# Patient Record
Sex: FEMALE | Race: WHITE | NOT HISPANIC OR LATINO | Employment: OTHER | URBAN - METROPOLITAN AREA
[De-identification: names, ages, dates, MRNs, and addresses within clinical notes are randomized per-mention and may not be internally consistent; named-entity substitution may affect disease eponyms.]

---

## 2018-01-31 ENCOUNTER — TRANSCRIBE ORDERS (OUTPATIENT)
Dept: ADMINISTRATIVE | Facility: HOSPITAL | Age: 58
End: 2018-01-31

## 2018-01-31 DIAGNOSIS — Z12.39 SCREENING BREAST EXAMINATION: Primary | ICD-10-CM

## 2018-02-22 ENCOUNTER — HOSPITAL ENCOUNTER (OUTPATIENT)
Dept: RADIOLOGY | Facility: MEDICAL CENTER | Age: 58
Discharge: HOME | End: 2018-02-22
Payer: COMMERCIAL

## 2018-02-22 DIAGNOSIS — Z12.39 SCREENING BREAST EXAMINATION: ICD-10-CM

## 2018-02-22 PROCEDURE — 77067 SCR MAMMO BI INCL CAD: CPT

## 2018-03-13 NOTE — PROGRESS NOTES
called and spoke with nurse Cirilo Huizar at [facility], [address] [place] [address] office on 3/13/18 concerning  unsuccessful attempts (on 2/23/18 and 3/02/18) at reaching Ms Hensley for diagnostic call back breast imaging  Also a Patient result letter was sent on 2/26/18  Per nurse Cirilo Huizar the office is currently working on getting a breast MRI approved through patients insurance

## 2018-03-26 ENCOUNTER — HOSPITAL ENCOUNTER (OUTPATIENT)
Dept: MAMMOGRAPHY | Facility: CLINIC | Age: 58
Discharge: HOME | End: 2018-03-26
Payer: COMMERCIAL

## 2018-03-26 ENCOUNTER — HOSPITAL ENCOUNTER (OUTPATIENT)
Dept: ULTRASOUND IMAGING | Facility: CLINIC | Age: 58
Discharge: HOME | End: 2018-03-26
Payer: COMMERCIAL

## 2018-03-26 ENCOUNTER — HOSPITAL ENCOUNTER (OUTPATIENT)
Dept: ULTRASOUND IMAGING | Facility: CLINIC | Age: 58
Discharge: HOME | End: 2018-03-26

## 2018-03-26 DIAGNOSIS — R92.8 ABNORMAL SCREENING MAMMOGRAM: ICD-10-CM

## 2018-03-26 PROCEDURE — G0279 TOMOSYNTHESIS, MAMMO: HCPCS

## 2018-03-26 PROCEDURE — 77065 DX MAMMO INCL CAD UNI: CPT

## 2018-03-26 PROCEDURE — 76642 ULTRASOUND BREAST LIMITED: CPT

## 2018-03-26 RX ORDER — LIDOCAINE HYDROCHLORIDE 10 MG/ML
5 INJECTION, SOLUTION INFILTRATION; PERINEURAL ONCE
Status: DISCONTINUED | OUTPATIENT
Start: 2018-03-26 | End: 2018-03-26

## 2018-03-26 NOTE — PROGRESS NOTES
Patient was going to have biopsy today however on pre-procedure VS screen pt BP noted to be 191/118, P 103, 0/10 pain, Dr Remi Glover adv of pt blood pressure, adv against performing procedure with pt blood pressure, adv to contact pt PCP, Dr Mio Pastrana office contacted, adv will see pt right away, pt and pt  adv of plan, given information and adv to contact tomorrow to schedule procedure after PCP appt

## 2018-03-26 NOTE — PROGRESS NOTES
Met with patient and Dr Nadir Rose regarding recommendation for;      _____ RIGHT ___X___LEFT      __X___Ultrasound guided  ______Stereotactic  Breast biopsy  __X___Verbalized understanding        Blood thinners:  _____yes ___X__no    Date stopped: _____N/A______    Biopsy teaching sheet given:  ___X___yes ______no

## 2018-04-03 ENCOUNTER — HOSPITAL ENCOUNTER (OUTPATIENT)
Dept: MAMMOGRAPHY | Facility: CLINIC | Age: 58
Discharge: HOME | End: 2018-04-03

## 2018-04-03 ENCOUNTER — HOSPITAL ENCOUNTER (OUTPATIENT)
Dept: ULTRASOUND IMAGING | Facility: CLINIC | Age: 58
Discharge: HOME | End: 2018-04-03
Admitting: RADIOLOGY
Payer: COMMERCIAL

## 2018-04-03 ENCOUNTER — HOSPITAL ENCOUNTER (OUTPATIENT)
Dept: ULTRASOUND IMAGING | Facility: CLINIC | Age: 58
Discharge: HOME | End: 2018-04-03

## 2018-04-03 VITALS — HEART RATE: 57 BPM | DIASTOLIC BLOOD PRESSURE: 54 MMHG | SYSTOLIC BLOOD PRESSURE: 82 MMHG

## 2018-04-03 DIAGNOSIS — R92.8 ABNORMAL MAMMOGRAM: ICD-10-CM

## 2018-04-03 DIAGNOSIS — R93.89 ABNORMAL ULTRASOUND: ICD-10-CM

## 2018-04-03 PROCEDURE — 76942 ECHO GUIDE FOR BIOPSY: CPT

## 2018-04-03 PROCEDURE — 88341 IMHCHEM/IMCYTCHM EA ADD ANTB: CPT | Performed by: PATHOLOGY

## 2018-04-03 PROCEDURE — 88305 TISSUE EXAM BY PATHOLOGIST: CPT | Performed by: PATHOLOGY

## 2018-04-03 PROCEDURE — 88342 IMHCHEM/IMCYTCHM 1ST ANTB: CPT | Performed by: PATHOLOGY

## 2018-04-03 PROCEDURE — 38505 NEEDLE BIOPSY LYMPH NODES: CPT

## 2018-04-03 PROCEDURE — 19084 BX BREAST ADD LESION US IMAG: CPT

## 2018-04-03 PROCEDURE — 88361 TUMOR IMMUNOHISTOCHEM/COMPUT: CPT | Performed by: PATHOLOGY

## 2018-04-03 PROCEDURE — 19083 BX BREAST 1ST LESION US IMAG: CPT

## 2018-04-03 RX ORDER — VERAPAMIL HYDROCHLORIDE 240 MG/1
240 TABLET, FILM COATED, EXTENDED RELEASE ORAL 2 TIMES DAILY
COMMUNITY
End: 2018-12-21 | Stop reason: HOSPADM

## 2018-04-03 RX ORDER — LIDOCAINE HYDROCHLORIDE 10 MG/ML
5 INJECTION, SOLUTION INFILTRATION; PERINEURAL ONCE
Status: COMPLETED | OUTPATIENT
Start: 2018-04-03 | End: 2018-04-03

## 2018-04-03 RX ORDER — SUMATRIPTAN 100 MG/1
100 TABLET, FILM COATED ORAL ONCE AS NEEDED
COMMUNITY

## 2018-04-03 RX ORDER — VENLAFAXINE 75 MG/1
75 TABLET ORAL
COMMUNITY
End: 2018-05-30 | Stop reason: HOSPADM

## 2018-04-03 RX ORDER — ALPRAZOLAM 0.25 MG/1
0.25 TABLET ORAL 2 TIMES DAILY PRN
COMMUNITY
End: 2018-06-04 | Stop reason: SDDI

## 2018-04-03 RX ADMIN — LIDOCAINE HYDROCHLORIDE 5 ML: 10 INJECTION, SOLUTION INFILTRATION; PERINEURAL at 15:21

## 2018-04-03 RX ADMIN — LIDOCAINE HYDROCHLORIDE 5 ML: 10 INJECTION, SOLUTION INFILTRATION; PERINEURAL at 14:53

## 2018-04-03 RX ADMIN — LIDOCAINE HYDROCHLORIDE 5 ML: 10 INJECTION, SOLUTION INFILTRATION; PERINEURAL at 15:07

## 2018-04-03 NOTE — PROGRESS NOTES
Met with patient and Dr Mauricio Benson regarding recommendation for;      _____ RIGHT __X____LEFT      __X___Ultrasound guided  ______Stereotactic  Breast biopsy  __X___Verbalized understanding        Blood thinners:  _____yes __X___no    Date stopped: ____N/A_______    Biopsy teaching sheet given:  ___X____yes ______no

## 2018-04-03 NOTE — DISCHARGE INSTR - OTHER ORDERS
POST LARGE CORE BREAST BIOPSY PATIENT INFORMATION      1  Place an ice pack inside your bra over the top of the dressing every hour for 20 minutes (20 minutes on, 60 minutes off) Do this until bedtime  2  Do not shower or bathe until the following morning       3  You may bathe your breast carefully with the steri-strips in place  Be careful    Not to loosen them  The steri-strips will fall off in 3-5 days  4  You may have mild discomfort, and you may have some bruising where the   Needle entered the skin  This should clear within 5-7 days  5  If you need medicine for discomfort, take acetaminophen products such as   Tylenol  You may also take Advil or Motrin products  6  Do not participate in strenuous activities such as-tennis, aerobics, skiing,  Weight lifting, etc  for 24 hours  Refrain from swimming for 72 hours  7  Wearing a bra for sleeping may be more comfortable for the first 24-48 hours  8  Watch for continued bleeding, pain or fever over 101     For procedures done at the [address] [place] [address] call:  Zena Mahan RN at [phone], Baldo Franklin RN at [phone] or Laurie Zhou RN at [phone]  After 4 PM call the Interventional Radiology Department at [phone] and ask to speak with the nurse on call  For procedures performed at [address] [place] [address] call: The Radiology Nurse at [phone]    After 4 PM call your physician, or go to the Emergency Department  9          The final results of your biopsy are usually available within one week

## 2018-04-03 NOTE — PROGRESS NOTES
Ice pack given:    ___X__yes _____no    Discharge instructions signed by patient:    ___X__yes _____no    Discharge instructions given to patient:    __X___yes _____no    Discharged via:    _____amulatory    _____wheelchair    _____stretcher    Stable on discharge:    ___X__yes ____no

## 2018-04-03 NOTE — PROGRESS NOTES
Patient arrived via:    __X___ambulatory    _____wheelchair    _____stretcher      Domestic violence screen    ___X___negative______positive    Breast Implants:    _______yes ____X____no

## 2018-04-04 NOTE — PROGRESS NOTES
Post procedure call completed    Bleeding: _____yes __X___no    Pain: _____yes ___X___no    Redness/Swelling: ______yes __X____no    Band aid removed: _____yes ___X__no    Steri-Strips intact: ___X___yes _____no

## 2018-04-04 NOTE — PROGRESS NOTES
Procedure type: (Site A)    __X___ultrasound guided _____stereotactic    Breast:    __X___Left _____Right    Location: 6:00 5cm FN    Needle: 12g Marquee    # of passes: 4    Clip: Wing    Performed by: Oval Freud    Pressure held for 5 minutes by: Clois Rich    Steri Strips:    __X___yes _____no    Band aid:    __X___yes_____no    Tape and guaze:    _____yes __X___no    Tolerated procedure:    __X___yes _____no      Procedure type: (Site B)    __X___ultrasound guided _____stereotactic    Breast:    __X___Left _____Right    Location: 11:00 4cm FN    Needle: 12g Marquee    # of passes: 4    Clip: Cylinder    Performed by: Oval Freud    Pressure held for 5 minutes by: Clois Rich    Steri Strips:    ___X__yes _____no    Alex Apley aid:    __X___yes_____no    Tape and guaze:    _____yes __X___no    Tolerated procedure:    __X___yes _____no      Procedure type: (Site C)    __X___ultrasound guided _____stereotactic    Breast:    ___X__Left _____Right    Location: L Axilla    Needle: Bard 14g    # of passes: 2    Clip: Top Hat    Performed by: Oval Freud    Pressure held for 5 minutes by: Clois Rich    Steri Strips:    ___X__yes _____no    Band aid:    __X___yes_____no    Tape and guaze:    _____yes __X___no    Tolerated procedure:    __X___yes _____no

## 2018-04-06 NOTE — PROGRESS NOTES
Patient Past Medical History: Anxiety, HTN, Migraines    Allergies: NKDA    Past Breast History:     Previous Biopsy:   Yes______ No____X____      Breast: Right____ Left______       Malignant______ Benign_______      Treatments if applicable        Present Breast History: Invasive lobular carcinoma with L axillary metastatic carcinoma     Right__________    Left____X________     Density_________    Calcifications____________   Palpable______________      Patient notified of results on: 4/6/18    Date of Appointment with Surgeon: Dr Macie Chery 4/10/18 at

## 2018-04-10 ENCOUNTER — APPOINTMENT (OUTPATIENT)
Dept: LAB | Facility: CLINIC | Age: 58
End: 2018-04-10
Payer: COMMERCIAL

## 2018-04-10 ENCOUNTER — OFFICE VISIT (OUTPATIENT)
Dept: SURGICAL ONCOLOGY | Facility: CLINIC | Age: 58
End: 2018-04-10
Payer: COMMERCIAL

## 2018-04-10 VITALS
DIASTOLIC BLOOD PRESSURE: 98 MMHG | BODY MASS INDEX: 26.91 KG/M2 | TEMPERATURE: 97.5 F | RESPIRATION RATE: 16 BRPM | SYSTOLIC BLOOD PRESSURE: 160 MMHG | HEIGHT: 65 IN | WEIGHT: 161.5 LBS | HEART RATE: 84 BPM

## 2018-04-10 DIAGNOSIS — C50.812 MALIGNANT NEOPLASM OF OVERLAPPING SITES OF LEFT BREAST IN FEMALE, ESTROGEN RECEPTOR POSITIVE (HCC): Primary | ICD-10-CM

## 2018-04-10 DIAGNOSIS — C50.812 MALIGNANT NEOPLASM OF OVERLAPPING SITES OF LEFT BREAST IN FEMALE, ESTROGEN RECEPTOR POSITIVE (HCC): ICD-10-CM

## 2018-04-10 DIAGNOSIS — Z17.0 MALIGNANT NEOPLASM OF OVERLAPPING SITES OF LEFT BREAST IN FEMALE, ESTROGEN RECEPTOR POSITIVE (HCC): Primary | ICD-10-CM

## 2018-04-10 DIAGNOSIS — Z17.0 MALIGNANT NEOPLASM OF OVERLAPPING SITES OF LEFT BREAST IN FEMALE, ESTROGEN RECEPTOR POSITIVE (HCC): ICD-10-CM

## 2018-04-10 LAB
BUN SERPL-MCNC: 22 MG/DL (ref 5–25)
CREAT SERPL-MCNC: 1.38 MG/DL (ref 0.6–1.3)
GFR SERPL CREATININE-BSD FRML MDRD: 43 ML/MIN/1.73SQ M

## 2018-04-10 PROCEDURE — 84520 ASSAY OF UREA NITROGEN: CPT

## 2018-04-10 PROCEDURE — 82565 ASSAY OF CREATININE: CPT

## 2018-04-10 PROCEDURE — 99245 OFF/OP CONSLTJ NEW/EST HI 55: CPT | Performed by: SURGERY

## 2018-04-10 PROCEDURE — 36415 COLL VENOUS BLD VENIPUNCTURE: CPT

## 2018-04-10 NOTE — LETTER
April 10, 2018     Consuelo [facility], [address]  [facility]    Patient: Isa Iglesias   YOB: 1960   Date of Visit: 4/10/2018       Dear Dr José Ramirez: Thank you for referring Olayinka Eddy to me for evaluation  Below are my notes for this consultation  If you have questions, please do not hesitate to call me  I look forward to following your patient along with you  Sincerely,        Melanie Hill MD        CC: No Recipients  Melanie Hill MD  4/10/2018 11:31 AM  Sign at close encounter               Surgical Oncology Follow Up       [address] [place] [id number]    Isa Iglesias  1960  [id number]  [address] [place] [address],[address]  [facility] [address] [address]      Chief Complaint:     Chief Complaint   Patient presents with    Breast Cancer       Assessment and Plan:   Assessment/Plan   Left invasive breast cancer, stage II (T2, N1) multifocal  Metastatic workup, if negative left modified radical mastectomy followed by chemotherapy and radiation therapy    Oncology History:        Malignant neoplasm of overlapping sites of left breast in female, estrogen receptor positive ([facility] [address] )    4/3/2018 Initial Diagnosis     Left breast biopsy  4 6 cm in size on ultrasound largest tumor   Site A:  6:00 o clock 5 CMFN-Invasive Lobular grade 2  Site B:  11:00 o'clock 4 CMFN Invasive Lobular grade 2  Lymph node:  Positive for metastatic cancer  ER 70  OK 15  Her 2 1+   Performed on site A specimen  Stage IIA            History of Present Illness: This is a 45-year-old woman who had a mammogram in the distant past which was negative  She recently appreciated a palpable abnormality in her left breast and sought medical attention    She had a diagnostic mammogram which showed no abnormalities on the right side however on the left side there were 2 masses both measuring over 4 cm, she also had suspicious adenopathy with lymph nodes measuring approximately 1 2 cm in size 1 lymph node in 2 of the masses were biopsied  They all came back with breast cancer  The breast cancer is invasive lobular, ER 70% NV 15%, grade 2, HER2 1+/negative  Patient presents now for an opinion regarding further management  She has no family history of breast cancer  Review of Systems:   Review of Systems   Constitutional: Negative for activity change, appetite change, fatigue and unexpected weight change  HENT: Negative for ear pain, tinnitus, trouble swallowing and voice change  Eyes: Negative for pain and visual disturbance  Respiratory: Negative for cough, shortness of breath, wheezing and stridor  Cardiovascular: Negative for chest pain, palpitations and leg swelling  Gastrointestinal: Negative for abdominal distention, abdominal pain and blood in stool  Endocrine: Negative for cold intolerance and heat intolerance  Genitourinary: Negative for difficulty urinating, dysuria, flank pain and hematuria  Musculoskeletal: Negative for arthralgias, back pain, gait problem and joint swelling  Skin: Negative for color change, rash and wound  Allergic/Immunologic: Negative for immunocompromised state  Neurological: Negative for dizziness, seizures, speech difficulty, weakness and headaches  Hematological: Negative for adenopathy  Psychiatric/Behavioral: Negative for confusion         Past Medical History:      Patient Active Problem List   Diagnosis    Malignant neoplasm of overlapping sites of left breast in female, estrogen receptor positive ([facility] [place] [address] )        Past Medical History:   Diagnosis Date    Hypertension         Past Surgical History:   Procedure Laterality Date    COLON SURGERY          Family History   Problem Relation Age of Onset    No Known Problems Mother     No Known Problems Father         Social History     Social History    Marital status: /Civil Union     Spouse name: N/A    Number of children: N/A    Years of education: N/A     Occupational History    Not on file  Social History Main Topics    Smoking status: Never Smoker    Smokeless tobacco: Never Used    Alcohol use No    Drug use: No    Sexual activity: Not on file     Other Topics Concern    Not on file     Social History Narrative    No narrative on file        Current Outpatient Prescriptions:     ALPRAZolam (XANAX) 0 25 mg tablet, Take 0 25 mg by mouth 2 (two) times a day as needed for anxiety, Disp: , Rfl:     SUMAtriptan (IMITREX) 100 mg tablet, Take 100 mg by mouth once as needed for migraine, Disp: , Rfl:     venlafaxine (EFFEXOR) 75 mg tablet, Take 75 mg by mouth daily, Disp: , Rfl:     verapamil (CALAN-SR) 240 mg CR tablet, Take 240 mg by mouth 2 (two) times a day, Disp: , Rfl:    No Known Allergies    Physical Exam:     Vitals:    04/10/18 1048   BP: 160/98   Pulse: 84   Resp: 16   Temp: 97 5 °F (36 4 °C)     Physical Exam   Constitutional: She is oriented to person, place, and time  She appears well-developed and well-nourished  HENT:   Head: Normocephalic and atraumatic  Mouth/Throat: Oropharynx is clear and moist    Eyes: EOM are normal  Pupils are equal, round, and reactive to light  Neck: Normal range of motion  Neck supple  No JVD present  No tracheal deviation present  No thyromegaly present  Cardiovascular: Normal rate, regular rhythm, normal heart sounds and intact distal pulses  Exam reveals no gallop and no friction rub  No murmur heard  Pulmonary/Chest: Effort normal and breath sounds normal  No respiratory distress  She has no wheezes  She has no rales  Examination the right breast was entirely normal, specifically no dominant masses skin changes or axillary adenopathy  Examination of the left breast demonstrates considerable ecchymosis    There is a large dominant mass in the mid to lower inner quadrant with additional mass above and lateral to this  There is subtle adenopathy on clinical exam which is consistent with her biopsy  Abdominal: Soft  She exhibits no distension and no mass  There is no hepatomegaly  There is no tenderness  There is no rebound and no guarding  Musculoskeletal: Normal range of motion  She exhibits no edema or tenderness  Lymphadenopathy:     She has no cervical adenopathy  Neurological: She is alert and oriented to person, place, and time  No cranial nerve deficit  Skin: Skin is warm and dry  No rash noted  No erythema  Psychiatric: She has a normal mood and affect  Her behavior is normal    Vitals reviewed  Results:   Pathology:  Juancarlos stage IIA left invasive lobular carcinoma, grade 2  Imaging  I reviewed her mammogram ultrasound and post biopsy films  Discussion/Summary:   Clinically this is a stage II however on clinical exam she has relatively extensive disease consequently I have recommended a metastatic workup  One lymph node was biopsied though at least 3 were suspicious on ultrasound exam   Presuming she has no metastatic disease and I have recommended a modified radical mastectomy followed by chemotherapy and radiation therapy  We talked about possible reconstruction though I would recommend this initially  I explained the patient could be reconstructed later after everything was over though she is not interested did this  We will see her back following her metastatic workup for definitive surgical planning  All questions were answered the patient's satisfaction  Advance Care Planning/Advance Directives:  I discussed the disease status, treatment plans and follow-up with the patient

## 2018-04-10 NOTE — PROGRESS NOTES
Surgical Oncology Follow Up       [facility] [address] [address]    Betsey Reynoso  1960  [id number]  [address] [address] [address]      Chief Complaint:     Chief Complaint   Patient presents with    Breast Cancer       Assessment and Plan:   Assessment/Plan   Left invasive breast cancer, stage II (T2, N1) multifocal  Metastatic workup, if negative left modified radical mastectomy followed by chemotherapy and radiation therapy    Oncology History:        Malignant neoplasm of overlapping sites of left breast in female, estrogen receptor positive ([facility] [address] )    4/3/2018 Initial Diagnosis     Left breast biopsy  4 6 cm in size on ultrasound largest tumor   Site A:  6:00 o clock 5 CMFN-Invasive Lobular grade 2  Site B:  11:00 o'clock 4 CMFN Invasive Lobular grade 2  Lymph node:  Positive for metastatic cancer  ER 70  NV 15  Her 2 1+   Performed on site A specimen  Stage IIA            History of Present Illness: This is a 52-year-old woman who had a mammogram in the distant past which was negative  She recently appreciated a palpable abnormality in her left breast and sought medical attention  She had a diagnostic mammogram which showed no abnormalities on the right side however on the left side there were 2 masses both measuring over 4 cm, she also had suspicious adenopathy with lymph nodes measuring approximately 1 2 cm in size 1 lymph node in 2 of the masses were biopsied  They all came back with breast cancer  The breast cancer is invasive lobular, ER 70% NV 15%, grade 2, HER2 1+/negative  Patient presents now for an opinion regarding further management  She has no family history of breast cancer      Review of Systems:   Review of Systems   Constitutional: Negative for activity change, appetite change, fatigue and unexpected weight change  HENT: Negative for ear pain, tinnitus, trouble swallowing and voice change  Eyes: Negative for pain and visual disturbance  Respiratory: Negative for cough, shortness of breath, wheezing and stridor  Cardiovascular: Negative for chest pain, palpitations and leg swelling  Gastrointestinal: Negative for abdominal distention, abdominal pain and blood in stool  Endocrine: Negative for cold intolerance and heat intolerance  Genitourinary: Negative for difficulty urinating, dysuria, flank pain and hematuria  Musculoskeletal: Negative for arthralgias, back pain, gait problem and joint swelling  Skin: Negative for color change, rash and wound  Allergic/Immunologic: Negative for immunocompromised state  Neurological: Negative for dizziness, seizures, speech difficulty, weakness and headaches  Hematological: Negative for adenopathy  Psychiatric/Behavioral: Negative for confusion  Past Medical History:      Patient Active Problem List   Diagnosis    Malignant neoplasm of overlapping sites of left breast in female, estrogen receptor positive ([facility] [address] )        Past Medical History:   Diagnosis Date    Hypertension         Past Surgical History:   Procedure Laterality Date    COLON SURGERY          Family History   Problem Relation Age of Onset    No Known Problems Mother     No Known Problems Father         Social History     Social History    Marital status: /Civil Union     Spouse name: N/A    Number of children: N/A    Years of education: N/A     Occupational History    Not on file       Social History Main Topics    Smoking status: Never Smoker    Smokeless tobacco: Never Used    Alcohol use No    Drug use: No    Sexual activity: Not on file     Other Topics Concern    Not on file     Social History Narrative    No narrative on file        Current Outpatient Prescriptions:     ALPRAZolam (XANAX) 0 25 mg tablet, Take 0 25 mg by mouth 2 (two) times a day as needed for anxiety, Disp: , Rfl:     SUMAtriptan (IMITREX) 100 mg tablet, Take 100 mg by mouth once as needed for migraine, Disp: , Rfl:     venlafaxine (EFFEXOR) 75 mg tablet, Take 75 mg by mouth daily, Disp: , Rfl:     verapamil (CALAN-SR) 240 mg CR tablet, Take 240 mg by mouth 2 (two) times a day, Disp: , Rfl:    No Known Allergies    Physical Exam:     Vitals:    04/10/18 1048   BP: 160/98   Pulse: 84   Resp: 16   Temp: 97 5 °F (36 4 °C)     Physical Exam   Constitutional: She is oriented to person, place, and time  She appears well-developed and well-nourished  HENT:   Head: Normocephalic and atraumatic  Mouth/Throat: Oropharynx is clear and moist    Eyes: EOM are normal  Pupils are equal, round, and reactive to light  Neck: Normal range of motion  Neck supple  No JVD present  No tracheal deviation present  No thyromegaly present  Cardiovascular: Normal rate, regular rhythm, normal heart sounds and intact distal pulses  Exam reveals no gallop and no friction rub  No murmur heard  Pulmonary/Chest: Effort normal and breath sounds normal  No respiratory distress  She has no wheezes  She has no rales  Examination the right breast was entirely normal, specifically no dominant masses skin changes or axillary adenopathy  Examination of the left breast demonstrates considerable ecchymosis  There is a large dominant mass in the mid to lower inner quadrant with additional mass above and lateral to this  There is subtle adenopathy on clinical exam which is consistent with her biopsy  Abdominal: Soft  She exhibits no distension and no mass  There is no hepatomegaly  There is no tenderness  There is no rebound and no guarding  Musculoskeletal: Normal range of motion  She exhibits no edema or tenderness  Lymphadenopathy:     She has no cervical adenopathy  Neurological: She is alert and oriented to person, place, and time  No cranial nerve deficit  Skin: Skin is warm and dry  No rash noted   No erythema  Psychiatric: She has a normal mood and affect  Her behavior is normal    Vitals reviewed  Results:   Pathology:  Juancarlos stage IIA left invasive lobular carcinoma, grade 2  Imaging  I reviewed her mammogram ultrasound and post biopsy films  Discussion/Summary:   Clinically this is a stage II however on clinical exam she has relatively extensive disease consequently I have recommended a metastatic workup  One lymph node was biopsied though at least 3 were suspicious on ultrasound exam   Presuming she has no metastatic disease and I have recommended a modified radical mastectomy followed by chemotherapy and radiation therapy  We talked about possible reconstruction though I would recommend this initially  I explained the patient could be reconstructed later after everything was over though she is not interested did this  We will see her back following her metastatic workup for definitive surgical planning  All questions were answered the patient's satisfaction  Advance Care Planning/Advance Directives:  I discussed the disease status, treatment plans and follow-up with the patient

## 2018-04-11 ENCOUNTER — HOSPITAL ENCOUNTER (OUTPATIENT)
Dept: NUCLEAR MEDICINE | Facility: HOSPITAL | Age: 58
Discharge: HOME | End: 2018-04-11
Attending: SURGERY
Payer: COMMERCIAL

## 2018-04-11 ENCOUNTER — TELEPHONE (OUTPATIENT)
Dept: SURGICAL ONCOLOGY | Facility: CLINIC | Age: 58
End: 2018-04-11

## 2018-04-11 ENCOUNTER — APPOINTMENT (OUTPATIENT)
Dept: NUCLEAR MEDICINE | Facility: HOSPITAL | Age: 58
End: 2018-04-11
Attending: SURGERY
Payer: COMMERCIAL

## 2018-04-11 ENCOUNTER — HOSPITAL ENCOUNTER (OUTPATIENT)
Dept: CT IMAGING | Facility: HOSPITAL | Age: 58
Discharge: HOME | End: 2018-04-11
Attending: SURGERY
Payer: COMMERCIAL

## 2018-04-11 DIAGNOSIS — C50.812 MALIGNANT NEOPLASM OF OVERLAPPING SITES OF LEFT BREAST IN FEMALE, ESTROGEN RECEPTOR POSITIVE (HCC): ICD-10-CM

## 2018-04-11 DIAGNOSIS — Z17.0 MALIGNANT NEOPLASM OF OVERLAPPING SITES OF LEFT BREAST IN FEMALE, ESTROGEN RECEPTOR POSITIVE (HCC): ICD-10-CM

## 2018-04-11 PROCEDURE — A9503 TC99M MEDRONATE: HCPCS

## 2018-04-11 PROCEDURE — 74177 CT ABD & PELVIS W/CONTRAST: CPT

## 2018-04-11 PROCEDURE — 78306 BONE IMAGING WHOLE BODY: CPT

## 2018-04-11 PROCEDURE — 71260 CT THORAX DX C+: CPT

## 2018-04-11 RX ADMIN — IODIXANOL 80 ML: 320 INJECTION, SOLUTION INTRAVASCULAR at 07:27

## 2018-04-12 ENCOUNTER — TELEPHONE (OUTPATIENT)
Dept: UROLOGY | Facility: AMBULATORY SURGERY CENTER | Age: 58
End: 2018-04-12

## 2018-04-12 ENCOUNTER — TELEPHONE (OUTPATIENT)
Dept: SURGICAL ONCOLOGY | Facility: CLINIC | Age: 58
End: 2018-04-12

## 2018-04-12 DIAGNOSIS — N13.5 URETERAL OBSTRUCTION, RIGHT: ICD-10-CM

## 2018-04-12 DIAGNOSIS — Z17.0 MALIGNANT NEOPLASM OF OVERLAPPING SITES OF LEFT BREAST IN FEMALE, ESTROGEN RECEPTOR POSITIVE (HCC): Primary | ICD-10-CM

## 2018-04-12 DIAGNOSIS — N13.1 HYDRONEPHROSIS DUE TO OBSTRUCTION OF URETER: Primary | ICD-10-CM

## 2018-04-12 DIAGNOSIS — C50.812 MALIGNANT NEOPLASM OF OVERLAPPING SITES OF LEFT BREAST IN FEMALE, ESTROGEN RECEPTOR POSITIVE (HCC): Primary | ICD-10-CM

## 2018-04-12 NOTE — TELEPHONE ENCOUNTER
I spoke with her  area regarding her findings of abnormal kidney function as well as the need for an additional test which would be a PET scan  He will try to have her call me later today on my cell phone  In the meantime we will place order for PET scan

## 2018-04-12 NOTE — TELEPHONE ENCOUNTER
Spoke with Dr Paty Juarez office, they would like to set up a new patient appointment for patient  She had cat scan done  Cat scan in Epic- please review and advise  Patient would like [facility]

## 2018-04-12 NOTE — TELEPHONE ENCOUNTER
Pt called and scheduled for appt with Dr Danielle Treviño at [facility] in approximately 2 weeks per Jorge Butler

## 2018-04-16 ENCOUNTER — HOSPITAL ENCOUNTER (OUTPATIENT)
Dept: RADIOLOGY | Age: 58
Discharge: HOME | End: 2018-04-16
Payer: COMMERCIAL

## 2018-04-16 DIAGNOSIS — C50.812 MALIGNANT NEOPLASM OF OVERLAPPING SITES OF LEFT BREAST IN FEMALE, ESTROGEN RECEPTOR POSITIVE (HCC): ICD-10-CM

## 2018-04-16 DIAGNOSIS — Z17.0 MALIGNANT NEOPLASM OF OVERLAPPING SITES OF LEFT BREAST IN FEMALE, ESTROGEN RECEPTOR POSITIVE (HCC): ICD-10-CM

## 2018-04-16 LAB — GLUCOSE SERPL-MCNC: 106 MG/DL (ref 65–140)

## 2018-04-16 PROCEDURE — 78815 PET IMAGE W/CT SKULL-THIGH: CPT

## 2018-04-16 PROCEDURE — A9552 F18 FDG: HCPCS

## 2018-04-16 PROCEDURE — 82948 REAGENT STRIP/BLOOD GLUCOSE: CPT

## 2018-04-16 RX ADMIN — IOHEXOL 5 ML: 240 INJECTION, SOLUTION INTRATHECAL; INTRAVASCULAR; INTRAVENOUS; ORAL at 13:30

## 2018-04-17 ENCOUNTER — TELEPHONE (OUTPATIENT)
Dept: SURGICAL ONCOLOGY | Facility: CLINIC | Age: 58
End: 2018-04-17

## 2018-04-17 NOTE — TELEPHONE ENCOUNTER
I contacted the urologist that Pete is going to see on 4/30 and described the CT findings of the kidney, I explained that she is asymptomatic  There is nothing we need to do prior to surgery, but possibly if she undergoes chemotherapy

## 2018-04-20 ENCOUNTER — TRANSCRIBE ORDERS (OUTPATIENT)
Dept: LAB | Facility: CLINIC | Age: 58
End: 2018-04-20

## 2018-04-20 ENCOUNTER — APPOINTMENT (OUTPATIENT)
Dept: LAB | Facility: CLINIC | Age: 58
End: 2018-04-20
Payer: COMMERCIAL

## 2018-04-20 ENCOUNTER — OFFICE VISIT (OUTPATIENT)
Dept: SURGICAL ONCOLOGY | Facility: CLINIC | Age: 58
End: 2018-04-20
Payer: COMMERCIAL

## 2018-04-20 ENCOUNTER — OFFICE VISIT (OUTPATIENT)
Dept: LAB | Facility: CLINIC | Age: 58
End: 2018-04-20
Payer: COMMERCIAL

## 2018-04-20 VITALS
DIASTOLIC BLOOD PRESSURE: 110 MMHG | HEIGHT: 65 IN | WEIGHT: 158 LBS | SYSTOLIC BLOOD PRESSURE: 180 MMHG | HEART RATE: 94 BPM | TEMPERATURE: 98.1 F | RESPIRATION RATE: 16 BRPM | BODY MASS INDEX: 26.33 KG/M2

## 2018-04-20 DIAGNOSIS — C50.812 MALIGNANT NEOPLASM OF OVERLAPPING SITES OF LEFT BREAST IN FEMALE, ESTROGEN RECEPTOR POSITIVE (HCC): Primary | ICD-10-CM

## 2018-04-20 DIAGNOSIS — C50.812 MALIGNANT NEOPLASM OF OVERLAPPING SITES OF LEFT BREAST IN FEMALE, ESTROGEN RECEPTOR POSITIVE (HCC): ICD-10-CM

## 2018-04-20 DIAGNOSIS — Z17.0 MALIGNANT NEOPLASM OF OVERLAPPING SITES OF LEFT BREAST IN FEMALE, ESTROGEN RECEPTOR POSITIVE (HCC): ICD-10-CM

## 2018-04-20 DIAGNOSIS — Z17.0 MALIGNANT NEOPLASM OF OVERLAPPING SITES OF LEFT BREAST IN FEMALE, ESTROGEN RECEPTOR POSITIVE (HCC): Primary | ICD-10-CM

## 2018-04-20 LAB
ALBUMIN SERPL BCP-MCNC: 4.5 G/DL (ref 3.5–5)
ALP SERPL-CCNC: 91 U/L (ref 46–116)
ALT SERPL W P-5'-P-CCNC: 44 U/L (ref 12–78)
ANION GAP SERPL CALCULATED.3IONS-SCNC: 12 MMOL/L (ref 4–13)
AST SERPL W P-5'-P-CCNC: 42 U/L (ref 5–45)
BASOPHILS # BLD AUTO: 0.04 THOUSANDS/ΜL (ref 0–0.1)
BASOPHILS NFR BLD AUTO: 1 % (ref 0–1)
BILIRUB SERPL-MCNC: 0.5 MG/DL (ref 0.2–1)
BUN SERPL-MCNC: 23 MG/DL (ref 5–25)
CALCIUM SERPL-MCNC: 9.2 MG/DL (ref 8.3–10.1)
CHLORIDE SERPL-SCNC: 99 MMOL/L (ref 100–108)
CO2 SERPL-SCNC: 28 MMOL/L (ref 21–32)
CREAT SERPL-MCNC: 1.42 MG/DL (ref 0.6–1.3)
EOSINOPHIL # BLD AUTO: 0.26 THOUSAND/ΜL (ref 0–0.61)
EOSINOPHIL NFR BLD AUTO: 5 % (ref 0–6)
ERYTHROCYTE [DISTWIDTH] IN BLOOD BY AUTOMATED COUNT: 12.9 % (ref 11.6–15.1)
GFR SERPL CREATININE-BSD FRML MDRD: 41 ML/MIN/1.73SQ M
GLUCOSE SERPL-MCNC: 102 MG/DL (ref 65–140)
HCT VFR BLD AUTO: 42.7 % (ref 34.8–46.1)
HGB BLD-MCNC: 14.6 G/DL (ref 11.5–15.4)
LYMPHOCYTES # BLD AUTO: 0.92 THOUSANDS/ΜL (ref 0.6–4.47)
LYMPHOCYTES NFR BLD AUTO: 17 % (ref 14–44)
MCH RBC QN AUTO: 33.7 PG (ref 26.8–34.3)
MCHC RBC AUTO-ENTMCNC: 34.2 G/DL (ref 31.4–37.4)
MCV RBC AUTO: 99 FL (ref 82–98)
MONOCYTES # BLD AUTO: 0.39 THOUSAND/ΜL (ref 0.17–1.22)
MONOCYTES NFR BLD AUTO: 7 % (ref 4–12)
NEUTROPHILS # BLD AUTO: 3.8 THOUSANDS/ΜL (ref 1.85–7.62)
NEUTS SEG NFR BLD AUTO: 70 % (ref 43–75)
PLATELET # BLD AUTO: 270 THOUSANDS/UL (ref 149–390)
PMV BLD AUTO: 10.7 FL (ref 8.9–12.7)
POTASSIUM SERPL-SCNC: 4.5 MMOL/L (ref 3.5–5.3)
PROT SERPL-MCNC: 8.7 G/DL (ref 6.4–8.2)
RBC # BLD AUTO: 4.33 MILLION/UL (ref 3.81–5.12)
SODIUM SERPL-SCNC: 139 MMOL/L (ref 136–145)
WBC # BLD AUTO: 5.41 THOUSAND/UL (ref 4.31–10.16)

## 2018-04-20 PROCEDURE — 36415 COLL VENOUS BLD VENIPUNCTURE: CPT

## 2018-04-20 PROCEDURE — 85025 COMPLETE CBC W/AUTO DIFF WBC: CPT

## 2018-04-20 PROCEDURE — 99214 OFFICE O/P EST MOD 30 MIN: CPT | Performed by: SURGERY

## 2018-04-20 PROCEDURE — 93005 ELECTROCARDIOGRAM TRACING: CPT

## 2018-04-20 PROCEDURE — 80053 COMPREHEN METABOLIC PANEL: CPT

## 2018-04-20 RX ORDER — OXYCODONE HYDROCHLORIDE AND ACETAMINOPHEN 5; 325 MG/1; MG/1
1 TABLET ORAL EVERY 6 HOURS PRN
Qty: 28 TABLET | Refills: 0 | Status: SHIPPED | OUTPATIENT
Start: 2018-04-20 | End: 2018-06-04 | Stop reason: ALTCHOICE

## 2018-04-20 NOTE — PROGRESS NOTES
Surgical Oncology Follow Up       [address]  [facility] [address] [address]    Fam Guest  1960  [id number]  [address] [facility]  [facility] [place] [id number]    Chief Complaint   Patient presents with    Breast Cancer     Pt is here for a two week follow up          Assessment & Plan:   Assessment/Plan   Left breast invasive lobular carcinoma, node positive  Plan left modified radical mastectomy  Cancer History:        Malignant neoplasm of overlapping sites of left breast in female, estrogen receptor positive ([facility] [address] )    4/3/2018 Initial Diagnosis     Left breast biopsy  4 6 cm in size on ultrasound largest tumor   Site A:  6:00 o clock 5 CMFN-Invasive Lobular grade 2  Site B:  11:00 o'clock 4 CMFN Invasive Lobular grade 2  Lymph node:  Positive for metastatic cancer  ER 70  NV 15  Her 2 1+   Performed on site A specimen  Stage IIA            History of Present Illness:   See above    Interval History:   The patient has had a CT scan which demonstrated possible activity which is been cleared on a PET scan  She also had obstructed ureter which seems to be chronic  She has a follow-up appointment with a urologist I have reviewed this with him and he recommended proceeding with surgery currently  Review of Systems:   Review of Systems   Constitutional: Negative for activity change, appetite change, fatigue and unexpected weight change  HENT: Negative for ear pain, tinnitus, trouble swallowing and voice change  Eyes: Negative for pain and visual disturbance  Respiratory: Negative for cough, shortness of breath, wheezing and stridor  Cardiovascular: Negative for chest pain, palpitations and leg swelling  Gastrointestinal: Negative for abdominal distention, abdominal pain and blood in stool  Endocrine: Negative for cold intolerance and heat intolerance  Genitourinary: Negative for difficulty urinating, dysuria, flank pain and hematuria  Musculoskeletal: Negative for arthralgias, back pain, gait problem and joint swelling  Skin: Negative for color change, rash and wound  Allergic/Immunologic: Negative for immunocompromised state  Neurological: Negative for dizziness, seizures, speech difficulty, weakness and headaches  Hematological: Negative for adenopathy  Psychiatric/Behavioral: Negative for confusion  Past Medical History     Patient Active Problem List   Diagnosis    Malignant neoplasm of overlapping sites of left breast in female, estrogen receptor positive ([facility] [address] )     Past Medical History:   Diagnosis Date    Hypertension     Malignant neoplasm of overlapping sites of left female breast [facility])      Past Surgical History:   Procedure Laterality Date    BREAST BIOPSY      COLON SURGERY       Family History   Problem Relation Age of Onset    No Known Problems Mother     No Known Problems Father      Social History     Social History    Marital status: /Civil Union     Spouse name: N/A    Number of children: N/A    Years of education: N/A     Occupational History    Not on file       Social History Main Topics    Smoking status: Never Smoker    Smokeless tobacco: Never Used    Alcohol use No    Drug use: No    Sexual activity: Not on file     Other Topics Concern    Not on file     Social History Narrative    No narrative on file       Current Outpatient Prescriptions:     ALPRAZolam (XANAX) 0 25 mg tablet, Take 0 25 mg by mouth 2 (two) times a day as needed for anxiety, Disp: , Rfl:     SUMAtriptan (IMITREX) 100 mg tablet, Take 100 mg by mouth once as needed for migraine, Disp: , Rfl:     venlafaxine (EFFEXOR) 75 mg tablet, Take 75 mg by mouth daily, Disp: , Rfl:     verapamil (CALAN-SR) 240 mg CR tablet, Take 240 mg by mouth 2 (two) times a day, Disp: , Rfl:   No Known Allergies    Physical Exam:     Vitals: 04/20/18 1015   BP: (!) 180/110   Pulse: 94   Resp: 16   Temp: 98 1 °F (36 7 °C)     Physical Exam   Constitutional: She is oriented to person, place, and time  She appears well-developed and well-nourished  HENT:   Head: Normocephalic and atraumatic  Mouth/Throat: Oropharynx is clear and moist    Eyes: EOM are normal  Pupils are equal, round, and reactive to light  Neck: Normal range of motion  Neck supple  No JVD present  No tracheal deviation present  No thyromegaly present  Cardiovascular: Normal rate, regular rhythm, normal heart sounds and intact distal pulses  Exam reveals no gallop and no friction rub  No murmur heard  Pulmonary/Chest: Effort normal and breath sounds normal  No respiratory distress  She has no wheezes  She has no rales  The patient has no ecchymosis in the left breast   Her dominant masses remain present she still has subtle clinical adenopathy  Abdominal: Soft  She exhibits no distension and no mass  There is no hepatomegaly  There is no tenderness  There is no rebound and no guarding  Musculoskeletal: Normal range of motion  She exhibits no edema or tenderness  Lymphadenopathy:     She has no cervical adenopathy  Neurological: She is alert and oriented to person, place, and time  No cranial nerve deficit  Skin: Skin is warm and dry  No rash noted  No erythema  Psychiatric: She has a normal mood and affect  Her behavior is normal    Vitals reviewed  Results:   I reviewed the results with the patient for the PET scan as well as the CT scan  Discussion/Summary:   The patient has no evidence of metastatic disease  She will follow up with Urology for her ureter kidney issue  Given the magnitude of the disease in her breast as well as her axilla I have recommended a modified radical mastectomy    I suspect she will need postoperative radiation therapy and possibly chemotherapy consequently I have recommended we delay reconstruction until she completes all of her treatments  Patient is agreeable to this  We subsequently underwent the process of informed consent for a left breast modified radical mastectomy  Advance Care Planning/Advance Directives:  I discussed the disease status, treatment plans and follow-up with the patient

## 2018-04-20 NOTE — LETTER
April 20, 2018     Sorin Majestic, [address]    Patient: Karthik Cottrell   YOB: 1960   Date of Visit: 4/20/2018       Dear Dr Marilin Oliver: Thank you for referring Jaziel Ling to me for evaluation  Below are my notes for this consultation  If you have questions, please do not hesitate to call me  I look forward to following your patient along with you  Sincerely,        Deya Fine MD        CC: No Recipients  Deya Fine MD  4/20/2018 11:07 AM  Sign at close encounter     Surgical Oncology Follow Up       [address] [place] [id number]    Karthik Cottrell  1960  [id number]  [address] [facility],[address]  [facility] [address] [address]    Chief Complaint   Patient presents with    Breast Cancer     Pt is here for a two week follow up          Assessment & Plan:   Assessment/Plan   Left breast invasive lobular carcinoma, node positive  Plan left modified radical mastectomy  Cancer History:        Malignant neoplasm of overlapping sites of left breast in female, estrogen receptor positive ([facility] [address] )    4/3/2018 Initial Diagnosis     Left breast biopsy  4 6 cm in size on ultrasound largest tumor   Site A:  6:00 o clock 5 CMFN-Invasive Lobular grade 2  Site B:  11:00 o'clock 4 CMFN Invasive Lobular grade 2  Lymph node:  Positive for metastatic cancer  ER 70  KS 15  Her 2 1+   Performed on site A specimen  Stage IIA            History of Present Illness:   See above    Interval History:   The patient has had a CT scan which demonstrated possible activity which is been cleared on a PET scan  She also had obstructed ureter which seems to be chronic  She has a follow-up appointment with a urologist I have reviewed this with him and he recommended proceeding with surgery currently      Review of Systems:   Review of Systems   Constitutional: Negative for activity change, appetite change, fatigue and unexpected weight change  HENT: Negative for ear pain, tinnitus, trouble swallowing and voice change  Eyes: Negative for pain and visual disturbance  Respiratory: Negative for cough, shortness of breath, wheezing and stridor  Cardiovascular: Negative for chest pain, palpitations and leg swelling  Gastrointestinal: Negative for abdominal distention, abdominal pain and blood in stool  Endocrine: Negative for cold intolerance and heat intolerance  Genitourinary: Negative for difficulty urinating, dysuria, flank pain and hematuria  Musculoskeletal: Negative for arthralgias, back pain, gait problem and joint swelling  Skin: Negative for color change, rash and wound  Allergic/Immunologic: Negative for immunocompromised state  Neurological: Negative for dizziness, seizures, speech difficulty, weakness and headaches  Hematological: Negative for adenopathy  Psychiatric/Behavioral: Negative for confusion  Past Medical History     Patient Active Problem List   Diagnosis    Malignant neoplasm of overlapping sites of left breast in female, estrogen receptor positive ([facility] [address] )     Past Medical History:   Diagnosis Date    Hypertension     Malignant neoplasm of overlapping sites of left female breast [facility])      Past Surgical History:   Procedure Laterality Date    BREAST BIOPSY      COLON SURGERY       Family History   Problem Relation Age of Onset    No Known Problems Mother     No Known Problems Father      Social History     Social History    Marital status: /Civil Union     Spouse name: N/A    Number of children: N/A    Years of education: N/A     Occupational History    Not on file       Social History Main Topics    Smoking status: Never Smoker    Smokeless tobacco: Never Used    Alcohol use No    Drug use: No    Sexual activity: Not on file     Other Topics Concern    Not on file     Social History Narrative    No narrative on file       Current Outpatient Prescriptions:     ALPRAZolam (XANAX) 0 25 mg tablet, Take 0 25 mg by mouth 2 (two) times a day as needed for anxiety, Disp: , Rfl:     SUMAtriptan (IMITREX) 100 mg tablet, Take 100 mg by mouth once as needed for migraine, Disp: , Rfl:     venlafaxine (EFFEXOR) 75 mg tablet, Take 75 mg by mouth daily, Disp: , Rfl:     verapamil (CALAN-SR) 240 mg CR tablet, Take 240 mg by mouth 2 (two) times a day, Disp: , Rfl:   No Known Allergies    Physical Exam:     Vitals:    04/20/18 1015   BP: (!) 180/110   Pulse: 94   Resp: 16   Temp: 98 1 °F (36 7 °C)     Physical Exam   Constitutional: She is oriented to person, place, and time  She appears well-developed and well-nourished  HENT:   Head: Normocephalic and atraumatic  Mouth/Throat: Oropharynx is clear and moist    Eyes: EOM are normal  Pupils are equal, round, and reactive to light  Neck: Normal range of motion  Neck supple  No JVD present  No tracheal deviation present  No thyromegaly present  Cardiovascular: Normal rate, regular rhythm, normal heart sounds and intact distal pulses  Exam reveals no gallop and no friction rub  No murmur heard  Pulmonary/Chest: Effort normal and breath sounds normal  No respiratory distress  She has no wheezes  She has no rales  The patient has no ecchymosis in the left breast   Her dominant masses remain present she still has subtle clinical adenopathy  Abdominal: Soft  She exhibits no distension and no mass  There is no hepatomegaly  There is no tenderness  There is no rebound and no guarding  Musculoskeletal: Normal range of motion  She exhibits no edema or tenderness  Lymphadenopathy:     She has no cervical adenopathy  Neurological: She is alert and oriented to person, place, and time  No cranial nerve deficit  Skin: Skin is warm and dry  No rash noted  No erythema  Psychiatric: She has a normal mood and affect   Her behavior is normal    Vitals reviewed  Results:   I reviewed the results with the patient for the PET scan as well as the CT scan  Discussion/Summary:   The patient has no evidence of metastatic disease  She will follow up with Urology for her ureter kidney issue  Given the magnitude of the disease in her breast as well as her axilla I have recommended a modified radical mastectomy  I suspect she will need postoperative radiation therapy and possibly chemotherapy consequently I have recommended we delay reconstruction until she completes all of her treatments  Patient is agreeable to this  We subsequently underwent the process of informed consent for a left breast modified radical mastectomy  Advance Care Planning/Advance Directives:  I discussed the disease status, treatment plans and follow-up with the patient

## 2018-04-20 NOTE — PATIENT INSTRUCTIONS
Stefan-Davis Drain Care   AMBULATORY CARE:   A Stefan-Davis (MIMI) drain  is used to remove fluids that build up in an area of your body after surgery  The MIMI drain is a bulb shaped device connected to a tube  One end of the tube is placed inside you during surgery  The other end comes out through a small cut in your skin  The bulb is connected to this end  You may have a stitch to hold the tube in place  Seek care immediately if:   · Your MIMI drain breaks or comes out  · You have cloudy yellow or brown drainage from your MIMI drain site, or the drainage smells bad  Contact your healthcare provider if:   · You drain less than 30 milliliters (2 tablespoons) in 24 hours  This may mean your drain can be removed  · You suddenly stop draining fluid or think your MIMI drain is blocked  · You have a fever higher than 101 5°F (38 6°C)  · You have increased pain, redness, or swelling around the drain site  · You have questions about your MIMI drain care  How a Stefan-Davis drain works: The MIMI drain removes fluids by creating suction in the tube  The bulb is squeezed flat and connected to the tube that sticks out of your body  The bulb expands as it fills with fluid  How to change the bandage around your Stefan-Davis drain:  If you have a bandage, change it once a day  You may need to change your bandage more than once a day if it gets completely wet  · Wash your hands with soap and water  · Loosen the tape and gently remove the old bandage  Throw the old bandage into a plastic trash bag  · Use soap and water or saline (salt water) solution to clean your MIMI drain site  Dip a cotton swab or gauze pad in the solution and gently clean your skin  · Pat the area dry  · Place a new bandage on your MIMI drain site and secure it to your skin with medical tape  · Wash your hands  How to empty the Stefan-Davis drain:  Empty the bulb when it is half full or every 8 to 12 hours    · Wash your hands with soap and water  · Remove the plug from the bulb  · Pour the fluid into a measuring cup  · Clean the plug with an alcohol swab or a cotton ball dipped in rubbing alcohol  · Squeeze the bulb flat and put the plug back in  The bulb should stay flat until it starts to fill with fluid again  · Measure the amount of fluid you pour out  Write down how much fluid you empty from the MIMI drain and the date and time you collected it  · Flush the fluid down the toilet  Wash your hands  Clear clogged tubing: Use the following steps to clear your Stefan-Davis tubing:  · Hold the tubing between your thumb and first finger at the place closest to your skin  This hand will prevent the tube from being pulled out of your skin  · Use your other thumb and first finger to slide the clog down the tubing toward the bulb  You may have to repeat the sliding until the tubing is unclogged  Stefan-Davis drain removal:  The amount of fluid that you drain will decrease as your wound heals  The MIMI drain usually is removed when less than 30 milliliters (2 tablespoons) is collected in 24 hours  Ask your healthcare provider when and how your MIMI drain will be removed  Follow up with your healthcare provider as directed:  Write down your questions so you remember to ask them during your visits  © 2017 [employer] [employer] Information is for End User's use only and may not be sold, redistributed or otherwise used for commercial purposes  All illustrations and images included in CareNotes® are the copyrighted property of [employer]  or [employer] Butterfield  The above information is an  only  It is not intended as medical advice for individual conditions or treatments  Talk to your doctor, nurse or pharmacist before following any medical regimen to see if it is safe and effective for you    Mastectomy   AMBULATORY CARE:   What you need to know about a mastectomy:  A mastectomy is surgery to remove all or part of your breast  Tissue, lymph nodes, or muscle near the breast may also be removed  A mastectomy is done to treat breast cancer and prevent cancer from spreading  A mastectomy can also be done to prevent breast cancer  This may be a choice if you are at high risk for breast cancer  The type of mastectomy you need may depend on the size of the tumor  It may also depend on if the cancer has spread  How to prepare for a mastectomy:  Your healthcare provider will talk to you about how to prepare for surgery  He may tell you not to eat or drink anything after midnight on the day of your surgery  He will tell you what medicines to take or not take on the day of your surgery  You may need to stop taking aspirin or blood thinners several days before surgery  Arrange for someone to drive you home and stay with you after surgery  This person can help care for you and watch for complications from surgery  What will happen during a mastectomy:   · You will be given general anesthesia to keep you asleep and free from pain during surgery  You may be given an antibiotic through your IV to help prevent a bacterial infection  Your healthcare provider will make an incision over your breast  He will remove the tumor and breast tissue  He may also remove muscle from behind your breast  If lymph nodes will be taken, a small incision will be made in your armpit  The lymph nodes will be removed and tested for cancer  · One or more drains may be inserted near your incision  A drain removes extra fluid and helps your incision heal  Your healthcare provider will close your incision with stitches and cover it with a bandage  He may also wrap a tight-fitting bandage around both of your breasts  This may decrease swelling, bleeding, and pain  What will happen after a mastectomy:  Healthcare providers will monitor you until you are awake   You may need to spend 1 to 2 nights in the hospital  You may have difficulty moving your arm closest to your mastectomy  This should get better in a few days  Get out of bed and walk when your healthcare provider says it is safe  This will help prevent blood clots  Risks of a mastectomy:  You may bleed more than expected or get an infection  Nerves, blood vessels, and muscles may be damaged during your surgery  Blood or fluid may collect under your skin  You may need other procedures to remove the fluid or blood  You may have swelling in your arm closest to the mastectomy or where lymph nodes were removed  This swelling is called lymphedema  Lymphedema may cause tingling, numbness, stiffness, and weakness in your arm  This may be permanent  You may get a blood clot in your arm or leg  The blood clot may travel to your heart, lungs, or brain  This may become life-threatening  Call 911 for any of the following:   · You feel lightheaded, short of breath, and have chest pain  · You cough up blood  · You have trouble breathing  Seek care immediately if:   · Blood soaks through your bandage  · Your stitches come apart  · Your bruise suddenly gets bigger  · Your leg or arm is larger than normal and painful  Contact your healthcare provider if:   · You have a fever or chills  · Your wound is red, swollen, or draining pus  · You have nausea or are vomiting  · Your skin is itchy, swollen, or you have a rash  · Your pain does not get better after you take pain medicine  · Your drain falls out or stops draining fluid  · Your drain has pus or foul-smelling fluid coming out of it  · You have numbness, tingling, or swelling in your arm or hand  · You feel very sad or anxious  · You have trouble coping with your condition  · You have questions or concerns about your condition or care  Medicines: You may need any of the following:  · Antibiotics  help prevent a bacterial infection  · Prescription pain medicine  may be given   Ask your healthcare provider how to take this medicine safely  Some prescription pain medicines contain acetaminophen  Do not take other medicines that contain acetaminophen without talking to your healthcare provider  Too much acetaminophen may cause liver damage  Prescription pain medicine may cause constipation  Ask your healthcare provider how to prevent or treat constipation  · NSAIDs , such as ibuprofen, help decrease swelling, pain, and fever  NSAIDs can cause stomach bleeding or kidney problems in certain people  If you take blood thinner medicine, always ask your healthcare provider if NSAIDs are safe for you  Always read the medicine label and follow directions  · Take your medicine as directed  Contact your healthcare provider if you think your medicine is not helping or if you have side effects  Tell him or her if you are allergic to any medicine  Keep a list of the medicines, vitamins, and herbs you take  Include the amounts, and when and why you take them  Bring the list or the pill bottles to follow-up visits  Carry your medicine list with you in case of an emergency  Care for your wound as directed: If you have a tight-fitting bandage, you can remove it in 24 to 48 hours, or as directed  Ask your healthcare provider when your incision can get wet  You may need to take a sponge bath until your drain is removed  Carefully wash around the incision with soap and water  It is okay to allow the soap and water to gently run over your incision  Gently pat dry the area and put on new, clean bandages as directed  Change your bandages when they get wet or dirty  If lymph nodes were removed from your armpit, ask your healthcare provider when you can wear deodorant  Check your incision every day for redness, pus, or swelling  Self-care:   · Apply ice  on your incision for 15 to 20 minutes every hour or as directed  Use an ice pack, or put crushed ice in a plastic bag  Cover it with a towel   Ice helps prevent tissue damage and decreases swelling and pain  · Elevate  your arm nearest to your incision above the level of your heart  Do this as often as you can  This will help decrease swelling and pain  Prop your arm on pillows or blankets to keep it elevated comfortably  · Rest  as directed  Do not lift anything heavier than 5 pounds  Do not push or pull with your arms  You can use your arms to groom, eat, and bathe  Take short walks around the house  Gradually walk further as you feel better  Ask your healthcare provider when you can return to your normal activities  · Do not sleep on your stomach  This will put too much pressure on your incision  Sleep on your back or on the side opposite to your incision  · Empty your drain  as directed  You may need to write down how much fluid you empty from your drain each day  Ask your healthcare provider for more information about how to empty your drain  · Wear a supportive bra  as directed  Wait until you remove the tight-fitting bandage to wear a bra  You may be given a surgical bra or told to wear a sports bra  A supportive bra may help hold your bandages in place  It may also help with swelling and pain  Do not  wear bras with lace or underwire  They may rub against your incision and cause discomfort  Arm stretches: Your healthcare provider may show you how to do arm stretches  Arm stretches may prevent stiff arms or shoulders  You may need to wait until after your drains are removed to begin stretching  Do not do arm stretches until your healthcare provider says it is okay  Ask your healthcare provider for more information about arm stretches  More information and support: You may have difficulty coping with the changes to your body  Talk to your family or friends about how you are feeling  Ask your healthcare provider about support groups  It may be helpful to talk with other women who have had a mastectomy     · [employer]  [address] [place] [address] Cheri York  Phone: [phone]  Web Address: http://[employer]/  org  · [address] [place] [address], [address] [place] [address]  Phone: [phone] - [phone]  Web Address: http://[employer]/  gov  Follow up with your healthcare provider as directed:  Write down your questions so you remember to ask them during your visits  © 2017 [address] [place] [address] Information is for End User's use only and may not be sold, redistributed or otherwise used for commercial purposes  All illustrations and images included in CareNotes® are the copyrighted property of [employer]  or Roberto Butterfield  The above information is an  only  It is not intended as medical advice for individual conditions or treatments  Talk to your doctor, nurse or pharmacist before following any medical regimen to see if it is safe and effective for you

## 2018-04-21 LAB
ATRIAL RATE: 79 BPM
P AXIS: 36 DEGREES
PR INTERVAL: 178 MS
QRS AXIS: 1 DEGREES
QRSD INTERVAL: 78 MS
QT INTERVAL: 400 MS
QTC INTERVAL: 458 MS
T WAVE AXIS: 39 DEGREES
VENTRICULAR RATE: 79 BPM

## 2018-04-21 PROCEDURE — 93010 ELECTROCARDIOGRAM REPORT: CPT | Performed by: INTERNAL MEDICINE

## 2018-04-25 NOTE — PRE-PROCEDURE INSTRUCTIONS
Pre-Surgery Instructions:   Medication Instructions    ALPRAZolam (XANAX) 0 25 mg tablet Instructed patient per Anesthesia Guidelines   SUMAtriptan (IMITREX) 100 mg tablet Instructed patient per Anesthesia Guidelines   venlafaxine (EFFEXOR) 75 mg tablet Instructed patient per Anesthesia Guidelines   verapamil (CALAN-SR) 240 mg CR tablet Instructed patient per Anesthesia Guidelines      Pre op instructions reviewed-showering instructions reviewed- patient has hibiclens

## 2018-04-27 DIAGNOSIS — Z17.0 MALIGNANT NEOPLASM OF OVERLAPPING SITES OF LEFT BREAST IN FEMALE, ESTROGEN RECEPTOR POSITIVE (HCC): Primary | ICD-10-CM

## 2018-04-27 DIAGNOSIS — C50.812 MALIGNANT NEOPLASM OF OVERLAPPING SITES OF LEFT BREAST IN FEMALE, ESTROGEN RECEPTOR POSITIVE (HCC): Primary | ICD-10-CM

## 2018-04-29 PROBLEM — N13.30 HYDRONEPHROSIS: Status: ACTIVE | Noted: 2018-04-29

## 2018-04-29 NOTE — ASSESSMENT & PLAN NOTE
I have reviewed Amelia's imaging and her pathology with her in realtime using our PACS imaging system  There appears to be no functional renal parenchyma remaining in the right kidney  There may be an impacted stone in the right distal ureter accounting for this pathology  I would not be able to place a stent in her ureter given extreme tortuosity proximal to her distal right ureteral calcification and should she ever develop complicated urinary tract infection or right flank pain she will require a nephrostomy tube followed by eventual ureteroscopy to assess for destruction of the right ureteral stone vs incision of stricture to effect drainage of infected fluid  As she does not have the above presentation at this time her imaging findings require no acute urologic or interventional radiology intervention as we stand to gain negligible renal function from any surgical intervention  Plan:  She will continue to follow with Dr Sony Geiger, I will be happy to see her again in the future should any symptoms developed  She mentions concerns about financial toxicity of treatments and as such I do not believe that her following with me on a regular basis is absolutely necessary at this time

## 2018-04-29 NOTE — PATIENT INSTRUCTIONS
Hydronephrosis   WHAT YOU NEED TO KNOW:   What is hydronephrosis? Hydronephrosis is swelling in one or both kidneys caused by urine buildup  Urine normally flows from the kidneys to the bladder through tubes called ureters  A blockage in the ureters can prevent urine from flowing properly  Urine flow may also be prevented or slowed if your kidneys do not work correctly  Urine flows back into your urinary tract  Pressure builds up in the kidney and causes swelling  What increases my risk for hydronephrosis? · Nerve damage or narrowed blood vessels    · Kidney stones, blood clots, or tumors that cause a blockage    · Urinary tract infections    · Body changes during pregnancy    · Enlarged prostate  What are the signs and symptoms of hydronephrosis? You may have no signs or symptoms, or you may have any of the following:  · Frequent urinary tract infections    · Mild or severe lower back pain that may spread to the groin    · Urinating little or not at all, even with an urge to urinate    · Dribbling urine, or loss of urine control    · Blood or pus in your urine    · Nausea, vomiting, fever, or chills    · Abdominal fullness or swelling    · Weight gain that you cannot explain  How is hydronephrosis diagnosed? Your healthcare provider will examine you and ask you about your signs and symptoms  He may also feel your abdomen or pelvis for any pain or swelling  You may also need any of the following:  · Blood tests  show if your kidneys are working properly or have a blockage  · Kidney function tests  show how well your kidneys are working  · X-rays  may be taken of your kidneys, bladder, and ureters  You may need to have dye injected into your kidneys before the x-rays to help healthcare providers find the blockage  Tell the healthcare provider if you have ever had an allergic reaction to contrast dye  · Urine tests  show how much urine your body is removing   They may also show if you have infection, blood, or protein in your urine  This may mean your kidneys are not working as they should  · A CT scan  (CAT scan) uses an x-ray and a computer to take pictures of your kidneys, bladder, and ureters  The pictures may show a kidney stone or other obstruction  · An ultrasound  may be used to show your kidney or bladder size, and if either is swollen  Ultrasound can also be used to find kidney stones  You may need to have a CT and an ultrasound together to find a blockage  How is hydronephrosis treated? Treatment may help keep your kidneys healthy, and prevent infection  You may need the following:  · A renal diet  is a meal plan that includes foods that are low in sodium (salt), potassium, and protein  Your healthcare provider may also tell you to eat and drink more vegetables and juices  · Stone removal  may be used to remove the kidney stones that are slowing or blocking your urine flow  Your healthcare provider may use strong sound waves called shock wave therapy to break up large kidney stones  This will help make them small enough for you to pass them when you urinate  · Catheter or stent placement  may be needed to help increase your urine flow  You may need a catheter (flexible tube) placed directly into your bladder to drain urine  Your healthcare provider may place a hard plastic tube called a stent inside your urinary tract to help urine pass from your kidney to your bladder  · Surgery  may be needed to remove part or all of your kidney if it is not working properly  Your prostate may need to be removed if it is so large that it is blocking urine flow  What are the risks of hydronephrosis? Swelling in one or both kidneys from too much urine buildup may lead to long-term kidney damage  Partial blockages may cause loss of urine control  Severe hydronephrosis may cause a blood infection called sepsis  Sepsis is toxin (poison) buildup in your blood   It happens when your kidneys cannot flush toxins out of your body  It could also paralyze your intestines  Your kidneys could fail without treatment  These conditions may be life-threatening  When should I contact my healthcare provider? · Your abdomen feels full  · You have a change in how much or how often you urinate  · You urinate more times at night and in larger amounts than during the day  · You have mild lower back pain or pain on one side when you urinate  When should I seek immediate care? · You have severe, stabbing back pain  · You have blood in your urine  · You cannot urinate, or you urinate very little  CARE AGREEMENT:   You have the right to help plan your care  Learn about your health condition and how it may be treated  Discuss treatment options with your caregivers to decide what care you want to receive  You always have the right to refuse treatment  The above information is an  only  It is not intended as medical advice for individual conditions or treatments  Talk to your doctor, nurse or pharmacist before following any medical regimen to see if it is safe and effective for you  © 2017 [address] Information is for End User's use only and may not be sold, redistributed or otherwise used for commercial purposes  All illustrations and images included in CareNotes® are the copyrighted property of [employer]  or [employer] Butterfield

## 2018-04-30 ENCOUNTER — OFFICE VISIT (OUTPATIENT)
Dept: UROLOGY | Facility: CLINIC | Age: 58
End: 2018-04-30
Payer: COMMERCIAL

## 2018-04-30 VITALS
BODY MASS INDEX: 26.12 KG/M2 | HEIGHT: 65 IN | WEIGHT: 156.8 LBS | SYSTOLIC BLOOD PRESSURE: 106 MMHG | DIASTOLIC BLOOD PRESSURE: 74 MMHG | HEART RATE: 74 BPM

## 2018-04-30 DIAGNOSIS — N13.1 HYDRONEPHROSIS DUE TO OBSTRUCTION OF URETER: ICD-10-CM

## 2018-04-30 DIAGNOSIS — N13.5 URETERAL OBSTRUCTION, RIGHT: ICD-10-CM

## 2018-04-30 DIAGNOSIS — N13.30 HYDRONEPHROSIS, UNSPECIFIED HYDRONEPHROSIS TYPE: Primary | ICD-10-CM

## 2018-04-30 PROCEDURE — 99244 OFF/OP CNSLTJ NEW/EST MOD 40: CPT | Performed by: UROLOGY

## 2018-04-30 NOTE — LETTER
April 30, 2018     Eva Hagan MD  [facility] [id number]    Patient: Betsey Reynoso   YOB: 1960   Date of Visit: 4/30/2018       Dear Dr Sonia Parker:    Thank you for referring Sophy Miller to me for evaluation  Below are my notes for this consultation  If you have questions, please do not hesitate to call me  I look forward to following your patient along with you  Sincerely,        Jess Owens MD        CC: Vishnu Ashley MD  4/30/2018 11:45 AM  Sign at close encounter       Problem List Items Addressed This Visit     Hydronephrosis - Primary     I have reviewed Amelia's imaging and her pathology with her in realtime using our PACS imaging system  There appears to be no functional renal parenchyma remaining in the right kidney  There may be an impacted stone in the right distal ureter accounting for this pathology  I would not be able to place a stent in her ureter given extreme tortuosity proximal to her distal right ureteral calcification and should she ever develop complicated urinary tract infection or right flank pain she will require a nephrostomy tube followed by eventual ureteroscopy to assess for destruction of the right ureteral stone vs incision of stricture to effect drainage of infected fluid  As she does not have the above presentation at this time her imaging findings require no acute urologic or interventional radiology intervention as we stand to gain negligible renal function from any surgical intervention  Plan:  She will continue to follow with Dr Sonia Parker, I will be happy to see her again in the future should any symptoms developed  She mentions concerns about financial toxicity of treatments and as such I do not believe that her following with me on a regular basis is absolutely necessary at this time             Other Visit Diagnoses     Ureteral obstruction, right        Hydronephrosis due to obstruction of ureter Discussion:  William Mensah and her partner and I had a productive consultation today  We reviewed her imaging in realtime using our PACS imaging system and we reviewed the fact that she has no symptoms at this time  She does have concerns about financial impact of treatments and she is having no symptoms is not particularly interested in having any treatments that are not absolutely necessary  I did  her on the need to care after her functionally solitary left kidney and in the event that she has any left flank pain that she contact her healthcare providers immediately  I suspect that this will cause her no troubles during her oncological treatment for breast cancer  I will be happy to see her again in the future should she develop other urologic complaints or concerns  Assessment and plan:       Please see problem oriented charting for the assessment plan of today's urological complaints      Daly Lynne MD      Chief Complaint     Right hydronephrosis  Functionally solitary left kidney  Kidney disease      History of Present Illness     Kike Jay is a 65 y o  woman with a past medical history as listed below which includes breast cancer with a plan for mastectomy followed by radiation under the guidance of Dr Jimmy Fernandez  Upon her staging workup she was found to have a massively hydronephrotic right kidney with no residual parenchyma and a torturous right ureter  Today she denies dysuria, incontinence, hesitancy urination, gross hematuria, urgency of urination, she has nocturia 2 times per night, she feels empty after voiding in her stream quality is strong  She does not have a known history of kidney stones although there is a calcification within the distal right ureter proximal to which is severe pelvic caliectasis and ureterectasis    Again, there appears to be no residual functional renal parenchyma on her right kidney as evidence by multiplanar cross-sectional imaging  Her review of systems from a urinary standpoint is otherwise negative today and she feels well overall  Her baseline creatinine is 1 42  She has a planned undergo mastectomy with radiation therapy and subsequent breast reconstruction per her report today  Detailed Urologic History     - please refer to HPI    Review of Systems     Review of Systems   Constitutional: Negative  HENT: Negative  Eyes: Negative  Respiratory: Negative  Cardiovascular: Negative  Gastrointestinal: Negative  Endocrine: Negative  Genitourinary:        As per HPI   Musculoskeletal: Negative  Skin: Negative  Allergic/Immunologic: Negative  Neurological: Negative  Hematological: Negative  Psychiatric/Behavioral: Negative  Allergies     No Known Allergies    Physical Exam     Physical Exam   Constitutional: She is oriented to person, place, and time  She appears well-developed and well-nourished  No distress  HENT:   Head: Normocephalic and atraumatic  Right Ear: External ear normal    Left Ear: External ear normal    Nose: Nose normal    Mouth/Throat: Oropharynx is clear and moist  No oropharyngeal exudate  Eyes: Conjunctivae and EOM are normal  Pupils are equal, round, and reactive to light  Right eye exhibits no discharge  Left eye exhibits no discharge  No scleral icterus  Neck: Normal range of motion  Neck supple  No tracheal deviation present  No thyromegaly present  Cardiovascular: Normal rate, regular rhythm and intact distal pulses  Pulmonary/Chest: Effort normal  No stridor  No respiratory distress  She has no wheezes  She exhibits no tenderness  Abdominal: Soft  She exhibits no distension and no mass  There is no tenderness  There is no rebound and no guarding  No hernia     There is no CVA tenderness, the right kidney is not palpable on abdominal examination   Genitourinary:   Genitourinary Comments: A genitourinary examination is not indicated based on today's chief complaint   Musculoskeletal: Normal range of motion  She exhibits no edema, tenderness or deformity  Lymphadenopathy:     She has no cervical adenopathy  Neurological: She is alert and oriented to person, place, and time  No cranial nerve deficit or sensory deficit  She exhibits normal muscle tone  Coordination normal    Skin: Skin is warm and dry  No rash noted  She is not diaphoretic  No erythema  No pallor  Psychiatric: She has a normal mood and affect   Her behavior is normal  Judgment and thought content normal            Vital Signs  Vitals:    04/30/18 1112   BP: 106/74   BP Location: Left arm   Patient Position: Sitting   Cuff Size: Standard   Pulse: 74   Weight: 71 1 kg (156 lb 12 8 oz)   Height: 5' 5" (1 651 m)         Current Medications       Current Outpatient Prescriptions:     ALPRAZolam (XANAX) 0 25 mg tablet, Take 0 25 mg by mouth 2 (two) times a day as needed for anxiety, Disp: , Rfl:     SUMAtriptan (IMITREX) 100 mg tablet, Take 100 mg by mouth once as needed for migraine, Disp: , Rfl:     venlafaxine (EFFEXOR) 75 mg tablet, Take 75 mg by mouth daily in the early morning  , Disp: , Rfl:     verapamil (CALAN-SR) 240 mg CR tablet, Take 240 mg by mouth 2 (two) times a day, Disp: , Rfl:     oxyCODONE-acetaminophen (PERCOCET) 5-325 mg per tablet, Take 1 tablet by mouth every 6 (six) hours as needed for moderate pain Max Daily Amount: 4 tablets, Disp: 28 tablet, Rfl: 0      Active Problems     Patient Active Problem List   Diagnosis    Malignant neoplasm of overlapping sites of left breast in female, estrogen receptor positive ([facility] [address] )    Hydronephrosis         Past Medical History     Past Medical History:   Diagnosis Date    Hypertension     Malignant neoplasm of overlapping sites of left female breast ([facility] [address] )     Migraine          Surgical History     Past Surgical History:   Procedure Laterality Date    BREAST BIOPSY      COLON SURGERY      colon resection         Family History     Family History   Problem Relation Age of Onset    No Known Problems Mother     No Known Problems Father          Social History     Social History     History   Smoking Status    Never Smoker   Smokeless Tobacco    Never Used         Pertinent Lab Values     Lab Results   Component Value Date    CREATININE 1 42 (H) 04/20/2018                 Pertinent Imaging       The patient's images were reviewed by me personally and also in real time with them in the examination room using our PACS imaging system  The imaging findings are significant for a severely obstructed right kidney with ureterectasis and pelviectasis to the level of a right distal ureteral calcification (stricture vs impacted stone)

## 2018-05-02 ENCOUNTER — PATIENT MESSAGE (OUTPATIENT)
Dept: UROLOGY | Facility: AMBULATORY SURGERY CENTER | Age: 58
End: 2018-05-02

## 2018-05-15 ENCOUNTER — CONVERSION ENCOUNTER (OUTPATIENT)
Dept: MAMMOGRAPHY | Facility: HOSPITAL | Age: 58
End: 2018-05-15

## 2018-05-15 ENCOUNTER — ANESTHESIA (OUTPATIENT)
Dept: PERIOP | Facility: HOSPITAL | Age: 58
End: 2018-05-15
Payer: COMMERCIAL

## 2018-05-15 ENCOUNTER — HOSPITAL ENCOUNTER (OUTPATIENT)
Facility: HOSPITAL | Age: 58
Setting detail: OBSERVATION
Discharge: HOME | End: 2018-05-16
Attending: SURGERY | Admitting: SURGERY
Payer: COMMERCIAL

## 2018-05-15 ENCOUNTER — ANESTHESIA EVENT (OUTPATIENT)
Dept: PERIOP | Facility: HOSPITAL | Age: 58
End: 2018-05-15
Payer: COMMERCIAL

## 2018-05-15 DIAGNOSIS — C50.812 MALIGNANT NEOPLASM OF OVERLAPPING SITES OF LEFT BREAST IN FEMALE, ESTROGEN RECEPTOR POSITIVE (HCC): ICD-10-CM

## 2018-05-15 DIAGNOSIS — Z17.0 MALIGNANT NEOPLASM OF OVERLAPPING SITES OF LEFT BREAST IN FEMALE, ESTROGEN RECEPTOR POSITIVE (HCC): ICD-10-CM

## 2018-05-15 PROCEDURE — 88342 IMHCHEM/IMCYTCHM 1ST ANTB: CPT | Performed by: PATHOLOGY

## 2018-05-15 PROCEDURE — 19307 MAST MOD RAD: CPT | Performed by: PHYSICIAN ASSISTANT

## 2018-05-15 PROCEDURE — 19307 MAST MOD RAD: CPT | Performed by: SURGERY

## 2018-05-15 PROCEDURE — 88307 TISSUE EXAM BY PATHOLOGIST: CPT | Performed by: PATHOLOGY

## 2018-05-15 RX ORDER — BUPIVACAINE HYDROCHLORIDE AND EPINEPHRINE 2.5; 5 MG/ML; UG/ML
INJECTION, SOLUTION EPIDURAL; INFILTRATION; INTRACAUDAL; PERINEURAL AS NEEDED
Status: DISCONTINUED | OUTPATIENT
Start: 2018-05-15 | End: 2018-05-15 | Stop reason: HOSPADM

## 2018-05-15 RX ORDER — ONDANSETRON 2 MG/ML
INJECTION INTRAMUSCULAR; INTRAVENOUS AS NEEDED
Status: DISCONTINUED | OUTPATIENT
Start: 2018-05-15 | End: 2018-05-15 | Stop reason: SURG

## 2018-05-15 RX ORDER — SODIUM CHLORIDE 9 MG/ML
75 INJECTION, SOLUTION INTRAVENOUS CONTINUOUS
Status: DISCONTINUED | OUTPATIENT
Start: 2018-05-15 | End: 2018-05-16

## 2018-05-15 RX ORDER — PROPOFOL 10 MG/ML
INJECTION, EMULSION INTRAVENOUS AS NEEDED
Status: DISCONTINUED | OUTPATIENT
Start: 2018-05-15 | End: 2018-05-15 | Stop reason: SURG

## 2018-05-15 RX ORDER — ONDANSETRON 2 MG/ML
4 INJECTION INTRAMUSCULAR; INTRAVENOUS EVERY 4 HOURS PRN
Status: DISCONTINUED | OUTPATIENT
Start: 2018-05-15 | End: 2018-05-15 | Stop reason: HOSPADM

## 2018-05-15 RX ORDER — FENTANYL CITRATE 50 UG/ML
INJECTION, SOLUTION INTRAMUSCULAR; INTRAVENOUS AS NEEDED
Status: DISCONTINUED | OUTPATIENT
Start: 2018-05-15 | End: 2018-05-15 | Stop reason: SURG

## 2018-05-15 RX ORDER — ACETAMINOPHEN 325 MG/1
650 TABLET ORAL EVERY 4 HOURS PRN
Status: DISCONTINUED | OUTPATIENT
Start: 2018-05-15 | End: 2018-05-16 | Stop reason: HOSPADM

## 2018-05-15 RX ORDER — VENLAFAXINE 37.5 MG/1
75 TABLET ORAL
Status: DISCONTINUED | OUTPATIENT
Start: 2018-05-16 | End: 2018-05-16 | Stop reason: HOSPADM

## 2018-05-15 RX ORDER — EPHEDRINE SULFATE 50 MG/ML
INJECTION, SOLUTION INTRAVENOUS AS NEEDED
Status: DISCONTINUED | OUTPATIENT
Start: 2018-05-15 | End: 2018-05-15 | Stop reason: SURG

## 2018-05-15 RX ORDER — MORPHINE SULFATE 2 MG/ML
2 INJECTION, SOLUTION INTRAMUSCULAR; INTRAVENOUS EVERY 4 HOURS PRN
Status: DISCONTINUED | OUTPATIENT
Start: 2018-05-15 | End: 2018-05-16 | Stop reason: HOSPADM

## 2018-05-15 RX ORDER — ONDANSETRON 2 MG/ML
4 INJECTION INTRAMUSCULAR; INTRAVENOUS EVERY 4 HOURS PRN
Status: DISCONTINUED | OUTPATIENT
Start: 2018-05-15 | End: 2018-05-16 | Stop reason: HOSPADM

## 2018-05-15 RX ORDER — ALPRAZOLAM 0.25 MG/1
0.25 TABLET ORAL 2 TIMES DAILY PRN
Status: DISCONTINUED | OUTPATIENT
Start: 2018-05-15 | End: 2018-05-16 | Stop reason: HOSPADM

## 2018-05-15 RX ORDER — OXYCODONE HYDROCHLORIDE AND ACETAMINOPHEN 5; 325 MG/1; MG/1
1 TABLET ORAL EVERY 4 HOURS PRN
Status: DISCONTINUED | OUTPATIENT
Start: 2018-05-15 | End: 2018-05-15

## 2018-05-15 RX ORDER — FENTANYL CITRATE/PF 50 MCG/ML
50 SYRINGE (ML) INJECTION
Status: DISCONTINUED | OUTPATIENT
Start: 2018-05-15 | End: 2018-05-15 | Stop reason: HOSPADM

## 2018-05-15 RX ORDER — MORPHINE SULFATE 10 MG/ML
2 INJECTION, SOLUTION INTRAMUSCULAR; INTRAVENOUS
Status: DISCONTINUED | OUTPATIENT
Start: 2018-05-15 | End: 2018-05-16

## 2018-05-15 RX ORDER — SODIUM CHLORIDE 9 MG/ML
INJECTION, SOLUTION INTRAVENOUS CONTINUOUS PRN
Status: DISCONTINUED | OUTPATIENT
Start: 2018-05-15 | End: 2018-05-15 | Stop reason: SURG

## 2018-05-15 RX ORDER — MIDAZOLAM HYDROCHLORIDE 1 MG/ML
INJECTION INTRAMUSCULAR; INTRAVENOUS AS NEEDED
Status: DISCONTINUED | OUTPATIENT
Start: 2018-05-15 | End: 2018-05-15 | Stop reason: SURG

## 2018-05-15 RX ORDER — SUMATRIPTAN 50 MG/1
100 TABLET, FILM COATED ORAL ONCE AS NEEDED
Status: DISCONTINUED | OUTPATIENT
Start: 2018-05-15 | End: 2018-05-16 | Stop reason: HOSPADM

## 2018-05-15 RX ORDER — MORPHINE SULFATE 4 MG/ML
4 INJECTION, SOLUTION INTRAMUSCULAR; INTRAVENOUS EVERY 4 HOURS PRN
Status: DISCONTINUED | OUTPATIENT
Start: 2018-05-15 | End: 2018-05-16

## 2018-05-15 RX ORDER — DIPHENHYDRAMINE HYDROCHLORIDE 50 MG/ML
12.5 INJECTION INTRAMUSCULAR; INTRAVENOUS ONCE AS NEEDED
Status: DISCONTINUED | OUTPATIENT
Start: 2018-05-15 | End: 2018-05-15 | Stop reason: HOSPADM

## 2018-05-15 RX ORDER — OXYCODONE HYDROCHLORIDE 5 MG/1
10 TABLET ORAL EVERY 4 HOURS PRN
Status: DISCONTINUED | OUTPATIENT
Start: 2018-05-15 | End: 2018-05-15

## 2018-05-15 RX ADMIN — PROPOFOL 20 MG: 10 INJECTION, EMULSION INTRAVENOUS at 14:38

## 2018-05-15 RX ADMIN — ONDANSETRON 4 MG: 2 INJECTION INTRAMUSCULAR; INTRAVENOUS at 16:12

## 2018-05-15 RX ADMIN — PROPOFOL 180 MG: 10 INJECTION, EMULSION INTRAVENOUS at 14:37

## 2018-05-15 RX ADMIN — VERAPAMIL HYDROCHLORIDE 240 MG: 120 TABLET, FILM COATED, EXTENDED RELEASE ORAL at 18:42

## 2018-05-15 RX ADMIN — FENTANYL CITRATE 50 MCG: 50 INJECTION INTRAMUSCULAR; INTRAVENOUS at 17:05

## 2018-05-15 RX ADMIN — ALPRAZOLAM 0.25 MG: 0.25 TABLET ORAL at 22:37

## 2018-05-15 RX ADMIN — MIDAZOLAM HYDROCHLORIDE 2 MG: 1 INJECTION, SOLUTION INTRAMUSCULAR; INTRAVENOUS at 14:31

## 2018-05-15 RX ADMIN — OXYCODONE AND ACETAMINOPHEN 1 TABLET: 5; 325 TABLET ORAL at 18:25

## 2018-05-15 RX ADMIN — MORPHINE SULFATE 2 MG: 2 INJECTION, SOLUTION INTRAMUSCULAR; INTRAVENOUS at 19:47

## 2018-05-15 RX ADMIN — EPHEDRINE SULFATE 10 MG: 50 INJECTION, SOLUTION INTRAMUSCULAR; INTRAVENOUS; SUBCUTANEOUS at 15:41

## 2018-05-15 RX ADMIN — EPHEDRINE SULFATE 10 MG: 50 INJECTION, SOLUTION INTRAMUSCULAR; INTRAVENOUS; SUBCUTANEOUS at 15:47

## 2018-05-15 RX ADMIN — FENTANYL CITRATE 25 MCG: 50 INJECTION INTRAMUSCULAR; INTRAVENOUS at 15:26

## 2018-05-15 RX ADMIN — ACETAMINOPHEN 650 MG: 325 TABLET ORAL at 22:37

## 2018-05-15 RX ADMIN — DEXAMETHASONE SODIUM PHOSPHATE 10 MG: 10 INJECTION INTRAMUSCULAR; INTRAVENOUS at 14:37

## 2018-05-15 RX ADMIN — SODIUM CHLORIDE: 0.9 INJECTION, SOLUTION INTRAVENOUS at 12:40

## 2018-05-15 RX ADMIN — EPHEDRINE SULFATE 10 MG: 50 INJECTION, SOLUTION INTRAMUSCULAR; INTRAVENOUS; SUBCUTANEOUS at 15:23

## 2018-05-15 RX ADMIN — FENTANYL CITRATE 50 MCG: 50 INJECTION INTRAMUSCULAR; INTRAVENOUS at 14:51

## 2018-05-15 RX ADMIN — FENTANYL CITRATE 25 MCG: 50 INJECTION INTRAMUSCULAR; INTRAVENOUS at 14:39

## 2018-05-15 RX ADMIN — SODIUM CHLORIDE: 0.9 INJECTION, SOLUTION INTRAVENOUS at 15:13

## 2018-05-15 RX ADMIN — CEFAZOLIN SODIUM 1000 MG: 1 SOLUTION INTRAVENOUS at 14:41

## 2018-05-15 RX ADMIN — HYDROMORPHONE HYDROCHLORIDE 0.5 MG: 1 INJECTION, SOLUTION INTRAMUSCULAR; INTRAVENOUS; SUBCUTANEOUS at 16:11

## 2018-05-15 NOTE — OP NOTE
OPERATIVE REPORT  PATIENT NAME: Karthik Cottrell    :  1960  MRN: [id number]  Pt Location: AN OR ROOM 02    SURGERY DATE: 5/15/2018    Surgeon(s) and Role:     * Deya Fine MD - Primary     * Hilda Adamson - Assisting     * Isadora Griggs MD - Assisting    Preop Diagnosis:  Malignant neoplasm of overlapping sites of left breast in female, estrogen receptor positive ([facility] [address] ) [C50 812, Z17 0]    Post-Op Diagnosis Codes:     * Malignant neoplasm of overlapping sites of left breast in female, estrogen receptor positive ([facility] [address] ) [C50 812, Z17 0]    Procedure(s) (LRB):  BREAST MODIFIED RADICAL MASTECTOMY (Left)    Specimen(s):  ID Type Source Tests Collected by Time Destination   1 : Left breast mastectomy and axillary contents  Tissue Breast, Left TISSUE EXAM Deya Fine MD 5/15/2018 1502        Estimated Blood Loss:   50 mL    Drains:  Closed/Suction Drain Left;Superior Breast Bulb 19 Fr  (Active)   Number of days: 0       Closed/Suction Drain Left; Inferior Breast Bulb 19 Fr   (Active)   Number of days: 0       Anesthesia Type:   General    Operative Indications:  Malignant neoplasm of overlapping sites of left breast in female, estrogen receptor positive ([facility] [address] ) [C50 812, Z17 0]  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    Operative Findings:  Gross adenopathy in axilla, level I and II, large masses in breast requiring large extensive skin resection, wound closed primarily, but tight    Complications:   None    Procedure and Technique:  The patient was brought to the operation room and placed under general anesthesia   Attention was paid to ensure appropriate padding and correct positioning   The left breast was prepped and draped in a sterile fashion   I initiated a time-out, identifying the patient, the correct side and the above procedure   All parties agreed with the time out      The planned incision which encompassed both masses was then injected with 0 25% Marcaine and epinephrine for local anesthesia   The incision was sharply incised   Thin flaps were then elevated using electrocautery starting superiorly and then continuing medially, inferiorly and finally laterally   The breast was then removed in a subfascial manner from the underlying muscles and left tethered to the axillary contents  There was gross disease in the level I and II nodes  The deltopectoral fascia was indentified and divided just inferior the axillary vein  Dissection through the superificial adipose tissue was performed with electrocautery and the superficial vein entering the axilla was ligated and divided  Further dissection at this level identified both the thoracodorsal neurovascular bundle and the long thoracic nerve  These structures were carefully preserved  The axillary contents were withdrawn from level II and dissection continued along the medial axillary wall  The axillary contents (I and II) were retracted inferiorly and  from their lateral attachments  Small vessels and lymphatics were controlled with clips, ligated or if small, electrocautery  The axillary contents and breast in continuity were sent for permanent analysis  The breast was oriented with sutures (short=superior; medium=medial; long=lateral)  Meticulous hemostasis was maintained with electrocautery   The wound was extensively irrigated and two 19mm, slotted, round MIMI drains were placed and brought out through separate incisions and secured with nylon sutures   The wound was then closed in two layers  an inner layer of 2-0 vicryl and a subcuticular closure with 4-0 Monocryl  Steri-strips were applied  The counts were correct x2  I was present for the entire procedure, the resident had to leave midway through the case and a PA facilitated the remainder of the axillary dissection and closure with me      Patient Disposition:  PACU     SIGNATURE: Klever Quintanilla MD  DATE: May 15, 2018  TIME: 4:26 PM

## 2018-05-15 NOTE — POST OP PROGRESS NOTES
Post Op Check Note -Surgery TIFFANIE Storey 65 y o  female MRN: [id number]  Unit/Bed#: -01 Encounter: [id number]    ASSESSMENT/PLAN:    81-year-old female postop day 1 status post left breast mastectomy    -continue drain care   -continue to monitor skin for changes  -patient may have IV pain medication this evening  Plan to transition to p o  pain medication tomorrow  -please do not give Lovenox evening of 5/15  May give Lovenox morning of 5/16  -plan for discharge 5/16    Problem List     * (Principal)Malignant neoplasm of overlapping sites of left breast in female, estrogen receptor positive ([facility] [address] )    Hydronephrosis          Subjective/Objective     Subjective: Carl Storey is a 65 y o  female who is status post left breast mastectomy  Patient tolerated dinner  Patient is stating she is having pain to the left breast   MIMI drain x2 with serosanguineous output  No clots present  Objective/Physical Exam:   Blood pressure 129/58, pulse 77, temperature 97 6 °F (36 4 °C), temperature source Oral, resp  rate 18, height 5' 5" (1 651 m), weight 72 1 kg (158 lb 15 2 oz), SpO2 93 %  ,Body mass index is 26 45 kg/m²      General appearance: alert and oriented, in no acute distress  Skin: Left breast: No hematoma present, MIMI drain x2 serosanguinous output, no erythema      Current Facility-Administered Medications:     acetaminophen (TYLENOL) tablet 650 mg, 650 mg, Oral, Q4H PRN, Lakesha Alanis PA-C    ALPRAZolam Brunetta Dome) tablet 0 25 mg, 0 25 mg, Oral, BID PRN, Lakesha Alanis PA-C    [START ON 5/16/2018] enoxaparin (LOVENOX) subcutaneous injection 40 mg, 40 mg, Subcutaneous, Daily, Lakesha Alanis PA-C    morphine (PF) 10 mg/mL injection 2 mg, 2 mg, Intravenous, Q1H PRN, Lakesha Alanis PA-C    morphine (PF) 4 mg/mL injection 4 mg, 4 mg, Intravenous, Q4H PRN, Lakesha Alanis PA-C    morphine injection 2 mg, 2 mg, Intravenous, Q4H PRN, Lakesha Alanis PA-C    ondansetron (ZOFRAN) injection 4 mg, 4 mg, Intravenous, Q4H PRN, Lakesha Alanis PA-C    sodium chloride 0 9 % infusion, 75 mL/hr, Intravenous, Continuous, Lakesha Alanis PA-C    SUMAtriptan (IMITREX) tablet 100 mg, 100 mg, Oral, Once PRN, Blaine Nieves PA-C    [START ON 5/16/2018] venlafaxine [facility]) tablet 75 mg, 75 mg, Oral, Early Morning, Lakesha Alanis PA-C    verapamil (CALAN-SR) CR tablet 240 mg, 240 mg, Oral, BID, Lakesha Alanis PA-C, 240 mg at 05/15/18 1842      Intake/Output Summary (Last 24 hours) at 05/15/18 1935  Last data filed at 05/15/18 1914   Gross per 24 hour   Intake             1800 ml   Output               82 ml   Net             1718 ml       Invasive Devices     Peripheral Intravenous Line            Peripheral IV 05/15/18 Right Hand less than 1 day          Drain            Closed/Suction Drain Left; Inferior Breast Bulb 19 Fr  less than 1 day    Closed/Suction Drain Left;Superior Breast Bulb 19 Fr  less than 1 day                          VTE Pharmacologic Prophylaxis: Lovenox may be given 5/16    Blaine Nieves PA-C

## 2018-05-15 NOTE — ANESTHESIA POSTPROCEDURE EVALUATION
Post-Op Assessment Note      CV Status:  Stable    Mental Status:  Alert and awake    Hydration Status:  Euvolemic    PONV Controlled:  Controlled    Airway Patency:  Patent    Post Op Vitals Reviewed: Yes          Staff: CRNA       Comments: vss report rn          /88 (05/15/18 1645)    Temp 97 8 °F (36 6 °C) (05/15/18 1645)    Pulse 86 (05/15/18 1645)   Resp 16 (05/15/18 1645)    SpO2 90 % (05/15/18 1645)

## 2018-05-15 NOTE — PLAN OF CARE
DISCHARGE PLANNING     Discharge to home or other facility with appropriate resources Progressing        PAIN - ADULT     Verbalizes/displays adequate comfort level or baseline comfort level Progressing        Potential for Falls     Patient will remain free of falls Progressing        SKIN/TISSUE INTEGRITY - ADULT     Incision(s), wounds(s) or drain site(s) healing without S/S of infection Progressing

## 2018-05-15 NOTE — H&P (VIEW-ONLY)
Surgical Oncology Follow Up       [address]  [facility] [address] [address]    Candy Berger  1960  [id number]  [address] [facility]  [facility] [place]  [address] [address]    Chief Complaint   Patient presents with    Breast Cancer     Pt is here for a two week follow up          Assessment & Plan:   Assessment/Plan   Left breast invasive lobular carcinoma, node positive  Plan left modified radical mastectomy  Cancer History:        Malignant neoplasm of overlapping sites of left breast in female, estrogen receptor positive ([facility] [address] )    4/3/2018 Initial Diagnosis     Left breast biopsy  4 6 cm in size on ultrasound largest tumor   Site A:  6:00 o clock 5 CMFN-Invasive Lobular grade 2  Site B:  11:00 o'clock 4 CMFN Invasive Lobular grade 2  Lymph node:  Positive for metastatic cancer  ER 70  NC 15  Her 2 1+   Performed on site A specimen  Stage IIA            History of Present Illness:   See above    Interval History:   The patient has had a CT scan which demonstrated possible activity which is been cleared on a PET scan  She also had obstructed ureter which seems to be chronic  She has a follow-up appointment with a urologist I have reviewed this with him and he recommended proceeding with surgery currently  Review of Systems:   Review of Systems   Constitutional: Negative for activity change, appetite change, fatigue and unexpected weight change  HENT: Negative for ear pain, tinnitus, trouble swallowing and voice change  Eyes: Negative for pain and visual disturbance  Respiratory: Negative for cough, shortness of breath, wheezing and stridor  Cardiovascular: Negative for chest pain, palpitations and leg swelling  Gastrointestinal: Negative for abdominal distention, abdominal pain and blood in stool  Endocrine: Negative for cold intolerance and heat intolerance  Genitourinary: Negative for difficulty urinating, dysuria, flank pain and hematuria  Musculoskeletal: Negative for arthralgias, back pain, gait problem and joint swelling  Skin: Negative for color change, rash and wound  Allergic/Immunologic: Negative for immunocompromised state  Neurological: Negative for dizziness, seizures, speech difficulty, weakness and headaches  Hematological: Negative for adenopathy  Psychiatric/Behavioral: Negative for confusion  Past Medical History     Patient Active Problem List   Diagnosis    Malignant neoplasm of overlapping sites of left breast in female, estrogen receptor positive ([facility] [address] )     Past Medical History:   Diagnosis Date    Hypertension     Malignant neoplasm of overlapping sites of left female breast [facility])      Past Surgical History:   Procedure Laterality Date    BREAST BIOPSY      COLON SURGERY       Family History   Problem Relation Age of Onset    No Known Problems Mother     No Known Problems Father      Social History     Social History    Marital status: /Civil Union     Spouse name: N/A    Number of children: N/A    Years of education: N/A     Occupational History    Not on file       Social History Main Topics    Smoking status: Never Smoker    Smokeless tobacco: Never Used    Alcohol use No    Drug use: No    Sexual activity: Not on file     Other Topics Concern    Not on file     Social History Narrative    No narrative on file       Current Outpatient Prescriptions:     ALPRAZolam (XANAX) 0 25 mg tablet, Take 0 25 mg by mouth 2 (two) times a day as needed for anxiety, Disp: , Rfl:     SUMAtriptan (IMITREX) 100 mg tablet, Take 100 mg by mouth once as needed for migraine, Disp: , Rfl:     venlafaxine (EFFEXOR) 75 mg tablet, Take 75 mg by mouth daily, Disp: , Rfl:     verapamil (CALAN-SR) 240 mg CR tablet, Take 240 mg by mouth 2 (two) times a day, Disp: , Rfl:   No Known Allergies    Physical Exam:     Vitals: 04/20/18 1015   BP: (!) 180/110   Pulse: 94   Resp: 16   Temp: 98 1 °F (36 7 °C)     Physical Exam   Constitutional: She is oriented to person, place, and time  She appears well-developed and well-nourished  HENT:   Head: Normocephalic and atraumatic  Mouth/Throat: Oropharynx is clear and moist    Eyes: EOM are normal  Pupils are equal, round, and reactive to light  Neck: Normal range of motion  Neck supple  No JVD present  No tracheal deviation present  No thyromegaly present  Cardiovascular: Normal rate, regular rhythm, normal heart sounds and intact distal pulses  Exam reveals no gallop and no friction rub  No murmur heard  Pulmonary/Chest: Effort normal and breath sounds normal  No respiratory distress  She has no wheezes  She has no rales  The patient has no ecchymosis in the left breast   Her dominant masses remain present she still has subtle clinical adenopathy  Abdominal: Soft  She exhibits no distension and no mass  There is no hepatomegaly  There is no tenderness  There is no rebound and no guarding  Musculoskeletal: Normal range of motion  She exhibits no edema or tenderness  Lymphadenopathy:     She has no cervical adenopathy  Neurological: She is alert and oriented to person, place, and time  No cranial nerve deficit  Skin: Skin is warm and dry  No rash noted  No erythema  Psychiatric: She has a normal mood and affect  Her behavior is normal    Vitals reviewed  Results:   I reviewed the results with the patient for the PET scan as well as the CT scan  Discussion/Summary:   The patient has no evidence of metastatic disease  She will follow up with Urology for her ureter kidney issue  Given the magnitude of the disease in her breast as well as her axilla I have recommended a modified radical mastectomy    I suspect she will need postoperative radiation therapy and possibly chemotherapy consequently I have recommended we delay reconstruction until she completes all of her treatments  Patient is agreeable to this  We subsequently underwent the process of informed consent for a left breast modified radical mastectomy  Advance Care Planning/Advance Directives:  I discussed the disease status, treatment plans and follow-up with the patient

## 2018-05-16 VITALS
HEIGHT: 65 IN | DIASTOLIC BLOOD PRESSURE: 67 MMHG | HEART RATE: 66 BPM | SYSTOLIC BLOOD PRESSURE: 122 MMHG | TEMPERATURE: 97.5 F | OXYGEN SATURATION: 95 % | RESPIRATION RATE: 14 BRPM | WEIGHT: 158.95 LBS | BODY MASS INDEX: 26.48 KG/M2

## 2018-05-16 LAB
ANION GAP SERPL CALCULATED.3IONS-SCNC: 10 MMOL/L (ref 4–13)
BUN SERPL-MCNC: 20 MG/DL (ref 5–25)
CALCIUM SERPL-MCNC: 8.2 MG/DL (ref 8.3–10.1)
CHLORIDE SERPL-SCNC: 106 MMOL/L (ref 100–108)
CO2 SERPL-SCNC: 24 MMOL/L (ref 21–32)
CREAT SERPL-MCNC: 1.29 MG/DL (ref 0.6–1.3)
ERYTHROCYTE [DISTWIDTH] IN BLOOD BY AUTOMATED COUNT: 13.6 % (ref 11.6–15.1)
GFR SERPL CREATININE-BSD FRML MDRD: 46 ML/MIN/1.73SQ M
GLUCOSE P FAST SERPL-MCNC: 159 MG/DL (ref 65–99)
GLUCOSE SERPL-MCNC: 159 MG/DL (ref 65–140)
HCT VFR BLD AUTO: 38.2 % (ref 34.8–46.1)
HGB BLD-MCNC: 12.5 G/DL (ref 11.5–15.4)
MCH RBC QN AUTO: 33.1 PG (ref 26.8–34.3)
MCHC RBC AUTO-ENTMCNC: 32.7 G/DL (ref 31.4–37.4)
MCV RBC AUTO: 101 FL (ref 82–98)
PLATELET # BLD AUTO: 197 THOUSANDS/UL (ref 149–390)
PMV BLD AUTO: 10.3 FL (ref 8.9–12.7)
POTASSIUM SERPL-SCNC: 4.3 MMOL/L (ref 3.5–5.3)
RBC # BLD AUTO: 3.78 MILLION/UL (ref 3.81–5.12)
SODIUM SERPL-SCNC: 140 MMOL/L (ref 136–145)
WBC # BLD AUTO: 5.86 THOUSAND/UL (ref 4.31–10.16)

## 2018-05-16 PROCEDURE — 85027 COMPLETE CBC AUTOMATED: CPT | Performed by: PHYSICIAN ASSISTANT

## 2018-05-16 PROCEDURE — 80048 BASIC METABOLIC PNL TOTAL CA: CPT | Performed by: PHYSICIAN ASSISTANT

## 2018-05-16 RX ORDER — OXYCODONE HYDROCHLORIDE AND ACETAMINOPHEN 5; 325 MG/1; MG/1
1 TABLET ORAL EVERY 4 HOURS PRN
Status: DISCONTINUED | OUTPATIENT
Start: 2018-05-16 | End: 2018-05-16 | Stop reason: HOSPADM

## 2018-05-16 RX ADMIN — MORPHINE SULFATE 2 MG: 2 INJECTION, SOLUTION INTRAMUSCULAR; INTRAVENOUS at 03:26

## 2018-05-16 RX ADMIN — VERAPAMIL HYDROCHLORIDE 240 MG: 120 TABLET, FILM COATED, EXTENDED RELEASE ORAL at 08:31

## 2018-05-16 RX ADMIN — VENLAFAXINE 75 MG: 37.5 TABLET ORAL at 05:23

## 2018-05-16 NOTE — PROGRESS NOTES
Progress Note -Surgery PA  Aaron Mendoza 65 y o  female MRN: [id number]  Unit/Bed#: -01 Encounter: [id number]      Assessment:  65year old female s/p left mastectomy  Plan:  -dc today   -patient states she can do her own drain care  -has RX for percocet at home  -follow up with Dr Rosey Simmons as scheduled          Subjective/Objective     Subjective: Feels well  Pain is minimal  Wants to go home  Drain serosanguinous  Objective:     /66 (BP Location: Right arm)   Pulse 61   Temp 97 6 °F (36 4 °C) (Oral)   Resp 14   Ht 5' 5" (1 651 m)   Wt 72 1 kg (158 lb 15 2 oz)   SpO2 92%   BMI 26 45 kg/m²   I/O last 24 hours: In: 2280 [P O :480; I V :1800]  Out: 632 [Urine:400; Drains:182; Blood:50]        Intake/Output Summary (Last 24 hours) at 05/16/18 0724  Last data filed at 05/16/18 4514   Gross per 24 hour   Intake             2280 ml   Output              632 ml   Net             1648 ml       Invasive Devices     Peripheral Intravenous Line            Peripheral IV 05/15/18 Right Hand less than 1 day          Drain            Closed/Suction Drain Left; Inferior Breast Bulb 19 Fr  less than 1 day    Closed/Suction Drain Left;Superior Breast Bulb 19 Fr  less than 1 day                Physical Exam:  /66 (BP Location: Right arm)   Pulse 61   Temp 97 6 °F (36 4 °C) (Oral)   Resp 14   Ht 5' 5" (1 651 m)   Wt 72 1 kg (158 lb 15 2 oz)   SpO2 92%   BMI 26 45 kg/m²   General appearance: alert and oriented, in no acute distress and alert  Breasts: left breast incision healing well   JPs functioning     Current Facility-Administered Medications:     acetaminophen (TYLENOL) tablet 650 mg, 650 mg, Oral, Q4H PRN, Reagan Brian, PA-C, 650 mg at 05/15/18 2237    ALPRAZolam (XANAX) tablet 0 25 mg, 0 25 mg, Oral, BID PRN, Hendricks Brian, PA-C, 0 25 mg at 05/15/18 2237    enoxaparin (LOVENOX) subcutaneous injection 40 mg, 40 mg, Subcutaneous, Daily, Reagan Brian, PA-C    morphine (PF) 10 mg/mL injection 2 mg, 2 mg, Intravenous, Q1H PRN, Blaine Nieves PA-C    morphine (PF) 4 mg/mL injection 4 mg, 4 mg, Intravenous, Q4H PRN, Blaine Nieves PA-C    morphine injection 2 mg, 2 mg, Intravenous, Q4H PRN, Lakesha Alanis PA-C, 2 mg at 05/16/18 0326    ondansetron (ZOFRAN) injection 4 mg, 4 mg, Intravenous, Q4H PRN, Lakesha Alanis PA-C    sodium chloride 0 9 % infusion, 75 mL/hr, Intravenous, Continuous, Lakesha Alanis PA-C    SUMAtriptan (IMITREX) tablet 100 mg, 100 mg, Oral, Once PRN, Lakesha Alanis PA-C    venlafaxine [facility]) tablet 75 mg, 75 mg, Oral, Early Morning, Lakesha Alanis PA-C, 75 mg at 05/16/18 0523    verapamil (CALAN-SR) CR tablet 240 mg, 240 mg, Oral, BID, Blaine Nieves PA-C, 240 mg at 05/15/18 1842           Lab, Imaging and other studies:  CBC:   Lab Results   Component Value Date    WBC 5 86 05/16/2018    HGB 12 5 05/16/2018    HCT 38 2 05/16/2018     (H) 05/16/2018     05/16/2018    MCH 33 1 05/16/2018    MCHC 32 7 05/16/2018    RDW 13 6 05/16/2018    MPV 10 3 05/16/2018   , CMP:   Lab Results   Component Value Date     05/16/2018    K 4 3 05/16/2018     05/16/2018    CO2 24 05/16/2018    ANIONGAP 10 05/16/2018    BUN 20 05/16/2018    CREATININE 1 29 05/16/2018    GLUCOSE 159 (H) 05/16/2018    CALCIUM 8 2 (L) 05/16/2018    EGFR 46 05/16/2018         VTE Pharmacologic Prophylaxis: Sequential compression device (Venodyne)  and Enoxaparin (Lovenox)  VTE Mechanical Prophylaxis: sequential compression device    Rounds performed with nursing

## 2018-05-16 NOTE — CASE MANAGEMENT
Initial Clinical Review    Age/Sex: 65 y o  female    Surgery Date:  5/16/2018     Procedure: BREAST MODIFIED RADICAL MASTECTOMY (Left)    Anesthesia: general     Admission Orders: Date/Time/Statement:   5/16/2018  0737 Outpatient no charge bed and CHANGED TO  5/16/2018  0743 OBSERVATION  Re:  Used post op iv analgesia  Place in Observation (Order [id number])   Admission   Date: 5/16/2018 Department: [place] [address] 4th Floor Med Surg Unit Released By/Authorizing: Darrell Lai PA-C (auto-released)   Order Information     Order Name   [address] [[id number]]     The link below is only for use if the above order is AMB REFERRAL TO HOME HEALTH   Face to Face Certification Statement (for AMB REFERRAL TO [place] [address] Only)   Order History   Inpatient   Date/Time Action Taken User Additional Information   05/16/18 0742 Release Darrell Lai PA-C (auto-released) From Order: [id number]   05/16/18 8480 Complete Darrell Lai PA-C          Vital Signs: /66 (BP Location: Right arm)   Pulse 61   Temp 97 6 °F (36 4 °C) (Oral)   Resp 14   Ht 5' 5" (1 651 m)   Wt 72 1 kg (158 lb 15 2 oz)   SpO2 92%   BMI 26 45 kg/m²     Diet:        Diet Orders            Start     Ordered    05/15/18 1918  Room Service  Once     Question:  Type of Service  Answer:  Room Service - Appropriate with Assistance    05/15/18 1917    05/15/18 1752  Diet Regular; Regular House; Gluten Free  Diet effective now     Question Answer Comment   Diet Type Regular    Regular Regular House    Other Restriction(s): Gluten Free    RD to adjust diet per protocol? Yes        05/15/18 1752          Mobility: up as tolerated  DVT Prophylaxis: lovenox  40 sq daily  scds  Pain Control: tylenol - used x 1  Morphine 2 mg iv - used x 2     Pain Medications             oxyCODONE-acetaminophen (PERCOCET) 5-325 mg per tablet Take 1 tablet by mouth every 6 (six) hours as needed for moderate pain Max Daily Amount: 4 tablets venlafaxine (EFFEXOR) 75 mg tablet Take 75 mg by mouth daily in the early morning            OTHER ORDERS:  Po medication:  Venlafaxine  Verapamil  Xanax prn - used x 1  Thank you,  [facility] in the [facility] by Roberto Butterfield for 2017  Network Utilization Review Department  Phone: [phone]; Fax [phone]  ATTENTION: The Network Utilization Review Department is now centralized for our 7 Facilities  Make a note that we have a new phone and fax numbers for our Department  Please call with any questions or concerns to [phone] and carefully follow the prompts so that you are directed to the right person  All voicemails are confidential  Fax any determinations, approvals, denials, and requests for initial or continue stay review clinical to [phone]  Due to HIGH CALL volume, it would be easier if you could please send faxed requests to expedite your requests and in part, help us provide discharge notifications faster

## 2018-05-17 ENCOUNTER — TELEPHONE (OUTPATIENT)
Dept: SURGICAL ONCOLOGY | Facility: CLINIC | Age: 58
End: 2018-05-17

## 2018-05-17 NOTE — TELEPHONE ENCOUNTER
Patient returned call  No need for additional Percocet  No problems post op  No fevers, drains functioning  Patient instructed to contact the office if she has any issues prior to her scheduled post op appointment

## 2018-05-30 ENCOUNTER — OFFICE VISIT (OUTPATIENT)
Dept: SURGICAL ONCOLOGY | Facility: CLINIC | Age: 58
End: 2018-05-30

## 2018-05-30 VITALS
SYSTOLIC BLOOD PRESSURE: 144 MMHG | WEIGHT: 152 LBS | TEMPERATURE: 98.6 F | RESPIRATION RATE: 14 BRPM | DIASTOLIC BLOOD PRESSURE: 90 MMHG | HEIGHT: 65 IN | BODY MASS INDEX: 25.33 KG/M2 | HEART RATE: 88 BPM

## 2018-05-30 DIAGNOSIS — Z17.0 MALIGNANT NEOPLASM OF OVERLAPPING SITES OF LEFT BREAST IN FEMALE, ESTROGEN RECEPTOR POSITIVE (HCC): Primary | ICD-10-CM

## 2018-05-30 DIAGNOSIS — C50.812 MALIGNANT NEOPLASM OF OVERLAPPING SITES OF LEFT BREAST IN FEMALE, ESTROGEN RECEPTOR POSITIVE (HCC): Primary | ICD-10-CM

## 2018-05-30 PROCEDURE — 99024 POSTOP FOLLOW-UP VISIT: CPT | Performed by: SURGERY

## 2018-05-30 NOTE — PROGRESS NOTES
Surgical Oncology Breast Post-Op       [address] [facility],[address] [place] [address]  [facility]ER [facility] SURGICAL ONCOLOGY [address] [address]    Adam Francis  1960  [id number]  [address],[address] [place] [address] SURGICAL ONCOLOGY [facility] [id number]    Chief Complaint:   Gracia Pitts is seen for a post-operative visit of her recent left breast surgery  Oncology History:        Malignant neoplasm of overlapping sites of left breast in female, estrogen receptor positive ([facility] [address] )    4/3/2018 Initial Diagnosis     Left breast biopsy  4 6 cm in size on ultrasound largest tumor   Site A:  6:00 o clock 5 CMFN-Invasive Lobular grade 2  Site B:  11:00 o'clock 4 CMFN Invasive Lobular grade 2  Lymph node:  Positive for metastatic cancer  ER 70  [place] 15  Her 2 1+   Performed on site A specimen  Stage IIA         5/15/2018 Surgery     Left breast modified radical mastectomy  Invasive lobular carcinoma  Multi focal  Tumor seen in deep dermis  Grade 2  8 1 cm  6/24 lymph nodes  ER 70  WV 15  Her 2 negative  Stage IIIA               Assessment & Plan:   Assessment/Plan    The patient presents for a postoperative visit  Her pathology demonstrated 6/24 lymph nodes had metastatic disease  Her tumor was multifocal with the largest piece being approximately 8 cm  I recommended Radiation Oncology in addition to Medical Oncology for consideration regarding adjuvant therapies  Her margins were widely negative  The patient's drains are still putting out significant amount of fluid  She has no arm swelling or lymphedema  Her exit sites are somewhat excoriated  The patient has not showered since her surgery and we have encouraged her to do so and use antibiotic ointment  Patient has requested a refill on her Percocet though this far out I explained the prefer her to be on Aleve and ice packs    History of Present Illness:   See above    Interval History:   See above    Review of Systems: Review of Systems    Past Medical History:     Patient Active Problem List   Diagnosis    Malignant neoplasm of overlapping sites of left breast in female, estrogen receptor positive ([facility] [address] )    Hydronephrosis     Past Medical History:   Diagnosis Date    Hypertension     Malignant neoplasm of overlapping sites of left female breast ([facility] [address] )     Migraine      Past Surgical History:   Procedure Laterality Date    BREAST BIOPSY      COLON SURGERY      colon resection    [place] MASTECTOMY, MODIFIED RADICAL Left 5/15/2018    Procedure: BREAST MODIFIED RADICAL MASTECTOMY;  Surgeon: Eva Hagan MD;  [facility];  Service: Surgical Oncology     Family History   Problem Relation Age of Onset    No Known Problems Mother     No Known Problems Father      Social History     Social History    Marital status: /Civil Union     Spouse name: N/A    Number of children: N/A    Years of education: N/A     Occupational History    Not on file       Social History Main Topics    Smoking status: Never Smoker    Smokeless tobacco: Never Used    Alcohol use No    Drug use: No    Sexual activity: Yes     Partners: Female     Birth control/ protection: Female Sterilization     Other Topics Concern    Not on file     Social History Narrative    No narrative on file       Current Outpatient Prescriptions:     ALPRAZolam (XANAX) 0 25 mg tablet, Take 0 25 mg by mouth 2 (two) times a day as needed for anxiety, Disp: , Rfl:     oxyCODONE-acetaminophen (PERCOCET) 5-325 mg per tablet, Take 1 tablet by mouth every 6 (six) hours as needed for moderate pain Max Daily Amount: 4 tablets, Disp: 28 tablet, Rfl: 0    SUMAtriptan (IMITREX) 100 mg tablet, Take 100 mg by mouth once as needed for migraine, Disp: , Rfl:     verapamil (CALAN-SR) 240 mg CR tablet, Take 240 mg by mouth 2 (two) times a day, Disp: , Rfl:   No Known Allergies    Physical Exams:     Vitals:    05/30/18 1450   BP: 144/90   Pulse: 88   Resp: 14   Temp: 98 6 °F (37 °C)     Physical Exam   Pulmonary/Chest:   Examination the mastectomy site shows no evidence of infection or seroma  However at the drain exit sites the area is excoriated  Results:       Labs:       Discussion/Summary:   Will see the patient back in 10 days  I have recommended that she contact us should her drains put out less than a total of 30 cc each per day and we can remove the drains earlier if they meet this criteria

## 2018-05-30 NOTE — LETTER
May 30, 2018     Pham Jerez, [address] [facility] [address] Charli Russell    Patient: Katalina Gardner   YOB: 1960   Date of Visit: 5/30/2018       Dear Dr Davin Anne: Thank you for referring Shaji Oconnell to me for evaluation  Below are my notes for this consultation  If you have questions, please do not hesitate to call me  I look forward to following your patient along with you  Sincerely,        Melanie Johnson MD        CC: No Recipients  Melanie Johnson MD  5/30/2018  3:12 PM  Sign at close encounter     Surgical Oncology Breast Post-Op       [address]  1960  [id number]  [address] [facility],[address]  [facility] SURGICAL ONCOLOGY [place] [id number]    Chief Complaint:   Nikita Snow is seen for a post-operative visit of her recent left breast surgery  Oncology History:        Malignant neoplasm of overlapping sites of left breast in female, estrogen receptor positive ([facility] [address] )    4/3/2018 Initial Diagnosis     Left breast biopsy  4 6 cm in size on ultrasound largest tumor   Site A:  6:00 o clock 5 CMFN-Invasive Lobular grade 2  Site B:  11:00 o'clock 4 CMFN Invasive Lobular grade 2  Lymph node:  Positive for metastatic cancer  ER 70  AK 15  Her 2 1+   Performed on site A specimen  Stage IIA         5/15/2018 Surgery     Left breast modified radical mastectomy  Invasive lobular carcinoma  Multi focal  Tumor seen in deep dermis  Grade 2  8 1 cm  6/24 lymph nodes  ER 70  AK 15  Her 2 negative  Stage IIIA               Assessment & Plan:   Assessment/Plan    The patient presents for a postoperative visit  Her pathology demonstrated 6/24 lymph nodes had metastatic disease  Her tumor was multifocal with the largest piece being approximately 8 cm  I recommended Radiation Oncology in addition to Medical Oncology for consideration regarding adjuvant therapies  Her margins were widely negative  The patient's drains are still putting out significant amount of fluid  She has no arm swelling or lymphedema  Her exit sites are somewhat excoriated  The patient has not showered since her surgery and we have encouraged her to do so and use antibiotic ointment  Patient has requested a refill on her Percocet though this far out I explained the prefer her to be on Aleve and ice packs  History of Present Illness:   See above    Interval History:   See above    Review of Systems:   Review of Systems    Past Medical History:     Patient Active Problem List   Diagnosis    Malignant neoplasm of overlapping sites of left breast in female, estrogen receptor positive ([address] )    Hydronephrosis     Past Medical History:   Diagnosis Date    Hypertension     Malignant neoplasm of overlapping sites of left female breast ([facility] [address] )     Migraine      Past Surgical History:   Procedure Laterality Date    BREAST BIOPSY      COLON SURGERY      colon resection    [place] MASTECTOMY, MODIFIED RADICAL Left 5/15/2018    Procedure: BREAST MODIFIED RADICAL MASTECTOMY;  Surgeon: Philly Mcmillan MD;  [facility];  Service: Surgical Oncology     Family History   Problem Relation Age of Onset    No Known Problems Mother     No Known Problems Father      Social History     Social History    Marital status: /Civil Union     Spouse name: N/A    Number of children: N/A    Years of education: N/A     Occupational History    Not on file       Social History Main Topics    Smoking status: Never Smoker    Smokeless tobacco: Never Used    Alcohol use No    Drug use: No    Sexual activity: Yes     Partners: Female     Birth control/ protection: Female Sterilization     Other Topics Concern    Not on file     Social History Narrative    No narrative on file       Current Outpatient Prescriptions:     ALPRAZolam (XANAX) 0 25 mg tablet, Take 0 25 mg by mouth 2 (two) times a day as needed for anxiety, Disp: , Rfl:     oxyCODONE-acetaminophen (PERCOCET) 5-325 mg per tablet, Take 1 tablet by mouth every 6 (six) hours as needed for moderate pain Max Daily Amount: 4 tablets, Disp: 28 tablet, Rfl: 0    SUMAtriptan (IMITREX) 100 mg tablet, Take 100 mg by mouth once as needed for migraine, Disp: , Rfl:     verapamil (CALAN-SR) 240 mg CR tablet, Take 240 mg by mouth 2 (two) times a day, Disp: , Rfl:   No Known Allergies    Physical Exams:     Vitals:    05/30/18 1450   BP: 144/90   Pulse: 88   Resp: 14   Temp: 98 6 °F (37 °C)     Physical Exam   Pulmonary/Chest:   Examination the mastectomy site shows no evidence of infection or seroma  However at the drain exit sites the area is excoriated  Results:       Labs:       Discussion/Summary:   Will see the patient back in 10 days  I have recommended that she contact us should her drains put out less than a total of 30 cc each per day and we can remove the drains earlier if they meet this criteria

## 2018-05-31 ENCOUNTER — TELEPHONE (OUTPATIENT)
Dept: SURGICAL ONCOLOGY | Facility: CLINIC | Age: 58
End: 2018-05-31

## 2018-06-01 ENCOUNTER — TELEPHONE (OUTPATIENT)
Dept: SURGICAL ONCOLOGY | Facility: CLINIC | Age: 58
End: 2018-06-01

## 2018-06-01 ENCOUNTER — TRANSITIONAL CARE MANAGEMENT (OUTPATIENT)
Dept: SURGICAL ONCOLOGY | Facility: CLINIC | Age: 58
End: 2018-06-01

## 2018-06-01 ENCOUNTER — OFFICE VISIT (OUTPATIENT)
Dept: SURGICAL ONCOLOGY | Facility: CLINIC | Age: 58
End: 2018-06-01

## 2018-06-01 VITALS
WEIGHT: 151.5 LBS | BODY MASS INDEX: 25.24 KG/M2 | SYSTOLIC BLOOD PRESSURE: 140 MMHG | HEART RATE: 97 BPM | HEIGHT: 65 IN | TEMPERATURE: 98 F | DIASTOLIC BLOOD PRESSURE: 80 MMHG

## 2018-06-01 DIAGNOSIS — C50.812 MALIGNANT NEOPLASM OF OVERLAPPING SITES OF LEFT BREAST IN FEMALE, ESTROGEN RECEPTOR POSITIVE (HCC): Primary | ICD-10-CM

## 2018-06-01 DIAGNOSIS — Z17.0 MALIGNANT NEOPLASM OF OVERLAPPING SITES OF LEFT BREAST IN FEMALE, ESTROGEN RECEPTOR POSITIVE (HCC): Primary | ICD-10-CM

## 2018-06-01 PROCEDURE — 99024 POSTOP FOLLOW-UP VISIT: CPT | Performed by: SURGERY

## 2018-06-01 NOTE — PROGRESS NOTES
Surgical Oncology Breast Post-Op       [address] [facility],[address] [place] [address]  [facility] SURGICAL ONCOLOGY [address] [address]    Benjamin Beebe  1960  [id number]  [address] [facility],[address] [place] [address] SURGICAL ONCOLOGY [place] [id number]    Chief Complaint:   Erica Connell is seen for a post-operative visit of her recent left breast surgery  Oncology History:     Oncology History    2/22/18 Bilateral screening mammogram showed large   suspicious masses  identified at the 7 o'clock position of the left breast posteriorly and in the 11-1 o'clock position of the   left breast, middle depth  Dense lymph nodes are also identified in the left axilla  3/26/18 Left diagnostic mammogram/US revealed at 6:00 5 cm from the nipple, a hypoechoic mass with a hyperechoic rim measuring 3 7 x 2 4 x 3 6 cm  corresponds to the mammographic   lower inner quadrant mass  At 11:00 4 cm from the nipple a hypoechoic mass with hyperechoic rim measuring 4 2 x 2 5 by at least 4 6 cm corresponds to the upper inner quadrant mass on mammogram   At 12:00 7 cm from the nipple, a possible correlate for the 3rd mammographic mass is identified as a hyperechoic mass measuring 1 1 x 0 9 cm     There are multiple abnormal left axillary lymph nodes with the largest measuring 1 2 x 0 9 x 1 2 cm, without an identifiable fatty hilum  Cine images show at least 3 additional abnormal lymph nodes some of which appear to contain calcifications  4/3/18 left breast biopsy with lymph node biopsy  4/10/18 Dr Roxanne Tyson:  Richard Gorman she has no metastatic disease, recommended a modified radical mastectomy followed by chemotherapy and radiation therapy       5/15/18 BREAST MODIFIED RADICAL MASTECTOMY (Left)              Malignant neoplasm of overlapping sites of left breast in female, estrogen receptor positive ([facility] [address] )    4/3/2018 Initial Diagnosis     Left breast biopsy  4 6 cm in size on ultrasound largest tumor   Site A:  6:00 o clock 5 CMFN-Invasive Lobular grade 2  Site B:  11:00 o'clock 4 CMFN Invasive Lobular grade 2  Lymph node:  Positive for metastatic cancer  ER 70  OK 15  Her 2 1+   Performed on site A specimen  Stage IIA         5/15/2018 Surgery     Left breast modified radical mastectomy  Invasive lobular carcinoma  Multi focal  Tumor seen in deep dermis  Grade 2  8 1 cm  6/24 lymph nodes  ER 70  OK 15  Her 2 negative  Stage IIIA               Assessment & Plan:   Assessment/Plan      History of Present Illness:   See above    Interval History:   The patient called me last night and explained that she noticed that her drains had fallen out along with her sutures  Review of Systems:   Review of Systems    Past Medical History:     Patient Active Problem List   Diagnosis    Malignant neoplasm of overlapping sites of left breast in female, estrogen receptor positive ([address] )    Hydronephrosis     Past Medical History:   Diagnosis Date    Hypertension     Malignant neoplasm of overlapping sites of left female breast ([address] )     Migraine      Past Surgical History:   Procedure Laterality Date    BREAST BIOPSY      COLON SURGERY      colon resection    [place] MASTECTOMY, MODIFIED RADICAL Left 5/15/2018    Procedure: BREAST MODIFIED RADICAL MASTECTOMY;  Surgeon: Terri Herzog MD;  [facility];  Service: Surgical Oncology     Family History   Problem Relation Age of Onset    No Known Problems Mother     No Known Problems Father      Social History     Social History    Marital status: /Civil Union     Spouse name: N/A    Number of children: N/A    Years of education: N/A     Occupational History    Not on file       Social History Main Topics    Smoking status: Never Smoker    Smokeless tobacco: Never Used    Alcohol use No    Drug use: No    Sexual activity: Yes     Partners: Female     Birth control/ protection: Female Sterilization     Other Topics Concern    Not on file Social History Narrative    No narrative on file       Current Outpatient Prescriptions:     ALPRAZolam (XANAX) 0 25 mg tablet, Take 0 25 mg by mouth 2 (two) times a day as needed for anxiety, Disp: , Rfl:     oxyCODONE-acetaminophen (PERCOCET) 5-325 mg per tablet, Take 1 tablet by mouth every 6 (six) hours as needed for moderate pain Max Daily Amount: 4 tablets, Disp: 28 tablet, Rfl: 0    SUMAtriptan (IMITREX) 100 mg tablet, Take 100 mg by mouth once as needed for migraine, Disp: , Rfl:     verapamil (CALAN-SR) 240 mg CR tablet, Take 240 mg by mouth 2 (two) times a day, Disp: , Rfl:   No Known Allergies    Physical Exams:     Vitals:    06/01/18 0929   BP: 140/80   Pulse: 97   Temp: 98 °F (36 7 °C)     Physical Exam   Pulmonary/Chest:   Examination left mastectomy site shows no evidence of a seroma  The patient's drain site was covered with seroma which we left in place  The patient assured us her sutures were no longer in place  Results:       Labs:       Discussion/Summary:   The patient may develop a seroma she had been draining approximately 50 cc per day per drain  She was instructed to wash the area twice a day keep it covered  She is currently using Optifoam which is working well for her  We will cancel her scheduled appointment for 10 days and move that out to 3 months  She will contact us should she developed a seroma

## 2018-06-04 ENCOUNTER — DOCUMENTATION (OUTPATIENT)
Dept: RADIATION ONCOLOGY | Facility: HOSPITAL | Age: 58
End: 2018-06-04

## 2018-06-04 ENCOUNTER — RADIATION ONCOLOGY CONSULT (OUTPATIENT)
Dept: RADIATION ONCOLOGY | Facility: HOSPITAL | Age: 58
End: 2018-06-04
Attending: RADIOLOGY
Payer: COMMERCIAL

## 2018-06-04 VITALS
TEMPERATURE: 98.7 F | OXYGEN SATURATION: 98 % | HEIGHT: 65 IN | HEART RATE: 93 BPM | WEIGHT: 152.2 LBS | SYSTOLIC BLOOD PRESSURE: 118 MMHG | BODY MASS INDEX: 25.36 KG/M2 | DIASTOLIC BLOOD PRESSURE: 70 MMHG | RESPIRATION RATE: 16 BRPM

## 2018-06-04 DIAGNOSIS — Z17.0 MALIGNANT NEOPLASM OF OVERLAPPING SITES OF LEFT BREAST IN FEMALE, ESTROGEN RECEPTOR POSITIVE (HCC): ICD-10-CM

## 2018-06-04 DIAGNOSIS — C50.812 MALIGNANT NEOPLASM OF OVERLAPPING SITES OF LEFT BREAST IN FEMALE, ESTROGEN RECEPTOR POSITIVE (HCC): ICD-10-CM

## 2018-06-04 PROCEDURE — 99215 OFFICE O/P EST HI 40 MIN: CPT | Performed by: RADIOLOGY

## 2018-06-04 RX ORDER — NAPROXEN SODIUM 220 MG
660 TABLET ORAL DAILY PRN
COMMUNITY
End: 2018-12-21 | Stop reason: HOSPADM

## 2018-06-04 NOTE — PROGRESS NOTES
Consultation - Radiation Oncology     LQI:[id number] : 1960  Encounter: [id number]  Patient Information: [address]  Chief Complaint   Patient presents with    Breast Cancer     Consult     Cancer Staging  Malignant neoplasm of overlapping sites of left breast in female, estrogen receptor positive ([address] )  Staging form: Breast, AJCC 8th Edition  - Clinical stage from 4/10/2018: Stage IIA (cT2, cN1(f), cM0, G2, ER: Positive, ND: Positive, HER2: Negative) - Signed by Gabriela Saucedo MD on 4/10/2018           History of Present Illness   Vilma Abdullahi is a 65y o  year old female who presents with recently diagnosed invasive mammary carcinoma of the left breast as described below  She presents today accompanied by her  to discuss postmastectomy radiation therapy  She has done well since her surgery and is essentially without complaints  Historical Information   Oncology History      Summer 2017, felt 2 masses in the left breast  Felt pressure, at times, when she leaned forward  She did not seek medical attention until 2018    Post menopause around age 49  NO HRT  BCP appr 5 yrs  No recent GYN f/u  18 Bilateral screening mammogram showed large   suspicious masses  identified at the 7 o'clock position of the left breast posteriorly and in the 11-1 o'clock position of the   left breast, middle depth  Dense lymph nodes are also identified in the left axilla  3/26/18 Left diagnostic mammogram/US revealed at 6:00 5 cm from the nipple, a hypoechoic mass with a hyperechoic rim measuring 3 7 x 2 4 x 3 6 cm  corresponds to the mammographic   lower inner quadrant mass    At 11:00 4 cm from the nipple a hypoechoic mass with hyperechoic rim measuring 4 2 x 2 5 by at least 4 6 cm corresponds to the upper inner quadrant mass on mammogram   At 12:00 7 cm from the nipple, a possible correlate for the 3rd mammographic mass is identified as a hyperechoic mass measuring 1 1 x 0 9 cm     There are multiple abnormal left axillary lymph nodes with the largest measuring 1 2 x 0 9 x 1 2 cm, without an identifiable fatty hilum  Cine images show at least 3 additional abnormal lymph nodes some of which appear to contain calcifications  4/3/18 left breast biopsy with lymph node biopsy  4/10/18 Dr Nicola Nunez:  Tyra Leon she has no metastatic disease, recommended a modified radical mastectomy followed by chemotherapy and radiation therapy  4/11/18 Bone scan/CT chest, abd , and pelvis    4/16/18 PET/CT    5/15/18 BREAST MODIFIED RADICAL MASTECTOMY (Left)    5/30/18 F/U with Dr Nicola Nunez:  pathology demonstrated 6/24 lymph nodes had metastatic disease  Her tumor was multifocal with the largest piece being approximately 8 cm  Recommended Radiation Oncology in addition to Medical Oncology for consideration regarding adjuvant therapies  6/11/18 Consult with Dr Linda Bell  Today has minimal soreness in the left chest, post op  No healing issues           Malignant neoplasm of overlapping sites of left breast in female, estrogen receptor positive ([address] )    4/3/2018 Initial Diagnosis     Left breast biopsy  4 6 cm in size on ultrasound largest tumor   Site A:  6:00 o clock 5 CMFN-Invasive Lobular grade 2  Site B:  11:00 o'clock 4 CMFN Invasive Lobular grade 2  Lymph node:  Positive for metastatic cancer  ER 70  AR 15  Her 2 1+   Performed on site A specimen  Stage IIA         5/15/2018 Surgery     Left breast modified radical mastectomy  Invasive lobular carcinoma  Multi focal  Tumor seen in deep dermis  Grade 2  8 1 cm  6/24 lymph nodes  ER 70  AR 15  Her 2 negative  Stage IIIA                 Past Medical History:   Diagnosis Date    Congenital prolapsed rectum     Hypertension     Malignant neoplasm of overlapping sites of left female breast ([address] )     Migraine      Past Surgical History:   Procedure Laterality Date    BREAST BIOPSY      COLON SURGERY      colon resection    AR MASTECTOMY, MODIFIED RADICAL Left 5/15/2018    Procedure: BREAST MODIFIED RADICAL MASTECTOMY;  Surgeon: Dima Smith MD;  [facility];  Service: Surgical Oncology       Family History   Problem Relation Age of Onset    No Known Problems Mother     No Known Problems Father        Social History   History   Alcohol Use No     History   Drug Use No     History   Smoking Status    Never Smoker   Smokeless Tobacco    Never Used         Meds/Allergies     Current Outpatient Prescriptions:     naproxen sodium (ALEVE) 220 MG tablet, Take 660 mg by mouth daily as needed for mild pain, Disp: , Rfl:     SUMAtriptan (IMITREX) 100 mg tablet, Take 100 mg by mouth once as needed for migraine, Disp: , Rfl:     verapamil (CALAN-SR) 240 mg CR tablet, Take 240 mg by mouth 2 (two) times a day, Disp: , Rfl:   No Known Allergies      Review of Systems   Review of Systems   Constitutional: Positive for activity change (less than pre op  ), appetite change (decreased), fatigue (6/10) and unexpected weight change (wt loss of 10 lbs since diagnosis, stable presently)  HENT: Negative  Eyes: Negative  Respiratory: Negative  Cardiovascular: Negative  Gastrointestinal: Negative  Endocrine: Negative  Genitourinary: Negative  Musculoskeletal: Negative  Skin: Negative  Reports good healing in the left chest   Allergic/Immunologic: Negative  Neurological: Negative  Hematological: Negative  Psychiatric/Behavioral: Negative  Screening  Tobacco  Current tobacco user: no  If yes, brief counseling provided: NA    Hypertension  Hypertension screening performed: yes  Normotensive:  yes  If no, referred to PCP: n/a    Depression Screening  Screened for depression using PHQ-2: yes    Screened for depression using PHQ-9:  yes  Screening positive or negative:  positive  If score >4, was any of the following actions taken? Yes    Additional evaluation for depression, suicide risk assesment, referral to PCP or psychiatry, medication started:   Referred to counselor, I spoke with Dimitry Simpson, gave her his card  Asked him to call her  Advanced Care Planning for Patients >65 years  Advanced Care Planning Discussed:  n/a  Patient named surrogate decision maker or care plan in chart: n/a        OBJECTIVE:   /70 (BP Location: Right arm)   Pulse 93   Temp 98 7 °F (37 1 °C)   Resp 16   Ht 5' 5" (1 651 m)   Wt 69 kg (152 lb 3 2 oz)   SpO2 98%   BMI 25 33 kg/m²   Pain Assessment:  0  Performance Status: Karnofsky: 90 - Able to carry on normal activity; minor signs or symptoms of disease     Physical Exam   The patient presents today no apparent distress  Sclera anicteric  No cervical supraclavicular lymphadenopathy  Normal S1-S2 regular rate and rhythm  Lungs clear to auscultation bilaterally  The right breast is within normal limits  The patient is status post left mastectomy  The left mastectomy scar is healing well  There is a small seroma along the far lateral inferior aspect of the scar where her drain was previously located  No signs of infection  No axillary lymphadenopathy  No lymphedema  Slight limitation in range of motion of the left shoulder  Normal speech  Normal affect  RESULTS  Lab Results    Chemistry        Component Value Date/Time     05/16/2018 0606    K 4 3 05/16/2018 0606     05/16/2018 0606    CO2 24 05/16/2018 0606    BUN 20 05/16/2018 0606    CREATININE 1 29 05/16/2018 0606        Component Value Date/Time    CALCIUM 8 2 (L) 05/16/2018 0606    ALKPHOS 91 04/20/2018 1129    AST 42 04/20/2018 1129    ALT 44 04/20/2018 1129    BILITOT 0 50 04/20/2018 1129            Lab Results   Component Value Date    WBC 5 86 05/16/2018    HGB 12 5 05/16/2018    HCT 38 2 05/16/2018     (H) 05/16/2018     05/16/2018           ASSESSMENT  1   Malignant neoplasm of overlapping sites of left breast in female, estrogen receptor positive ([facility] [address] )  Ambulatory referral to Radiation Oncology     Cancer Staging  Malignant neoplasm of overlapping sites of left breast in female, estrogen receptor positive ([facility] [address] )  Staging form: Breast, AJCC 8th Edition  - Clinical stage from 4/10/2018: Stage IIA (cT2, cN1(f), cM0, G2, ER: Positive, ND: Positive, HER2: Negative) - Signed by Nona Juarze MD on 4/10/2018        PLAN/DISCUSSION  No orders of the defined types were placed in this encounter  Garnette Bloch is a 65y o  year old female with recently diagnosed pT3 N2a M0 grade 2 ER/ND positive her 2 - Stage IIIA invasive mammary carcinoma of the left breast status post modified radical mastectomy with 6 of 24 lymph nodes positive  She did not undergo immediate reconstruction but is planning on meeting with a plastic surgeon and will be considering reconstruction in the future  She will be meeting with Medical Oncology next week for consultation  We are recommending a course of postmastectomy radiation therapy  We anticipate delivering a dose of 5000 cGy to the chest wall and regional lymphatics  Given the size of the primary lesion and dermal invasion, we would additionally boost the mastectomy scar to a dose of 6000 cGy  The associated risks and toxicities of postmastectomy radiation were discussed patient in detail, including, but not limited to, fatigue, erythema, hyperpigmentation, desquamation, subcutaneous fibrosis, rib fracture, lymphedema, pneumonitis, cardiac toxicity, secondary malignancy, and increased difficulty with and complications related to reconstruction  She will return to our department following completion of systemic therapy for CT planning            Lake Valera MD  6/0/5355,78:00 AM      Portions of the record may have been created with voice recognition software   Occasional wrong word or "sound a like" substitutions may have occurred due to the inherent limitations of voice recognition software   Read the chart carefully and recognize, using context, where substitutions have occurred

## 2018-06-04 NOTE — PROGRESS NOTES
Sola Drake    1  Malignant neoplasm of overlapping sites of left breast in female, estrogen receptor positive [facility])  Ambulatory referral to Radiation Oncology     Cancer Staging  Malignant neoplasm of overlapping sites of left breast in female, estrogen receptor positive ([facility] [address] )  Staging form: Breast, AJCC 8th Edition  - Clinical stage from 4/10/2018: Stage IIA (cT2, cN1(f), cM0, G2, ER: Positive, NC: Positive, HER2: Negative) - Signed by Yumiko Canas MD on 4/10/2018    Oncology History      Summer 2017, felt 2 masses in the left breast  Felt pressure, at times, when she leaned forward  She did not seek medical attention until Feb 2018 2/22/18 Bilateral screening mammogram showed large   suspicious masses  identified at the 7 o'clock position of the left breast posteriorly and in the 11-1 o'clock position of the   left breast, middle depth  Dense lymph nodes are also identified in the left axilla  3/26/18 Left diagnostic mammogram/US revealed at 6:00 5 cm from the nipple, a hypoechoic mass with a hyperechoic rim measuring 3 7 x 2 4 x 3 6 cm  corresponds to the mammographic   lower inner quadrant mass  At 11:00 4 cm from the nipple a hypoechoic mass with hyperechoic rim measuring 4 2 x 2 5 by at least 4 6 cm corresponds to the upper inner quadrant mass on mammogram   At 12:00 7 cm from the nipple, a possible correlate for the 3rd mammographic mass is identified as a hyperechoic mass measuring 1 1 x 0 9 cm     There are multiple abnormal left axillary lymph nodes with the largest measuring 1 2 x 0 9 x 1 2 cm, without an identifiable fatty hilum  Cine images show at least 3 additional abnormal lymph nodes some of which appear to contain calcifications  4/3/18 left breast biopsy with lymph node biopsy  4/10/18 [facility] Scot Call[facility] has no [facility] disease, recommended a modified radical mastectomy followed by chemotherapy and radiation therapy       4/11/18 Bone scan/CT chest, abd , and pelvis    4/16/18 PET/CT    5/15/18 BREAST MODIFIED RADICAL MASTECTOMY (Left)    5/30/18 F/U with Dr Kalin Davey:  pathology demonstrated 6/24 lymph nodes had metastatic disease  Her tumor was multifocal with the largest piece being approximately 8 cm  Recommended Radiation Oncology in addition to Medical Oncology for consideration regarding adjuvant therapies  6/11/18 Consult with Dr Jacqueline Cortez           Malignant neoplasm of overlapping sites of left breast in female, estrogen receptor positive ([facility] [address] )    4/3/2018 Initial Diagnosis     Left breast biopsy  4 6 cm in size on ultrasound largest tumor   Site A:  6:00 o clock 5 CMFN-Invasive Lobular grade 2  Site B:  11:00 o'clock 4 CMFN Invasive Lobular grade 2  Lymph node:  Positive for metastatic cancer  ER 70  MS 15  Her 2 1+   Performed on site A specimen  Stage IIA         5/15/2018 Surgery     Left breast modified radical mastectomy  Invasive lobular carcinoma  Multi focal  Tumor seen in deep dermis  Grade 2  8 1 cm  6/24 lymph nodes  ER 70  MS 15  Her 2 negative  Stage IIIA               Interval History:***    GYN History:***    Patient Active Problem List   Diagnosis    Malignant neoplasm of overlapping sites of left breast in female, estrogen receptor positive ([address] )    Hydronephrosis     Past Medical History:   Diagnosis Date    Congenital prolapsed rectum     Hypertension     Malignant neoplasm of overlapping sites of left female breast ([address] )     Migraine      Past Surgical History:   Procedure Laterality Date    BREAST BIOPSY      COLON SURGERY      colon resection    MS MASTECTOMY, MODIFIED RADICAL Left 5/15/2018    Procedure: BREAST MODIFIED RADICAL MASTECTOMY;  Surgeon: Mally Arceo MD;  [facility];  Service: Surgical Oncology     Family History   Problem Relation Age of Onset    No Known Problems Mother     No Known Problems Father      Social History     Social History    Marital status: /Civil Union     Spouse name: N/A   [facility] Number of children: N/A    Years of education: N/A     Occupational History    Not on file  Social History Main Topics    Smoking status: Never Smoker    Smokeless tobacco: Never Used    Alcohol use No    Drug use: No    Sexual activity: Yes     Partners: Female     Birth control/ protection: Female Sterilization     Other Topics Concern    Not on file     Social History Narrative    No narrative on file       Current Outpatient Prescriptions:     naproxen sodium (ALEVE) 220 MG tablet, Take 660 mg by mouth daily as needed for mild pain, Disp: , Rfl:     SUMAtriptan (IMITREX) 100 mg tablet, Take 100 mg by mouth once as needed for migraine, Disp: , Rfl:     verapamil (CALAN-SR) 240 mg CR tablet, Take 240 mg by mouth 2 (two) times a day, Disp: , Rfl:   No Known Allergies    Review of Systems:  Review of Systems    Vitals:    06/04/18 0950   BP: 118/70   BP Location: Right arm   Pulse: 93   Resp: 16   Temp: 98 7 °F (37 1 °C)   SpO2: 98%   Weight: 69 kg (152 lb 3 2 oz)   Height: 5' 5" (1 651 m)       Imaging:No results found      Teaching:***

## 2018-06-04 NOTE — PROGRESS NOTES
As per Rad Onc nursing, I was asked to meet with the pt upon her initial consult in that department  She apparently scored high on the depression scale  Met with the pt and her  Kenneth Bhatia admitted to some depression and anxiety which she stated started upon diagnosis and resulted in major changes to her lifestyle eg her work and family life re caring for her grandchildren  It is her hope to retire later this year and move with her  to [place]  Her family and other support network appear intact and strong  She has taken Xanax as prescribed by her PCP around the time of diagnosis  Chery Bhatia admitted to being anxious today  Provided reassurance and supportive counseling to her at this visit and encouraged her to call to set up an appt with me whenever she feels the need to speak with someone  As she is is currently out of her Xanax, I encouraged her to call her PCP to keep her physician apprised as to her condition  Provided pt and spouse with my contact information

## 2018-06-04 NOTE — PROGRESS NOTES
Adam Francis  1960  Ms Berenda Gitelman is a 65 y o  female    Chief Complaint   Patient presents with   [address]       Cancer Staging  Malignant neoplasm of overlapping sites of left breast in female, estrogen receptor positive ([facility] [address] )  Staging form: Breast, AJCC 8th Edition  - Clinical stage from 4/10/2018: Stage IIA (cT2, cN1(f), cM0, G2, ER: Positive, W[place]: Positive, HER2: Negative) - Signed by Fletcher He MD on 4/10/2018      Oncology History      Summer 2017, felt 2 masses in the left breast  Felt pressure, at times, when she leaned forward  She did not seek medical attention until Feb 2018 2/22/18 Bilateral screening mammogram showed large   suspicious masses  identified at the 7 o'clock position of the left breast posteriorly and in the 11-1 o'clock position of the   left breast, middle depth  Dense lymph nodes are also identified in the left axilla  3/26/18 Left diagnostic mammogram/US revealed at 6:00 5 cm from the nipple, a hypoechoic mass with a hyperechoic rim measuring 3 7 x 2 4 x 3 6 cm  corresponds to the mammographic   lower inner quadrant mass  At 11:00 4 cm from the nipple a hypoechoic mass with hyperechoic rim measuring 4 2 x 2 5 by at least 4 6 cm corresponds to the upper inner quadrant mass on mammogram   At 12:00 7 cm from the nipple, a possible correlate for the 3rd mammographic mass is identified as a hyperechoic mass measuring 1 1 x 0 9 cm     There are multiple abnormal left axillary lymph nodes with the largest measuring 1 2 x 0 9 x 1 2 cm, without an identifiable fatty hilum  Cine images show at least 3 additional abnormal lymph nodes some of which appear to contain calcifications  4/3/18 left breast biopsy with lymph node biopsy  4/10/18 Dr Rangel Roque:  Otilio Vincent she has no metastatic disease, recommended a modified radical mastectomy followed by chemotherapy and radiation therapy       4/11/18 Bone scan/CT chest, abd , and pelvis    4/16/18 PET/CT    5/15/18 BREAST MODIFIED RADICAL MASTECTOMY (Left)    18 F/U with Dr Priti Rome:  pathology demonstrated  lymph nodes had metastatic disease  Her tumor was multifocal with the largest piece being approximately 8 cm  Recommended Radiation Oncology in addition to Medical Oncology for consideration regarding adjuvant therapies  18 Consult with Dr Veronica Flannery  Malignant neoplasm of overlapping sites of left breast in female, estrogen receptor positive ([facility] [address] )    4/3/2018 Initial Diagnosis     Left breast biopsy  4 6 cm in size on ultrasound largest tumor   Site A:  6:00 o clock 5 CMFN-Invasive Lobular grade 2  Site B:  11:00 o'clock 4 CMFN Invasive Lobular grade 2  Lymph node:  Positive for metastatic cancer  ER 70  SD 15  Her 2 1+   Performed on site A specimen  Stage IIA         5/15/2018 Surgery     Left breast modified radical mastectomy  Invasive lobular carcinoma  Multi focal  Tumor seen in deep dermis  Grade 2  8 1 cm   lymph nodes  ER 70  SD 15  Her 2 negative  Stage IIIA               Clinical Trial: No  Screening  Tobacco  Current tobacco user: no  If yes, brief counseling provided: NA    Hypertension  Hypertension screening performed: yes  Normotensive:  yes  If no, referred to PCP: n/a    Depression Screening  Screened for depression using PHQ-2: yes    Screened for depression using PHQ-9:  yes  Screening positive or negative:  positive  If score >4, was any of the following actions taken? Yes  Additional evaluation for depression, suicide risk assesment, referral to PCP or psychiatry, medication started:   Referred to counselor, I spoke with Flash Piper, gave her his card  Asked him to call her  Advanced Care Planning for Patients >65 years  Advanced Care Planning Discussed:  n/a  Patient named surrogate decision maker or care plan in chart: n/a    OB/GYN History:  Post menopause around age 49  NO HRT  BCP appr 5 yrs          Health Maintenance   Topic Date Due    HIV SCREENING  1960  Hepatitis C Screening  1960    Depression Screening PHQ-9  1960    PAP SMEAR  1960    CRC Screening: Colonoscopy  1960    PNEUMOCOCCAL POLYSACCHARIDE VACCINE X 2 AGE 11-64 YEARS IMMUNOCOMPROMISED (1) 07/30/1972    DTaP,Tdap,and Td Vaccines (1 - Tdap) 07/30/1982    INFLUENZA VACCINE  09/01/2018    MAMMOGRAM  03/26/2019       Patient Active Problem List   Diagnosis    Malignant neoplasm of overlapping sites of left breast in female, estrogen receptor positive ([facility] [address] )    Hydronephrosis     Past Medical History:   Diagnosis Date    Congenital prolapsed rectum     Hypertension     Malignant neoplasm of overlapping sites of left female breast ([address] )     Migraine      Past Surgical History:   Procedure Laterality Date    BREAST BIOPSY      COLON SURGERY      colon resection    TN MASTECTOMY, MODIFIED RADICAL Left 5/15/2018    Procedure: BREAST MODIFIED RADICAL MASTECTOMY;  Surgeon: Gabriela Saucedo MD;  [facility];  Service: Surgical Oncology     Family History   Problem Relation Age of Onset    No Known Problems Mother     No Known Problems Father      Social History     Social History    Marital status: /Civil Union     Spouse name: N/A    Number of children: N/A    Years of education: N/A     Occupational History    Not on file       Social History Main Topics    Smoking status: Never Smoker    Smokeless tobacco: Never Used    Alcohol use No    Drug use: No    Sexual activity: Yes     Partners: Female     Birth control/ protection: Female Sterilization     Other Topics Concern    Not on file     Social History Narrative    No narrative on file       Current Outpatient Prescriptions:     naproxen sodium (ALEVE) 220 MG tablet, Take 660 mg by mouth daily as needed for mild pain, Disp: , Rfl:     SUMAtriptan (IMITREX) 100 mg tablet, Take 100 mg by mouth once as needed for migraine, Disp: , Rfl:     verapamil (CALAN-SR) 240 mg CR tablet, Take 240 mg by mouth 2 (two) times a day, Disp: , Rfl:     No Known Allergies    Review of Systems:  Review of Systems   Constitutional: Positive for activity change (less than pre op  ), appetite change (decreased), fatigue (6/10) and unexpected weight change (wt loss of 10 lbs since diagnosis, stable presently)  HENT: Negative  Eyes: Negative  Respiratory: Negative  Cardiovascular: Negative  Gastrointestinal: Negative  Endocrine: Negative  Genitourinary: Negative  Musculoskeletal: Negative  Skin: Negative  Reports good healing in the left chest   Allergic/Immunologic: Negative  Neurological: Negative  Hematological: Negative  Psychiatric/Behavioral: Negative  Vitals:    06/04/18 0950   BP: 118/70   BP Location: Right arm   Pulse: 93   Resp: 16   Temp: 98 7 °F (37 1 °C)   SpO2: 98%   Weight: 69 kg (152 lb 3 2 oz)   Height: 5' 5" (1 651 m)            Imaging:No results found      Teaching Left chest wall information/radiation packet

## 2018-06-11 ENCOUNTER — OFFICE VISIT (OUTPATIENT)
Dept: HEMATOLOGY ONCOLOGY | Facility: CLINIC | Age: 58
End: 2018-06-11
Payer: COMMERCIAL

## 2018-06-11 VITALS
SYSTOLIC BLOOD PRESSURE: 140 MMHG | DIASTOLIC BLOOD PRESSURE: 100 MMHG | TEMPERATURE: 98.3 F | HEART RATE: 107 BPM | WEIGHT: 153 LBS | HEIGHT: 65 IN | OXYGEN SATURATION: 99 % | BODY MASS INDEX: 25.49 KG/M2 | RESPIRATION RATE: 18 BRPM

## 2018-06-11 DIAGNOSIS — C50.812 MALIGNANT NEOPLASM OF OVERLAPPING SITES OF LEFT BREAST IN FEMALE, ESTROGEN RECEPTOR POSITIVE (HCC): ICD-10-CM

## 2018-06-11 DIAGNOSIS — L65.9 ALOPECIA: Primary | ICD-10-CM

## 2018-06-11 DIAGNOSIS — L03.90 CELLULITIS, UNSPECIFIED CELLULITIS SITE: Primary | ICD-10-CM

## 2018-06-11 DIAGNOSIS — C50.912 MALIGNANT NEOPLASM OF LEFT BREAST IN FEMALE, ESTROGEN RECEPTOR POSITIVE, UNSPECIFIED SITE OF BREAST (HCC): Primary | ICD-10-CM

## 2018-06-11 DIAGNOSIS — Z17.0 MALIGNANT NEOPLASM OF OVERLAPPING SITES OF LEFT BREAST IN FEMALE, ESTROGEN RECEPTOR POSITIVE (HCC): ICD-10-CM

## 2018-06-11 DIAGNOSIS — Z17.0 MALIGNANT NEOPLASM OF LEFT BREAST IN FEMALE, ESTROGEN RECEPTOR POSITIVE, UNSPECIFIED SITE OF BREAST (HCC): Primary | ICD-10-CM

## 2018-06-11 PROCEDURE — 99245 OFF/OP CONSLTJ NEW/EST HI 55: CPT | Performed by: INTERNAL MEDICINE

## 2018-06-11 RX ORDER — CEPHALEXIN 500 MG/1
500 CAPSULE ORAL EVERY 8 HOURS SCHEDULED
Qty: 21 CAPSULE | Refills: 0 | Status: SHIPPED | OUTPATIENT
Start: 2018-06-11 | End: 2018-06-18

## 2018-06-11 NOTE — LETTER
June 11, 2018     [facility] MD Sergio  [facility] [address]    Patient: Isa Iglesias   YOB: 1960   Date of Visit: 6/11/2018       Dear Dr rPiti Rome:    Thank you for referring Olayinka Eddy to me for evaluation  Below are my notes for this consultation  If you have questions, please do not hesitate to call me  I look forward to following your patient along with you  Sincerely,        Shirlene Addison MD        CC: MD Shirlene Zapata MD  6/11/2018 12:09 PM  Sign at close encounter  Hematology / Oncology Outpatient Consult Note    Isa Iglesias 65 y o  female [id number]         Date:  6/11/2018    Assessment / Plan:  A 15-year-old postmenopausal woman with newly diagnosed stage IIIA left breast cancer, grade 2, invasive lobular histology, ER 75% positive, WI 15% positive, HER2 negative disease  She underwent mastectomy and axillary lymph node dissection, resulting in IBAN  She had 6 positive lymph nodes as well as 8 1 cm of primary tumor size  She presents today with her  to discuss adjuvant treatment options  We had extensive discussion regarding the diagnosis, staging information, tumor phenotype, relatively high risk disease, prognosis and treatment options  I recommended her to have adjuvant chemotherapy with dose dense AC x4 followed by weekly paclitaxel x12  Side effects of this regimen was thoroughly discussed, including but not limited to alopecia, nausea, vomiting, neutropenia, risk of infection, cardiotoxicity, neuropathy, allergic reaction as well as small chance of secondary leukemia  She understood and wished to proceed  She appeared to have cellulitis at mastectomy site  I prescribed Keflex for 7 days  I am going to ask interventional radiologist to place a Port-A-Cath  I am going to obtain MUGA scan for cardiac clearance    She is going to have 1st cycle treatment in June 27, 2018 at [facility]  She is in agreement with my recommendations  Subjective:     HPI:  A 71-year-old postmenopausal woman who palpated lump in the left breast which she brought to medical attention  She was found to have radiographic abnormality for which she underwent left breast biopsy in April 3, 2018  Biopsy showed invasive lobular carcinoma, grade 2  This was ER % positive, IN 15% positive, HER2 negative disease  She had led to be large breast tumor as well as clinically positive lymph nodes  Therefore, she underwent mastectomy and axillary lymph node dissection by Dr Mina Paul in May 15, 2018  She had 8 1 cm of invasive ductal carcinoma, grade 2  Lymphovascular invasion was present  6/24 axillary lymph nodes were positive for metastatic disease with largest tumor measuring 1 9 cm with extranodal extension  She did not have reconstruction  Prior to the surgery, PET-CT scan showed no evidence of distant metastasis  She presents today to discuss adjuvant treatment options  She has no breast related symptomatology  Her weight has been stable  She has no complaint of bone pain  She denied any respiratory symptoms  Her performance status is normal  She is a lifetime never smoker  Interval History:          Objective:     Primary Diagnosis:    Left breast cancer, stage IIIA (pT3, pN2, M0) grade 2, invasive lobular histology, ER 75% positive, IN 15% positive, HER2 negative disease  6 positive lymph nodes  Diagnosed in May 2018      Cancer Staging:  Cancer Staging  Malignant neoplasm of overlapping sites of left breast in female, estrogen receptor positive ([facility] [address] )  Staging form: Breast, AJCC 8th Edition  - Clinical stage from 4/10/2018: Stage IIA (cT2, cN1(f), cM0, G2, ER: Positive, IN: Positive, HER2: Negative) - Signed by Zak Quinteros MD on 4/10/2018        Previous Hematologic/ Oncologic Treatment:         Current Hematologic/ Oncologic Treatment:      Adjuvant chemotherapy with dose dense AC x4 followed by weekly paclitaxel x12  Disease Status:     IBAN status post mastectomy and axillary lymph node dissection  Test Results:    Pathology:    8 1 cm of invasive lobular carcinoma, grade 2  Lymphovascular invasion was present  6/24 axillary lymph nodes were positive for metastatic disease with largest tumor 1 9 cm  Extranodal extension was positive  ER 75 to 100% positive, MD 15 present positive, HER2 negative disease  Stage IIIA (pT3, pN2, M0)    Radiology:    PET-CT scan showed hypermetabolic left breast mass with no evidence of distant metastasis  Laboratory:    See below  Physical Exam:      General Appearance:    Alert, oriented        Eyes:    PERRL   Ears:    Normal external ear canals, both ears   Nose:   Nares normal, septum midline   Throat:   Mucosa moist  Pharynx without injection  Neck:   Supple       Lungs:     Clear to auscultation bilaterally   Chest Wall:    No tenderness or deformity    Heart:    Regular rate and rhythm       Abdomen:     Soft, non-tender, bowel sounds +, no organomegaly           Extremities:   Extremities no cyanosis or edema       Skin:   no rash or icterus  Lymph nodes:   Cervical, supraclavicular, and axillary nodes normal   Neurologic:   CNII-XII intact, normal strength, sensation and reflexes     Throughout          Breast exam: Status post left mastectomy without reconstruction  No palpable abnormality in her left chest wall  Minor redness around the mastectomy scar  Right breast exam is negative  ROS: Review of Systems        Imaging: No results found        Labs:   Lab Results   Component Value Date    WBC 5 86 05/16/2018    HGB 12 5 05/16/2018    HCT 38 2 05/16/2018     (H) 05/16/2018     05/16/2018     Lab Results   Component Value Date     05/16/2018    K 4 3 05/16/2018     05/16/2018    CO2 24 05/16/2018    ANIONGAP 10 05/16/2018    BUN 20 05/16/2018    CREATININE 1 29 05/16/2018    GLUCOSE 159 (H) 05/16/2018    GLUF 159 (H) 05/16/2018    CALCIUM 8 2 (L) 05/16/2018    AST 42 04/20/2018    ALT 44 04/20/2018    ALKPHOS 91 04/20/2018    PROT 8 7 (H) 04/20/2018    BILITOT 0 50 04/20/2018    EGFR 46 05/16/2018           Vital Sign:    Body surface area is 1 76 meters squared  Wt Readings from Last 3 Encounters:   06/11/18 69 4 kg (153 lb)   06/04/18 69 kg (152 lb 3 2 oz)   06/01/18 68 7 kg (151 lb 8 oz)        Temp Readings from Last 3 Encounters:   06/11/18 98 3 °F (36 8 °C) (Tympanic)   06/04/18 98 7 °F (37 1 °C)   06/01/18 98 °F (36 7 °C)        BP Readings from Last 3 Encounters:   06/11/18 140/100   06/04/18 118/70   06/01/18 140/80         Pulse Readings from Last 3 Encounters:   06/11/18 (!) 107   06/04/18 93   06/01/18 97     @LASTSAO2(3)@    Active Problems:   Patient Active Problem List   Diagnosis    Malignant neoplasm of overlapping sites of left breast in female, estrogen receptor positive ([facility] [address] )    Hydronephrosis       Past Medical History:   Past Medical History:   Diagnosis Date    Congenital prolapsed rectum     Hypertension     Malignant neoplasm of overlapping sites of left female breast ([facility] [address] )     Migraine        Surgical History:   Past Surgical History:   Procedure Laterality Date    BREAST BIOPSY      COLON SURGERY      colon resection    HI MASTECTOMY, MODIFIED RADICAL Left 5/15/2018    Procedure: BREAST MODIFIED RADICAL MASTECTOMY;  Surgeon: Milagros Contreras MD;  Location: [facility];  Service: Surgical Oncology       Family History:    Family History   Problem Relation Age of Onset    No Known Problems Mother     No Known Problems Father        Cancer-related family history is not on file  Social History:   Social History     Social History    Marital status: /Civil Union     Spouse name: N/A    Number of children: N/A    Years of education: N/A     Occupational History    Not on file       Social History Main Topics    Smoking status: Never Smoker    Smokeless tobacco: Never Used    Alcohol use No    Drug use: No    Sexual activity: Yes     Partners: Female     Birth control/ protection: Female Sterilization     Other Topics Concern    Not on file     Social History Narrative    No narrative on file       Current Medications:   Current Outpatient Prescriptions   Medication Sig Dispense Refill    naproxen sodium (ALEVE) 220 MG tablet Take 660 mg by mouth daily as needed for mild pain      SUMAtriptan (IMITREX) 100 mg tablet Take 100 mg by mouth once as needed for migraine      verapamil (CALAN-SR) 240 mg CR tablet Take 240 mg by mouth 2 (two) times a day       No current facility-administered medications for this visit          Allergies: No Known Allergies

## 2018-06-11 NOTE — PROGRESS NOTES
Hematology / Oncology Outpatient Consult Note    Garnette Bloch 65 y o  female [id number]         Date:  6/11/2018    Assessment / Plan:  A 61-year-old postmenopausal woman with newly diagnosed stage IIIA left breast cancer, grade 2, invasive lobular histology, ER 75% positive, IN 15% positive, HER2 negative disease  She underwent mastectomy and axillary lymph node dissection, resulting in IBAN  She had 6 positive lymph nodes as well as 8 1 cm of primary tumor size  She presents today with her  to discuss adjuvant treatment options  We had extensive discussion regarding the diagnosis, staging information, tumor phenotype, relatively high risk disease, prognosis and treatment options  I recommended her to have adjuvant chemotherapy with dose dense AC x4 followed by weekly paclitaxel x12  Side effects of this regimen was thoroughly discussed, including but not limited to alopecia, nausea, vomiting, neutropenia, risk of infection, cardiotoxicity, neuropathy, allergic reaction as well as small chance of secondary leukemia  She understood and wished to proceed  She appeared to have cellulitis at mastectomy site  I prescribed Keflex for 7 days  I am going to ask interventional radiologist to place a Port-A-Cath  I am going to obtain MUGA scan for cardiac clearance  She is going to have 1st cycle treatment in June 27, 2018 at [facility]  She is in agreement with my recommendations  Subjective:     HPI:  A 61-year-old postmenopausal woman who palpated lump in the left breast which she brought to medical attention  She was found to have radiographic abnormality for which she underwent left breast biopsy in April 3, 2018  Biopsy showed invasive lobular carcinoma, grade 2  This was ER % positive, IN 15% positive, HER2 negative disease  She had led to be large breast tumor as well as clinically positive lymph nodes   Therefore, she underwent mastectomy and axillary lymph node dissection by Dr Misael Silverman in May 15, 2018  She had 8 1 cm of invasive ductal carcinoma, grade 2  Lymphovascular invasion was present  6/24 axillary lymph nodes were positive for metastatic disease with largest tumor measuring 1 9 cm with extranodal extension  She did not have reconstruction  Prior to the surgery, PET-CT scan showed no evidence of distant metastasis  She presents today to discuss adjuvant treatment options  She has no breast related symptomatology  Her weight has been stable  She has no complaint of bone pain  She denied any respiratory symptoms  Her performance status is normal  She is a lifetime never smoker  Interval History:          Objective:     Primary Diagnosis:    Left breast cancer, stage IIIA (pT3, pN2, M0) grade 2, invasive lobular histology, ER 75% positive, UT 15% positive, HER2 negative disease  6 positive lymph nodes  Diagnosed in May 2018  Cancer Staging:  Cancer Staging  Malignant neoplasm of overlapping sites of left breast in female, estrogen receptor positive ([facility] [address] )  Staging form: Breast, AJCC 8th Edition  - Clinical stage from 4/10/2018: Stage IIA (cT2, cN1(f), cM0, G2, ER: Positive, UT: Positive, HER2: Negative) - Signed by Avis Romo MD on 4/10/2018        Previous Hematologic/ Oncologic Treatment:         Current Hematologic/ Oncologic Treatment:      Adjuvant chemotherapy with dose dense AC x4 followed by weekly paclitaxel x12  Disease Status:     IBAN status post mastectomy and axillary lymph node dissection  Test Results:    Pathology:    8 1 cm of invasive lobular carcinoma, grade 2  Lymphovascular invasion was present  6/24 axillary lymph nodes were positive for metastatic disease with largest tumor 1 9 cm  Extranodal extension was positive  ER 75 to 100% positive, UT 15 present positive, HER2 negative disease    Stage IIIA (pT3, pN2, M0)    Radiology:    PET-CT scan showed hypermetabolic left breast mass with no evidence of distant metastasis  Laboratory:    See below  Physical Exam:      General Appearance:    Alert, oriented        Eyes:    PERRL   Ears:    Normal external ear canals, both ears   Nose:   Nares normal, septum midline   Throat:   Mucosa moist  Pharynx without injection  Neck:   Supple       Lungs:     Clear to auscultation bilaterally   Chest Wall:    No tenderness or deformity    Heart:    Regular rate and rhythm       Abdomen:     Soft, non-tender, bowel sounds +, no organomegaly           Extremities:   Extremities no cyanosis or edema       Skin:   no rash or icterus  Lymph nodes:   Cervical, supraclavicular, and axillary nodes normal   Neurologic:   CNII-XII intact, normal strength, sensation and reflexes     Throughout          Breast exam: Status post left mastectomy without reconstruction  No palpable abnormality in her left chest wall  Minor redness around the mastectomy scar  Right breast exam is negative  ROS: Review of Systems        Imaging: No results found  Labs:   Lab Results   Component Value Date    WBC 5 86 05/16/2018    HGB 12 5 05/16/2018    HCT 38 2 05/16/2018     (H) 05/16/2018     05/16/2018     Lab Results   Component Value Date     05/16/2018    K 4 3 05/16/2018     05/16/2018    CO2 24 05/16/2018    ANIONGAP 10 05/16/2018    BUN 20 05/16/2018    CREATININE 1 29 05/16/2018    GLUCOSE 159 (H) 05/16/2018    GLUF 159 (H) 05/16/2018    CALCIUM 8 2 (L) 05/16/2018    AST 42 04/20/2018    ALT 44 04/20/2018    ALKPHOS 91 04/20/2018    PROT 8 7 (H) 04/20/2018    BILITOT 0 50 04/20/2018    EGFR 46 05/16/2018           Vital Sign:    Body surface area is 1 76 meters squared      Wt Readings from Last 3 Encounters:   06/11/18 69 4 kg (153 lb)   06/04/18 69 kg (152 lb 3 2 oz)   06/01/18 68 7 kg (151 lb 8 oz)        Temp Readings from Last 3 Encounters:   06/11/18 98 3 °F (36 8 °C) (Tympanic)   06/04/18 98 7 °F (37 1 °C)   06/01/18 98 °F (36 7 °C) BP Readings from Last 3 Encounters:   06/11/18 140/100   06/04/18 118/70   06/01/18 140/80         Pulse Readings from Last 3 Encounters:   06/11/18 (!) 107   06/04/18 93   06/01/18 97     @LASTSAO2(3)@    Active Problems:   Patient Active Problem List   Diagnosis    Malignant neoplasm of overlapping sites of left breast in female, estrogen receptor positive ([facility] [address] )    Hydronephrosis       Past Medical History:   Past Medical History:   Diagnosis Date    Congenital prolapsed rectum     Hypertension     [facility]     Migraine        Surgical History:   Past Surgical History:   Procedure Laterality Date    BREAST BIOPSY      COLON SURGERY      colon resection    [place] MASTECTOMY, MODIFIED RADICAL Left 5/15/2018    Procedure: BREAST MODIFIED RADICAL MASTECTOMY;  Surgeon: Juliane Albarran MD;  Location: [facility];  Service: Surgical Oncology       Family History:    Family History   Problem Relation Age of Onset    No Known Problems Mother     No Known Problems Father        Cancer-related family history is not on file  Social History:   Social History     Social History    Marital status: /Civil Union     Spouse name: N/A    Number of children: N/A    Years of education: N/A     Occupational History    Not on file       Social History Main Topics    Smoking status: Never Smoker    Smokeless tobacco: Never Used    Alcohol use No    Drug use: No    Sexual activity: Yes     Partners: Female     Birth control/ protection: Female Sterilization     Other Topics Concern    Not on file     Social History Narrative    No narrative on file       Current Medications:   Current Outpatient Prescriptions   Medication Sig Dispense Refill    naproxen sodium (ALEVE) 220 MG tablet Take 660 mg by mouth daily as needed for mild pain      SUMAtriptan (IMITREX) 100 mg tablet Take 100 mg by mouth once as needed for migraine      verapamil (CALAN-SR) 240 mg CR tablet Take 240 mg by mouth 2 (two) times a day       No current facility-administered medications for this visit          Allergies: No Known Allergies

## 2018-06-13 ENCOUNTER — HOSPITAL ENCOUNTER (OUTPATIENT)
Dept: NUCLEAR MEDICINE | Facility: HOSPITAL | Age: 58
Discharge: HOME | End: 2018-06-13
Attending: INTERNAL MEDICINE
Payer: COMMERCIAL

## 2018-06-13 DIAGNOSIS — C50.812 MALIGNANT NEOPLASM OF OVERLAPPING SITES OF LEFT BREAST IN FEMALE, ESTROGEN RECEPTOR POSITIVE (HCC): ICD-10-CM

## 2018-06-13 DIAGNOSIS — Z17.0 MALIGNANT NEOPLASM OF OVERLAPPING SITES OF LEFT BREAST IN FEMALE, ESTROGEN RECEPTOR POSITIVE (HCC): ICD-10-CM

## 2018-06-13 PROCEDURE — 78472 GATED HEART PLANAR SINGLE: CPT

## 2018-06-13 PROCEDURE — A9560 TC99M LABELED RBC: HCPCS

## 2018-06-18 ENCOUNTER — TELEPHONE (OUTPATIENT)
Dept: UROLOGY | Facility: CLINIC | Age: 58
End: 2018-06-18

## 2018-06-18 ENCOUNTER — ANESTHESIA EVENT (OUTPATIENT)
Dept: PERIOP | Facility: AMBULARY SURGERY CENTER | Age: 58
End: 2018-06-18
Payer: COMMERCIAL

## 2018-06-18 ENCOUNTER — TELEPHONE (OUTPATIENT)
Dept: SURGICAL ONCOLOGY | Facility: CLINIC | Age: 58
End: 2018-06-18

## 2018-06-18 NOTE — TELEPHONE ENCOUNTER
Patient called with concern of new onset of left chest wall swelling  Patient had a mastectomy performed on 5/15/2018 with subsequent MIMI drain placement  The drains have been out for 2 weeks  No pain, no redness, no warmth to area, no fevers  Patient instructed that she can apply warm compresses to that area or take OTC pain medications if needed  Reviewed signs of infection with patient and instructed her to call if swelling increases or new symptoms start

## 2018-06-18 NOTE — TELEPHONE ENCOUNTER
Left additional message for patient to call office, advised her question should be directed to her primary treating team   Request she call back to confirm

## 2018-06-18 NOTE — TELEPHONE ENCOUNTER
----- Message from Nereida Hensley sent at 6/16/2018  3:30 PM EDT -----  Regarding: Non-Urgent Medical Question  Contact: [phone]  Am I a candidate for an I O R T ?

## 2018-06-18 NOTE — TELEPHONE ENCOUNTER
This is not a question for the urology team   This should be addressed by her primary treating team

## 2018-06-19 NOTE — TELEPHONE ENCOUNTER
Patient called back and confirmed she received message and states she sent email to the wrong provider

## 2018-06-26 ENCOUNTER — HOSPITAL ENCOUNTER (OUTPATIENT)
Facility: AMBULARY SURGERY CENTER | Age: 58
Setting detail: OUTPATIENT SURGERY
Discharge: HOME | End: 2018-06-26
Attending: RADIOLOGY | Admitting: RADIOLOGY
Payer: COMMERCIAL

## 2018-06-26 ENCOUNTER — APPOINTMENT (OUTPATIENT)
Dept: RADIOLOGY | Facility: AMBULARY SURGERY CENTER | Age: 58
End: 2018-06-26
Payer: COMMERCIAL

## 2018-06-26 ENCOUNTER — APPOINTMENT (OUTPATIENT)
Dept: LAB | Facility: CLINIC | Age: 58
End: 2018-06-26
Payer: COMMERCIAL

## 2018-06-26 ENCOUNTER — ANESTHESIA (OUTPATIENT)
Dept: PERIOP | Facility: AMBULARY SURGERY CENTER | Age: 58
End: 2018-06-26
Payer: COMMERCIAL

## 2018-06-26 ENCOUNTER — OFFICE VISIT (OUTPATIENT)
Dept: HEMATOLOGY ONCOLOGY | Facility: CLINIC | Age: 58
End: 2018-06-26
Payer: COMMERCIAL

## 2018-06-26 VITALS
WEIGHT: 152 LBS | BODY MASS INDEX: 25.33 KG/M2 | HEART RATE: 64 BPM | DIASTOLIC BLOOD PRESSURE: 58 MMHG | OXYGEN SATURATION: 95 % | SYSTOLIC BLOOD PRESSURE: 99 MMHG | HEIGHT: 65 IN | RESPIRATION RATE: 18 BRPM | TEMPERATURE: 98 F

## 2018-06-26 VITALS
HEIGHT: 65 IN | DIASTOLIC BLOOD PRESSURE: 68 MMHG | RESPIRATION RATE: 18 BRPM | TEMPERATURE: 98.1 F | SYSTOLIC BLOOD PRESSURE: 100 MMHG | WEIGHT: 151 LBS | BODY MASS INDEX: 25.16 KG/M2

## 2018-06-26 DIAGNOSIS — C50.812 MALIGNANT NEOPLASM OF OVERLAPPING SITES OF LEFT BREAST IN FEMALE, ESTROGEN RECEPTOR POSITIVE (HCC): Primary | ICD-10-CM

## 2018-06-26 DIAGNOSIS — Z17.0 MALIGNANT NEOPLASM OF OVERLAPPING SITES OF LEFT BREAST IN FEMALE, ESTROGEN RECEPTOR POSITIVE (HCC): Primary | ICD-10-CM

## 2018-06-26 LAB
BASOPHILS # BLD AUTO: 0.05 THOUSANDS/ΜL (ref 0–0.1)
BASOPHILS NFR BLD AUTO: 1 % (ref 0–1)
EOSINOPHIL # BLD AUTO: 0.1 THOUSAND/ΜL (ref 0–0.61)
EOSINOPHIL NFR BLD AUTO: 2 % (ref 0–6)
ERYTHROCYTE [DISTWIDTH] IN BLOOD BY AUTOMATED COUNT: 13.9 % (ref 11.6–15.1)
HCT VFR BLD AUTO: 37.4 % (ref 34.8–46.1)
HGB BLD-MCNC: 12.5 G/DL (ref 11.5–15.4)
LYMPHOCYTES # BLD AUTO: 1.57 THOUSANDS/ΜL (ref 0.6–4.47)
LYMPHOCYTES NFR BLD AUTO: 30 % (ref 14–44)
MCH RBC QN AUTO: 33.8 PG (ref 26.8–34.3)
MCHC RBC AUTO-ENTMCNC: 33.4 G/DL (ref 31.4–37.4)
MCV RBC AUTO: 101 FL (ref 82–98)
MONOCYTES # BLD AUTO: 0.39 THOUSAND/ΜL (ref 0.17–1.22)
MONOCYTES NFR BLD AUTO: 7 % (ref 4–12)
NEUTROPHILS # BLD AUTO: 3.2 THOUSANDS/ΜL (ref 1.85–7.62)
NEUTS SEG NFR BLD AUTO: 60 % (ref 43–75)
PLATELET # BLD AUTO: 284 THOUSANDS/UL (ref 149–390)
PMV BLD AUTO: 9.6 FL (ref 8.9–12.7)
RBC # BLD AUTO: 3.7 MILLION/UL (ref 3.81–5.12)
WBC # BLD AUTO: 5.31 THOUSAND/UL (ref 4.31–10.16)

## 2018-06-26 PROCEDURE — 77001 FLUOROGUIDE FOR VEIN DEVICE: CPT | Performed by: RADIOLOGY

## 2018-06-26 PROCEDURE — 77001 FLUOROGUIDE FOR VEIN DEVICE: CPT

## 2018-06-26 PROCEDURE — C1788 PORT, INDWELLING, IMP: HCPCS | Performed by: RADIOLOGY

## 2018-06-26 PROCEDURE — 36415 COLL VENOUS BLD VENIPUNCTURE: CPT | Performed by: INTERNAL MEDICINE

## 2018-06-26 PROCEDURE — 76937 US GUIDE VASCULAR ACCESS: CPT | Performed by: RADIOLOGY

## 2018-06-26 PROCEDURE — 36561 INSERT TUNNELED CV CATH: CPT | Performed by: RADIOLOGY

## 2018-06-26 PROCEDURE — 85025 COMPLETE CBC W/AUTO DIFF WBC: CPT | Performed by: INTERNAL MEDICINE

## 2018-06-26 PROCEDURE — 99214 OFFICE O/P EST MOD 30 MIN: CPT | Performed by: INTERNAL MEDICINE

## 2018-06-26 DEVICE — PORT DIGINTY 8FR MICRO-STICK KIT HEMODIAL MID SIZE
Type: IMPLANTABLE DEVICE | Site: CHEST | Status: NON-FUNCTIONAL
Removed: 2019-06-04

## 2018-06-26 RX ORDER — DOXORUBICIN HYDROCHLORIDE 2 MG/ML
106 INJECTION, SOLUTION INTRAVENOUS ONCE
Status: COMPLETED | OUTPATIENT
Start: 2018-06-27 | End: 2018-06-27

## 2018-06-26 RX ORDER — METOCLOPRAMIDE 10 MG/1
10 TABLET ORAL 4 TIMES DAILY PRN
Qty: 30 TABLET | Refills: 1 | Status: SHIPPED | OUTPATIENT
Start: 2018-06-26 | End: 2019-01-22 | Stop reason: ALTCHOICE

## 2018-06-26 RX ORDER — FENTANYL CITRATE 50 UG/ML
INJECTION, SOLUTION INTRAMUSCULAR; INTRAVENOUS AS NEEDED
Status: DISCONTINUED | OUTPATIENT
Start: 2018-06-26 | End: 2018-06-26 | Stop reason: SURG

## 2018-06-26 RX ORDER — SODIUM CHLORIDE 9 MG/ML
20 INJECTION, SOLUTION INTRAVENOUS CONTINUOUS
Status: DISCONTINUED | OUTPATIENT
Start: 2018-06-27 | End: 2018-06-30 | Stop reason: HOSPADM

## 2018-06-26 RX ORDER — SODIUM CHLORIDE 9 MG/ML
125 INJECTION, SOLUTION INTRAVENOUS CONTINUOUS
Status: DISCONTINUED | OUTPATIENT
Start: 2018-06-26 | End: 2018-06-26 | Stop reason: HOSPADM

## 2018-06-26 RX ORDER — CEFAZOLIN SODIUM 1 G/3ML
INJECTION, POWDER, FOR SOLUTION INTRAMUSCULAR; INTRAVENOUS AS NEEDED
Status: DISCONTINUED | OUTPATIENT
Start: 2018-06-26 | End: 2018-06-26 | Stop reason: SURG

## 2018-06-26 RX ORDER — LIDOCAINE HYDROCHLORIDE AND EPINEPHRINE 10; 10 MG/ML; UG/ML
INJECTION, SOLUTION INFILTRATION; PERINEURAL AS NEEDED
Status: DISCONTINUED | OUTPATIENT
Start: 2018-06-26 | End: 2018-06-26 | Stop reason: HOSPADM

## 2018-06-26 RX ORDER — MIDAZOLAM HYDROCHLORIDE 1 MG/ML
INJECTION INTRAMUSCULAR; INTRAVENOUS AS NEEDED
Status: DISCONTINUED | OUTPATIENT
Start: 2018-06-26 | End: 2018-06-26 | Stop reason: SURG

## 2018-06-26 RX ADMIN — MIDAZOLAM HYDROCHLORIDE 1 MG: 1 INJECTION, SOLUTION INTRAMUSCULAR; INTRAVENOUS at 09:42

## 2018-06-26 RX ADMIN — MIDAZOLAM HYDROCHLORIDE 2 MG: 1 INJECTION, SOLUTION INTRAMUSCULAR; INTRAVENOUS at 09:15

## 2018-06-26 RX ADMIN — MIDAZOLAM HYDROCHLORIDE 1 MG: 1 INJECTION, SOLUTION INTRAMUSCULAR; INTRAVENOUS at 09:26

## 2018-06-26 RX ADMIN — FENTANYL CITRATE 50 MCG: 50 INJECTION, SOLUTION INTRAMUSCULAR; INTRAVENOUS at 09:50

## 2018-06-26 RX ADMIN — FENTANYL CITRATE 50 MCG: 50 INJECTION, SOLUTION INTRAMUSCULAR; INTRAVENOUS at 09:22

## 2018-06-26 RX ADMIN — CEFAZOLIN SODIUM 1000 MG: 1 INJECTION, POWDER, FOR SOLUTION INTRAMUSCULAR; INTRAVENOUS at 09:20

## 2018-06-26 RX ADMIN — SODIUM CHLORIDE: 0.9 INJECTION, SOLUTION INTRAVENOUS at 09:00

## 2018-06-26 NOTE — H&P (VIEW-ONLY)
Hematology / Oncology Outpatient Consult Note    Jerry Samaniego 65 y o  female [id number]         Date:  6/11/2018    Assessment / Plan:  A 15-year-old postmenopausal woman with newly diagnosed stage IIIA left breast cancer, grade 2, invasive lobular histology, ER 75% positive, GA 15% positive, HER2 negative disease  She underwent mastectomy and axillary lymph node dissection, resulting in IBAN  She had 6 positive lymph nodes as well as 8 1 cm of primary tumor size  She presents today with her  to discuss adjuvant treatment options  We had extensive discussion regarding the diagnosis, staging information, tumor phenotype, relatively high risk disease, prognosis and treatment options  I recommended her to have adjuvant chemotherapy with dose dense AC x4 followed by weekly paclitaxel x12  Side effects of this regimen was thoroughly discussed, including but not limited to alopecia, nausea, vomiting, neutropenia, risk of infection, cardiotoxicity, neuropathy, allergic reaction as well as small chance of secondary leukemia  She understood and wished to proceed  She appeared to have cellulitis at mastectomy site  I prescribed Keflex for 7 days  I am going to ask interventional radiologist to place a Port-A-Cath  I am going to obtain MUGA scan for cardiac clearance  She is going to have 1st cycle treatment in June 27, 2018 at [facility]  She is in agreement with my recommendations  Subjective:     HPI:  A 15-year-old postmenopausal woman who palpated lump in the left breast which she brought to medical attention  She was found to have radiographic abnormality for which she underwent left breast biopsy in April 3, 2018  Biopsy showed invasive lobular carcinoma, grade 2  This was ER % positive, GA 15% positive, HER2 negative disease  She had led to be large breast tumor as well as clinically positive lymph nodes   Therefore, she underwent mastectomy and axillary lymph node dissection by Dr Stefania Clark in May 15, 2018  She had 8 1 cm of invasive ductal carcinoma, grade 2  Lymphovascular invasion was present  6/24 axillary lymph nodes were positive for metastatic disease with largest tumor measuring 1 9 cm with extranodal extension  She did not have reconstruction  Prior to the surgery, PET-CT scan showed no evidence of distant metastasis  She presents today to discuss adjuvant treatment options  She has no breast related symptomatology  Her weight has been stable  She has no complaint of bone pain  She denied any respiratory symptoms  Her performance status is normal  She is a lifetime never smoker  Interval History:          Objective:     Primary Diagnosis:    Left breast cancer, stage IIIA (pT3, pN2, M0) grade 2, invasive lobular histology, ER 75% positive, AK 15% positive, HER2 negative disease  6 positive lymph nodes  Diagnosed in May 2018  Cancer Staging:  Cancer Staging  Malignant neoplasm of overlapping sites of left breast in female, estrogen receptor positive ([facility] [address] )  Staging form: Breast, AJCC 8th Edition  - Clinical stage from 4/10/2018: Stage IIA (cT2, cN1(f), cM0, G2, ER: Positive, AK: Positive, HER2: Negative) - Signed by Sahil Adams MD on 4/10/2018        Previous Hematologic/ Oncologic Treatment:         Current Hematologic/ Oncologic Treatment:      Adjuvant chemotherapy with dose dense AC x4 followed by weekly paclitaxel x12  Disease Status:     IBAN status post mastectomy and axillary lymph node dissection  Test Results:    Pathology:    8 1 cm of invasive lobular carcinoma, grade 2  Lymphovascular invasion was present  6/24 axillary lymph nodes were positive for metastatic disease with largest tumor 1 9 cm  Extranodal extension was positive  ER 75 to 100% positive, AK 15 present positive, HER2 negative disease    Stage IIIA (pT3, pN2, M0)    Radiology:    PET-CT scan showed hypermetabolic left breast mass with no evidence of distant metastasis  Laboratory:    See below  Physical Exam:      General Appearance:    Alert, oriented        Eyes:    PERRL   Ears:    Normal external ear canals, both ears   Nose:   Nares normal, septum midline   Throat:   Mucosa moist  Pharynx without injection  Neck:   Supple       Lungs:     Clear to auscultation bilaterally   Chest Wall:    No tenderness or deformity    Heart:    Regular rate and rhythm       Abdomen:     Soft, non-tender, bowel sounds +, no organomegaly           Extremities:   Extremities no cyanosis or edema       Skin:   no rash or icterus  Lymph nodes:   Cervical, supraclavicular, and axillary nodes normal   Neurologic:   CNII-XII intact, normal strength, sensation and reflexes     Throughout          Breast exam: Status post left mastectomy without reconstruction  No palpable abnormality in her left chest wall  Minor redness around the mastectomy scar  Right breast exam is negative  ROS: Review of Systems        Imaging: No results found  Labs:   Lab Results   Component Value Date    WBC 5 86 05/16/2018    HGB 12 5 05/16/2018    HCT 38 2 05/16/2018     (H) 05/16/2018     05/16/2018     Lab Results   Component Value Date     05/16/2018    K 4 3 05/16/2018     05/16/2018    CO2 24 05/16/2018    ANIONGAP 10 05/16/2018    BUN 20 05/16/2018    CREATININE 1 29 05/16/2018    GLUCOSE 159 (H) 05/16/2018    GLUF 159 (H) 05/16/2018    CALCIUM 8 2 (L) 05/16/2018    AST 42 04/20/2018    ALT 44 04/20/2018    ALKPHOS 91 04/20/2018    PROT 8 7 (H) 04/20/2018    BILITOT 0 50 04/20/2018    EGFR 46 05/16/2018           Vital Sign:    Body surface area is 1 76 meters squared      Wt Readings from Last 3 Encounters:   06/11/18 69 4 kg (153 lb)   06/04/18 69 kg (152 lb 3 2 oz)   06/01/18 68 7 kg (151 lb 8 oz)        Temp Readings from Last 3 Encounters:   06/11/18 98 3 °F (36 8 °C) (Tympanic)   06/04/18 98 7 °F (37 1 °C)   06/01/18 98 °F (36 7 °C) BP Readings from Last 3 Encounters:   06/11/18 140/100   06/04/18 118/70   06/01/18 140/80         Pulse Readings from Last 3 Encounters:   06/11/18 (!) 107   06/04/18 93   06/01/18 97     @LASTSAO2(3)@    Active Problems:   Patient Active Problem List   Diagnosis    Malignant neoplasm of overlapping sites of left breast in female, estrogen receptor positive ([facility] [address] )    Hydronephrosis       Past Medical History:   Past Medical History:   Diagnosis Date    Congenital prolapsed rectum     Hypertension     Malignant neoplasm of overlapping sites of left female breast ([facility] [address] )     Migraine        Surgical History:   Past Surgical History:   Procedure Laterality Date    BREAST BIOPSY      COLON SURGERY      colon resection    AK MASTECTOMY, MODIFIED RADICAL Left 5/15/2018    Procedure: BREAST MODIFIED RADICAL MASTECTOMY;  Surgeon: Gabriela Saucedo MD;  Location: [facility];  Service: Surgical Oncology       Family History:    Family History   Problem Relation Age of Onset    No Known Problems Mother     No Known Problems Father        Cancer-related family history is not on file  Social History:   Social History     Social History    Marital status: /Civil Union     Spouse name: N/A    Number of children: N/A    Years of education: N/A     Occupational History    Not on file       Social History Main Topics    Smoking status: Never Smoker    Smokeless tobacco: Never Used    Alcohol use No    Drug use: No    Sexual activity: Yes     Partners: Female     Birth control/ protection: Female Sterilization     Other Topics Concern    Not on file     Social History Narrative    No narrative on file       Current Medications:   Current Outpatient Prescriptions   Medication Sig Dispense Refill    naproxen sodium (ALEVE) 220 MG tablet Take 660 mg by mouth daily as needed for mild pain      SUMAtriptan (IMITREX) 100 mg tablet Take 100 mg by mouth once as needed for migraine      verapamil (CALAN-SR) 240 mg CR tablet Take 240 mg by mouth 2 (two) times a day       No current facility-administered medications for this visit          Allergies: No Known Allergies

## 2018-06-26 NOTE — INTERVAL H&P NOTE
H & P reviewed after examining the patient and I find no changes in the patient condition since the H & P has been written  Patient re-evaluated   Accept as history and physical     Douglas Hoffman, DO/June 26, 2018/7:57 AM

## 2018-06-26 NOTE — OP NOTE
Chest port placement 6/26/2018     History:      Breast carcinoma     Procedure:     The patient was identified verbally and by wristband  Timeout was performed  Informed consent was obtained       All elements of maximal sterile barrier technique were followed (cap, mask, sterile gown, sterile gloves, large sterile sheet, hand hygiene and 2% chlorhexidine for cutaneous antisepsis)      Following obtaining informed consent, the patient was prepped and draped in the usual sterile fashion  Using ultrasound guidance, access was gained to the patient's right  internal jugular vein using a micropuncture system  The micropuncture wire was removed and a  035 wire was advanced to the level of the inferior vena cava using fluoroscopic guidance      Using 1% lidocaine, the region of the right anterior chest was was anesthetized  A small incision was made using a 15 blade scalpel  The port pocket was created using blunt dissection      The catheter tubing was tunneled from the incision to the venotomy  The micropuncture dilator was exchanged for a peel-away sheath, using fluoroscopic guidance  The catheter was place through the peel-away sheath  The catheter was measured and cut to size  The catheter was attached to the port  The port was flushed with saline to ensure patency without evidence of leakage  The port was placed in the port pocket  The port was sutured in the pocket using 3-0 Vicryl      The incisions were approximated with 3-0 Vicryl and Histoacryl  The patient tolerated the procedure well without apparent immediate complications  The patient left the IR department in unchanged condition      Dr Jory Martin performed and directly supervised the entire procedure      Findings:      Using ultrasound guidance, the right internal jugular vein was cannulated using Seldinger technique  The right internal jugular vein was evaluated as a potential access site  The right internal jugular vein was patent, and free of thrombus  Static images of the vessel was obtained  Visualization of real time needle entry into the vessel was obtained      Fluoroscopic spot image demonstrates a newly placed single lumen chest port via the right internal jugular vein with the most central aspect at the SVC/RA junction   The catheter tubing is smooth in contour         IMPRESSION:     Successful placement of a single lumen chest port via the right internal jugular vein

## 2018-06-26 NOTE — PROGRESS NOTES
Patient arrived to [facility] for port placement    The procedure and risks were discussed with the patient  All questions were answered  Informed consent was obtained

## 2018-06-26 NOTE — DISCHARGE INSTRUCTIONS
Do not lay flat for 6 hours  No lifting more than 5 pounds for 1 week  No showers for 24 hours  Take off dressing 6/27/18  Let skin glue wear off on its own, do not pick at it  If pain not relieved by over the counter pain relievers, call MD   Report any signs of infection (reddness at incision sites, bleeding, foul smelling discharge, or fever over 101) to MD   No creams or ointments to be applied to port site

## 2018-06-26 NOTE — ANESTHESIA PREPROCEDURE EVALUATION
Review of Systems/Medical History  Patient summary reviewed  Chart reviewed      Cardiovascular  Exercise tolerance (METS): >4,  Hypertension controlled,    Pulmonary  Negative pulmonary ROS        GI/Hepatic    No GERD ,        Negative  ROS        Endo/Other  Negative endo/other ROS      GYN    Breast cancer left mastectomy       Hematology  Negative hematology ROS      Musculoskeletal  Negative musculoskeletal ROS        Neurology    Headaches,    Psychology   Negative psychology ROS              Physical Exam    Airway    Mallampati score: II  TM Distance: >3 FB  Neck ROM: full     Dental   No notable dental hx     Cardiovascular  Cardiovascular exam normal    Pulmonary  Pulmonary exam normal     Other Findings        Anesthesia Plan  ASA Score- 2     Anesthesia Type- IV sedation with anesthesia with ASA Monitors  Additional Monitors:   Airway Plan:         Plan Factors-  Patient did not smoke on day of surgery  Induction- intravenous  Postoperative Plan-     Informed Consent- Anesthetic plan and risks discussed with patient  I personally reviewed this patient with the CRNA  Discussed and agreed on the Anesthesia Plan with the CRNA  Iain Willett

## 2018-06-26 NOTE — ANESTHESIA POSTPROCEDURE EVALUATION
Post-Op Assessment Note      CV Status:  Stable    Mental Status:  Alert    Hydration Status:  Euvolemic    PONV Controlled:  None    Airway Patency:  Patent    Post Op Vitals Reviewed: Yes          Staff: CRNA       Comments: vss          BP   100/60   Temp 98 4   Pulse 60   Resp 16   SpO2 94

## 2018-06-26 NOTE — PROGRESS NOTES
Hematology / Oncology Outpatient Follow Up Note    Tamar Parada 65 y o  female :1960 [id number]         Date:  2018    Assessment / Plan:  A 60-year-old postmenopausal woman with stage IIIA left breast cancer, grade 2, invasive lobular histology, ER 75% positive, KY 15% positive, HER2 negative disease  She underwent mastectomy and axillary lymph node dissection, resulting in IBAN  She had 6 positive lymph nodes as well as 8 1 cm of primary tumor size  she has adequate cardiac function to be able to tolerate doxorubicin  She is going to start [facility], tomorrow followed by Neulasta support  Clinically, she has no evidence recurrent disease  She was instructed to give us a call immediately if she has fever  I prescribed metoclopramide for nausea control  I will see her again in 2 weeks at which time she is due for 2nd cycle treatment  She is in agreement with my recommendation               Subjective:      HPI:  A 60-year-old postmenopausal woman who palpated lump in the left breast which she brought to medical attention  She was found to have radiographic abnormality for which she underwent left breast biopsy in April 3, 2018  Biopsy showed invasive lobular carcinoma, grade 2  This was ER % positive, KY 15% positive, HER2 negative disease  She had led to be large breast tumor as well as clinically positive lymph nodes  Therefore, she underwent mastectomy and axillary lymph node dissection by Dr Joe Montes in May 15, 2018  She had 8 1 cm of invasive ductal carcinoma, grade 2  Lymphovascular invasion was present  /24 axillary lymph nodes were positive for metastatic disease with largest tumor measuring 1 9 cm with extranodal extension  She did not have reconstruction  Prior to the surgery, PET-CT scan showed no evidence of distant metastasis  She presents today to discuss adjuvant treatment options  She has no breast related symptomatology  Her weight has been stable    She has no complaint of bone pain  She denied any respiratory symptoms  Her performance status is normal  She is a lifetime never smoker         Interval History:   A 60-year-old postmenopausal woman with stage IIIA left breast cancer, grade 2, invasive lobular histology, ER 75% positive, TN 15% positive, HER2 negative disease  She underwent mastectomy and axillary lymph node dissection, resulting in IBAN  She had 6 positive lymph nodes as well as 8 1 cm of primary tumor size  She was agreeable to have adjuvant chemotherapy with [facility] followed by paclitaxel  She had port placement, earlier today  Her recent MUGA scan showed the normal ejection fraction  She presents today for follow-up  She is scheduled to have 1st cycle chemotherapy tomorrow  She feels well  She has no complaint of pain  She has no respiratory symptoms  She is maintaining her weight  Her performance status is normal            Objective:      Primary Diagnosis:     Left breast cancer, stage IIIA (pT3, pN2, M0) grade 2, invasive lobular histology, ER 75% positive, TN 15% positive, HER2 negative disease  6 positive lymph nodes  Diagnosed in May 2018      Cancer Staging:  Cancer Staging  Malignant neoplasm of overlapping sites of left breast in female, estrogen receptor positive ([facility] [address] )  Staging form: Breast, AJCC 8th Edition  - Clinical stage from 4/10/2018: Stage IIA (cT2, cN1(f), cM0, G2, ER: Positive, TN: Positive, HER2: Negative) - Signed by Mayra Horta MD on 4/10/2018           Previous Hematologic/ Oncologic Treatment:            Current Hematologic/ Oncologic Treatment:       Adjuvant chemotherapy with dose dense AC x4 followed by weekly paclitaxel x12  First cycle of AC to be given in June 27, 2018      Disease Status:      IBAN status post mastectomy and axillary lymph node dissection      Test Results:     Pathology:     8 1 cm of invasive lobular carcinoma, grade 2  Lymphovascular invasion was present   6/24 axillary lymph nodes were positive for metastatic disease with largest tumor 1 9 cm  Extranodal extension was positive  ER 75 to 100% positive, MD 15 present positive, HER2 negative disease  Stage IIIA (pT3, pN2, M0)     Radiology:     PET-CT scan showed hypermetabolic left breast mass with no evidence of distant metastasis  MUGA scan in June 2018 showed ejection fraction 62%      Laboratory:     See below      Physical Exam:        General Appearance:    Alert, oriented          Eyes:    PERRL   Ears:    Normal external ear canals, both ears   Nose:   Nares normal, septum midline   Throat:   Mucosa moist  Pharynx without injection  Neck:   Supple         Lungs:     Clear to auscultation bilaterally   Chest Wall:    No tenderness or deformity    Heart:    Regular rate and rhythm         Abdomen:     Soft, non-tender, bowel sounds +, no organomegaly               Extremities:   Extremities no cyanosis or edema         Skin:   no rash or icterus  Lymph nodes:   Cervical, supraclavicular, and axillary nodes normal   Neurologic:   CNII-XII intact, normal strength, sensation and reflexes     Throughout             Breast exam: Status post left mastectomy without reconstruction  No palpable abnormality in her left chest wall  Minor redness around the mastectomy scar  Right breast exam is negative  ROS: Review of Systems   All other systems reviewed and are negative  Imaging: Nm Cardiac Blood Pool [place] (at Rest)    Result Date: 6/13/2018  Narrative: MUGA SCAN INDICATION: Prechemotherapy treatment  D56 235: Malignant neoplasm of overlapping sites of left female breast Z17 0: Estrogen receptor positive status (ER+) COMPARISON: None available TECHNIQUE:   The study was performed following in vivo labeling utilizing 26 9  mCi Tc-99m pertechnetate IV  Gated images of the heart were acquired in the anterior, [place] and steep [place] projections  FINDINGS: Right ventricular motion is unremarkable   There is normal symmetrical contractility of the left ventricle  The overall resting left ventricular ejection fraction is 62 %, based on automatic calculation  Impression: The resting left ventricular ejection fraction is 62%  Workstation performed: [id number]         Labs:   Lab Results   Component Value Date    WBC 5 86 05/16/2018    HGB 12 5 05/16/2018    HCT 38 2 05/16/2018     (H) 05/16/2018     05/16/2018     Lab Results   Component Value Date     05/16/2018    K 4 3 05/16/2018     05/16/2018    CO2 24 05/16/2018    ANIONGAP 10 05/16/2018    BUN 20 05/16/2018    CREATININE 1 29 05/16/2018    GLUCOSE 159 (H) 05/16/2018    GLUF 159 (H) 05/16/2018    CALCIUM 8 2 (L) 05/16/2018    AST 42 04/20/2018    ALT 44 04/20/2018    ALKPHOS 91 04/20/2018    PROT 8 7 (H) 04/20/2018    BILITOT 0 50 04/20/2018    EGFR 46 05/16/2018         Current Medications: Reviewed  Allergies: Reviewed  PMH/FH/SH:  Reviewed      Vital Sign:    Body surface area is 1 76 meters squared      Wt Readings from Last 3 Encounters:   06/26/18 68 5 kg (151 lb)   06/26/18 68 9 kg (152 lb)   06/11/18 69 4 kg (153 lb)        Temp Readings from Last 3 Encounters:   06/26/18 98 1 °F (36 7 °C) (Oral)   06/26/18 98 °F (36 7 °C) (Temporal)   06/11/18 98 3 °F (36 8 °C) (Tympanic)        BP Readings from Last 3 Encounters:   06/26/18 100/68   06/26/18 99/58   06/11/18 140/100         Pulse Readings from Last 3 Encounters:   06/26/18 64   06/11/18 (!) 107   06/04/18 93     @LASTSAO2(3)@

## 2018-06-27 ENCOUNTER — HOSPITAL ENCOUNTER (OUTPATIENT)
Dept: INFUSION CENTER | Facility: CLINIC | Age: 58
Discharge: HOME | End: 2018-06-27
Payer: COMMERCIAL

## 2018-06-27 VITALS
HEIGHT: 66 IN | SYSTOLIC BLOOD PRESSURE: 128 MMHG | RESPIRATION RATE: 18 BRPM | HEART RATE: 87 BPM | BODY MASS INDEX: 24.75 KG/M2 | DIASTOLIC BLOOD PRESSURE: 82 MMHG | OXYGEN SATURATION: 97 % | WEIGHT: 154 LBS | TEMPERATURE: 98.5 F

## 2018-06-27 LAB
ALBUMIN SERPL BCP-MCNC: 4.1 G/DL (ref 3.5–5)
ALP SERPL-CCNC: 101 U/L (ref 46–116)
ALT SERPL W P-5'-P-CCNC: 48 U/L (ref 12–78)
ANION GAP SERPL CALCULATED.3IONS-SCNC: 14 MMOL/L (ref 4–13)
AST SERPL W P-5'-P-CCNC: 51 U/L (ref 5–45)
BILIRUB SERPL-MCNC: 0.4 MG/DL (ref 0.2–1)
BUN SERPL-MCNC: 17 MG/DL (ref 5–25)
CALCIUM SERPL-MCNC: 9 MG/DL (ref 8.3–10.1)
CHLORIDE SERPL-SCNC: 96 MMOL/L (ref 100–108)
CO2 SERPL-SCNC: 24 MMOL/L (ref 21–32)
CREAT SERPL-MCNC: 1.25 MG/DL (ref 0.6–1.3)
GFR SERPL CREATININE-BSD FRML MDRD: 48 ML/MIN/1.73SQ M
GLUCOSE P FAST SERPL-MCNC: 73 MG/DL (ref 65–99)
GLUCOSE SERPL-MCNC: 73 MG/DL (ref 65–140)
POTASSIUM SERPL-SCNC: 3.5 MMOL/L (ref 3.5–5.3)
PROT SERPL-MCNC: 7.8 G/DL (ref 6.4–8.2)
SODIUM SERPL-SCNC: 134 MMOL/L (ref 136–145)

## 2018-06-27 PROCEDURE — 96367 TX/PROPH/DG ADDL SEQ IV INF: CPT

## 2018-06-27 PROCEDURE — 96377 APPLICATON ON-BODY INJECTOR: CPT

## 2018-06-27 PROCEDURE — 96413 CHEMO IV INFUSION 1 HR: CPT

## 2018-06-27 PROCEDURE — 80053 COMPREHEN METABOLIC PANEL: CPT | Performed by: INTERNAL MEDICINE

## 2018-06-27 PROCEDURE — 96411 CHEMO IV PUSH ADDL DRUG: CPT

## 2018-06-27 RX ADMIN — DEXAMETHASONE SODIUM PHOSPHATE: 10 INJECTION, SOLUTION INTRAMUSCULAR; INTRAVENOUS at 11:27

## 2018-06-27 RX ADMIN — Medication 300 UNITS: at 13:35

## 2018-06-27 RX ADMIN — PEGFILGRASTIM 6 MG: KIT SUBCUTANEOUS at 13:40

## 2018-06-27 RX ADMIN — SODIUM CHLORIDE 150 MG: 0.9 INJECTION, SOLUTION INTRAVENOUS at 11:57

## 2018-06-27 RX ADMIN — SODIUM CHLORIDE 20 ML/HR: 9 INJECTION, SOLUTION INTRAVENOUS at 10:45

## 2018-06-27 RX ADMIN — CYCLOPHOSPHAMIDE 1056 MG: 1 INJECTION, POWDER, FOR SOLUTION INTRAVENOUS; ORAL at 12:59

## 2018-06-27 RX ADMIN — DOXORUBICIN HYDROCHLORIDE 106 MG: 2 INJECTION, SOLUTION INTRAVENOUS at 12:37

## 2018-06-27 NOTE — PROGRESS NOTES
PATIENT HERE FOR FIRST CHEMO  HER PORT WAS PUT IN YESTERDAY  CMP DRAWN  PER HEIDI ASTUDILLO TO TREAT WITHOUT WAITING FOR LAB RESULTS  MUGA SCAN - JUNE 2018 - 62% PER NOTE  INTAKE ASSESSMENT PERFORMED  PATIENT TOLERATED ALL INFUSIONS WELL WITH NO ISSUES  PATIENT AND  WATCHED NEULASTA VIDEO  ALS PROVIDED

## 2018-06-27 NOTE — PLAN OF CARE
Knowledge Deficit     Patient/family/caregiver demonstrates understanding of disease process, treatment plan, medications, and discharge instructions Progressing        PAIN - ADULT     Verbalizes/displays adequate comfort level or baseline comfort level Progressing

## 2018-07-10 ENCOUNTER — HOSPITAL ENCOUNTER (OUTPATIENT)
Dept: INFUSION CENTER | Facility: CLINIC | Age: 58
Discharge: HOME | End: 2018-07-10
Payer: COMMERCIAL

## 2018-07-10 ENCOUNTER — OFFICE VISIT (OUTPATIENT)
Dept: HEMATOLOGY ONCOLOGY | Facility: CLINIC | Age: 58
End: 2018-07-10
Payer: COMMERCIAL

## 2018-07-10 VITALS
DIASTOLIC BLOOD PRESSURE: 84 MMHG | BODY MASS INDEX: 23.95 KG/M2 | HEART RATE: 109 BPM | WEIGHT: 149 LBS | SYSTOLIC BLOOD PRESSURE: 120 MMHG | HEIGHT: 66 IN | TEMPERATURE: 100 F | OXYGEN SATURATION: 99 % | RESPIRATION RATE: 18 BRPM

## 2018-07-10 DIAGNOSIS — C50.812 MALIGNANT NEOPLASM OF OVERLAPPING SITES OF LEFT BREAST IN FEMALE, ESTROGEN RECEPTOR POSITIVE (HCC): Primary | ICD-10-CM

## 2018-07-10 DIAGNOSIS — Z17.0 MALIGNANT NEOPLASM OF OVERLAPPING SITES OF LEFT BREAST IN FEMALE, ESTROGEN RECEPTOR POSITIVE (HCC): Primary | ICD-10-CM

## 2018-07-10 LAB
ALBUMIN SERPL BCP-MCNC: 3.5 G/DL (ref 3.5–5)
ALP SERPL-CCNC: 81 U/L (ref 46–116)
ALT SERPL W P-5'-P-CCNC: 28 U/L (ref 12–78)
ANION GAP SERPL CALCULATED.3IONS-SCNC: 10 MMOL/L (ref 4–13)
ANISOCYTOSIS BLD QL SMEAR: PRESENT
AST SERPL W P-5'-P-CCNC: 26 U/L (ref 5–45)
BASOPHILS # BLD MANUAL: 0 THOUSAND/UL (ref 0–0.1)
BASOPHILS NFR MAR MANUAL: 0 % (ref 0–1)
BILIRUB SERPL-MCNC: 0.1 MG/DL (ref 0.2–1)
BUN SERPL-MCNC: 13 MG/DL (ref 5–25)
CALCIUM SERPL-MCNC: 9.2 MG/DL (ref 8.3–10.1)
CHLORIDE SERPL-SCNC: 102 MMOL/L (ref 100–108)
CO2 SERPL-SCNC: 28 MMOL/L (ref 21–32)
CREAT SERPL-MCNC: 1.45 MG/DL (ref 0.6–1.3)
EOSINOPHIL # BLD MANUAL: 0 THOUSAND/UL (ref 0–0.4)
EOSINOPHIL NFR BLD MANUAL: 0 % (ref 0–6)
ERYTHROCYTE [DISTWIDTH] IN BLOOD BY AUTOMATED COUNT: 13.1 % (ref 11.6–15.1)
GFR SERPL CREATININE-BSD FRML MDRD: 40 ML/MIN/1.73SQ M
GLUCOSE SERPL-MCNC: 86 MG/DL (ref 65–140)
HCT VFR BLD AUTO: 31.6 % (ref 34.8–46.1)
HGB BLD-MCNC: 11.1 G/DL (ref 11.5–15.4)
HYPERCHROMIA BLD QL SMEAR: PRESENT
LG PLATELETS BLD QL SMEAR: PRESENT
LYMPHOCYTES # BLD AUTO: 1.21 THOUSAND/UL (ref 0.6–4.47)
LYMPHOCYTES # BLD AUTO: 14 % (ref 14–44)
MCH RBC QN AUTO: 33.6 PG (ref 26.8–34.3)
MCHC RBC AUTO-ENTMCNC: 35.1 G/DL (ref 31.4–37.4)
MCV RBC AUTO: 96 FL (ref 82–98)
METAMYELOCYTES NFR BLD MANUAL: 4 % (ref 0–1)
MONOCYTES # BLD AUTO: 0.6 THOUSAND/UL (ref 0–1.22)
MONOCYTES NFR BLD: 7 % (ref 4–12)
MYELOCYTES NFR BLD MANUAL: 3 % (ref 0–1)
NEUTROPHILS # BLD MANUAL: 6.2 THOUSAND/UL (ref 1.85–7.62)
NEUTS BAND NFR BLD MANUAL: 5 % (ref 0–8)
NEUTS SEG NFR BLD AUTO: 67 % (ref 43–75)
PLATELET # BLD AUTO: 350 THOUSANDS/UL (ref 149–390)
PLATELET BLD QL SMEAR: ADEQUATE
PMV BLD AUTO: 10.2 FL (ref 8.9–12.7)
POLYCHROMASIA BLD QL SMEAR: PRESENT
POTASSIUM SERPL-SCNC: 3.9 MMOL/L (ref 3.5–5.3)
PROT SERPL-MCNC: 7.4 G/DL (ref 6.4–8.2)
RBC # BLD AUTO: 3.3 MILLION/UL (ref 3.81–5.12)
SODIUM SERPL-SCNC: 140 MMOL/L (ref 136–145)
TOTAL CELLS COUNTED SPEC: 100
TOXIC GRANULES BLD QL SMEAR: PRESENT
WBC # BLD AUTO: 8.61 THOUSAND/UL (ref 4.31–10.16)

## 2018-07-10 PROCEDURE — 99214 OFFICE O/P EST MOD 30 MIN: CPT | Performed by: INTERNAL MEDICINE

## 2018-07-10 PROCEDURE — 85027 COMPLETE CBC AUTOMATED: CPT | Performed by: INTERNAL MEDICINE

## 2018-07-10 PROCEDURE — 85007 BL SMEAR W/DIFF WBC COUNT: CPT | Performed by: INTERNAL MEDICINE

## 2018-07-10 PROCEDURE — 80053 COMPREHEN METABOLIC PANEL: CPT | Performed by: INTERNAL MEDICINE

## 2018-07-10 RX ORDER — SODIUM CHLORIDE 9 MG/ML
20 INJECTION, SOLUTION INTRAVENOUS CONTINUOUS
Status: DISCONTINUED | OUTPATIENT
Start: 2018-07-11 | End: 2018-07-14 | Stop reason: HOSPADM

## 2018-07-10 RX ORDER — DOXORUBICIN HYDROCHLORIDE 2 MG/ML
106 INJECTION, SOLUTION INTRAVENOUS ONCE
Status: COMPLETED | OUTPATIENT
Start: 2018-07-11 | End: 2018-07-11

## 2018-07-10 RX ORDER — ONDANSETRON 4 MG/1
4 TABLET, FILM COATED ORAL EVERY 6 HOURS PRN
Qty: 30 TABLET | Refills: 1 | Status: SHIPPED | OUTPATIENT
Start: 2018-07-10 | End: 2019-02-26

## 2018-07-10 RX ADMIN — Medication 300 UNITS: at 15:10

## 2018-07-10 NOTE — PROGRESS NOTES
Pt resting with no complaints  Port accessed, labs drawn, port hep-locked and deaccessed without issue  Declined AVS  Pt aware of next appt

## 2018-07-10 NOTE — PROGRESS NOTES
Hematology / Oncology Outpatient Follow Up Note    Umair Black 65 y o  female :1960 [facility]:[id number]         Date:  7/10/2018    Assessment / Plan:  A 60-year-old postmenopausal woman with stage IIIA left breast cancer, grade 2, invasive lobular histology, ER 75% positive, MO 15% positive, HER2 negative disease   She underwent mastectomy and axillary lymph node dissection, resulting in IBAN   She had 6 positive lymph nodes as well as 8 1 cm of primary tumor size   She is currently on adjuvant chemotherapy with dose dense AC  She had expected toxicity for nausea as well as diarrhea  I recommended her to take Imodium if she has diarrhea after the 2nd cycle  I prescribed ondansetron for nausea control  She has adequate ANC to have 2nd cycle of treatment, tomorrow followed by Neulasta support  I will see her again in 2 weeks, when she is due for 3rd cycle treatment  She is in agreement with my recommendations         Subjective:      HPI:  A 60-year-old postmenopausal woman who palpated lump in the left breast which she brought to medical attention  Toby Morgan was found to have radiographic abnormality for which she underwent left breast biopsy in April 3, 2018   Biopsy showed invasive lobular carcinoma, grade 2   This was ER % positive, MO 15% positive, HER2 negative disease   She had led to be large breast tumor as well as clinically positive lymph nodes  Therefore, she underwent mastectomy and axillary lymph node dissection by Dr Stefania Clark in May 15, 2018   She had 8 1 cm of invasive ductal carcinoma, grade 2   Lymphovascular invasion was present  6/24 axillary lymph nodes were positive for metastatic disease with largest tumor measuring 1 9 cm with extranodal extension   She did not have reconstruction  Ingris Salgado to the surgery, PET-CT scan showed no evidence of distant metastasis   She presents today to discuss adjuvant treatment options   She has no breast related symptomatology   Her weight has been stable   She has no complaint of bone pain   She denied any respiratory symptoms   Her performance status is normal  She is a lifetime never smoker         Interval History:   A 50-year-old postmenopausal woman with stage IIIA left breast cancer, grade 2, invasive lobular histology, ER 75% positive, WA 15% positive, HER2 negative disease   She underwent mastectomy and axillary lymph node dissection, resulting in IBAN   She had 6 positive lymph nodes as well as 8 1 cm of primary tumor size  she started adjuvant chemotherapy with dose dense AC, 2 weeks ago with Neulasta support  She has severe nausea for 3 days as well as diarrhea  However, she did not have bone pain, after the Neulasta support  She had 2 hr of fever on day 7 for which she did not call us  In the last 7 days, she feels well  She has mild fatigue  She has a few lb of interval weight loss  She denied any bone pain or respiratory symptoms  She is going to have 2nd cycle of treatment tomorrow  Her performance status is normal        Objective:      Primary Diagnosis:     Left breast cancer, stage IIIA (pT3, pN2, M0) grade 2, invasive lobular histology, ER 75% positive, WA 15% positive, HER2 negative disease   6 positive lymph nodes   Diagnosed in May 2018      Cancer Staging:  Cancer Staging  Malignant neoplasm of overlapping sites of left breast in female, estrogen receptor positive ([facility] [address] )  Staging form: Breast, AJCC 8th Edition  - Clinical stage from 4/10/2018: Stage IIA (cT2, cN1(f), cM0, G2, ER: Positive, WA: Positive, HER2: Negative) - Signed by Everardo Suarez MD on 4/10/2018           Previous Hematologic/ Oncologic Treatment:            Current Hematologic/ Oncologic Treatment:       Adjuvant chemotherapy with dose dense AC x4 followed by weekly paclitaxel x12    second cycle of AC to be given in July 11, 2018        Disease Status:      IBAN status post mastectomy and axillary lymph node dissection      Test Results:     Pathology:     8 1 cm of invasive lobular carcinoma, grade 2   Lymphovascular invasion was present  6/24 axillary lymph nodes were positive for metastatic disease with largest tumor 1 9 cm   Extranodal extension was positive   ER 75 to 100% positive, AK 15 present positive, HER2 negative disease   Stage IIIA (pT3, pN2, M0)     Radiology:     PET-CT scan showed hypermetabolic left breast mass with no evidence of distant metastasis      MUGA scan in June 2018 showed ejection fraction 62%      Laboratory:     See below      Physical Exam:        General Appearance:    Alert, oriented          Eyes:    PERRL   Ears:    Normal external ear canals, both ears   Nose:   Nares normal, septum midline   Throat:   Mucosa moist  Pharynx without injection  Neck:   Supple         Lungs:     Clear to auscultation bilaterally   Chest Wall:    No tenderness or deformity    Heart:    Regular rate and rhythm         Abdomen:     Soft, non-tender, bowel sounds +, no organomegaly               Extremities:   Extremities no cyanosis or edema         Skin:   no rash or icterus  Lymph nodes:   Cervical, supraclavicular, and axillary nodes normal   Neurologic:   CNII-XII intact, normal strength, sensation and reflexes     Throughout             Breast exam: Status post left mastectomy without reconstruction   No palpable abnormality in her left chest wall   Minor redness around the mastectomy scar   Right breast exam is negative             ROS: Review of Systems   All other systems reviewed and are negative  Imaging: Nm Cardiac Blood Pool [place] (at Rest)    Result Date: 6/13/2018  Narrative: MUGA SCAN INDICATION: Prechemotherapy treatment  C56 944: Malignant neoplasm of overlapping sites of left female breast Z17 0: Estrogen receptor positive status (ER+) COMPARISON: None available TECHNIQUE:   The study was performed following in vivo labeling utilizing 26 9  mCi Tc-99m pertechnetate IV   Gated images of the heart were acquired in the anterior, [place] of [place] and steep JENSEN projections  FINDINGS: Right ventricular motion is unremarkable  There is normal symmetrical contractility of the left ventricle  The overall resting left ventricular ejection fraction is 62 %, based on automatic calculation  Impression: The resting left ventricular ejection fraction is 62%  Workstation performed: [id number]     Fl Guided Central Venous Access Device Insertion    Result Date: 6/27/2018  Narrative: Chest port placement 6/26/2018 History: Breast carcinoma Contrast: none Fluoroscopy time: 17 3 seconds Medications: The patient received conscious sedation  The patient's heart rate, oxygen saturation and blood pressure were monitored continuously throughout the procedure  Procedure: The patient was identified verbally and by wristband  Timeout was performed  Informed consent was obtained  All elements of maximal sterile barrier technique were followed (cap, mask, sterile gown, sterile gloves, large sterile sheet, hand hygiene and 2% chlorhexidine for cutaneous antisepsis)  Following obtaining informed consent, the patient was prepped and draped in the usual sterile fashion  Using ultrasound guidance, access was gained to the patient's right internal jugular vein using a micropuncture system  The micropuncture wire was removed and a  035 wire was advanced to the level of the inferior vena cava using fluoroscopic guidance  Using 1% lidocaine, the region of the right anterior chest was was anesthetized  A small incision was made using a 15 blade scalpel  The port pocket was created using blunt dissection  The catheter tubing was tunneled from the incision to the venotomy  The micropuncture dilator was exchanged for a peel-away sheath, using fluoroscopic guidance  The catheter was place through the peel-away sheath  The catheter was measured and cut to size  The catheter was attached to the port  The port was flushed with saline to ensure patency without evidence of leakage    The port was placed in the port pocket  The port was sutured in the pocket using 3-0 Vicryl  The incisions were approximated with 3-0 Vicryl and Histoacryl  The patient tolerated the procedure well without apparent immediate complications  The patient left the IR department in unchanged condition  Dr Aretha Krabbe performed and directly supervised the entire procedure  Findings: Using ultrasound guidance, the right internal jugular vein was cannulated using Seldinger technique  The right internal jugular vein was evaluated as a potential access site  The right internal jugular vein was patent, and free of thrombus  Static images of the vessel was obtained  Visualization of real time needle entry into the vessel was obtained  Fluoroscopic spot image demonstrates a newly placed single lumen chest port via the right internal jugular vein with the most central aspect at the SVC/RA junction  The catheter tubing is smooth in contour  Impression: Impression:  Successful placement of a single lumen chest port via the right internal jugular vein  Workstation performed: [id number]         Labs:   Lab Results   Component Value Date    WBC 8 61 07/10/2018    HGB 11 1 (L) 07/10/2018    HCT 31 6 (L) 07/10/2018    MCV 96 07/10/2018     07/10/2018     Lab Results   Component Value Date     07/10/2018    K 3 9 07/10/2018     07/10/2018    CO2 28 07/10/2018    ANIONGAP 10 07/10/2018    BUN 13 07/10/2018    CREATININE 1 45 (H) 07/10/2018    GLUCOSE 86 07/10/2018    GLUF 73 06/27/2018    CALCIUM 9 2 07/10/2018    AST 26 07/10/2018    ALT 28 07/10/2018    ALKPHOS 81 07/10/2018    PROT 7 4 07/10/2018    BILITOT 0 10 (L) 07/10/2018    EGFR 40 07/10/2018         Current Medications: Reviewed  Allergies: Reviewed  PMH/FH/SH:  Reviewed      Vital Sign:    Body surface area is 1 76 meters squared      Wt Readings from Last 3 Encounters:   07/10/18 67 6 kg (149 lb)   06/27/18 69 9 kg (154 lb)   06/26/18 68 5 kg (151 lb) Temp Readings from Last 3 Encounters:   07/10/18 100 °F (37 8 °C) (Tympanic)   06/27/18 98 5 °F (36 9 °C) (Oral)   06/26/18 98 1 °F (36 7 °C) (Oral)        BP Readings from Last 3 Encounters:   07/10/18 120/84   06/27/18 128/82   06/26/18 100/68         Pulse Readings from Last 3 Encounters:   07/10/18 (!) 109   06/27/18 87   06/26/18 64     @LASTSAO2(3)@

## 2018-07-11 ENCOUNTER — HOSPITAL ENCOUNTER (OUTPATIENT)
Dept: INFUSION CENTER | Facility: CLINIC | Age: 58
Discharge: HOME | End: 2018-07-11
Payer: COMMERCIAL

## 2018-07-11 VITALS
HEIGHT: 65 IN | SYSTOLIC BLOOD PRESSURE: 106 MMHG | DIASTOLIC BLOOD PRESSURE: 68 MMHG | TEMPERATURE: 97.6 F | HEART RATE: 64 BPM | RESPIRATION RATE: 18 BRPM | BODY MASS INDEX: 24.83 KG/M2 | OXYGEN SATURATION: 96 % | WEIGHT: 149 LBS

## 2018-07-11 PROCEDURE — 96367 TX/PROPH/DG ADDL SEQ IV INF: CPT

## 2018-07-11 PROCEDURE — 96411 CHEMO IV PUSH ADDL DRUG: CPT

## 2018-07-11 PROCEDURE — 96377 APPLICATON ON-BODY INJECTOR: CPT

## 2018-07-11 PROCEDURE — 96413 CHEMO IV INFUSION 1 HR: CPT

## 2018-07-11 RX ADMIN — CYCLOPHOSPHAMIDE 1056 MG: 1 INJECTION, POWDER, FOR SOLUTION INTRAVENOUS; ORAL at 12:15

## 2018-07-11 RX ADMIN — DOXORUBICIN HYDROCHLORIDE 106 MG: 2 INJECTION, SOLUTION INTRAVENOUS at 11:58

## 2018-07-11 RX ADMIN — SODIUM CHLORIDE 150 MG: 0.9 INJECTION, SOLUTION INTRAVENOUS at 11:10

## 2018-07-11 RX ADMIN — DEXAMETHASONE SODIUM PHOSPHATE: 10 INJECTION, SOLUTION INTRAMUSCULAR; INTRAVENOUS at 10:48

## 2018-07-11 RX ADMIN — PEGFILGRASTIM 6 MG: KIT SUBCUTANEOUS at 12:58

## 2018-07-11 RX ADMIN — SODIUM CHLORIDE 20 ML/HR: 900 INJECTION, SOLUTION INTRAVENOUS at 10:28

## 2018-07-11 RX ADMIN — HEPARIN 300 UNITS: 100 SYRINGE at 12:54

## 2018-07-11 NOTE — PROGRESS NOTES
Pt tolerated chemotherapy infusion well without any adverse reactions  neulasta injection applied to right arm and patent, patient aware of removal time   Pt declined avs

## 2018-07-24 ENCOUNTER — HOSPITAL ENCOUNTER (OUTPATIENT)
Dept: INFUSION CENTER | Facility: CLINIC | Age: 58
Discharge: HOME | End: 2018-07-24
Payer: COMMERCIAL

## 2018-07-24 DIAGNOSIS — Z17.0 MALIGNANT NEOPLASM OF OVERLAPPING SITES OF LEFT BREAST IN FEMALE, ESTROGEN RECEPTOR POSITIVE (HCC): ICD-10-CM

## 2018-07-24 DIAGNOSIS — C50.812 MALIGNANT NEOPLASM OF OVERLAPPING SITES OF LEFT BREAST IN FEMALE, ESTROGEN RECEPTOR POSITIVE (HCC): ICD-10-CM

## 2018-07-24 LAB
ALBUMIN SERPL BCP-MCNC: 3.5 G/DL (ref 3.5–5)
ALP SERPL-CCNC: 80 U/L (ref 46–116)
ALT SERPL W P-5'-P-CCNC: 20 U/L (ref 12–78)
ANION GAP SERPL CALCULATED.3IONS-SCNC: 8 MMOL/L (ref 4–13)
ANISOCYTOSIS BLD QL SMEAR: PRESENT
AST SERPL W P-5'-P-CCNC: 18 U/L (ref 5–45)
BASOPHILS # BLD MANUAL: 0 THOUSAND/UL (ref 0–0.1)
BASOPHILS NFR MAR MANUAL: 0 % (ref 0–1)
BILIRUB SERPL-MCNC: 0.2 MG/DL (ref 0.2–1)
BLASTS NFR BLD MANUAL: 1 %
BUN SERPL-MCNC: 12 MG/DL (ref 5–25)
CALCIUM SERPL-MCNC: 9.1 MG/DL (ref 8.3–10.1)
CHLORIDE SERPL-SCNC: 102 MMOL/L (ref 100–108)
CO2 SERPL-SCNC: 26 MMOL/L (ref 21–32)
CREAT SERPL-MCNC: 1.53 MG/DL (ref 0.6–1.3)
EOSINOPHIL # BLD MANUAL: 0.11 THOUSAND/UL (ref 0–0.4)
EOSINOPHIL NFR BLD MANUAL: 1 % (ref 0–6)
ERYTHROCYTE [DISTWIDTH] IN BLOOD BY AUTOMATED COUNT: 13.1 % (ref 11.6–15.1)
GFR SERPL CREATININE-BSD FRML MDRD: 38 ML/MIN/1.73SQ M
GLUCOSE SERPL-MCNC: 102 MG/DL (ref 65–140)
HCT VFR BLD AUTO: 27.5 % (ref 34.8–46.1)
HGB BLD-MCNC: 9.5 G/DL (ref 11.5–15.4)
LYMPHOCYTES # BLD AUTO: 0.54 THOUSAND/UL (ref 0.6–4.47)
LYMPHOCYTES # BLD AUTO: 5 % (ref 14–44)
MCH RBC QN AUTO: 34.4 PG (ref 26.8–34.3)
MCHC RBC AUTO-ENTMCNC: 34.5 G/DL (ref 31.4–37.4)
MCV RBC AUTO: 100 FL (ref 82–98)
MONOCYTES # BLD AUTO: 0.86 THOUSAND/UL (ref 0–1.22)
MONOCYTES NFR BLD: 8 % (ref 4–12)
MYELOCYTES NFR BLD MANUAL: 2 % (ref 0–1)
NEUTROPHILS # BLD MANUAL: 8.74 THOUSAND/UL (ref 1.85–7.62)
NEUTS BAND NFR BLD MANUAL: 4 % (ref 0–8)
NEUTS SEG NFR BLD AUTO: 77 % (ref 43–75)
PLATELET # BLD AUTO: 182 THOUSANDS/UL (ref 149–390)
PLATELET BLD QL SMEAR: ADEQUATE
PMV BLD AUTO: 9.5 FL (ref 8.9–12.7)
POTASSIUM SERPL-SCNC: 3.6 MMOL/L (ref 3.5–5.3)
PROT SERPL-MCNC: 6.9 G/DL (ref 6.4–8.2)
RBC # BLD AUTO: 2.76 MILLION/UL (ref 3.81–5.12)
SODIUM SERPL-SCNC: 136 MMOL/L (ref 136–145)
TOTAL CELLS COUNTED SPEC: 100
TOXIC GRANULES BLD QL SMEAR: PRESENT
VARIANT LYMPHS # BLD AUTO: 2 %
WBC # BLD AUTO: 10.79 THOUSAND/UL (ref 4.31–10.16)

## 2018-07-24 PROCEDURE — 80053 COMPREHEN METABOLIC PANEL: CPT

## 2018-07-24 PROCEDURE — 85027 COMPLETE CBC AUTOMATED: CPT

## 2018-07-24 PROCEDURE — 85007 BL SMEAR W/DIFF WBC COUNT: CPT

## 2018-07-24 RX ORDER — DOXORUBICIN HYDROCHLORIDE 2 MG/ML
106 INJECTION, SOLUTION INTRAVENOUS ONCE
Status: COMPLETED | OUTPATIENT
Start: 2018-07-25 | End: 2018-07-25

## 2018-07-24 RX ORDER — SODIUM CHLORIDE 9 MG/ML
20 INJECTION, SOLUTION INTRAVENOUS CONTINUOUS
Status: DISCONTINUED | OUTPATIENT
Start: 2018-07-25 | End: 2018-07-28 | Stop reason: HOSPADM

## 2018-07-24 RX ADMIN — Medication 300 UNITS: at 15:25

## 2018-07-24 NOTE — PROGRESS NOTES
Pt resting with no complaints  Area superior to pt's port appears reddened, like newly healed skin  Pt reports having gotten suburned  Area not swollen or sore  Very small area at the corner of the incision site is scabbed over but no active exudate noted  Pt told to keep close eye on the port and report any swelling, soreness or pus to MD Ozzie Stanley accessed, labs drawn, port hep-locked and deaccessed without issue  Declined AVS  Pt aware of next appt

## 2018-07-25 ENCOUNTER — HOSPITAL ENCOUNTER (OUTPATIENT)
Dept: INFUSION CENTER | Facility: CLINIC | Age: 58
Discharge: HOME | End: 2018-07-25
Payer: COMMERCIAL

## 2018-07-25 VITALS
HEIGHT: 65 IN | WEIGHT: 149.03 LBS | HEART RATE: 91 BPM | BODY MASS INDEX: 24.83 KG/M2 | DIASTOLIC BLOOD PRESSURE: 86 MMHG | SYSTOLIC BLOOD PRESSURE: 128 MMHG | RESPIRATION RATE: 18 BRPM | TEMPERATURE: 98.6 F

## 2018-07-25 PROCEDURE — 96411 CHEMO IV PUSH ADDL DRUG: CPT

## 2018-07-25 PROCEDURE — 96377 APPLICATON ON-BODY INJECTOR: CPT

## 2018-07-25 PROCEDURE — 96413 CHEMO IV INFUSION 1 HR: CPT

## 2018-07-25 PROCEDURE — 96367 TX/PROPH/DG ADDL SEQ IV INF: CPT

## 2018-07-25 RX ADMIN — HEPARIN 300 UNITS: 100 SYRINGE at 12:32

## 2018-07-25 RX ADMIN — DEXAMETHASONE SODIUM PHOSPHATE: 10 INJECTION, SOLUTION INTRAMUSCULAR; INTRAVENOUS at 10:32

## 2018-07-25 RX ADMIN — PEGFILGRASTIM 6 MG: KIT SUBCUTANEOUS at 12:33

## 2018-07-25 RX ADMIN — SODIUM CHLORIDE 150 MG: 0.9 INJECTION, SOLUTION INTRAVENOUS at 11:05

## 2018-07-25 RX ADMIN — CYCLOPHOSPHAMIDE 1056 MG: 2 INJECTION, POWDER, FOR SOLUTION INTRAVENOUS; ORAL at 11:55

## 2018-07-25 RX ADMIN — DOXORUBICIN HYDROCHLORIDE 106 MG: 2 INJECTION, SOLUTION INTRAVENOUS at 11:42

## 2018-07-25 RX ADMIN — SODIUM CHLORIDE 20 ML/HR: 0.9 INJECTION, SOLUTION INTRAVENOUS at 10:31

## 2018-07-25 NOTE — PROGRESS NOTES
Chemo infused without incident, pt tolerated well and offered no complaints  OnPro applied to pt's left arm, confirmed flashing green light, pt aware of time to d/c and discard   Aware of next appt, declined avs

## 2018-07-25 NOTE — PLAN OF CARE
Problem: Potential for Falls  Goal: Patient will remain free of falls  INTERVENTIONS:  - Assess patient frequently for physical needs  -  Identify cognitive and physical deficits and behaviors that affect risk of falls    -  [place] fall precautions as indicated by assessment   - Educate patient/family on patient safety including physical limitations  - Instruct patient to call for assistance with activity based on assessment  - Modify environment to reduce risk of injury  - Consider OT/PT consult to assist with strengthening/mobility   Outcome: Progressing

## 2018-07-25 NOTE — PROGRESS NOTES
Called and spoke w Shelbie Chaidez to make her aware that's pt's creat has been increasing  Per Danae President they are aware and stated it was 'OK for now'

## 2018-08-06 RX ORDER — SODIUM CHLORIDE 9 MG/ML
20 INJECTION, SOLUTION INTRAVENOUS CONTINUOUS
Status: DISCONTINUED | OUTPATIENT
Start: 2018-08-07 | End: 2018-08-10 | Stop reason: HOSPADM

## 2018-08-07 ENCOUNTER — HOSPITAL ENCOUNTER (OUTPATIENT)
Dept: INFUSION CENTER | Facility: CLINIC | Age: 58
Discharge: HOME | End: 2018-08-07
Payer: COMMERCIAL

## 2018-08-07 DIAGNOSIS — Z17.0 MALIGNANT NEOPLASM OF BREAST IN FEMALE, ESTROGEN RECEPTOR POSITIVE, UNSPECIFIED LATERALITY, UNSPECIFIED SITE OF BREAST (HCC): Primary | ICD-10-CM

## 2018-08-07 DIAGNOSIS — C50.919 MALIGNANT NEOPLASM OF BREAST IN FEMALE, ESTROGEN RECEPTOR POSITIVE, UNSPECIFIED LATERALITY, UNSPECIFIED SITE OF BREAST (HCC): Primary | ICD-10-CM

## 2018-08-07 DIAGNOSIS — C50.919 MALIGNANT NEOPLASM OF BREAST IN FEMALE, ESTROGEN RECEPTOR POSITIVE, UNSPECIFIED LATERALITY, UNSPECIFIED SITE OF BREAST (HCC): ICD-10-CM

## 2018-08-07 DIAGNOSIS — Z17.0 MALIGNANT NEOPLASM OF BREAST IN FEMALE, ESTROGEN RECEPTOR POSITIVE, UNSPECIFIED LATERALITY, UNSPECIFIED SITE OF BREAST (HCC): ICD-10-CM

## 2018-08-07 LAB
ALBUMIN SERPL BCP-MCNC: 3.4 G/DL (ref 3.5–5)
ALP SERPL-CCNC: 79 U/L (ref 46–116)
ALT SERPL W P-5'-P-CCNC: 18 U/L (ref 12–78)
ANION GAP SERPL CALCULATED.3IONS-SCNC: 12 MMOL/L (ref 4–13)
ANISOCYTOSIS BLD QL SMEAR: PRESENT
AST SERPL W P-5'-P-CCNC: 17 U/L (ref 5–45)
BASOPHILS # BLD MANUAL: 0.11 THOUSAND/UL (ref 0–0.1)
BASOPHILS NFR MAR MANUAL: 1 % (ref 0–1)
BILIRUB SERPL-MCNC: 0.2 MG/DL (ref 0.2–1)
BUN SERPL-MCNC: 7 MG/DL (ref 5–25)
CALCIUM SERPL-MCNC: 8.8 MG/DL (ref 8.3–10.1)
CHLORIDE SERPL-SCNC: 100 MMOL/L (ref 100–108)
CO2 SERPL-SCNC: 23 MMOL/L (ref 21–32)
CREAT SERPL-MCNC: 1.32 MG/DL (ref 0.6–1.3)
EOSINOPHIL # BLD MANUAL: 0 THOUSAND/UL (ref 0–0.4)
EOSINOPHIL NFR BLD MANUAL: 0 % (ref 0–6)
ERYTHROCYTE [DISTWIDTH] IN BLOOD BY AUTOMATED COUNT: 13.9 % (ref 11.6–15.1)
GFR SERPL CREATININE-BSD FRML MDRD: 45 ML/MIN/1.73SQ M
GLUCOSE SERPL-MCNC: 120 MG/DL (ref 65–140)
HCT VFR BLD AUTO: 22 % (ref 34.8–46.1)
HGB BLD-MCNC: 7.5 G/DL (ref 11.5–15.4)
LYMPHOCYTES # BLD AUTO: 0.44 THOUSAND/UL (ref 0.6–4.47)
LYMPHOCYTES # BLD AUTO: 4 % (ref 14–44)
MCH RBC QN AUTO: 33.9 PG (ref 26.8–34.3)
MCHC RBC AUTO-ENTMCNC: 34.1 G/DL (ref 31.4–37.4)
MCV RBC AUTO: 100 FL (ref 82–98)
METAMYELOCYTES NFR BLD MANUAL: 1 % (ref 0–1)
MONOCYTES # BLD AUTO: 1.1 THOUSAND/UL (ref 0–1.22)
MONOCYTES NFR BLD: 10 % (ref 4–12)
MYELOCYTES NFR BLD MANUAL: 4 % (ref 0–1)
NEUTROPHILS # BLD MANUAL: 8.8 THOUSAND/UL (ref 1.85–7.62)
NEUTS BAND NFR BLD MANUAL: 6 % (ref 0–8)
NEUTS SEG NFR BLD AUTO: 74 % (ref 43–75)
NRBC BLD AUTO-RTO: 1 /100 WBC (ref 0–2)
NRBC BLD AUTO-RTO: 1 /100 WBCS
PLATELET # BLD AUTO: 241 THOUSANDS/UL (ref 149–390)
PLATELET BLD QL SMEAR: ADEQUATE
PMV BLD AUTO: 10.7 FL (ref 8.9–12.7)
POLYCHROMASIA BLD QL SMEAR: PRESENT
POTASSIUM SERPL-SCNC: 3.1 MMOL/L (ref 3.5–5.3)
PROT SERPL-MCNC: 6.8 G/DL (ref 6.4–8.2)
RBC # BLD AUTO: 2.21 MILLION/UL (ref 3.81–5.12)
SODIUM SERPL-SCNC: 135 MMOL/L (ref 136–145)
TOTAL CELLS COUNTED SPEC: 100
TOXIC GRANULES BLD QL SMEAR: PRESENT
WBC # BLD AUTO: 11 THOUSAND/UL (ref 4.31–10.16)

## 2018-08-07 PROCEDURE — 85007 BL SMEAR W/DIFF WBC COUNT: CPT

## 2018-08-07 PROCEDURE — 80053 COMPREHEN METABOLIC PANEL: CPT

## 2018-08-07 PROCEDURE — 85027 COMPLETE CBC AUTOMATED: CPT

## 2018-08-07 RX ORDER — SODIUM CHLORIDE 9 MG/ML
20 INJECTION, SOLUTION INTRAVENOUS CONTINUOUS
Status: DISCONTINUED | OUTPATIENT
Start: 2018-08-08 | End: 2018-08-11 | Stop reason: HOSPADM

## 2018-08-07 RX ORDER — DOXORUBICIN HYDROCHLORIDE 2 MG/ML
104 INJECTION, SOLUTION INTRAVENOUS ONCE
Status: COMPLETED | OUTPATIENT
Start: 2018-08-08 | End: 2018-08-08

## 2018-08-07 RX ADMIN — HEPARIN 300 UNITS: 100 SYRINGE at 15:25

## 2018-08-07 NOTE — PROGRESS NOTES
Patient here for labs in prep for final cycle [facility] tomorrow  Labs drawn as ordered and patient tolerated procedure well  Noted her port appeared reddened  Patient denies pain/soreness to site, she thinks it got sunburned  When needle pulled from site noted a small amount of serous drainage from puncture site  Dry dressing applied  I did also have Santiago Prescott, RN assess  No s/s infection observed but patient aware to monitor to increased drainage, if site should get sore, or if she should develop red streaking along catheter  She did verbalize understanding and will RTO tomorrow for treatment

## 2018-08-07 NOTE — PLAN OF CARE
Problem: PAIN - ADULT  Goal: Verbalizes/displays adequate comfort level or baseline comfort level  Interventions:  - Encourage patient to monitor pain and request assistance  - Assess pain using appropriate pain scale  - Administer analgesics based on type and severity of pain and evaluate response  - Implement non-pharmacological measures as appropriate and evaluate response  - Consider cultural and social influences on pain and pain management  - Notify physician/advanced practitioner if interventions unsuccessful or patient reports new pain  Outcome: Progressing      Problem: INFECTION - ADULT  Goal: Absence or prevention of progression during hospitalization  INTERVENTIONS:  - Assess and monitor for signs and symptoms of infection  - Monitor lab/diagnostic results  - Monitor all insertion sites, i e  indwelling lines, tubes, and drains  - Monitor endotracheal (as able) and nasal secretions for changes in amount and color  - [place] appropriate cooling/warming therapies per order  - Administer medications as ordered  - Instruct and encourage patient and family to use good hand hygiene technique  - Identify and instruct in appropriate isolation precautions for identified infection/condition  Outcome: Progressing      Problem: Knowledge Deficit  Goal: Patient/family/caregiver demonstrates understanding of disease process, treatment plan, medications, and discharge instructions  Complete learning assessment and assess knowledge base    Interventions:  - Provide teaching at level of understanding  - Provide teaching via preferred learning methods  Outcome: Progressing

## 2018-08-08 ENCOUNTER — HOSPITAL ENCOUNTER (OUTPATIENT)
Dept: INFUSION CENTER | Facility: CLINIC | Age: 58
Discharge: HOME | End: 2018-08-08
Payer: COMMERCIAL

## 2018-08-08 VITALS
TEMPERATURE: 98.6 F | WEIGHT: 143 LBS | RESPIRATION RATE: 16 BRPM | HEART RATE: 92 BPM | BODY MASS INDEX: 23.82 KG/M2 | DIASTOLIC BLOOD PRESSURE: 70 MMHG | OXYGEN SATURATION: 98 % | SYSTOLIC BLOOD PRESSURE: 106 MMHG | HEIGHT: 65 IN

## 2018-08-08 DIAGNOSIS — E87.6 HYPOKALEMIA: Primary | ICD-10-CM

## 2018-08-08 PROCEDURE — 96377 APPLICATON ON-BODY INJECTOR: CPT

## 2018-08-08 PROCEDURE — 96413 CHEMO IV INFUSION 1 HR: CPT

## 2018-08-08 PROCEDURE — 96411 CHEMO IV PUSH ADDL DRUG: CPT

## 2018-08-08 PROCEDURE — 96375 TX/PRO/DX INJ NEW DRUG ADDON: CPT

## 2018-08-08 PROCEDURE — 96367 TX/PROPH/DG ADDL SEQ IV INF: CPT

## 2018-08-08 RX ORDER — POTASSIUM CHLORIDE 20 MEQ/1
20 TABLET, EXTENDED RELEASE ORAL DAILY
Qty: 90 TABLET | Refills: 2 | Status: SHIPPED | OUTPATIENT
Start: 2018-08-08 | End: 2018-09-11

## 2018-08-08 RX ADMIN — DEXAMETHASONE SODIUM PHOSPHATE: 10 INJECTION, SOLUTION INTRAMUSCULAR; INTRAVENOUS at 10:38

## 2018-08-08 RX ADMIN — SODIUM CHLORIDE 20 ML/HR: 0.9 INJECTION, SOLUTION INTRAVENOUS at 10:30

## 2018-08-08 RX ADMIN — Medication 300 UNITS: at 12:20

## 2018-08-08 RX ADMIN — CYCLOPHOSPHAMIDE 1000 MG: 1 INJECTION, POWDER, FOR SOLUTION INTRAVENOUS; ORAL at 11:44

## 2018-08-08 RX ADMIN — PEGFILGRASTIM 6 MG: KIT SUBCUTANEOUS at 12:20

## 2018-08-08 RX ADMIN — SODIUM CHLORIDE 150 MG: 0.9 INJECTION, SOLUTION INTRAVENOUS at 10:59

## 2018-08-08 RX ADMIN — DOXORUBICIN HYDROCHLORIDE 104 MG: 2 INJECTION, SOLUTION INTRAVENOUS at 11:33

## 2018-08-08 NOTE — PROGRESS NOTES
Pt resting with no complaints  K 3 1; pt denies any diarrhea or vomiting  Pt's hgb is below parameters at7 5; okay to treat obtained by [facility] also aware of K; no new orders at this time  Pt provided information on hypokalemia and the importance of high potassium foods  Vitals stable; labs within parameters for treatment  [place] within reach; will continue to monitor

## 2018-08-14 ENCOUNTER — OFFICE VISIT (OUTPATIENT)
Dept: HEMATOLOGY ONCOLOGY | Facility: CLINIC | Age: 58
End: 2018-08-14
Payer: COMMERCIAL

## 2018-08-14 VITALS
TEMPERATURE: 100.4 F | RESPIRATION RATE: 18 BRPM | HEIGHT: 65 IN | HEART RATE: 138 BPM | WEIGHT: 143 LBS | SYSTOLIC BLOOD PRESSURE: 148 MMHG | DIASTOLIC BLOOD PRESSURE: 94 MMHG | OXYGEN SATURATION: 98 % | BODY MASS INDEX: 23.82 KG/M2

## 2018-08-14 DIAGNOSIS — Z17.0 MALIGNANT NEOPLASM OF OVERLAPPING SITES OF LEFT BREAST IN FEMALE, ESTROGEN RECEPTOR POSITIVE (HCC): Primary | ICD-10-CM

## 2018-08-14 DIAGNOSIS — C50.812 MALIGNANT NEOPLASM OF OVERLAPPING SITES OF LEFT BREAST IN FEMALE, ESTROGEN RECEPTOR POSITIVE (HCC): Primary | ICD-10-CM

## 2018-08-14 PROCEDURE — 99214 OFFICE O/P EST MOD 30 MIN: CPT | Performed by: INTERNAL MEDICINE

## 2018-08-14 RX ORDER — LEVOFLOXACIN 500 MG/1
500 TABLET, FILM COATED ORAL EVERY 24 HOURS
Qty: 7 TABLET | Refills: 0 | Status: SHIPPED | OUTPATIENT
Start: 2018-08-14 | End: 2018-08-22

## 2018-08-14 NOTE — PROGRESS NOTES
Hematology / Oncology Outpatient Follow Up Note    Jerry Samaniego 63 y o  female :1960 AQA:[id number]         Date:  2018    Assessment / Plan:  A 50-year-old postmenopausal woman with stage IIIA left breast cancer, grade 2, invasive lobular histology, ER 75% positive, ME 15% positive, HER2 negative disease   She underwent mastectomy and axillary lymph node dissection, resulting in IBAN   She had 6 positive lymph nodes as well as 8 1 cm of primary tumor size   She is currently on adjuvant chemotherapy with dose dense AC    she finished 4 cycle of AC  She has progressive fatigue  However, she has no nausea, vomiting or diarrhea  She was found to have low-grade temperature, today  This is day 7 of 4th cycle of AC  She has no focal symptom of suggesting infection  I recommended her to take empiric antibiotics with levofloxacin for 7 days, starting today  I instructed her to give us a call if she continued to have fever or if she feels ill  She is going to start weekly paclitaxel in 2018  She has more than expected chemotherapy-induced anemia  Since I expect her hemoglobin started to go up with weekly paclitaxel, I would like to hold transfusion which she is agreeable  Based on physical examination, she has no evidence recurrent disease  I will see her again in 4 weeks for routine follow-up  She is in agreement  Subjective:      HPI:  A 50-year-old postmenopausal woman who palpated lump in the left breast which she brought to medical attention  Mekasilvestre Armijo was found to have radiographic abnormality for which she underwent left breast biopsy in April 3, 2018   Biopsy showed invasive lobular carcinoma, grade 2   This was ER % positive, ME 15% positive, HER2 negative disease   She had led to be large breast tumor as well as clinically positive lymph nodes   Therefore, she underwent mastectomy and axillary lymph node dissection by Dr Jagdish Shah in May 15, 2018   She had 8 1 cm of invasive ductal carcinoma, grade 2   Lymphovascular invasion was present  6/24 axillary lymph nodes were positive for metastatic disease with largest tumor measuring 1 9 cm with extranodal extension   She did not have reconstruction  Meena Lynn to the surgery, PET-CT scan showed no evidence of distant metastasis   She presents today to discuss adjuvant treatment options   She has no breast related symptomatology  Ladean Deck weight has been stable   She has no complaint of bone pain   She denied any respiratory symptoms   Her performance status is normal  She is a lifetime never smoker         Interval History:   A 50-year-old postmenopausal woman with stage IIIA left breast cancer, grade 2, invasive lobular histology, ER 75% positive, CO 15% positive, HER2 negative disease   She underwent mastectomy and axillary lymph node dissection, resulting in IBAN   She had 6 positive lymph nodes as well as 8 1 cm of primary tumor size    she started adjuvant chemotherapy with dose dense AC  She already had 4 cycle of dose dense AC with no nausea vomiting  However, she has progressive fatigue  She has no complaint of pain  She has clear runny nose but no shortness of breath at rest   She has no cough, sputum production  She has no mouth sore or diarrhea  She was found to have low-grade temperature with 100 4, today which she was not aware  Her performance status is normal       Objective:      Primary Diagnosis:     Left breast cancer, stage IIIA (pT3, pN2, M0) grade 2, invasive lobular histology, ER 75% positive, CO 15% positive, HER2 negative disease   6 positive lymph nodes   Diagnosed in May 2018      Cancer Staging:  Cancer Staging  Malignant neoplasm of overlapping sites of left breast in female, estrogen receptor positive ([facility] [address] )  Staging form: Breast, AJCC 8th Edition  - Clinical stage from 4/10/2018: Stage IIA (cT2, cN1(f), cM0, G2, ER: Positive, CO: Positive, HER2: Negative) - Signed by Israel Alvarez MD on 4/10/2018           Previous Hematologic/ Oncologic Treatment:            Current Hematologic/ Oncologic Treatment:       Adjuvant chemotherapy with dose dense AC x4 followed by weekly paclitaxel x12   First cycle of weekly paclitaxel to be given in August 22, 2018        Disease Status:      IBAN status post mastectomy and axillary lymph node dissection      Test Results:     Pathology:     8 1 cm of invasive lobular carcinoma, grade 2   Lymphovascular invasion was present  6/24 axillary lymph nodes were positive for metastatic disease with largest tumor 1 9 cm   Extranodal extension was positive   ER 75 to 100% positive, AR 15 present positive, HER2 negative disease   Stage IIIA (pT3, pN2, M0)     Radiology:     PET-CT scan showed hypermetabolic left breast mass with no evidence of distant metastasis      MUGA scan in June 2018 showed ejection fraction 62%      Laboratory:     See below      Physical Exam:        General Appearance:    Alert, oriented          Eyes:    PERRL   Ears:    Normal external ear canals, both ears   Nose:   Nares normal, septum midline   Throat:   Mucosa moist  Pharynx without injection  Neck:   Supple         Lungs:     Clear to auscultation bilaterally   Chest Wall:    No tenderness or deformity    Heart:    Regular rate and rhythm         Abdomen:     Soft, non-tender, bowel sounds +, no organomegaly               Extremities:   Extremities no cyanosis or edema         Skin:   no rash or icterus  Lymph nodes:   Cervical, supraclavicular, and axillary nodes normal   Neurologic:   CNII-XII intact, normal strength, sensation and reflexes     Throughout             Breast exam: Status post left mastectomy without reconstruction   No palpable abnormality in her left chest wall   Minor redness around the mastectomy scar   Right breast exam is negative  ROS: Review of Systems   Constitutional:        Low-grade temperature    Significant fatigue     All other systems reviewed and are negative  Imaging: No results found  Labs:   Lab Results   Component Value Date    WBC 11 00 (H) 08/07/2018    HGB 7 5 (L) 08/07/2018    HCT 22 0 (L) 08/07/2018     (H) 08/07/2018     08/07/2018     Lab Results   Component Value Date     (L) 08/07/2018    K 3 1 (L) 08/07/2018     08/07/2018    CO2 23 08/07/2018    ANIONGAP 12 08/07/2018    BUN 7 08/07/2018    CREATININE 1 32 (H) 08/07/2018    GLUCOSE 120 08/07/2018    GLUF 73 06/27/2018    CALCIUM 8 8 08/07/2018    AST 17 08/07/2018    ALT 18 08/07/2018    ALKPHOS 79 08/07/2018    PROT 6 8 08/07/2018    BILITOT 0 20 08/07/2018    EGFR 45 08/07/2018         Current Medications: Reviewed  Allergies: Reviewed  PMH/FH/SH:  Reviewed      Vital Sign:    Body surface area is 1 72 meters squared      Wt Readings from Last 3 Encounters:   08/14/18 64 9 kg (143 lb)   08/08/18 64 9 kg (143 lb)   07/25/18 67 6 kg (149 lb 0 5 oz)        Temp Readings from Last 3 Encounters:   08/14/18 100 4 °F (38 °C) (Tympanic)   08/08/18 98 6 °F (37 °C) (Oral)   07/25/18 98 6 °F (37 °C) (Oral)        BP Readings from Last 3 Encounters:   08/14/18 148/94   08/08/18 106/70   07/25/18 128/86         Pulse Readings from Last 3 Encounters:   08/14/18 (!) 138   08/08/18 92   07/25/18 91     @LASTSAO2(3)@

## 2018-08-21 ENCOUNTER — HOSPITAL ENCOUNTER (OUTPATIENT)
Dept: INFUSION CENTER | Facility: CLINIC | Age: 58
Discharge: HOME | End: 2018-08-21
Payer: COMMERCIAL

## 2018-08-21 ENCOUNTER — TELEPHONE (OUTPATIENT)
Dept: HEMATOLOGY ONCOLOGY | Facility: CLINIC | Age: 58
End: 2018-08-21

## 2018-08-21 DIAGNOSIS — C50.812 MALIGNANT NEOPLASM OF OVERLAPPING SITES OF LEFT BREAST IN FEMALE, ESTROGEN RECEPTOR POSITIVE (HCC): ICD-10-CM

## 2018-08-21 DIAGNOSIS — Z17.0 MALIGNANT NEOPLASM OF OVERLAPPING SITES OF LEFT BREAST IN FEMALE, ESTROGEN RECEPTOR POSITIVE (HCC): ICD-10-CM

## 2018-08-21 LAB
ALBUMIN SERPL BCP-MCNC: 3.4 G/DL (ref 3.5–5)
ALP SERPL-CCNC: 68 U/L (ref 46–116)
ALT SERPL W P-5'-P-CCNC: 15 U/L (ref 12–78)
ANION GAP SERPL CALCULATED.3IONS-SCNC: 7 MMOL/L (ref 4–13)
ANISOCYTOSIS BLD QL SMEAR: PRESENT
AST SERPL W P-5'-P-CCNC: 12 U/L (ref 5–45)
BASOPHILS # BLD MANUAL: 0.03 THOUSAND/UL (ref 0–0.1)
BASOPHILS NFR MAR MANUAL: 1 % (ref 0–1)
BILIRUB SERPL-MCNC: 0.3 MG/DL (ref 0.2–1)
BUN SERPL-MCNC: 9 MG/DL (ref 5–25)
CALCIUM SERPL-MCNC: 8.4 MG/DL (ref 8.3–10.1)
CHLORIDE SERPL-SCNC: 105 MMOL/L (ref 100–108)
CO2 SERPL-SCNC: 26 MMOL/L (ref 21–32)
CREAT SERPL-MCNC: 1.08 MG/DL (ref 0.6–1.3)
EOSINOPHIL # BLD MANUAL: 0 THOUSAND/UL (ref 0–0.4)
EOSINOPHIL NFR BLD MANUAL: 0 % (ref 0–6)
ERYTHROCYTE [DISTWIDTH] IN BLOOD BY AUTOMATED COUNT: 14.5 % (ref 11.6–15.1)
GFR SERPL CREATININE-BSD FRML MDRD: 57 ML/MIN/1.73SQ M
GLUCOSE SERPL-MCNC: 101 MG/DL (ref 65–140)
HCT VFR BLD AUTO: 16.2 % (ref 34.8–46.1)
HGB BLD-MCNC: 5.5 G/DL (ref 11.5–15.4)
HYPERCHROMIA BLD QL SMEAR: PRESENT
LG PLATELETS BLD QL SMEAR: PRESENT
LYMPHOCYTES # BLD AUTO: 0.41 THOUSAND/UL (ref 0.6–4.47)
LYMPHOCYTES # BLD AUTO: 14 % (ref 14–44)
MCH RBC QN AUTO: 34 PG (ref 26.8–34.3)
MCHC RBC AUTO-ENTMCNC: 34 G/DL (ref 31.4–37.4)
MCV RBC AUTO: 100 FL (ref 82–98)
MONOCYTES # BLD AUTO: 0.15 THOUSAND/UL (ref 0–1.22)
MONOCYTES NFR BLD: 5 % (ref 4–12)
NEUTROPHILS # BLD MANUAL: 2.36 THOUSAND/UL (ref 1.85–7.62)
NEUTS BAND NFR BLD MANUAL: 13 % (ref 0–8)
NEUTS SEG NFR BLD AUTO: 67 % (ref 43–75)
NRBC BLD AUTO-RTO: 0 /100 WBCS
NRBC BLD AUTO-RTO: 2 /100 WBC (ref 0–2)
PLATELET # BLD AUTO: 48 THOUSANDS/UL (ref 149–390)
PLATELET BLD QL SMEAR: ABNORMAL
PMV BLD AUTO: 12 FL (ref 8.9–12.7)
POLYCHROMASIA BLD QL SMEAR: PRESENT
POTASSIUM SERPL-SCNC: 3 MMOL/L (ref 3.5–5.3)
PROT SERPL-MCNC: 6.5 G/DL (ref 6.4–8.2)
RBC # BLD AUTO: 1.62 MILLION/UL (ref 3.81–5.12)
SODIUM SERPL-SCNC: 138 MMOL/L (ref 136–145)
TOTAL CELLS COUNTED SPEC: 100
WBC # BLD AUTO: 2.95 THOUSAND/UL (ref 4.31–10.16)

## 2018-08-21 PROCEDURE — 85007 BL SMEAR W/DIFF WBC COUNT: CPT

## 2018-08-21 PROCEDURE — 80053 COMPREHEN METABOLIC PANEL: CPT | Performed by: INTERNAL MEDICINE

## 2018-08-21 PROCEDURE — 85027 COMPLETE CBC AUTOMATED: CPT

## 2018-08-21 RX ADMIN — Medication 300 UNITS: at 14:50

## 2018-08-22 ENCOUNTER — HOSPITAL ENCOUNTER (OUTPATIENT)
Dept: INFUSION CENTER | Facility: HOSPITAL | Age: 58
Discharge: HOME | End: 2018-08-22
Payer: COMMERCIAL

## 2018-08-22 VITALS
DIASTOLIC BLOOD PRESSURE: 70 MMHG | SYSTOLIC BLOOD PRESSURE: 130 MMHG | RESPIRATION RATE: 18 BRPM | HEART RATE: 80 BPM | TEMPERATURE: 98 F

## 2018-08-22 LAB
ABO GROUP BLD: NORMAL
BLD GP AB SCN SERPL QL: NEGATIVE
RH BLD: NEGATIVE
SPECIMEN EXPIRATION DATE: NORMAL

## 2018-08-22 PROCEDURE — 86900 BLOOD TYPING SEROLOGIC ABO: CPT | Performed by: INTERNAL MEDICINE

## 2018-08-22 PROCEDURE — 86901 BLOOD TYPING SEROLOGIC RH(D): CPT | Performed by: INTERNAL MEDICINE

## 2018-08-22 PROCEDURE — 86923 COMPATIBILITY TEST ELECTRIC: CPT

## 2018-08-22 PROCEDURE — P9040 RBC LEUKOREDUCED IRRADIATED: HCPCS

## 2018-08-22 PROCEDURE — 36430 TRANSFUSION BLD/BLD COMPNT: CPT

## 2018-08-22 PROCEDURE — 86850 RBC ANTIBODY SCREEN: CPT | Performed by: INTERNAL MEDICINE

## 2018-08-22 NOTE — PLAN OF CARE
Problem: Potential for Falls  Goal: Patient will remain free of falls  INTERVENTIONS:  - Assess patient frequently for physical needs  -  Identify cognitive and physical deficits and behaviors that affect risk of falls    -  [place] fall precautions as indicated by assessment   - Educate patient/family on patient safety including physical limitations  - Instruct patient to call for assistance with activity based on assessment  - Modify environment to reduce risk of injury  - Consider OT/PT consult to assist with strengthening/mobility   Outcome: Progressing

## 2018-08-23 LAB
ABO GROUP BLD BPU: NORMAL
ABO GROUP BLD BPU: NORMAL
BPU ID: NORMAL
BPU ID: NORMAL
UNIT DISPENSE STATUS: NORMAL
UNIT DISPENSE STATUS: NORMAL
UNIT PRODUCT CODE: NORMAL
UNIT PRODUCT CODE: NORMAL
UNIT RH: NORMAL
UNIT RH: NORMAL

## 2018-08-29 ENCOUNTER — HOSPITAL ENCOUNTER (OUTPATIENT)
Dept: INFUSION CENTER | Facility: CLINIC | Age: 58
Discharge: HOME | End: 2018-08-29
Payer: COMMERCIAL

## 2018-08-29 DIAGNOSIS — C50.812 MALIGNANT NEOPLASM OF OVERLAPPING SITES OF LEFT BREAST IN FEMALE, ESTROGEN RECEPTOR POSITIVE (HCC): ICD-10-CM

## 2018-08-29 DIAGNOSIS — Z17.0 MALIGNANT NEOPLASM OF OVERLAPPING SITES OF LEFT BREAST IN FEMALE, ESTROGEN RECEPTOR POSITIVE (HCC): ICD-10-CM

## 2018-08-29 LAB
ALBUMIN SERPL BCP-MCNC: 3.3 G/DL (ref 3.5–5)
ALP SERPL-CCNC: 66 U/L (ref 46–116)
ALT SERPL W P-5'-P-CCNC: 39 U/L (ref 12–78)
ANION GAP SERPL CALCULATED.3IONS-SCNC: 12 MMOL/L (ref 4–13)
AST SERPL W P-5'-P-CCNC: 66 U/L (ref 5–45)
BASOPHILS # BLD AUTO: 0.09 THOUSANDS/ΜL (ref 0–0.1)
BASOPHILS NFR BLD AUTO: 2 % (ref 0–1)
BILIRUB SERPL-MCNC: 0.3 MG/DL (ref 0.2–1)
BUN SERPL-MCNC: 11 MG/DL (ref 5–25)
CALCIUM SERPL-MCNC: 8.9 MG/DL (ref 8.3–10.1)
CHLORIDE SERPL-SCNC: 107 MMOL/L (ref 100–108)
CO2 SERPL-SCNC: 24 MMOL/L (ref 21–32)
CREAT SERPL-MCNC: 1.01 MG/DL (ref 0.6–1.3)
EOSINOPHIL # BLD AUTO: 0.01 THOUSAND/ΜL (ref 0–0.61)
EOSINOPHIL NFR BLD AUTO: 0 % (ref 0–6)
ERYTHROCYTE [DISTWIDTH] IN BLOOD BY AUTOMATED COUNT: 17.4 % (ref 11.6–15.1)
GFR SERPL CREATININE-BSD FRML MDRD: 62 ML/MIN/1.73SQ M
GLUCOSE SERPL-MCNC: 97 MG/DL (ref 65–140)
HCT VFR BLD AUTO: 28.3 % (ref 34.8–46.1)
HGB BLD-MCNC: 9.6 G/DL (ref 11.5–15.4)
IMM GRANULOCYTES # BLD AUTO: 0.03 THOUSAND/UL (ref 0–0.2)
IMM GRANULOCYTES NFR BLD AUTO: 1 % (ref 0–2)
LYMPHOCYTES # BLD AUTO: 0.46 THOUSANDS/ΜL (ref 0.6–4.47)
LYMPHOCYTES NFR BLD AUTO: 8 % (ref 14–44)
MCH RBC QN AUTO: 33.1 PG (ref 26.8–34.3)
MCHC RBC AUTO-ENTMCNC: 33.9 G/DL (ref 31.4–37.4)
MCV RBC AUTO: 98 FL (ref 82–98)
MONOCYTES # BLD AUTO: 0.61 THOUSAND/ΜL (ref 0.17–1.22)
MONOCYTES NFR BLD AUTO: 11 % (ref 4–12)
NEUTROPHILS # BLD AUTO: 4.56 THOUSANDS/ΜL (ref 1.85–7.62)
NEUTS SEG NFR BLD AUTO: 78 % (ref 43–75)
NRBC BLD AUTO-RTO: 0 /100 WBCS
PLATELET # BLD AUTO: 269 THOUSANDS/UL (ref 149–390)
PMV BLD AUTO: 10.3 FL (ref 8.9–12.7)
POTASSIUM SERPL-SCNC: 4.1 MMOL/L (ref 3.5–5.3)
PROT SERPL-MCNC: 7 G/DL (ref 6.4–8.2)
RBC # BLD AUTO: 2.9 MILLION/UL (ref 3.81–5.12)
SODIUM SERPL-SCNC: 143 MMOL/L (ref 136–145)
WBC # BLD AUTO: 5.76 THOUSAND/UL (ref 4.31–10.16)

## 2018-08-29 PROCEDURE — 80053 COMPREHEN METABOLIC PANEL: CPT

## 2018-08-29 PROCEDURE — 85025 COMPLETE CBC W/AUTO DIFF WBC: CPT

## 2018-08-29 RX ORDER — SODIUM CHLORIDE 9 MG/ML
20 INJECTION, SOLUTION INTRAVENOUS CONTINUOUS
Status: DISCONTINUED | OUTPATIENT
Start: 2018-08-30 | End: 2018-09-02 | Stop reason: HOSPADM

## 2018-08-29 RX ADMIN — HEPARIN 300 UNITS: 100 SYRINGE at 14:47

## 2018-08-29 NOTE — PLAN OF CARE

## 2018-08-29 NOTE — PROGRESS NOTES
Patient here for labs in prep for chemo tomorrow  She tolerated procedure via port well and offers no c/o  Patient discharged and will RTO tomorrow for first dose Taxol

## 2018-08-30 ENCOUNTER — HOSPITAL ENCOUNTER (OUTPATIENT)
Dept: INFUSION CENTER | Facility: CLINIC | Age: 58
Discharge: HOME | End: 2018-08-30
Payer: COMMERCIAL

## 2018-08-30 VITALS
SYSTOLIC BLOOD PRESSURE: 118 MMHG | OXYGEN SATURATION: 98 % | HEART RATE: 88 BPM | TEMPERATURE: 98.3 F | DIASTOLIC BLOOD PRESSURE: 84 MMHG | WEIGHT: 142 LBS | RESPIRATION RATE: 18 BRPM | HEIGHT: 65 IN | BODY MASS INDEX: 23.66 KG/M2

## 2018-08-30 PROCEDURE — 96413 CHEMO IV INFUSION 1 HR: CPT

## 2018-08-30 PROCEDURE — 96367 TX/PROPH/DG ADDL SEQ IV INF: CPT

## 2018-08-30 PROCEDURE — 96375 TX/PRO/DX INJ NEW DRUG ADDON: CPT

## 2018-08-30 RX ADMIN — SODIUM CHLORIDE 20 ML/HR: 0.9 INJECTION, SOLUTION INTRAVENOUS at 08:10

## 2018-08-30 RX ADMIN — Medication 300 UNITS: at 10:15

## 2018-08-30 RX ADMIN — DIPHENHYDRAMINE HYDROCHLORIDE 25 MG: 50 INJECTION, SOLUTION INTRAMUSCULAR; INTRAVENOUS at 08:38

## 2018-08-30 RX ADMIN — DEXAMETHASONE SODIUM PHOSPHATE 10 MG: 10 INJECTION, SOLUTION INTRAMUSCULAR; INTRAVENOUS at 08:20

## 2018-08-30 RX ADMIN — FAMOTIDINE 20 MG: 10 INJECTION, SOLUTION INTRAVENOUS at 08:54

## 2018-08-30 RX ADMIN — PACLITAXEL 139 MG: 6 INJECTION, SOLUTION INTRAVENOUS at 09:14

## 2018-09-04 ENCOUNTER — HOSPITAL ENCOUNTER (OUTPATIENT)
Dept: INFUSION CENTER | Facility: CLINIC | Age: 58
Discharge: HOME | End: 2018-09-04
Payer: COMMERCIAL

## 2018-09-04 LAB
ALBUMIN SERPL BCP-MCNC: 3.6 G/DL (ref 3.5–5)
ALP SERPL-CCNC: 54 U/L (ref 46–116)
ALT SERPL W P-5'-P-CCNC: 31 U/L (ref 12–78)
ANION GAP SERPL CALCULATED.3IONS-SCNC: 10 MMOL/L (ref 4–13)
AST SERPL W P-5'-P-CCNC: 29 U/L (ref 5–45)
BASOPHILS # BLD AUTO: 0.06 THOUSANDS/ΜL (ref 0–0.1)
BASOPHILS NFR BLD AUTO: 2 % (ref 0–1)
BILIRUB SERPL-MCNC: 0.6 MG/DL (ref 0.2–1)
BUN SERPL-MCNC: 16 MG/DL (ref 5–25)
CALCIUM SERPL-MCNC: 9.3 MG/DL (ref 8.3–10.1)
CHLORIDE SERPL-SCNC: 101 MMOL/L (ref 100–108)
CO2 SERPL-SCNC: 25 MMOL/L (ref 21–32)
CREAT SERPL-MCNC: 1.23 MG/DL (ref 0.6–1.3)
EOSINOPHIL # BLD AUTO: 0.11 THOUSAND/ΜL (ref 0–0.61)
EOSINOPHIL NFR BLD AUTO: 4 % (ref 0–6)
ERYTHROCYTE [DISTWIDTH] IN BLOOD BY AUTOMATED COUNT: 17.2 % (ref 11.6–15.1)
GFR SERPL CREATININE-BSD FRML MDRD: 49 ML/MIN/1.73SQ M
GLUCOSE SERPL-MCNC: 118 MG/DL (ref 65–140)
HCT VFR BLD AUTO: 27.7 % (ref 34.8–46.1)
HGB BLD-MCNC: 9.1 G/DL (ref 11.5–15.4)
IMM GRANULOCYTES # BLD AUTO: 0.03 THOUSAND/UL (ref 0–0.2)
IMM GRANULOCYTES NFR BLD AUTO: 1 % (ref 0–2)
LYMPHOCYTES # BLD AUTO: 0.24 THOUSANDS/ΜL (ref 0.6–4.47)
LYMPHOCYTES NFR BLD AUTO: 8 % (ref 14–44)
MCH RBC QN AUTO: 32.6 PG (ref 26.8–34.3)
MCHC RBC AUTO-ENTMCNC: 32.9 G/DL (ref 31.4–37.4)
MCV RBC AUTO: 99 FL (ref 82–98)
MONOCYTES # BLD AUTO: 0.17 THOUSAND/ΜL (ref 0.17–1.22)
MONOCYTES NFR BLD AUTO: 6 % (ref 4–12)
NEUTROPHILS # BLD AUTO: 2.37 THOUSANDS/ΜL (ref 1.85–7.62)
NEUTS SEG NFR BLD AUTO: 79 % (ref 43–75)
NRBC BLD AUTO-RTO: 0 /100 WBCS
PLATELET # BLD AUTO: 219 THOUSANDS/UL (ref 149–390)
PMV BLD AUTO: 10.6 FL (ref 8.9–12.7)
POTASSIUM SERPL-SCNC: 3.9 MMOL/L (ref 3.5–5.3)
PROT SERPL-MCNC: 7.3 G/DL (ref 6.4–8.2)
RBC # BLD AUTO: 2.79 MILLION/UL (ref 3.81–5.12)
SODIUM SERPL-SCNC: 136 MMOL/L (ref 136–145)
WBC # BLD AUTO: 2.98 THOUSAND/UL (ref 4.31–10.16)

## 2018-09-04 PROCEDURE — 96523 IRRIG DRUG DELIVERY DEVICE: CPT

## 2018-09-04 PROCEDURE — 85025 COMPLETE CBC W/AUTO DIFF WBC: CPT | Performed by: INTERNAL MEDICINE

## 2018-09-04 PROCEDURE — 80053 COMPREHEN METABOLIC PANEL: CPT | Performed by: INTERNAL MEDICINE

## 2018-09-04 RX ADMIN — Medication 300 UNITS: at 14:40

## 2018-09-04 NOTE — PLAN OF CARE

## 2018-09-04 NOTE — PLAN OF CARE
Problem: SAFETY ADULT  Goal: Patient will remain free of falls  INTERVENTIONS:  - Assess patient frequently for physical needs  -  Identify cognitive and physical deficits and behaviors that affect risk of falls    -  [place] fall precautions as indicated by assessment   - Educate patient/family on patient safety including physical limitations  - Instruct patient to call for assistance with activity based on assessment  - Modify environment to reduce risk of injury  - Consider OT/PT consult to assist with strengthening/mobility  Outcome: Progressing

## 2018-09-04 NOTE — PROGRESS NOTES
Patient here for labs in prep for treatment on Thursday 9/6/18, she is doing well and tolerated procedure without incident, offers no c/o and will RTO as scheduled

## 2018-09-05 ENCOUNTER — OFFICE VISIT (OUTPATIENT)
Dept: SURGICAL ONCOLOGY | Facility: CLINIC | Age: 58
End: 2018-09-05
Payer: COMMERCIAL

## 2018-09-05 VITALS
HEART RATE: 110 BPM | BODY MASS INDEX: 23.82 KG/M2 | HEIGHT: 65 IN | SYSTOLIC BLOOD PRESSURE: 108 MMHG | DIASTOLIC BLOOD PRESSURE: 78 MMHG | WEIGHT: 143 LBS | TEMPERATURE: 99 F | RESPIRATION RATE: 18 BRPM

## 2018-09-05 DIAGNOSIS — Z17.0 MALIGNANT NEOPLASM OF OVERLAPPING SITES OF LEFT BREAST IN FEMALE, ESTROGEN RECEPTOR POSITIVE (HCC): Primary | ICD-10-CM

## 2018-09-05 DIAGNOSIS — C50.812 MALIGNANT NEOPLASM OF OVERLAPPING SITES OF LEFT BREAST IN FEMALE, ESTROGEN RECEPTOR POSITIVE (HCC): Primary | ICD-10-CM

## 2018-09-05 PROCEDURE — 99214 OFFICE O/P EST MOD 30 MIN: CPT | Performed by: SURGERY

## 2018-09-05 RX ORDER — SODIUM CHLORIDE 9 MG/ML
20 INJECTION, SOLUTION INTRAVENOUS CONTINUOUS
Status: DISCONTINUED | OUTPATIENT
Start: 2018-09-06 | End: 2018-09-09 | Stop reason: HOSPADM

## 2018-09-05 NOTE — LETTER
September 5, 2018     Virgilio Gutierrez, [address]  [address] [facility]    Patient: Aaron Mendoza   YOB: 1960   Date of Visit: 9/5/2018       Dear Dr Glenna Arnold: Thank you for referring Rachel Jesus to me for evaluation  Below are my notes for this consultation  If you have questions, please do not hesitate to call me  I look forward to following your patient along with you           Sincerely,        Philly Mcmillan MD        CC: No Recipients

## 2018-09-05 NOTE — PROGRESS NOTES
Surgical Oncology Follow Up       [address]  CANCER CARE [facility] SURGICAL ONCOLOGY [place] [address] [address]  1960  [id number]  [address]  CANCER [facility] SURGICAL ONCOLOGY [place] [id number]    Chief Complaint   Patient presents with    Follow-up     3 month follow up    Breast Cancer          Assessment & Plan:   Patient presents for a follow-up visit  She is alf through her chemotherapy and has radiation therapy thereafter  He clinical examination shows no evidence of local regional distant recurrence disease  She will be due for a right mammogram in approximately 6 months will coordinate that for her  We will see her back in 6 months  She is agreeable to seeing Armando Yolanda at her next follow-up  Cancer History:        Malignant neoplasm of overlapping sites of left breast in female, estrogen receptor positive ([facility] [address] )    4/3/2018 Initial Diagnosis     Left breast biopsy  4 6 cm in size on ultrasound largest tumor   Site A:  6:00 o clock 5 CMFN-Invasive Lobular grade 2  Site B:  11:00 o'clock 4 CMFN Invasive Lobular grade 2  Lymph node:  Positive for metastatic cancer  ER 70  IN 15  Her 2 1+   Performed on site A specimen  Stage IIA         5/15/2018 Surgery     Left breast modified radical mastectomy  Invasive lobular carcinoma  Multi focal  Tumor seen in deep dermis  Grade 2  8 1 cm   lymph nodes  ER 70  IN 15  Her 2 negative  Stage IIIA            2018 -  Chemotherapy        Adjuvant chemotherapy with dose dense AC x4 followed by weekly paclitaxel x12  Interval History:   See above    Review of Systems:   Review of Systems   Constitutional: Positive for activity change and fatigue  Negative for appetite change  HENT: Negative  Eyes: Negative  Respiratory: Negative for cough, shortness of breath and wheezing      Cardiovascular: Negative for chest pain and leg swelling  Gastrointestinal: Negative  Endocrine: Negative  Genitourinary: Negative  Musculoskeletal:        No new changes or complaints of bone pain   Skin: Negative  Allergic/Immunologic: Negative  Neurological: Negative  Hematological: Negative  Psychiatric/Behavioral: Negative  Past Medical History     Patient Active Problem List   Diagnosis    Malignant neoplasm of overlapping sites of left breast in female, estrogen receptor positive ([facility] [address] )    Hydronephrosis    Cellulitis     Past Medical History:   Diagnosis Date    Congenital prolapsed rectum     Hypertension     Malignant neoplasm of overlapping sites of left female breast ([facility] [address] )     Migraine      Past Surgical History:   Procedure Laterality Date    BREAST BIOPSY      COLON SURGERY      colon resection    WA INSJ TUNNELED CTR VAD W/SUBQ PORT AGE 5 YR/> N/A 6/26/2018    Procedure: INSERTION VENOUS PORT ( PORT-A-CATH) IR;  Surgeon: Milan Chung DO;  [facility];  Service: Interventional Radiology    WA MASTECTOMY, MODIFIED RADICAL Left 5/15/2018    Procedure: BREAST MODIFIED RADICAL MASTECTOMY;  Surgeon: Zak Quinteros MD;  Location: [facility];  Service: Surgical Oncology     Family History   Problem Relation Age of Onset    No Known Problems Mother     No Known Problems Father      Social History     Social History    Marital status: /Civil Union     Spouse name: N/A    Number of children: N/A    Years of education: N/A     Occupational History    Not on file       Social History Main Topics    Smoking status: Never Smoker    Smokeless tobacco: Never Used    Alcohol use No    Drug use: No    Sexual activity: Yes     Partners: Female     Birth control/ protection: Female Sterilization     Other Topics Concern    Not on file     Social History Narrative    No narrative on file       Current Outpatient Prescriptions:     metoclopramide (REGLAN) 10 mg tablet, Take 1 tablet (10 mg total) by mouth 4 (four) times a day as needed (nausea), Disp: 30 tablet, Rfl: 1    naproxen sodium (ALEVE) 220 MG tablet, Take 660 mg by mouth daily as needed for mild pain, Disp: , Rfl:     ondansetron (ZOFRAN) 4 mg tablet, Take 1 tablet (4 mg total) by mouth every 6 (six) hours as needed for nausea or vomiting, Disp: 30 tablet, Rfl: 1    potassium chloride (K-DUR,KLOR-CON) 20 mEq tablet, Take 1 tablet (20 mEq total) by mouth daily, Disp: 90 tablet, Rfl: 2    SUMAtriptan (IMITREX) 100 mg tablet, Take 100 mg by mouth once as needed for migraine, Disp: , Rfl:     verapamil (CALAN-SR) 240 mg CR tablet, Take 240 mg by mouth 2 (two) times a day, Disp: , Rfl:   No current facility-administered medications for this visit       Facility-Administered Medications Ordered in Other Visits:     alteplase (CATHFLO) injection 2 mg, 2 mg, Intracatheter, PRN, Heather Park MD    [START ON 9/6/2018] alteplase (CATHFLO) injection 2 mg, 2 mg, Intracatheter, PRN, Heather Park MD  [facility]  [START ON 9/6/2018] dexamethasone (DECADRON) 10 mg in sodium chloride 0 9 % 50 mL IVPB, 10 mg, Intravenous, Once, Heather Park MD  [facility]  [START ON 9/6/2018] diphenhydrAMINE (BENADRYL) 25 mg in sodium chloride 0 9 % 50 mL IVPB, 25 mg, Intravenous, Once, Heather Park MD  [facility]  [START ON 9/6/2018] famotidine (PEPCID) 20 mg in sodium chloride 0 9 % 52 mL IVPB, 20 mg, Intravenous, Once, Heather Park MD  [facility]  [START ON 9/6/2018] heparin lock flush 100 units/mL injection 300 Units, 300 Units, Intracatheter, PRN, Heather Park MD  [facility]  [START ON 9/6/2018] PACLitaxel (TAXOL) 139 mg in sodium chloride 0 9 % 250 mL chemo IVPB, 139 mg, Intravenous, Once, Heather Park MD  [facility]  [START ON 9/6/2018] sodium chloride 0 9 % infusion, 20 mL/hr, Intravenous, Continuous, Heather Park MD  No Known Allergies    Physical Exam:     Vitals:    09/05/18 1434   BP: 108/78   Pulse: (!) 110   Resp: 18   Temp: 99 °F (37 2 °C)     Physical Exam   Constitutional: She is oriented to person, place, and time  She appears well-developed  HENT:   Head: Normocephalic and atraumatic  Mouth/Throat: Oropharynx is clear and moist    Eyes: EOM are normal  Pupils are equal, round, and reactive to light  Neck: Normal range of motion  Neck supple  No JVD present  No tracheal deviation present  No thyromegaly present  Cardiovascular: Normal rate, regular rhythm, normal heart sounds and intact distal pulses  Exam reveals no gallop and no friction rub  No murmur heard  Pulmonary/Chest: Effort normal and breath sounds normal  No respiratory distress  She has no wheezes  She has no rales  Examination of the left mastectomy site shows no worrisome skin findings dominant masses or axillary adenopathy  Examination of the right breast in the sitting and supine position demonstrate no skin changes nipple discharge or dominant masses  Abdominal: Soft  She exhibits no distension and no mass  There is no hepatomegaly  There is no tenderness  There is no rebound and no guarding  Musculoskeletal: Normal range of motion  She exhibits no edema or tenderness  Lymphadenopathy:     She has no cervical adenopathy  Neurological: She is alert and oriented to person, place, and time  No cranial nerve deficit  Skin: Skin is warm and dry  No rash noted  No erythema  Psychiatric: She has a normal mood and affect  Her behavior is normal    Vitals reviewed  Results:             Advance Care Planning/Advance Directives:  I discussed the disease status, treatment plans and follow-up with the patient

## 2018-09-06 ENCOUNTER — HOSPITAL ENCOUNTER (OUTPATIENT)
Dept: INFUSION CENTER | Facility: CLINIC | Age: 58
Discharge: HOME | End: 2018-09-06
Payer: COMMERCIAL

## 2018-09-06 VITALS
DIASTOLIC BLOOD PRESSURE: 70 MMHG | HEIGHT: 65 IN | TEMPERATURE: 97.8 F | HEART RATE: 90 BPM | BODY MASS INDEX: 23.16 KG/M2 | WEIGHT: 139 LBS | OXYGEN SATURATION: 98 % | RESPIRATION RATE: 16 BRPM | SYSTOLIC BLOOD PRESSURE: 122 MMHG

## 2018-09-06 PROCEDURE — 96413 CHEMO IV INFUSION 1 HR: CPT

## 2018-09-06 PROCEDURE — 96367 TX/PROPH/DG ADDL SEQ IV INF: CPT

## 2018-09-06 RX ADMIN — DIPHENHYDRAMINE HYDROCHLORIDE 25 MG: 50 INJECTION, SOLUTION INTRAMUSCULAR; INTRAVENOUS at 13:09

## 2018-09-06 RX ADMIN — DEXAMETHASONE SODIUM PHOSPHATE 10 MG: 10 INJECTION, SOLUTION INTRAMUSCULAR; INTRAVENOUS at 12:51

## 2018-09-06 RX ADMIN — SODIUM CHLORIDE 20 ML/HR: 0.9 INJECTION, SOLUTION INTRAVENOUS at 12:34

## 2018-09-06 RX ADMIN — FAMOTIDINE 20 MG: 10 INJECTION, SOLUTION INTRAVENOUS at 13:25

## 2018-09-06 RX ADMIN — Medication 300 UNITS: at 14:50

## 2018-09-06 RX ADMIN — PACLITAXEL 139 MG: 6 INJECTION, SOLUTION INTRAVENOUS at 13:42

## 2018-09-06 NOTE — PROGRESS NOTES
Pt to clinic for taxol infusion, pt offers no complaints at this time, aware of next appointment, declines avs

## 2018-09-11 ENCOUNTER — OFFICE VISIT (OUTPATIENT)
Dept: HEMATOLOGY ONCOLOGY | Facility: CLINIC | Age: 58
End: 2018-09-11
Payer: COMMERCIAL

## 2018-09-11 VITALS
HEIGHT: 65 IN | DIASTOLIC BLOOD PRESSURE: 92 MMHG | RESPIRATION RATE: 18 BRPM | OXYGEN SATURATION: 98 % | HEART RATE: 145 BPM | SYSTOLIC BLOOD PRESSURE: 138 MMHG | TEMPERATURE: 99.8 F | BODY MASS INDEX: 22.82 KG/M2 | WEIGHT: 137 LBS

## 2018-09-11 DIAGNOSIS — Z17.0 MALIGNANT NEOPLASM OF OVERLAPPING SITES OF LEFT BREAST IN FEMALE, ESTROGEN RECEPTOR POSITIVE (HCC): Primary | ICD-10-CM

## 2018-09-11 DIAGNOSIS — C50.812 MALIGNANT NEOPLASM OF OVERLAPPING SITES OF LEFT BREAST IN FEMALE, ESTROGEN RECEPTOR POSITIVE (HCC): Primary | ICD-10-CM

## 2018-09-11 PROCEDURE — 99214 OFFICE O/P EST MOD 30 MIN: CPT | Performed by: INTERNAL MEDICINE

## 2018-09-11 NOTE — PROGRESS NOTES
Hematology / Oncology Outpatient Follow Up Note    Jerry Samaniego 63 y o  female :1960 MELY:[id number]         Date:  2018    Assessment / Plan:  A 50-year-old postmenopausal woman with stage IIIA left breast cancer, grade 2, invasive lobular histology, ER 75% positive, ND 15% positive, HER2 negative disease   She underwent mastectomy and axillary lymph node dissection, resulting in IBAN   She had 6 positive lymph nodes as well as 8 1 cm of primary tumor size   She had more than expected chemotherapy-induced anemia, when she was on [facility] for which she required to have 2 unit of transfusion  Currently, she is on weekly paclitaxel with excellent tolerance  She is going to have 3rd cycle of weekly paclitaxel, tomorrow  I recommended her to continue with weekly treatment until November 15 2018  I will see her again in a month for routine follow-up  She is in agreement with my recommendations  Subjective:      HPI:  A 50-year-old postmenopausal woman who palpated lump in the left breast which she brought to medical attention  Meka Armijo was found to have radiographic abnormality for which she underwent left breast biopsy in April 3, 2018   Biopsy showed invasive lobular carcinoma, grade 2   This was ER % positive, ND 15% positive, HER2 negative disease   She had led to be large breast tumor as well as clinically positive lymph nodes  Therefore, she underwent mastectomy and axillary lymph node dissection by Dr Jagdish Shah in May 15, 2018   She had 8 1 cm of invasive ductal carcinoma, grade 2   Lymphovascular invasion was present   6/24 axillary lymph nodes were positive for metastatic disease with largest tumor measuring 1 9 cm with extranodal extension   She did not have reconstruction  [place] F[place] to the surgery, PET-CT scan showed no evidence of distant metastasis   She presents today to discuss adjuvant treatment options   She has no breast related symptomatology   Her weight has been stable   She has no complaint of bone pain   She denied any respiratory symptoms   Her performance status is normal  She is a lifetime never smoker         Interval History:   A 61-year-old postmenopausal woman with stage IIIA left breast cancer, grade 2, invasive lobular histology, ER 75% positive, HI 15% positive, HER2 negative disease   She underwent mastectomy and axillary lymph node dissection, resulting in IBAN   She had 6 positive lymph nodes as well as 8 1 cm of primary tumor size   She is currently on adjuvant chemotherapy  When she was on dose dense AC, she had more than expected chemotherapy-induced anemia  She her lowest hemoglobin was 5 5  Therefore, she was given 2 units of transfusion, after the 4th cycle of AC  First cycle of weekly paclitaxel was delayed by 1 week  She is going to have 3rd cycle of weekly paclitaxel, tomorrow  She is tolerating paclitaxel very well with no nausea vomiting  However, she has significant fatigue  She has no neuropathy  She denied any skin rash  She has no respiratory symptoms  Her performance status is 0 to 1/4 on the ECOG scale                                                                Objective:      Primary Diagnosis:     Left breast cancer, stage IIIA (pT3, pN2, M0) grade 2, invasive lobular histology, ER 75% positive, HI 15% positive, HER2 negative disease   6 positive lymph nodes   Diagnosed in May 2018      Cancer Staging:  Cancer Staging  Malignant neoplasm of overlapping sites of left breast in female, estrogen receptor positive ([facility] [address] )  Staging form: Breast, AJCC 8th Edition  - Clinical stage from 4/10/2018: Stage IIA (cT2, cN1(f), cM0, G2, ER: Positive, HI: Positive, HER2: Negative) - Signed by Phoenix Burrell MD on 4/10/2018           Previous Hematologic/ Oncologic Treatment:            Current Hematologic/ Oncologic Treatment:       Adjuvant chemotherapy with dose dense AC x4 followed by weekly paclitaxel x12 3rd cycle of [facility] pac[facility]  Disease Status:      IBAN status post mastectomy and axillary lymph node dissection      Test Results:     Pathology:     8 1 cm of invasive lobular carcinoma, grade 2   Lymphovascular invasion was present  6/24 axillary lymph nodes were positive for metastatic disease with largest tumor 1 9 cm   Extranodal extension was positive   ER 75 to 100% positive, ND 15 % positive, HER2 negative disease   Stage IIIA (pT3, pN2, M0)     Radiology:     PET-CT scan showed hypermetabolic left breast mass with no evidence of distant metastasis      MUGA scan in June 2018 showed ejection fraction 62%      Laboratory:     See below      Physical Exam:        General Appearance:    Alert, oriented          Eyes:    PERRL   Ears:    Normal external ear canals, both ears   Nose:   Nares normal, septum midline   Throat:   Mucosa moist  Pharynx without injection  Neck:   Supple         Lungs:     Clear to auscultation bilaterally   Chest Wall:    No tenderness or deformity    Heart:    Regular rate and rhythm         Abdomen:     Soft, non-tender, bowel sounds +, no organomegaly               Extremities:   Extremities no cyanosis or edema         Skin:   no rash or icterus  Lymph nodes:   Cervical, supraclavicular, and axillary nodes normal   Neurologic:   CNII-XII intact, normal strength, sensation and reflexes     Throughout             Breast exam: Status post left mastectomy without reconstruction   No palpable abnormality in her left chest wall   Minor redness around the mastectomy scar   Right breast exam is negative  ROS: Review of Systems   Constitutional:        Fatigue   All other systems reviewed and are negative  Imaging: No results found        Labs:   Lab Results   Component Value Date    WBC 2 98 (L) 09/04/2018    HGB 9 1 (L) 09/04/2018    HCT 27 7 (L) 09/04/2018    MCV 99 (H) 09/04/2018     09/04/2018     Lab Results   Component Value Date     09/04/2018    K 3 9 09/04/2018  09/04/2018    CO2 25 09/04/2018    BUN 16 09/04/2018    CREATININE 1 23 09/04/2018    GLUF 73 06/27/2018    CALCIUM 9 3 09/04/2018    AST 29 09/04/2018    ALT 31 09/04/2018    ALKPHOS 54 09/04/2018    EGFR 49 09/04/2018         Current Medications: Reviewed  Allergies: Reviewed  PMH/FH/SH:  Reviewed      Vital Sign:    Body surface area is 1 68 meters squared      Wt Readings from Last 3 Encounters:   09/11/18 62 1 kg (137 lb)   09/06/18 63 kg (139 lb)   09/05/18 64 9 kg (143 lb)        Temp Readings from Last 3 Encounters:   09/11/18 99 8 °F (37 7 °C) (Tympanic)   09/06/18 97 8 °F (36 6 °C) (Oral)   09/05/18 99 °F (37 2 °C)        BP Readings from Last 3 Encounters:   09/11/18 138/92   09/06/18 122/70   09/05/18 108/78         Pulse Readings from Last 3 Encounters:   09/11/18 (!) 145   09/06/18 90   09/05/18 (!) 110     @LASTSAO2(3)@

## 2018-09-12 ENCOUNTER — HOSPITAL ENCOUNTER (OUTPATIENT)
Dept: INFUSION CENTER | Facility: CLINIC | Age: 58
Discharge: HOME | End: 2018-09-12
Payer: COMMERCIAL

## 2018-09-12 DIAGNOSIS — Z17.0 MALIGNANT NEOPLASM OF OVERLAPPING SITES OF LEFT BREAST IN FEMALE, ESTROGEN RECEPTOR POSITIVE (HCC): ICD-10-CM

## 2018-09-12 DIAGNOSIS — C50.812 MALIGNANT NEOPLASM OF OVERLAPPING SITES OF LEFT BREAST IN FEMALE, ESTROGEN RECEPTOR POSITIVE (HCC): ICD-10-CM

## 2018-09-12 LAB
ALBUMIN SERPL BCP-MCNC: 3.4 G/DL (ref 3.5–5)
ALP SERPL-CCNC: 58 U/L (ref 46–116)
ALT SERPL W P-5'-P-CCNC: 23 U/L (ref 12–78)
ANION GAP SERPL CALCULATED.3IONS-SCNC: 9 MMOL/L (ref 4–13)
AST SERPL W P-5'-P-CCNC: 22 U/L (ref 5–45)
BASOPHILS # BLD AUTO: 0.03 THOUSANDS/ΜL (ref 0–0.1)
BASOPHILS NFR BLD AUTO: 2 % (ref 0–1)
BILIRUB SERPL-MCNC: 0.5 MG/DL (ref 0.2–1)
BUN SERPL-MCNC: 11 MG/DL (ref 5–25)
CALCIUM SERPL-MCNC: 9.3 MG/DL (ref 8.3–10.1)
CHLORIDE SERPL-SCNC: 102 MMOL/L (ref 100–108)
CO2 SERPL-SCNC: 25 MMOL/L (ref 21–32)
CREAT SERPL-MCNC: 1.18 MG/DL (ref 0.6–1.3)
EOSINOPHIL # BLD AUTO: 0.18 THOUSAND/ΜL (ref 0–0.61)
EOSINOPHIL NFR BLD AUTO: 11 % (ref 0–6)
ERYTHROCYTE [DISTWIDTH] IN BLOOD BY AUTOMATED COUNT: 18.2 % (ref 11.6–15.1)
GFR SERPL CREATININE-BSD FRML MDRD: 51 ML/MIN/1.73SQ M
GLUCOSE SERPL-MCNC: 98 MG/DL (ref 65–140)
HCT VFR BLD AUTO: 23.8 % (ref 34.8–46.1)
HGB BLD-MCNC: 7.9 G/DL (ref 11.5–15.4)
IMM GRANULOCYTES # BLD AUTO: 0.01 THOUSAND/UL (ref 0–0.2)
IMM GRANULOCYTES NFR BLD AUTO: 1 % (ref 0–2)
LYMPHOCYTES # BLD AUTO: 0.22 THOUSANDS/ΜL (ref 0.6–4.47)
LYMPHOCYTES NFR BLD AUTO: 14 % (ref 14–44)
MCH RBC QN AUTO: 33.6 PG (ref 26.8–34.3)
MCHC RBC AUTO-ENTMCNC: 33.2 G/DL (ref 31.4–37.4)
MCV RBC AUTO: 101 FL (ref 82–98)
MONOCYTES # BLD AUTO: 0.13 THOUSAND/ΜL (ref 0.17–1.22)
MONOCYTES NFR BLD AUTO: 8 % (ref 4–12)
NEUTROPHILS # BLD AUTO: 1.03 THOUSANDS/ΜL (ref 1.85–7.62)
NEUTS SEG NFR BLD AUTO: 64 % (ref 43–75)
NRBC BLD AUTO-RTO: 0 /100 WBCS
PLATELET # BLD AUTO: 185 THOUSANDS/UL (ref 149–390)
PMV BLD AUTO: 10.6 FL (ref 8.9–12.7)
POTASSIUM SERPL-SCNC: 3.8 MMOL/L (ref 3.5–5.3)
PROT SERPL-MCNC: 7 G/DL (ref 6.4–8.2)
RBC # BLD AUTO: 2.35 MILLION/UL (ref 3.81–5.12)
SODIUM SERPL-SCNC: 136 MMOL/L (ref 136–145)
WBC # BLD AUTO: 1.6 THOUSAND/UL (ref 4.31–10.16)

## 2018-09-12 PROCEDURE — 80053 COMPREHEN METABOLIC PANEL: CPT

## 2018-09-12 PROCEDURE — 85025 COMPLETE CBC W/AUTO DIFF WBC: CPT

## 2018-09-12 RX ORDER — SODIUM CHLORIDE 9 MG/ML
20 INJECTION, SOLUTION INTRAVENOUS CONTINUOUS
Status: DISCONTINUED | OUTPATIENT
Start: 2018-09-13 | End: 2018-09-16 | Stop reason: HOSPADM

## 2018-09-12 RX ADMIN — HEPARIN 300 UNITS: 100 SYRINGE at 14:57

## 2018-09-12 NOTE — PROGRESS NOTES
Patient here for labs in prep for treatment tomorrow  She is doing well, offers no c/o and tolerated procedure without incident  She was discharged post and will RTO tomorrow 1200 for Taxol as previously scheduled

## 2018-09-12 NOTE — PLAN OF CARE

## 2018-09-13 ENCOUNTER — HOSPITAL ENCOUNTER (OUTPATIENT)
Dept: INFUSION CENTER | Facility: CLINIC | Age: 58
Discharge: HOME | End: 2018-09-13
Payer: COMMERCIAL

## 2018-09-13 VITALS
BODY MASS INDEX: 22.91 KG/M2 | DIASTOLIC BLOOD PRESSURE: 78 MMHG | WEIGHT: 137.5 LBS | SYSTOLIC BLOOD PRESSURE: 114 MMHG | HEART RATE: 100 BPM | TEMPERATURE: 98.1 F | OXYGEN SATURATION: 97 % | HEIGHT: 65 IN | RESPIRATION RATE: 18 BRPM

## 2018-09-13 PROCEDURE — 96413 CHEMO IV INFUSION 1 HR: CPT

## 2018-09-13 PROCEDURE — 96375 TX/PRO/DX INJ NEW DRUG ADDON: CPT

## 2018-09-13 PROCEDURE — 96367 TX/PROPH/DG ADDL SEQ IV INF: CPT

## 2018-09-13 RX ADMIN — FAMOTIDINE 20 MG: 10 INJECTION, SOLUTION INTRAVENOUS at 13:00

## 2018-09-13 RX ADMIN — SODIUM CHLORIDE 20 ML/HR: 0.9 INJECTION, SOLUTION INTRAVENOUS at 12:02

## 2018-09-13 RX ADMIN — PACLITAXEL 136 MG: 6 INJECTION, SOLUTION INTRAVENOUS at 13:23

## 2018-09-13 RX ADMIN — DEXAMETHASONE SODIUM PHOSPHATE 10 MG: 10 INJECTION, SOLUTION INTRAMUSCULAR; INTRAVENOUS at 12:43

## 2018-09-13 RX ADMIN — Medication 300 UNITS: at 14:29

## 2018-09-13 RX ADMIN — DIPHENHYDRAMINE HYDROCHLORIDE 25 MG: 50 INJECTION, SOLUTION INTRAMUSCULAR; INTRAVENOUS at 12:20

## 2018-09-13 NOTE — PROGRESS NOTES
LATE NOTE:  Pt to clinic for weekly taxol for breast cancer  Orders present on chart ok to tx with HGB 7 9  Also of note is a low ANC that is above parameters to treat  Discussed pancytopenic precautions with patient and her   Also encouraged her to call with any fevers/shaking/chill/signs or symptoms of infection  Treatment completed w/o incident  Pt confirms next appointment

## 2018-09-19 ENCOUNTER — HOSPITAL ENCOUNTER (OUTPATIENT)
Dept: INFUSION CENTER | Facility: CLINIC | Age: 58
Discharge: HOME | End: 2018-09-19
Payer: COMMERCIAL

## 2018-09-19 ENCOUNTER — TELEPHONE (OUTPATIENT)
Dept: HEMATOLOGY ONCOLOGY | Facility: CLINIC | Age: 58
End: 2018-09-19

## 2018-09-19 DIAGNOSIS — Z17.0 MALIGNANT NEOPLASM OF OVERLAPPING SITES OF LEFT BREAST IN FEMALE, ESTROGEN RECEPTOR POSITIVE (HCC): ICD-10-CM

## 2018-09-19 DIAGNOSIS — C50.919 MALIGNANT NEOPLASM OF FEMALE BREAST, UNSPECIFIED ESTROGEN RECEPTOR STATUS, UNSPECIFIED LATERALITY, UNSPECIFIED SITE OF BREAST (HCC): Primary | ICD-10-CM

## 2018-09-19 DIAGNOSIS — C50.812 MALIGNANT NEOPLASM OF OVERLAPPING SITES OF LEFT BREAST IN FEMALE, ESTROGEN RECEPTOR POSITIVE (HCC): ICD-10-CM

## 2018-09-19 LAB
ALBUMIN SERPL BCP-MCNC: 3.3 G/DL (ref 3.5–5)
ALP SERPL-CCNC: 58 U/L (ref 46–116)
ALT SERPL W P-5'-P-CCNC: 22 U/L (ref 12–78)
ANION GAP SERPL CALCULATED.3IONS-SCNC: 10 MMOL/L (ref 4–13)
AST SERPL W P-5'-P-CCNC: 22 U/L (ref 5–45)
BASOPHILS # BLD AUTO: 0.01 THOUSANDS/ΜL (ref 0–0.1)
BASOPHILS NFR BLD AUTO: 1 % (ref 0–1)
BILIRUB SERPL-MCNC: 0.4 MG/DL (ref 0.2–1)
BUN SERPL-MCNC: 7 MG/DL (ref 5–25)
CALCIUM SERPL-MCNC: 8.8 MG/DL (ref 8.3–10.1)
CHLORIDE SERPL-SCNC: 100 MMOL/L (ref 100–108)
CO2 SERPL-SCNC: 23 MMOL/L (ref 21–32)
CREAT SERPL-MCNC: 1.24 MG/DL (ref 0.6–1.3)
EOSINOPHIL # BLD AUTO: 0.07 THOUSAND/ΜL (ref 0–0.61)
EOSINOPHIL NFR BLD AUTO: 5 % (ref 0–6)
ERYTHROCYTE [DISTWIDTH] IN BLOOD BY AUTOMATED COUNT: 18.8 % (ref 11.6–15.1)
GFR SERPL CREATININE-BSD FRML MDRD: 48 ML/MIN/1.73SQ M
GLUCOSE SERPL-MCNC: 100 MG/DL (ref 65–140)
HCT VFR BLD AUTO: 21.7 % (ref 34.8–46.1)
HGB BLD-MCNC: 7.2 G/DL (ref 11.5–15.4)
IMM GRANULOCYTES # BLD AUTO: 0.01 THOUSAND/UL (ref 0–0.2)
IMM GRANULOCYTES NFR BLD AUTO: 1 % (ref 0–2)
LYMPHOCYTES # BLD AUTO: 0.19 THOUSANDS/ΜL (ref 0.6–4.47)
LYMPHOCYTES NFR BLD AUTO: 13 % (ref 14–44)
MCH RBC QN AUTO: 34 PG (ref 26.8–34.3)
MCHC RBC AUTO-ENTMCNC: 33.2 G/DL (ref 31.4–37.4)
MCV RBC AUTO: 102 FL (ref 82–98)
MONOCYTES # BLD AUTO: 0.15 THOUSAND/ΜL (ref 0.17–1.22)
MONOCYTES NFR BLD AUTO: 10 % (ref 4–12)
NEUTROPHILS # BLD AUTO: 1.01 THOUSANDS/ΜL (ref 1.85–7.62)
NEUTS SEG NFR BLD AUTO: 70 % (ref 43–75)
NRBC BLD AUTO-RTO: 0 /100 WBCS
PLATELET # BLD AUTO: 185 THOUSANDS/UL (ref 149–390)
PMV BLD AUTO: 10.9 FL (ref 8.9–12.7)
POTASSIUM SERPL-SCNC: 3.1 MMOL/L (ref 3.5–5.3)
PROT SERPL-MCNC: 6.7 G/DL (ref 6.4–8.2)
RBC # BLD AUTO: 2.12 MILLION/UL (ref 3.81–5.12)
SODIUM SERPL-SCNC: 133 MMOL/L (ref 136–145)
WBC # BLD AUTO: 1.44 THOUSAND/UL (ref 4.31–10.16)

## 2018-09-19 PROCEDURE — 85025 COMPLETE CBC W/AUTO DIFF WBC: CPT

## 2018-09-19 PROCEDURE — 80053 COMPREHEN METABOLIC PANEL: CPT | Performed by: INTERNAL MEDICINE

## 2018-09-19 RX ADMIN — HEPARIN 300 UNITS: 100 SYRINGE at 14:00

## 2018-09-19 NOTE — TELEPHONE ENCOUNTER
[facility] from the lab in [place] called for critical lab result   Otelia Riding WBC was 1 44

## 2018-09-19 NOTE — PROGRESS NOTES
Patient arrived for central line blood work  Offers no complaints  Blood work drawn with port access  Port flushed per protocol and d/c'd  Verified patients appointment time tomorrow   Declined AVS

## 2018-09-20 ENCOUNTER — HOSPITAL ENCOUNTER (OUTPATIENT)
Dept: INFUSION CENTER | Facility: CLINIC | Age: 58
Discharge: HOME | End: 2018-09-20
Payer: COMMERCIAL

## 2018-09-20 VITALS
RESPIRATION RATE: 18 BRPM | HEIGHT: 66 IN | HEART RATE: 96 BPM | SYSTOLIC BLOOD PRESSURE: 104 MMHG | TEMPERATURE: 98.1 F | OXYGEN SATURATION: 99 % | DIASTOLIC BLOOD PRESSURE: 70 MMHG | BODY MASS INDEX: 21.21 KG/M2 | WEIGHT: 132 LBS

## 2018-09-20 PROCEDURE — 96367 TX/PROPH/DG ADDL SEQ IV INF: CPT

## 2018-09-20 PROCEDURE — 96413 CHEMO IV INFUSION 1 HR: CPT

## 2018-09-20 PROCEDURE — 96375 TX/PRO/DX INJ NEW DRUG ADDON: CPT

## 2018-09-20 RX ORDER — SODIUM CHLORIDE 9 MG/ML
20 INJECTION, SOLUTION INTRAVENOUS CONTINUOUS
Status: DISCONTINUED | OUTPATIENT
Start: 2018-09-20 | End: 2018-09-23 | Stop reason: HOSPADM

## 2018-09-20 RX ADMIN — PACLITAXEL 136 MG: 6 INJECTION, SOLUTION INTRAVENOUS at 15:05

## 2018-09-20 RX ADMIN — DEXAMETHASONE SODIUM PHOSPHATE 10 MG: 10 INJECTION, SOLUTION INTRAMUSCULAR; INTRAVENOUS at 14:09

## 2018-09-20 RX ADMIN — Medication 300 UNITS: at 16:15

## 2018-09-20 RX ADMIN — SODIUM CHLORIDE 20 ML/HR: 0.9 INJECTION, SOLUTION INTRAVENOUS at 13:50

## 2018-09-20 RX ADMIN — DIPHENHYDRAMINE HYDROCHLORIDE 25 MG: 50 INJECTION, SOLUTION INTRAMUSCULAR; INTRAVENOUS at 14:24

## 2018-09-20 RX ADMIN — FAMOTIDINE 20 MG: 10 INJECTION, SOLUTION INTRAVENOUS at 14:44

## 2018-09-20 NOTE — PLAN OF CARE
Problem: Potential for Falls  Goal: Patient will remain free of falls  INTERVENTIONS:  - Assess patient frequently for physical needs  -  Identify cognitive and physical deficits and behaviors that affect risk of falls    -  [place] fall precautions as indicated by assessment   - Educate patient/family on patient safety including physical limitations  - Instruct patient to call for assistance with activity based on assessment  - Modify environment to reduce risk of injury  - Consider OT/PT consult to assist with strengthening/mobility   Outcome: Progressing

## 2018-09-20 NOTE — PROGRESS NOTES
Pt offers no c/o --- she feels tired and adds " this is no different than usual" we discussed her low Hgb and low WBC with ANC noted at 1 01   pt verb understanding of my precautions and neutropenic state that warrants preventative measures  Her noted Hgb at 7 2, pt denies SOB, and  no chest pain   I did call and talk with Florence Dudley RN who is covering with DR Eamon Kemp in [address] [place] [address] Dr    Labs reviewed and we will wait for orders to proceed or HOLD todays treatment

## 2018-09-26 ENCOUNTER — TELEPHONE (OUTPATIENT)
Dept: HEMATOLOGY ONCOLOGY | Facility: CLINIC | Age: 58
End: 2018-09-26

## 2018-09-26 ENCOUNTER — HOSPITAL ENCOUNTER (OUTPATIENT)
Dept: INFUSION CENTER | Facility: CLINIC | Age: 58
Discharge: HOME | End: 2018-09-26
Payer: COMMERCIAL

## 2018-09-26 DIAGNOSIS — C50.812 MALIGNANT NEOPLASM OF OVERLAPPING SITES OF LEFT BREAST IN FEMALE, ESTROGEN RECEPTOR POSITIVE (HCC): ICD-10-CM

## 2018-09-26 DIAGNOSIS — C50.919 MALIGNANT NEOPLASM OF FEMALE BREAST, UNSPECIFIED ESTROGEN RECEPTOR STATUS, UNSPECIFIED LATERALITY, UNSPECIFIED SITE OF BREAST (HCC): Primary | ICD-10-CM

## 2018-09-26 DIAGNOSIS — Z17.0 MALIGNANT NEOPLASM OF OVERLAPPING SITES OF LEFT BREAST IN FEMALE, ESTROGEN RECEPTOR POSITIVE (HCC): ICD-10-CM

## 2018-09-26 DIAGNOSIS — C50.919 MALIGNANT NEOPLASM OF FEMALE BREAST, UNSPECIFIED ESTROGEN RECEPTOR STATUS, UNSPECIFIED LATERALITY, UNSPECIFIED SITE OF BREAST (HCC): ICD-10-CM

## 2018-09-26 LAB
ALBUMIN SERPL BCP-MCNC: 3.1 G/DL (ref 3.5–5)
ALP SERPL-CCNC: 57 U/L (ref 46–116)
ALT SERPL W P-5'-P-CCNC: 20 U/L (ref 12–78)
ANION GAP SERPL CALCULATED.3IONS-SCNC: 11 MMOL/L (ref 4–13)
AST SERPL W P-5'-P-CCNC: 21 U/L (ref 5–45)
BASOPHILS # BLD AUTO: 0.02 THOUSANDS/ΜL (ref 0–0.1)
BASOPHILS NFR BLD AUTO: 2 % (ref 0–1)
BILIRUB SERPL-MCNC: 0.4 MG/DL (ref 0.2–1)
BUN SERPL-MCNC: 11 MG/DL (ref 5–25)
CALCIUM SERPL-MCNC: 8.6 MG/DL (ref 8.3–10.1)
CHLORIDE SERPL-SCNC: 104 MMOL/L (ref 100–108)
CO2 SERPL-SCNC: 23 MMOL/L (ref 21–32)
CREAT SERPL-MCNC: 1.05 MG/DL (ref 0.6–1.3)
EOSINOPHIL # BLD AUTO: 0.04 THOUSAND/ΜL (ref 0–0.61)
EOSINOPHIL NFR BLD AUTO: 3 % (ref 0–6)
ERYTHROCYTE [DISTWIDTH] IN BLOOD BY AUTOMATED COUNT: 19.6 % (ref 11.6–15.1)
GFR SERPL CREATININE-BSD FRML MDRD: 59 ML/MIN/1.73SQ M
GLUCOSE SERPL-MCNC: 103 MG/DL (ref 65–140)
HCT VFR BLD AUTO: 21.3 % (ref 34.8–46.1)
HGB BLD-MCNC: 7 G/DL (ref 11.5–15.4)
IMM GRANULOCYTES # BLD AUTO: 0.02 THOUSAND/UL (ref 0–0.2)
IMM GRANULOCYTES NFR BLD AUTO: 2 % (ref 0–2)
LYMPHOCYTES # BLD AUTO: 0.17 THOUSANDS/ΜL (ref 0.6–4.47)
LYMPHOCYTES NFR BLD AUTO: 13 % (ref 14–44)
MCH RBC QN AUTO: 34.3 PG (ref 26.8–34.3)
MCHC RBC AUTO-ENTMCNC: 32.9 G/DL (ref 31.4–37.4)
MCV RBC AUTO: 104 FL (ref 82–98)
MONOCYTES # BLD AUTO: 0.09 THOUSAND/ΜL (ref 0.17–1.22)
MONOCYTES NFR BLD AUTO: 7 % (ref 4–12)
NEUTROPHILS # BLD AUTO: 0.98 THOUSANDS/ΜL (ref 1.85–7.62)
NEUTS SEG NFR BLD AUTO: 73 % (ref 43–75)
NRBC BLD AUTO-RTO: 0 /100 WBCS
PLATELET # BLD AUTO: 140 THOUSANDS/UL (ref 149–390)
PMV BLD AUTO: 11.5 FL (ref 8.9–12.7)
POTASSIUM SERPL-SCNC: 3.7 MMOL/L (ref 3.5–5.3)
PROT SERPL-MCNC: 6.4 G/DL (ref 6.4–8.2)
RBC # BLD AUTO: 2.04 MILLION/UL (ref 3.81–5.12)
SODIUM SERPL-SCNC: 138 MMOL/L (ref 136–145)
WBC # BLD AUTO: 1.32 THOUSAND/UL (ref 4.31–10.16)

## 2018-09-26 PROCEDURE — 85025 COMPLETE CBC W/AUTO DIFF WBC: CPT

## 2018-09-26 PROCEDURE — 80053 COMPREHEN METABOLIC PANEL: CPT

## 2018-09-26 RX ORDER — SODIUM CHLORIDE 9 MG/ML
20 INJECTION, SOLUTION INTRAVENOUS CONTINUOUS
Status: DISCONTINUED | OUTPATIENT
Start: 2018-09-27 | End: 2018-09-30 | Stop reason: HOSPADM

## 2018-09-26 RX ADMIN — HEPARIN 300 UNITS: 100 SYRINGE at 14:47

## 2018-09-26 NOTE — PROGRESS NOTES
Pt to clinic for lab draw from port a cath, pt offers no complaints at this time, aware of next appointment, declines avs

## 2018-09-26 NOTE — TELEPHONE ENCOUNTER
Spoke to patient, advised that her counts are too low for treatment this week  She will check a CBC next week and we will reevaluate  Pt verbalized understanding  Order for tomorrow timed out with Nick Vazquez RN in [facility] infusion

## 2018-09-27 ENCOUNTER — HOSPITAL ENCOUNTER (OUTPATIENT)
Dept: INFUSION CENTER | Facility: CLINIC | Age: 58
Discharge: HOME | End: 2018-09-27

## 2018-10-03 ENCOUNTER — HOSPITAL ENCOUNTER (OUTPATIENT)
Dept: INFUSION CENTER | Facility: CLINIC | Age: 58
Discharge: HOME | End: 2018-10-03
Payer: COMMERCIAL

## 2018-10-03 ENCOUNTER — TELEPHONE (OUTPATIENT)
Dept: HEMATOLOGY ONCOLOGY | Facility: CLINIC | Age: 58
End: 2018-10-03

## 2018-10-03 DIAGNOSIS — C50.919 MALIGNANT NEOPLASM OF FEMALE BREAST, UNSPECIFIED ESTROGEN RECEPTOR STATUS, UNSPECIFIED LATERALITY, UNSPECIFIED SITE OF BREAST (HCC): ICD-10-CM

## 2018-10-03 LAB
ALBUMIN SERPL BCP-MCNC: 3.1 G/DL (ref 3.5–5)
ALP SERPL-CCNC: 61 U/L (ref 46–116)
ALT SERPL W P-5'-P-CCNC: 25 U/L (ref 12–78)
ANION GAP SERPL CALCULATED.3IONS-SCNC: 8 MMOL/L (ref 4–13)
AST SERPL W P-5'-P-CCNC: 27 U/L (ref 5–45)
BASOPHILS # BLD AUTO: 0.01 THOUSANDS/ΜL (ref 0–0.1)
BASOPHILS NFR BLD AUTO: 1 % (ref 0–1)
BILIRUB SERPL-MCNC: 0.3 MG/DL (ref 0.2–1)
BUN SERPL-MCNC: 9 MG/DL (ref 5–25)
CALCIUM SERPL-MCNC: 8.4 MG/DL (ref 8.3–10.1)
CHLORIDE SERPL-SCNC: 105 MMOL/L (ref 100–108)
CO2 SERPL-SCNC: 24 MMOL/L (ref 21–32)
CREAT SERPL-MCNC: 0.98 MG/DL (ref 0.6–1.3)
EOSINOPHIL # BLD AUTO: 0.05 THOUSAND/ΜL (ref 0–0.61)
EOSINOPHIL NFR BLD AUTO: 3 % (ref 0–6)
ERYTHROCYTE [DISTWIDTH] IN BLOOD BY AUTOMATED COUNT: 20.3 % (ref 11.6–15.1)
GFR SERPL CREATININE-BSD FRML MDRD: 64 ML/MIN/1.73SQ M
GLUCOSE SERPL-MCNC: 106 MG/DL (ref 65–140)
HCT VFR BLD AUTO: 24.1 % (ref 34.8–46.1)
HGB BLD-MCNC: 7.7 G/DL (ref 11.5–15.4)
IMM GRANULOCYTES # BLD AUTO: 0.01 THOUSAND/UL (ref 0–0.2)
IMM GRANULOCYTES NFR BLD AUTO: 1 % (ref 0–2)
LYMPHOCYTES # BLD AUTO: 0.21 THOUSANDS/ΜL (ref 0.6–4.47)
LYMPHOCYTES NFR BLD AUTO: 12 % (ref 14–44)
MCH RBC QN AUTO: 34.4 PG (ref 26.8–34.3)
MCHC RBC AUTO-ENTMCNC: 32 G/DL (ref 31.4–37.4)
MCV RBC AUTO: 108 FL (ref 82–98)
MONOCYTES # BLD AUTO: 0.3 THOUSAND/ΜL (ref 0.17–1.22)
MONOCYTES NFR BLD AUTO: 18 % (ref 4–12)
NEUTROPHILS # BLD AUTO: 1.12 THOUSANDS/ΜL (ref 1.85–7.62)
NEUTS SEG NFR BLD AUTO: 65 % (ref 43–75)
NRBC BLD AUTO-RTO: 0 /100 WBCS
PLATELET # BLD AUTO: 198 THOUSANDS/UL (ref 149–390)
PMV BLD AUTO: 10.4 FL (ref 8.9–12.7)
POTASSIUM SERPL-SCNC: 3.7 MMOL/L (ref 3.5–5.3)
PROT SERPL-MCNC: 6.4 G/DL (ref 6.4–8.2)
RBC # BLD AUTO: 2.24 MILLION/UL (ref 3.81–5.12)
SODIUM SERPL-SCNC: 137 MMOL/L (ref 136–145)
WBC # BLD AUTO: 1.7 THOUSAND/UL (ref 4.31–10.16)

## 2018-10-03 PROCEDURE — 85025 COMPLETE CBC W/AUTO DIFF WBC: CPT

## 2018-10-03 PROCEDURE — 80053 COMPREHEN METABOLIC PANEL: CPT | Performed by: INTERNAL MEDICINE

## 2018-10-03 RX ORDER — SODIUM CHLORIDE 9 MG/ML
20 INJECTION, SOLUTION INTRAVENOUS CONTINUOUS
Status: DISCONTINUED | OUTPATIENT
Start: 2018-10-04 | End: 2018-10-07 | Stop reason: HOSPADM

## 2018-10-03 RX ADMIN — HEPARIN 300 UNITS: 100 SYRINGE at 15:01

## 2018-10-03 NOTE — PROGRESS NOTES
Patient is here for labs from her port  She offers no complaints at this time  Labs drawn per Dr's orders   Next appointment confirmed and she declined avs

## 2018-10-04 ENCOUNTER — HOSPITAL ENCOUNTER (OUTPATIENT)
Dept: INFUSION CENTER | Facility: CLINIC | Age: 58
Discharge: HOME | End: 2018-10-04
Payer: COMMERCIAL

## 2018-10-04 ENCOUNTER — TELEPHONE (OUTPATIENT)
Dept: HEMATOLOGY ONCOLOGY | Facility: CLINIC | Age: 58
End: 2018-10-04

## 2018-10-04 VITALS
DIASTOLIC BLOOD PRESSURE: 80 MMHG | OXYGEN SATURATION: 96 % | RESPIRATION RATE: 18 BRPM | HEART RATE: 95 BPM | WEIGHT: 138 LBS | BODY MASS INDEX: 23.56 KG/M2 | SYSTOLIC BLOOD PRESSURE: 112 MMHG | HEIGHT: 64 IN | TEMPERATURE: 97.8 F

## 2018-10-04 PROCEDURE — 96413 CHEMO IV INFUSION 1 HR: CPT

## 2018-10-04 PROCEDURE — 96367 TX/PROPH/DG ADDL SEQ IV INF: CPT

## 2018-10-04 RX ADMIN — DEXAMETHASONE SODIUM PHOSPHATE 10 MG: 10 INJECTION, SOLUTION INTRAMUSCULAR; INTRAVENOUS at 13:20

## 2018-10-04 RX ADMIN — DIPHENHYDRAMINE HYDROCHLORIDE 25 MG: 50 INJECTION, SOLUTION INTRAMUSCULAR; INTRAVENOUS at 13:45

## 2018-10-04 RX ADMIN — Medication 300 UNITS: at 15:40

## 2018-10-04 RX ADMIN — PACLITAXEL 136 MG: 6 INJECTION, SOLUTION INTRAVENOUS at 14:30

## 2018-10-04 RX ADMIN — SODIUM CHLORIDE 20 ML/HR: 0.9 INJECTION, SOLUTION INTRAVENOUS at 12:35

## 2018-10-04 RX ADMIN — FAMOTIDINE 20 MG: 10 INJECTION, SOLUTION INTRAVENOUS at 14:05

## 2018-10-04 NOTE — TELEPHONE ENCOUNTER
Wants to cancel her appt for 10  9 18 for 10 16 18    I want to make sure that it is ok since the pt is on treatment

## 2018-10-04 NOTE — PROGRESS NOTES
Patient to [facility] for Taxol: Offers no complaints at present time: Lab work ( 10/03/18 ) reviewed: Hgb - 7 7: Dr Sherice Pulido office notified: Message left for Leanna Zaragoza RN: Awaiting return call: Right PAC accessed without difficulty: Good blood return noted

## 2018-10-10 ENCOUNTER — HOSPITAL ENCOUNTER (OUTPATIENT)
Dept: INFUSION CENTER | Facility: CLINIC | Age: 58
Discharge: HOME | End: 2018-10-10
Payer: COMMERCIAL

## 2018-10-10 DIAGNOSIS — C50.919 MALIGNANT NEOPLASM OF FEMALE BREAST, UNSPECIFIED ESTROGEN RECEPTOR STATUS, UNSPECIFIED LATERALITY, UNSPECIFIED SITE OF BREAST (HCC): ICD-10-CM

## 2018-10-10 DIAGNOSIS — C50.919 MALIGNANT NEOPLASM OF FEMALE BREAST, UNSPECIFIED ESTROGEN RECEPTOR STATUS, UNSPECIFIED LATERALITY, UNSPECIFIED SITE OF BREAST (HCC): Primary | ICD-10-CM

## 2018-10-10 LAB
ALBUMIN SERPL BCP-MCNC: 3.4 G/DL (ref 3.5–5)
ALP SERPL-CCNC: 61 U/L (ref 46–116)
ALT SERPL W P-5'-P-CCNC: 40 U/L (ref 12–78)
ANION GAP SERPL CALCULATED.3IONS-SCNC: 10 MMOL/L (ref 4–13)
AST SERPL W P-5'-P-CCNC: 64 U/L (ref 5–45)
BASOPHILS # BLD AUTO: 0.03 THOUSANDS/ΜL (ref 0–0.1)
BASOPHILS NFR BLD AUTO: 1 % (ref 0–1)
BILIRUB SERPL-MCNC: 0.4 MG/DL (ref 0.2–1)
BUN SERPL-MCNC: 16 MG/DL (ref 5–25)
CALCIUM SERPL-MCNC: 8.8 MG/DL (ref 8.3–10.1)
CHLORIDE SERPL-SCNC: 96 MMOL/L (ref 100–108)
CO2 SERPL-SCNC: 26 MMOL/L (ref 21–32)
CREAT SERPL-MCNC: 1.03 MG/DL (ref 0.6–1.3)
EOSINOPHIL # BLD AUTO: 0.06 THOUSAND/ΜL (ref 0–0.61)
EOSINOPHIL NFR BLD AUTO: 1 % (ref 0–6)
ERYTHROCYTE [DISTWIDTH] IN BLOOD BY AUTOMATED COUNT: 18.7 % (ref 11.6–15.1)
GFR SERPL CREATININE-BSD FRML MDRD: 60 ML/MIN/1.73SQ M
GLUCOSE SERPL-MCNC: 101 MG/DL (ref 65–140)
HCT VFR BLD AUTO: 24.9 % (ref 34.8–46.1)
HGB BLD-MCNC: 8.2 G/DL (ref 11.5–15.4)
IMM GRANULOCYTES # BLD AUTO: 0.05 THOUSAND/UL (ref 0–0.2)
IMM GRANULOCYTES NFR BLD AUTO: 1 % (ref 0–2)
LYMPHOCYTES # BLD AUTO: 0.32 THOUSANDS/ΜL (ref 0.6–4.47)
LYMPHOCYTES NFR BLD AUTO: 7 % (ref 14–44)
MCH RBC QN AUTO: 34.7 PG (ref 26.8–34.3)
MCHC RBC AUTO-ENTMCNC: 32.9 G/DL (ref 31.4–37.4)
MCV RBC AUTO: 106 FL (ref 82–98)
MONOCYTES # BLD AUTO: 0.21 THOUSAND/ΜL (ref 0.17–1.22)
MONOCYTES NFR BLD AUTO: 5 % (ref 4–12)
NEUTROPHILS # BLD AUTO: 3.77 THOUSANDS/ΜL (ref 1.85–7.62)
NEUTS SEG NFR BLD AUTO: 85 % (ref 43–75)
NRBC BLD AUTO-RTO: 0 /100 WBCS
PLATELET # BLD AUTO: 218 THOUSANDS/UL (ref 149–390)
PMV BLD AUTO: 10.9 FL (ref 8.9–12.7)
POTASSIUM SERPL-SCNC: 4 MMOL/L (ref 3.5–5.3)
PROT SERPL-MCNC: 6.9 G/DL (ref 6.4–8.2)
RBC # BLD AUTO: 2.36 MILLION/UL (ref 3.81–5.12)
SODIUM SERPL-SCNC: 132 MMOL/L (ref 136–145)
WBC # BLD AUTO: 4.44 THOUSAND/UL (ref 4.31–10.16)

## 2018-10-10 PROCEDURE — 80053 COMPREHEN METABOLIC PANEL: CPT

## 2018-10-10 PROCEDURE — 85025 COMPLETE CBC W/AUTO DIFF WBC: CPT

## 2018-10-10 RX ORDER — SODIUM CHLORIDE 9 MG/ML
20 INJECTION, SOLUTION INTRAVENOUS CONTINUOUS
Status: DISCONTINUED | OUTPATIENT
Start: 2018-10-11 | End: 2018-10-14 | Stop reason: HOSPADM

## 2018-10-10 RX ADMIN — HEPARIN 300 UNITS: 100 SYRINGE at 15:01

## 2018-10-10 NOTE — PROGRESS NOTES
Patient is here for labs from her port  She states she had left lower leg and ankle swelling that started on Saturday September 30, 2018  She states she had a negative homans sign  She stated she had some leftover lasix at home and she took it on October 1,2 and 3  Patient is not on lasix and was instructed to not take any medication without notifying her Dr  She also elevated her legs  She stated this did help  There is no swelling today  Yanira Suarez RN was notified  She stated to inform patient of the signs and symptoms of a blood clot and if she does develop any of them to go to the ER  Patient was instructed and verbalized understanding  She also complains of feeling very tired  Labs drawn per dr's orders   Next appointment confirmed and she declined avs

## 2018-10-11 ENCOUNTER — HOSPITAL ENCOUNTER (OUTPATIENT)
Dept: INFUSION CENTER | Facility: CLINIC | Age: 58
Discharge: HOME | End: 2018-10-11
Payer: COMMERCIAL

## 2018-10-11 VITALS
SYSTOLIC BLOOD PRESSURE: 150 MMHG | OXYGEN SATURATION: 99 % | TEMPERATURE: 97.8 F | WEIGHT: 137.5 LBS | DIASTOLIC BLOOD PRESSURE: 86 MMHG | HEIGHT: 65 IN | BODY MASS INDEX: 22.91 KG/M2 | HEART RATE: 94 BPM | RESPIRATION RATE: 16 BRPM

## 2018-10-11 PROCEDURE — 96367 TX/PROPH/DG ADDL SEQ IV INF: CPT

## 2018-10-11 PROCEDURE — 96413 CHEMO IV INFUSION 1 HR: CPT

## 2018-10-11 RX ADMIN — DIPHENHYDRAMINE HYDROCHLORIDE 25 MG: 50 INJECTION, SOLUTION INTRAMUSCULAR; INTRAVENOUS at 14:29

## 2018-10-11 RX ADMIN — DEXAMETHASONE SODIUM PHOSPHATE 10 MG: 10 INJECTION, SOLUTION INTRAMUSCULAR; INTRAVENOUS at 14:07

## 2018-10-11 RX ADMIN — SODIUM CHLORIDE 20 ML/HR: 0.9 INJECTION, SOLUTION INTRAVENOUS at 14:00

## 2018-10-11 RX ADMIN — FAMOTIDINE 20 MG: 10 INJECTION, SOLUTION INTRAVENOUS at 14:52

## 2018-10-11 RX ADMIN — HEPARIN 300 UNITS: 100 SYRINGE at 16:20

## 2018-10-11 RX ADMIN — PACLITAXEL 136 MG: 6 INJECTION, SOLUTION INTRAVENOUS at 15:17

## 2018-10-11 NOTE — PROGRESS NOTES
Patient tolerated treatment today without complications  Verified patients upcoming appointment   Patient declined AVS

## 2018-10-16 ENCOUNTER — OFFICE VISIT (OUTPATIENT)
Dept: HEMATOLOGY ONCOLOGY | Facility: CLINIC | Age: 58
End: 2018-10-16
Payer: COMMERCIAL

## 2018-10-16 VITALS
RESPIRATION RATE: 16 BRPM | SYSTOLIC BLOOD PRESSURE: 152 MMHG | TEMPERATURE: 98.7 F | BODY MASS INDEX: 23.16 KG/M2 | HEART RATE: 83 BPM | OXYGEN SATURATION: 97 % | DIASTOLIC BLOOD PRESSURE: 100 MMHG | HEIGHT: 65 IN | WEIGHT: 139 LBS

## 2018-10-16 DIAGNOSIS — C50.812 MALIGNANT NEOPLASM OF OVERLAPPING SITES OF LEFT BREAST IN FEMALE, ESTROGEN RECEPTOR POSITIVE (HCC): Primary | ICD-10-CM

## 2018-10-16 DIAGNOSIS — Z17.0 MALIGNANT NEOPLASM OF OVERLAPPING SITES OF LEFT BREAST IN FEMALE, ESTROGEN RECEPTOR POSITIVE (HCC): Primary | ICD-10-CM

## 2018-10-16 PROCEDURE — 99214 OFFICE O/P EST MOD 30 MIN: CPT | Performed by: INTERNAL MEDICINE

## 2018-10-16 NOTE — PROGRESS NOTES
Hematology / Oncology Outpatient Follow Up Note    Efrain Llamas 63 y o  female :1960 OCT:[id number]         Date:  10/16/2018    Assessment / Plan:  A 64-year-old postmenopausal woman with stage IIIA left breast cancer, grade 2, invasive lobular histology, ER 75% positive, NE 15% positive, HER2 negative disease   She underwent mastectomy and axillary lymph node dissection, resulting in IBAN   She had 6 positive lymph nodes as well as 8 1 cm of primary tumor size   She had more than expected chemotherapy-induced anemia, when she was on [facility] for which she required to have 2 unit of transfusion  Currently, she is on weekly paclitaxel with excellent tolerance  Clinically, she has no evidence recurrent disease  I recommended her to continue with weekly paclitaxel until 2018  Since 2 weeks ago, she has left lower extremity swelling  I recommended her to have Doppler ultrasound soon  Once obtain report of Doppler ultrasound, I will contact her  If she has DVT, I would consider apixaban  She is in agreement with my recommendation  I will see her again in a month for routine follow-up                                       Subjective:      HPI:  A 64-year-old postmenopausal woman who palpated lump in the left breast which she brought to medical attention  Marie Mishra was found to have radiographic abnormality for which she underwent left breast biopsy in April 3, 2018   Biopsy showed invasive lobular carcinoma, grade 2   This was ER % positive, NE 15% positive, HER2 negative disease   She had led to be large breast tumor as well as clinically positive lymph nodes  Therefore, she underwent mastectomy and axillary lymph node dissection by Dr Zandra Cooks in May 15, 2018   She had 8 1 cm of invasive ductal carcinoma, grade 2   Lymphovascular invasion was present   6/24 axillary lymph nodes were positive for metastatic disease with largest tumor measuring 1 9 cm with extranodal extension   She did not have shahbaz Christianson to the surgery, PET-CT scan showed no evidence of distant metastasis   She presents today to discuss adjuvant treatment options   She has no breast related symptomatology  Sam Matheuskevin weight has been stable   She has no complaint of bone pain   She denied any respiratory symptoms   Her performance status is normal  She is a lifetime never smoker         Interval History:   A 81-year-old postmenopausal woman with stage IIIA left breast cancer, grade 2, invasive lobular histology, ER 75% positive, VT 15% positive, HER2 negative disease   She underwent mastectomy and axillary lymph node dissection, resulting in IBAN   She had 6 positive lymph nodes as well as 8 1 cm of primary tumor size   She is currently on adjuvant chemotherapy  When she was on dose dense AC, she had more than expected chemotherapy-induced anemia  She her lowest hemoglobin was 5 5  Therefore, she was given 2 units of transfusion, after the 4th cycle of AC    she is currently on weekly paclitaxel  She has no nausea vomiting  However, she continued to have some fatigue  Her previous anemia has been slowly improving  Since 2 weeks ago, she she has noticed some swelling in the left lower extremity with mild pain  She denied shortness of breath  She is maintaining her weight  She has mild neuropathy in the bilateral lower extremity  However, she has no numbness in upper extremity  Her performance status is normal                                              Objective:      Primary Diagnosis:     Left breast cancer, stage IIIA (pT3, pN2, M0) grade 2, invasive lobular histology, ER 75% positive, VT 15% positive, HER2 negative disease   6 positive lymph nodes   Diagnosed in May 2018      Cancer Staging:  Cancer Staging  Malignant neoplasm of overlapping sites of left breast in female, estrogen receptor positive ([facility] [address] )  Staging form: Breast, AJCC 8th Edition  - Clinical stage from 4/10/2018: Stage IIA (cT2, cN1(f), cM0, G2, ER: Positive, CA: Positive, HER2: Negative) - Signed by Martha Nascimento MD on 4/10/2018           Previous Hematologic/ Oncologic Treatment:            Current Hematologic/ Oncologic Treatment:       Adjuvant chemotherapy with dose dense AC x4 followed by weekly paclitaxel x12  7th cycle of weekly paclitaxel to be given in October 18, 2018        Disease Status:      IBAN status post mastectomy and axillary lymph node dissection      Test Results:     Pathology:     8 1 cm of invasive lobular carcinoma, grade 2   Lymphovascular invasion was present  6/24 axillary lymph nodes were positive for metastatic disease with largest tumor 1 9 cm   Extranodal extension was positive   ER 75 to 100% positive, CA 15 % positive, HER2 negative disease   Stage IIIA (pT3, pN2, M0)     Radiology:     PET-CT scan showed hypermetabolic left breast mass with no evidence of distant metastasis      MUGA scan in June 2018 showed ejection fraction 62%      Laboratory:     See below      Physical Exam:        General Appearance:    Alert, oriented          Eyes:    PERRL   Ears:    Normal external ear canals, both ears   Nose:   Nares normal, septum midline   Throat:   Mucosa moist  Pharynx without injection  Neck:   Supple         Lungs:     Clear to auscultation bilaterally   Chest Wall:    No tenderness or deformity    Heart:    Regular rate and rhythm         Abdomen:     Soft, non-tender, bowel sounds +, no organomegaly               Extremities:   Extremities no cyanosis, left lower extremity swelling          Skin:   no rash or icterus  Lymph nodes:   Cervical, supraclavicular, and axillary nodes normal   Neurologic:   CNII-XII intact, normal strength, sensation and reflexes     Throughout             Breast exam: Status post left mastectomy without reconstruction   No palpable abnormality in her left chest wall   Minor redness around the mastectomy scar   Right breast exam is negative             ROS: Review of Systems   Constitutional: Fatigue   Neurological:        Mild neuropathy in lower extremity  All other systems reviewed and are negative  Imaging: No results found  Labs:   Lab Results   Component Value Date    WBC 4 44 10/10/2018    HGB 8 2 (L) 10/10/2018    HCT 24 9 (L) 10/10/2018     (H) 10/10/2018     10/10/2018     Lab Results   Component Value Date     (L) 10/10/2018    K 4 0 10/10/2018    CL 96 (L) 10/10/2018    CO2 26 10/10/2018    BUN 16 10/10/2018    CREATININE 1 03 10/10/2018    GLUF 73 06/27/2018    CALCIUM 8 8 10/10/2018    AST 64 (H) 10/10/2018    ALT 40 10/10/2018    ALKPHOS 61 10/10/2018    EGFR 60 10/10/2018         Current Medications: Reviewed  Allergies: Reviewed  PMH/FH/SH:  Reviewed      Vital Sign:    Body surface area is 1 69 meters squared      Wt Readings from Last 3 Encounters:   10/16/18 63 kg (139 lb)   10/11/18 62 4 kg (137 lb 8 oz)   10/04/18 62 6 kg (138 lb)        Temp Readings from Last 3 Encounters:   10/16/18 98 7 °F (37 1 °C) (Tympanic)   10/11/18 97 8 °F (36 6 °C) (Oral)   10/04/18 97 8 °F (36 6 °C) (Oral)        BP Readings from Last 3 Encounters:   10/16/18 152/100   10/11/18 150/86   10/04/18 112/80         Pulse Readings from Last 3 Encounters:   10/16/18 83   10/11/18 94   10/04/18 95     @LASTSAO2(3)@

## 2018-10-17 ENCOUNTER — HOSPITAL ENCOUNTER (OUTPATIENT)
Dept: INFUSION CENTER | Facility: CLINIC | Age: 58
Discharge: HOME | End: 2018-10-17
Payer: COMMERCIAL

## 2018-10-17 DIAGNOSIS — C50.919 MALIGNANT NEOPLASM OF FEMALE BREAST, UNSPECIFIED ESTROGEN RECEPTOR STATUS, UNSPECIFIED LATERALITY, UNSPECIFIED SITE OF BREAST (HCC): ICD-10-CM

## 2018-10-17 LAB
BASOPHILS # BLD AUTO: 0.02 THOUSANDS/ΜL (ref 0–0.1)
BASOPHILS NFR BLD AUTO: 1 % (ref 0–1)
EOSINOPHIL # BLD AUTO: 0.05 THOUSAND/ΜL (ref 0–0.61)
EOSINOPHIL NFR BLD AUTO: 3 % (ref 0–6)
ERYTHROCYTE [DISTWIDTH] IN BLOOD BY AUTOMATED COUNT: 19.2 % (ref 11.6–15.1)
HCT VFR BLD AUTO: 24.5 % (ref 34.8–46.1)
HGB BLD-MCNC: 8 G/DL (ref 11.5–15.4)
IMM GRANULOCYTES # BLD AUTO: 0.03 THOUSAND/UL (ref 0–0.2)
IMM GRANULOCYTES NFR BLD AUTO: 2 % (ref 0–2)
LYMPHOCYTES # BLD AUTO: 0.26 THOUSANDS/ΜL (ref 0.6–4.47)
LYMPHOCYTES NFR BLD AUTO: 14 % (ref 14–44)
MCH RBC QN AUTO: 35.2 PG (ref 26.8–34.3)
MCHC RBC AUTO-ENTMCNC: 32.7 G/DL (ref 31.4–37.4)
MCV RBC AUTO: 108 FL (ref 82–98)
MONOCYTES # BLD AUTO: 0.11 THOUSAND/ΜL (ref 0.17–1.22)
MONOCYTES NFR BLD AUTO: 6 % (ref 4–12)
NEUTROPHILS # BLD AUTO: 1.39 THOUSANDS/ΜL (ref 1.85–7.62)
NEUTS SEG NFR BLD AUTO: 74 % (ref 43–75)
NRBC BLD AUTO-RTO: 0 /100 WBCS
PLATELET # BLD AUTO: 206 THOUSANDS/UL (ref 149–390)
PMV BLD AUTO: 11.3 FL (ref 8.9–12.7)
RBC # BLD AUTO: 2.27 MILLION/UL (ref 3.81–5.12)
WBC # BLD AUTO: 1.86 THOUSAND/UL (ref 4.31–10.16)

## 2018-10-17 PROCEDURE — 85025 COMPLETE CBC W/AUTO DIFF WBC: CPT

## 2018-10-17 RX ORDER — SODIUM CHLORIDE 9 MG/ML
20 INJECTION, SOLUTION INTRAVENOUS CONTINUOUS
Status: DISCONTINUED | OUTPATIENT
Start: 2018-10-18 | End: 2018-10-21 | Stop reason: HOSPADM

## 2018-10-17 RX ADMIN — HEPARIN 300 UNITS: 100 SYRINGE at 15:06

## 2018-10-17 NOTE — PROGRESS NOTES
Pt tolerated lab draw well without any issues  Pt declined avs and is aware of follow up appointments

## 2018-10-18 ENCOUNTER — HOSPITAL ENCOUNTER (OUTPATIENT)
Dept: INFUSION CENTER | Facility: CLINIC | Age: 58
Discharge: HOME | End: 2018-10-18
Payer: COMMERCIAL

## 2018-10-18 VITALS
TEMPERATURE: 97.6 F | BODY MASS INDEX: 23.41 KG/M2 | HEART RATE: 82 BPM | RESPIRATION RATE: 16 BRPM | DIASTOLIC BLOOD PRESSURE: 77 MMHG | HEIGHT: 65 IN | WEIGHT: 140.5 LBS | SYSTOLIC BLOOD PRESSURE: 132 MMHG

## 2018-10-18 PROCEDURE — 96375 TX/PRO/DX INJ NEW DRUG ADDON: CPT

## 2018-10-18 PROCEDURE — 96413 CHEMO IV INFUSION 1 HR: CPT

## 2018-10-18 PROCEDURE — 96367 TX/PROPH/DG ADDL SEQ IV INF: CPT

## 2018-10-18 RX ADMIN — FAMOTIDINE 20 MG: 10 INJECTION, SOLUTION INTRAVENOUS at 14:34

## 2018-10-18 RX ADMIN — PACLITAXEL 134 MG: 6 INJECTION, SOLUTION INTRAVENOUS at 14:54

## 2018-10-18 RX ADMIN — SODIUM CHLORIDE 20 ML/HR: 0.9 INJECTION, SOLUTION INTRAVENOUS at 13:50

## 2018-10-18 RX ADMIN — DIPHENHYDRAMINE HYDROCHLORIDE 25 MG: 50 INJECTION, SOLUTION INTRAMUSCULAR; INTRAVENOUS at 14:16

## 2018-10-18 RX ADMIN — Medication 300 UNITS: at 15:55

## 2018-10-18 RX ADMIN — DEXAMETHASONE SODIUM PHOSPHATE 10 MG: 10 INJECTION, SOLUTION INTRAMUSCULAR; INTRAVENOUS at 14:01

## 2018-10-18 NOTE — PROGRESS NOTES
Pt here for Taxol for breast ca tx  Vitals stable; labs within parameters for treatment  Callbell within reach; will continue to monitor

## 2018-10-19 ENCOUNTER — HOSPITAL ENCOUNTER (OUTPATIENT)
Dept: NON INVASIVE DIAGNOSTICS | Facility: CLINIC | Age: 58
Discharge: HOME | End: 2018-10-19
Payer: COMMERCIAL

## 2018-10-19 ENCOUNTER — TRANSCRIBE ORDERS (OUTPATIENT)
Dept: LAB | Facility: CLINIC | Age: 58
End: 2018-10-19

## 2018-10-19 DIAGNOSIS — C50.812 MALIGNANT NEOPLASM OF OVERLAPPING SITES OF LEFT BREAST IN FEMALE, ESTROGEN RECEPTOR POSITIVE (HCC): ICD-10-CM

## 2018-10-19 DIAGNOSIS — Z17.0 MALIGNANT NEOPLASM OF OVERLAPPING SITES OF LEFT BREAST IN FEMALE, ESTROGEN RECEPTOR POSITIVE (HCC): ICD-10-CM

## 2018-10-19 DIAGNOSIS — I82.432 ACUTE DEEP VEIN THROMBOSIS (DVT) OF POPLITEAL VEIN OF LEFT LOWER EXTREMITY (HCC): Primary | ICD-10-CM

## 2018-10-19 DIAGNOSIS — M79.89 LEFT LEG SWELLING: ICD-10-CM

## 2018-10-19 PROBLEM — I82.402 ACUTE DEEP VEIN THROMBOSIS (DVT) OF LEFT LOWER EXTREMITY (HCC): Status: ACTIVE | Noted: 2018-10-19

## 2018-10-19 PROCEDURE — 93970 EXTREMITY STUDY: CPT

## 2018-10-19 NOTE — Clinical Note
Please call patient on Monday re:  DVT  Make sure she is taking eliquis as rx, any issues or concerns

## 2018-10-19 NOTE — PROGRESS NOTES
Received call from [address] at vascular lab at [facility] that this patient of Dr Janette Morales has acute dvt left popliteal vein  Chart reviewed  Patient to start Eliquis 5mg po bid  Sent to her [facility] pharmacy on record  Call if any questions or concerns

## 2018-10-21 PROCEDURE — 93970 EXTREMITY STUDY: CPT | Performed by: SURGERY

## 2018-10-22 ENCOUNTER — TELEPHONE (OUTPATIENT)
Dept: HEMATOLOGY ONCOLOGY | Facility: CLINIC | Age: 58
End: 2018-10-22

## 2018-10-22 NOTE — TELEPHONE ENCOUNTER
Left message for patient  Message sent by Nafisa Johnston PA-C to verify that patient started taking Eloquis 5mg BID for clot  I left message for patient asking whether she picked up the medication, and if she is taking it  Asked for a call back when she got the message  Phone number provided

## 2018-10-24 ENCOUNTER — HOSPITAL ENCOUNTER (OUTPATIENT)
Dept: INFUSION CENTER | Facility: CLINIC | Age: 58
Discharge: HOME | End: 2018-10-24
Payer: COMMERCIAL

## 2018-10-24 DIAGNOSIS — Z17.0 MALIGNANT NEOPLASM OF OVERLAPPING SITES OF LEFT BREAST IN FEMALE, ESTROGEN RECEPTOR POSITIVE (HCC): ICD-10-CM

## 2018-10-24 DIAGNOSIS — C50.812 MALIGNANT NEOPLASM OF OVERLAPPING SITES OF LEFT BREAST IN FEMALE, ESTROGEN RECEPTOR POSITIVE (HCC): ICD-10-CM

## 2018-10-24 LAB
ALBUMIN SERPL BCP-MCNC: 3.6 G/DL (ref 3.5–5)
ALP SERPL-CCNC: 66 U/L (ref 46–116)
ALT SERPL W P-5'-P-CCNC: 27 U/L (ref 12–78)
ANION GAP SERPL CALCULATED.3IONS-SCNC: 10 MMOL/L (ref 4–13)
AST SERPL W P-5'-P-CCNC: 20 U/L (ref 5–45)
BASOPHILS # BLD AUTO: 0.02 THOUSANDS/ΜL (ref 0–0.1)
BASOPHILS NFR BLD AUTO: 1 % (ref 0–1)
BILIRUB SERPL-MCNC: 0.4 MG/DL (ref 0.2–1)
BUN SERPL-MCNC: 15 MG/DL (ref 5–25)
CALCIUM SERPL-MCNC: 9.3 MG/DL (ref 8.3–10.1)
CHLORIDE SERPL-SCNC: 102 MMOL/L (ref 100–108)
CO2 SERPL-SCNC: 25 MMOL/L (ref 21–32)
CREAT SERPL-MCNC: 1.25 MG/DL (ref 0.6–1.3)
EOSINOPHIL # BLD AUTO: 0.03 THOUSAND/ΜL (ref 0–0.61)
EOSINOPHIL NFR BLD AUTO: 1 % (ref 0–6)
ERYTHROCYTE [DISTWIDTH] IN BLOOD BY AUTOMATED COUNT: 18.4 % (ref 11.6–15.1)
GFR SERPL CREATININE-BSD FRML MDRD: 48 ML/MIN/1.73SQ M
GLUCOSE SERPL-MCNC: 100 MG/DL (ref 65–140)
HCT VFR BLD AUTO: 26.5 % (ref 34.8–46.1)
HGB BLD-MCNC: 8.5 G/DL (ref 11.5–15.4)
IMM GRANULOCYTES # BLD AUTO: 0.01 THOUSAND/UL (ref 0–0.2)
IMM GRANULOCYTES NFR BLD AUTO: 0 % (ref 0–2)
LYMPHOCYTES # BLD AUTO: 0.24 THOUSANDS/ΜL (ref 0.6–4.47)
LYMPHOCYTES NFR BLD AUTO: 10 % (ref 14–44)
MCH RBC QN AUTO: 35.4 PG (ref 26.8–34.3)
MCHC RBC AUTO-ENTMCNC: 32.1 G/DL (ref 31.4–37.4)
MCV RBC AUTO: 110 FL (ref 82–98)
MONOCYTES # BLD AUTO: 0.17 THOUSAND/ΜL (ref 0.17–1.22)
MONOCYTES NFR BLD AUTO: 7 % (ref 4–12)
NEUTROPHILS # BLD AUTO: 2 THOUSANDS/ΜL (ref 1.85–7.62)
NEUTS SEG NFR BLD AUTO: 81 % (ref 43–75)
NRBC BLD AUTO-RTO: 0 /100 WBCS
PLATELET # BLD AUTO: 257 THOUSANDS/UL (ref 149–390)
PMV BLD AUTO: 10.5 FL (ref 8.9–12.7)
POTASSIUM SERPL-SCNC: 3.8 MMOL/L (ref 3.5–5.3)
PROT SERPL-MCNC: 6.9 G/DL (ref 6.4–8.2)
RBC # BLD AUTO: 2.4 MILLION/UL (ref 3.81–5.12)
SODIUM SERPL-SCNC: 137 MMOL/L (ref 136–145)
WBC # BLD AUTO: 2.47 THOUSAND/UL (ref 4.31–10.16)

## 2018-10-24 PROCEDURE — 80053 COMPREHEN METABOLIC PANEL: CPT

## 2018-10-24 PROCEDURE — 85025 COMPLETE CBC W/AUTO DIFF WBC: CPT

## 2018-10-24 RX ORDER — SODIUM CHLORIDE 9 MG/ML
20 INJECTION, SOLUTION INTRAVENOUS CONTINUOUS
Status: DISCONTINUED | OUTPATIENT
Start: 2018-10-25 | End: 2018-10-28 | Stop reason: HOSPADM

## 2018-10-24 RX ORDER — ESCITALOPRAM OXALATE 10 MG/1
10 TABLET ORAL DAILY
Refills: 0 | Status: ON HOLD | COMMUNITY
Start: 2018-10-16 | End: 2019-03-04 | Stop reason: ALTCHOICE

## 2018-10-24 RX ORDER — VERAPAMIL HYDROCHLORIDE 240 MG/1
240 CAPSULE, EXTENDED RELEASE ORAL DAILY
Refills: 0 | COMMUNITY
Start: 2018-10-16 | End: 2022-03-02 | Stop reason: HOSPADM

## 2018-10-24 RX ORDER — LORAZEPAM 1 MG/1
1 TABLET ORAL 2 TIMES DAILY PRN
Refills: 0 | COMMUNITY
Start: 2018-10-16 | End: 2019-12-03 | Stop reason: ALTCHOICE

## 2018-10-24 RX ADMIN — Medication 300 UNITS: at 14:50

## 2018-10-24 NOTE — PROGRESS NOTES
Patient to [facility] for Lab testing / Catheter maintenance: Offers no complaints at present time: Right PAC accessed without difficulty: Good blood return noted: Labs drawn per MD order

## 2018-10-25 ENCOUNTER — HOSPITAL ENCOUNTER (OUTPATIENT)
Dept: INFUSION CENTER | Facility: CLINIC | Age: 58
Discharge: HOME | End: 2018-10-25
Payer: COMMERCIAL

## 2018-10-25 VITALS
RESPIRATION RATE: 18 BRPM | DIASTOLIC BLOOD PRESSURE: 82 MMHG | WEIGHT: 138.01 LBS | TEMPERATURE: 97.1 F | BODY MASS INDEX: 22.18 KG/M2 | HEART RATE: 113 BPM | SYSTOLIC BLOOD PRESSURE: 140 MMHG | HEIGHT: 66 IN

## 2018-10-25 PROCEDURE — 96367 TX/PROPH/DG ADDL SEQ IV INF: CPT

## 2018-10-25 PROCEDURE — 96413 CHEMO IV INFUSION 1 HR: CPT

## 2018-10-25 RX ADMIN — HEPARIN 300 UNITS: 100 SYRINGE at 15:50

## 2018-10-25 RX ADMIN — DIPHENHYDRAMINE HYDROCHLORIDE 25 MG: 50 INJECTION, SOLUTION INTRAMUSCULAR; INTRAVENOUS at 14:12

## 2018-10-25 RX ADMIN — DEXAMETHASONE SODIUM PHOSPHATE 10 MG: 10 INJECTION, SOLUTION INTRAMUSCULAR; INTRAVENOUS at 13:53

## 2018-10-25 RX ADMIN — PACLITAXEL 134 MG: 6 INJECTION, SOLUTION INTRAVENOUS at 14:51

## 2018-10-25 RX ADMIN — FAMOTIDINE 20 MG: 10 INJECTION, SOLUTION INTRAVENOUS at 14:30

## 2018-10-25 RX ADMIN — SODIUM CHLORIDE 20 ML/HR: 0.9 INJECTION, SOLUTION INTRAVENOUS at 13:36

## 2018-10-25 NOTE — PROGRESS NOTES
LATE NOTE: Pt to clinic for weekly taxol for breast cancer  She is feeling well and reports fatigue as her only complaint  Therapy given as per orders without incident  Pt confirms next appointment

## 2018-10-31 ENCOUNTER — TELEPHONE (OUTPATIENT)
Dept: HEMATOLOGY ONCOLOGY | Facility: CLINIC | Age: 58
End: 2018-10-31

## 2018-10-31 ENCOUNTER — HOSPITAL ENCOUNTER (OUTPATIENT)
Dept: INFUSION CENTER | Facility: CLINIC | Age: 58
Discharge: HOME | End: 2018-10-31
Payer: COMMERCIAL

## 2018-10-31 DIAGNOSIS — C50.919 MALIGNANT NEOPLASM OF FEMALE BREAST, UNSPECIFIED ESTROGEN RECEPTOR STATUS, UNSPECIFIED LATERALITY, UNSPECIFIED SITE OF BREAST (HCC): ICD-10-CM

## 2018-10-31 LAB
BASOPHILS # BLD AUTO: 0.02 THOUSANDS/ΜL (ref 0–0.1)
BASOPHILS NFR BLD AUTO: 1 % (ref 0–1)
EOSINOPHIL # BLD AUTO: 0.05 THOUSAND/ΜL (ref 0–0.61)
EOSINOPHIL NFR BLD AUTO: 3 % (ref 0–6)
ERYTHROCYTE [DISTWIDTH] IN BLOOD BY AUTOMATED COUNT: 17.8 % (ref 11.6–15.1)
HCT VFR BLD AUTO: 26.1 % (ref 34.8–46.1)
HGB BLD-MCNC: 8.5 G/DL (ref 11.5–15.4)
IMM GRANULOCYTES # BLD AUTO: 0.01 THOUSAND/UL (ref 0–0.2)
IMM GRANULOCYTES NFR BLD AUTO: 1 % (ref 0–2)
LYMPHOCYTES # BLD AUTO: 0.22 THOUSANDS/ΜL (ref 0.6–4.47)
LYMPHOCYTES NFR BLD AUTO: 13 % (ref 14–44)
MCH RBC QN AUTO: 35.9 PG (ref 26.8–34.3)
MCHC RBC AUTO-ENTMCNC: 32.6 G/DL (ref 31.4–37.4)
MCV RBC AUTO: 110 FL (ref 82–98)
MONOCYTES # BLD AUTO: 0.11 THOUSAND/ΜL (ref 0.17–1.22)
MONOCYTES NFR BLD AUTO: 7 % (ref 4–12)
NEUTROPHILS # BLD AUTO: 1.24 THOUSANDS/ΜL (ref 1.85–7.62)
NEUTS SEG NFR BLD AUTO: 75 % (ref 43–75)
NRBC BLD AUTO-RTO: 0 /100 WBCS
PLATELET # BLD AUTO: 225 THOUSANDS/UL (ref 149–390)
PMV BLD AUTO: 10.6 FL (ref 8.9–12.7)
RBC # BLD AUTO: 2.37 MILLION/UL (ref 3.81–5.12)
WBC # BLD AUTO: 1.65 THOUSAND/UL (ref 4.31–10.16)

## 2018-10-31 PROCEDURE — 85025 COMPLETE CBC W/AUTO DIFF WBC: CPT

## 2018-10-31 RX ORDER — SODIUM CHLORIDE 9 MG/ML
20 INJECTION, SOLUTION INTRAVENOUS CONTINUOUS
Status: DISCONTINUED | OUTPATIENT
Start: 2018-11-01 | End: 2018-11-04 | Stop reason: HOSPADM

## 2018-10-31 RX ADMIN — HEPARIN 300 UNITS: 100 SYRINGE at 15:02

## 2018-10-31 NOTE — PROGRESS NOTES
Patient arrived for central line blood work  Offers no complaints  Blood work drawn with port access  Port flushed per protocol and d/c'd  Verified patient upcoming appointment   Patient declined AVS

## 2018-11-01 ENCOUNTER — HOSPITAL ENCOUNTER (OUTPATIENT)
Dept: INFUSION CENTER | Facility: CLINIC | Age: 58
Discharge: HOME | End: 2018-11-01
Payer: COMMERCIAL

## 2018-11-01 VITALS
WEIGHT: 137 LBS | HEART RATE: 107 BPM | OXYGEN SATURATION: 98 % | HEIGHT: 64 IN | TEMPERATURE: 98.1 F | SYSTOLIC BLOOD PRESSURE: 131 MMHG | BODY MASS INDEX: 23.39 KG/M2 | DIASTOLIC BLOOD PRESSURE: 71 MMHG | RESPIRATION RATE: 16 BRPM

## 2018-11-01 PROCEDURE — 96413 CHEMO IV INFUSION 1 HR: CPT

## 2018-11-01 PROCEDURE — 96367 TX/PROPH/DG ADDL SEQ IV INF: CPT

## 2018-11-01 RX ADMIN — FAMOTIDINE 20 MG: 10 INJECTION, SOLUTION INTRAVENOUS at 14:25

## 2018-11-01 RX ADMIN — PACLITAXEL 134 MG: 6 INJECTION, SOLUTION INTRAVENOUS at 14:45

## 2018-11-01 RX ADMIN — DEXAMETHASONE SODIUM PHOSPHATE 10 MG: 10 INJECTION, SOLUTION INTRAMUSCULAR; INTRAVENOUS at 13:35

## 2018-11-01 RX ADMIN — SODIUM CHLORIDE 20 ML/HR: 0.9 INJECTION, SOLUTION INTRAVENOUS at 13:10

## 2018-11-01 RX ADMIN — DIPHENHYDRAMINE HYDROCHLORIDE 25 MG: 50 INJECTION, SOLUTION INTRAMUSCULAR; INTRAVENOUS at 14:00

## 2018-11-01 RX ADMIN — HEPARIN 300 UNITS: 100 SYRINGE at 15:55

## 2018-11-01 NOTE — PROGRESS NOTES
Patient to [facility] for Paclitaxel: Offers no complaints at present time: Lab work ( 10/31/18 ) reviewed:  Within parameters to treat: Right PAC accessed without difficulty: Good blood return noted

## 2018-11-07 ENCOUNTER — TELEPHONE (OUTPATIENT)
Dept: HEMATOLOGY ONCOLOGY | Facility: CLINIC | Age: 58
End: 2018-11-07

## 2018-11-07 ENCOUNTER — DOCUMENTATION (OUTPATIENT)
Dept: HEMATOLOGY ONCOLOGY | Facility: CLINIC | Age: 58
End: 2018-11-07

## 2018-11-07 ENCOUNTER — HOSPITAL ENCOUNTER (OUTPATIENT)
Dept: INFUSION CENTER | Facility: CLINIC | Age: 58
Discharge: HOME | End: 2018-11-07
Payer: COMMERCIAL

## 2018-11-07 DIAGNOSIS — C50.919 MALIGNANT NEOPLASM OF FEMALE BREAST, UNSPECIFIED ESTROGEN RECEPTOR STATUS, UNSPECIFIED LATERALITY, UNSPECIFIED SITE OF BREAST (HCC): ICD-10-CM

## 2018-11-07 LAB
ANISOCYTOSIS BLD QL SMEAR: PRESENT
BASOPHILS # BLD MANUAL: 0 THOUSAND/UL (ref 0–0.1)
BASOPHILS NFR MAR MANUAL: 0 % (ref 0–1)
EOSINOPHIL # BLD MANUAL: 0.02 THOUSAND/UL (ref 0–0.4)
EOSINOPHIL NFR BLD MANUAL: 4 % (ref 0–6)
ERYTHROCYTE [DISTWIDTH] IN BLOOD BY AUTOMATED COUNT: 17.4 % (ref 11.6–15.1)
HCT VFR BLD AUTO: 25.6 % (ref 34.8–46.1)
HGB BLD-MCNC: 8.4 G/DL (ref 11.5–15.4)
LYMPHOCYTES # BLD AUTO: 0.2 THOUSAND/UL (ref 0.6–4.47)
LYMPHOCYTES # BLD AUTO: 34 % (ref 14–44)
MCH RBC QN AUTO: 35.9 PG (ref 26.8–34.3)
MCHC RBC AUTO-ENTMCNC: 32.8 G/DL (ref 31.4–37.4)
MCV RBC AUTO: 109 FL (ref 82–98)
MONOCYTES # BLD AUTO: 0.02 THOUSAND/UL (ref 0–1.22)
MONOCYTES NFR BLD: 4 % (ref 4–12)
MYELOCYTES NFR BLD MANUAL: 2 % (ref 0–1)
NEUTROPHILS # BLD MANUAL: 0.32 THOUSAND/UL (ref 1.85–7.62)
NEUTS BAND NFR BLD MANUAL: 4 % (ref 0–8)
NEUTS SEG NFR BLD AUTO: 52 % (ref 43–75)
NRBC BLD AUTO-RTO: 0 /100 WBCS
PLATELET # BLD AUTO: 186 THOUSANDS/UL (ref 149–390)
PLATELET BLD QL SMEAR: ADEQUATE
PMV BLD AUTO: 10.9 FL (ref 8.9–12.7)
RBC # BLD AUTO: 2.34 MILLION/UL (ref 3.81–5.12)
TOTAL CELLS COUNTED SPEC: 50
WBC # BLD AUTO: 0.58 THOUSAND/UL (ref 4.31–10.16)

## 2018-11-07 PROCEDURE — 85027 COMPLETE CBC AUTOMATED: CPT

## 2018-11-07 PROCEDURE — 85007 BL SMEAR W/DIFF WBC COUNT: CPT

## 2018-11-07 RX ADMIN — HEPARIN 300 UNITS: 100 SYRINGE at 14:58

## 2018-11-07 NOTE — PLAN OF CARE
Problem: PAIN - ADULT  Goal: Verbalizes/displays adequate comfort level or baseline comfort level  Interventions:  - Encourage patient to monitor pain and request assistance  - Assess pain using appropriate pain scale  - Administer analgesics based on type and severity of pain and evaluate response  - Implement non-pharmacological measures as appropriate and evaluate response  - Consider cultural and social influences on pain and pain management  - Notify physician/advanced practitioner if interventions unsuccessful or patient reports new pain  Outcome: Progressing      Problem: INFECTION - ADULT  Goal: Absence or prevention of progression during hospitalization  INTERVENTIONS:  - Assess and monitor for signs and symptoms of infection  - Monitor lab/diagnostic results  - Monitor all insertion sites, i e  indwelling lines, tubes, and drains  - Monitor endotracheal (as able) and nasal secretions for changes in amount and color  - [place] appropriate cooling/warming therapies per order  - Administer medications as ordered  - Instruct and encourage patient and family to use good hand hygiene technique  - Identify and instruct in appropriate isolation precautions for identified infection/condition  Outcome: Progressing      Problem: SAFETY ADULT  Goal: Patient will remain free of falls  INTERVENTIONS:  - Assess patient frequently for physical needs  -  Identify cognitive and physical deficits and behaviors that affect risk of falls    -  [place] fall precautions as indicated by assessment   - Educate patient/family on patient safety including physical limitations  - Instruct patient to call for assistance with activity based on assessment  - Modify environment to reduce risk of injury  - Consider OT/PT consult to assist with strengthening/mobility  Outcome: Progressing

## 2018-11-07 NOTE — TELEPHONE ENCOUNTER
Informed patient that chemo will be held tomorrow (11/8) due to WBC of 0 58    Pt informed to get CBC next week    Pt verbalized understanding

## 2018-11-07 NOTE — PROGRESS NOTES
Timeout done on 11/7/2018 at 1605  Treatment to be held tomorrow 11/8/2018 due to WBC of 0 58  Order timed out  Patient notified

## 2018-11-07 NOTE — PROGRESS NOTES
Patient here for weekly CBC and tolerated lab draw via port without incident, patient discharged post and will RTO tomorrow for next cycle chemotherapy  She offers no c/o

## 2018-11-14 ENCOUNTER — TELEPHONE (OUTPATIENT)
Dept: HEMATOLOGY ONCOLOGY | Facility: CLINIC | Age: 58
End: 2018-11-14

## 2018-11-14 ENCOUNTER — HOSPITAL ENCOUNTER (OUTPATIENT)
Dept: INFUSION CENTER | Facility: CLINIC | Age: 58
Discharge: HOME | End: 2018-11-14
Payer: COMMERCIAL

## 2018-11-14 DIAGNOSIS — C50.919 MALIGNANT NEOPLASM OF FEMALE BREAST, UNSPECIFIED ESTROGEN RECEPTOR STATUS, UNSPECIFIED LATERALITY, UNSPECIFIED SITE OF BREAST (HCC): ICD-10-CM

## 2018-11-14 DIAGNOSIS — C50.919 MALIGNANT NEOPLASM OF FEMALE BREAST, UNSPECIFIED ESTROGEN RECEPTOR STATUS, UNSPECIFIED LATERALITY, UNSPECIFIED SITE OF BREAST (HCC): Primary | ICD-10-CM

## 2018-11-14 LAB
ANISOCYTOSIS BLD QL SMEAR: PRESENT
BASO STIPL BLD QL SMEAR: PRESENT
BASOPHILS # BLD MANUAL: 0.01 THOUSAND/UL (ref 0–0.1)
BASOPHILS NFR MAR MANUAL: 1 % (ref 0–1)
EOSINOPHIL # BLD MANUAL: 0.03 THOUSAND/UL (ref 0–0.4)
EOSINOPHIL NFR BLD MANUAL: 2 % (ref 0–6)
ERYTHROCYTE [DISTWIDTH] IN BLOOD BY AUTOMATED COUNT: 18.6 % (ref 11.6–15.1)
HCT VFR BLD AUTO: 28.1 % (ref 34.8–46.1)
HGB BLD-MCNC: 9.6 G/DL (ref 11.5–15.4)
HYPERCHROMIA BLD QL SMEAR: PRESENT
LG PLATELETS BLD QL SMEAR: PRESENT
LYMPHOCYTES # BLD AUTO: 0.54 THOUSAND/UL (ref 0.6–4.47)
LYMPHOCYTES # BLD AUTO: 40 % (ref 14–44)
MCH RBC QN AUTO: 37.1 PG (ref 26.8–34.3)
MCHC RBC AUTO-ENTMCNC: 34.2 G/DL (ref 31.4–37.4)
MCV RBC AUTO: 109 FL (ref 82–98)
MONOCYTES # BLD AUTO: 0.07 THOUSAND/UL (ref 0–1.22)
MONOCYTES NFR BLD: 5 % (ref 4–12)
NEUTROPHILS # BLD MANUAL: 0.67 THOUSAND/UL (ref 1.85–7.62)
NEUTS BAND NFR BLD MANUAL: 2 % (ref 0–8)
NEUTS SEG NFR BLD AUTO: 47 % (ref 43–75)
NRBC BLD AUTO-RTO: 0 /100 WBCS
PLATELET # BLD AUTO: 283 THOUSANDS/UL (ref 149–390)
PLATELET BLD QL SMEAR: ADEQUATE
PMV BLD AUTO: 10.6 FL (ref 8.9–12.7)
POIKILOCYTOSIS BLD QL SMEAR: PRESENT
POLYCHROMASIA BLD QL SMEAR: PRESENT
RBC # BLD AUTO: 2.59 MILLION/UL (ref 3.81–5.12)
SCHISTOCYTES BLD QL SMEAR: PRESENT
TOTAL CELLS COUNTED SPEC: 100
VARIANT LYMPHS # BLD AUTO: 3 %
WBC # BLD AUTO: 1.36 THOUSAND/UL (ref 4.31–10.16)

## 2018-11-14 PROCEDURE — 85007 BL SMEAR W/DIFF WBC COUNT: CPT | Performed by: INTERNAL MEDICINE

## 2018-11-14 PROCEDURE — 85027 COMPLETE CBC AUTOMATED: CPT | Performed by: INTERNAL MEDICINE

## 2018-11-14 RX ADMIN — HEPARIN 300 UNITS: 100 SYRINGE at 14:46

## 2018-11-14 NOTE — TELEPHONE ENCOUNTER
Spoke with patient  Advised her WBC and ANC were too low for treatment tomorrow  Advised her to check CBC in 1 week  Pt verbalized understanding

## 2018-11-15 ENCOUNTER — HOSPITAL ENCOUNTER (OUTPATIENT)
Dept: INFUSION CENTER | Facility: CLINIC | Age: 58
Discharge: HOME | End: 2018-11-15

## 2018-11-15 ENCOUNTER — DOCUMENTATION (OUTPATIENT)
Dept: HEMATOLOGY ONCOLOGY | Facility: CLINIC | Age: 58
End: 2018-11-15

## 2018-11-15 ENCOUNTER — TELEPHONE (OUTPATIENT)
Dept: HEMATOLOGY ONCOLOGY | Facility: CLINIC | Age: 58
End: 2018-11-15

## 2018-11-15 NOTE — TELEPHONE ENCOUNTER
Left message for patient that she is scheduled for chemo next Friday (11/23) at 130 at [address]  As discussed yesterday, I reminded patient to have CBC done on Wednesday (11/21)    Asked for a call back with any questions  Phone number provided

## 2018-11-15 NOTE — PROGRESS NOTES
Time Out    Received from Ryne Davis RN regarding patient Lanette Muhammad    Hold Taxol on Thursday ( 11/15/18 ) secondary to low WBC / Platelet Count  Delay Treatment by 1 week  Repeat CBC next Wednesday ( 11/21/18 )   Next Treatment to be scheduled for Friday ( 11/23/18 )   Awaiting new orders    Pharmacy made aware / Time Out Faxed

## 2018-11-15 NOTE — PROGRESS NOTES
Timeout done on 11/15/18 with [facility] DAVID RN    Holding treatment today (11/15) due to decreased WBC and ANC  Pt scheduled for 11/23/18 at 1:30pm     Pt informed of this appointment   She will have bloodwork done Wednesday 11/21/2018

## 2018-11-20 ENCOUNTER — OFFICE VISIT (OUTPATIENT)
Dept: HEMATOLOGY ONCOLOGY | Facility: CLINIC | Age: 58
End: 2018-11-20
Payer: COMMERCIAL

## 2018-11-20 VITALS
HEART RATE: 94 BPM | TEMPERATURE: 98.7 F | BODY MASS INDEX: 23.73 KG/M2 | WEIGHT: 139 LBS | RESPIRATION RATE: 16 BRPM | HEIGHT: 64 IN | OXYGEN SATURATION: 98 % | SYSTOLIC BLOOD PRESSURE: 122 MMHG | DIASTOLIC BLOOD PRESSURE: 80 MMHG

## 2018-11-20 DIAGNOSIS — I82.90 DEEP VEIN THROMBOSIS (DVT) OF NON-EXTREMITY VEIN, UNSPECIFIED CHRONICITY: Primary | ICD-10-CM

## 2018-11-20 DIAGNOSIS — C50.812 MALIGNANT NEOPLASM OF OVERLAPPING SITES OF LEFT BREAST IN FEMALE, ESTROGEN RECEPTOR POSITIVE (HCC): Primary | ICD-10-CM

## 2018-11-20 DIAGNOSIS — Z17.0 MALIGNANT NEOPLASM OF OVERLAPPING SITES OF LEFT BREAST IN FEMALE, ESTROGEN RECEPTOR POSITIVE (HCC): Primary | ICD-10-CM

## 2018-11-20 PROCEDURE — 99215 OFFICE O/P EST HI 40 MIN: CPT | Performed by: INTERNAL MEDICINE

## 2018-11-20 NOTE — PROGRESS NOTES
Hematology / Oncology Outpatient Follow Up Note    Eddie Doshi 63 y o  female :1960 [facility]:[id number]         Date:  2018    Assessment / Plan:  A 81-year-old postmenopausal woman with stage IIIA left breast cancer, grade 2, invasive lobular histology, ER 75% positive, ND 15% positive, HER2 negative disease   She underwent mastectomy and axillary lymph node dissection, resulting in IBAN   She had 6 positive lymph nodes as well as 8 1 cm of primary tumor size   She had more than expected chemotherapy-induced anemia, when she was on [facility] for which she required to have 2 unit of transfusion   Currently, she is on weekly paclitaxel  She developed acute DVT in left lower extremity for which she is on apixaban  She is to have significant swelling  I recommended her to wear Reji stocking  She has prolonged course of weekly paclitaxel due to the chemotherapy hold with neutropenia  She she is going to have 10 cycle of weekly paclitaxel with reduced dose to 60 mg/m2  She is expected to complete adjuvant chemotherapy in 2018  I will refer her to Dr Nafisa Juarez for postmastectomy radiation therapy  I recommended her to continue with apixaban 5 mg b i d  Humaira Cox I will see her again in end of 2019 to discuss hormonal therapy  She is a candidate for either abemaciclib or everolimus clinical trial   She is in agreement with my recommendations                                                                            Subjective:      HPI:  A 81-year-old postmenopausal woman who palpated lump in the left breast which she brought to medical attention  Rahel Griffith was found to have radiographic abnormality for which she underwent left breast biopsy in April 3, 2018   Biopsy showed invasive lobular carcinoma, grade 2   This was ER % positive, ND 15% positive, HER2 negative disease   She had led to be large breast tumor as well as clinically positive lymph nodes   Therefore, she underwent mastectomy and axillary lymph node dissection by Dr Conchita Chase in May 15, 2018   She had 8 1 cm of invasive ductal carcinoma, grade 2   Lymphovascular invasion was present  6/24 axillary lymph nodes were positive for metastatic disease with largest tumor measuring 1 9 cm with extranodal extension   She did not have reconstruction  Diania Quarry to the surgery, PET-CT scan showed no evidence of distant metastasis   She presents today to discuss adjuvant treatment options   She has no breast related symptomatology  Kerri Edge weight has been stable   She has no complaint of bone pain   She denied any respiratory symptoms   Her performance status is normal  She is a lifetime never smoker         Interval History:   A 55-year-old postmenopausal woman with stage IIIA left breast cancer, grade 2, invasive lobular histology, ER 75% positive, NE 15% positive, HER2 negative disease   She underwent mastectomy and axillary lymph node dissection, resulting in IBAN   She had 6 positive lymph nodes as well as 8 1 cm of primary tumor size   She is currently on adjuvant chemotherapy   When she was on dose dense AC, she had more than expected chemotherapy-induced anemia   She her lowest hemoglobin was 5 5   Therefore, she was given 2 units of transfusion, after the 4th cycle of AC    she is currently on weekly paclitaxel  She has no complaint of nausea vomiting  However, she has significant fatigue  She also has mild to moderate neuropathy  In mid October 2018, she was found to have swelling in the left lower extremity  Doppler study showed occlusive DVT in the left distal lower extremity  Since then, she is on apixaban  She has no bleeding symptoms  She continued to have mild-to-moderate swelling in the left lower extremity  She has no respiratory symptoms  She is maintaining her weight  We held weekly paclitaxel 2 weeks and low because of the neutropenia without any fever  Therefore, her entire treatment has been quite prolonged    Her performance status is 1/4 on the ECOG scale                                                                                             Objective:      Primary Diagnosis:     1  Left breast cancer, stage IIIA (pT3, pN2, M0) grade 2, invasive lobular histology, ER 75% positive, MA 15% positive, HER2 negative disease   6 positive lymph nodes  Diagnosed in May 2018  2    Left lower extremity DVT diagnosed in October 2018      Cancer Staging:  Cancer Staging  Malignant neoplasm of overlapping sites of left breast in female, estrogen receptor positive ([address] )  Staging form: Breast, AJCC 8th Edition  - Clinical stage from 4/10/2018: Stage IIA (cT2, cN1(f), cM0, G2, ER: Positive, MA: Positive, HER2: Negative) - Signed by Leonel Iraheta MD on 4/10/2018           Previous Hematologic/ Oncologic Treatment:            Current Hematologic/ Oncologic Treatment:       Adjuvant chemotherapy with dose dense AC x4 followed by weekly paclitaxel x12  10th cycle of weekly paclitaxel to be given in November 23, 2018        Disease Status:      IBAN status post mastectomy and axillary lymph node dissection      Test Results:     Pathology:     8 1 cm of invasive lobular carcinoma, grade 2   Lymphovascular invasion was present  6/24 axillary lymph nodes were positive for metastatic disease with largest tumor 1 9 cm   Extranodal extension was positive   ER 75 to 100% positive, MA 15 % positive, HER2 negative disease   Stage IIIA (pT3, pN2, M0)     Radiology:     PET-CT scan showed hypermetabolic left breast mass with no evidence of distant metastasis      MUGA scan in June 2018 showed ejection fraction 62%  Doppler study showed acute occlusive left distal lower extremity DVT      Laboratory:     See below      Physical Exam:        General Appearance:    Alert, oriented          Eyes:    PERRL   Ears:    Normal external ear canals, both ears   Nose:   Nares normal, septum midline   Throat:   Mucosa moist  Pharynx without injection      Neck:   Supple         Lungs:     Clear to auscultation bilaterally   Chest Wall:    No tenderness or deformity    Heart:    Regular rate and rhythm         Abdomen:     Soft, non-tender, bowel sounds +, no organomegaly               Extremities:   Extremities no cyanosis, left lower extremity swelling          Skin:   no rash or icterus  Lymph nodes:   Cervical, supraclavicular, and axillary nodes normal   Neurologic:   CNII-XII intact, normal strength, sensation and reflexes     Throughout             Breast exam: Status post left mastectomy without reconstruction   No palpable abnormality in her left chest wall   Minor redness around the mastectomy scar   Right breast exam is negative  ROS: Review of Systems   Constitutional:        Fatigue   Neurological:        Neuropathy  All other systems reviewed and are negative  Imaging: No results found  Labs:   Lab Results   Component Value Date    WBC 1 36 (LL) 11/14/2018    HGB 9 6 (L) 11/14/2018    HCT 28 1 (L) 11/14/2018     (H) 11/14/2018     11/14/2018     Lab Results   Component Value Date    K 3 8 10/24/2018     10/24/2018    CO2 25 10/24/2018    BUN 15 10/24/2018    CREATININE 1 25 10/24/2018    GLUF 73 06/27/2018    CALCIUM 9 3 10/24/2018    AST 20 10/24/2018    ALT 27 10/24/2018    ALKPHOS 66 10/24/2018    EGFR 48 10/24/2018         Current Medications: Reviewed  Allergies: Reviewed  PMH/FH/SH:  Reviewed      Vital Sign:    Body surface area is 1 68 meters squared      Wt Readings from Last 3 Encounters:   11/20/18 63 kg (139 lb)   11/01/18 62 1 kg (137 lb)   10/25/18 62 6 kg (138 lb 0 1 oz)        Temp Readings from Last 3 Encounters:   11/20/18 98 7 °F (37 1 °C) (Tympanic)   11/01/18 98 1 °F (36 7 °C) (Oral)   10/25/18 (!) 97 1 °F (36 2 °C) (Temporal)        BP Readings from Last 3 Encounters:   11/20/18 122/80   11/01/18 131/71   10/25/18 140/82         Pulse Readings from Last 3 Encounters:   11/20/18 94   11/01/18 (!) 107 10/25/18 (!) 113     @LPCCVGC6(5)@

## 2018-11-21 ENCOUNTER — HOSPITAL ENCOUNTER (OUTPATIENT)
Dept: INFUSION CENTER | Facility: CLINIC | Age: 58
Discharge: HOME | End: 2018-11-21
Payer: COMMERCIAL

## 2018-11-21 DIAGNOSIS — Z17.0 MALIGNANT NEOPLASM OF OVERLAPPING SITES OF LEFT BREAST IN FEMALE, ESTROGEN RECEPTOR POSITIVE (HCC): ICD-10-CM

## 2018-11-21 DIAGNOSIS — C50.919 MALIGNANT NEOPLASM OF FEMALE BREAST, UNSPECIFIED ESTROGEN RECEPTOR STATUS, UNSPECIFIED LATERALITY, UNSPECIFIED SITE OF BREAST (HCC): ICD-10-CM

## 2018-11-21 DIAGNOSIS — C50.812 MALIGNANT NEOPLASM OF OVERLAPPING SITES OF LEFT BREAST IN FEMALE, ESTROGEN RECEPTOR POSITIVE (HCC): ICD-10-CM

## 2018-11-21 LAB
ALBUMIN SERPL BCP-MCNC: 3.5 G/DL (ref 3.5–5)
ALP SERPL-CCNC: 81 U/L (ref 46–116)
ALT SERPL W P-5'-P-CCNC: 42 U/L (ref 12–78)
ANION GAP SERPL CALCULATED.3IONS-SCNC: 12 MMOL/L (ref 4–13)
AST SERPL W P-5'-P-CCNC: 60 U/L (ref 5–45)
BASOPHILS # BLD AUTO: 0.05 THOUSANDS/ΜL (ref 0–0.1)
BASOPHILS NFR BLD AUTO: 1 % (ref 0–1)
BILIRUB SERPL-MCNC: 0.6 MG/DL (ref 0.2–1)
BUN SERPL-MCNC: 12 MG/DL (ref 5–25)
CALCIUM SERPL-MCNC: 9 MG/DL (ref 8.3–10.1)
CHLORIDE SERPL-SCNC: 103 MMOL/L (ref 100–108)
CO2 SERPL-SCNC: 23 MMOL/L (ref 21–32)
CREAT SERPL-MCNC: 1.34 MG/DL (ref 0.6–1.3)
EOSINOPHIL # BLD AUTO: 0.1 THOUSAND/ΜL (ref 0–0.61)
EOSINOPHIL NFR BLD AUTO: 2 % (ref 0–6)
ERYTHROCYTE [DISTWIDTH] IN BLOOD BY AUTOMATED COUNT: 20.4 % (ref 11.6–15.1)
GFR SERPL CREATININE-BSD FRML MDRD: 44 ML/MIN/1.73SQ M
GLUCOSE SERPL-MCNC: 89 MG/DL (ref 65–140)
HCT VFR BLD AUTO: 29 % (ref 34.8–46.1)
HGB BLD-MCNC: 9.9 G/DL (ref 11.5–15.4)
IMM GRANULOCYTES # BLD AUTO: 0.02 THOUSAND/UL (ref 0–0.2)
IMM GRANULOCYTES NFR BLD AUTO: 0 % (ref 0–2)
LYMPHOCYTES # BLD AUTO: 0.47 THOUSANDS/ΜL (ref 0.6–4.47)
LYMPHOCYTES NFR BLD AUTO: 10 % (ref 14–44)
MCH RBC QN AUTO: 38.5 PG (ref 26.8–34.3)
MCHC RBC AUTO-ENTMCNC: 34.1 G/DL (ref 31.4–37.4)
MCV RBC AUTO: 113 FL (ref 82–98)
MONOCYTES # BLD AUTO: 0.41 THOUSAND/ΜL (ref 0.17–1.22)
MONOCYTES NFR BLD AUTO: 9 % (ref 4–12)
NEUTROPHILS # BLD AUTO: 3.7 THOUSANDS/ΜL (ref 1.85–7.62)
NEUTS SEG NFR BLD AUTO: 78 % (ref 43–75)
NRBC BLD AUTO-RTO: 0 /100 WBCS
PLATELET # BLD AUTO: 243 THOUSANDS/UL (ref 149–390)
PMV BLD AUTO: 9.7 FL (ref 8.9–12.7)
POTASSIUM SERPL-SCNC: 4.3 MMOL/L (ref 3.5–5.3)
PROT SERPL-MCNC: 6.6 G/DL (ref 6.4–8.2)
RBC # BLD AUTO: 2.57 MILLION/UL (ref 3.81–5.12)
SODIUM SERPL-SCNC: 138 MMOL/L (ref 136–145)
WBC # BLD AUTO: 4.75 THOUSAND/UL (ref 4.31–10.16)

## 2018-11-21 PROCEDURE — 85025 COMPLETE CBC W/AUTO DIFF WBC: CPT

## 2018-11-21 PROCEDURE — 80053 COMPREHEN METABOLIC PANEL: CPT

## 2018-11-21 RX ORDER — SODIUM CHLORIDE 9 MG/ML
20 INJECTION, SOLUTION INTRAVENOUS CONTINUOUS
Status: DISCONTINUED | OUTPATIENT
Start: 2018-11-23 | End: 2018-11-26 | Stop reason: HOSPADM

## 2018-11-21 RX ADMIN — HEPARIN 300 UNITS: 100 SYRINGE at 15:05

## 2018-11-23 ENCOUNTER — HOSPITAL ENCOUNTER (OUTPATIENT)
Dept: INFUSION CENTER | Facility: CLINIC | Age: 58
Discharge: HOME | End: 2018-11-23
Payer: COMMERCIAL

## 2018-11-23 ENCOUNTER — TELEPHONE (OUTPATIENT)
Dept: HEMATOLOGY ONCOLOGY | Facility: CLINIC | Age: 58
End: 2018-11-23

## 2018-11-23 VITALS
BODY MASS INDEX: 23.47 KG/M2 | HEART RATE: 100 BPM | WEIGHT: 137.5 LBS | OXYGEN SATURATION: 98 % | SYSTOLIC BLOOD PRESSURE: 118 MMHG | HEIGHT: 64 IN | DIASTOLIC BLOOD PRESSURE: 72 MMHG | TEMPERATURE: 97.5 F | RESPIRATION RATE: 20 BRPM

## 2018-11-23 DIAGNOSIS — I82.402 ACUTE DEEP VEIN THROMBOSIS (DVT) OF LEFT LOWER EXTREMITY, UNSPECIFIED VEIN (HCC): Primary | ICD-10-CM

## 2018-11-23 PROCEDURE — 96367 TX/PROPH/DG ADDL SEQ IV INF: CPT

## 2018-11-23 PROCEDURE — 96413 CHEMO IV INFUSION 1 HR: CPT

## 2018-11-23 RX ADMIN — DEXAMETHASONE SODIUM PHOSPHATE 10 MG: 10 INJECTION, SOLUTION INTRAMUSCULAR; INTRAVENOUS at 12:45

## 2018-11-23 RX ADMIN — HEPARIN 300 UNITS: 100 SYRINGE at 15:00

## 2018-11-23 RX ADMIN — DIPHENHYDRAMINE HYDROCHLORIDE 25 MG: 50 INJECTION, SOLUTION INTRAMUSCULAR; INTRAVENOUS at 13:10

## 2018-11-23 RX ADMIN — FAMOTIDINE 20 MG: 10 INJECTION, SOLUTION INTRAVENOUS at 13:30

## 2018-11-23 RX ADMIN — SODIUM CHLORIDE 20 ML/HR: 0.9 INJECTION, SOLUTION INTRAVENOUS at 12:35

## 2018-11-23 RX ADMIN — PACLITAXEL 101 MG: 6 INJECTION, SOLUTION INTRAVENOUS at 13:50

## 2018-11-23 NOTE — TELEPHONE ENCOUNTER
Marisabel from pharmacy  called she needs us to send them a script for TEDS Stocking compression 20-30

## 2018-11-26 ENCOUNTER — APPOINTMENT (OUTPATIENT)
Dept: LAB | Facility: CLINIC | Age: 58
End: 2018-11-26
Payer: COMMERCIAL

## 2018-11-26 ENCOUNTER — TELEPHONE (OUTPATIENT)
Dept: HEMATOLOGY ONCOLOGY | Facility: CLINIC | Age: 58
End: 2018-11-26

## 2018-11-26 ENCOUNTER — TRANSCRIBE ORDERS (OUTPATIENT)
Dept: LAB | Facility: CLINIC | Age: 58
End: 2018-11-26

## 2018-11-26 DIAGNOSIS — C50.919 MALIGNANT NEOPLASM OF FEMALE BREAST, UNSPECIFIED ESTROGEN RECEPTOR STATUS, UNSPECIFIED LATERALITY, UNSPECIFIED SITE OF BREAST (HCC): Primary | ICD-10-CM

## 2018-11-26 DIAGNOSIS — C50.919 MALIGNANT NEOPLASM OF FEMALE BREAST, UNSPECIFIED ESTROGEN RECEPTOR STATUS, UNSPECIFIED LATERALITY, UNSPECIFIED SITE OF BREAST (HCC): ICD-10-CM

## 2018-11-26 LAB
ALBUMIN SERPL BCP-MCNC: 4.2 G/DL (ref 3.5–5)
ALP SERPL-CCNC: 83 U/L (ref 46–116)
ALT SERPL W P-5'-P-CCNC: 119 U/L (ref 12–78)
ANION GAP SERPL CALCULATED.3IONS-SCNC: 12 MMOL/L (ref 4–13)
AST SERPL W P-5'-P-CCNC: 168 U/L (ref 5–45)
BACTERIA UR QL AUTO: ABNORMAL /HPF
BASOPHILS # BLD AUTO: 0.03 THOUSANDS/ΜL (ref 0–0.1)
BASOPHILS NFR BLD AUTO: 1 % (ref 0–1)
BILIRUB SERPL-MCNC: 0.8 MG/DL (ref 0.2–1)
BILIRUB UR QL STRIP: NEGATIVE
BUN SERPL-MCNC: 27 MG/DL (ref 5–25)
CALCIUM SERPL-MCNC: 9.8 MG/DL (ref 8.3–10.1)
CHLORIDE SERPL-SCNC: 100 MMOL/L (ref 100–108)
CLARITY UR: ABNORMAL
CO2 SERPL-SCNC: 26 MMOL/L (ref 21–32)
COLOR UR: ABNORMAL
CREAT SERPL-MCNC: 1.18 MG/DL (ref 0.6–1.3)
EOSINOPHIL # BLD AUTO: 0.08 THOUSAND/ΜL (ref 0–0.61)
EOSINOPHIL NFR BLD AUTO: 2 % (ref 0–6)
ERYTHROCYTE [DISTWIDTH] IN BLOOD BY AUTOMATED COUNT: 19.6 % (ref 11.6–15.1)
GFR SERPL CREATININE-BSD FRML MDRD: 51 ML/MIN/1.73SQ M
GLUCOSE P FAST SERPL-MCNC: 114 MG/DL (ref 65–99)
GLUCOSE UR STRIP-MCNC: NEGATIVE MG/DL
HCT VFR BLD AUTO: 32.7 % (ref 34.8–46.1)
HGB BLD-MCNC: 11.1 G/DL (ref 11.5–15.4)
HGB UR QL STRIP.AUTO: ABNORMAL
IMM GRANULOCYTES # BLD AUTO: 0.02 THOUSAND/UL (ref 0–0.2)
IMM GRANULOCYTES NFR BLD AUTO: 0 % (ref 0–2)
KETONES UR STRIP-MCNC: NEGATIVE MG/DL
LEUKOCYTE ESTERASE UR QL STRIP: NEGATIVE
LYMPHOCYTES # BLD AUTO: 0.44 THOUSANDS/ΜL (ref 0.6–4.47)
LYMPHOCYTES NFR BLD AUTO: 9 % (ref 14–44)
MCH RBC QN AUTO: 38.9 PG (ref 26.8–34.3)
MCHC RBC AUTO-ENTMCNC: 33.9 G/DL (ref 31.4–37.4)
MCV RBC AUTO: 115 FL (ref 82–98)
MONOCYTES # BLD AUTO: 0.14 THOUSAND/ΜL (ref 0.17–1.22)
MONOCYTES NFR BLD AUTO: 3 % (ref 4–12)
NEUTROPHILS # BLD AUTO: 4.22 THOUSANDS/ΜL (ref 1.85–7.62)
NEUTS SEG NFR BLD AUTO: 85 % (ref 43–75)
NITRITE UR QL STRIP: NEGATIVE
NON-SQ EPI CELLS URNS QL MICRO: ABNORMAL /HPF
NRBC BLD AUTO-RTO: 0 /100 WBCS
PH UR STRIP.AUTO: 6.5 [PH] (ref 4.5–8)
PLATELET # BLD AUTO: 190 THOUSANDS/UL (ref 149–390)
PMV BLD AUTO: 10.2 FL (ref 8.9–12.7)
POTASSIUM SERPL-SCNC: 4.9 MMOL/L (ref 3.5–5.3)
PROT SERPL-MCNC: 7.8 G/DL (ref 6.4–8.2)
PROT UR STRIP-MCNC: ABNORMAL MG/DL
RBC # BLD AUTO: 2.85 MILLION/UL (ref 3.81–5.12)
RBC #/AREA URNS AUTO: ABNORMAL /HPF
SODIUM SERPL-SCNC: 138 MMOL/L (ref 136–145)
SP GR UR STRIP.AUTO: 1.02 (ref 1–1.03)
UROBILINOGEN UR QL STRIP.AUTO: 0.2 E.U./DL
WBC # BLD AUTO: 4.93 THOUSAND/UL (ref 4.31–10.16)
WBC #/AREA URNS AUTO: ABNORMAL /HPF

## 2018-11-26 PROCEDURE — 85025 COMPLETE CBC W/AUTO DIFF WBC: CPT

## 2018-11-26 PROCEDURE — 80053 COMPREHEN METABOLIC PANEL: CPT

## 2018-11-26 PROCEDURE — 81001 URINALYSIS AUTO W/SCOPE: CPT

## 2018-11-26 PROCEDURE — 36415 COLL VENOUS BLD VENIPUNCTURE: CPT

## 2018-11-26 NOTE — TELEPHONE ENCOUNTER
Spoke with patient that states she has had some small amounts of blood in her urine since last Wednesday, however since her chemotherapy on Friday she states "it looks like she has her period when she pees"  Denies any urinary pain/burning, hesitancy, increased output, but that urine is almost completely red  Encouraged patient to go to ER for evaluation regarding potential concerns of infection versus thrombocytopenia, etc  Patient reviewed she does not want to go to ER  I reviewed my medical advice was to get evaluated at ER since this has been going on since last week, but could place STAT labs/UA for now if she wanted to go and we could review  Patient again stated she does not want to go to ER, but would go now to [facility] for STAT labs  Patient will call me back after having labs drawn to review results/next steps

## 2018-11-26 NOTE — TELEPHONE ENCOUNTER
Patient has had blood in urine since last Wednesday---had chemo on Friday      Please call to discuss  [phone]

## 2018-11-27 ENCOUNTER — TELEPHONE (OUTPATIENT)
Dept: HEMATOLOGY ONCOLOGY | Facility: CLINIC | Age: 58
End: 2018-11-27

## 2018-11-27 DIAGNOSIS — I82.402 ACUTE DEEP VEIN THROMBOSIS (DVT) OF LEFT LOWER EXTREMITY, UNSPECIFIED VEIN (HCC): Primary | ICD-10-CM

## 2018-11-27 NOTE — TELEPHONE ENCOUNTER
Per Dr Claude Junes, patient has Hematuria  Wanted patient to see PCP as this was not related to her breast cancer  I left message to see if she was able to see her PCP yet  Asked for a call back  Phone number provided

## 2018-11-27 NOTE — TELEPHONE ENCOUNTER
Patient seeing her pcp tomorrow  She also wanted to know if she needs to have labs tomorrow  She had them yesterday    Please contact at [phone]

## 2018-11-28 ENCOUNTER — HOSPITAL ENCOUNTER (OUTPATIENT)
Dept: INFUSION CENTER | Facility: CLINIC | Age: 58
Discharge: HOME | End: 2018-11-28

## 2018-11-28 RX ORDER — SODIUM CHLORIDE 9 MG/ML
20 INJECTION, SOLUTION INTRAVENOUS CONTINUOUS
Status: DISCONTINUED | OUTPATIENT
Start: 2018-11-29 | End: 2018-12-02 | Stop reason: HOSPADM

## 2018-11-29 ENCOUNTER — TELEPHONE (OUTPATIENT)
Dept: HEMATOLOGY ONCOLOGY | Facility: CLINIC | Age: 58
End: 2018-11-29

## 2018-11-29 ENCOUNTER — HOSPITAL ENCOUNTER (OUTPATIENT)
Dept: INFUSION CENTER | Facility: CLINIC | Age: 58
Discharge: HOME | End: 2018-11-29
Payer: COMMERCIAL

## 2018-11-29 VITALS
HEIGHT: 65 IN | DIASTOLIC BLOOD PRESSURE: 78 MMHG | BODY MASS INDEX: 22.24 KG/M2 | HEART RATE: 88 BPM | WEIGHT: 133.5 LBS | TEMPERATURE: 98.8 F | SYSTOLIC BLOOD PRESSURE: 147 MMHG | RESPIRATION RATE: 18 BRPM

## 2018-11-29 PROCEDURE — 96413 CHEMO IV INFUSION 1 HR: CPT

## 2018-11-29 PROCEDURE — 96367 TX/PROPH/DG ADDL SEQ IV INF: CPT

## 2018-11-29 RX ADMIN — FAMOTIDINE 20 MG: 10 INJECTION, SOLUTION INTRAVENOUS at 14:18

## 2018-11-29 RX ADMIN — HEPARIN 300 UNITS: 100 SYRINGE at 15:50

## 2018-11-29 RX ADMIN — DEXAMETHASONE SODIUM PHOSPHATE 10 MG: 10 INJECTION, SOLUTION INTRAMUSCULAR; INTRAVENOUS at 13:36

## 2018-11-29 RX ADMIN — DIPHENHYDRAMINE HYDROCHLORIDE 25 MG: 50 INJECTION, SOLUTION INTRAMUSCULAR; INTRAVENOUS at 13:56

## 2018-11-29 RX ADMIN — SODIUM CHLORIDE 20 ML/HR: 0.9 INJECTION, SOLUTION INTRAVENOUS at 13:20

## 2018-11-29 RX ADMIN — PACLITAXEL 101 MG: 6 INJECTION, SOLUTION INTRAVENOUS at 14:42

## 2018-11-29 NOTE — TELEPHONE ENCOUNTER
Emiliano QUIROGA from [facility] called regarding elevated AST and ALT  Also reports PCP thinks pt may have kidney stones and that's what could be causing hematuria  Per Dr Leah Aguila OK to treat with recorded lab results  Asked about hematuria and remains unchanged  Pt for CT tomorrow  Please have pt call office Monday with update on hematuria and hold Eliquis until then

## 2018-11-29 NOTE — PROGRESS NOTES
Notified Stephanie Flores RN in Dr Willingham Ask office about elevated LFT's from blood work drawn 11/26  OK to treat patient today  Dr Vero Lewis requests patient to call office on Monday with update on hematuria  Until then, Eliquis to remain on hold  All reported to patient who verbalized understanding

## 2018-12-02 ENCOUNTER — HOSPITAL ENCOUNTER (EMERGENCY)
Facility: HOSPITAL | Age: 58
Discharge: HOME | End: 2018-12-02
Attending: EMERGENCY MEDICINE | Admitting: EMERGENCY MEDICINE
Payer: COMMERCIAL

## 2018-12-02 VITALS
DIASTOLIC BLOOD PRESSURE: 88 MMHG | HEART RATE: 87 BPM | RESPIRATION RATE: 20 BRPM | SYSTOLIC BLOOD PRESSURE: 153 MMHG | WEIGHT: 136.47 LBS | OXYGEN SATURATION: 98 % | BODY MASS INDEX: 23.01 KG/M2

## 2018-12-02 DIAGNOSIS — I82.432 ACUTE DEEP VEIN THROMBOSIS (DVT) OF POPLITEAL VEIN OF LEFT LOWER EXTREMITY (HCC): ICD-10-CM

## 2018-12-02 DIAGNOSIS — M79.605 ACUTE LEG PAIN, LEFT: Primary | ICD-10-CM

## 2018-12-02 PROCEDURE — 99283 EMERGENCY DEPT VISIT LOW MDM: CPT

## 2018-12-02 RX ORDER — MORPHINE SULFATE 15 MG/1
15 TABLET ORAL EVERY 4 HOURS PRN
Status: DISCONTINUED | OUTPATIENT
Start: 2018-12-02 | End: 2018-12-02 | Stop reason: HOSPADM

## 2018-12-02 RX ORDER — MORPHINE SULFATE 15 MG/1
15 TABLET ORAL EVERY 4 HOURS PRN
Qty: 20 TABLET | Refills: 0 | Status: SHIPPED | OUTPATIENT
Start: 2018-12-02 | End: 2018-12-12

## 2018-12-02 RX ADMIN — MORPHINE SULFATE 15 MG: 15 TABLET ORAL at 15:43

## 2018-12-02 NOTE — DISCHARGE INSTRUCTIONS
Leg Pain   WHAT YOU NEED TO KNOW:   Leg pain may be caused by a variety of health conditions  Your tests did not show any broken bones or blood clots  DISCHARGE INSTRUCTIONS:   Return to the emergency department if:   · You have a fever  · Your leg starts to swell  · Your leg pain gets worse  · You have numbness or tingling in your leg or toes  · You cannot put any weight on or move your leg  Contact your healthcare provider if:   · Your pain does not decrease, even after treatment  · You have questions or concerns about your condition or care  Medicines:   · NSAIDs , such as ibuprofen, help decrease swelling, pain, and fever  This medicine is available with or without a doctor's order  NSAIDs can cause stomach bleeding or kidney problems in certain people  If you take blood thinner medicine, always ask your healthcare provider if NSAIDs are safe for you  Always read the medicine label and follow directions  · Take your medicine as directed  Contact your healthcare provider if you think your medicine is not helping or if you have side effects  Tell him of her if you are allergic to any medicine  Keep a list of the medicines, vitamins, and herbs you take  Include the amounts, and when and why you take them  Bring the list or the pill bottles to follow-up visits  Carry your medicine list with you in case of an emergency  Follow up with your healthcare provider as directed: You may need more tests to find the cause of your leg pain  You may need to see an orthopedic specialist or a physical therapist  Write down your questions so you remember to ask them during your visits  Manage your leg pain:   · Rest  your injured leg so that it can heal  You may need an immobilizer, brace, or splint to limit the movement of your leg  You may need to avoid putting any weight on your leg for at least 48 hours  Return to normal activities as directed      · Ice  the injury for 20 minutes every 4 hours for up to 24 hours, or as directed  Use an ice pack, or put crushed ice in a plastic bag  Cover it with a towel to protect your skin  Ice helps prevent tissue damage and decreases swelling and pain  · Elevate  your injured leg above the level of your heart as often as you can  This will help decrease swelling and pain  If possible, prop your leg on pillows or blankets to keep the area elevated comfortably  · Use assistive devices as directed  You may need to use a cane or crutches  Assistive devices help decrease pain and pressure on your leg when you walk  Ask your healthcare provider for more information about assistive devices and how to use them correctly  · Maintain a healthy weight  Extra body weight can cause pressure and pain in your hip, knee, and ankle joints  Ask your healthcare provider how much you should weigh  Ask him to help you create a weight loss plan if you are overweight  © 2017 [employer] Information is for End User's use only and may not be sold, redistributed or otherwise used for commercial purposes  All illustrations and images included in CareNotes® are the copyrighted property of [employer] , [employer]  or [employer]  The above information is an  only  It is not intended as medical advice for individual conditions or treatments  Talk to your doctor, nurse or pharmacist before following any medical regimen to see if it is safe and effective for you

## 2018-12-02 NOTE — ED PROVIDER NOTES
History  Chief Complaint   Patient presents with    Leg Pain     Pt  presents to the ED with complaints of left leg pain due to a diagnosed DVT  Pt  was diagnosed prior at this facility and started on Eliquis but has just recently stopped taking due to excessive bleeding  Pt  arrives for pain until she can resume treatment  History provided by:  Patient, spouse and medical records  Leg Pain   Location:  Leg  Time since incident:  3 days  Injury: no    Leg location:  L lower leg  Pain details:     Quality:  Aching    Radiates to:  Does not radiate    Severity:  Severe    Onset quality:  Gradual    Duration:  1 week    Timing:  Constant    Progression:  Worsening  Chronicity:  New  Relieved by:  None tried  Worsened by:  Nothing  Ineffective treatments:  None tried  Associated symptoms: no fever    Associated symptoms comment:  No DVT in the lower extremity  , stop taking Eliquis due to hematuria from a bleeding kidney stone  Has worsening pain left lower leg, here for pain controlling medication  Prior to Admission Medications   Prescriptions Last Dose Informant Patient Reported? Taking?    LORazepam (ATIVAN) 1 mg tablet   Yes No   Sig: Take 1 mg by mouth 2 (two) times a day as needed   SUMAtriptan (IMITREX) 100 mg tablet  Self Yes No   Sig: Take 100 mg by mouth once as needed for migraine   apixaban (ELIQUIS) 5 mg   No No   Sig: Take 1 tablet (5 mg total) by mouth 2 (two) times a day   Patient not taking: Reported on 11/29/2018    escitalopram (LEXAPRO) 10 mg tablet   Yes No   Sig: Take 10 mg by mouth daily   metoclopramide (REGLAN) 10 mg tablet   No No   Sig: Take 1 tablet (10 mg total) by mouth 4 (four) times a day as needed (nausea)   naproxen sodium (ALEVE) 220 MG tablet   Yes No   Sig: Take 660 mg by mouth daily as needed for mild pain   ondansetron (ZOFRAN) 4 mg tablet   No No   Sig: Take 1 tablet (4 mg total) by mouth every 6 (six) hours as needed for nausea or vomiting   sertraline (ZOLOFT) 50 mg tablet   Yes No   Sig: Take 50 mg by mouth daily at bedtime   verapamil (CALAN-SR) 240 mg CR tablet  Self Yes No   Sig: Take 240 mg by mouth 2 (two) times a day   verapamil (VERELAN) 240 MG 24 hr capsule   Yes No   Sig: Take 240 mg by mouth daily      Facility-Administered Medications: None       Past Medical History:   Diagnosis Date    Congenital prolapsed rectum     Hypertension     Malignant neoplasm of overlapping sites of left female breast ([address] )     Migraine        Past Surgical History:   Procedure Laterality Date    BREAST BIOPSY      COLON SURGERY      colon resection    LA INSJ TUNNELED CTR VAD W/SUBQ PORT AGE 5 YR/> N/A 6/26/2018    Procedure: INSERTION VENOUS PORT ( PORT-A-CATH) IR;  Surgeon: Lexy Greco DO;  [facility];  Service: Interventional Radiology    LA MASTECTOMY, MODIFIED RADICAL Left 5/15/2018    Procedure: BREAST MODIFIED RADICAL MASTECTOMY;  Surgeon: Alden Allen MD;  [facility];  Service: Surgical Oncology    US GUIDANCE BREAST BIOPSY LEFT EACH ADDITIONAL Left 4/3/2018    US GUIDED BREAST BIOPSY LEFT COMPLETE Left 4/3/2018    US GUIDED BREAST LYMPH NODE BIOPSY LEFT Left 4/3/2018       Family History   Problem Relation Age of Onset    No Known Problems Mother     No Known Problems Father      I have reviewed and agree with the history as documented  Social History   Substance Use Topics    Smoking status: Never Smoker    Smokeless tobacco: Never Used    Alcohol use No        Review of Systems   Constitutional: Negative for fever  Respiratory: Negative for chest tightness and shortness of breath  Cardiovascular: Negative for chest pain  Skin: Negative for rash  Neurological: Negative for dizziness, light-headedness and headaches  Physical Exam  Physical Exam   Constitutional: She appears well-developed  HENT:   Head: Atraumatic  Eyes: Conjunctivae are normal  Right eye exhibits no discharge  Left eye exhibits no discharge   No scleral icterus  Neck: Neck supple  No tracheal deviation present  Pulmonary/Chest: Effort normal  No stridor  No respiratory distress  Musculoskeletal: She exhibits no deformity  Left leg: Moderate swelling compared to right side  , no bony tenderness to tib-fib, ankle bones or foot  No overlying skin changes  ,   Neurological: She is alert  She exhibits normal muscle tone  Coordination normal    Skin: Skin is warm and dry  No rash noted  No erythema  No pallor  Psychiatric: She has a normal mood and affect  Vitals reviewed  Vital Signs  ED Triage Vitals [12/02/18 1459]   Temp Pulse Respirations Blood Pressure SpO2   -- 87 20 153/88 98 %      Temp src Heart Rate Source Patient Position - Orthostatic VS BP Location FiO2 (%)   -- Monitor Lying Right arm --      Pain Score       Worst Possible Pain           Vitals:    12/02/18 1459   BP: 153/88   Pulse: 87   Patient Position - Orthostatic VS: Lying       Visual Acuity      ED Medications  Medications   morphine (MSIR) IR tablet 15 mg (not administered)       Diagnostic Studies  Results Reviewed     None                 No orders to display              Procedures  Procedures       Phone Contacts  ED Phone Contact    ED Course                               MDM  Number of Diagnoses or Management Options  Acute deep vein thrombosis (DVT) of popliteal vein of left lower extremity ([address] ): new and requires workup  Acute leg pain, left: new and requires workup  Diagnosis management comments:  Known breast cancer, has unfortunate complication of DVT and left lower extremity, has worsening pain in the leg here for pain controlling medications  , will prescribe morphine         Amount and/or Complexity of Data Reviewed  Decide to obtain previous medical records or to obtain history from someone other than the patient: yes  Obtain history from someone other than the patient: yes  Review and summarize past medical records: yes      Oj Time    Disposition  Final diagnoses:   Acute leg pain, left   Acute deep vein thrombosis (DVT) of popliteal vein of left lower extremity ([address] )     Time reflects when diagnosis was documented in both MDM as applicable and the Disposition within this note     Time User Action Codes Description Comment    12/2/2018  3:04 PM Marissa, [address] [facility] Acute leg pain, left     12/2/2018  3:05 PM Nery Hays Add [I82 [id number]] Acute deep vein thrombosis (DVT) of popliteal vein of left lower extremity [facility])       ED Disposition     ED Disposition Condition Comment    Discharge  Darrick Rushing discharge to home/self care  Condition at discharge: Good        Follow-up Information    None         Patient's Medications   Discharge Prescriptions    MORPHINE (MSIR) 15 MG TABLET    Take 1 tablet (15 mg total) by mouth every 4 (four) hours as needed for severe pain for up to 10 days Max Daily Amount: 90 mg       Start Date: 12/2/2018 End Date: 12/12/2018       Order Dose: 15 mg       Quantity: 20 tablet    Refills: 0     No discharge procedures on file      ED Provider  Electronically Signed by           Thu Davis DO  12/02/18 [address] [place] [address]   12/02/18 [id number]

## 2018-12-03 ENCOUNTER — TRANSCRIBE ORDERS (OUTPATIENT)
Dept: ADMINISTRATIVE | Facility: HOSPITAL | Age: 58
End: 2018-12-03

## 2018-12-03 DIAGNOSIS — R31.0 GROSS HEMATURIA: Primary | ICD-10-CM

## 2018-12-04 ENCOUNTER — TELEPHONE (OUTPATIENT)
Dept: HEMATOLOGY ONCOLOGY | Facility: CLINIC | Age: 58
End: 2018-12-04

## 2018-12-04 ENCOUNTER — DOCUMENTATION (OUTPATIENT)
Dept: HEMATOLOGY ONCOLOGY | Facility: CLINIC | Age: 58
End: 2018-12-04

## 2018-12-04 RX ORDER — SODIUM CHLORIDE 9 MG/ML
20 INJECTION, SOLUTION INTRAVENOUS CONTINUOUS
Status: DISCONTINUED | OUTPATIENT
Start: 2018-12-06 | End: 2018-12-09 | Stop reason: HOSPADM

## 2018-12-04 NOTE — TELEPHONE ENCOUNTER
Pt indicated that originally [facility] paperwork said she was cleared to go back to work on 11/30  She is just starting to walk and has some other issues  Needs a letter indicating that her leave has been extended through 12/28  Letter is for [employer] number is [phone]  If you can please call patient to let her know when letter has been faxed

## 2018-12-05 ENCOUNTER — HOSPITAL ENCOUNTER (OUTPATIENT)
Dept: INFUSION CENTER | Facility: CLINIC | Age: 58
Discharge: HOME | End: 2018-12-05
Payer: COMMERCIAL

## 2018-12-05 DIAGNOSIS — C50.812 MALIGNANT NEOPLASM OF OVERLAPPING SITES OF LEFT BREAST IN FEMALE, ESTROGEN RECEPTOR POSITIVE (HCC): ICD-10-CM

## 2018-12-05 DIAGNOSIS — Z17.0 MALIGNANT NEOPLASM OF OVERLAPPING SITES OF LEFT BREAST IN FEMALE, ESTROGEN RECEPTOR POSITIVE (HCC): ICD-10-CM

## 2018-12-05 DIAGNOSIS — C50.919 MALIGNANT NEOPLASM OF FEMALE BREAST, UNSPECIFIED ESTROGEN RECEPTOR STATUS, UNSPECIFIED LATERALITY, UNSPECIFIED SITE OF BREAST (HCC): ICD-10-CM

## 2018-12-05 LAB
ALBUMIN SERPL BCP-MCNC: 3.7 G/DL (ref 3.5–5)
ALP SERPL-CCNC: 67 U/L (ref 46–116)
ALT SERPL W P-5'-P-CCNC: 81 U/L (ref 12–78)
ANION GAP SERPL CALCULATED.3IONS-SCNC: 15 MMOL/L (ref 4–13)
AST SERPL W P-5'-P-CCNC: 54 U/L (ref 5–45)
BASOPHILS # BLD AUTO: 0.02 THOUSANDS/ΜL (ref 0–0.1)
BASOPHILS NFR BLD AUTO: 1 % (ref 0–1)
BILIRUB SERPL-MCNC: 0.6 MG/DL (ref 0.2–1)
BUN SERPL-MCNC: 16 MG/DL (ref 5–25)
CALCIUM SERPL-MCNC: 9 MG/DL (ref 8.3–10.1)
CHLORIDE SERPL-SCNC: 99 MMOL/L (ref 100–108)
CO2 SERPL-SCNC: 21 MMOL/L (ref 21–32)
CREAT SERPL-MCNC: 1.06 MG/DL (ref 0.6–1.3)
EOSINOPHIL # BLD AUTO: 0.05 THOUSAND/ΜL (ref 0–0.61)
EOSINOPHIL NFR BLD AUTO: 2 % (ref 0–6)
ERYTHROCYTE [DISTWIDTH] IN BLOOD BY AUTOMATED COUNT: 18.9 % (ref 11.6–15.1)
GFR SERPL CREATININE-BSD FRML MDRD: 58 ML/MIN/1.73SQ M
GLUCOSE SERPL-MCNC: 67 MG/DL (ref 65–140)
HCT VFR BLD AUTO: 26.9 % (ref 34.8–46.1)
HGB BLD-MCNC: 9.2 G/DL (ref 11.5–15.4)
IMM GRANULOCYTES # BLD AUTO: 0.01 THOUSAND/UL (ref 0–0.2)
IMM GRANULOCYTES NFR BLD AUTO: 0 % (ref 0–2)
LYMPHOCYTES # BLD AUTO: 0.21 THOUSANDS/ΜL (ref 0.6–4.47)
LYMPHOCYTES NFR BLD AUTO: 7 % (ref 14–44)
MCH RBC QN AUTO: 39.7 PG (ref 26.8–34.3)
MCHC RBC AUTO-ENTMCNC: 34.2 G/DL (ref 31.4–37.4)
MCV RBC AUTO: 116 FL (ref 82–98)
MONOCYTES # BLD AUTO: 0.19 THOUSAND/ΜL (ref 0.17–1.22)
MONOCYTES NFR BLD AUTO: 6 % (ref 4–12)
NEUTROPHILS # BLD AUTO: 2.63 THOUSANDS/ΜL (ref 1.85–7.62)
NEUTS SEG NFR BLD AUTO: 84 % (ref 43–75)
NRBC BLD AUTO-RTO: 0 /100 WBCS
PLATELET # BLD AUTO: 193 THOUSANDS/UL (ref 149–390)
PMV BLD AUTO: 10.5 FL (ref 8.9–12.7)
POTASSIUM SERPL-SCNC: 4.3 MMOL/L (ref 3.5–5.3)
PROT SERPL-MCNC: 6.8 G/DL (ref 6.4–8.2)
RBC # BLD AUTO: 2.32 MILLION/UL (ref 3.81–5.12)
SODIUM SERPL-SCNC: 135 MMOL/L (ref 136–145)
WBC # BLD AUTO: 3.11 THOUSAND/UL (ref 4.31–10.16)

## 2018-12-05 PROCEDURE — 85025 COMPLETE CBC W/AUTO DIFF WBC: CPT

## 2018-12-05 PROCEDURE — 80053 COMPREHEN METABOLIC PANEL: CPT

## 2018-12-05 RX ADMIN — HEPARIN 300 UNITS: 100 SYRINGE at 14:45

## 2018-12-05 NOTE — PROGRESS NOTES
Pt resting with no complaints  Pt reported having a fall the other day due to leg pain but reports feeling better now  Lacerations noted on pt's lips and face but pt reports they're healing well  Port accessed, labs drawn, port hep-locked and deaccessed without issue  Declined AVS  Pt aware of next appt

## 2018-12-06 ENCOUNTER — HOSPITAL ENCOUNTER (OUTPATIENT)
Dept: INFUSION CENTER | Facility: CLINIC | Age: 58
Discharge: HOME | End: 2018-12-06
Payer: COMMERCIAL

## 2018-12-06 VITALS
RESPIRATION RATE: 18 BRPM | HEART RATE: 84 BPM | BODY MASS INDEX: 22.51 KG/M2 | WEIGHT: 133.5 LBS | SYSTOLIC BLOOD PRESSURE: 115 MMHG | TEMPERATURE: 98.8 F | DIASTOLIC BLOOD PRESSURE: 60 MMHG

## 2018-12-06 PROCEDURE — 96413 CHEMO IV INFUSION 1 HR: CPT

## 2018-12-06 PROCEDURE — 96367 TX/PROPH/DG ADDL SEQ IV INF: CPT

## 2018-12-06 RX ADMIN — DIPHENHYDRAMINE HYDROCHLORIDE 25 MG: 50 INJECTION, SOLUTION INTRAMUSCULAR; INTRAVENOUS at 14:19

## 2018-12-06 RX ADMIN — FAMOTIDINE 20 MG: 10 INJECTION INTRAVENOUS at 14:40

## 2018-12-06 RX ADMIN — PACLITAXEL 101 MG: 6 INJECTION, SOLUTION INTRAVENOUS at 15:02

## 2018-12-06 RX ADMIN — DEXAMETHASONE SODIUM PHOSPHATE 10 MG: 10 INJECTION, SOLUTION INTRAMUSCULAR; INTRAVENOUS at 13:58

## 2018-12-06 RX ADMIN — HEPARIN 300 UNITS: 100 SYRINGE at 16:05

## 2018-12-06 RX ADMIN — SODIUM CHLORIDE 20 ML/HR: 0.9 INJECTION, SOLUTION INTRAVENOUS at 13:55

## 2018-12-06 NOTE — PLAN OF CARE
Problem: Potential for Falls  Goal: Patient will remain free of falls  INTERVENTIONS:  - Assess patient frequently for physical needs  -  Identify cognitive and physical deficits and behaviors that affect risk of falls  -  [place] fall precautions as indicated by assessment   - Educate patient/family on patient safety including physical limitations  - Instruct patient to call for assistance with activity based on assessment  - Modify environment to reduce risk of injury  - Consider OT/PT consult to assist with strengthening/mobility   Outcome: Progressing      Problem: SAFETY ADULT  Goal: Patient will remain free of falls  INTERVENTIONS:  - Assess patient frequently for physical needs  -  Identify cognitive and physical deficits and behaviors that affect risk of falls  -  [place] fall precautions as indicated by assessment   - Educate patient/family on patient safety including physical limitations  - Instruct patient to call for assistance with activity based on assessment  - Modify environment to reduce risk of injury  - Consider OT/PT consult to assist with strengthening/mobility   Outcome: Progressing      Problem: Knowledge Deficit  Goal: Patient/family/caregiver demonstrates understanding of disease process, treatment plan, medications, and discharge instructions  Complete learning assessment and assess knowledge base    Interventions:  - Provide teaching at level of understanding  - Provide teaching via preferred learning methods  Outcome: Progressing

## 2018-12-06 NOTE — PROGRESS NOTES
Patient tolerated treatment today without issue  Patient scheduled port flush appointments before d/c   Patient declined AVS

## 2018-12-10 DIAGNOSIS — I82.412 ACUTE DEEP VEIN THROMBOSIS (DVT) OF FEMORAL VEIN OF LEFT LOWER EXTREMITY (HCC): Primary | ICD-10-CM

## 2018-12-17 ENCOUNTER — HOSPITAL ENCOUNTER (OUTPATIENT)
Dept: CT IMAGING | Facility: HOSPITAL | Age: 58
Discharge: HOME | End: 2018-12-17
Payer: COMMERCIAL

## 2018-12-17 DIAGNOSIS — R31.0 GROSS HEMATURIA: ICD-10-CM

## 2018-12-17 PROCEDURE — 74177 CT ABD & PELVIS W/CONTRAST: CPT

## 2018-12-17 RX ADMIN — IOHEXOL 100 ML: 350 INJECTION, SOLUTION INTRAVENOUS at 18:14

## 2018-12-19 ENCOUNTER — HOSPITAL ENCOUNTER (INPATIENT)
Facility: HOSPITAL | Age: 58
LOS: 2 days | Discharge: HOME | DRG: 598 | End: 2018-12-21
Attending: EMERGENCY MEDICINE | Admitting: INTERNAL MEDICINE
Payer: COMMERCIAL

## 2018-12-19 DIAGNOSIS — N13.30 HYDRONEPHROSIS, UNSPECIFIED HYDRONEPHROSIS TYPE: ICD-10-CM

## 2018-12-19 DIAGNOSIS — R31.9 HEMATURIA: ICD-10-CM

## 2018-12-19 DIAGNOSIS — R53.1 GENERALIZED WEAKNESS: ICD-10-CM

## 2018-12-19 DIAGNOSIS — D64.81 ANEMIA FOLLOWING USE OF CHEMOTHERAPEUTIC DRUG: Primary | ICD-10-CM

## 2018-12-19 PROBLEM — Z86.718 HISTORY OF DVT (DEEP VEIN THROMBOSIS): Status: ACTIVE | Noted: 2018-10-19

## 2018-12-19 LAB
ABO GROUP BLD: NORMAL
ALBUMIN SERPL BCP-MCNC: 2.1 G/DL (ref 3.5–5)
ALP SERPL-CCNC: 149 U/L (ref 46–116)
ALT SERPL W P-5'-P-CCNC: 53 U/L (ref 12–78)
ANION GAP SERPL CALCULATED.3IONS-SCNC: 18 MMOL/L (ref 4–13)
APTT PPP: 38 SECONDS (ref 26–38)
AST SERPL W P-5'-P-CCNC: 123 U/L (ref 5–45)
BASOPHILS # BLD MANUAL: 0.1 THOUSAND/UL (ref 0–0.1)
BASOPHILS NFR MAR MANUAL: 1 % (ref 0–1)
BILIRUB SERPL-MCNC: 1 MG/DL (ref 0.2–1)
BLD GP AB SCN SERPL QL: NEGATIVE
BUN SERPL-MCNC: 23 MG/DL (ref 5–25)
CALCIUM SERPL-MCNC: 9.1 MG/DL (ref 8.3–10.1)
CHLORIDE SERPL-SCNC: 93 MMOL/L (ref 100–108)
CO2 SERPL-SCNC: 20 MMOL/L (ref 21–32)
CREAT SERPL-MCNC: 0.98 MG/DL (ref 0.6–1.3)
DOHLE BOD BLD QL SMEAR: PRESENT
EOSINOPHIL # BLD MANUAL: 0 THOUSAND/UL (ref 0–0.4)
EOSINOPHIL NFR BLD MANUAL: 0 % (ref 0–6)
ERYTHROCYTE [DISTWIDTH] IN BLOOD BY AUTOMATED COUNT: 18.4 % (ref 11.6–15.1)
GFR SERPL CREATININE-BSD FRML MDRD: 64 ML/MIN/1.73SQ M
GLUCOSE SERPL-MCNC: 89 MG/DL (ref 65–140)
HCT VFR BLD AUTO: 12 % (ref 34.8–46.1)
HGB BLD-MCNC: 4 G/DL (ref 11.5–15.4)
HYPERCHROMIA BLD QL SMEAR: PRESENT
INR PPP: 1.32 (ref 0.86–1.17)
LG PLATELETS BLD QL SMEAR: PRESENT
LYMPHOCYTES # BLD AUTO: 0.3 THOUSAND/UL (ref 0.6–4.47)
LYMPHOCYTES # BLD AUTO: 3 % (ref 14–44)
MACROCYTES BLD QL AUTO: PRESENT
MAGNESIUM SERPL-MCNC: 1.6 MG/DL (ref 1.6–2.6)
MCH RBC QN AUTO: 39.2 PG (ref 26.8–34.3)
MCHC RBC AUTO-ENTMCNC: 33.3 G/DL (ref 31.4–37.4)
MCV RBC AUTO: 118 FL (ref 82–98)
MONOCYTES # BLD AUTO: 1.3 THOUSAND/UL (ref 0–1.22)
MONOCYTES NFR BLD: 13 % (ref 4–12)
NEUTROPHILS # BLD MANUAL: 7.52 THOUSAND/UL (ref 1.85–7.62)
NEUTS BAND NFR BLD MANUAL: 12 % (ref 0–8)
NEUTS SEG NFR BLD AUTO: 63 % (ref 43–75)
NRBC BLD AUTO-RTO: 0 /100 WBCS
PLATELET # BLD AUTO: 372 THOUSANDS/UL (ref 149–390)
PLATELET BLD QL SMEAR: ADEQUATE
PMV BLD AUTO: 10.4 FL (ref 8.9–12.7)
POIKILOCYTOSIS BLD QL SMEAR: PRESENT
POTASSIUM SERPL-SCNC: 3.4 MMOL/L (ref 3.5–5.3)
PROT SERPL-MCNC: 6.3 G/DL (ref 6.4–8.2)
PROTHROMBIN TIME: 16 SECONDS (ref 11.8–14.2)
RBC # BLD AUTO: 1.02 MILLION/UL (ref 3.81–5.12)
RH BLD: NEGATIVE
SODIUM SERPL-SCNC: 131 MMOL/L (ref 136–145)
SPECIMEN EXPIRATION DATE: NORMAL
TOTAL CELLS COUNTED SPEC: 100
TOXIC GRANULES BLD QL SMEAR: PRESENT
VARIANT LYMPHS # BLD AUTO: 8 %
WBC # BLD AUTO: 10.03 THOUSAND/UL (ref 4.31–10.16)

## 2018-12-19 PROCEDURE — 82607 VITAMIN B-12: CPT | Performed by: PHYSICIAN ASSISTANT

## 2018-12-19 PROCEDURE — 85610 PROTHROMBIN TIME: CPT | Performed by: EMERGENCY MEDICINE

## 2018-12-19 PROCEDURE — 96360 HYDRATION IV INFUSION INIT: CPT

## 2018-12-19 PROCEDURE — 30233N1 TRANSFUSION OF NONAUTOLOGOUS RED BLOOD CELLS INTO PERIPHERAL VEIN, PERCUTANEOUS APPROACH: ICD-10-PCS | Performed by: EMERGENCY MEDICINE

## 2018-12-19 PROCEDURE — 83540 ASSAY OF IRON: CPT | Performed by: PHYSICIAN ASSISTANT

## 2018-12-19 PROCEDURE — 86901 BLOOD TYPING SEROLOGIC RH(D): CPT | Performed by: EMERGENCY MEDICINE

## 2018-12-19 PROCEDURE — 99223 1ST HOSP IP/OBS HIGH 75: CPT | Performed by: PHYSICIAN ASSISTANT

## 2018-12-19 PROCEDURE — 86920 COMPATIBILITY TEST SPIN: CPT

## 2018-12-19 PROCEDURE — P9040 RBC LEUKOREDUCED IRRADIATED: HCPCS

## 2018-12-19 PROCEDURE — 86850 RBC ANTIBODY SCREEN: CPT | Performed by: EMERGENCY MEDICINE

## 2018-12-19 PROCEDURE — 85007 BL SMEAR W/DIFF WBC COUNT: CPT | Performed by: EMERGENCY MEDICINE

## 2018-12-19 PROCEDURE — 36430 TRANSFUSION BLD/BLD COMPNT: CPT

## 2018-12-19 PROCEDURE — 82728 ASSAY OF FERRITIN: CPT | Performed by: PHYSICIAN ASSISTANT

## 2018-12-19 PROCEDURE — 82746 ASSAY OF FOLIC ACID SERUM: CPT | Performed by: PHYSICIAN ASSISTANT

## 2018-12-19 PROCEDURE — 83735 ASSAY OF MAGNESIUM: CPT | Performed by: EMERGENCY MEDICINE

## 2018-12-19 PROCEDURE — 86900 BLOOD TYPING SEROLOGIC ABO: CPT | Performed by: EMERGENCY MEDICINE

## 2018-12-19 PROCEDURE — 85027 COMPLETE CBC AUTOMATED: CPT | Performed by: EMERGENCY MEDICINE

## 2018-12-19 PROCEDURE — 36415 COLL VENOUS BLD VENIPUNCTURE: CPT | Performed by: EMERGENCY MEDICINE

## 2018-12-19 PROCEDURE — 85730 THROMBOPLASTIN TIME PARTIAL: CPT | Performed by: EMERGENCY MEDICINE

## 2018-12-19 PROCEDURE — 99285 EMERGENCY DEPT VISIT HI MDM: CPT

## 2018-12-19 PROCEDURE — 83550 IRON BINDING TEST: CPT | Performed by: PHYSICIAN ASSISTANT

## 2018-12-19 PROCEDURE — 80053 COMPREHEN METABOLIC PANEL: CPT | Performed by: EMERGENCY MEDICINE

## 2018-12-19 RX ORDER — SUMATRIPTAN 25 MG/1
100 TABLET, FILM COATED ORAL ONCE AS NEEDED
Status: DISCONTINUED | OUTPATIENT
Start: 2018-12-19 | End: 2018-12-21 | Stop reason: HOSPADM

## 2018-12-19 RX ORDER — ONDANSETRON 2 MG/ML
4 INJECTION INTRAMUSCULAR; INTRAVENOUS EVERY 6 HOURS PRN
Status: DISCONTINUED | OUTPATIENT
Start: 2018-12-19 | End: 2018-12-21 | Stop reason: HOSPADM

## 2018-12-19 RX ORDER — METOCLOPRAMIDE 10 MG/1
10 TABLET ORAL 4 TIMES DAILY PRN
Status: DISCONTINUED | OUTPATIENT
Start: 2018-12-19 | End: 2018-12-21 | Stop reason: HOSPADM

## 2018-12-19 RX ORDER — ESCITALOPRAM OXALATE 10 MG/1
10 TABLET ORAL DAILY
Status: DISCONTINUED | OUTPATIENT
Start: 2018-12-20 | End: 2018-12-21 | Stop reason: HOSPADM

## 2018-12-19 RX ORDER — LORAZEPAM 1 MG/1
1 TABLET ORAL 2 TIMES DAILY PRN
Status: DISCONTINUED | OUTPATIENT
Start: 2018-12-19 | End: 2018-12-21 | Stop reason: HOSPADM

## 2018-12-19 RX ORDER — DICYCLOMINE HCL 20 MG
20 TABLET ORAL ONCE
Status: COMPLETED | OUTPATIENT
Start: 2018-12-19 | End: 2018-12-19

## 2018-12-19 RX ORDER — ACETAMINOPHEN 325 MG/1
650 TABLET ORAL EVERY 6 HOURS PRN
Status: DISCONTINUED | OUTPATIENT
Start: 2018-12-19 | End: 2018-12-21 | Stop reason: HOSPADM

## 2018-12-19 RX ADMIN — DICYCLOMINE HYDROCHLORIDE 20 MG: 20 TABLET ORAL at 19:37

## 2018-12-19 RX ADMIN — SODIUM CHLORIDE 1000 ML: 0.9 INJECTION, SOLUTION INTRAVENOUS at 19:43

## 2018-12-20 PROBLEM — R31.9 HEMATURIA: Status: ACTIVE | Noted: 2018-12-20

## 2018-12-20 LAB
ABO GROUP BLD BPU: NORMAL
ANION GAP SERPL CALCULATED.3IONS-SCNC: 12 MMOL/L (ref 4–13)
BPU ID: NORMAL
BUN SERPL-MCNC: 22 MG/DL (ref 5–25)
CALCIUM SERPL-MCNC: 8.9 MG/DL (ref 8.3–10.1)
CHLORIDE SERPL-SCNC: 100 MMOL/L (ref 100–108)
CO2 SERPL-SCNC: 21 MMOL/L (ref 21–32)
CREAT SERPL-MCNC: 0.9 MG/DL (ref 0.6–1.3)
CROSSMATCH: NORMAL
ERYTHROCYTE [DISTWIDTH] IN BLOOD BY AUTOMATED COUNT: 23.2 % (ref 11.6–15.1)
FERRITIN SERPL-MCNC: 1033 NG/ML (ref 8–388)
FOLATE SERPL-MCNC: 6.7 NG/ML (ref 3.1–17.5)
GFR SERPL CREATININE-BSD FRML MDRD: 71 ML/MIN/1.73SQ M
GLUCOSE SERPL-MCNC: 101 MG/DL (ref 65–140)
HCT VFR BLD AUTO: 35.4 % (ref 34.8–46.1)
HGB BLD-MCNC: 12.4 G/DL (ref 11.5–15.4)
IRON SATN MFR SERPL: 8 %
IRON SERPL-MCNC: 12 UG/DL (ref 50–170)
MCH RBC QN AUTO: 34.8 PG (ref 26.8–34.3)
MCHC RBC AUTO-ENTMCNC: 35 G/DL (ref 31.4–37.4)
MCV RBC AUTO: 99 FL (ref 82–98)
PLATELET # BLD AUTO: 287 THOUSANDS/UL (ref 149–390)
PMV BLD AUTO: 10 FL (ref 8.9–12.7)
POTASSIUM SERPL-SCNC: 3.9 MMOL/L (ref 3.5–5.3)
RBC # BLD AUTO: 3.56 MILLION/UL (ref 3.81–5.12)
SODIUM SERPL-SCNC: 133 MMOL/L (ref 136–145)
TIBC SERPL-MCNC: 145 UG/DL (ref 250–450)
UNIT DISPENSE STATUS: NORMAL
UNIT PRODUCT CODE: NORMAL
UNIT RH: NORMAL
VIT B12 SERPL-MCNC: 1811 PG/ML (ref 100–900)
WBC # BLD AUTO: 10.13 THOUSAND/UL (ref 4.31–10.16)

## 2018-12-20 PROCEDURE — 99232 SBSQ HOSP IP/OBS MODERATE 35: CPT | Performed by: INTERNAL MEDICINE

## 2018-12-20 PROCEDURE — 80048 BASIC METABOLIC PNL TOTAL CA: CPT | Performed by: PHYSICIAN ASSISTANT

## 2018-12-20 PROCEDURE — P9040 RBC LEUKOREDUCED IRRADIATED: HCPCS

## 2018-12-20 PROCEDURE — 99254 IP/OBS CNSLTJ NEW/EST MOD 60: CPT | Performed by: NURSE PRACTITIONER

## 2018-12-20 PROCEDURE — 85027 COMPLETE CBC AUTOMATED: CPT | Performed by: PHYSICIAN ASSISTANT

## 2018-12-20 RX ORDER — FERROUS SULFATE 325(65) MG
325 TABLET ORAL 2 TIMES DAILY WITH MEALS
Status: DISCONTINUED | OUTPATIENT
Start: 2018-12-20 | End: 2018-12-21 | Stop reason: HOSPADM

## 2018-12-20 RX ORDER — POTASSIUM CHLORIDE 20 MEQ/1
40 TABLET, EXTENDED RELEASE ORAL ONCE
Status: COMPLETED | OUTPATIENT
Start: 2018-12-20 | End: 2018-12-20

## 2018-12-20 RX ORDER — SODIUM CHLORIDE 9 MG/ML
50 INJECTION, SOLUTION INTRAVENOUS CONTINUOUS
Status: DISCONTINUED | OUTPATIENT
Start: 2018-12-20 | End: 2018-12-21 | Stop reason: HOSPADM

## 2018-12-20 RX ADMIN — FERROUS SULFATE TAB 325 MG (65 MG ELEMENTAL FE) 325 MG: 325 (65 FE) TAB at 09:00

## 2018-12-20 RX ADMIN — SERTRALINE HYDROCHLORIDE 50 MG: 50 TABLET ORAL at 00:42

## 2018-12-20 RX ADMIN — FERROUS SULFATE TAB 325 MG (65 MG ELEMENTAL FE) 325 MG: 325 (65 FE) TAB at 17:12

## 2018-12-20 RX ADMIN — POTASSIUM CHLORIDE 40 MEQ: 1500 TABLET, EXTENDED RELEASE ORAL at 00:42

## 2018-12-20 RX ADMIN — APIXABAN 5 MG: 5 TABLET, FILM COATED ORAL at 17:12

## 2018-12-20 RX ADMIN — SERTRALINE HYDROCHLORIDE 50 MG: 50 TABLET ORAL at 22:50

## 2018-12-20 RX ADMIN — APIXABAN 5 MG: 5 TABLET, FILM COATED ORAL at 09:00

## 2018-12-20 RX ADMIN — VERAPAMIL HYDROCHLORIDE 240 MG: 120 TABLET, FILM COATED, EXTENDED RELEASE ORAL at 09:01

## 2018-12-20 RX ADMIN — ESCITALOPRAM OXALATE 10 MG: 10 TABLET ORAL at 09:00

## 2018-12-20 NOTE — ED PROVIDER NOTES
History  Chief Complaint   Patient presents with    Weakness - Generalized     S/P Mastectomy in May, Finished Chemo on the 7th  Seen last week for pain her feet  Last couple of days since finishing Chemo has not eaten feeling weak, denies N/V does have "alot of diarhea"        History provided by:  Patient and spouse   used: No     81-year-old female undergoing treatment for breast cancer just finished chemotherapy almost 2 weeks ago and is about to start radiation therapy presented for evaluation of generalized weakness  She has been having some diarrhea  States that she has not been able to walk secondary to weakness  She states she gets up and then falls right down  She appears very pale on exam   Her vitals are normal   Heart lung sounds normal   Normal pulses  Oropharynx dry  Prior to Admission Medications   Prescriptions Last Dose Informant Patient Reported? Taking?    Elastic Bandages & Supports MISC  Self No No   Sig: by Does not apply route daily   LORazepam (ATIVAN) 1 mg tablet  Self Yes No   Sig: Take 1 mg by mouth 2 (two) times a day as needed   SUMAtriptan (IMITREX) 100 mg tablet  Self Yes No   Sig: Take 100 mg by mouth once as needed for migraine   apixaban (ELIQUIS) 5 mg  Self No No   Sig: Take 1 tablet (5 mg total) by mouth 2 (two) times a day   Patient not taking: Reported on 11/29/2018    escitalopram (LEXAPRO) 10 mg tablet  Self Yes No   Sig: Take 10 mg by mouth daily   metoclopramide (REGLAN) 10 mg tablet  Self No No   Sig: Take 1 tablet (10 mg total) by mouth 4 (four) times a day as needed (nausea)   naproxen sodium (ALEVE) 220 MG tablet  Self Yes No   Sig: Take 660 mg by mouth daily as needed for mild pain   ondansetron (ZOFRAN) 4 mg tablet  Self No No   Sig: Take 1 tablet (4 mg total) by mouth every 6 (six) hours as needed for nausea or vomiting   sertraline (ZOLOFT) 50 mg tablet  Self Yes No   Sig: Take 50 mg by mouth daily at bedtime   verapamil (CALAN-SR) 240 mg CR tablet  Self Yes No   Sig: Take 240 mg by mouth 2 (two) times a day   verapamil (VERELAN) 240 MG 24 hr capsule  Self Yes No   Sig: Take 240 mg by mouth daily      Facility-Administered Medications: None       Past Medical History:   Diagnosis Date    Acute DVT (deep venous thrombosis) (HCC)     of LLE>     Congenital prolapsed rectum     Hydronephrosis     right kidney    Hypertension     Malignant neoplasm of overlapping sites of left female breast ([address] )     Migraine        Past Surgical History:   Procedure Laterality Date    BREAST BIOPSY      COLON SURGERY      colon resection    NM INSJ TUNNELED CTR VAD W/SUBQ PORT AGE 5 YR/> N/A 6/26/2018    Procedure: INSERTION VENOUS PORT ( PORT-A-CATH) IR;  Surgeon: Hilario Lomeli DO;  [facility];  Service: Interventional Radiology    NM MASTECTOMY, MODIFIED RADICAL Left 5/15/2018    Procedure: BREAST MODIFIED RADICAL MASTECTOMY;  Surgeon: Jack Dominguez MD;  [facility];  Service: Surgical Oncology    US GUIDANCE BREAST BIOPSY LEFT EACH ADDITIONAL Left 4/3/2018    US GUIDED BREAST BIOPSY LEFT COMPLETE Left 4/3/2018    US GUIDED BREAST LYMPH NODE BIOPSY LEFT Left 4/3/2018       Family History   Problem Relation Age of Onset    No Known Problems Mother     No Known Problems Father      I have reviewed and agree with the history as documented  Social History   Substance Use Topics    Smoking status: Never Smoker    Smokeless tobacco: Never Used    Alcohol use No        Review of Systems   Constitutional: Positive for activity change, appetite change and fatigue  Negative for fever  Respiratory: Negative for chest tightness and shortness of breath  Cardiovascular: Negative for chest pain and leg swelling  Gastrointestinal: Negative for abdominal pain, nausea and vomiting  Musculoskeletal: Positive for gait problem  Skin: Positive for pallor  Negative for color change  Neurological: Negative for dizziness and weakness     All other systems reviewed and are negative  Physical Exam  Physical Exam   Constitutional: She is oriented to person, place, and time  She appears well-developed and well-nourished  No distress  HENT:   Head: Normocephalic and atraumatic  Dry lips and somewhat dry mouth  Eyes: Pupils are equal, round, and reactive to light  Conjunctivae are normal    Cardiovascular: Normal rate, regular rhythm and intact distal pulses  Pulmonary/Chest: Effort normal and breath sounds normal    Abdominal: She exhibits no distension  Musculoskeletal: Normal range of motion  She exhibits no edema  Neurological: She is alert and oriented to person, place, and time  Skin: Skin is warm and dry  There is pallor  Psychiatric: She has a normal mood and affect  Her behavior is normal    Nursing note and vitals reviewed        Vital Signs  ED Triage Vitals [12/19/18 1738]   Temperature Pulse Respirations Blood Pressure SpO2   98 3 °F (36 8 °C) 104 20 139/83 95 %      Temp Source Heart Rate Source Patient Position - Orthostatic VS BP Location FiO2 (%)   Oral Monitor Sitting Right arm --      Pain Score       No Pain           Vitals:    12/19/18 2146 12/19/18 2313 12/19/18 2348 12/20/18 0000   BP: 124/69 116/69 124/66 126/70   Pulse: 95 102 98 96   Patient Position - Orthostatic VS: Lying Lying  Lying       Visual Acuity      ED Medications  Medications   apixaban (ELIQUIS) tablet 5 mg (not administered)   escitalopram (LEXAPRO) tablet 10 mg (not administered)   LORazepam (ATIVAN) tablet 1 mg (not administered)   metoclopramide (REGLAN) tablet 10 mg (not administered)   sertraline (ZOLOFT) tablet 50 mg (50 mg Oral Given 12/20/18 0042)   SUMAtriptan (IMITREX) tablet 100 mg (not administered)   verapamil (CALAN-SR) CR tablet 240 mg (not administered)   ondansetron (ZOFRAN) injection 4 mg (not administered)   acetaminophen (TYLENOL) tablet 650 mg (not administered)   sodium chloride 0 9 % infusion (not administered)   sodium chloride 0 9 % bolus 1,000 mL (0 mL Intravenous Stopped 12/19/18 2043)   dicyclomine (BENTYL) tablet 20 mg (20 mg Oral Given 12/19/18 1937)   potassium chloride (K-DUR,KLOR-CON) CR tablet 40 mEq (40 mEq Oral Given 12/20/18 0042)       Diagnostic Studies  Results Reviewed     Procedure Component Value Units Date/Time    Vitamin B12 [[id number]] Collected:  12/19/18 1933    Lab Status: In process Specimen:  Blood Updated:  12/19/18 2353    Folate [[id number]] Collected:  12/19/18 1933    Lab Status: In process Specimen:  Blood Updated:  12/19/18 2353    Iron Saturation % [[id number]] Collected:  12/19/18 1933    Lab Status: In process Specimen:  Blood Updated:  12/19/18 2353    Ferritin [[id number]] Collected:  12/19/18 1933    Lab Status:   In process Specimen:  Blood Updated:  12/19/18 2353    CBC and differential [[id number]]  (Abnormal) Collected:  12/19/18 1933    Lab Status:  Final result Specimen:  Blood from Central Venous Line Updated:  12/19/18 2028     WBC 10 03 Thousand/uL      RBC 1 02 (L) Million/uL      Hemoglobin 4 0 (LL) g/dL      Hematocrit 12 0 (L) %       (H) fL      MCH 39 2 (H) pg      MCHC 33 3 g/dL      RDW 18 4 (H) %      MPV 10 4 fL      Platelets 831 Thousands/uL      nRBC 0 /100 WBCs     Comprehensive metabolic panel [[id number]]  (Abnormal) Collected:  12/19/18 1933    Lab Status:  Final result Specimen:  Blood from Central Venous Line Updated:  12/19/18 2010     Sodium 131 (L) mmol/L      Potassium 3 4 (L) mmol/L      Chloride 93 (L) mmol/L      CO2 20 (L) mmol/L      ANION GAP 18 (H) mmol/L      BUN 23 mg/dL      Creatinine 0 98 mg/dL      Glucose 89 mg/dL      Calcium 9 1 mg/dL       (H) U/L      ALT 53 U/L      Alkaline Phosphatase 149 (H) U/L      Total Protein 6 3 (L) g/dL      Albumin 2 1 (L) g/dL      Total Bilirubin 1 00 mg/dL      eGFR 64 ml/min/1 73sq m     Narrative:         National Kidney Disease Education Program recommendations are as follows:  GFR calculation is accurate only with a steady state creatinine  Chronic Kidney disease less than 60 ml/min/1 73 sq  meters  Kidney failure less than 15 ml/min/1 73 sq  meters  Magnesium [[id number]]  (Normal) Collected:  12/19/18 1933    Lab Status:  Final result Specimen:  Blood from Central Venous Line Updated:  12/19/18 2010     Magnesium 1 6 mg/dL     Protime-INR [[id number]]  (Abnormal) Collected:  12/19/18 1933    Lab Status:  Final result Specimen:  Blood from Arm, Right Updated:  12/19/18 2006     Protime 16 0 (H) seconds      INR 1 32 (H)    APTT [[id number]]  (Normal) Collected:  12/19/18 1933    Lab Status:  Final result Specimen:  Blood from Arm, Right Updated:  12/19/18 2006     PTT 38 seconds                  No orders to display              Procedures  Procedures       Phone Contacts  ED Phone Contact    ED Course                               MDM  Number of Diagnoses or Management Options  Anemia following use of chemotherapeutic drug: new and requires workup  Generalized weakness: new and requires workup  Diagnosis management comments: 72-year-old female who recently finished chemotherapy for breast cancer presented with generalized weakness, pallor  Found have a hemoglobin of 4 0 likely related to anti neoplastic agent  No signs of active bleeding  Packed red cell transfusion began in emergency department  Patient admitted for continued care         Amount and/or Complexity of Data Reviewed  Clinical lab tests: ordered and reviewed  Discuss the patient with other providers: yes    Patient Progress  Patient progress: stable    CritCare Time    Disposition  Final diagnoses:   Anemia following use of chemotherapeutic drug   Generalized weakness     Time reflects when diagnosis was documented in both MDM as applicable and the Disposition within this note     Time User Action Codes Description Comment    12/19/2018  8:52 PM Chirag Miller [D64 81] Anemia following use of chemotherapeutic drug     12/19/2018 8:52 PM Bailey Plata Add [R53 1] Generalized weakness       ED Disposition     ED Disposition Condition Comment    Admit  Case was discussed with Karen Brown and the patient's admission status was agreed to be Admission Status: inpatient status to the service of Dr SANDOVAL           Follow-up Information    None         Current Discharge Medication List      CONTINUE these medications which have NOT CHANGED    Details   apixaban (ELIQUIS) 5 mg Take 1 tablet (5 mg total) by mouth 2 (two) times a day  Qty: 60 tablet, Refills: 2    Associated Diagnoses: Acute deep vein thrombosis (DVT) of popliteal vein of left lower extremity (HCC)      Elastic Bandages & Supports MISC by Does not apply route daily  Qty: 6 each, Refills: 0    Comments: Compression stockings 20-30  Associated Diagnoses: Acute deep vein thrombosis (DVT) of left lower extremity, unspecified vein (HCC)      escitalopram (LEXAPRO) 10 mg tablet Take 10 mg by mouth daily  Refills: 0      LORazepam (ATIVAN) 1 mg tablet Take 1 mg by mouth 2 (two) times a day as needed  Refills: 0      metoclopramide (REGLAN) 10 mg tablet Take 1 tablet (10 mg total) by mouth 4 (four) times a day as needed (nausea)  Qty: 30 tablet, Refills: 1    Associated Diagnoses: Malignant neoplasm of overlapping sites of left breast in female, estrogen receptor positive (HCC)      naproxen sodium (ALEVE) 220 MG tablet Take 660 mg by mouth daily as needed for mild pain      ondansetron (ZOFRAN) 4 mg tablet Take 1 tablet (4 mg total) by mouth every 6 (six) hours as needed for nausea or vomiting  Qty: 30 tablet, Refills: 1    Associated Diagnoses: Malignant neoplasm of overlapping sites of left breast in female, estrogen receptor positive (HCC)      sertraline (ZOLOFT) 50 mg tablet Take 50 mg by mouth daily at bedtime  Refills: 0      SUMAtriptan (IMITREX) 100 mg tablet Take 100 mg by mouth once as needed for migraine      verapamil (CALAN-SR) 240 mg CR tablet Take 240 mg by mouth 2 (two) times a day      verapamil (VERELAN) 240 MG 24 hr capsule Take 240 mg by mouth daily  Refills: 0           No discharge procedures on file      ED Provider  Electronically Signed by           Yosef Yusuf MD  12/20/18 0569

## 2018-12-20 NOTE — H&P
H&P- Melissa Gudino 1960, 63 y o  female MRN: [id number]    Unit/Bed#: -01 Encounter: [id number]    Primary Care Provider: Carlos Alberto Rodriguez DC   Date and time admitted to hospital: 12/19/2018  7:06 PM    * Anemia following use of chemotherapeutic drug   Assessment & Plan    · Presents with generalized weakness  · History of chemotherapy induced anemia requiring 2 units PRBCs  · Hgb noted to be 4 0 on admission  · 3 units PRBCs ordered in ED  · Post-transfusion CBC  · Monitor Hgb and transfuse as necessary  · Check occult stools, iron panel, B12, Folate  History of DVT (deep vein thrombosis)   Assessment & Plan    · Anticoagulated on Eliquis- Continue  · SCD's  Malignant neoplasm of overlapping sites of left breast in female, estrogen receptor positive ([facility] [address] )   Assessment & Plan    · Stage IIIA left breast cancer, grade II, invasive lobular, ER 75% positive, GA 15% positive, HER2 negative  · S/p mastectomy and axillary LN dissection resulting in IBAN  · Finished final chemotherapy on 12/13/18  · Referred to Dr Iris Aviles for post-mastectomy radiation  · Following with Dr Belinda Luna as outpatient  VTE Prophylaxis: Apixaban (Eliquis)  / sequential compression device   Code Status: Full Code  POLST: POLST form is not discussed and not completed at this time  Discussion with family: Discussed with patient at bedside  Anticipated Length of Stay:  Patient will be admitted on an Inpatient basis with an anticipated length of stay of  Greater than 2 midnights  Justification for Hospital Stay: Chemotherapy-induced anemia requiring blood transfusion  Total Time for Visit, including Counseling / Coordination of Care: 45 minutes  Greater than 50% of this total time spent on direct patient counseling and coordination of care  Chief Complaint:   Weakness      History of Present Illness:    Melissa Gudino is a 63 y o  female, PMHx of breast cancer s/p mastectomy, chemotherapy, prior DVT, who presents with generalized weakness x 1 week  Patient states that she received her final chemotherapy treatment last Thursday  Since then she has been feeling very weak  She states that when she tries to stand up her legs just give out on her  She additionally has a tingling sensation over both of her lower legs  She states that she was told this is from the chemotherapy  She denies any hematemesis, hematochezia/melena  She had some hematuria from kidney stones and was due to have a cystoscopy yesterday, but it was cancelled due to her blood counts being low  She additionally states that she has no appetite and has not been eating well  She denies abdominal pain, N/V, change in BM, dysuria/frequency/urgency  She states that she needed 2 units of blood before from chemotherapy, but she does not remember when this occurred  Review of Systems:    Review of Systems   Constitutional: Positive for fatigue  Negative for chills, diaphoresis and fever  Respiratory: Negative for cough and shortness of breath  Cardiovascular: Negative for chest pain and palpitations  Gastrointestinal: Negative for abdominal pain, blood in stool, constipation, diarrhea, nausea and vomiting  Genitourinary: Positive for hematuria  Negative for dysuria, frequency and urgency  Neurological: Negative for dizziness, light-headedness and headaches  All other systems reviewed and are negative        Past Medical and Surgical History:     Past Medical History:   Diagnosis Date    Acute DVT (deep venous thrombosis) (HCC)     of LLE>     Congenital prolapsed rectum     Hydronephrosis     right kidney    Hypertension     Malignant neoplasm of overlapping sites of left female breast ([facility] [address] )     Migraine        Past Surgical History:   Procedure Laterality Date    BREAST BIOPSY      COLON SURGERY      colon resection    [place] INSJ TUNNELED CTR VAD W/SUBQ PORT AGE 5 YR/> N/A 6/26/2018    Procedure: INSERTION VENOUS PORT ( PORT-A-CATH) IR;  Surgeon: Bulah Kanner, DO;  [facility];  Service: Interventional Radiology    GA MASTECTOMY, MODIFIED RADICAL Left 5/15/2018    Procedure: BREAST MODIFIED RADICAL MASTECTOMY;  Surgeon: Anand Elizabeth MD;  [facility];  Service: Surgical Oncology    US GUIDANCE BREAST BIOPSY LEFT EACH ADDITIONAL Left 4/3/2018    US GUIDED BREAST BIOPSY LEFT COMPLETE Left 4/3/2018    US GUIDED BREAST LYMPH NODE BIOPSY LEFT Left 4/3/2018       Meds/Allergies:    Prior to Admission medications    Medication Sig Start Date End Date Taking?  Authorizing Provider   apixaban (ELIQUIS) 5 mg Take 1 tablet (5 mg total) by mouth 2 (two) times a day  Patient not taking: Reported on 11/29/2018  10/19/18   Jared Hernandes PA-C   Elastic Bandages & Supports MISC by Does not apply route daily 12/12/18   Miguel Ángel Gutierrez MD   escitalopram (LEXAPRO) 10 mg tablet Take 10 mg by mouth daily 10/16/18   Historical Provider, MD   LORazepam (ATIVAN) 1 mg tablet Take 1 mg by mouth 2 (two) times a day as needed 10/16/18   Historical Provider, MD   metoclopramide (REGLAN) 10 mg tablet Take 1 tablet (10 mg total) by mouth 4 (four) times a day as needed (nausea) 6/26/18   Miguel Ángel Gutierrez MD   naproxen sodium (ALEVE) 220 MG tablet Take 660 mg by mouth daily as needed for mild pain    Historical Provider, MD   ondansetron (ZOFRAN) 4 mg tablet Take 1 tablet (4 mg total) by mouth every 6 (six) hours as needed for nausea or vomiting 7/10/18   Miguel Ángel Gutierrez MD   sertraline (ZOLOFT) 50 mg tablet Take 50 mg by mouth daily at bedtime 10/11/18   Historical Provider, MD   SUMAtriptan (IMITREX) 100 mg tablet Take 100 mg by mouth once as needed for migraine    Historical Provider, MD   verapamil (CALAN-SR) 240 mg CR tablet Take 240 mg by mouth 2 (two) times a day    Historical Provider, MD   verapamil (VERELAN) 240 MG 24 hr capsule Take 240 mg by mouth daily 10/16/18   Historical Provider, MD     I have reviewed home medications with patient personally  Allergies: No Known Allergies    Social History:     Marital Status: /Civil Union   Occupation: Unknown  Patient Pre-hospital Living Situation: Home  Patient Pre-hospital Level of Mobility: Independent  Patient Pre-hospital Diet Restrictions: None  Substance Use History:   History   Alcohol Use No     History   Smoking Status    Never Smoker   Smokeless Tobacco    Never Used     History   Drug Use No       Family History:    Family History   Problem Relation Age of Onset    No Known Problems Mother     No Known Problems Father        Physical Exam:     Vitals:   Blood Pressure: 116/69 (12/19/18 2313)  Pulse: 102 (12/19/18 2313)  Temperature: 98 3 °F (36 8 °C) (12/19/18 2313)  Temp Source: Oral (12/19/18 2313)  Respirations: 16 (12/19/18 2313)  Height: 5' 4" (162 6 cm) (12/19/18 1738)  Weight - Scale: 60 6 kg (133 lb 9 6 oz) (12/19/18 1738)  SpO2: 95 % (12/19/18 2313)    Physical Exam   Constitutional: She is oriented to person, place, and time  She appears well-developed and well-nourished  She is cooperative  She does not appear ill  No distress  Pale appearing  HENT:   Head: Normocephalic and atraumatic  Eyes: Pupils are equal, round, and reactive to light  Conjunctivae, EOM and lids are normal    Cardiovascular: Normal rate, regular rhythm, normal heart sounds and normal pulses  No murmur heard  Pulmonary/Chest: Effort normal and breath sounds normal  She has no wheezes  She has no rhonchi  She has no rales  Right chest wall port  Abdominal: Soft  Normal appearance and bowel sounds are normal  There is no tenderness  Musculoskeletal: Normal range of motion  Neurological: She is alert and oriented to person, place, and time  She has normal strength  She is not disoriented  No sensory deficit  Skin: Skin is warm, dry and intact  She is not diaphoretic  Psychiatric: She has a normal mood and affect   Her speech is normal and behavior is normal  Cognition and memory are normal    Vitals reviewed  Additional Data:     Lab Results: I have personally reviewed pertinent reports  Results from last 7 days  Lab Units 12/19/18  1933   WBC Thousand/uL 10 03   HEMOGLOBIN g/dL 4 0*   HEMATOCRIT % 12 0*   PLATELETS Thousands/uL 372   BANDS PCT % 12*   LYMPHO PCT % 3*   MONO PCT % 13*   EOS PCT % 0       Results from last 7 days  Lab Units 12/19/18 1933   SODIUM mmol/L 131*   POTASSIUM mmol/L 3 4*   CHLORIDE mmol/L 93*   CO2 mmol/L 20*   BUN mg/dL 23   CREATININE mg/dL 0 98   ANION GAP mmol/L 18*   CALCIUM mg/dL 9 1   ALBUMIN g/dL 2 1*   TOTAL BILIRUBIN mg/dL 1 00   ALK PHOS U/L 149*   ALT U/L 53   AST U/L 123*   GLUCOSE RANDOM mg/dL 89       Results from last 7 days  Lab Units 12/19/18 1933   INR  1 32*                   Imaging: I have personally reviewed pertinent reports  No orders to display       EKG, Pathology, and Other Studies Reviewed on Admission:   · EKG: Unavailable  [facility] Records Reviewed: Yes     ** Please Note: This note has been constructed using a voice recognition system   **

## 2018-12-20 NOTE — UTILIZATION REVIEW
Initial Clinical Review    Admission: Date/Time/Statement: 12/19/18 @ 2110     Orders Placed This Encounter   Procedures    Inpatient Admission (expected length of stay for this patient is greater than two midnights)     Standing Status:   Standing     Number of Occurrences:   1     Order Specific Question:   Admitting Physician     Answer:   Wood Arias     Order Specific Question:   Level of Care     Answer:   Med Surg [16]     Order Specific Question:   Estimated length of stay     Answer:   More than 2 Midnights     Order Specific Question:   Certification     Answer:   I certify that inpatient services are medically necessary for this patient for a duration of greater than two midnights  See H&P and MD Progress Notes for additional information about the patient's course of treatment  ED: Date/Time/Mode of Arrival:   ED Arrival Information     Expected Arrival Acuity Means of Arrival Escorted By Service Admission Type    - 12/19/2018 17:33 Urgent Wheelchair Family Member Hospitalist Urgent    Arrival Complaint    difficulty walking,           Chief Complaint:   Chief Complaint   Patient presents with    Weakness - Generalized     S/P Mastectomy in May, Finished Chemo on the 7th  Seen last week for pain her feet  Last couple of days since finishing Chemo has not eaten feeling weak, denies N/V does have "alot of diarhea"        History of Illness: Marcie Naqvi is a 63 y o  female, PMHx of breast cancer s/p mastectomy, chemotherapy, prior DVT, who presents with generalized weakness x 1 week  Patient states that she received her final chemotherapy treatment last Thursday  Since then she has been feeling very weak  She states that when she tries to stand up her legs just give out on her  She additionally has a tingling sensation over both of her lower legs  She states that she was told this is from the chemotherapy    She had some hematuria from kidney stones and was due to have a cystoscopy yesterday, but it was cancelled due to her blood counts being low  She additionally states that she has no appetite and has not been eating well  She states that she needed 2 units of blood before from chemotherapy, but she does not remember when this occurred  ED Vital Signs:   ED Triage Vitals [12/19/18 1738]   Temperature Pulse Respirations Blood Pressure SpO2   98 3 °F (36 8 °C) 104 20 139/83 95 %      Temp Source Heart Rate Source Patient Position - Orthostatic VS BP Location FiO2 (%)   Oral Monitor Sitting Right arm --      Pain Score       No Pain        Wt Readings from Last 1 Encounters:   12/19/18 60 6 kg (133 lb 9 6 oz)       Vital Signs (abnormal): intermittent tachycardia 104 - 115    12/20/18 0705  98 4 °F (36 9 °C)   115  20  130/81  --  95 %  None (Room air)        Abnormal Labs/Diagnostic Test Results:   Wbc 10 03, hgb 4, hct 12  Bands 12%  Na 131, K 3 4  Cl 93  CO2-20  Anion gap 18  ast 123  Alkaline phosphatase 149  Total protein 6 3  Albumin 2 1  INR 1 32  Vitamin B-12 1811  TIBC 145  Iron 12  Ferritin 1033    12/20/2018-  Na 133  Wbc 10 13, hgb 12 4, hct 35 4       ED Treatment:   Medication Administration from 12/19/2018 1733 to 12/19/2018 2140       Date/Time Order Dose Route Action Comments     12/19/2018 2043 sodium chloride 0 9 % bolus 1,000 mL 0 mL Intravenous Stopped      12/19/2018 1943 sodium chloride 0 9 % bolus 1,000 mL 1,000 mL Intravenous New Bag      12/19/2018 1937 dicyclomine (BENTYL) tablet 20 mg 20 mg Oral Given           Past Medical/Surgical History:   Past Medical History:   Diagnosis Date    Acute DVT (deep venous thrombosis) (HCC)     Congenital prolapsed rectum     Hydronephrosis     Hypertension     Malignant neoplasm of overlapping sites of left female breast ([facility] [address] )     Migraine        Admitting Diagnosis: Weakness [R53 1]  Difficulty walking [R26 2]  Generalized weakness [R53 1]  Anemia following use of chemotherapeutic drug [D64 81]    Age/Sex: 63 y o  female    Assessment/Plan: This is a 63year old female from home with past medical history of breast cancer, mastectomy, DVT who presents to ED with weakness  Patient completed chemotherapy  Last Thursday and weakness has increased  She additionaly has no appetite  She admits to home hematuria  In ED Hgb 4, hct 12  Patient is admitted inpatient with anemia following use of chemotherapeutic drug, plan includes, transfuse with PRBC, monitor CBC, check stools occult blood    Admission Orders: 12/19/2018  2111 INPATIENT   Scheduled Meds:   Current Facility-Administered Medications:  acetaminophen 650 mg Oral Q6H PRN    apixaban 5 mg Oral BID    escitalopram 10 mg Oral Daily    ferrous sulfate 325 mg Oral BID With Meals    LORazepam 1 mg Oral BID PRN    metoclopramide 10 mg Oral 4x Daily PRN    ondansetron 4 mg Intravenous Q6H PRN    sertraline 50 mg Oral HS    sodium chloride 50 mL/hr Intravenous Continuous Last Rate: 50 mL/hr (12/20/18 0347)   SUMAtriptan 100 mg Oral Once PRN    verapamil 240 mg Oral Daily      Continuous Infusions:   sodium chloride 50 mL/hr Last Rate: 50 mL/hr (12/20/18 0347)     PRN Meds: not used     scds  Stool occult blood  Transfuse PRBC - 3 units  [address]  Utilization Review Department  Phone: [phone]; Fax [phone]  [email or web address]  ATTENTION: Please call with any questions or concerns to [phone]  and carefully listen to the prompts so that you are directed to the right person  Send all requests for admission clinical reviews, approved or denied determinations and any other requests to fax [phone]   All voicemails are confidential

## 2018-12-20 NOTE — MALNUTRITION/BMI
This medical record reflects one or more clinical indicators suggestive of malnutrition and/or morbid obesity  Malnutrition Findings:   Malnutrition type: Chronic illness (catabolic illness)  Degree of Malnutrition: Malnutrition of moderate degree  Malnutrition Characteristics: Fat loss, Muscle loss, Inadequate energy, Weight loss (13 1% weight decrease x 6 months, temporal indentation)Currently being treated with oral supplementation  BMI Findings: Body mass index is 22 93 kg/m²  See Nutrition note dated 12/20/2018 for additional details  Completed nutrition assessment is viewable in the nutrition documentation

## 2018-12-20 NOTE — ASSESSMENT & PLAN NOTE
· Stage IIIA left breast cancer, grade II, invasive lobular, ER 75% positive, GA 15% positive, HER2 negative  · S/p mastectomy and axillary LN dissection resulting in IBAN  · Finished final chemotherapy on 12/13/18  · Referred to Dr Mariano Ramon for post-mastectomy radiation  · Following with Dr Gladys Jaramillo as outpatient

## 2018-12-20 NOTE — ASSESSMENT & PLAN NOTE
· Stage IIIA left breast cancer, grade II, invasive lobular, ER 75% positive, [place] 15% positive, HER2 negative  · S/p mastectomy and axillary LN dissection resulting in IBAN  · Finished final chemotherapy on 12/13/18  · Referred to Dr Candelaria Sanders for post-mastectomy radiation  · Following with Dr Dami Handley as outpatient

## 2018-12-20 NOTE — PLAN OF CARE
HEMATOLOGIC - ADULT     Maintains hematologic stability Not Progressing        Potential for Falls     Patient will remain free of falls Not Progressing        SAFETY ADULT     Maintain or return to baseline ADL function Not Progressing     Maintain or return mobility status to optimal level Not Progressing

## 2018-12-20 NOTE — CONSULTS
UROLOGY CONSULTATION NOTE     Patient Identifiers: Keith Hernandez (MRN [id number])  Service Requesting Consultation: DR Chon Song  Service Providing Consultation:  Urology, Ashleigh Delgadillo, [facility]    Date of Service: 12/20/2018  Inpatient consult to Urology  Consult performed by: Sylwia Stokes ordered by: Kg Yadav          Reason for Consultation:  Hematuria    ASSESSMENT:     63 y o  old female with  stage III breast cancer, systemic chemotherapy, profound anemia, hematuria and chronic severe right hydronephrosis  PLAN:   Monitor voiding  Check PVRs  Collect serial urine  Monitor hemoglobin  Appreciate medical optimization to include transfusion  Patient's presenting hemoglobin of 4 is inconsistent with degree of hematuria  In patients with ABLA secondary to  tract blood loss, severe symptomatology is anticipated to include urinary clot retention and bladder spasms  Patient would require urinary catheter for bladder access  Fortunately for Ms Marty Ivey this is not the case  Patient is on chemotherapy, compounded by anticoagulation  Would continue workup for different source/etiology  Right hydronephrosis is essentially unchanged  Of note, retrograde ureteral stent placement is not feasible  Therefore, if patient should require emergent decompression for symptoms related to upper tract obstruction, percutaneous nephrostomy would be necessary  Considering intact renal function and absence of renal colic, nephrostomy insertion is currently not indicated  Timing for cystoscopy to be determined  Currently no requirement for emergent cystoscopy or clot evacuation      History of Present Illness:     Keith Hernandez is a 63 y o  old with stage III breast cancer status post mastectomy, radiation and recent completion of cytoreductive chemotherapy known to Dr Lucia Cruz for chronic right hydronephrosis secondary to torturous right ureter, admitted for profound anemia    Of note, patient was scheduled for cystoscopic examination to investigate recent mild hematuria, but procedure was canceled secondary to abnormal lab work  Patient denies fever, chills but acknowledges weakness and anorexia  Urologically, patient denies any irritative or obstructive urinary symptoms besides painless hematuria to include urinary urgency, frequency, incomplete bladder emptying, suprapubic pain, flank or groin pain or dysuria  On presentation hemoglobin was 4  Patient on Eliquis chronically  Patient was in admitted to Internal Medicine service and immediately transfused with 3 units of packed cells  Hemoglobin is now exceeding 12  CT scan was positive for chronic in stable severe right hydronephrosis without change from prior imaging  Urologic consultation is requested against patient's significant  background      Past Medical, Past Surgical History:     Past Medical History:   Diagnosis Date    Acute DVT (deep venous thrombosis) (HCC)     of LLE>     Congenital prolapsed rectum     Hydronephrosis     right kidney    Hypertension     Malignant neoplasm of overlapping sites of left female breast ([facility] [place] [address] )     Migraine    :    Past Surgical History:   Procedure Laterality Date    BREAST BIOPSY      COLON SURGERY      colon resection    WY INSJ TUNNELED CTR VAD W/SUBQ PORT AGE 5 YR/> N/A 6/26/2018    Procedure: INSERTION VENOUS PORT ( PORT-A-CATH) IR;  Surgeon: Madelyn Case DO;  [facility];  Service: Interventional Radiology    WY MASTECTOMY, MODIFIED RADICAL Left 5/15/2018    Procedure: BREAST MODIFIED RADICAL MASTECTOMY;  Surgeon: Jasper Wood MD;  [facility];  Service: Surgical Oncology    US GUIDANCE BREAST BIOPSY LEFT EACH ADDITIONAL Left 4/3/2018    US GUIDED BREAST BIOPSY LEFT COMPLETE Left 4/3/2018    US GUIDED BREAST LYMPH NODE BIOPSY LEFT Left 4/3/2018   :    Medications, Allergies:     Current Facility-Administered Medications   Medication Dose Route Frequency    acetaminophen (TYLENOL) tablet 650 mg  650 mg Oral Q6H PRN    apixaban (ELIQUIS) tablet 5 mg  5 mg Oral BID    escitalopram (LEXAPRO) tablet 10 mg  10 mg Oral Daily    ferrous sulfate tablet 325 mg  325 mg Oral BID With Meals    LORazepam (ATIVAN) tablet 1 mg  1 mg Oral BID PRN    metoclopramide (REGLAN) tablet 10 mg  10 mg Oral 4x Daily PRN    ondansetron (ZOFRAN) injection 4 mg  4 mg Intravenous Q6H PRN    sertraline (ZOLOFT) tablet 50 mg  50 mg Oral HS    sodium chloride 0 9 % infusion  50 mL/hr Intravenous Continuous    SUMAtriptan (IMITREX) tablet 100 mg  100 mg Oral Once PRN    verapamil (CALAN-SR) CR tablet 240 mg  240 mg Oral Daily       Allergies:  No Known Allergies:    Social and Family History:   Social History:   Social History   Substance Use Topics    Smoking status: Never Smoker    Smokeless tobacco: Never Used    Alcohol use No        History   Smoking Status    Never Smoker   Smokeless Tobacco    Never Used       Family History:  Family History   Problem Relation Age of Onset    No Known Problems Mother     No Known Problems Father    :     Review of Systems:     Review of Systems - History obtained from chart review and the patient  General ROS: positive for  - malaise  Respiratory ROS: no cough, shortness of breath, or wheezing  Cardiovascular ROS: no chest pain or dyspnea on exertion  Gastrointestinal ROS: no abdominal pain, change in bowel habits, or black or bloody stools  Genito-Urinary ROS: positive for - hematuria  Neurological ROS: no TIA or stroke symptoms    All other systems queried were negative  Physical Exam:   General: Patient is pleasant and in NAD  Awake and alert  /73 (BP Location: Right arm)   Pulse 93   Temp 98 °F (36 7 °C) (Oral)   Resp 18   Ht 5' 4" (1 626 m)   Wt 60 6 kg (133 lb 9 6 oz)   LMP  (LMP Unknown)   SpO2 94%   BMI 22 93 kg/m² Temp (24hrs), Av 4 °F (36 9 °C), Min:97 6 °F (36 4 °C), Max:99 5 °F (37 5 °C)  current;  Temperature: 98 °F (36 7 °C)  I/O last 24 hours: In: 2050 [Blood:1050; IV Piggyback:1000]  Out: 200 [Urine:200]  General appearance: alert and oriented, in no acute distress, appears stated age, cooperative and no distress  Head: Normocephalic, without obvious abnormality, atraumatic, Alopecia  Neck: no adenopathy, no carotid bruit, no JVD, supple, symmetrical, trachea midline and thyroid not enlarged, symmetric, no tenderness/mass/nodules  Lungs: diminished breath sounds  Heart: regular rate and rhythm, S1, S2 normal, no murmur, click, rub or gallop  Abdomen: soft, non-tender; bowel sounds normal; no masses,  no organomegaly  Extremities: extremities normal, warm and well-perfused; no cyanosis, clubbing, or edema  Pulses: 2+ and symmetric  Neurologic: Grossly normal  No urinary drains      Labs:     Lab Results   Component Value Date    HGB 12 4 12/20/2018    HCT 35 4 12/20/2018    WBC 10 13 12/20/2018     12/20/2018   ]    Lab Results   Component Value Date    K 3 9 12/20/2018     12/20/2018    CO2 21 12/20/2018    BUN 22 12/20/2018    CREATININE 0 90 12/20/2018    CALCIUM 8 9 12/20/2018   ]    Imaging:   I personally reviewed the images and report of the following studies, and reviewed them with the patient:    CT Abdomen: See below  CT abdomen pelvis w contrast [[id number]] Collected: 12/17/18 1828   Order Status: Completed Updated: 12/17/18 1837   Narrative:     CT ABDOMEN AND PELVIS WITH IV CONTRAST    INDICATION:   R31 0: Gross hematuria  COMPARISON:  CT chest abdomen pelvis 4/11/2018  TECHNIQUE:  CT examination of the abdomen and pelvis was performed  Axial, sagittal, and coronal 2D reformatted images were created from the source data and submitted for interpretation      Radiation dose length product (DLP) for this visit:  901 mGy-cm    This examination, like all CT scans performed [facility], was performed utilizing techniques to minimize radiation dose exposure, including the use of iterative   reconstruction and automated exposure control  IV Contrast:  100 mL of iohexol (OMNIPAQUE)  Enteric Contrast:  Enteric contrast was not administered  FINDINGS:    ABDOMEN    LOWER CHEST:  Partially imaged large hiatal hernia  LIVER/BILIARY TREE:  Fatty infiltration    GALLBLADDER:  No calcified gallstones  No pericholecystic inflammatory change  SPLEEN:  Unremarkable  PANCREAS:  Unremarkable  ADRENAL GLANDS:  15 mm left adrenal nodule is likely an adenoma  KIDNEYS/URETERS:  Severe chronic right hydronephrosis and hydroureter with an abrupt transition point of the distal right ureter unchanged from the prior study  No new left renal findings  STOMACH AND BOWEL:  Mild low-density thickening of the distal sigmoid colon and rectum    APPENDIX:  No findings to suggest appendicitis  ABDOMINOPELVIC CAVITY:  No ascites or free intraperitoneal air  No lymphadenopathy  VESSELS:  Unremarkable for patient's age  PELVIS    REPRODUCTIVE ORGANS:  Unremarkable for patient's age  URINARY BLADDER:  Circumferential bladder wall thickening and surrounding fat stranding in keeping with a cystitis  ABDOMINAL WALL/INGUINAL REGIONS:  Unremarkable  OSSEOUS STRUCTURES:  No acute fracture or destructive osseous lesion  Impression:       Circumferential bladder wall thickening in keeping with cystitis  Chronic right hydroureter nephrosis and hydroureter unchanged from the prior study  Partially imaged large hiatal hernia  The study was marked in [facility] for immediate notification  Thank you for allowing me to participate in this patients care  Please do not hesitate to call with any additional questions    JANN Melgar

## 2018-12-20 NOTE — PROGRESS NOTES
Progress Note - Cesar Ruiz 1960, 63 y o  female MRN: [id number]    Unit/Bed#: -01 Encounter: [id number]    Primary Care Provider: Conni Hodgkin, DC   Date and time admitted to hospital: 12/19/2018  7:06 PM    Hematuria   Assessment & Plan    Was scheduled for cystoscopy yesterday  Given Hx hematuria and anemia  Urology consult     History of DVT (deep vein thrombosis)   Assessment & Plan    · Diagnosed 2 weeks ago per pt  · Anticoagulated on Eliquis- Continue  · SCD's  Malignant neoplasm of overlapping sites of left breast in female, estrogen receptor positive ([facility] [address] )   Assessment & Plan    · Stage IIIA left breast cancer, grade II, invasive lobular, ER 75% positive, [place] 15% positive, HER2 negative  · S/p mastectomy and axillary LN dissection resulting in IBAN  · Finished final chemotherapy on 12/13/18  · Referred to Dr Fatuma Oneil for post-mastectomy radiation  · Following with Dr Abram Chu as outpatient  * Anemia following use of chemotherapeutic drug   Assessment & Plan    · Presents with generalized weakness  · History of chemotherapy induced anemia requiring 2 units PRBCs  · Hgb noted to be 4 0 on admission  · 3 units PRBCs ordered in ED  · Post-transfusion CBC 12  · If hemoccult stool negative, no further GI w/u needed  · Note Hematuria       VTE Pharmacologic Prophylaxis: Pharmacologic VTE Prophylaxis contraindicated due to eliquis  Mechanical: Mechanical VTE prophylaxis in place  Patient Centered Rounds: I have performed bedside rounds with nursing staff today  Discussions with Specialists or Other Care Team Provider:     Education and Discussions with Family / Patient:     Time Spent for Care: More than 50% of total time spent on counseling and coordination of care as described above      Current Length of Stay: 1 day(s)    Current Patient Status: Inpatient   Certification Statement: The patient will continue to require additional inpatient hospital stay due to as above    Discharge Plan:    Code Status: Level 1 - Full Code      Subjective: better since transfusion; chart review shows similar problem in past     Objective:     Vitals:   Temp (24hrs), Av 4 °F (36 9 °C), Min:97 6 °F (36 4 °C), Max:99 5 °F (37 5 °C)    Temp:  [97 6 °F (36 4 °C)-99 5 °F (37 5 °C)] 98 6 °F (37 °C)  HR:  [] 94  Resp:  [16-20] 18  BP: (108-153)/(66-83) 124/80  SpO2:  [93 %-97 %] 96 %  Body mass index is 22 93 kg/m²  Input and Output Summary (last 24 hours): Intake/Output Summary (Last 24 hours) at 18 1644  Last data filed at 18 1318   Gross per 24 hour   Intake             2050 ml   Output              200 ml   Net             1850 ml       Physical Exam   Eyes: Pupils are equal, round, and reactive to light  Cardiovascular: Normal heart sounds  Pulmonary/Chest: Effort normal    Abdominal: Soft  Neurological: She is alert  Skin: There is pallor  Additional Data:     Labs:      Results from last 7 days  Lab Units 18  0508 18  1933   WBC Thousand/uL 10 13 10 03   HEMOGLOBIN g/dL 12 4 4 0*   HEMATOCRIT % 35 4 12 0*   PLATELETS Thousands/uL 287 372   LYMPHO PCT %  --  3*   MONO PCT %  --  13*   EOS PCT %  --  0       Results from last 7 days  Lab Units 18  0508 18  1933   POTASSIUM mmol/L 3 9 3 4*   CHLORIDE mmol/L 100 93*   CO2 mmol/L 21 20*   BUN mg/dL 22 23   CREATININE mg/dL 0 90 0 98   CALCIUM mg/dL 8 9 9 1   ALK PHOS U/L  --  149*   ALT U/L  --  53   AST U/L  --  123*       Results from last 7 days  Lab Units 18  1933   INR  1 32*       * I Have Reviewed All Lab Data Listed Above      Imaging:  Imaging Personally Reviewed by Myself Includes:     Cultures:   Blood Culture: No results found for: BLOODCX  Urine Culture: No results found for: URINECX  Sputum Culture: No components found for: SPUTUMCX  Wound Culture: No results found for: WOUNDCULT    Last 24 Hours Medication List:     Current Facility-Administered Medications:  acetaminophen 650 mg Oral Q6H PRN Tatyana Chimes, PA-C    apixaban 5 mg Oral BID Tatyana Chimes, PA-C    escitalopram 10 mg Oral Daily Tatyana Chimes, PA-C    ferrous sulfate 325 mg Oral BID With Meals Tatyana Chimes, PA-C    LORazepam 1 mg Oral BID PRN Tatyana Chimes, PA-C    metoclopramide 10 mg Oral 4x Daily PRN Tatyana Chimes, PA-C    ondansetron 4 mg Intravenous Q6H PRN Tatyana Chimes, PA-C    sertraline 50 mg Oral HS Tatyana Chimes, PA-C    sodium chloride 50 mL/hr Intravenous Continuous Tatyana Chimes, PA-C Last Rate: 50 mL/hr (12/20/18 0347)   SUMAtriptan 100 mg Oral Once PRN Tatyana Chimes, PA-C    verapamil 240 mg Oral Daily Tatyana Chimes, PA-C         Today, Patient Was Seen By: Fredi Dominguez MD    ** Please Note: Dragon 360 Dictation voice to text software may have been used in the creation of this document   **

## 2018-12-20 NOTE — ASSESSMENT & PLAN NOTE
· Presents with generalized weakness  · History of chemotherapy induced anemia requiring 2 units PRBCs  · Hgb noted to be 4 0 on admission  · 3 units PRBCs ordered in ED    · Post-transfusion CBC 12  · If hemoccult stool negative, no further GI w/u needed  · Note Hematuria

## 2018-12-20 NOTE — ASSESSMENT & PLAN NOTE
· Presents with generalized weakness  · History of chemotherapy induced anemia requiring 2 units PRBCs  · Hgb noted to be 4 0 on admission  · 3 units PRBCs ordered in ED  · Post-transfusion CBC  · Monitor Hgb and transfuse as necessary  · Check occult stools, iron panel, B12, Folate

## 2018-12-21 ENCOUNTER — TELEPHONE (OUTPATIENT)
Dept: OTHER | Facility: HOSPITAL | Age: 58
End: 2018-12-21

## 2018-12-21 VITALS
DIASTOLIC BLOOD PRESSURE: 70 MMHG | BODY MASS INDEX: 22.81 KG/M2 | RESPIRATION RATE: 18 BRPM | OXYGEN SATURATION: 93 % | HEART RATE: 96 BPM | WEIGHT: 133.6 LBS | TEMPERATURE: 98.5 F | HEIGHT: 64 IN | SYSTOLIC BLOOD PRESSURE: 101 MMHG

## 2018-12-21 LAB
ANISOCYTOSIS BLD QL SMEAR: PRESENT
BASOPHILS # BLD MANUAL: 0 THOUSAND/UL (ref 0–0.1)
BASOPHILS NFR MAR MANUAL: 0 % (ref 0–1)
EOSINOPHIL # BLD MANUAL: 0 THOUSAND/UL (ref 0–0.4)
EOSINOPHIL NFR BLD MANUAL: 0 % (ref 0–6)
ERYTHROCYTE [DISTWIDTH] IN BLOOD BY AUTOMATED COUNT: 23.3 % (ref 11.6–15.1)
HCT VFR BLD AUTO: 34.3 % (ref 34.8–46.1)
HEMOCCULT STL QL: NEGATIVE
HGB BLD-MCNC: 12.2 G/DL (ref 11.5–15.4)
LYMPHOCYTES # BLD AUTO: 0.12 THOUSAND/UL (ref 0.6–4.47)
LYMPHOCYTES # BLD AUTO: 1 % (ref 14–44)
MCH RBC QN AUTO: 35 PG (ref 26.8–34.3)
MCHC RBC AUTO-ENTMCNC: 35.6 G/DL (ref 31.4–37.4)
MCV RBC AUTO: 98 FL (ref 82–98)
MONOCYTES # BLD AUTO: 1.16 THOUSAND/UL (ref 0–1.22)
MONOCYTES NFR BLD: 10 % (ref 4–12)
NEUTROPHILS # BLD MANUAL: 10.31 THOUSAND/UL (ref 1.85–7.62)
NEUTS BAND NFR BLD MANUAL: 6 % (ref 0–8)
NEUTS SEG NFR BLD AUTO: 83 % (ref 43–75)
NRBC BLD AUTO-RTO: 0 /100 WBCS
PLATELET # BLD AUTO: 318 THOUSANDS/UL (ref 149–390)
PLATELET BLD QL SMEAR: ADEQUATE
PMV BLD AUTO: 9.9 FL (ref 8.9–12.7)
RBC # BLD AUTO: 3.49 MILLION/UL (ref 3.81–5.12)
TOTAL CELLS COUNTED SPEC: 100
WBC # BLD AUTO: 11.58 THOUSAND/UL (ref 4.31–10.16)

## 2018-12-21 PROCEDURE — 85027 COMPLETE CBC AUTOMATED: CPT | Performed by: PHYSICIAN ASSISTANT

## 2018-12-21 PROCEDURE — 99239 HOSP IP/OBS DSCHRG MGMT >30: CPT | Performed by: INTERNAL MEDICINE

## 2018-12-21 PROCEDURE — 99232 SBSQ HOSP IP/OBS MODERATE 35: CPT | Performed by: NURSE PRACTITIONER

## 2018-12-21 PROCEDURE — 82272 OCCULT BLD FECES 1-3 TESTS: CPT | Performed by: PHYSICIAN ASSISTANT

## 2018-12-21 PROCEDURE — 85007 BL SMEAR W/DIFF WBC COUNT: CPT | Performed by: PHYSICIAN ASSISTANT

## 2018-12-21 RX ORDER — FERROUS SULFATE 325(65) MG
325 TABLET ORAL 2 TIMES DAILY WITH MEALS
Qty: 60 TABLET | Refills: 0 | Status: SHIPPED | OUTPATIENT
Start: 2018-12-22 | End: 2019-01-22 | Stop reason: ALTCHOICE

## 2018-12-21 RX ADMIN — VERAPAMIL HYDROCHLORIDE 240 MG: 120 TABLET, FILM COATED, EXTENDED RELEASE ORAL at 08:02

## 2018-12-21 RX ADMIN — APIXABAN 5 MG: 5 TABLET, FILM COATED ORAL at 17:06

## 2018-12-21 RX ADMIN — FERROUS SULFATE TAB 325 MG (65 MG ELEMENTAL FE) 325 MG: 325 (65 FE) TAB at 17:06

## 2018-12-21 RX ADMIN — FERROUS SULFATE TAB 325 MG (65 MG ELEMENTAL FE) 325 MG: 325 (65 FE) TAB at 08:02

## 2018-12-21 RX ADMIN — ESCITALOPRAM OXALATE 10 MG: 10 TABLET ORAL at 08:02

## 2018-12-21 RX ADMIN — HEPARIN 300 UNITS: 100 SYRINGE at 18:28

## 2018-12-21 RX ADMIN — APIXABAN 5 MG: 5 TABLET, FILM COATED ORAL at 08:01

## 2018-12-21 NOTE — TELEPHONE ENCOUNTER
Ms Les Locke is a 52-year-old female known to Dr Ford Macias awaiting cystoscopy and admitted for profound anemia and seen in consultation  Please contact patient had to reschedule office follow-up in 3 weeks time  Thank you

## 2018-12-21 NOTE — PLAN OF CARE
HEMATOLOGIC - ADULT     Maintains hematologic stability Adequate for Discharge        Nutrition/Hydration-ADULT     Nutrient/Hydration intake appropriate for improving, restoring or maintaining nutritional needs Adequate for Discharge        Potential for Falls     Patient will remain free of falls Adequate for Discharge        Prexisting or High Potential for Compromised Skin Integrity     Skin integrity is maintained or improved Adequate for Discharge        SAFETY ADULT     Maintain or return to baseline ADL function Adequate for Discharge     Maintain or return mobility status to optimal level Adequate for Discharge

## 2018-12-21 NOTE — PLAN OF CARE
HEMATOLOGIC - ADULT     Maintains hematologic stability Progressing        Nutrition/Hydration-ADULT     Nutrient/Hydration intake appropriate for improving, restoring or maintaining nutritional needs Progressing        Potential for Falls     Patient will remain free of falls Progressing        SAFETY ADULT     Maintain or return to baseline ADL function Progressing     Maintain or return mobility status to optimal level Progressing

## 2018-12-21 NOTE — PROGRESS NOTES
Progress Note - Calin Nicolas 63 y o  female MRN: [id number]    Unit/Bed#: -01 Encounter: [id number]      Assessment:  Ms Eve García is a 15-year-old female with stage III breast cancer status post recent completion chemotherapy, admitted for profound anemia with presenting hemoglobin of 4; known to our group for recent complaints of hematuria, pending outpatient cystoscopy, canceled secondary to low blood count  Additional  history includes chronic right hydronephrosis secondary to torturous right ureter  Hemoglobin has remained relatively stable post transfusion of 3 units packed RBCs at 12  Of note, patient is chronically anticoagulated for history of DVT; on Eliquis  No doses held  Nursing staff have collected serial urine with inspection of joselyn to light blush urine without clots  Plan:  May discontinue serial urine collection  Hemoglobin stable  Patient is asymptomatic  Not inclined to believe level of anemia on presentation was associated with degree of hematuria  Continue medical optimization  Our office will reach out to patient to reschedule cystoscopy with Dr Douglas Eldridge once fully recovered  No further  intervention indicated during this hospital stay  Will sign off  Please do not hesitate to contact our office with any questions or new urologic concerns  Subjective:  Denies fever, chills, flank, abdominal, suprapubic or groin pain,or dysuria  Objective:     Vitals: Blood pressure 136/74, pulse (!) 121, temperature 97 5 °F (36 4 °C), temperature source Oral, resp  rate 18, height 5' 4" (1 626 m), weight 60 6 kg (133 lb 9 6 oz), SpO2 97 %, not currently breastfeeding  ,Body mass index is 22 93 kg/m²        Intake/Output Summary (Last 24 hours) at 12/21/18 0936  Last data filed at 12/21/18 0804   Gross per 24 hour   Intake                0 ml   Output              525 ml   Net             -525 ml       Physical Exam: General appearance: alert and oriented, in no acute distress, appears stated age, cooperative and no distress  Head: Normocephalic, without obvious abnormality, atraumatic, Alopecia  Lungs: diminished breath sounds  Heart: regular rate and rhythm, S1, S2 normal, no murmur, click, rub or gallop  Abdomen: soft, non-tender; bowel sounds normal; no masses,  no organomegaly  Extremities: extremities normal, warm and well-perfused; no cyanosis, clubbing, or edema  Pulses: 2+ and symmetric  Neurologic: Grossly normal  Urine collected and wrapped in bathroom in small specimen containers--joselyn--to light blush     Invasive Devices     Central Venous Catheter Line            Port A Cath 12/19/18 Right Chest 1 day              Lab Results   Component Value Date    WBC 11 58 (H) 12/21/2018    HGB 12 2 12/21/2018    HCT 34 3 (L) 12/21/2018    MCV 98 12/21/2018     12/21/2018     Lab Results   Component Value Date    CALCIUM 8 9 12/20/2018    K 3 9 12/20/2018    CO2 21 12/20/2018     12/20/2018    BUN 22 12/20/2018    CREATININE 0 90 12/20/2018         Lab, Imaging and other studies: I have personally reviewed pertinent reports

## 2018-12-21 NOTE — DISCHARGE SUMMARY
Discharge Summary - [facility] Internal Medicine    Patient Information: Brian Julien 63 y o  female MRN: [id number]  Unit/Bed#: -01 Encounter: [id number]    Discharging Physician / Practitioner: Gayatri Greene MD  PCP: Sally Tarango DC  Admission Date: 12/19/2018  Discharge Date: 12/21/18    Reason for Admission: anemia    Discharge Diagnoses:     Principal Problem:    Anemia following use of chemotherapeutic drug  Active Problems:    Malignant neoplasm of overlapping sites of left breast in female, estrogen receptor positive ([address] )    History of DVT (deep vein thrombosis)    Hematuria  Resolved Problems:    * No resolved hospital problems  *      Consultations During Hospital Stay:  · Dr Dallin Jorgensen urology    Procedures Performed:     · none      Incidental Findings:   · none    Test Results Pending at Discharge (will require follow up):   · none     Outpatient Tests Requested:  · none    Complications: none    Hospital Course:     Brian Julien is a 63 y o  female patient who originally presented to the hospital on 12/19/2018 due to severe anemia likely chemo related  She responded to 3u PRBC with rpt Hb 12  She was also reported to have hematuria and scheduled for outpt cystoscopy at presentation  Urology saw patient but her hematuria settled and they will follow as outpt  She had recent DVT/PE with known Lt breast cancer currently getting chemotherapy  Stable at discharge  Condition at Discharge: fair     Discharge Day Visit / Exam:     Vitals: Blood Pressure: 101/70 (12/21/18 1613)  Pulse: 96 (12/21/18 1613)  Temperature: 98 5 °F (36 9 °C) (12/21/18 1613)  Temp Source: Oral (12/21/18 1613)  Respirations: 18 (12/21/18 1613)  Height: 5' 4" (162 6 cm) (12/19/18 1738)  Weight - Scale: 60 6 kg (133 lb 9 6 oz) (12/19/18 1738)  SpO2: 93 % (12/21/18 1613)  Exam:   Physical Exam   HENT:   Head: Normocephalic  Eyes: Pupils are equal, round, and reactive to light  Cardiovascular: Normal heart sounds  Pulmonary/Chest: Effort normal    Abdominal: Soft  Neurological: She is alert  Skin: There is pallor  Discharge instructions/Information to patient and family:   See after visit summary for information provided to patient and family  Provisions for Follow-Up Care:  See after visit summary for information related to follow-up care and any pertinent home health orders  Disposition: Home    Discharge Statement:  I spent 35 minutes discharging the patient  This time was spent on the day of discharge  I had direct contact with the patient on the day of discharge  Greater than 50% of the total time was spent examining patient, answering all patient questions, arranging and discussing plan of care with patient as well as directly providing post-discharge instructions  Additional time then spent on discharge activities  Discharge Medications:  See after visit summary for reconciled discharge medications provided to patient and family  ** Please Note: Dragon 360 Dictation voice to text software may have been used in the creation of this document   **

## 2018-12-24 NOTE — TELEPHONE ENCOUNTER
Patient has been discharged  Last time patient was seen was 4/30/2018 and Dr Sally Correa note stated:    Plan:  She will continue to follow with Dr Zandra Cooks, I will be happy to see her again in the future should any symptoms developed  She mentions concerns about financial toxicity of treatments and as such I do not believe that her following with me on a regular basis is absolutely necessary at this time  I do not see that patient was ever scheduled for cysto in our office, and there is no documentation that this was needed  Please advise on follow up  Thank you

## 2018-12-30 ENCOUNTER — HOSPITAL ENCOUNTER (INPATIENT)
Facility: HOSPITAL | Age: 58
LOS: 7 days | Discharge: HOME | DRG: 690 | End: 2019-01-06
Attending: EMERGENCY MEDICINE | Admitting: INTERNAL MEDICINE
Payer: COMMERCIAL

## 2018-12-30 ENCOUNTER — APPOINTMENT (EMERGENCY)
Dept: RADIOLOGY | Facility: HOSPITAL | Age: 58
DRG: 690 | End: 2018-12-30
Payer: COMMERCIAL

## 2018-12-30 ENCOUNTER — APPOINTMENT (EMERGENCY)
Dept: CT IMAGING | Facility: HOSPITAL | Age: 58
DRG: 690 | End: 2018-12-30
Payer: COMMERCIAL

## 2018-12-30 DIAGNOSIS — R19.5 HEME POSITIVE STOOL: ICD-10-CM

## 2018-12-30 DIAGNOSIS — N30.91 HEMORRHAGIC CYSTITIS: ICD-10-CM

## 2018-12-30 DIAGNOSIS — E80.6 HYPERBILIRUBINEMIA: ICD-10-CM

## 2018-12-30 DIAGNOSIS — D62 ACUTE BLOOD LOSS ANEMIA: ICD-10-CM

## 2018-12-30 DIAGNOSIS — D64.81 ANEMIA FOLLOWING USE OF CHEMOTHERAPEUTIC DRUG: ICD-10-CM

## 2018-12-30 DIAGNOSIS — Z86.718 HISTORY OF DVT (DEEP VEIN THROMBOSIS): ICD-10-CM

## 2018-12-30 DIAGNOSIS — R53.1 GENERALIZED WEAKNESS: ICD-10-CM

## 2018-12-30 DIAGNOSIS — R31.9 HEMATURIA: ICD-10-CM

## 2018-12-30 DIAGNOSIS — Z17.0 MALIGNANT NEOPLASM OF OVERLAPPING SITES OF LEFT BREAST IN FEMALE, ESTROGEN RECEPTOR POSITIVE (HCC): ICD-10-CM

## 2018-12-30 DIAGNOSIS — C50.812 MALIGNANT NEOPLASM OF OVERLAPPING SITES OF LEFT BREAST IN FEMALE, ESTROGEN RECEPTOR POSITIVE (HCC): ICD-10-CM

## 2018-12-30 DIAGNOSIS — R50.9 FEVER: Primary | ICD-10-CM

## 2018-12-30 PROBLEM — I10 ESSENTIAL HYPERTENSION: Status: ACTIVE | Noted: 2018-12-30

## 2018-12-30 PROBLEM — D84.9 IMMUNOCOMPROMISED (HCC): Status: ACTIVE | Noted: 2018-12-30

## 2018-12-30 PROBLEM — E44.0 MODERATE PROTEIN-CALORIE MALNUTRITION (HCC): Status: ACTIVE | Noted: 2018-12-30

## 2018-12-30 PROBLEM — I82.402 ACUTE DEEP VEIN THROMBOSIS (DVT) OF LEFT LOWER EXTREMITY (HCC): Status: ACTIVE | Noted: 2018-12-30

## 2018-12-30 LAB
ALBUMIN SERPL BCP-MCNC: 1.8 G/DL (ref 3.5–5)
ALP SERPL-CCNC: 190 U/L (ref 46–116)
ALT SERPL W P-5'-P-CCNC: 30 U/L (ref 12–78)
ANION GAP SERPL CALCULATED.3IONS-SCNC: 10 MMOL/L (ref 4–13)
APTT PPP: 55 SECONDS (ref 26–38)
APTT PPP: 59 SECONDS (ref 26–38)
AST SERPL W P-5'-P-CCNC: 49 U/L (ref 5–45)
ATRIAL RATE: 84 BPM
BACTERIA UR QL AUTO: ABNORMAL /HPF
BASOPHILS # BLD AUTO: 0.07 THOUSANDS/ΜL (ref 0–0.1)
BASOPHILS NFR BLD AUTO: 1 % (ref 0–1)
BILIRUB DIRECT SERPL-MCNC: 0.72 MG/DL (ref 0–0.2)
BILIRUB SERPL-MCNC: 1.3 MG/DL (ref 0.2–1)
BILIRUB UR QL STRIP: ABNORMAL
BUN SERPL-MCNC: 17 MG/DL (ref 5–25)
CALCIUM SERPL-MCNC: 8.8 MG/DL (ref 8.3–10.1)
CHLORIDE SERPL-SCNC: 102 MMOL/L (ref 100–108)
CLARITY UR: ABNORMAL
CO2 SERPL-SCNC: 26 MMOL/L (ref 21–32)
COLOR UR: ABNORMAL
CREAT SERPL-MCNC: 0.98 MG/DL (ref 0.6–1.3)
EOSINOPHIL # BLD AUTO: 0.07 THOUSAND/ΜL (ref 0–0.61)
EOSINOPHIL NFR BLD AUTO: 1 % (ref 0–6)
ERYTHROCYTE [DISTWIDTH] IN BLOOD BY AUTOMATED COUNT: 20.1 % (ref 11.6–15.1)
ERYTHROCYTE [DISTWIDTH] IN BLOOD BY AUTOMATED COUNT: 20.4 % (ref 11.6–15.1)
GFR SERPL CREATININE-BSD FRML MDRD: 64 ML/MIN/1.73SQ M
GLUCOSE SERPL-MCNC: 81 MG/DL (ref 65–140)
GLUCOSE UR STRIP-MCNC: NEGATIVE MG/DL
HCT VFR BLD AUTO: 29.3 % (ref 34.8–46.1)
HCT VFR BLD AUTO: 31.6 % (ref 34.8–46.1)
HGB BLD-MCNC: 10.4 G/DL (ref 11.5–15.4)
HGB BLD-MCNC: 9.4 G/DL (ref 11.5–15.4)
HGB UR QL STRIP.AUTO: ABNORMAL
IMM GRANULOCYTES # BLD AUTO: 0.22 THOUSAND/UL (ref 0–0.2)
IMM GRANULOCYTES NFR BLD AUTO: 2 % (ref 0–2)
INR PPP: 2.29 (ref 0.86–1.17)
INR PPP: 2.32 (ref 0.86–1.17)
KETONES UR STRIP-MCNC: ABNORMAL MG/DL
LACTATE SERPL-SCNC: 1.2 MMOL/L (ref 0.5–2)
LEUKOCYTE ESTERASE UR QL STRIP: ABNORMAL
LYMPHOCYTES # BLD AUTO: 0.48 THOUSANDS/ΜL (ref 0.6–4.47)
LYMPHOCYTES NFR BLD AUTO: 4 % (ref 14–44)
MCH RBC QN AUTO: 34.7 PG (ref 26.8–34.3)
MCH RBC QN AUTO: 35.3 PG (ref 26.8–34.3)
MCHC RBC AUTO-ENTMCNC: 32.1 G/DL (ref 31.4–37.4)
MCHC RBC AUTO-ENTMCNC: 32.9 G/DL (ref 31.4–37.4)
MCV RBC AUTO: 107 FL (ref 82–98)
MCV RBC AUTO: 108 FL (ref 82–98)
MONOCYTES # BLD AUTO: 1.02 THOUSAND/ΜL (ref 0.17–1.22)
MONOCYTES NFR BLD AUTO: 8 % (ref 4–12)
NEUTROPHILS # BLD AUTO: 10.93 THOUSANDS/ΜL (ref 1.85–7.62)
NEUTS SEG NFR BLD AUTO: 84 % (ref 43–75)
NITRITE UR QL STRIP: POSITIVE
NON-SQ EPI CELLS URNS QL MICRO: ABNORMAL /HPF
NRBC BLD AUTO-RTO: 0 /100 WBCS
P AXIS: 44 DEGREES
PH UR STRIP.AUTO: 6 [PH] (ref 4.5–8)
PLATELET # BLD AUTO: 344 THOUSANDS/UL (ref 149–390)
PLATELET # BLD AUTO: 388 THOUSANDS/UL (ref 149–390)
PMV BLD AUTO: 10.3 FL (ref 8.9–12.7)
PMV BLD AUTO: 10.7 FL (ref 8.9–12.7)
POTASSIUM SERPL-SCNC: 3.7 MMOL/L (ref 3.5–5.3)
PR INTERVAL: 184 MS
PROCALCITONIN SERPL-MCNC: 0.52 NG/ML
PROT SERPL-MCNC: 5.9 G/DL (ref 6.4–8.2)
PROT UR STRIP-MCNC: ABNORMAL MG/DL
PROTHROMBIN TIME: 24.5 SECONDS (ref 11.8–14.2)
PROTHROMBIN TIME: 24.8 SECONDS (ref 11.8–14.2)
QRS AXIS: 25 DEGREES
QRSD INTERVAL: 86 MS
QT INTERVAL: 340 MS
QTC INTERVAL: 401 MS
RBC # BLD AUTO: 2.71 MILLION/UL (ref 3.81–5.12)
RBC # BLD AUTO: 2.95 MILLION/UL (ref 3.81–5.12)
RBC #/AREA URNS AUTO: ABNORMAL /HPF
SODIUM SERPL-SCNC: 138 MMOL/L (ref 136–145)
SP GR UR STRIP.AUTO: 1.02 (ref 1–1.03)
T WAVE AXIS: 60 DEGREES
TROPONIN I SERPL-MCNC: <0.02 NG/ML
UROBILINOGEN UR QL STRIP.AUTO: 4 E.U./DL
VENTRICULAR RATE: 84 BPM
WBC # BLD AUTO: 12.79 THOUSAND/UL (ref 4.31–10.16)
WBC # BLD AUTO: 13.13 THOUSAND/UL (ref 4.31–10.16)
WBC #/AREA URNS AUTO: ABNORMAL /HPF

## 2018-12-30 PROCEDURE — 81001 URINALYSIS AUTO W/SCOPE: CPT | Performed by: EMERGENCY MEDICINE

## 2018-12-30 PROCEDURE — 93005 ELECTROCARDIOGRAM TRACING: CPT

## 2018-12-30 PROCEDURE — 36415 COLL VENOUS BLD VENIPUNCTURE: CPT | Performed by: EMERGENCY MEDICINE

## 2018-12-30 PROCEDURE — 87086 URINE CULTURE/COLONY COUNT: CPT | Performed by: EMERGENCY MEDICINE

## 2018-12-30 PROCEDURE — 99223 1ST HOSP IP/OBS HIGH 75: CPT | Performed by: INTERNAL MEDICINE

## 2018-12-30 PROCEDURE — 99285 EMERGENCY DEPT VISIT HI MDM: CPT

## 2018-12-30 PROCEDURE — 83605 ASSAY OF LACTIC ACID: CPT | Performed by: EMERGENCY MEDICINE

## 2018-12-30 PROCEDURE — 87186 SC STD MICRODIL/AGAR DIL: CPT | Performed by: EMERGENCY MEDICINE

## 2018-12-30 PROCEDURE — 87631 RESP VIRUS 3-5 TARGETS: CPT | Performed by: EMERGENCY MEDICINE

## 2018-12-30 PROCEDURE — 87077 CULTURE AEROBIC IDENTIFY: CPT | Performed by: EMERGENCY MEDICINE

## 2018-12-30 PROCEDURE — 85610 PROTHROMBIN TIME: CPT | Performed by: INTERNAL MEDICINE

## 2018-12-30 PROCEDURE — 85027 COMPLETE CBC AUTOMATED: CPT | Performed by: INTERNAL MEDICINE

## 2018-12-30 PROCEDURE — 82272 OCCULT BLD FECES 1-3 TESTS: CPT

## 2018-12-30 PROCEDURE — 93010 ELECTROCARDIOGRAM REPORT: CPT | Performed by: INTERNAL MEDICINE

## 2018-12-30 PROCEDURE — 85610 PROTHROMBIN TIME: CPT | Performed by: EMERGENCY MEDICINE

## 2018-12-30 PROCEDURE — 80053 COMPREHEN METABOLIC PANEL: CPT | Performed by: EMERGENCY MEDICINE

## 2018-12-30 PROCEDURE — 85730 THROMBOPLASTIN TIME PARTIAL: CPT | Performed by: INTERNAL MEDICINE

## 2018-12-30 PROCEDURE — 96365 THER/PROPH/DIAG IV INF INIT: CPT

## 2018-12-30 PROCEDURE — 70450 CT HEAD/BRAIN W/O DYE: CPT

## 2018-12-30 PROCEDURE — 71046 X-RAY EXAM CHEST 2 VIEWS: CPT

## 2018-12-30 PROCEDURE — 82248 BILIRUBIN DIRECT: CPT | Performed by: INTERNAL MEDICINE

## 2018-12-30 PROCEDURE — 85025 COMPLETE CBC W/AUTO DIFF WBC: CPT | Performed by: EMERGENCY MEDICINE

## 2018-12-30 PROCEDURE — 84484 ASSAY OF TROPONIN QUANT: CPT | Performed by: EMERGENCY MEDICINE

## 2018-12-30 PROCEDURE — 84145 PROCALCITONIN (PCT): CPT | Performed by: EMERGENCY MEDICINE

## 2018-12-30 PROCEDURE — 85730 THROMBOPLASTIN TIME PARTIAL: CPT | Performed by: EMERGENCY MEDICINE

## 2018-12-30 PROCEDURE — 96367 TX/PROPH/DG ADDL SEQ IV INF: CPT

## 2018-12-30 PROCEDURE — 87040 BLOOD CULTURE FOR BACTERIA: CPT | Performed by: EMERGENCY MEDICINE

## 2018-12-30 RX ORDER — VANCOMYCIN HYDROCHLORIDE 1 G/200ML
15 INJECTION, SOLUTION INTRAVENOUS ONCE
Status: COMPLETED | OUTPATIENT
Start: 2018-12-30 | End: 2018-12-30

## 2018-12-30 RX ORDER — HEPARIN SODIUM 1000 [USP'U]/ML
4800 INJECTION, SOLUTION INTRAVENOUS; SUBCUTANEOUS AS NEEDED
Status: DISCONTINUED | OUTPATIENT
Start: 2018-12-30 | End: 2019-01-02

## 2018-12-30 RX ORDER — ONDANSETRON 2 MG/ML
4 INJECTION INTRAMUSCULAR; INTRAVENOUS EVERY 6 HOURS PRN
Status: DISCONTINUED | OUTPATIENT
Start: 2018-12-30 | End: 2019-01-06 | Stop reason: HOSPADM

## 2018-12-30 RX ORDER — HEPARIN SODIUM 1000 [USP'U]/ML
4800 INJECTION, SOLUTION INTRAVENOUS; SUBCUTANEOUS ONCE
Status: COMPLETED | OUTPATIENT
Start: 2018-12-30 | End: 2018-12-30

## 2018-12-30 RX ORDER — SUMATRIPTAN 25 MG/1
100 TABLET, FILM COATED ORAL ONCE AS NEEDED
Status: DISCONTINUED | OUTPATIENT
Start: 2018-12-30 | End: 2019-01-06 | Stop reason: HOSPADM

## 2018-12-30 RX ORDER — HEPARIN SODIUM 10000 [USP'U]/100ML
3-30 INJECTION, SOLUTION INTRAVENOUS
Status: DISCONTINUED | OUTPATIENT
Start: 2018-12-30 | End: 2019-01-02

## 2018-12-30 RX ORDER — ACETAMINOPHEN 325 MG/1
650 TABLET ORAL EVERY 6 HOURS PRN
Status: DISCONTINUED | OUTPATIENT
Start: 2018-12-30 | End: 2019-01-06 | Stop reason: HOSPADM

## 2018-12-30 RX ORDER — FERROUS SULFATE 325(65) MG
325 TABLET ORAL 2 TIMES DAILY WITH MEALS
Status: DISCONTINUED | OUTPATIENT
Start: 2018-12-31 | End: 2019-01-06 | Stop reason: HOSPADM

## 2018-12-30 RX ORDER — SODIUM CHLORIDE 9 MG/ML
100 INJECTION, SOLUTION INTRAVENOUS CONTINUOUS
Status: DISCONTINUED | OUTPATIENT
Start: 2018-12-30 | End: 2019-01-06 | Stop reason: HOSPADM

## 2018-12-30 RX ORDER — HEPARIN SODIUM 1000 [USP'U]/ML
2400 INJECTION, SOLUTION INTRAVENOUS; SUBCUTANEOUS AS NEEDED
Status: DISCONTINUED | OUTPATIENT
Start: 2018-12-30 | End: 2019-01-02

## 2018-12-30 RX ORDER — ESCITALOPRAM OXALATE 10 MG/1
10 TABLET ORAL DAILY
Status: DISCONTINUED | OUTPATIENT
Start: 2018-12-31 | End: 2019-01-06 | Stop reason: HOSPADM

## 2018-12-30 RX ORDER — METOCLOPRAMIDE 10 MG/1
10 TABLET ORAL 4 TIMES DAILY PRN
Status: DISCONTINUED | OUTPATIENT
Start: 2018-12-30 | End: 2019-01-06 | Stop reason: HOSPADM

## 2018-12-30 RX ORDER — LORAZEPAM 1 MG/1
1 TABLET ORAL 2 TIMES DAILY PRN
Status: DISCONTINUED | OUTPATIENT
Start: 2018-12-30 | End: 2019-01-06 | Stop reason: HOSPADM

## 2018-12-30 RX ADMIN — HEPARIN SODIUM AND DEXTROSE 18 UNITS/KG/HR: 10000; 5 INJECTION INTRAVENOUS at 21:24

## 2018-12-30 RX ADMIN — HEPARIN SODIUM 4800 UNITS: 1000 INJECTION, SOLUTION INTRAVENOUS; SUBCUTANEOUS at 21:23

## 2018-12-30 RX ADMIN — SERTRALINE HYDROCHLORIDE 50 MG: 50 TABLET ORAL at 21:22

## 2018-12-30 RX ADMIN — SODIUM CHLORIDE 1000 ML: 0.9 INJECTION, SOLUTION INTRAVENOUS at 12:26

## 2018-12-30 RX ADMIN — CEFEPIME HYDROCHLORIDE 2000 MG: 2 INJECTION, POWDER, FOR SOLUTION INTRAVENOUS at 12:30

## 2018-12-30 RX ADMIN — SODIUM CHLORIDE 100 ML/HR: 0.9 INJECTION, SOLUTION INTRAVENOUS at 21:35

## 2018-12-30 RX ADMIN — VANCOMYCIN HYDROCHLORIDE 1000 MG: 1 INJECTION, SOLUTION INTRAVENOUS at 13:11

## 2018-12-30 NOTE — ED PROVIDER NOTES
History  Chief Complaint   Patient presents with    Weakness - Generalized     Pt being treated for breast Ca, last trx 12/7, family reports pt is weak and has not eaten in 3 days r/t no appetite, pt recently admitted with Hgb of 4 and received 4 units of blood  Pt reports going to urology for blood in urine but unable to do testing r/t low Hgb  Pt on Eliquis r/t DVT in left leg     HPI     60-year-old female with history of breast cancer, recently finished chemotherapy on December 7th with upcoming radiation, DVT on Eliquis, who presents for evaluation of generalized weakness  Of note, patient was just discharged from the hospital 9 days ago after admission for anemia with hemoglobin of 4  During that admission patient received 3 units of packed red blood cells  She had hematuria that spontaneously resolved  Her Eliquis was discontinued and then restarted  Patient now reports that she is having blood in her stool  Reports pink blood mixed in with diarrhea multiple times a day  Also endorses continued blood in her urine  States that she has felt very weak and tired since 1 day after discharge  Denies fevers but is febrile to 100 4 here  No body aches  Denies chest pain, cough, or shortness of breath  No abdominal pain, nausea, or vomiting  Endorses multiple episodes of diarrhea daily  Endorses complete lack of appetite and has not ate or drank anything in the last 3 days  She tells me she feels very lightheaded with standing, and passed out in the bathroom 2 days ago, hit her head  Had some bleeding from a scalp black that resolved on its own so did not call EMS  Prior to Admission Medications   Prescriptions Last Dose Informant Patient Reported? Taking?    Elastic Bandages & Supports MISC  Self No Yes   Sig: by Does not apply route daily   LORazepam (ATIVAN) 1 mg tablet 12/30/2018 at Unknown time Self Yes Yes   Sig: Take 1 mg by mouth 2 (two) times a day as needed   SUMAtriptan (IMITREX) 100 mg tablet  Self Yes Yes   Sig: Take 100 mg by mouth once as needed for migraine   apixaban (ELIQUIS) 5 mg 12/30/2018 at Unknown time Self No Yes   Sig: Take 1 tablet (5 mg total) by mouth 2 (two) times a day   escitalopram (LEXAPRO) 10 mg tablet 12/30/2018 at Unknown time Self Yes Yes   Sig: Take 10 mg by mouth daily   ferrous sulfate 325 (65 Fe) mg tablet 12/30/2018 at Unknown time  No Yes   Sig: Take 1 tablet (325 mg total) by mouth 2 (two) times a day with meals   metoclopramide (REGLAN) 10 mg tablet  Self No Yes   Sig: Take 1 tablet (10 mg total) by mouth 4 (four) times a day as needed (nausea)   ondansetron (ZOFRAN) 4 mg tablet  Self No Yes   Sig: Take 1 tablet (4 mg total) by mouth every 6 (six) hours as needed for nausea or vomiting   sertraline (ZOLOFT) 50 mg tablet 12/30/2018 at Unknown time Self Yes Yes   Sig: Take 50 mg by mouth daily at bedtime   verapamil (VERELAN) 240 MG 24 hr capsule 12/30/2018 at Unknown time Self Yes Yes   Sig: Take 240 mg by mouth daily      Facility-Administered Medications: None       Past Medical History:   Diagnosis Date    Acute DVT (deep venous thrombosis) (HCC)     of LLE>     Congenital prolapsed rectum     Hydronephrosis     right kidney    Hypertension     Malignant neoplasm of overlapping sites of left female breast ([facility] [address]     Migraine        Past Surgical History:   Procedure Laterality Date    BREAST BIOPSY      COLON SURGERY      colon resection    TN INSJ TUNNELED CTR VAD W/SUBQ PORT AGE 5 YR/> N/A 6/26/2018    Procedure: INSERTION VENOUS PORT ( PORT-A-CATH) IR;  Surgeon: Mackenzie Carcamo DO;  [facility];  Service: Interventional Radiology    TN MASTECTOMY, MODIFIED RADICAL Left 5/15/2018    Procedure: BREAST MODIFIED RADICAL MASTECTOMY;  Surgeon: Jeff Gunderson MD;  Location[facility];  Service: Surgical Oncology    US GUIDANCE BREAST BIOPSY LEFT EACH ADDITIONAL Left 4/3/2018    US GUIDED BREAST BIOPSY LEFT COMPLETE Left 4/3/2018    US GUIDED BREAST LYMPH NODE BIOPSY LEFT Left 4/3/2018       Family History   Problem Relation Age of Onset    No Known Problems Mother     No Known Problems Father      I have reviewed and agree with the history as documented  Social History   Substance Use Topics    Smoking status: Never Smoker    Smokeless tobacco: Never Used    Alcohol use No        Review of Systems   Constitutional: Positive for appetite change and fatigue  Negative for chills and fever  HENT: Negative for congestion  Eyes: Negative for visual disturbance  Respiratory: Negative for cough and shortness of breath  Cardiovascular: Negative for chest pain and leg swelling  Gastrointestinal: Positive for blood in stool and diarrhea  Negative for abdominal pain, nausea and vomiting  Genitourinary: Positive for hematuria  Negative for dysuria, flank pain and frequency  Musculoskeletal: Negative for arthralgias, back pain, neck pain and neck stiffness  Skin: Negative for rash  Neurological: Positive for weakness (generalized)  Negative for numbness and headaches  Psychiatric/Behavioral: Negative for agitation, behavioral problems and confusion  Physical Exam  Physical Exam   Constitutional: She is oriented to person, place, and time  No distress  Thin, appears ill   HENT:   Head: Normocephalic  Right Ear: External ear normal    Left Ear: External ear normal    Nose: Nose normal    Mouth/Throat: Oropharynx is clear and moist    Healing superficial lac to the R parietal scalp   Eyes: Pupils are equal, round, and reactive to light  Conjunctivae and EOM are normal    Neck: Normal range of motion  Neck supple  Cardiovascular: Normal rate, regular rhythm, normal heart sounds and intact distal pulses  Exam reveals no gallop and no friction rub  No murmur heard  Pulmonary/Chest: Effort normal and breath sounds normal  No respiratory distress  She has no wheezes  She has no rales  Abdominal: Soft   Bowel sounds are normal  She exhibits no distension  There is no tenderness  There is no guarding  Genitourinary:   Genitourinary Comments: Hemoccult positive light brown stool in the rectal vault   Musculoskeletal: Normal range of motion  She exhibits no edema or deformity  Neurological: She is alert and oriented to person, place, and time  She exhibits normal muscle tone  Skin: Skin is warm and dry  She is not diaphoretic  There is pallor     Psychiatric: Thought content normal        Vital Signs  ED Triage Vitals [12/30/18 1107]   Temperature Pulse Respirations Blood Pressure SpO2   99 6 °F (37 6 °C) 93 18 106/68 94 %      Temp Source Heart Rate Source Patient Position - Orthostatic VS BP Location FiO2 (%)   Oral Monitor Lying Right arm --      Pain Score       No Pain           Vitals:    12/30/18 1400 12/30/18 1409 12/30/18 1444 12/30/18 1505   BP: 104/60 104/60 99/65 100/58   Pulse: 76 75 78 79   Patient Position - Orthostatic VS: Lying Lying  Lying       Visual Acuity      ED Medications  Medications   escitalopram (LEXAPRO) tablet 10 mg (not administered)   ferrous sulfate tablet 325 mg (not administered)   LORazepam (ATIVAN) tablet 1 mg (not administered)   metoclopramide (REGLAN) tablet 10 mg (not administered)   sertraline (ZOLOFT) tablet 50 mg (not administered)   SUMAtriptan (IMITREX) tablet 100 mg (not administered)   verapamil (CALAN-SR) CR tablet 240 mg (not administered)   cefepime (MAXIPIME) 2 g/50 mL dextrose IVPB (0 mg Intravenous Stopped 12/30/18 1309)   vancomycin (VANCOCIN) IVPB (premix) 1,000 mg (0 mg/kg × 61 kg Intravenous Stopped 12/30/18 1429)   sodium chloride 0 9 % bolus 1,000 mL (0 mL Intravenous Stopped 12/30/18 1447)       Diagnostic Studies  Results Reviewed     Procedure Component Value Units Date/Time    Procalcitonin [[id number]]  (Abnormal) Collected:  12/30/18 1153    Lab Status:  Final result Specimen:  Blood from Arm, Left Updated:  12/30/18 3368     Procalcitonin 0 52 (H) ng/ml     Urine Microscopic [[id number]]  (Abnormal) Collected:  12/30/18 1219    Lab Status:  Final result Specimen:  Urine from Urine, Clean Catch Updated:  12/30/18 1242     RBC, UA Innumerable (A) /hpf      WBC, UA       Field obscured, unable to enumerate (A)     /hpf     Epithelial Cells       Field obscured, unable to enumerate (A)     /hpf     Bacteria, UA       Field obscured, unable to enumerate (A)     /hpf    Urine culture [[id number]] Collected:  12/30/18 1219    Lab Status: In process Specimen:  Urine from Urine, Clean Catch Updated:  12/30/18 1242    UA w Reflex to Microscopic w Reflex to Culture [[id number]]  (Abnormal) Collected:  12/30/18 1219    Lab Status:  Final result Specimen:  Urine from Urine, Clean Catch Updated:  12/30/18 1238     Color, UA Red     Clarity, UA Cloudy     Specific Gravity, UA 1 025     pH, UA 6 0     Leukocytes, UA Trace (A)     Nitrite, UA Positive (A)     Protein,  (2+) (A) mg/dl      Glucose, UA Negative mg/dl      Ketones, UA Trace (A) mg/dl      Urobilinogen, UA 4 0 (A) E U /dl      Bilirubin, UA Moderate (A)     Blood, UA Large (A)    Influenza A/B and RSV by PCR [[id number]] Collected:  12/30/18 1228    Lab Status: In process Specimen:  Nasopharyngeal from Nasopharyngeal Swab Updated:  12/30/18 1230    Protime-INR [[id number]]  (Abnormal) Collected:  12/30/18 1153    Lab Status:  Final result Specimen:  Blood from Arm, Left Updated:  12/30/18 1227     Protime 24 5 (H) seconds      INR 2 29 (H)    APTT [[id number]]  (Abnormal) Collected:  12/30/18 1153    Lab Status:  Final result Specimen:  Blood from Arm, Left Updated:  12/30/18 1227     PTT 55 (H) seconds     Lactic acid x2 [[id number]]  (Normal) Collected:  12/30/18 1153    Lab Status:  Final result Specimen:  Blood from Arm, Left Updated:  12/30/18 1225     LACTIC ACID 1 2 mmol/L     Narrative:         Result may be elevated if tourniquet was used during collection      Troponin I [[id number]]  (Normal) Collected:  12/30/18 1153    Lab Status:  Final result Specimen:  Blood from Arm, Left Updated:  12/30/18 1225     Troponin I <0 02 ng/mL     Comprehensive metabolic panel [[id number]]  (Abnormal) Collected:  12/30/18 1153    Lab Status:  Final result Specimen:  Blood from Arm, Left Updated:  12/30/18 1223     Sodium 138 mmol/L      Potassium 3 7 mmol/L      Chloride 102 mmol/L      CO2 26 mmol/L      ANION GAP 10 mmol/L      BUN 17 mg/dL      Creatinine 0 98 mg/dL      Glucose 81 mg/dL      Calcium 8 8 mg/dL      AST 49 (H) U/L      ALT 30 U/L      Alkaline Phosphatase 190 (H) U/L      Total Protein 5 9 (L) g/dL      Albumin 1 8 (L) g/dL      Total Bilirubin 1 30 (H) mg/dL      eGFR 64 ml/min/1 73sq m     Narrative:         National Kidney Disease Education Program recommendations are as follows:  GFR calculation is accurate only with a steady state creatinine  Chronic Kidney disease less than 60 ml/min/1 73 sq  meters  Kidney failure less than 15 ml/min/1 73 sq  meters  Blood culture #2 [[id number]] Collected:  12/30/18 1216    Lab Status:   In process Specimen:  Blood from Arm, Right Updated:  12/30/18 1218    CBC and differential [[id number]]  (Abnormal) Collected:  12/30/18 1153    Lab Status:  Final result Specimen:  Blood from Arm, Left Updated:  12/30/18 1205     WBC 12 79 (H) Thousand/uL      RBC 2 95 (L) Million/uL      Hemoglobin 10 4 (L) g/dL      Hematocrit 31 6 (L) %       (H) fL      MCH 35 3 (H) pg      MCHC 32 9 g/dL      RDW 20 4 (H) %      MPV 10 7 fL      Platelets 029 Thousands/uL      nRBC 0 /100 WBCs      Neutrophils Relative 84 (H) %      Immat GRANS % 2 %      Lymphocytes Relative 4 (L) %      Monocytes Relative 8 %      Eosinophils Relative 1 %      Basophils Relative 1 %      Neutrophils Absolute 10 93 (H) Thousands/µL      Immature Grans Absolute 0 22 (H) Thousand/uL      Lymphocytes Absolute 0 48 (L) Thousands/µL      Monocytes Absolute 1 02 Thousand/µL      Eosinophils Absolute 0 07 Thousand/µL      Basophils Absolute 0 07 Thousands/µL     Blood culture #1 [[id number]] Collected:  12/30/18 1153    Lab Status: In process Specimen:  Blood from Arm, Left Updated:  12/30/18 1202                 CT head without contrast   Final Result by Janell Tinoco DO (12/30 1241)      No acute intracranial abnormality  Workstation performed: [id number]         XR chest pa & lateral    (Results Pending)              Procedures  Procedures       Phone Contacts  ED Phone Contact    ED Course                               MDM  Number of Diagnoses or Management Options  Fever: new and requires workup  Generalized weakness: new and requires workup  Hematuria: new and requires workup  Diagnosis management comments: Ill-appearing  Rectal temp to 100 4°  Mildly hyotensive with blood pressure of 101/59  Not tachycardic  Satting well on room air  Normal abdominal exam   Patient does have Hemoccult-positive light brown stool in the rectal vault, her urine has gross hematuria  This is concerning as she is on Eliquis  CBC with normal hemoglobin to 10 4, leukocytosis to 12 79, platelet count 875  CMP remarkable for elevated alk-phos to 190, mildly elevated T bili to 1 3  No lactic acidosis  Troponin undetectable  I personally interpreted the patient's EKG, which shows normal rate, normal sinus rhythm, normal axis, normal intervals, T-wave inversion in leads V1 and V2 unchanged from prior, no new ischemic changes  Doubt underlying cardiac etiology the patient's symptoms  Influenza testing sent  Urinalysis with innumerable red blood cells, positive nitrites, trace leukocytes, unable to assess for bacteria secondary to high volume of blood  Underlying cause of the patient's fever is still unknown, as she recently finished chemotherapy and is immunocompromised, she was covered with vancomycin and Zosyn until cultures result  May be secondary to UTI    Plan to admit for fatigue in the setting of gross hematuria and Hemoccult-positive stool in this patient who is on Eliquis and recently required multiple blood transfusions  She does not have bright red blood per rectum and is hemodynamically stable so does not require emergent colonoscopy or higher level of care  She was admitted in stable condition  Amount and/or Complexity of Data Reviewed  Clinical lab tests: ordered and reviewed  Tests in the radiology section of CPT®: ordered  Review and summarize past medical records: yes  Independent visualization of images, tracings, or specimens: yes    Patient Progress  Patient progress: stable    CritCare Time         Disposition  Final diagnoses:   Fever   Hematuria   Generalized weakness     Time reflects when diagnosis was documented in both MDM as applicable and the Disposition within this note     Time User Action Codes Description Comment    12/30/2018  2:25 PM Reg Shin Add [R50 9] Fever     12/30/2018  2:25 PM Reg Shin Add [R31 9] Hematuria     12/30/2018  2:25 PM Reg Shin Add [R53 1] Generalized weakness       ED Disposition     ED Disposition Condition Comment    Admit  Case was discussed with Arpan and the patient's admission status was agreed to be Admission Status: inpatient status to the service of Dr Lara Licea           Follow-up Information    None         Current Discharge Medication List      CONTINUE these medications which have NOT CHANGED    Details   apixaban (ELIQUIS) 5 mg Take 1 tablet (5 mg total) by mouth 2 (two) times a day  Qty: 60 tablet, Refills: 2    Associated Diagnoses: Acute deep vein thrombosis (DVT) of popliteal vein of left lower extremity (HCC)      Elastic Bandages & Supports MISC by Does not apply route daily  Qty: 6 each, Refills: 0    Comments: Compression stockings 20-30  Associated Diagnoses: Acute deep vein thrombosis (DVT) of left lower extremity, unspecified vein (HCC)      escitalopram (LEXAPRO) 10 mg tablet Take 10 mg by mouth daily  Refills: 0      ferrous sulfate 325 (65 Fe) mg tablet Take 1 tablet (325 mg total) by mouth 2 (two) times a day with meals  Qty: 60 tablet, Refills: 0    Associated Diagnoses: Anemia following use of chemotherapeutic drug      LORazepam (ATIVAN) 1 mg tablet Take 1 mg by mouth 2 (two) times a day as needed  Refills: 0      metoclopramide (REGLAN) 10 mg tablet Take 1 tablet (10 mg total) by mouth 4 (four) times a day as needed (nausea)  Qty: 30 tablet, Refills: 1    Associated Diagnoses: Malignant neoplasm of overlapping sites of left breast in female, estrogen receptor positive (HCC)      ondansetron (ZOFRAN) 4 mg tablet Take 1 tablet (4 mg total) by mouth every 6 (six) hours as needed for nausea or vomiting  Qty: 30 tablet, Refills: 1    Associated Diagnoses: Malignant neoplasm of overlapping sites of left breast in female, estrogen receptor positive (HCC)      sertraline (ZOLOFT) 50 mg tablet Take 50 mg by mouth daily at bedtime  Refills: 0      SUMAtriptan (IMITREX) 100 mg tablet Take 100 mg by mouth once as needed for migraine      verapamil (VERELAN) 240 MG 24 hr capsule Take 240 mg by mouth daily  Refills: 0           No discharge procedures on file      ED Provider  Electronically Signed by           Bernadette Tapia MD  12/30/18 2592

## 2018-12-31 ENCOUNTER — APPOINTMENT (INPATIENT)
Dept: MRI IMAGING | Facility: HOSPITAL | Age: 58
DRG: 690 | End: 2018-12-31
Payer: COMMERCIAL

## 2018-12-31 ENCOUNTER — TELEPHONE (OUTPATIENT)
Dept: UROLOGY | Facility: CLINIC | Age: 58
End: 2018-12-31

## 2018-12-31 ENCOUNTER — APPOINTMENT (INPATIENT)
Dept: ULTRASOUND IMAGING | Facility: HOSPITAL | Age: 58
DRG: 690 | End: 2018-12-31
Payer: COMMERCIAL

## 2018-12-31 LAB
ABO GROUP BLD: NORMAL
ALBUMIN SERPL BCP-MCNC: 1.5 G/DL (ref 3.5–5)
ALP SERPL-CCNC: 152 U/L (ref 46–116)
ALT SERPL W P-5'-P-CCNC: 22 U/L (ref 12–78)
ANION GAP SERPL CALCULATED.3IONS-SCNC: 15 MMOL/L (ref 4–13)
APTT PPP: 142 SECONDS (ref 26–38)
APTT PPP: 65 SECONDS (ref 26–38)
APTT PPP: 94 SECONDS (ref 26–38)
AST SERPL W P-5'-P-CCNC: 46 U/L (ref 5–45)
BASOPHILS # BLD AUTO: 0.08 THOUSANDS/ΜL (ref 0–0.1)
BASOPHILS NFR BLD AUTO: 1 % (ref 0–1)
BILIRUB SERPL-MCNC: 1 MG/DL (ref 0.2–1)
BLD GP AB SCN SERPL QL: NEGATIVE
BLD SMEAR INTERP: NORMAL
BUN SERPL-MCNC: 14 MG/DL (ref 5–25)
CALCIUM SERPL-MCNC: 7.9 MG/DL (ref 8.3–10.1)
CHLORIDE SERPL-SCNC: 104 MMOL/L (ref 100–108)
CO2 SERPL-SCNC: 17 MMOL/L (ref 21–32)
CREAT SERPL-MCNC: 0.79 MG/DL (ref 0.6–1.3)
EOSINOPHIL # BLD AUTO: 0.08 THOUSAND/ΜL (ref 0–0.61)
EOSINOPHIL NFR BLD AUTO: 1 % (ref 0–6)
ERYTHROCYTE [DISTWIDTH] IN BLOOD BY AUTOMATED COUNT: 20.4 % (ref 11.6–15.1)
FLUAV AG SPEC QL: NORMAL
FLUBV AG SPEC QL: NORMAL
GFR SERPL CREATININE-BSD FRML MDRD: 83 ML/MIN/1.73SQ M
GLUCOSE SERPL-MCNC: 73 MG/DL (ref 65–140)
HCT VFR BLD AUTO: 29.5 % (ref 34.8–46.1)
HGB BLD-MCNC: 9.3 G/DL (ref 11.5–15.4)
IMM GRANULOCYTES # BLD AUTO: 0.22 THOUSAND/UL (ref 0–0.2)
IMM GRANULOCYTES NFR BLD AUTO: 2 % (ref 0–2)
LDH SERPL-CCNC: 217 U/L (ref 81–234)
LYMPHOCYTES # BLD AUTO: 0.44 THOUSANDS/ΜL (ref 0.6–4.47)
LYMPHOCYTES NFR BLD AUTO: 4 % (ref 14–44)
MAGNESIUM SERPL-MCNC: 1.7 MG/DL (ref 1.6–2.6)
MCH RBC QN AUTO: 34.6 PG (ref 26.8–34.3)
MCHC RBC AUTO-ENTMCNC: 31.5 G/DL (ref 31.4–37.4)
MCV RBC AUTO: 110 FL (ref 82–98)
MONOCYTES # BLD AUTO: 0.8 THOUSAND/ΜL (ref 0.17–1.22)
MONOCYTES NFR BLD AUTO: 6 % (ref 4–12)
NEUTROPHILS # BLD AUTO: 10.84 THOUSANDS/ΜL (ref 1.85–7.62)
NEUTS SEG NFR BLD AUTO: 86 % (ref 43–75)
NRBC BLD AUTO-RTO: 0 /100 WBCS
PLATELET # BLD AUTO: 360 THOUSANDS/UL (ref 149–390)
PMV BLD AUTO: 10.3 FL (ref 8.9–12.7)
POTASSIUM SERPL-SCNC: 3.6 MMOL/L (ref 3.5–5.3)
PROT SERPL-MCNC: 5.3 G/DL (ref 6.4–8.2)
RBC # BLD AUTO: 2.69 MILLION/UL (ref 3.81–5.12)
RH BLD: NEGATIVE
RSV B RNA SPEC QL NAA+PROBE: NORMAL
SODIUM SERPL-SCNC: 136 MMOL/L (ref 136–145)
SPECIMEN EXPIRATION DATE: NORMAL
WBC # BLD AUTO: 12.46 THOUSAND/UL (ref 4.31–10.16)

## 2018-12-31 PROCEDURE — 76705 ECHO EXAM OF ABDOMEN: CPT

## 2018-12-31 PROCEDURE — 99253 IP/OBS CNSLTJ NEW/EST LOW 45: CPT | Performed by: PHYSICIAN ASSISTANT

## 2018-12-31 PROCEDURE — 86900 BLOOD TYPING SEROLOGIC ABO: CPT | Performed by: INTERNAL MEDICINE

## 2018-12-31 PROCEDURE — 83735 ASSAY OF MAGNESIUM: CPT | Performed by: INTERNAL MEDICINE

## 2018-12-31 PROCEDURE — 80053 COMPREHEN METABOLIC PANEL: CPT | Performed by: INTERNAL MEDICINE

## 2018-12-31 PROCEDURE — 83615 LACTATE (LD) (LDH) ENZYME: CPT | Performed by: INTERNAL MEDICINE

## 2018-12-31 PROCEDURE — 74181 MRI ABDOMEN W/O CONTRAST: CPT

## 2018-12-31 PROCEDURE — 86850 RBC ANTIBODY SCREEN: CPT | Performed by: INTERNAL MEDICINE

## 2018-12-31 PROCEDURE — 99232 SBSQ HOSP IP/OBS MODERATE 35: CPT | Performed by: INTERNAL MEDICINE

## 2018-12-31 PROCEDURE — 86901 BLOOD TYPING SEROLOGIC RH(D): CPT | Performed by: INTERNAL MEDICINE

## 2018-12-31 PROCEDURE — 99254 IP/OBS CNSLTJ NEW/EST MOD 60: CPT | Performed by: INTERNAL MEDICINE

## 2018-12-31 PROCEDURE — 85730 THROMBOPLASTIN TIME PARTIAL: CPT | Performed by: INTERNAL MEDICINE

## 2018-12-31 PROCEDURE — 87493 C DIFF AMPLIFIED PROBE: CPT | Performed by: INTERNAL MEDICINE

## 2018-12-31 PROCEDURE — 83010 ASSAY OF HAPTOGLOBIN QUANT: CPT | Performed by: INTERNAL MEDICINE

## 2018-12-31 PROCEDURE — 85025 COMPLETE CBC W/AUTO DIFF WBC: CPT | Performed by: INTERNAL MEDICINE

## 2018-12-31 RX ADMIN — CEFEPIME HYDROCHLORIDE 2000 MG: 2 INJECTION, POWDER, FOR SOLUTION INTRAVENOUS at 12:38

## 2018-12-31 RX ADMIN — IRON SUCROSE 200 MG: 20 INJECTION, SOLUTION INTRAVENOUS at 15:17

## 2018-12-31 RX ADMIN — ESCITALOPRAM OXALATE 10 MG: 10 TABLET, FILM COATED ORAL at 08:16

## 2018-12-31 RX ADMIN — FERROUS SULFATE TAB 325 MG (65 MG ELEMENTAL FE) 325 MG: 325 (65 FE) TAB at 08:15

## 2018-12-31 RX ADMIN — SERTRALINE HYDROCHLORIDE 50 MG: 50 TABLET ORAL at 21:29

## 2018-12-31 RX ADMIN — SODIUM CHLORIDE 100 ML/HR: 0.9 INJECTION, SOLUTION INTRAVENOUS at 07:07

## 2018-12-31 RX ADMIN — SODIUM CHLORIDE 100 ML/HR: 0.9 INJECTION, SOLUTION INTRAVENOUS at 17:47

## 2018-12-31 RX ADMIN — HEPARIN SODIUM AND DEXTROSE 13 UNITS/KG/HR: 10000; 5 INJECTION INTRAVENOUS at 20:35

## 2018-12-31 RX ADMIN — FERROUS SULFATE TAB 325 MG (65 MG ELEMENTAL FE) 325 MG: 325 (65 FE) TAB at 16:46

## 2018-12-31 NOTE — PROGRESS NOTES
[facility] Progress Note  Patient: Arlen Kayser 63 y o  female   MRN: [id number]  PCP: Camryn Jurado DC  Unit/Bed#: MS 22465 Encounter: [id number]  Date Of Visit: 12/31/18    Assessment:    Principal Problem:    Hemorrhagic cystitis  Active Problems:    Malignant neoplasm of overlapping sites of left breast in female, estrogen receptor positive ([facility] [address] )    Hematuria    Heme positive stool    Essential hypertension    Acute deep vein thrombosis (DVT) of left lower extremity (HCC)    Hyperbilirubinemia    Acute blood loss anemia    Moderate protein-calorie malnutrition ([facility] [address] )    Immunocompromised ([facility] [address] )      Plan:    · Hemorrhagic Cystitis in Immunosuppressed Patient -   · Antibiotic - Cefepime  · Culture - follow up urine and blood cultures  · Serial exams   · Monitor temperatures and WBC counts  Monitor H/H  · Urologist evaluation for hematuria done today and recommend treating for infection initially  However, I would recommend getting cystoscopy done prior to starting back on Eliquis again (ie, this admission)  · If failure to improve, or source is not urinary, consider ID evaluation  · Heme positive stool - GI evaluation today - patient declined offer made for colonoscopy  Monitor for symptoms  Of note, hemoccult in ER was positive yesterday, but stool was negative for blood on 12/21/2018  Monitor H/H   · Diarrhea - likely chemo induced vs  Bleed related  However, will check C-diff given antibiotic use  If C-diff negative, then can use antidiarrheal therapies  · Acute blood loss anemia - Monitor H/H  Try venofer today  Transfuse if needed  Evaluations of the  and GI losses  Has recently required transfusion 12/19/2018  Iron studies, folate and B12 checked last admission  Hemolysis workup is negative  Chemo related anemia as well? · History of DVT - Holding Eliquis   But as the DVT was diagnosed only 1 month ago, we have patient being bridged on a heparin drip currently until we figure out a definitive plan of care for hematuria at least (blood in stool very mild)  Need to determine reversibility of hematuria / gi blood losses  If not clear or if high risk for rebleed, would consider IVC filter  Awaiting hematology evaluation for them to weigh in on decision  · History of Breast Cancer - Awaiting consult by hematology / oncology to evaluate  Has been receiving chemotherapy under the direction of Dr Lexis Newberry  · Hyperbilirubinemia - LFTs have normalized today  GI team is planning on an MRCP to evaluate this further  CMP again in am   · Moderate Protein Calorie Malnutrition - dietician evaluation  Nutritional supplements  · Essential Hypertension - continue home meds  Monitor blood pressures  VTE Pharmacologic Prophylaxis:   Pharmacologic: Heparin Drip  Mechanical VTE Prophylaxis in Place: Yes    Patient Centered Rounds: I have performed bedside rounds with nursing staff today  Discussions with Specialists or Other Care Team Provider: PA with GI team     Education and Discussions with Family / Patient: Updated family at bedside    Time Spent for Care: 30 minutes  More than 50% of total time spent on counseling and coordination of care as described above  Current Length of Stay: 1 day(s)    Current Patient Status: Inpatient   Certification Statement: The patient will continue to require additional inpatient hospital stay due to evaluation and treatment for above medical conditions  Discharge Plan / Estimated Discharge Date: to be determined  Within 72 hours  Code Status: Level 3 - DNAR and DNI      Subjective: The patient states that her hematuria is lightening up a little bit  The patient has no dizziness, dyspnea, chest pain, or other signs of anemia  The patient states that she has been having diarrhea and that her stools are a little bit more melanotic  The diarrhea has been intermittent off and on with the chemotherapy      Objective:     Vitals:   Temp (24hrs), Av 9 °F (37 2 °C), Min:98 4 °F (36 9 °C), Max:99 2 °F (37 3 °C)    Temp:  [98 4 °F (36 9 °C)-99 2 °F (37 3 °C)] 98 4 °F (36 9 °C)  HR:  [68-86] 68  Resp:  [16-18] 18  BP: ()/(58-70) 104/70  SpO2:  [93 %-98 %] 98 %  Body mass index is 23 08 kg/m²  Input and Output Summary (last 24 hours): Intake/Output Summary (Last 24 hours) at 18 1142  Last data filed at 18 0707   Gross per 24 hour   Intake          2153 33 ml   Output                0 ml   Net          2153 33 ml       Physical Exam:     Physical Exam   Constitutional: She is oriented to person, place, and time  HENT:   Mouth/Throat: No oropharyngeal exudate  Slightly dry oral mucosa   Eyes: Pupils are equal, round, and reactive to light  Cardiovascular: Normal rate and regular rhythm  Pulmonary/Chest: Effort normal  She has no wheezes  She has no rales  Decreased breath sounds   Abdominal: Soft  Bowel sounds are normal  She exhibits no distension  There is no tenderness  Musculoskeletal: She exhibits edema (left lower extremity - mild to 1+)  Neurological: She is alert and oriented to person, place, and time  Vitals reviewed  Additional Data:     Labs:      Results from last 7 days  Lab Units 18  0410   WBC Thousand/uL 12 46*   HEMOGLOBIN g/dL 9 3*   HEMATOCRIT % 29 5*   PLATELETS Thousands/uL 360   NEUTROS PCT % 86*   LYMPHS PCT % 4*   MONOS PCT % 6   EOS PCT % 1       Results from last 7 days  Lab Units 18  0410   POTASSIUM mmol/L 3 6   CHLORIDE mmol/L 104   CO2 mmol/L 17*   BUN mg/dL 14   CREATININE mg/dL 0 79   CALCIUM mg/dL 7 9*   ALK PHOS U/L 152*   ALT U/L 22   AST U/L 46*       Results from last 7 days  Lab Units 18  2043   INR  2 32*       * I Have Reviewed All Lab Data Listed Above  * Additional Pertinent Lab Tests Reviewed:  All Labs For Current Hospital Admission Reviewed    Imaging:    Imaging Reports Reviewed Today Include: RUQ ultrasound  Imaging Personally Reviewed by Myself Includes:  None    Recent Cultures (last 7 days):       Results from last 7 days  Lab Units 12/30/18  1228 12/30/18  1219   URINE CULTURE   --  >100,000 cfu/ml Gram Negative Saeid Enteric Like*   INFLUENZA B PCR  None Detected  --    RSV PCR  None Detected  --        Last 24 Hours Medication List:     Current Facility-Administered Medications:  acetaminophen 650 mg Oral Q6H PRN Keanu Spinner, DO    cefepime 2,000 mg Intravenous Q24H Keanu Spinner, DO    escitalopram 10 mg Oral Daily Keanu Spinner, DO    ferrous sulfate 325 mg Oral BID With Meals Keanu Spinner, DO    heparin (porcine) 3-30 Units/kg/hr (Order-Specific) Intravenous Titrated Keanu Spinner, DO Last Rate: 15 Units/kg/hr (12/31/18 3415)   heparin (porcine) 2,400 Units Intravenous PRN Keanu Spinner, DO    heparin (porcine) 4,800 Units Intravenous PRN Keanu Spinner, DO    LORazepam 1 mg Oral BID PRN Keanu Spinner, DO    metoclopramide 10 mg Oral 4x Daily PRN Keanu Spinner, DO    ondansetron 4 mg Intravenous Q6H PRN Keanu Spinner, DO    sertraline 50 mg Oral HS Keanu Spinner, DO    sodium chloride 100 mL/hr Intravenous Continuous Keanu Spinner, DO Last Rate: 100 mL/hr (12/31/18 0707)   SUMAtriptan 100 mg Oral Once PRN Keanu Spinner, DO    verapamil 240 mg Oral Daily Keanu Spinner, DO         Today, Patient Was Seen By: Keanu Guillory DO    ** Please Note: This note has been constructed using a voice recognition system   **

## 2018-12-31 NOTE — CONSULTS
Consultation - [facility]  Stefanylindseycirilo Doshi 63 y o  female MRN: [id number]  Unit/Bed#: -01 Encounter: [id number]        Inpatient consult to gastroenterology  Consult performed by: Agustina Paris ordered by: Cleve Schultz          Reason for Consult / Principal Problem:  Acute blood loss anemia, hyperbilirubinemia, heme-positive stool    HPI: 52-year-old female with history of breast cancer status post mastectomy and chemotherapy, hypertension, hydronephrosis, congenital prolapse rectum status post surgery, and recent DVT on Eliquis admitted with severe fatigue and weakness  She was admitted earlier this month 12/19 with anemia hemoglobin 4 0 and hematuria  She received 3 units PRBCs and hemoglobin responded to 12 4  Urology evaluated the patient and recommended outpatient follow-up  She comes back to the hospital with severe fatigue and weakness  Hemoglobin noted to be 10 4  She continues to have hematuria  She also reports red blood in the stool for the past few weeks since starting Eliquis  The Eliquis was put on hold and she was started on heparin drip  She has chronic nausea, poor appetite, some weight loss and occasional diarrhea since being on chemotherapy  She denies vomiting and abdominal pain  Last colonoscopy was about 7 years ago  She denies family history of colon cancer or colon polyps  REVIEW OF SYSTEMS:    CONSTITUTIONAL: Denies any fever, chills, or rigors  + Poor appetite and   + weight loss  HEENT: No earache or tinnitus  Denies hearing loss or visual disturbances  CARDIOVASCULAR: No chest pain or palpitations  RESPIRATORY: Denies any cough, hemoptysis, shortness of breath or dyspnea on exertion  GASTROINTESTINAL: As noted in the History of Present Illness  GENITOURINARY: No problems with urination  + Hematuria  NEUROLOGIC: No dizziness or vertigo, denies headaches  MUSCULOSKELETAL: Denies any muscle or joint pain     SKIN: Denies skin rashes or itching  ENDOCRINE: Denies excessive thirst  Denies intolerance to heat or cold  PSYCHOSOCIAL: Denies depression or anxiety  Denies any recent memory loss         Historical Information   Past Medical History:   Diagnosis Date    Acute DVT (deep venous thrombosis) (HCC)     of LLE>     Congenital prolapsed rectum     Hydronephrosis     right kidney    Hypertension     Malignant neoplasm of overlapping sites of left female breast ([facility] [address] )     Migraine      Past Surgical History:   Procedure Laterality Date    BREAST BIOPSY      COLON SURGERY      colon resection    AK INSJ TUNNELED CTR VAD W/SUBQ PORT AGE 5 YR/> N/A 6/26/2018    Procedure: INSERTION VENOUS PORT ( PORT-A-CATH) IR;  Surgeon: Ambrosio Lay DO;  [facility];  Service: Interventional Radiology    AK MASTECTOMY, MODIFIED RADICAL Left 5/15/2018    Procedure: BREAST MODIFIED RADICAL MASTECTOMY;  Surgeon: Honey Davila MD;  Location: [facility];  Service: Surgical Oncology    US GUIDANCE BREAST BIOPSY LEFT EACH ADDITIONAL Left 4/3/2018    US GUIDED BREAST BIOPSY LEFT COMPLETE Left 4/3/2018    US GUIDED BREAST LYMPH NODE BIOPSY LEFT Left 4/3/2018     Social History   History   Alcohol Use No     History   Drug Use No     History   Smoking Status    Never Smoker   Smokeless Tobacco    Never Used     Family History   Problem Relation Age of Onset    No Known Problems Mother     No Known Problems Father        Meds/Allergies     Prescriptions Prior to Admission   Medication    apixaban (ELIQUIS) 5 mg    Elastic Bandages & Supports MISC    escitalopram (LEXAPRO) 10 mg tablet    ferrous sulfate 325 (65 Fe) mg tablet    LORazepam (ATIVAN) 1 mg tablet    metoclopramide (REGLAN) 10 mg tablet    ondansetron (ZOFRAN) 4 mg tablet    sertraline (ZOLOFT) 50 mg tablet    SUMAtriptan (IMITREX) 100 mg tablet    verapamil (VERELAN) 240 MG 24 hr capsule     Current Facility-Administered Medications   Medication Dose Route Frequency    acetaminophen (TYLENOL) tablet 650 mg  650 mg Oral Q6H PRN    cefepime (MAXIPIME) 2,000 mg in dextrose 5 % 50 mL IVPB  2,000 mg Intravenous Q24H    escitalopram (LEXAPRO) tablet 10 mg  10 mg Oral Daily    ferrous sulfate tablet 325 mg  325 mg Oral BID With Meals    heparin (porcine) 25,000 units in 250 mL infusion (premix)  3-30 Units/kg/hr (Order-Specific) Intravenous Titrated    heparin (porcine) injection 2,400 Units  2,400 Units Intravenous PRN    heparin (porcine) injection 4,800 Units  4,800 Units Intravenous PRN    LORazepam (ATIVAN) tablet 1 mg  1 mg Oral BID PRN    metoclopramide (REGLAN) tablet 10 mg  10 mg Oral 4x Daily PRN    ondansetron (ZOFRAN) injection 4 mg  4 mg Intravenous Q6H PRN    sertraline (ZOLOFT) tablet 50 mg  50 mg Oral HS    sodium chloride 0 9 % infusion  100 mL/hr Intravenous Continuous    SUMAtriptan (IMITREX) tablet 100 mg  100 mg Oral Once PRN    verapamil (CALAN-SR) CR tablet 240 mg  240 mg Oral Daily       Allergies   Allergen Reactions    Gluten Meal     Lactose            Objective     Blood pressure 104/70, pulse 68, temperature 98 4 °F (36 9 °C), temperature source Oral, resp  rate 18, weight 61 kg (134 lb 7 7 oz), SpO2 98 %, not currently breastfeeding  Intake/Output Summary (Last 24 hours) at 12/31/18 1058  Last data filed at 12/31/18 0707   Gross per 24 hour   Intake          2153 33 ml   Output                0 ml   Net          2153 33 ml         PHYSICAL EXAM:      General Appearance:   Alert, cooperative, no distress, appears stated age    HEENT:   Normocephalic, atraumatic, anicteric      Neck:  Supple, symmetrical, trachea midline, no adenopathy   Lungs:   Clear to auscultation bilaterally; no rales, rhonchi or wheezing; respirations unlabored    Heart[de-identified]   S1 and S2 normal; regular rate and rhythm; no murmur, rub, or gallop     Abdomen:   Soft, non-tender, non-distended; normal bowel sounds; no masses, no organomegaly    Genitalia:   Deferred  Rectal:   Deferred    Extremities:  No cyanosis, clubbing or edema    Pulses:  2+ and symmetric all extremities    Skin:  Skin color, texture, turgor normal, no rashes or lesions    Lymph nodes:  No palpable cervical lymphadenopathy        Lab Results:     Results from last 7 days  Lab Units 12/31/18  0410   WBC Thousand/uL 12 46*   HEMOGLOBIN g/dL 9 3*   HEMATOCRIT % 29 5*   PLATELETS Thousands/uL 360   NEUTROS PCT % 86*   LYMPHS PCT % 4*   MONOS PCT % 6   EOS PCT % 1       Results from last 7 days  Lab Units 12/31/18  0410   POTASSIUM mmol/L 3 6   CHLORIDE mmol/L 104   CO2 mmol/L 17*   BUN mg/dL 14   CREATININE mg/dL 0 79   CALCIUM mg/dL 7 9*   ALK PHOS U/L 152*   ALT U/L 22   AST U/L 46*       Results from last 7 days  Lab Units 12/30/18  2043   INR  2 32*           Imaging Studies: I have personally reviewed pertinent imaging studies  Xr Chest Pa & Lateral    Result Date: 12/31/2018  Impression: Increased retrocardiac opacity could represent a developing infiltrate in the appropriate clinical setting  The study was marked in [facility] for immediate notification  Workstation performed: [id number]     Ct Head Without Contrast    Result Date: 12/30/2018  Impression: No acute intracranial abnormality  Workstation performed: [id number]     Us Liver    Result Date: 12/31/2018  Impression: Dilated common bile duct with suggestion of sludge or choledocholithiasis  Consider ERCP  Chronic right-sided hydronephrosis again identified with advanced cortical thinning  Workstation performed: [id number]       ASSESSMENT and PLAN:      Discussed plan with Dr Jacinta Sheth    60-year-old female with history of breast cancer status post mastectomy and chemotherapy, hypertension, hydronephrosis, congenital prolapse rectum status post surgery, and recent DVT on Eliquis admitted with severe fatigue, weakness, anemia, hematuria, and rectal bleeding      1) Acute blood loss anemia in the setting of hematuria and rectal bleeding: She was recently admitted earlier this month for hematuria and received 3 units PRBCs  She reports ongoing hematuria and rectal bleeding for the past few weeks  Stools did test heme-positive in the emergency room  Hemoglobin 10 4 on admission down from 12 4  Repeat hemoglobin 9 3  Suspect much of her anemia is related to hematuria however she does report rectal bleeding  Differential diagnosis includes hemorrhoids, colitis, rectal ulcer, AVM, colon polyp, malignancy     -We discussed our recommendation for colonoscopy to evaluate for source of bleeding especially since her last colonoscopy was about 7 years ago  -Patient adamantly refuses colonoscopy at this time  -She understands that we may be missing a colon cancer  -Monitor hemoglobin and stool color  -Eliquis currently being held, patient on heparin gtt  -If patient has more overt GI bleeding and continued drop in hemoglobin, may need urgent colonoscopy    2) Elevated liver enzymes with dilated CBD: Liver enzymes noted to be elevated on admission with AST 49, ALT 30, alkaline phosphatase 190, total bilirubin 1 30  Today, liver enzymes trending down AST 46, ALT 22, alkaline phosphatase 152, total bilirubin 1 00  Ultrasound significant for dilated common bile duct with suggestion of sludge or choledocholithiasis  T-max 100 4° yesterday  Patient has been afebrile today however     -Patient has no jaundice or abdominal pain to suggest cholangitis  -We will plan for MRCP to rule out retained sludge or bile duct stone  -If MRCP positive, patient will need ERCP   -Continue monitoring LFTs    Patient was seen and examined by Dr Anna Scales  All garduno medical decisions were made by Dr Anna Scales  Thank you for allowing us to participate in the care of this present patient  We will follow-up with you closely

## 2018-12-31 NOTE — TELEPHONE ENCOUNTER
Rosalva Selby MD   [facility] Garcia 3 minutes ago (2:50 PM)      Patient needs cystoscopy at the [facility] in the next 2-4 weeks, ok to double book (Routing comment)       Rosalva Selby MD 3 minutes ago (2:50 PM)        We will arrange for outpatient cystoscopy to evaluate the bladder

## 2018-12-31 NOTE — CONSULTS
UROLOGY CONSULTATION NOTE     Patient Identifiers: Zachary Zhou (MRN [id number])  Service Requesting Consultation:  Internal Medicine  Service Providing Consultation:  Urology, Tamar Gomez PA-C    Date of Service: 12/31/2018    Reason for Consultation:  Hematuria    History of Present Illness:     Zachary Zhou is a 63 y o  old female previously under the urologic care of Dr Ty Hardy with a history of chronic right hydronephrosis secondary to tortuous right ureter and hematuria  Patient was recently admitted with gross hematuria and anemia with a hemoglobin of 4  Patient had required blood transfusions  Her hematuria had resolved for period of time on report  She re-presented the emergency department yesterday for generalized weakness and reported blood in her stool with continued blood in her urine  Patient's urinalysis in the emergency department was leukocyte and nitrite negative, urine culture pending  Patient was initiated on cefepime in the meantime  Patient has a history of breast cancer and is followed by Oncology  Patient does have a history of DVT and is normally on Eliquis  She was transitioned to heparin drip by Internal Medicine  Patient reports that her urine this morning was yellow  On conversation with RN, there was gross hematuria without clot noted in her morning urine  There is no urine bedside or in her toilet to observe at this time however      Past Medical, Past Surgical History:     Past Medical History:   Diagnosis Date    Acute DVT (deep venous thrombosis) (HCC)     of LLE>     Congenital prolapsed rectum     Hydronephrosis     right kidney    Hypertension     Malignant neoplasm of overlapping sites of left female breast ([facility] [address] )     Migraine    :    Past Surgical History:   Procedure Laterality Date    BREAST BIOPSY      COLON SURGERY      colon resection    HI INSJ TUNNELED CTR VAD W/SUBQ PORT AGE 5 YR/> N/A 6/26/2018    Procedure: INSERTION VENOUS PORT ( PORT-A-CATH) IR;  Surgeon: Mackenzie Carcamo DO;  [facility];  Service: Interventional Radiology    [place] MASTECTOMY, MODIFIED RADICAL Left 5/15/2018    Procedure: BREAST MODIFIED RADICAL MASTECTOMY;  Surgeon: Jeff Gunderson MD;  [facility];  Service: Surgical Oncology    US GUIDANCE BREAST BIOPSY LEFT EACH ADDITIONAL Left 4/3/2018    US GUIDED BREAST BIOPSY LEFT COMPLETE Left 4/3/2018    US GUIDED BREAST LYMPH NODE BIOPSY LEFT Left 4/3/2018   :    Medications, Allergies:     Current Facility-Administered Medications:     acetaminophen (TYLENOL) tablet 650 mg, 650 mg, Oral, Q6H PRN, Fernanda Christianson, DO    cefepime (MAXIPIME) 2,000 mg in dextrose 5 % 50 mL IVPB, 2,000 mg, Intravenous, Q24H, Fernanda Christianson, DO    escitalopram (LEXAPRO) tablet 10 mg, 10 mg, Oral, Daily, Fernanda Crhistianson, DO    ferrous sulfate tablet 325 mg, 325 mg, Oral, BID With Meals, Fernanda Christianson, DO    heparin (porcine) 25,000 units in 250 mL infusion (premix), 3-30 Units/kg/hr (Order-Specific), Intravenous, Titrated, Fernanda Christianson, DO, Last Rate: 9 mL/hr at 12/31/18 0608, 15 Units/kg/hr at 12/31/18 0608    heparin (porcine) injection 2,400 Units, 2,400 Units, Intravenous, PRN, Fernanda Christianson, DO    heparin (porcine) injection 4,800 Units, 4,800 Units, Intravenous, PRN, Fernanda Christianson, DO    LORazepam (ATIVAN) tablet 1 mg, 1 mg, Oral, BID PRN, Fernanda Christianson, DO    metoclopramide (REGLAN) tablet 10 mg, 10 mg, Oral, 4x Daily PRN, Fernanda Christianson, DO    ondansetron [facility]) injection 4 mg, 4 mg, Intravenous, Q6H PRN, Fernanda Christianson, DO    sertraline (ZOLOFT) tablet 50 mg, 50 mg, Oral, HS, Fernanda Christianson, DO, 50 mg at 12/30/18 2122    sodium chloride 0 9 % infusion, 100 mL/hr, Intravenous, Continuous, Fernanda Sale, DO, Last Rate: 100 mL/hr at 12/31/18 0707, 100 mL/hr at 12/31/18 0707    SUMAtriptan (IMITREX) tablet 100 mg, 100 mg, Oral, Once PRN, Fernanda Christianson DO    verapamil (CALAN-SR) CR tablet 240 mg, 240 mg, Oral, Daily, Gilma Mack DO    Allergies: Allergies   Allergen Reactions    Gluten Meal     Lactose    :    Social and Family History:   Social History:   Social History   Substance Use Topics    Smoking status: Never Smoker    Smokeless tobacco: Never Used    Alcohol use No        History   Smoking Status    Never Smoker   Smokeless Tobacco    Never Used       Family History:  Family History   Problem Relation Age of Onset    No Known Problems Mother     No Known Problems Father    :     Review of Systems:     General: Fever, chills, or night sweats: negative  Cardiac: Negative for chest pain  Pulmonary: Negative for shortness of breath  Gastrointestinal: Abdominal pain negative  Positive new nausea in which she reports has been persistent since chemotherapy  Denies any emesis  Genitourinary: See HPI above  Patient does have hematuria  All other systems queried were negative  Physical Exam:   General: Patient is pleasant and in NAD  Awake and alert  /70 (BP Location: Right arm)   Pulse 68   Temp 98 4 °F (36 9 °C) (Oral)   Resp 18   Wt 61 kg (134 lb 7 7 oz)   LMP  (LMP Unknown)   SpO2 98%   BMI 23 08 kg/m²   Cardiac: Peripheral edema: negative  Pulmonary: Non-labored breathing  Abdomen: Soft, non-tender, non-distended  Genitourinary: Negative CVA tenderness, negative suprapubic tenderness  TURCIOS: None    Labs:     Lab Results   Component Value Date    HGB 9 3 (L) 12/31/2018    HCT 29 5 (L) 12/31/2018    WBC 12 46 (H) 12/31/2018     12/31/2018   ]    Lab Results   Component Value Date    K 3 6 12/31/2018     12/31/2018    CO2 17 (L) 12/31/2018    BUN 14 12/31/2018    CREATININE 0 79 12/31/2018    CALCIUM 7 9 (L) 12/31/2018   ]    ASSESSMENT/ PLAN:     1) Hemorrhagic cystitis  - urinalysis leukocyte and nitrite positive, urine culture pending  - continue cefepime in the meantime  - please obtain postvoid residual to ensure adequate emptying    If >250mL, initiate urinary retention protocol  - if patient is passing clots or continued hematuria noted, may need to place Salcedo catheter and perform hand irrigation for clot evacuation   - continue to monitor H/H  - will require in outpatient cystoscopy to complete evaluation of patient's hematuria  - please have patient is urine racked bedside for urologic evaluation    2) Chronic severe right hydronephrosis  - right hydronephrosis was previously unchanged on imaging from 12/17/18     - Creatinine stable at 0 79 in patient remains asymptomatic  No intervention required  Would not be able to drain through retrograde stent given torturous ureter, if required emergent decompression a percutaneous nephrostomy would be necessary  Thank you for allowing me to participate in this patients care  Please do not hesitate to call with any additional questions    Brandi Salas PA-C

## 2018-12-31 NOTE — TELEPHONE ENCOUNTER
Patient was re-admitted into hospital  I will track admission and call again once discharged  Will cancel appointment with Jace Alvarez on 1/15/19 because patient will need cysto after all per Dr Tomer Ortega

## 2018-12-31 NOTE — PLAN OF CARE

## 2018-12-31 NOTE — PLAN OF CARE
INFECTION - ADULT     Absence or prevention of progression during hospitalization Progressing     Absence of fever/infection during neutropenic period Progressing        Nutrition/Hydration-ADULT     Nutrient/Hydration intake appropriate for improving, restoring or maintaining nutritional needs Progressing        PAIN - ADULT     Verbalizes/displays adequate comfort level or baseline comfort level Progressing        Prexisting or High Potential for Compromised Skin Integrity     Skin integrity is maintained or improved Progressing        SAFETY ADULT     Patient will remain free of falls Progressing     Maintain or return to baseline ADL function Progressing     Maintain or return mobility status to optimal level Progressing

## 2018-12-31 NOTE — MALNUTRITION/BMI
This medical record reflects one or more clinical indicators suggestive of malnutrition and/or morbid obesity  Malnutrition Findings:   Malnutrition type: Chronic illness (catabolic illness)  Degree of Malnutrition: Malnutrition of moderate degree  Malnutrition Characteristics: Fat loss, Muscle loss, Weight loss (temporal indentation, 11 3% weight decrease x 6 months)Currently being treated with oral supplementation  BMI Findings: Body mass index is 23 08 kg/m²  See Nutrition note dated 12/31/18 for additional details  Completed nutrition assessment is viewable in the nutrition documentation

## 2018-12-31 NOTE — H&P
History and Physical - [facility] Internal Medicine    Patient Information: Marcie Naqvi 63 y o  female MRN: [id number]  Unit/Bed#: -40 Encounter: [id number]  Admitting Physician: Nickie Olivas DO  PCP: Meri Burkitt, DC  Date of Admission:  12/30/18    Assessment/Plan:    Hospital Problem List:     Principal Problem:    Hemorrhagic cystitis  Active Problems:    Malignant neoplasm of overlapping sites of left breast in female, estrogen receptor positive ([facility] [address] )    Hematuria    Heme positive stool    Essential hypertension    Acute deep vein thrombosis (DVT) of left lower extremity (HCC)    Hyperbilirubinemia    Acute blood loss anemia    Moderate protein-calorie malnutrition ([facility] [address] )    Immunocompromised ([facility] )      Plan for the Primary Problem(s):  · Hemorrhagic Cystitis in Immunosuppressed Patient -   · Antibiotic - Cefepime  · Culture - follow up urine and blood cultures  · Serial exams   · Monitor temperatures and WBC counts  Monitor H/H  · Urologist evaluation for hematuria  · If failure to improve, or source is not urinary, consider ID evaluation  Plan for Additional Problems:   · Heme positive stool - GI evaluation  Was this a false positive? Stool was negative for blood on 12/21/2018  Monitor H/H   · Acute blood loss anemia - Monitor H/H   venofer can be considered  Transfuse if needed  Evaluations of the  and GI losses  Has recently required transfusion 12/19/2018  Will get haptoglobin, LDH, and hemolysis smear given the elevated bilirubin  Chemo related anemia as well? · History of DVT - Hold Eliquis  Will change patient to heparin drip for now until we figure out a definitive plan of care  Need to determine reversibility of hematuria / gi blood losses  If not clear or if high risk for rebleed, would consider IVC filter  Will get hematology evaluation for them to weigh in on decision  · History of Breast Cancer - Will consult hematology / oncology to evaluate    Has been receiving chemotherapy under the direction of Dr Shorty Berrios  · Hyperbilirubinemia - trend LFTs  Get RUQ ultrasound  GI evaluation  · Moderate Protein Calorie Malnutrition - dietician evaluation  Nutritional supplements  · Essential Hypertension - continue home meds  Monitor blood pressures  VTE Prophylaxis: Apixaban (Eliquis)  / sequential compression device   Code Status: Level 3 DNR / DNI  This is a change from prior and confirmed twice with patient  POLST: There is no POLST form on file for this patient (pre-hospital)    Anticipated Length of Stay:  Patient will be admitted on an Inpatient basis with an anticipated length of stay of  > 2 midnights  Justification for Hospital Stay: Evaluation for fever / fatigue in immunosuppressed patient  Total Time for Visit, including Counseling / Coordination of Care: 45 minutes  Greater than 50% of this total time spent on direct patient counseling and coordination of care  Chief Complaint:  Fever, hematuria, fatigue    History of Present Illness:    Milena Hoffman is a 63 y o  female who presents with severe weakness and fatigue that has resulted in her sleeping for large parts of the day  The patient states that as much as she tries to stay awake she will continue leave fall asleep  She feels like she has no energy whatsoever  The patient denies any associated dyspnea, dizziness, chest pain, or palpitations  The patient has been having hematuria for the last 2 weeks  She denies any fevers or chills earlier today but did was noted to have fever this morning when evaluated in the emergency room  The patient has had a decreased appetite and does have chronic nausea  She has had no diarrhea or vomiting  The patient has a history of breast cancer and was diagnosed with a left lower extremity DVT about 1 month ago    The patient states that it took about 2 weeks before she was put on Eliquis but then only a few days after starting Eliquis she had a large amount of hematuria and actually had a hemoglobin down around 4  The patient states that she receive 4 units of blood and was kept off of the Eliquis for about a week  Then she was placed on a lower dose of 5 mg twice daily  The patient states that prior to restarting the Eliquis her hematuria which was dark previously had gone to a lighter red color but had not yet resolved  Previously the patient was supposed to see Urology for cystoscopy but this was canceled when she ended up coming into the hospital for the very low hemoglobin recently  Nonetheless the hematuria has continued on a consistent basis and she has not yet had any urology evaluation for this  The patient has had no fevers or chills along the way but today does have a mild fever  The patient is undergoing chemotherapy for the breast cancer and is immunosuppressed as result  The patient had stools checked during the last admission for blood and these were negative for blood  However, emergency room physician did report that they did a Hemoccult in the emergency room today which was positive  Request made by emergency room for admission into the hospital for concern about the patient's weakness and worsening anemia as her hemoglobin today is 10 4 which is decreased from 12 2 on 12/21/2018  Of note, it was just back on 12/19/2018 that the patient had hemoglobin of 4  Review of Systems:    Review of Systems   Constitutional: Positive for fatigue and fever (today only, mild / low grade)  Negative for activity change, appetite change and chills  HENT: Negative for congestion, rhinorrhea, sinus pain, sinus pressure and sore throat  Eyes: Negative for visual disturbance  Respiratory: Negative for cough, shortness of breath and wheezing  Cardiovascular: Positive for leg swelling (chronic left lower extremity)  Negative for chest pain and palpitations  Gastrointestinal: Positive for nausea (chronic)   Negative for abdominal pain, blood in stool (but heme positive in ER), constipation, diarrhea and vomiting  Endocrine: Negative  Genitourinary: Positive for hematuria  Negative for difficulty urinating, dysuria, flank pain and pelvic pain  Musculoskeletal: Negative for arthralgias, back pain, myalgias and neck pain  Skin: Negative for pallor and rash  Allergic/Immunologic: Negative  Neurological: Positive for weakness  Negative for dizziness, syncope, light-headedness and headaches  Hematological: Negative  Psychiatric/Behavioral: Negative  All other systems reviewed and are negative  Past Medical and Surgical History:     Past Medical History:   Diagnosis Date    Acute DVT (deep venous thrombosis) (HCC)     of LLE>     Congenital prolapsed rectum     Hydronephrosis     right kidney    Hypertension     Malignant neoplasm of overlapping sites of left female breast ([facility] [address] )     Migraine        Past Surgical History:   Procedure Laterality Date    BREAST BIOPSY      COLON SURGERY      colon resection    NM INSJ TUNNELED CTR VAD W/SUBQ PORT AGE 5 YR/> N/A 6/26/2018    Procedure: INSERTION VENOUS PORT ( PORT-A-CATH) IR;  Surgeon: Donna Ford DO;  [facility];  Service: Interventional Radiology    NM MASTECTOMY, MODIFIED RADICAL Left 5/15/2018    Procedure: BREAST MODIFIED RADICAL MASTECTOMY;  Surgeon: Astrid Sandoval MD;  [facility];  Service: Surgical Oncology    US GUIDANCE BREAST BIOPSY LEFT EACH ADDITIONAL Left 4/3/2018    US GUIDED BREAST BIOPSY LEFT COMPLETE Left 4/3/2018    US GUIDED BREAST LYMPH NODE BIOPSY LEFT Left 4/3/2018       Meds/Allergies:    Prior to Admission medications    Medication Sig Start Date End Date Taking?  Authorizing Provider   apixaban (ELIQUIS) 5 mg Take 1 tablet (5 mg total) by mouth 2 (two) times a day 10/19/18  Yes Eri Arceo PA-C   [address] by Does not apply route daily 12/12/18  Yes Moy Stewart MD   escitalopram (LEXAPRO) 10 mg tablet Take 10 mg by mouth daily 10/16/18  Yes Historical Provider, MD   ferrous sulfate 325 (65 Fe) mg tablet Take 1 tablet (325 mg total) by mouth 2 (two) times a day with meals 12/22/18  Yes Terrell Pedraza MD   LORazepam (ATIVAN) 1 mg tablet Take 1 mg by mouth 2 (two) times a day as needed 10/16/18  Yes Historical Provider, MD   metoclopramide (REGLAN) 10 mg tablet Take 1 tablet (10 mg total) by mouth 4 (four) times a day as needed (nausea) 6/26/18  Yes Michelle Sanchez MD   ondansetron (ZOFRAN) 4 mg tablet Take 1 tablet (4 mg total) by mouth every 6 (six) hours as needed for nausea or vomiting 7/10/18  Yes Michelle Sanchez MD   sertraline (ZOLOFT) 50 mg tablet Take 50 mg by mouth daily at bedtime 10/11/18  Yes Historical Provider, MD   SUMAtriptan (IMITREX) 100 mg tablet Take 100 mg by mouth once as needed for migraine   Yes Historical Provider, MD   verapamil (VERELAN) 240 MG 24 hr capsule Take 240 mg by mouth daily 10/16/18  Yes Historical Provider, MD     I have reviewed home medications with patient personally  Allergies: Allergies   Allergen Reactions    Gluten Meal     Lactose        Social History:     Marital Status: /Civil Union     Substance Use History:   History   Alcohol Use No     History   Smoking Status    Never Smoker   Smokeless Tobacco    Never Used     History   Drug Use No       Family History:     non-contributory    Physical Exam:     Vitals:   Blood Pressure: 100/58 (12/30/18 1505)  Pulse: 79 (12/30/18 1505)  Temperature: 99 2 °F (37 3 °C) (12/30/18 1505)  Temp Source: Oral (12/30/18 1505)  Respirations: 16 (12/30/18 1505)  Weight - Scale: 61 kg (134 lb 7 7 oz) (12/30/18 1107)  SpO2: 97 % (12/30/18 1505)    Physical Exam   Constitutional: She is oriented to person, place, and time  No distress  HENT:   Mouth/Throat: No oropharyngeal exudate  Slightly dry oral mucosa   Eyes: Pupils are equal, round, and reactive to light   Conjunctivae are normal    Neck: No JVD present  Cardiovascular: Normal rate and regular rhythm  No murmur heard  Pulmonary/Chest: Effort normal  She has no wheezes  She has no rales  Decreased breath sounds at right base   Abdominal: Soft  Bowel sounds are normal  She exhibits no distension  There is no tenderness  Genitourinary:   Genitourinary Comments: No flank tenderness  Musculoskeletal: She exhibits no edema  Neurological: She is alert and oriented to person, place, and time  Sleeping on arrival but easily awakens to voice  nonfocal neurological exam in terms of motor and sensory exams  Skin: Skin is warm and dry  She is not diaphoretic  No erythema  No bruising  Psychiatric: She has a normal mood and affect  Vitals reviewed  Additional Data:     Lab Results: I have personally reviewed pertinent reports  Results from last 7 days  Lab Units 12/30/18  1153   WBC Thousand/uL 12 79*   HEMOGLOBIN g/dL 10 4*   HEMATOCRIT % 31 6*   PLATELETS Thousands/uL 388   NEUTROS PCT % 84*   LYMPHS PCT % 4*   MONOS PCT % 8   EOS PCT % 1       Results from last 7 days  Lab Units 12/30/18  1153   POTASSIUM mmol/L 3 7   CHLORIDE mmol/L 102   CO2 mmol/L 26   BUN mg/dL 17   CREATININE mg/dL 0 98   CALCIUM mg/dL 8 8   ALK PHOS U/L 190*   ALT U/L 30   AST U/L 49*       Results from last 7 days  Lab Units 12/30/18  1153   INR  2 29*       Imaging: I have personally reviewed pertinent reports  Ct Head Without Contrast    Result Date: 12/30/2018  Narrative: CT BRAIN - WITHOUT CONTRAST INDICATION:   Fall, patient takes eliquis  COMPARISON:  Images from PET/CT 4/16/2018 TECHNIQUE:  CT examination of the brain was performed  In addition to axial images, coronal 2D reformatted images were created and submitted for interpretation  Radiation dose length product (DLP) for this visit:  966 mGy-cm     This examination, like all CT scans performed in the [facility], was performed utilizing techniques to minimize radiation dose exposure, including the use of iterative reconstruction and automated exposure control  IMAGE QUALITY:  Diagnostic  FINDINGS: PARENCHYMA:  No intracranial mass, mass effect or midline shift  No CT signs of acute infarction  No acute parenchymal hemorrhage  Probable bilateral basal ganglia calcifications although not heavily calcified on the left  Age-related cortical atrophy  Periventricular white matter hypodensity which is nonspecific although most compatible with chronic small vessel ischemic disease  VENTRICLES AND EXTRA-AXIAL SPACES:  Normal for the patient's age  VISUALIZED ORBITS AND PARANASAL SINUSES:  Unremarkable  CALVARIUM AND EXTRACRANIAL SOFT TISSUES:  Normal      Impression: No acute intracranial abnormality  Workstation performed: [id number]     Ct Abdomen Pelvis W Contrast    Result Date: 12/17/2018  Narrative: CT ABDOMEN AND PELVIS WITH IV CONTRAST INDICATION:   R31 0: Gross hematuria  COMPARISON:  CT chest abdomen pelvis 4/11/2018  TECHNIQUE:  CT examination of the abdomen and pelvis was performed  Axial, sagittal, and coronal 2D reformatted images were created from the source data and submitted for interpretation  Radiation dose length product (DLP) for this visit:  901 mGy-cm   This examination, like all CT scans performed in the [facility], was performed utilizing techniques to minimize radiation dose exposure, including the use of iterative reconstruction and automated exposure control  IV Contrast:  100 mL of iohexol (OMNIPAQUE) Enteric Contrast:  Enteric contrast was not administered  FINDINGS: ABDOMEN LOWER CHEST:  Partially imaged large hiatal hernia  LIVER/BILIARY TREE:  Fatty infiltration GALLBLADDER:  No calcified gallstones  No pericholecystic inflammatory change  SPLEEN:  Unremarkable  PANCREAS:  Unremarkable  ADRENAL GLANDS:  15 mm left adrenal nodule is likely an adenoma   KIDNEYS/URETERS:  Severe chronic right hydronephrosis and hydroureter with an abrupt transition point of the distal right ureter unchanged from the prior study  No new left renal findings  STOMACH AND BOWEL:  Mild low-density thickening of the distal sigmoid colon and rectum APPENDIX:  No findings to suggest appendicitis  ABDOMINOPELVIC CAVITY:  No ascites or free intraperitoneal air  No lymphadenopathy  VESSELS:  Unremarkable for patient's age  PELVIS REPRODUCTIVE ORGANS:  Unremarkable for patient's age  URINARY BLADDER:  Circumferential bladder wall thickening and surrounding fat stranding in keeping with a cystitis  ABDOMINAL WALL/INGUINAL REGIONS:  Unremarkable  OSSEOUS STRUCTURES:  No acute fracture or destructive osseous lesion  Impression: Circumferential bladder wall thickening in keeping with cystitis  Chronic right hydroureter nephrosis and hydroureter unchanged from the prior study  Partially imaged large hiatal hernia  The study was marked in [facility] for immediate notification  Workstation performed: [id number]     [email or web address] Records Reviewed: Yes     ** Please Note: This note has been constructed using a voice recognition system   **

## 2018-12-31 NOTE — UTILIZATION REVIEW
Initial Clinical Review    Admission: Date/Time/Statement: 12/30/18 @ 1425     Orders Placed This Encounter   Procedures    Inpatient Admission (expected length of stay for this patient is greater than two midnights)     Standing Status:   Standing     Number of Occurrences:   1     Order Specific Question:   Admitting Physician     Answer:   Jessee Solares [[id number]]     Order Specific Question:   Level of Care     Answer:   Med Surg [[id number]]     Order Specific Question:   Estimated length of stay     Answer:   More than 2 Midnights     Order Specific Question:   Certification     Answer:   I certify that inpatient services are medically necessary for this patient for a duration of greater than two midnights  See H&P and MD Progress Notes for additional information about the patient's course of treatment  ED: Date/Time/Mode of Arrival:   ED Arrival Information     Expected Arrival Acuity Means of Arrival Escorted By Service Admission Type    - 12/30/2018 10:38 Emergent Walk-In Self General Medicine Emergency    Arrival Complaint    UNABLE TO EAT OR WALK/ RECTAL BLEED          Chief Complaint:   Chief Complaint   Patient presents with    Weakness - Generalized     Pt being treated for breast Ca, last trx 12/7, family reports pt is weak and has not eaten in 3 days r/t no appetite, pt recently admitted with Hgb of 4 and received 4 units of blood  Pt reports going to urology for blood in urine but unable to do testing r/t low Hgb  Pt on Eliquis r/t DVT in left leg       History of Illness: 63 y o  female who presents with severe weakness and fatigue that has resulted in her sleeping for large parts of the day  The patient states that as much as she tries to stay awake she will continue leave fall asleep  She feels like she has no energy whatsoever  The patient has been having hematuria for the last 2 weeks    She denies any fevers or chills earlier today but did was noted to have fever this morning when evaluated in the emergency room  The patient has had a decreased appetite and does have chronic nausea  The patient has a history of breast cancer and was diagnosed with a left lower extremity DVT about 1 month ago  The patient states that it took about 2 weeks before she was put on Eliquis but then only a few days after starting Eliquis she had a large amount of hematuria and actually had a hemoglobin down around 4  The patient states that she receive 4 units of blood and was kept off of the Eliquis for about a week  Then she was placed on a lower dose of 5 mg twice daily  The patient states that prior to restarting the Eliquis her hematuria which was dark previously had gone to a lighter red color but had not yet resolved  Previously the patient was supposed to see Urology for cystoscopy but this was canceled when she ended up coming into the hospital for the very low hemoglobin recently  Nonetheless the hematuria has continued on a consistent basis and she has not yet had any urology evaluation for this  The patient has had no fevers or chills along the way but today does have a mild fever  The patient is undergoing chemotherapy for the breast cancer and is immunosuppressed as result  The patient had stools checked during the last admission for blood and these were negative for blood  However, emergency room physician did report that they did a Hemoccult in the emergency room today which was positive  Request made by emergency room for admission into the hospital for concern about the patient's weakness and worsening anemia as her hemoglobin today is 10 4 which is decreased from 12 2 on 12/21/2018    Of note, it was just back on 12/19/2018 that the patient had hemoglobin of 4        ED Vital Signs:   ED Triage Vitals [12/30/18 1107]   Temperature Pulse Respirations Blood Pressure SpO2   99 6 °F (37 6 °C) 93 18 106/68 94 %      Temp Source Heart Rate Source Patient Position - Orthostatic VS BP Location FiO2 (%)   Oral Monitor Lying Right arm --      Pain Score       No Pain        Wt Readings from Last 1 Encounters:   12/30/18 61 kg (134 lb 7 7 oz)     Vital Signs (abnormal):   12/30/18 1230  --  82  18  102/60  --  96 %  None (Room air)  Lying   12/30/18 1141  100 4 °F (38 °C)  --  --  --  --  --           Abnormal Labs/Diagnostic Test Results:   procalcitonin 0 52  UA trace leukocytes  + nitrites  2+ protein  Trace ketones  4 0 urobilinogen  Moderate bilirubin  Large blood  INR 2 29  ast 49  Alkaline phosphatase 190  Total protein 5 9  Albumin 1 8  Total bilirubin 1 30  Wbc 12 79   hgb 10 4, hct 31 6  CxR- Increased retrocardiac opacity could represent a developing infiltrate in the appropriate clinical setting    Ct head - no acute intracranial abnormality    2043-  Wbc 13 13, hgb 9 4, hct 29 3    Urine culture [[id number]] (Abnormal) Collected: 12/30/18 1219   Lab Status: Preliminary result Specimen: Urine from Urine, Clean Catch Updated: 12/31/18 1055    Urine Culture >100,000 cfu/ml Gram Negative Saeid Enteric Like (A)         12/31/2018-  Wbc 12 46, hgb 9 3, hct 29 5  CO2-17  Anion gap 15  Calcium 7 9   ast 46  ED Treatment: blood cultures     Medication Administration from 12/30/2018 1038 to 12/30/2018 1453       Date/Time Order Dose Route Action Comments     12/30/2018 1309 cefepime (MAXIPIME) 2 g/50 mL dextrose IVPB 0 mg Intravenous Stopped      12/30/2018 1230 cefepime (MAXIPIME) 2 g/50 mL dextrose IVPB 2,000 mg Intravenous New Bag      12/30/2018 1429 vancomycin (VANCOCIN) IVPB (premix) 1,000 mg 0 mg/kg Intravenous Stopped      12/30/2018 1311 vancomycin (VANCOCIN) IVPB (premix) 1,000 mg 1,000 mg Intravenous New Bag      12/30/2018 1447 sodium chloride 0 9 % bolus 1,000 mL 0 mL Intravenous Stopped      12/30/2018 1226 sodium chloride 0 9 % bolus 1,000 mL 1,000 mL Intravenous New Bag           Past Medical/Surgical History:   12/19/2018 - 12/21/2018- anemia following use of chemotherapeutic drug  Active Ambulatory Problems     Diagnosis Date Noted    Malignant neoplasm of overlapping sites of left breast in female, estrogen receptor positive ([facility] [address] ) 04/10/2018    Hydronephrosis 04/29/2018    Cellulitis 06/11/2018    History of DVT (deep vein thrombosis) 10/19/2018    Anemia following use of chemotherapeutic drug 12/19/2018    Hematuria 12/20/2018     Past Medical History:   Diagnosis Date    Acute DVT (deep venous thrombosis) (HCC)     Congenital prolapsed rectum     Hydronephrosis     Hypertension     [facility]     Migraine        Admitting Diagnosis: Weakness [R53 1]  Hematuria [R31 9]  Fever [R50 9]  Generalized weakness [R53 1]    Age/Sex: 63 y o  female    · Assessment/Plan: Hemorrhagic Cystitis in Immunosuppressed Patient -   ? Antibiotic - Cefepime  ? Culture - follow up urine and blood cultures  ? Serial exams   ? Monitor temperatures and WBC counts  Monitor H/H  ? Urologist evaluation for hematuria  ? If failure to improve, or source is not urinary, consider ID evaluation      Plan for Additional Problems:   · Heme positive stool - GI evaluation  Was this a false positive? Stool was negative for blood on 12/21/2018  Monitor H/H   · Acute blood loss anemia - Monitor H/H   venofer can be considered  Transfuse if needed  Evaluations of the  and GI losses  Has recently required transfusion 12/19/2018  Will get haptoglobin, LDH, and hemolysis smear given the elevated bilirubin  Chemo related anemia as well? · History of DVT - Hold Eliquis  Will change patient to heparin drip for now until we figure out a definitive plan of care  Need to determine reversibility of hematuria / gi blood losses  If not clear or if high risk for rebleed, would consider IVC filter  Will get hematology evaluation for them to weigh in on decision    · History of Breast Cancer - Will consult hematology / oncology to evaluate  Has been receiving chemotherapy under the direction of Dr Sharma  · Hyperbilirubinemia - trend LFTs  Get RUQ ultrasound  GI evaluation  · Moderate Protein Calorie Malnutrition - dietician evaluation  Nutritional supplements  Essential Hypertension - continue home meds  Monitor blood pressures    Admission Orders:  12/20/2018  1426 INPATIENT   Scheduled Meds:   Current Facility-Administered Medications:  acetaminophen 650 mg Oral Q6H PRN    cefepime 2,000 mg Intravenous Q24H    escitalopram 10 mg Oral Daily    ferrous sulfate 325 mg Oral BID With Meals    heparin (porcine) 3-30 Units/kg/hr (Order-Specific) Intravenous Titrated Last Rate: 15 Units/kg/hr (12/31/18 7671)   heparin (porcine) 2,400 Units Intravenous PRN    heparin (porcine) 4,800 Units Intravenous PRN    LORazepam 1 mg Oral BID PRN    metoclopramide 10 mg Oral 4x Daily PRN    ondansetron 4 mg Intravenous Q6H PRN    sertraline 50 mg Oral HS    sodium chloride 100 mL/hr Intravenous Continuous Last Rate: 100 mL/hr (12/31/18 0707)   SUMAtriptan 100 mg Oral Once PRN    verapamil 240 mg Oral Daily      Continuous Infusions:   heparin (porcine) 3-30 Units/kg/hr (Order-Specific) Last Rate: 15 Units/kg/hr (12/31/18 1944)   sodium chloride 100 mL/hr Last Rate: 100 mL/hr (12/31/18 0707)     PRN Meds: not used  OTHER ORDERS: scds  Consult hematology, urology, GI    12/21 per urology - Hemorrhagic cystitis  - urinalysis leukocyte and nitrite positive, urine culture pending  - continue cefepime in the meantime  - please obtain postvoid residual to ensure adequate emptying    If >250mL, initiate urinary retention protocol  - if patient is passing clots or continued hematuria noted, may need to place Salcedo catheter and perform hand irrigation for clot evacuation   - continue to monitor H/H  - will require in outpatient cystoscopy to complete evaluation of patient's hematuria  - please have patient is urine racked bedside for urologic evaluation     2) Chronic severe right hydronephrosis  - right hydronephrosis was previously unchanged on imaging from 12/17/18     - Creatinine stable at 0 79 in patient remains asymptomatic  No intervention required  Would not be able to drain through retrograde stent given torturous ureter, if required emergent decompression a percutaneous nephrostomy would be necessary      [address]  Utilization Review Department  Phone: [phone]; Fax [phone]  [email or web address]  ATTENTION: Please call with any questions or concerns to [phone]  and carefully listen to the prompts so that you are directed to the right person  Send all requests for admission clinical reviews, approved or denied determinations and any other requests to fax [phone]   All voicemails are confidential

## 2019-01-01 LAB
ALBUMIN SERPL BCP-MCNC: 1.3 G/DL (ref 3.5–5)
ALP SERPL-CCNC: 124 U/L (ref 46–116)
ALT SERPL W P-5'-P-CCNC: 17 U/L (ref 12–78)
ANION GAP SERPL CALCULATED.3IONS-SCNC: 11 MMOL/L (ref 4–13)
APTT PPP: 100 SECONDS (ref 26–38)
APTT PPP: 125 SECONDS (ref 26–38)
APTT PPP: 96 SECONDS (ref 26–38)
AST SERPL W P-5'-P-CCNC: 29 U/L (ref 5–45)
BACTERIA UR CULT: ABNORMAL
BASOPHILS # BLD AUTO: 0.05 THOUSANDS/ΜL (ref 0–0.1)
BASOPHILS NFR BLD AUTO: 1 % (ref 0–1)
BILIRUB SERPL-MCNC: 0.8 MG/DL (ref 0.2–1)
BUN SERPL-MCNC: 11 MG/DL (ref 5–25)
C DIFF TOX GENS STL QL NAA+PROBE: NORMAL
CALCIUM SERPL-MCNC: 7.7 MG/DL (ref 8.3–10.1)
CHLORIDE SERPL-SCNC: 107 MMOL/L (ref 100–108)
CO2 SERPL-SCNC: 20 MMOL/L (ref 21–32)
CREAT SERPL-MCNC: 0.77 MG/DL (ref 0.6–1.3)
EOSINOPHIL # BLD AUTO: 0.07 THOUSAND/ΜL (ref 0–0.61)
EOSINOPHIL NFR BLD AUTO: 1 % (ref 0–6)
ERYTHROCYTE [DISTWIDTH] IN BLOOD BY AUTOMATED COUNT: 19.9 % (ref 11.6–15.1)
GFR SERPL CREATININE-BSD FRML MDRD: 86 ML/MIN/1.73SQ M
GLUCOSE SERPL-MCNC: 82 MG/DL (ref 65–140)
HAPTOGLOB SERPL-MCNC: 305 MG/DL (ref 34–200)
HCT VFR BLD AUTO: 26.2 % (ref 34.8–46.1)
HGB BLD-MCNC: 8.4 G/DL (ref 11.5–15.4)
IMM GRANULOCYTES # BLD AUTO: 0.16 THOUSAND/UL (ref 0–0.2)
IMM GRANULOCYTES NFR BLD AUTO: 2 % (ref 0–2)
LYMPHOCYTES # BLD AUTO: 0.33 THOUSANDS/ΜL (ref 0.6–4.47)
LYMPHOCYTES NFR BLD AUTO: 3 % (ref 14–44)
MCH RBC QN AUTO: 34.6 PG (ref 26.8–34.3)
MCHC RBC AUTO-ENTMCNC: 32.1 G/DL (ref 31.4–37.4)
MCV RBC AUTO: 108 FL (ref 82–98)
MONOCYTES # BLD AUTO: 0.8 THOUSAND/ΜL (ref 0.17–1.22)
MONOCYTES NFR BLD AUTO: 7 % (ref 4–12)
NEUTROPHILS # BLD AUTO: 9.56 THOUSANDS/ΜL (ref 1.85–7.62)
NEUTS SEG NFR BLD AUTO: 86 % (ref 43–75)
NRBC BLD AUTO-RTO: 0 /100 WBCS
PLATELET # BLD AUTO: 352 THOUSANDS/UL (ref 149–390)
PMV BLD AUTO: 10.2 FL (ref 8.9–12.7)
POTASSIUM SERPL-SCNC: 3 MMOL/L (ref 3.5–5.3)
PROT SERPL-MCNC: 4.8 G/DL (ref 6.4–8.2)
RBC # BLD AUTO: 2.43 MILLION/UL (ref 3.81–5.12)
SODIUM SERPL-SCNC: 138 MMOL/L (ref 136–145)
WBC # BLD AUTO: 10.97 THOUSAND/UL (ref 4.31–10.16)

## 2019-01-01 PROCEDURE — 99231 SBSQ HOSP IP/OBS SF/LOW 25: CPT | Performed by: UROLOGY

## 2019-01-01 PROCEDURE — 99232 SBSQ HOSP IP/OBS MODERATE 35: CPT | Performed by: INTERNAL MEDICINE

## 2019-01-01 PROCEDURE — 85730 THROMBOPLASTIN TIME PARTIAL: CPT | Performed by: INTERNAL MEDICINE

## 2019-01-01 PROCEDURE — 85025 COMPLETE CBC W/AUTO DIFF WBC: CPT | Performed by: INTERNAL MEDICINE

## 2019-01-01 PROCEDURE — 80053 COMPREHEN METABOLIC PANEL: CPT | Performed by: INTERNAL MEDICINE

## 2019-01-01 RX ORDER — POTASSIUM CHLORIDE 14.9 MG/ML
20 INJECTION INTRAVENOUS ONCE
Status: COMPLETED | OUTPATIENT
Start: 2019-01-01 | End: 2019-01-01

## 2019-01-01 RX ORDER — POTASSIUM CHLORIDE 20 MEQ/1
20 TABLET, EXTENDED RELEASE ORAL DAILY
Status: DISCONTINUED | OUTPATIENT
Start: 2019-01-01 | End: 2019-01-01

## 2019-01-01 RX ORDER — CEPHALEXIN 500 MG/1
500 CAPSULE ORAL EVERY 12 HOURS SCHEDULED
Status: DISCONTINUED | OUTPATIENT
Start: 2019-01-01 | End: 2019-01-06 | Stop reason: HOSPADM

## 2019-01-01 RX ORDER — POTASSIUM CHLORIDE 20 MEQ/1
20 TABLET, EXTENDED RELEASE ORAL ONCE
Status: COMPLETED | OUTPATIENT
Start: 2019-01-01 | End: 2019-01-01

## 2019-01-01 RX ADMIN — ESCITALOPRAM OXALATE 10 MG: 10 TABLET, FILM COATED ORAL at 09:56

## 2019-01-01 RX ADMIN — POTASSIUM CHLORIDE 20 MEQ: 1500 TABLET, EXTENDED RELEASE ORAL at 20:39

## 2019-01-01 RX ADMIN — SODIUM CHLORIDE 100 ML/HR: 0.9 INJECTION, SOLUTION INTRAVENOUS at 19:52

## 2019-01-01 RX ADMIN — FERROUS SULFATE TAB 325 MG (65 MG ELEMENTAL FE) 325 MG: 325 (65 FE) TAB at 17:00

## 2019-01-01 RX ADMIN — POTASSIUM CHLORIDE 20 MEQ: 200 INJECTION, SOLUTION INTRAVENOUS at 10:03

## 2019-01-01 RX ADMIN — CEFEPIME HYDROCHLORIDE 2000 MG: 2 INJECTION, POWDER, FOR SOLUTION INTRAVENOUS at 12:49

## 2019-01-01 RX ADMIN — FERROUS SULFATE TAB 325 MG (65 MG ELEMENTAL FE) 325 MG: 325 (65 FE) TAB at 09:59

## 2019-01-01 RX ADMIN — SODIUM CHLORIDE 100 ML/HR: 0.9 INJECTION, SOLUTION INTRAVENOUS at 03:48

## 2019-01-01 RX ADMIN — CEPHALEXIN 500 MG: 500 CAPSULE ORAL at 20:39

## 2019-01-01 RX ADMIN — POTASSIUM CHLORIDE 20 MEQ: 200 INJECTION, SOLUTION INTRAVENOUS at 21:07

## 2019-01-01 RX ADMIN — SERTRALINE HYDROCHLORIDE 50 MG: 50 TABLET ORAL at 22:44

## 2019-01-01 RX ADMIN — HEPARIN SODIUM AND DEXTROSE 13 UNITS/KG/HR: 10000; 5 INJECTION INTRAVENOUS at 01:04

## 2019-01-01 RX ADMIN — POTASSIUM CHLORIDE 20 MEQ: 1500 TABLET, EXTENDED RELEASE ORAL at 09:56

## 2019-01-01 NOTE — PROGRESS NOTES
Per PCA, patient had an episode of hematuria, however not visualized by RN  Will assess next urine and continue to monitor

## 2019-01-01 NOTE — PROGRESS NOTES
Progress Note - Alex Watkins 1960, 63 y o  female MRN: [id number]    Unit/Bed#: -01 Encounter: [id number]    Primary Care Provider: Mindy Dow DC   Date and time admitted to hospital: 12/30/2018 10:57 AM    Hemorrhagic Cystitis in Immunosuppressed Patient -   C/w abx  Heme positive stool   GI evaluation today - patient declined offer made for colonoscopy  Monitor for symptoms  Of note, hemoccult in ER was positive yesterday, but stool was negative for blood on 12/21/2018  Monitor H/H    Diarrhea -   likely chemo induced vs  Bleed related  However, will check C-diff given antibiotic use  C-diff negative  History of DVT   Holding Eliquis  But as the DVT was diagnosed only 1 month ago, we have patient being bridged on a heparin drip currently until we figure out a definitive plan of care for hematuria at least (blood in stool very mild)  Need to determine reversibility of hematuria / gi blood losses  If not clear or if high risk for rebleed, would consider IVC filter    hematology evaluation for them to weigh in on decision  History of Breast Cancer -   Awaiting consult by hematology / oncology to evaluate  Has been receiving chemotherapy under the direction of Dr Sally Barnes  Hyperbilirubinemia   LFTs have normalized today  GI team is planning on an MRCP to evaluate this further  CMP again in am     Moderate Protein Calorie Malnutrition - dietician evaluation  Nutritional supplements  Essential Hypertension   continue home meds  Monitor blood pressures       VTE Pharmacologic Prophylaxis: heparin gtt  Pharmacologic: Heparin  Mechanical VTE Prophylaxis in Place: Yes    Patient Centered Rounds: I have performed bedside rounds with nursing staff today  Discussions with Specialists or Other Care Team Provider: reviewed Urology note  Education and Discussions with Family / Patient: Discussed with patient  She will update her family       Time Spent for Care: 30 minutes  More than 50% of total time spent on counseling and coordination of care as described above  Current Length of Stay: 2 day(s)    Current Patient Status: Inpatient   Certification Statement: The patient will continue to require additional inpatient hospital stay due to awaiting urine cultures  Discharge Plan: Not medically stable for DC  Code Status: Level 3 - DNAR and DNI      Subjective:   Patient seen and examined      " I feel okay"    Objective:     Vitals:   Temp (24hrs), Av 4 °F (36 9 °C), Min:97 9 °F (36 6 °C), Max:98 6 °F (37 °C)    Temp:  [97 9 °F (36 6 °C)-98 6 °F (37 °C)] 97 9 °F (36 6 °C)  HR:  [82-92] 82  Resp:  [18] 18  BP: (110-120)/(70-74) 110/70  SpO2:  [94 %-97 %] 97 %  Body mass index is 23 08 kg/m²  Input and Output Summary (last 24 hours): Intake/Output Summary (Last 24 hours) at 19 1446  Last data filed at 19 1044   Gross per 24 hour   Intake          2306 67 ml   Output                0 ml   Net          2306 67 ml       Physical Exam:     Physical Exam   Constitutional: She is oriented to person, place, and time  She appears well-developed  No distress  HENT:   Head: Normocephalic  Mouth/Throat: No oropharyngeal exudate  Eyes: Right eye exhibits no discharge  Left eye exhibits no discharge  No scleral icterus  Neck: Normal range of motion  No JVD present  No tracheal deviation present  No thyromegaly present  Cardiovascular: Normal rate  Exam reveals no gallop and no friction rub  No murmur heard  Pulmonary/Chest: Effort normal and breath sounds normal  No respiratory distress  She has no wheezes  She has no rales  She exhibits no tenderness  Abdominal: Soft  Bowel sounds are normal  She exhibits no distension and no mass  There is no tenderness  There is no rebound and no guarding  Musculoskeletal: She exhibits no edema, tenderness or deformity  Lymphadenopathy:     She has no cervical adenopathy     Neurological: She is alert and oriented to person, place, and time  She displays normal reflexes  No cranial nerve deficit  She exhibits normal muscle tone  Coordination normal    Skin: Skin is warm and dry  No rash noted  She is not diaphoretic  No erythema  No pallor  Psychiatric: She has a normal mood and affect  Additional Data:     Labs:      Results from last 7 days  Lab Units 01/01/19  0550   WBC Thousand/uL 10 97*   HEMOGLOBIN g/dL 8 4*   HEMATOCRIT % 26 2*   PLATELETS Thousands/uL 352   NEUTROS PCT % 86*   LYMPHS PCT % 3*   MONOS PCT % 7   EOS PCT % 1       Results from last 7 days  Lab Units 01/01/19  0550   SODIUM mmol/L 138   POTASSIUM mmol/L 3 0*   CHLORIDE mmol/L 107   CO2 mmol/L 20*   BUN mg/dL 11   CREATININE mg/dL 0 77   ANION GAP mmol/L 11   CALCIUM mg/dL 7 7*   ALBUMIN g/dL 1 3*   TOTAL BILIRUBIN mg/dL 0 80   ALK PHOS U/L 124*   ALT U/L 17   AST U/L 29   GLUCOSE RANDOM mg/dL 82       Results from last 7 days  Lab Units 12/30/18  2043   INR  2 32*               Results from last 7 days  Lab Units 12/30/18  1153   LACTIC ACID mmol/L 1 2   PROCALCITONIN ng/ml 0 52*           * I Have Reviewed All Lab Data Listed Above  * Additional Pertinent Lab Tests Reviewed: Roxanne 67 Admission Reviewed    Imaging:    Imaging Reports Reviewed Today Include:   MRI abdomen -   "1   Dilated CBD up to 15 mm with abrupt transitioning distal CBD proximal to the ampulla for which the possibility of stricture cannot be excluded  Correlation with ERCP with brush biopsy is recommended   No discernible choledocholithiasis  2   Evidence of pancreatic divisum   No MRI evidence of pancreatitis  3   Abnormal hepatic signal suggestive of iron overload   Correlate with serologic testing  4   Severe right hydroureteronephrosis again identified   Presence of distal right ureteral stricture cannot be excluded      5  1 cm hypoattenuating focus seen along the superior posterior margin of the pancreatic body on previous CT imaging in retrospect, 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   Follow-up CTA abdomen in 3 months recommended to exclude   less likely possibility of a subtle pancreatic lesion   Limited assessment of the pancreas without contrast     6   Small bilateral pleural effusions with bibasilar compressive atelectasis and large hiatal hernia      I personally discussed this study and recommendations with Dr Lilia Sharma from GI on 1/1/2019 at 6:12 AM "  Imaging Personally Reviewed by Myself Includes:  As above  Recent Cultures (last 7 days):       Results from last 7 days  Lab Units 12/31/18  1857 12/30/18  1228 12/30/18  1219 12/30/18  1216 12/30/18  1153   BLOOD CULTURE   --   --   --  No Growth at 24 hrs  No Growth at 24 hrs     URINE CULTURE   --   --  >100,000 cfu/ml Escherichia coli*  --   --    INFLUENZA B PCR   --  None Detected  --   --   --    RSV PCR   --  None Detected  --   --   --    C DIFF TOXIN B  NEGATIVE for C difficle toxin by PCR    --   --   --   --        Last 24 Hours Medication List:     Current Facility-Administered Medications:  acetaminophen 650 mg Oral Q6H PRN Vincent Parag, DO    cefepime 2,000 mg Intravenous Q24H Vincent Parag, DO Last Rate: 2,000 mg (01/01/19 1249)   escitalopram 10 mg Oral Daily Vincent Parag, DO    ferrous sulfate 325 mg Oral BID With Meals Vincent Parag, DO    heparin (porcine) 3-30 Units/kg/hr (Order-Specific) Intravenous Titrated Vincent Parag, DO Last Rate: 8 Units/kg/hr (01/01/19 1055)   heparin (porcine) 2,400 Units Intravenous PRN Vincent Parag, DO    heparin (porcine) 4,800 Units Intravenous PRN Vincent Parag, DO    LORazepam 1 mg Oral BID PRN Vincent Parag, DO    metoclopramide 10 mg Oral 4x Daily PRN Vincent Parag, DO    ondansetron 4 mg Intravenous Q6H PRN Vincent Parag, DO    potassium chloride 20 mEq Oral Daily Micha Raphael MD    sertraline 50 mg Oral HS Khanh Rust,     sodium chloride 100 mL/hr Intravenous Continuous Piper Murders, DO Last Rate: 100 mL/hr (01/01/19 0348)   SUMAtriptan 100 mg Oral Once PRN Piper Murders, DO    verapamil 240 mg Oral Daily Piper Murders, DO         Today, Patient Was Seen By: Tyree Hollingsworth MD    ** Please Note: Dictation voice to text software may have been used in the creation of this document   **

## 2019-01-01 NOTE — PROGRESS NOTES
Progress Note - Urology Progress  Brian Julien 63 y o  female MRN: [id number]  Unit/Bed#: -01 Encounter: [id number]    Assessment:  Hematuria has cleared  Culture shows E coli, that is possibility related to the hematuria, along with Eliquis  Has chronically obstructed nonfunctional right kidney, but no need to decompress that  Patient will be instructed to call our office, for outpatient cystoscopy, in the office, to check her bladder when she is all recovered from this hospitalization  Plan:  Above      Blood pressure 110/70, pulse 82, temperature 97 9 °F (36 6 °C), temperature source Oral, resp  rate 18, weight 61 kg (134 lb 7 7 oz), SpO2 97 %, not currently breastfeeding  ,Body mass index is 23 08 kg/m²        Intake/Output Summary (Last 24 hours) at 01/01/19 1034  Last data filed at 01/01/19 0348   Gross per 24 hour   Intake          2066 67 ml   Output                0 ml   Net          2066 67 ml       Invasive Devices     Central Venous Catheter Line            Port A Cath 12/30/18 Right Chest 1 day                Physical Exam: General appearance: alert and oriented, in no acute distress  Abdomen: soft, non-tender; bowel sounds normal; no masses,  no organomegaly    Lab, Imaging and other studies:  CBC:   Lab Results   Component Value Date    WBC 10 97 (H) 01/01/2019    HGB 8 4 (L) 01/01/2019    HCT 26 2 (L) 01/01/2019     (H) 01/01/2019     01/01/2019    MCH 34 6 (H) 01/01/2019    MCHC 32 1 01/01/2019    RDW 19 9 (H) 01/01/2019    MPV 10 2 01/01/2019    NRBC 0 01/01/2019   , CMP:   Lab Results   Component Value Date    SODIUM 138 01/01/2019    K 3 0 (L) 01/01/2019     01/01/2019    CO2 20 (L) 01/01/2019    BUN 11 01/01/2019    CREATININE 0 77 01/01/2019    CALCIUM 7 7 (L) 01/01/2019    AST 29 01/01/2019    ALT 17 01/01/2019    ALKPHOS 124 (H) 01/01/2019    EGFR 86 01/01/2019     }

## 2019-01-01 NOTE — PROGRESS NOTES
Progress Note - Alex Watkins 63 y o  female MRN: [id number]    Unit/Bed#: -18 Encounter: [id number]    Assessment and Plan:   Principal Problem:    Hemorrhagic cystitis  Active Problems:    Malignant neoplasm of overlapping sites of left breast in female, estrogen receptor positive ([facility] [address] )    Hematuria    Heme positive stool    Essential hypertension    Acute deep vein thrombosis (DVT) of left lower extremity (HCC)    Hyperbilirubinemia    Acute blood loss anemia    Moderate protein-calorie malnutrition ([facility] [address] )    Immunocompromised ([facility] [address] )    #1  Acute blood loss anemia in the setting of hematuria:  Denying any rectal bleeding at this time  Hemoglobin is stable  -recommend continued treatment of urinary tract infection hemorrhagic cystitis  -patient does not have any further rectal bleeding signs at this time  She has refused colonoscopy  -recommend bowel regimen    #2  Elevated liver enzymes with dilated common bile duct:  LFTs have almost returned to normal   MRCP showing a dilated CBD with a possible lower common bile duct stricture  No choledocholithiasis is present   -discussed with the patient in regards dating ERCP for further evaluation and biopsy at that stricture this will be diet an inpatient versus outpatient depending on patient's clinical course  This is not emergent and LFTs have normalized at this time   -continue to monitor LFTs    #3  Questionable signs of iron overload in the liver on MRI  -Iron level is currently level and ferritin is high  These could both be explained by current infection as ferritin is an acute phase reactant and low iron level due to bleeding  Consider repeating iron panel in the outpatient setting once patient is not actively bleeding and is not having acute infection  #4   Hypoattenuating focus in the pancreatic body:   This was seen on previous CT imaging and favors a partial thrombosis of splenic artery aneurysm   -follow-up CTA in 3 months as an outpatient was recommended to exclude subtle pancreatic lesion          ----------------------------------------------------------------------------------------------------------------    Subjective:     Patient reports that she had a normal bowel movement without any signs of blood  She denies any significant abdominal pain  No nausea or vomiting  Objective:     Vitals: Blood pressure 110/70, pulse 82, temperature 97 9 °F (36 6 °C), temperature source Oral, resp  rate 18, weight 61 kg (134 lb 7 7 oz), SpO2 97 %, not currently breastfeeding  ,Body mass index is 23 08 kg/m²  Intake/Output Summary (Last 24 hours) at 01/01/19 1015  Last data filed at 01/01/19 0348   Gross per 24 hour   Intake          2066 67 ml   Output                0 ml   Net          2066 67 ml       Physical Exam:     General Appearance: Alert, appears stated age and cooperative; chronically ill-appearing  Lungs: Clear to auscultation bilaterally, no rales or rhonchi, no labored breathing/accessory muscle use  Heart: Regular rate and rhythm, S1, S2 normal, no murmur, click, rub or gallop  Abdomen: Soft, non-tender, non-distended; bowel sounds normal; no masses or no organomegaly  Extremities: No cyanosis, clubbing, or edema    Invasive Devices     Central Venous Catheter Line            Port A Cath 12/30/18 Right Chest 1 day                Lab Results:    Results from last 7 days  Lab Units 01/01/19  0550   WBC Thousand/uL 10 97*   HEMOGLOBIN g/dL 8 4*   HEMATOCRIT % 26 2*   PLATELETS Thousands/uL 352   NEUTROS PCT % 86*   LYMPHS PCT % 3*   MONOS PCT % 7   EOS PCT % 1       Results from last 7 days  Lab Units 01/01/19  0550   POTASSIUM mmol/L 3 0*   CHLORIDE mmol/L 107   CO2 mmol/L 20*   BUN mg/dL 11   CREATININE mg/dL 0 77   CALCIUM mg/dL 7 7*   ALK PHOS U/L 124*   ALT U/L 17   AST U/L 29       Results from last 7 days  Lab Units 12/30/18  2043   INR  2 32*           Imaging Studies: I have personally reviewed pertinent imaging studies      [facility] Chest Pa & Lateral    Result Date: 12/31/2018  Impression: Increased retrocardiac opacity could represent a developing infiltrate in the appropriate clinical setting  The study was marked in [facility] for immediate notification  Workstation performed: [id number]     Ct Head Without Contrast    Result Date: 12/30/2018  Impression: No acute intracranial abnormality  Workstation performed: [id number]     Us Liver    Result Date: 12/31/2018  Impression: Dilated common bile duct with suggestion of sludge or choledocholithiasis  Consider ERCP  Chronic right-sided hydronephrosis again identified with advanced cortical thinning  Workstation performed: [id number]     Mri Abdomen Wo Contrast And Mrcp    Result Date: 1/1/2019  Impression: 1  Dilated CBD up to 15 mm with abrupt transitioning distal CBD proximal to the ampulla for which the possibility of stricture cannot be excluded  Correlation with ERCP with brush biopsy is recommended  No discernible choledocholithiasis  2   Evidence of pancreatic divisum  No MRI evidence of pancreatitis  3   Abnormal hepatic signal suggestive of iron overload  Correlate with serologic testing  4   Severe right hydroureteronephrosis again identified  Presence of distal right ureteral stricture cannot be excluded  5  1 cm hypoattenuating focus seen along the superior posterior margin of the pancreatic body on previous CT imaging in retrospect, 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  Follow-up CTA abdomen in 3 months recommended to exclude  less likely possibility of a subtle pancreatic lesion  Limited assessment of the pancreas without contrast  6   Small bilateral pleural effusions with bibasilar compressive atelectasis and large hiatal hernia    I personally discussed this study and recommendations with Dr Lane Drafts from GI on 1/1/2019 at [facility] NO 5 AM  Workstation performed: [id number]

## 2019-01-02 LAB
ALBUMIN SERPL BCP-MCNC: 1.6 G/DL (ref 3.5–5)
ALP SERPL-CCNC: 140 U/L (ref 46–116)
ALT SERPL W P-5'-P-CCNC: 20 U/L (ref 12–78)
ANION GAP SERPL CALCULATED.3IONS-SCNC: 10 MMOL/L (ref 4–13)
APTT PPP: 59 SECONDS (ref 26–38)
APTT PPP: 74 SECONDS (ref 26–38)
APTT PPP: 77 SECONDS (ref 26–38)
AST SERPL W P-5'-P-CCNC: 33 U/L (ref 5–45)
BASOPHILS # BLD AUTO: 0.06 THOUSANDS/ΜL (ref 0–0.1)
BASOPHILS NFR BLD AUTO: 1 % (ref 0–1)
BILIRUB SERPL-MCNC: 0.8 MG/DL (ref 0.2–1)
BUN SERPL-MCNC: 8 MG/DL (ref 5–25)
CALCIUM SERPL-MCNC: 8.1 MG/DL (ref 8.3–10.1)
CHLORIDE SERPL-SCNC: 107 MMOL/L (ref 100–108)
CO2 SERPL-SCNC: 20 MMOL/L (ref 21–32)
CREAT SERPL-MCNC: 0.8 MG/DL (ref 0.6–1.3)
EOSINOPHIL # BLD AUTO: 0.06 THOUSAND/ΜL (ref 0–0.61)
EOSINOPHIL NFR BLD AUTO: 1 % (ref 0–6)
ERYTHROCYTE [DISTWIDTH] IN BLOOD BY AUTOMATED COUNT: 20.1 % (ref 11.6–15.1)
ERYTHROCYTE [DISTWIDTH] IN BLOOD BY AUTOMATED COUNT: 20.3 % (ref 11.6–15.1)
GFR SERPL CREATININE-BSD FRML MDRD: 82 ML/MIN/1.73SQ M
GLUCOSE SERPL-MCNC: 90 MG/DL (ref 65–140)
HCT VFR BLD AUTO: 28.3 % (ref 34.8–46.1)
HCT VFR BLD AUTO: 31.9 % (ref 34.8–46.1)
HGB BLD-MCNC: 10.2 G/DL (ref 11.5–15.4)
HGB BLD-MCNC: 9 G/DL (ref 11.5–15.4)
IMM GRANULOCYTES # BLD AUTO: 0.12 THOUSAND/UL (ref 0–0.2)
IMM GRANULOCYTES NFR BLD AUTO: 1 % (ref 0–2)
INR PPP: 2.06 (ref 0.86–1.17)
LYMPHOCYTES # BLD AUTO: 0.38 THOUSANDS/ΜL (ref 0.6–4.47)
LYMPHOCYTES NFR BLD AUTO: 4 % (ref 14–44)
MCH RBC QN AUTO: 34.4 PG (ref 26.8–34.3)
MCH RBC QN AUTO: 34.5 PG (ref 26.8–34.3)
MCHC RBC AUTO-ENTMCNC: 31.8 G/DL (ref 31.4–37.4)
MCHC RBC AUTO-ENTMCNC: 32 G/DL (ref 31.4–37.4)
MCV RBC AUTO: 108 FL (ref 82–98)
MCV RBC AUTO: 108 FL (ref 82–98)
MONOCYTES # BLD AUTO: 0.51 THOUSAND/ΜL (ref 0.17–1.22)
MONOCYTES NFR BLD AUTO: 5 % (ref 4–12)
NEUTROPHILS # BLD AUTO: 8.84 THOUSANDS/ΜL (ref 1.85–7.62)
NEUTS SEG NFR BLD AUTO: 88 % (ref 43–75)
NRBC BLD AUTO-RTO: 0 /100 WBCS
PLATELET # BLD AUTO: 390 THOUSANDS/UL (ref 149–390)
PLATELET # BLD AUTO: 450 THOUSANDS/UL (ref 149–390)
PMV BLD AUTO: 10.2 FL (ref 8.9–12.7)
PMV BLD AUTO: 10.2 FL (ref 8.9–12.7)
POTASSIUM SERPL-SCNC: 3.7 MMOL/L (ref 3.5–5.3)
PROT SERPL-MCNC: 5.7 G/DL (ref 6.4–8.2)
PROTHROMBIN TIME: 22.6 SECONDS (ref 11.8–14.2)
RBC # BLD AUTO: 2.62 MILLION/UL (ref 3.81–5.12)
RBC # BLD AUTO: 2.96 MILLION/UL (ref 3.81–5.12)
SODIUM SERPL-SCNC: 137 MMOL/L (ref 136–145)
WBC # BLD AUTO: 11.1 THOUSAND/UL (ref 4.31–10.16)
WBC # BLD AUTO: 9.97 THOUSAND/UL (ref 4.31–10.16)

## 2019-01-02 PROCEDURE — 85610 PROTHROMBIN TIME: CPT | Performed by: INTERNAL MEDICINE

## 2019-01-02 PROCEDURE — 85730 THROMBOPLASTIN TIME PARTIAL: CPT | Performed by: INTERNAL MEDICINE

## 2019-01-02 PROCEDURE — 85027 COMPLETE CBC AUTOMATED: CPT | Performed by: INTERNAL MEDICINE

## 2019-01-02 PROCEDURE — 99232 SBSQ HOSP IP/OBS MODERATE 35: CPT | Performed by: INTERNAL MEDICINE

## 2019-01-02 PROCEDURE — 99233 SBSQ HOSP IP/OBS HIGH 50: CPT | Performed by: PHYSICIAN ASSISTANT

## 2019-01-02 PROCEDURE — 85025 COMPLETE CBC W/AUTO DIFF WBC: CPT | Performed by: INTERNAL MEDICINE

## 2019-01-02 PROCEDURE — 99232 SBSQ HOSP IP/OBS MODERATE 35: CPT | Performed by: NURSE PRACTITIONER

## 2019-01-02 PROCEDURE — 80053 COMPREHEN METABOLIC PANEL: CPT | Performed by: INTERNAL MEDICINE

## 2019-01-02 RX ORDER — HEPARIN SODIUM 10000 [USP'U]/100ML
3-30 INJECTION, SOLUTION INTRAVENOUS
Status: DISPENSED | OUTPATIENT
Start: 2019-01-02 | End: 2019-01-04

## 2019-01-02 RX ADMIN — SERTRALINE HYDROCHLORIDE 50 MG: 50 TABLET ORAL at 22:36

## 2019-01-02 RX ADMIN — FERROUS SULFATE TAB 325 MG (65 MG ELEMENTAL FE) 325 MG: 325 (65 FE) TAB at 16:15

## 2019-01-02 RX ADMIN — HEPARIN SODIUM 2400 UNITS: 1000 INJECTION, SOLUTION INTRAVENOUS; SUBCUTANEOUS at 01:43

## 2019-01-02 RX ADMIN — FERROUS SULFATE TAB 325 MG (65 MG ELEMENTAL FE) 325 MG: 325 (65 FE) TAB at 10:29

## 2019-01-02 RX ADMIN — ESCITALOPRAM OXALATE 10 MG: 10 TABLET, FILM COATED ORAL at 10:23

## 2019-01-02 RX ADMIN — APIXABAN 5 MG: 5 TABLET, FILM COATED ORAL at 10:23

## 2019-01-02 RX ADMIN — SODIUM CHLORIDE 100 ML/HR: 0.9 INJECTION, SOLUTION INTRAVENOUS at 05:16

## 2019-01-02 RX ADMIN — CEPHALEXIN 500 MG: 500 CAPSULE ORAL at 22:36

## 2019-01-02 RX ADMIN — VERAPAMIL HYDROCHLORIDE 240 MG: 120 TABLET, FILM COATED, EXTENDED RELEASE ORAL at 10:22

## 2019-01-02 RX ADMIN — CEPHALEXIN 500 MG: 500 CAPSULE ORAL at 10:22

## 2019-01-02 RX ADMIN — SODIUM CHLORIDE 100 ML/HR: 0.9 INJECTION, SOLUTION INTRAVENOUS at 14:39

## 2019-01-02 NOTE — PLAN OF CARE
HEMATOLOGIC - ADULT     Maintains hematologic stability Progressing        INFECTION - ADULT     Absence or prevention of progression during hospitalization Progressing     Absence of fever/infection during neutropenic period Progressing        METABOLIC, FLUID AND ELECTROLYTES - ADULT     Fluid balance maintained Progressing        Nutrition/Hydration-ADULT     Nutrient/Hydration intake appropriate for improving, restoring or maintaining nutritional needs Progressing        PAIN - ADULT     Verbalizes/displays adequate comfort level or baseline comfort level Progressing        Potential for Falls     Patient will remain free of falls Progressing        Prexisting or High Potential for Compromised Skin Integrity     Skin integrity is maintained or improved Progressing        SAFETY ADULT     Patient will remain free of falls Progressing     Maintain or return to baseline ADL function Progressing     Maintain or return mobility status to optimal level Progressing

## 2019-01-02 NOTE — PROGRESS NOTES
Progress Note -  Hematology/Oncology   Thu Bryson 63 y o  female MRN: [id number]  Unit/Bed#: -18 Encounter: [id number]    HPI: Thu Bryson is a 63y o  year old female with a history of Stage IIIA left breast cancer and LE DVT  She recently completed chemotherapy on 12/27 and presented 12/20 for progressive weakness and fatigue with hematuria  Hemoglobin was 10 4, WBC 12 7, platelets 332  Hematuria confirmed with UA  Her home apixaban was held for hematuria  She did not require blood transfusion  Please see Shivani Penn PA-C initial consult note dated 12/30 for complete details    Assessment/Plan:   #1Stage IIIA breast cancer, s/p chemotherapy w/ AC and paclitaxel (last dose 12/27)  -she will continue to follow with Dr Arby Galeaz[facility] for treatment planning and clinical monitoring  #2 Anemia, suspect multifactorial 2/2 cumulative marrow suppression +/- hematuria  -chronic cumulative bone marrow suppression due to systemic cytotoxic chemotherapy expect to improve over next 2-3 weeks  Continued laboratory study monitoring outpatient by primary oncology provider is recommended   -possibly mild hemorrhagic cystitis contributing, urology is planning outpatient evaluation   -negative hemolysis work up incliding tbili, LDH, haptoglobin and hemolysis smear negative    #3 History of DVT, apixaban on hold for hematuria  -new diagnosis 10/2018 will need at least 6 months of chronic anticoagulation  Recommend transition back to oral anticoagulant with resolution of hematuria and stabilization of hemoglobin    Our recommendations were communicated to the primary team ([facility] [place]  Please contact us if you have any questions  Subjective:  She is fatigued today but otherwise no complaints  No SOB or CP  No hematuria today  No pelvic pain or discomfort  Review of Systems   Constitutional: Positive for appetite change and fatigue  Negative for chills and fever     HENT: Negative for mouth sores, nosebleeds and trouble swallowing  Eyes: Negative for icterus  Respiratory: Negative for cough, hemoptysis and shortness of breath  Cardiovascular: Positive for leg swelling  Negative for chest pain and palpitations  Gastrointestinal: Negative for abdominal distention, abdominal pain, constipation, diarrhea, nausea and vomiting  Genitourinary: Positive for hematuria (intermittent this admission w/ dark blood)  Negative for dysuria, pelvic pain and vaginal discharge  Musculoskeletal: Negative for gait problem  Neurological: Negative for gait problem, headaches and speech difficulty  Hematological: Does not bruise/bleed easily  Psychiatric/Behavioral: Negative for confusion  All other systems reviewed and are negative  The remainder of a 12 point review of systems was negative  Objective:  /73   Pulse 80   Temp 98 8 °F (37 1 °C)   Resp 16   Wt 61 kg (134 lb 7 7 oz)   LMP  (LMP Unknown)   SpO2 96%   BMI 23 08 kg/m²     Physical Exam   Constitutional: She is oriented to person, place, and time  No distress  HENT:   Mouth/Throat: Oropharynx is clear and moist  No oropharyngeal exudate  Eyes: Conjunctivae and EOM are normal    Neck: Normal range of motion  Cardiovascular: Normal rate and regular rhythm  Pulmonary/Chest: Effort normal and breath sounds normal  No respiratory distress  Abdominal: Soft  She exhibits no distension  There is no tenderness  Musculoskeletal: She exhibits no edema  Neurological: She is alert and oriented to person, place, and time  Skin: Skin is warm and dry  No rash noted  No erythema         Recent Labs      12/31/18   0410  01/01/19   0550  01/02/19   0813  01/02/19   1201   WBC  12 46*  10 97*  9 97  11 10*   HGB  9 3*  8 4*  10 2*  9 0*   PLT  360  352  450*  390   MCV  110*  108*  108*  108*   RDW  20 4*  19 9*  20 1*  20 3*   CREATININE  0 79  0 77  0 80   --    AST  46*  29  33   --    ALT  22  17  20 --        Laboratory studies were reviewed    Imaging Studies:        Pathology: None    Code Status: Level 3 - DNAR and DNI

## 2019-01-02 NOTE — PROGRESS NOTES
Progress Note - Efrain Llamas 63 y o  female MRN: [id number]    Unit/Bed#: -01 Encounter: [id number]      Assessment:  Ms Izzy Casillas is a 80-year-old female with breast cancer admitted for anemia and seen in consultation for E coli UTI with hematuria  Patient is anticoagulated chronically and is currently on heparin dot bridging  PTT remains on the higher range of therapeutic dot hemoglobin is 10 4 dot patient reports dot hematuria dot however, degree of blossom urinary tract is in consistent with recent drop in hemoglobin  Patient is afebrile and denies any flank, abdominal, groin or suprapubic pain or dysuria  On Keflex per primary team    Plan:  Continue antibiosis and medical optimization  Office has already began process to schedule outpatient cystoscopy with Dr Isa Ruth  No further  intervention indicated during this hospital stay  Will sign off  Do not hesitate to contact our service with any questions or new  concerns  Subjective:   As above--    Objective:     Vitals: Blood pressure 134/73, pulse 80, temperature 98 8 °F (37 1 °C), resp  rate 16, weight 61 kg (134 lb 7 7 oz), SpO2 96 %, not currently breastfeeding  ,Body mass index is 23 08 kg/m²        Intake/Output Summary (Last 24 hours) at 01/02/19 0826  Last data filed at 01/02/19 0759   Gross per 24 hour   Intake          2906 67 ml   Output              750 ml   Net          2156 67 ml       Physical Exam: General appearance: alert and oriented, in no acute distress, appears stated age, cooperative and pale  Head: Normocephalic, without obvious abnormality, atraumatic, Alopecia  Neck: supple, symmetrical, trachea midline  Lungs: clear to auscultation bilaterally  Heart: regular rate and rhythm, S1, S2 normal, no murmur, click, rub or gallop  Abdomen: soft, non-tender; bowel sounds normal; no masses,  no organomegaly  Extremities: extremities normal, warm and well-perfused; no cyanosis, clubbing, or edema  Pulses: 2+ and symmetric  Neurologic: Grossly normal  No urinary drains     Invasive Devices     Central Venous Catheter Line            Port A Cath 12/30/18 Right Chest 2 days              Lab Results   Component Value Date    WBC 9 97 01/02/2019    HGB 10 2 (L) 01/02/2019    HCT 31 9 (L) 01/02/2019     (H) 01/02/2019     (H) 01/02/2019     Lab Results   Component Value Date    CALCIUM 7 7 (L) 01/01/2019    K 3 0 (L) 01/01/2019    CO2 20 (L) 01/01/2019     01/01/2019    BUN 11 01/01/2019    CREATININE 0 77 01/01/2019       Lab, Imaging and other studies: I have personally reviewed pertinent reports

## 2019-01-02 NOTE — PLAN OF CARE
HEMATOLOGIC - ADULT     Maintains hematologic stability Progressing        INFECTION - ADULT     Absence or prevention of progression during hospitalization Progressing     Absence of fever/infection during neutropenic period Progressing        METABOLIC, FLUID AND ELECTROLYTES - ADULT     Fluid balance maintained Progressing        PAIN - ADULT     Verbalizes/displays adequate comfort level or baseline comfort level Progressing        Potential for Falls     Patient will remain free of falls Progressing        Prexisting or High Potential for Compromised Skin Integrity     Skin integrity is maintained or improved Progressing        SAFETY ADULT     Patient will remain free of falls Progressing     Maintain or return to baseline ADL function Progressing     Maintain or return mobility status to optimal level Progressing

## 2019-01-02 NOTE — PROGRESS NOTES
Progress Note - Madison Braun 1960, 63 y o  female MRN: [id number]    Unit/Bed#: -01 Encounter: [id number]    Primary Care Provider: Krystla Fernandez DC   Date and time admitted to hospital: 12/30/2018 10:57 AM      Hemorrhagic Cystitis in Immunosuppressed Patient -   C/w abx  Also reviewed Urology note  Outpatient cystoscopy planned for now  Patient will continue on heparin gtt tomorrow       Heme positive stool   GI - patient not wanting to pursue scope  Will need ERCP inpatient         Diarrhea -   likely chemo induced vs  Bleed related  Resolved  C-diff negative       History of DVT   On gtt  Will transition to eliquis after ERCP       History of Breast Cancer -    Has been receiving chemotherapy under the direction of Dr Justin Admas  Outpatient follow up       Hyperbilirubinemia   LFTs have normalized today  [address] team is planning on an MRCP to evaluate this further   CMP again in am      Moderate Protein Calorie Malnutrition -   dietician evaluation   Nutritional supplements      Essential Hypertension   continue home meds   Monitor blood pressures  VTE Pharmacologic Prophylaxis:   Pharmacologic: Heparin  Mechanical VTE Prophylaxis in Place: Yes    Patient Centered Rounds: I have performed bedside rounds with nursing staff today  Discussions with Specialists or Other Care Team Provider: Discussed with GI today  ERCP ? Friday  Education and Discussions with Family / Patient: Discussed with patient and with family  Time Spent for Care: 30 minutes  More than 50% of total time spent on counseling and coordination of care as described above  Current Length of Stay: 3 day(s)    Current Patient Status: Inpatient   Certification Statement: The patient will continue to require additional inpatient hospital stay due to needs inpatient ERCP  Discharge Plan: Not medically stable for DC  Likely Saturday       Code Status: Level 3 - DNAR and DNI      Subjective:   Patient seen and examined      " I am feeling a little better"    Objective:     Vitals:   Temp (24hrs), Av 5 °F (36 9 °C), Min:98 3 °F (36 8 °C), Max:98 8 °F (37 1 °C)    Temp:  [98 3 °F (36 8 °C)-98 8 °F (37 1 °C)] 98 4 °F (36 9 °C)  HR:  [78-85] 78  Resp:  [16-18] 16  BP: (101-134)/(61-73) 101/61  SpO2:  [94 %-96 %] 94 %  Body mass index is 23 08 kg/m²  Input and Output Summary (last 24 hours): Intake/Output Summary (Last 24 hours) at 19 1640  Last data filed at 19 0759   Gross per 24 hour   Intake          2666 67 ml   Output              750 ml   Net          1916 67 ml       Physical Exam:     Physical Exam   Constitutional: She appears well-developed  No distress  HENT:   Head: Normocephalic  Eyes: Right eye exhibits no discharge  Left eye exhibits no discharge  No scleral icterus  Neck: Normal range of motion  No JVD present  No tracheal deviation present  No thyromegaly present  Cardiovascular: Normal rate  Exam reveals no gallop and no friction rub  No murmur heard  Pulmonary/Chest: No respiratory distress  She has no wheezes  She has no rales  She exhibits no tenderness  Abdominal: She exhibits no distension and no mass  There is no tenderness  There is no rebound and no guarding  Genitourinary: Rectal exam shows guaiac negative stool  No vaginal discharge found  Musculoskeletal: Normal range of motion  She exhibits no edema, tenderness or deformity  Lymphadenopathy:     She has no cervical adenopathy  Neurological: She is alert  No cranial nerve deficit  Coordination normal    Skin: Skin is warm  No rash noted  She is not diaphoretic  No erythema  There is pallor  Psychiatric: She has a normal mood and affect           Additional Data:     Labs:      Results from last 7 days  Lab Units 19  1201 19  0813   WBC Thousand/uL 11 10* 9 97   HEMOGLOBIN g/dL 9 0* 10 2*   HEMATOCRIT % 28 3* 31 9*   PLATELETS Thousands/uL 390 450*   NEUTROS PCT %  --  88*   LYMPHS PCT %  --  4*   MONOS PCT %  --  5   EOS PCT %  --  1       Results from last 7 days  Lab Units 01/02/19  0813   SODIUM mmol/L 137   POTASSIUM mmol/L 3 7   CHLORIDE mmol/L 107   CO2 mmol/L 20*   BUN mg/dL 8   CREATININE mg/dL 0 80   ANION GAP mmol/L 10   CALCIUM mg/dL 8 1*   ALBUMIN g/dL 1 6*   TOTAL BILIRUBIN mg/dL 0 80   ALK PHOS U/L 140*   ALT U/L 20   AST U/L 33   GLUCOSE RANDOM mg/dL 90       Results from last 7 days  Lab Units 01/02/19  1201   INR  2 06*               Results from last 7 days  Lab Units 12/30/18  1153   LACTIC ACID mmol/L 1 2   PROCALCITONIN ng/ml 0 52*           * I Have Reviewed All Lab Data Listed Above  * Additional Pertinent Lab Tests Reviewed: [address] Admission Reviewed    Imaging:    Imaging Reports Reviewed Today Include:   MRI -  "  1   Dilated CBD up to 15 mm with abrupt transitioning distal CBD proximal to the ampulla for which the possibility of stricture cannot be excluded  Correlation with ERCP with brush biopsy is recommended   No discernible choledocholithiasis  2   Evidence of pancreatic divisum   No MRI evidence of pancreatitis  3   Abnormal hepatic signal suggestive of iron overload   Correlate with serologic testing  4   Severe right hydroureteronephrosis again identified   Presence of distal right ureteral stricture cannot be excluded      5  1 cm hypoattenuating focus seen along the superior posterior margin of the pancreatic body on previous CT imaging in retrospect, 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   Follow-up CTA abdomen in 3 months recommended to exclude   less likely possibility of a subtle pancreatic lesion   Limited assessment of the pancreas without contrast     6   Small bilateral pleural effusions with bibasilar compressive atelectasis and large hiatal hernia      I personally discussed this study and recommendations with Dr Lobo Gomes from GI on 1/1/2019 at 6:12 AM "    US liver -   "  Dilated common bile duct with suggestion of sludge or choledocholithiasis   Consider ERCP  Chronic right-sided hydronephrosis again identified with advanced cortical thinning "      Imaging Personally Reviewed by Myself Includes:  As above  Recent Cultures (last 7 days):       Results from last 7 days  Lab Units 12/31/18  1857 12/30/18  1228 12/30/18  1219 12/30/18  1216 12/30/18  1153   BLOOD CULTURE   --   --   --  No Growth at 48 hrs  No Growth at 48 hrs  URINE CULTURE   --   --  >100,000 cfu/ml Escherichia coli*  --   --    INFLUENZA B PCR   --  None Detected  --   --   --    RSV PCR   --  None Detected  --   --   --    C DIFF TOXIN B  NEGATIVE for C difficle toxin by PCR    --   --   --   --        Last 24 Hours Medication List:     Current Facility-Administered Medications:  acetaminophen 650 mg Oral Q6H PRN Gweneth Glaze, DO    cephalexin 500 mg Oral Q12H [facility] Mik Rutherford MD    escitalopram 10 mg Oral Daily Gweneth Glaze, DO    ferrous sulfate 325 mg Oral BID With Meals Gweneth Glaze, DO    heparin (porcine) 3-30 Units/kg/hr (Order-Specific) Intravenous Titrated Mik Rutherford MD Last Rate: 8 Units/kg/hr (01/02/19 1253)   LORazepam 1 mg Oral BID PRN Gweneth Glaze, DO    metoclopramide 10 mg Oral 4x Daily PRN Gweneth Glaze, DO    ondansetron 4 mg Intravenous Q6H PRN Gweneth Glaze, DO    sertraline 50 mg Oral HS Gweneth Glaze, DO    sodium chloride 100 mL/hr Intravenous Continuous Gweneth Glaze, DO Last Rate: 100 mL/hr (01/02/19 1439)   SUMAtriptan 100 mg Oral Once PRN Gweneth Glaze, DO    verapamil 240 mg Oral Daily Gweneth Glaze, DO         Today, Patient Was Seen By: Mik Rutherford MD    ** Please Note: Dictation voice to text software may have been used in the creation of this document   **

## 2019-01-03 ENCOUNTER — ANESTHESIA EVENT (INPATIENT)
Dept: PERIOP | Facility: HOSPITAL | Age: 59
DRG: 690 | End: 2019-01-03
Payer: COMMERCIAL

## 2019-01-03 LAB — APTT PPP: 63 SECONDS (ref 26–38)

## 2019-01-03 PROCEDURE — 99232 SBSQ HOSP IP/OBS MODERATE 35: CPT | Performed by: INTERNAL MEDICINE

## 2019-01-03 PROCEDURE — 85730 THROMBOPLASTIN TIME PARTIAL: CPT | Performed by: INTERNAL MEDICINE

## 2019-01-03 PROCEDURE — 99232 SBSQ HOSP IP/OBS MODERATE 35: CPT | Performed by: PHYSICIAN ASSISTANT

## 2019-01-03 RX ADMIN — SODIUM CHLORIDE 100 ML/HR: 0.9 INJECTION, SOLUTION INTRAVENOUS at 19:50

## 2019-01-03 RX ADMIN — CEPHALEXIN 500 MG: 500 CAPSULE ORAL at 08:21

## 2019-01-03 RX ADMIN — ACETAMINOPHEN 650 MG: 325 TABLET, FILM COATED ORAL at 08:28

## 2019-01-03 RX ADMIN — FERROUS SULFATE TAB 325 MG (65 MG ELEMENTAL FE) 325 MG: 325 (65 FE) TAB at 17:28

## 2019-01-03 RX ADMIN — FERROUS SULFATE TAB 325 MG (65 MG ELEMENTAL FE) 325 MG: 325 (65 FE) TAB at 08:21

## 2019-01-03 RX ADMIN — SODIUM CHLORIDE 100 ML/HR: 0.9 INJECTION, SOLUTION INTRAVENOUS at 00:28

## 2019-01-03 RX ADMIN — CEPHALEXIN 500 MG: 500 CAPSULE ORAL at 21:08

## 2019-01-03 RX ADMIN — VERAPAMIL HYDROCHLORIDE 240 MG: 120 TABLET, FILM COATED, EXTENDED RELEASE ORAL at 08:20

## 2019-01-03 RX ADMIN — ESCITALOPRAM OXALATE 10 MG: 10 TABLET, FILM COATED ORAL at 08:21

## 2019-01-03 RX ADMIN — SERTRALINE HYDROCHLORIDE 50 MG: 50 TABLET ORAL at 21:08

## 2019-01-03 RX ADMIN — SODIUM CHLORIDE 100 ML/HR: 0.9 INJECTION, SOLUTION INTRAVENOUS at 10:08

## 2019-01-03 RX ADMIN — HEPARIN SODIUM AND DEXTROSE 8 UNITS/KG/HR: 10000; 5 INJECTION INTRAVENOUS at 10:06

## 2019-01-03 NOTE — UTILIZATION REVIEW
Continued Stay Review    Date: 1/3/3019    Vital Signs: /72   Pulse 79   Temp 97 9 °F (36 6 °C)   Resp 16   Wt 61 kg (134 lb 7 7 oz)   LMP  (LMP Unknown)   SpO2 96%   BMI 23 08 kg/m²     Medications:   Scheduled Meds:   Current Facility-Administered Medications:  acetaminophen 650 mg Oral Q6H PRN    cephalexin 500 mg Oral Q12H DAVON    escitalopram 10 mg Oral Daily    ferrous sulfate 325 mg Oral BID With Meals    heparin (porcine) 3-30 Units/kg/hr (Order-Specific) Intravenous Titrated Last Rate: 8 Units/kg/hr (01/03/19 1006)   LORazepam 1 mg Oral BID PRN    metoclopramide 10 mg Oral 4x Daily PRN    ondansetron 4 mg Intravenous Q6H PRN    sertraline 50 mg Oral HS    sodium chloride 100 mL/hr Intravenous Continuous Last Rate: 100 mL/hr (01/03/19 1008)   SUMAtriptan 100 mg Oral Once PRN    verapamil 240 mg Oral Daily      Continuous Infusions:   heparin (porcine) 3-30 Units/kg/hr (Order-Specific) Last Rate: 8 Units/kg/hr (01/03/19 1006)   sodium chloride 100 mL/hr Last Rate: 100 mL/hr (01/03/19 1008)     PRN Meds:   Acetaminophen - used x 1  Abnormal Labs/Diagnostic Results:   PTT 63    1/1 MRI abdomen - Dilated CBD up to 15 mm with abrupt transitioning distal CBD proximal to the ampulla for which the possibility of stricture cannot be excluded  Correlation with ERCP with brush biopsy is recommended   No discernible choledocholithiasis  2   Evidence of pancreatic divisum   No MRI evidence of pancreatitis  3   Abnormal hepatic signal suggestive of iron overload   Correlate with serologic testing  4   Severe right hydroureteronephrosis again identified   Presence of distal right ureteral stricture cannot be excluded    5  1 cm hypoattenuating focus seen along the superior posterior margin of the pancreatic body on previous CT imaging in retrospect, 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   Follow-up CTA abdomen in 3 months recommended to exclude   less likely possibility of a subtle pancreatic lesion   Limited assessment of the pancreas without contrast   6   Small bilateral pleural effusions with bibasilar compressive atelectasis and large hiatal hernia        Age/Sex: 63 y o  female     Assessment/Plan: Hemorrhagic Cystitis in Immunosuppressed Patient -   C/w abx  PO abx  Also reviewed Urology note  Outpatient cystoscopy planned for now  Patient will continue on heparin gtt tomorrow       Heme positive stool   GI - patient not wanting to pursue scope  Will need ERCP inpatient          Diarrhea   likely chemo induced vs  Bleed related  Resolved  C-diff negative       History of DVT   On gtt  Will transition to eliquis after ERCP       History of Breast Cancer -    Has been receiving chemotherapy under the direction of Dr Colt Crook  Outpatient follow up       Hyperbilirubinemia   LFTs have normalized today  [address] team is planning on an MRCP to evaluate this further   CMP again in am      Moderate Protein Calorie Malnutrition -   dietician evaluation   Nutritional supplements      Essential Hypertension   continue home meds   Monitor blood pressures  Discharge Plan: To be determined, needs IP ERCP            [address] Utilization Review Department  Phone: [phone]; Fax [phone]  [email or web address]  ATTENTION: Please call with any questions or concerns to [phone]  and carefully listen to the prompts so that you are directed to the right person  Send all requests for admission clinical reviews, approved or denied determinations and any other requests to fax [phone]   All voicemails are confidential

## 2019-01-03 NOTE — PROGRESS NOTES
Progress Note -  Hematology/Oncology   Efrain Llamas 63 y o  female MRN: [id number]  Unit/Bed#: -18 Encounter: [id number]    HPI: Efrain Llamas is a 63y o  year old female with a history of Stage IIIA left breast cancer and LE DVT  She recently completed chemotherapy on 12/27 and presented 12/20 for progressive weakness and fatigue with hematuria  Hemoglobin was 10 4, WBC 12 7, platelets 557  Hematuria confirmed with UA  Her home apixaban was held for hematuria  She did not require blood transfusion      Please see Shivani Penn PA-C initial consult note dated 12/30 for complete details     Assessment/Plan:   #1Stage IIIA breast cancer, s/p chemotherapy w/ AC and paclitaxel (last dose 12/27)  -she will continue to follow with Dr Anjali Delcid [facility] for treatment planning and clinical monitoring  Since her hemoglobin has been stable we will not need a post hospital follow up with Dr Teddy Melton  She will contact his office if she has any issues,otherwise we will keep her post treatment appointment as is for 2/12/2019 at 2:40 PM at the [facility]     #2 Anemia, suspect multifactorial 2/2 cumulative marrow suppression +/- hematuria   -chronic cumulative bone marrow suppression due to systemic cytotoxic chemotherapy expect to improve over next 2-3 weeks  Continued laboratory study monitoring outpatient by primary oncology provider is recommended   -possibly mild hemorrhagic cystitis contributing, urology is planning outpatient evaluation with cystoscopy  -negative hemolysis work up incliding tbili, LDH, haptoglobin and hemolysis smear negative     #3 History of DVT, apixaban on hold for hematuria  -new diagnosis 10/2018 will need at least 6 months of chronic anticoagulation   Recommend transition back to oral anticoagulant with resolution of hematuria and stabilization of hemoglobin    She is OK from an oncologic perspective for discharge when deemed medically stable by the primary team     Please contact us if you have any questions  Discussed with Dr Keron Santos on this date  Subjective:  Denies hematuria today  No pain  Review of Systems   Constitutional: Negative for appetite change, chills, fatigue and fever  HENT:   Negative for mouth sores and trouble swallowing  Eyes: Negative for icterus  Respiratory: Negative for chest tightness, cough, hemoptysis and shortness of breath  Cardiovascular: Negative for chest pain, leg swelling and palpitations  Gastrointestinal: Negative for abdominal distention, abdominal pain, blood in stool, diarrhea, nausea and vomiting  Genitourinary: Negative for hematuria and pelvic pain  Musculoskeletal: Negative for flank pain and gait problem  Skin: Negative for rash  Neurological: Negative for dizziness, gait problem, headaches and speech difficulty  Hematological: Bruises/bleeds easily  Psychiatric/Behavioral: Negative for confusion  All other systems reviewed and are negative  Objective:  /72   Pulse 79   Temp 97 9 °F (36 6 °C)   Resp 16   Wt 61 kg (134 lb 7 7 oz)   LMP  (LMP Unknown)   SpO2 96%   BMI 23 08 kg/m²     Physical Exam   Constitutional: She is oriented to person, place, and time  No distress  HENT:   Head: Atraumatic  Mouth/Throat: No oropharyngeal exudate  Eyes: Conjunctivae and EOM are normal  No scleral icterus  Neck: Normal range of motion  Cardiovascular: Normal rate and regular rhythm  Pulmonary/Chest: Effort normal and breath sounds normal  She has no wheezes  Abdominal: Soft  She exhibits no distension  There is no tenderness  Musculoskeletal: She exhibits no edema or tenderness  Lymphadenopathy:     She has no cervical adenopathy  Neurological: She is alert and oriented to person, place, and time  Skin: Skin is warm and dry  No rash noted  No pallor  Vitals reviewed        Recent Labs      01/01/19   0550  01/02/19   0813  01/02/19   1201   WBC  10 97*  9 97 11 10*   HGB  8 4*  10 2*  9 0*   PLT  352  450*  390   MCV  108*  108*  108*   RDW  19 9*  20 1*  20 3*   CREATININE  0 77  0 80   --    AST  29  33   --    ALT  17  20   --        Laboratory studies were reviewed    Imaging Studies:        Pathology: None    Code Status: Level 3 - DNAR and DNI

## 2019-01-03 NOTE — PROGRESS NOTES
Progress Note - Marielos Mahoney 1960, 63 y o  female MRN: [id number]    Unit/Bed#: -01 Encounter: [id number]    Primary Care Provider: Anika Oleary DC   Date and time admitted to hospital: 12/30/2018 10:57 AM          Hemorrhagic Cystitis in Immunosuppressed Patient -   C/w abx  PO abx  Also reviewed Urology note  Outpatient cystoscopy planned for now  Patient will continue on heparin gtt tomorrow       Heme positive stool   GI - patient not wanting to pursue scope  Will need ERCP inpatient          Diarrhea   likely chemo induced vs  Bleed related  Resolved  C-diff negative       History of DVT   On gtt  Will transition to eliquis after ERCP       History of Breast Cancer -    Has been receiving chemotherapy under the direction of Dr Prince Ray  Outpatient follow up       Hyperbilirubinemia   LFTs have normalized today  [facility] team is planning on an MRCP to evaluate this further   CMP again in am      Moderate Protein Calorie Malnutrition -   dietician evaluation   Nutritional supplements      Essential Hypertension   continue home meds   Monitor blood pressures      VTE Pharmacologic Prophylaxis:   Pharmacologic: Heparin  Mechanical VTE Prophylaxis in Place: Yes    Patient Centered Rounds: I have performed bedside rounds with nursing staff today  Discussions with Specialists or Other Care Team Provider: Discussed with GI - planned by GI on Friday  Education and Discussions with Family / Patient: Discussed with patient and with  Ruslan Needmakayla  Time Spent for Care: 30 minutes  More than 50% of total time spent on counseling and coordination of care as described above  Current Length of Stay: 4 day(s)    Current Patient Status: Inpatient   Certification Statement: The patient will continue to require additional inpatient hospital stay due to ercp inpatient  Discharge Plan: Not medically stable for DC       Code Status: Level 3 - DNAR and DNI      Subjective:   Patient seen and examined  "I feel okay"    Objective:     Vitals:   Temp (24hrs), Av 1 °F (36 7 °C), Min:97 9 °F (36 6 °C), Max:98 4 °F (36 9 °C)    Temp:  [97 9 °F (36 6 °C)-98 4 °F (36 9 °C)] 97 9 °F (36 6 °C)  HR:  [76-79] 79  Resp:  [16] 16  BP: (101-115)/(61-72) 115/72  SpO2:  [94 %-96 %] 96 %  Body mass index is 23 08 kg/m²  Input and Output Summary (last 24 hours): Intake/Output Summary (Last 24 hours) at 19 1223  Last data filed at 19 1005   Gross per 24 hour   Intake          2981 67 ml   Output              550 ml   Net          2431 67 ml       Physical Exam:     Physical Exam   Constitutional: No distress  HENT:   Mouth/Throat: No oropharyngeal exudate  Eyes: Right eye exhibits no discharge  Left eye exhibits no discharge  No scleral icterus  Neck: No JVD present  No tracheal deviation present  No thyromegaly present  Cardiovascular: Normal rate  Exam reveals no gallop and no friction rub  No murmur heard  Pulmonary/Chest: Effort normal  No respiratory distress  She has no wheezes  She exhibits no tenderness  Abdominal: Soft  She exhibits no distension and no mass  There is no tenderness  There is no rebound and no guarding  Musculoskeletal: She exhibits no edema, tenderness or deformity  Lymphadenopathy:     She has no cervical adenopathy  Neurological: She is alert  She displays normal reflexes  No cranial nerve deficit  She exhibits normal muscle tone  Coordination normal    Skin: No rash noted  She is not diaphoretic  No erythema  There is pallor  Psychiatric: She has a normal mood and affect           Additional Data:     Labs:      Results from last 7 days  Lab Units 19  1201 19  0813   WBC Thousand/uL 11 10* 9 97   HEMOGLOBIN g/dL 9 0* 10 2*   HEMATOCRIT % 28 3* 31 9*   PLATELETS Thousands/uL 390 450*   NEUTROS PCT %  --  88*   LYMPHS PCT %  --  4*   MONOS PCT %  --  5   EOS PCT %  --  1       Results from last 7 days  Lab Units 19  0813 SODIUM mmol/L 137   POTASSIUM mmol/L 3 7   CHLORIDE mmol/L 107   CO2 mmol/L 20*   BUN mg/dL 8   CREATININE mg/dL 0 80   ANION GAP mmol/L 10   CALCIUM mg/dL 8 1*   ALBUMIN g/dL 1 6*   TOTAL BILIRUBIN mg/dL 0 80   ALK PHOS U/L 140*   ALT U/L 20   AST U/L 33   GLUCOSE RANDOM mg/dL 90       Results from last 7 days  Lab Units 01/02/19  1201   INR  2 06*               Results from last 7 days  Lab Units 12/30/18  1153   LACTIC ACID mmol/L 1 2   PROCALCITONIN ng/ml 0 52*           * I Have Reviewed All Lab Data Listed Above  * Additional Pertinent Lab Tests Reviewed: [facility] Ad[facility] Reviewed    Imaging:    Imaging Reports Reviewed Today Include:   MRI -   "1   Dilated CBD up to 15 mm with abrupt transitioning distal CBD proximal to the ampulla for which the possibility of stricture cannot be excluded  Correlation with ERCP with brush biopsy is recommended   No discernible choledocholithiasis  2   Evidence of pancreatic divisum   No MRI evidence of pancreatitis  3   Abnormal hepatic signal suggestive of iron overload   Correlate with serologic testing  4   Severe right hydroureteronephrosis again identified   Presence of distal right ureteral stricture cannot be excluded  5  1 cm hypoattenuating focus seen along the superior posterior margin of the pancreatic body on previous CT imaging in retrospect, 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   Follow-up CTA abdomen in 3 months recommended to exclude   less likely possibility of a subtle pancreatic lesion   Limited assessment of the pancreas without contrast     6   Small bilateral pleural effusions with bibasilar compressive atelectasis and large hiatal hernia      I personally discussed this study and recommendations with Dr Freddy Crabtree from GI on 1/1/2019 at 6:12 AM "  Imaging Personally Reviewed by Myself Includes:  As above       Recent Cultures (last 7 days):       Results from last 7 days  Lab Units 12/31/18  1857 12/30/18  1228 12/30/18  1219 12/30/18  1216 12/30/18  1153   BLOOD CULTURE   --   --   --  No Growth at 72 hrs  No Growth at 72 hrs  URINE CULTURE   --   --  >100,000 cfu/ml Escherichia coli*  --   --    INFLUENZA B PCR   --  None Detected  --   --   --    RSV PCR   --  None Detected  --   --   --    C DIFF TOXIN B  NEGATIVE for C difficle toxin by PCR    --   --   --   --        Last 24 Hours Medication List:     Current Facility-Administered Medications:  acetaminophen 650 mg Oral Q6H PRN [place] Citrin, DO    cephalexin 500 mg Oral Q12H [address] Piper Sanchez MD    escitalopram 10 mg Oral Daily [place] Citrin, DO    ferrous sulfate 325 mg Oral BID With Meals [place] Citrin, DO    heparin (porcine) 3-30 Units/kg/hr (Order-Specific) Intravenous Titrated Piper Sanchez MD Last Rate: 8 Units/kg/hr (01/03/19 1006)   LORazepam 1 mg Oral BID PRN [place] Citrin, DO    metoclopramide 10 mg Oral 4x Daily PRN [place] Citrin, DO    ondansetron 4 mg Intravenous Q6H PRN [place] Citrin, DO    sertraline 50 mg Oral HS [place] Citrin, DO    sodium chloride 100 mL/hr Intravenous Continuous [place] Citrin, DO Last Rate: 100 mL/hr (01/03/19 1008)   SUMAtriptan 100 mg Oral Once PRN [place] Citrin, DO    verapamil 240 mg Oral Daily [place] Citrin, DO         Today, Patient Was Seen By: Piper Sanchez MD    ** Please Note: Dictation voice to text software may have been used in the creation of this document   **

## 2019-01-03 NOTE — PROGRESS NOTES
Progress Note- Zuleyka Mccurdy 63 y o  female MRN: [id number]    Unit/Bed#: -18 Encounter: [id number]      Assessment and Plan:    Elevate liver enzymes with dilated CBD  -MRCP shows dilated CBD with possible stricture  -lfts have improved  -planning for ERCP 1/4  -Heparin drip to be held 6 hours prior  -NPO at midnight    Acute blood loss anemia with hematuria  -hemorrhagic cystitis, no overt GI bleeding  -she continues to refuse colonoscopy    ? Pancreatic lesion versus partial thrombosis splenic artery aneurysm  -recommending outpatient EUS  ______________________________________________________________________    Subjective:     Juliana Roberto feels well, she denies complaints  No abdominal pain, nausea, vomiting       Medication Administration - last 24 hours from 01/02/2019 1032 to 01/03/2019 1032       Date/Time Order Dose Route Action Action by     01/03/2019 0821 escitalopram (LEXAPRO) tablet 10 mg 10 mg Oral Given Carin Sykes RN     01/03/2019 8916 ferrous sulfate tablet 325 mg 325 mg Oral Given Carin Sykes RN     01/02/2019 1615 ferrous sulfate tablet 325 mg 325 mg Oral Given Summer Cardona RN     01/02/2019 2236 sertraline (ZOLOFT) tablet 50 mg 50 mg Oral Given Genet Calvert RN     01/03/2019 0820 verapamil (CALAN-SR) CR tablet 240 mg 240 mg Oral Given Carin Sykes RN     01/03/2019 1008 sodium chloride 0 9 % infusion 100 mL/hr Intravenous [place] [address], [facility]     01/03/2019 1005 sodium chloride 0 9 % infusion 0 mL/hr Intravenous Stopped Carin Sykes RN     01/03/2019 0028 sodium chloride 0 9 % infusion 100 mL/hr Intravenous [address] Genet Calvert RN     01/02/2019 1439 sodium chloride 0 9 % infusion 100 mL/hr Intravenous [address], [facility]     01/03/2019 0392 acetaminophen (TYLENOL) tablet 650 mg 650 mg Oral Given Carin Sykes RN     01/03/2019 2328 cephalexin (KEFLEX) capsule 500 mg 500 mg Oral Given Carin Sykes RN     01/02/2019 2236 cephalexin (Merril Suzy) capsule 500 mg 500 mg Oral Given Vidal Hutton RN     01/02/2019 1254 heparin (VTE/PE) high 0  Intravenous Hold Daniela Perez RN     01/03/2019 1006 heparin (porcine) 25,000 units in 250 mL infusion (premix) 8 Units/kg/hr Intravenous [place] [address], [facility]     01/02/2019 1253 heparin (porcine) 25,000 units in 250 mL infusion (premix) 8 Units/kg/hr Intravenous Rate/Dose Change Daniela Perez RN          Objective:     Vitals: Blood pressure 115/72, pulse 79, temperature 97 9 °F (36 6 °C), resp  rate 16, weight 61 kg (134 lb 7 7 oz), SpO2 96 %, not currently breastfeeding  ,Body mass index is 23 08 kg/m²  Intake/Output Summary (Last 24 hours) at 01/03/19 1032  Last data filed at 01/03/19 1005   Gross per 24 hour   Intake          2981 67 ml   Output              550 ml   Net          2431 67 ml       Physical Exam:   General Appearance: Awake and alert, in no acute distress, chronically ill appearing  Abdomen: Soft, non-tender, non-distended; bowel sounds normal  Pulm: ctab, unlabored respirations    Invasive Devices     Central Venous Catheter Line            Port A Cath 12/30/18 Right Chest 3 days                Lab Results:  Admission on 12/30/2018   No results displayed because visit has over 200 results  Imaging Studies: I have personally reviewed pertinent imaging studies

## 2019-01-04 ENCOUNTER — APPOINTMENT (INPATIENT)
Dept: RADIOLOGY | Facility: HOSPITAL | Age: 59
DRG: 690 | End: 2019-01-04
Payer: COMMERCIAL

## 2019-01-04 ENCOUNTER — ANESTHESIA (INPATIENT)
Dept: PERIOP | Facility: HOSPITAL | Age: 59
DRG: 690 | End: 2019-01-04
Payer: COMMERCIAL

## 2019-01-04 LAB
ANION GAP SERPL CALCULATED.3IONS-SCNC: 11 MMOL/L (ref 4–13)
APTT PPP: 88 SECONDS (ref 26–38)
BACTERIA BLD CULT: NORMAL
BACTERIA BLD CULT: NORMAL
BASOPHILS # BLD AUTO: 0.05 THOUSANDS/ΜL (ref 0–0.1)
BASOPHILS NFR BLD AUTO: 1 % (ref 0–1)
BUN SERPL-MCNC: 4 MG/DL (ref 5–25)
CALCIUM SERPL-MCNC: 7.7 MG/DL (ref 8.3–10.1)
CHLORIDE SERPL-SCNC: 110 MMOL/L (ref 100–108)
CO2 SERPL-SCNC: 20 MMOL/L (ref 21–32)
CREAT SERPL-MCNC: 0.65 MG/DL (ref 0.6–1.3)
EOSINOPHIL # BLD AUTO: 0.17 THOUSAND/ΜL (ref 0–0.61)
EOSINOPHIL NFR BLD AUTO: 2 % (ref 0–6)
ERYTHROCYTE [DISTWIDTH] IN BLOOD BY AUTOMATED COUNT: 20.3 % (ref 11.6–15.1)
GFR SERPL CREATININE-BSD FRML MDRD: 99 ML/MIN/1.73SQ M
GLUCOSE SERPL-MCNC: 87 MG/DL (ref 65–140)
HCT VFR BLD AUTO: 24.4 % (ref 34.8–46.1)
HGB BLD-MCNC: 7.7 G/DL (ref 11.5–15.4)
IMM GRANULOCYTES # BLD AUTO: 0.07 THOUSAND/UL (ref 0–0.2)
IMM GRANULOCYTES NFR BLD AUTO: 1 % (ref 0–2)
INR PPP: 1.49 (ref 0.86–1.17)
LYMPHOCYTES # BLD AUTO: 0.52 THOUSANDS/ΜL (ref 0.6–4.47)
LYMPHOCYTES NFR BLD AUTO: 6 % (ref 14–44)
MCH RBC QN AUTO: 33.9 PG (ref 26.8–34.3)
MCHC RBC AUTO-ENTMCNC: 31.6 G/DL (ref 31.4–37.4)
MCV RBC AUTO: 108 FL (ref 82–98)
MONOCYTES # BLD AUTO: 0.62 THOUSAND/ΜL (ref 0.17–1.22)
MONOCYTES NFR BLD AUTO: 8 % (ref 4–12)
NEUTROPHILS # BLD AUTO: 6.72 THOUSANDS/ΜL (ref 1.85–7.62)
NEUTS SEG NFR BLD AUTO: 82 % (ref 43–75)
NRBC BLD AUTO-RTO: 0 /100 WBCS
PLATELET # BLD AUTO: 335 THOUSANDS/UL (ref 149–390)
PMV BLD AUTO: 10.3 FL (ref 8.9–12.7)
POTASSIUM SERPL-SCNC: 3.2 MMOL/L (ref 3.5–5.3)
PROTHROMBIN TIME: 17.6 SECONDS (ref 11.8–14.2)
RBC # BLD AUTO: 2.27 MILLION/UL (ref 3.81–5.12)
SODIUM SERPL-SCNC: 141 MMOL/L (ref 136–145)
WBC # BLD AUTO: 8.15 THOUSAND/UL (ref 4.31–10.16)

## 2019-01-04 PROCEDURE — 74328 X-RAY BILE DUCT ENDOSCOPY: CPT

## 2019-01-04 PROCEDURE — 88112 CYTOPATH CELL ENHANCE TECH: CPT | Performed by: PATHOLOGY

## 2019-01-04 PROCEDURE — 80048 BASIC METABOLIC PNL TOTAL CA: CPT | Performed by: INTERNAL MEDICINE

## 2019-01-04 PROCEDURE — 85025 COMPLETE CBC W/AUTO DIFF WBC: CPT | Performed by: INTERNAL MEDICINE

## 2019-01-04 PROCEDURE — C1769 GUIDE WIRE: HCPCS | Performed by: INTERNAL MEDICINE

## 2019-01-04 PROCEDURE — 0F798DZ DILATION OF COMMON BILE DUCT WITH INTRALUMINAL DEVICE, VIA NATURAL OR ARTIFICIAL OPENING ENDOSCOPIC: ICD-10-PCS | Performed by: INTERNAL MEDICINE

## 2019-01-04 PROCEDURE — 85610 PROTHROMBIN TIME: CPT | Performed by: PHYSICIAN ASSISTANT

## 2019-01-04 PROCEDURE — C2617 STENT, NON-COR, TEM W/O DEL: HCPCS | Performed by: INTERNAL MEDICINE

## 2019-01-04 PROCEDURE — 99232 SBSQ HOSP IP/OBS MODERATE 35: CPT | Performed by: INTERNAL MEDICINE

## 2019-01-04 PROCEDURE — 0FDC8ZX EXTRACTION OF AMPULLA OF VATER, VIA NATURAL OR ARTIFICIAL OPENING ENDOSCOPIC, DIAGNOSTIC: ICD-10-PCS | Performed by: INTERNAL MEDICINE

## 2019-01-04 PROCEDURE — 99232 SBSQ HOSP IP/OBS MODERATE 35: CPT | Performed by: PHYSICIAN ASSISTANT

## 2019-01-04 PROCEDURE — C2625 STENT, NON-COR, TEM W/DEL SY: HCPCS | Performed by: INTERNAL MEDICINE

## 2019-01-04 PROCEDURE — 43262 ENDO CHOLANGIOPANCREATOGRAPH: CPT | Performed by: INTERNAL MEDICINE

## 2019-01-04 PROCEDURE — 85730 THROMBOPLASTIN TIME PARTIAL: CPT | Performed by: INTERNAL MEDICINE

## 2019-01-04 PROCEDURE — 43274 ERCP DUCT STENT PLACEMENT: CPT | Performed by: INTERNAL MEDICINE

## 2019-01-04 PROCEDURE — 43273 ENDOSCOPIC PANCREATOSCOPY: CPT | Performed by: INTERNAL MEDICINE

## 2019-01-04 DEVICE — BILIARY STENT
Type: IMPLANTABLE DEVICE | Site: BILE DUCT | Status: NON-FUNCTIONAL
Brand: ADVANIX™ BILIARY
Removed: 2019-03-07

## 2019-01-04 RX ORDER — METOCLOPRAMIDE HYDROCHLORIDE 5 MG/ML
10 INJECTION INTRAMUSCULAR; INTRAVENOUS ONCE AS NEEDED
Status: DISCONTINUED | OUTPATIENT
Start: 2019-01-04 | End: 2019-01-04 | Stop reason: HOSPADM

## 2019-01-04 RX ORDER — FENTANYL CITRATE/PF 50 MCG/ML
25 SYRINGE (ML) INJECTION
Status: DISCONTINUED | OUTPATIENT
Start: 2019-01-04 | End: 2019-01-04 | Stop reason: HOSPADM

## 2019-01-04 RX ORDER — ONDANSETRON 2 MG/ML
INJECTION INTRAMUSCULAR; INTRAVENOUS AS NEEDED
Status: DISCONTINUED | OUTPATIENT
Start: 2019-01-04 | End: 2019-01-04 | Stop reason: SURG

## 2019-01-04 RX ORDER — MIDAZOLAM HYDROCHLORIDE 1 MG/ML
INJECTION INTRAMUSCULAR; INTRAVENOUS AS NEEDED
Status: DISCONTINUED | OUTPATIENT
Start: 2019-01-04 | End: 2019-01-04 | Stop reason: SURG

## 2019-01-04 RX ORDER — PROPOFOL 10 MG/ML
INJECTION, EMULSION INTRAVENOUS AS NEEDED
Status: DISCONTINUED | OUTPATIENT
Start: 2019-01-04 | End: 2019-01-04 | Stop reason: SURG

## 2019-01-04 RX ORDER — HEPARIN SODIUM 10000 [USP'U]/100ML
3-30 INJECTION, SOLUTION INTRAVENOUS
Status: DISCONTINUED | OUTPATIENT
Start: 2019-01-04 | End: 2019-01-04

## 2019-01-04 RX ORDER — ONDANSETRON 2 MG/ML
4 INJECTION INTRAMUSCULAR; INTRAVENOUS ONCE AS NEEDED
Status: DISCONTINUED | OUTPATIENT
Start: 2019-01-04 | End: 2019-01-04 | Stop reason: HOSPADM

## 2019-01-04 RX ORDER — SUCCINYLCHOLINE CHLORIDE 20 MG/ML
INJECTION INTRAMUSCULAR; INTRAVENOUS AS NEEDED
Status: DISCONTINUED | OUTPATIENT
Start: 2019-01-04 | End: 2019-01-04 | Stop reason: SURG

## 2019-01-04 RX ORDER — LIDOCAINE HYDROCHLORIDE 10 MG/ML
INJECTION, SOLUTION INFILTRATION; PERINEURAL AS NEEDED
Status: DISCONTINUED | OUTPATIENT
Start: 2019-01-04 | End: 2019-01-04 | Stop reason: SURG

## 2019-01-04 RX ORDER — POTASSIUM CHLORIDE 14.9 MG/ML
20 INJECTION INTRAVENOUS ONCE
Status: COMPLETED | OUTPATIENT
Start: 2019-01-04 | End: 2019-01-04

## 2019-01-04 RX ADMIN — ONDANSETRON 4 MG: 2 INJECTION INTRAMUSCULAR; INTRAVENOUS at 16:57

## 2019-01-04 RX ADMIN — SUCCINYLCHOLINE CHLORIDE 100 MG: 20 INJECTION, SOLUTION INTRAMUSCULAR; INTRAVENOUS at 16:49

## 2019-01-04 RX ADMIN — CEPHALEXIN 500 MG: 500 CAPSULE ORAL at 20:06

## 2019-01-04 RX ADMIN — DEXAMETHASONE SODIUM PHOSPHATE 4 MG: 10 INJECTION INTRAMUSCULAR; INTRAVENOUS at 16:57

## 2019-01-04 RX ADMIN — APIXABAN 5 MG: 5 TABLET, FILM COATED ORAL at 20:06

## 2019-01-04 RX ADMIN — IOHEXOL 13 ML: 240 INJECTION, SOLUTION INTRATHECAL; INTRAVASCULAR; INTRAVENOUS; ORAL at 18:40

## 2019-01-04 RX ADMIN — CEPHALEXIN 500 MG: 500 CAPSULE ORAL at 09:49

## 2019-01-04 RX ADMIN — LORAZEPAM 1 MG: 1 TABLET ORAL at 19:33

## 2019-01-04 RX ADMIN — ALTEPLASE 2 MG: 2.2 INJECTION, POWDER, LYOPHILIZED, FOR SOLUTION INTRAVENOUS at 12:52

## 2019-01-04 RX ADMIN — SERTRALINE HYDROCHLORIDE 50 MG: 50 TABLET ORAL at 22:06

## 2019-01-04 RX ADMIN — POTASSIUM CHLORIDE 20 MEQ: 200 INJECTION, SOLUTION INTRAVENOUS at 10:13

## 2019-01-04 RX ADMIN — MIDAZOLAM HYDROCHLORIDE 2 MG: 1 INJECTION, SOLUTION INTRAMUSCULAR; INTRAVENOUS at 16:47

## 2019-01-04 RX ADMIN — PROPOFOL 150 MG: 10 INJECTION, EMULSION INTRAVENOUS at 16:49

## 2019-01-04 RX ADMIN — SODIUM CHLORIDE: 0.9 INJECTION, SOLUTION INTRAVENOUS at 16:24

## 2019-01-04 RX ADMIN — LIDOCAINE HYDROCHLORIDE ANHYDROUS 50 MG: 10 INJECTION, SOLUTION INFILTRATION at 16:49

## 2019-01-04 NOTE — ANESTHESIA PREPROCEDURE EVALUATION
Review of Systems/Medical History  Patient summary reviewed  Chart reviewed      Cardiovascular  Hypertension controlled,    Pulmonary  Negative pulmonary ROS        GI/Hepatic    GERD ,        Negative  ROS        Endo/Other  Negative endo/other ROS      GYN    Breast cancer left mastectomy and axillary node dissection       Hematology  Anemia ,     Musculoskeletal  Negative musculoskeletal ROS        Neurology    Headaches,    Psychology   Negative psychology ROS              Physical Exam    Airway    Mallampati score: II  TM Distance: >3 FB  Neck ROM: full     Dental   No notable dental hx     Cardiovascular  Cardiovascular exam normal    Pulmonary  Pulmonary exam normal     Other Findings        Anesthesia Plan  ASA Score- 2     Anesthesia Type- general with ASA Monitors  Additional Monitors:   Airway Plan: ETT  Plan Factors-  Patient did not smoke on day of surgery  Induction- intravenous  Postoperative Plan-     Informed Consent- Anesthetic plan and risks discussed with patient  I personally reviewed this patient with the CRNA  Discussed and agreed on the Anesthesia Plan with the CRNA  Dara Soulier

## 2019-01-04 NOTE — OP NOTE
ENDOSCOPIC RETROGRADE CHOLAGIOPANCREATOGRAPHY    PROCEDURE: ERCP/ Sphincterotomy, Biliary Plastic Stent Placement, Biliary Brushings    INDICATIONS: Dilated CBD    POST-OP DIAGNOSIS: See the impression below    SEDATION: Monitored anesthesia care, check anesthesia records    PHYSICAL EXAM:    Blood pressure 130/73, pulse 88, temperature 97 9 °F (36 6 °C), temperature source Temporal, resp  rate 18, height 5' 5" (1 651 m), weight 60 8 kg (134 lb), SpO2 97 %, not currently breastfeeding  Body mass index is 22 3 kg/m²  General: NAD  Heart: S1 & S2 normal, RRR  Lungs: CTA, No rales or rhonchi  Abdomen: Soft, nontender, nondistended, good bowel sounds    CONSENT:  Informed consent was obtained for the procedure, including sedation after explaining the risks and benefits of the procedure  Risks including but not limited to severe pancreatitis, bleeding, perforation, infection, aspiration were discussed in detail  Also explained about less than 100% sensitivity with the exam and other alternatives  PREPARATION:   EKG tracing, pulse oximetry, blood pressure were monitored throughout the procedure  Patient was identified by myself both verbally and by visual inspection of ID band  DESCRIPTION:   Patient was placed in the prone position and was sedated with the above medication  The standard lateral viewing gastroduodenoscope was introduced in to the oropharynx and the esophagus was intubated under direct visualization using sliding technique  Scope was passed down the esophagus up to the second part of the duodenum  The scope was withdrawn and noted the ampulla on the medial wall  Ampulla was cannulated with sphinctertome catheter and cholangiogram was performed  FINDINGS:    1  Cholangiogram showed dilated common bile duct to about 15 mm all the way down to the ampulla  Sphincterotomy was performed with immediate drainage of bile    Biliary brushings were performed from the ampullary area and placed 10 Azeri x 7 cm biliary stent       IMPRESSIONS:      As above    RECOMMENDATIONS:     Check cytology    EGD with stent removal in 1-2 months    Spoke to pts   Discussed with Dr Radha Raphael about resuming heparin tonight    COMPLICATIONS: None; patient tolerated the procedure well          DISPOSITION: PACU           CONDITION: Stable

## 2019-01-04 NOTE — PROGRESS NOTES
Progress Note -  Hematology/Oncology   [facility] 63 y o  female MRN: [id number]  Unit/Bed#: -01 Encounter: [id number]    HPI: Amelia Hensley is a 63 y  o  year old female with a history of Stage IIIA left breast cancer and LE DVT  She recently completed chemotherapy on 12/27 and presented 12/20 for progressive weakness and fatigue with hematuria  Hemoglobin was 10 4, WBC 12 7, platelets 689  Hematuria confirmed with UA  Her home apixaban was held for hematuria  She was initiated on heparin for history of DVT  She did not require blood transfusion      Please see Shivani Penn PA-C initial consult note dated 12/30 for details     Assessment/Plan:   #1Stage IIIA breast cancer, s/p chemotherapy w/ AC and paclitaxel (last dose 12/27)  -she will continue to follow with Dr Suresh Kiran Oncology for treatment planning and clinical monitoring  Since her hemoglobin has been stable we will not need a post hospital follow up with Dr Cassandria Lombard  She will contact his office if she has any issues,otherwise we will keep her post treatment appointment as is for 2/12/2019 at 2:40 PM at the [facility]   -Dr Cassandria Lombard aware this admission      #2 Anemia, suspect multifactorial 2/2 cumulative marrow suppression +/- hematuria +/- dilution  -chronic cumulative bone marrow suppression due to systemic cytotoxic chemotherapy expect to improve over next 2-3 weeks  -hematuria may be due to ?? hemorrhagic cystitis contributing since she has had exposure to cyclophosphamide previously, urology is planning further outpatient evaluation with cystoscopy  -negative hemolysis work up incliding tbili, LDH, haptoglobin and hemolysis smear negative     #3 History of DVT, apixaban on hold for hematuria  -continue to HOLD apixaban with active hematuria  Continue heparin, she needs therapeutic anticoagulation with recent DVT and active malignancy   Recommend transition back to oral anticoagulant with resolution of hematuria and stabilization of hemoglobin  She is OK from a hematologic perspective for discharge when deemed medically stable by the primary team      Our recommendations were communicated to the primary team (Jose Sweet)  Please contact us if you have any questions  Subjective:  " I feel good today"  She has has some pink tinged urine today, no clots  No dyspnea, fatigue or palpitations  No pain  Review of Systems   Constitutional: Negative for appetite change, chills, fatigue and fever  HENT:   Negative for nosebleeds  Eyes: Negative for icterus  Respiratory: Negative for chest tightness, cough, hemoptysis and shortness of breath  Cardiovascular: Negative for chest pain, leg swelling and palpitations  Gastrointestinal: Positive for vomiting  Negative for abdominal pain, blood in stool and nausea  Genitourinary: Positive for hematuria  Negative for bladder incontinence  Musculoskeletal: Negative for arthralgias, gait problem and myalgias  Skin: Negative for rash  Neurological: Negative for gait problem, headaches, light-headedness and speech difficulty  Hematological: Negative for adenopathy  Psychiatric/Behavioral: Negative for confusion  All other systems reviewed and are negative  Objective:  /71   Pulse 81   Temp 98 3 °F (36 8 °C)   Resp 16   Wt 61 kg (134 lb 7 7 oz)   LMP  (LMP Unknown)   SpO2 96%   BMI 23 08 kg/m²     Physical Exam   Constitutional: She is oriented to person, place, and time  No distress  HENT:   Head: Normocephalic and atraumatic  Mouth/Throat: Oropharynx is clear and moist    Eyes: Conjunctivae and EOM are normal  No scleral icterus  Neck: Normal range of motion  Cardiovascular: Normal rate and regular rhythm  Pulmonary/Chest: Effort normal and breath sounds normal  No respiratory distress  She exhibits no tenderness  Abdominal: Soft  She exhibits no distension  There is no tenderness     Musculoskeletal: She exhibits no edema or tenderness  Lymphadenopathy:     She has no cervical adenopathy  Neurological: She is alert and oriented to person, place, and time  Skin: Skin is warm and dry  No rash noted  No pallor  Vitals reviewed        Recent Labs      01/02/19   0813  01/02/19   1201  01/04/19   0525   WBC  9 97  11 10*  8 15   HGB  10 2*  9 0*  7 7*   PLT  450*  390  335   MCV  108*  108*  108*   RDW  20 1*  20 3*  20 3*   CREATININE  0 80   --   0 65   AST  33   --    --    ALT  20   --    --        Laboratory studies were reviewed    Imaging Studies:        Pathology: None    Code Status: Level 3 - DNAR and DNI

## 2019-01-04 NOTE — ANESTHESIA POSTPROCEDURE EVALUATION
Post-Op Assessment Note      CV Status:  Stable    Mental Status:  Alert and awake    Hydration Status:  Euvolemic    PONV Controlled:  Controlled    Airway Patency:  Patent    Post Op Vitals Reviewed: Yes          Staff: Anesthesiologist, with CRNAs           BP   137/63   Temp 98 7   Pulse 83   Resp 20   SpO2 99% FM

## 2019-01-04 NOTE — PROGRESS NOTES
Progress Note - Thu Bryson 1960, 63 y o  female MRN: [id number]    Unit/Bed#: -01 Encounter: [id number]    Primary Care Provider: Angie Paris DC   Date and time admitted to hospital: 12/30/2018 10:57 AM        Hemorrhagic Cystitis in Immunosuppressed Patient -   C/w abx  PO abx  Also reviewed Urology note  Outpatient cystoscopy planned for now  Patient will continue on heparin gtt and transition tomorrow to eliquis   If substantial further hematuria may need re-eval by urology       Heme positive stool   GI - patient not wanting to pursue scope  Will need ERCP inpatient          Diarrhea   likely chemo induced vs  Bleed related  Resolved  C-diff negative       History of DVT   On gtt  Will transition to eliquis after ERCP       History of Breast Cancer -    Has been receiving chemotherapy under the direction of Dr Madeline Camejo  Outpatient follow up       Hyperbilirubinemia   LFTs have normalized today  [address] team is planning on an MRCP to evaluate this further   CMP again in am      Moderate Protein Calorie Malnutrition -   dietician evaluation   Nutritional supplements      Essential Hypertension   continue home meds   Monitor blood pressures  VTE Pharmacologic Prophylaxis:   Pharmacologic: Heparin  Mechanical VTE Prophylaxis in Place: Yes    Patient Centered Rounds: I have performed bedside rounds with nursing staff today  Discussions with Specialists or Other Care Team Provider: Discussed with patient and with  Isaias Prince  Education and Discussions with Family / Patient: Discussed with hematology  Time Spent for Care: 30 minutes  More than 50% of total time spent on counseling and coordination of care as described above  Current Length of Stay: 5 day(s)    Current Patient Status: Inpatient   Certification Statement: The patient will continue to require additional inpatient hospital stay due to ERCP today  Discharge Plan: Not medically stable for DC  Code Status: Level 3 - DNAR and DNI      Subjective:   Patient seen and examined      " I feel okay"    Does report " a little blood" in her urine today  Objective:     Vitals:   Temp (24hrs), Av 1 °F (36 7 °C), Min:97 7 °F (36 5 °C), Max:98 3 °F (36 8 °C)    Temp:  [97 7 °F (36 5 °C)-98 3 °F (36 8 °C)] 98 3 °F (36 8 °C)  HR:  [74-82] 81  Resp:  [16-18] 16  BP: (104-116)/(62-71) 116/71  SpO2:  [94 %-96 %] 96 %  Body mass index is 23 08 kg/m²  Input and Output Summary (last 24 hours): Intake/Output Summary (Last 24 hours) at 19 1402  Last data filed at 19 0743   Gross per 24 hour   Intake           968 33 ml   Output              250 ml   Net           718 33 ml       Physical Exam:     Physical Exam   Constitutional: She is oriented to person, place, and time  No distress  HENT:   Head: Normocephalic and atraumatic  Mouth/Throat: No oropharyngeal exudate  Eyes: Right eye exhibits no discharge  Left eye exhibits no discharge  No scleral icterus  Neck: No JVD present  No tracheal deviation present  No thyromegaly present  Cardiovascular: Normal rate  Exam reveals no gallop and no friction rub  No murmur heard  Pulmonary/Chest: No respiratory distress  She has no wheezes  She has no rales  She exhibits no tenderness  Abdominal: Soft  She exhibits no distension and no mass  There is no tenderness  There is no rebound and no guarding  Musculoskeletal: She exhibits no edema, tenderness or deformity  Lymphadenopathy:     She has no cervical adenopathy  Neurological: She is alert and oriented to person, place, and time  No cranial nerve deficit  Coordination normal    Skin: Skin is warm  No rash noted  She is not diaphoretic  No erythema  There is pallor  Psychiatric: She has a normal mood and affect           Additional Data:     Labs:      Results from last 7 days  Lab Units 19  0525   WBC Thousand/uL 8 15   HEMOGLOBIN g/dL 7 7*   HEMATOCRIT % 24 4* PLATELETS Thousands/uL 335   NEUTROS PCT % 82*   LYMPHS PCT % 6*   MONOS PCT % 8   EOS PCT % 2       Results from last 7 days  Lab Units 01/04/19  0525 01/02/19  0813   SODIUM mmol/L 141 137   POTASSIUM mmol/L 3 2* 3 7   CHLORIDE mmol/L 110* 107   CO2 mmol/L 20* 20*   BUN mg/dL 4* 8   CREATININE mg/dL 0 65 0 80   ANION GAP mmol/L 11 10   CALCIUM mg/dL 7 7* 8 1*   ALBUMIN g/dL  --  1 6*   TOTAL BILIRUBIN mg/dL  --  0 80   ALK PHOS U/L  --  140*   ALT U/L  --  20   AST U/L  --  33   GLUCOSE RANDOM mg/dL 87 90       Results from last 7 days  Lab Units 01/04/19  0956   INR  1 49*               Results from last 7 days  Lab Units 12/30/18  1153   LACTIC ACID mmol/L 1 2   PROCALCITONIN ng/ml 0 52*           * I Have Reviewed All Lab Data Listed Above  * Additional Pertinent Lab Tests Reviewed: Roxanne 66 Admission Reviewed    Imaging:    Imaging Reports Reviewed Today Include:   MRI abdomen -   "  1   Dilated CBD up to 15 mm with abrupt transitioning distal CBD proximal to the ampulla for which the possibility of stricture cannot be excluded  Correlation with ERCP with brush biopsy is recommended   No discernible choledocholithiasis  2   Evidence of pancreatic divisum   No MRI evidence of pancreatitis  3   Abnormal hepatic signal suggestive of iron overload   Correlate with serologic testing  4   Severe right hydroureteronephrosis again identified   Presence of distal right ureteral stricture cannot be excluded      5  1 cm hypoattenuating focus seen along the superior posterior margin of the pancreatic body on previous CT imaging in retrospect, 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   Follow-up CTA abdomen in 3 months recommended to exclude   less likely possibility of a subtle pancreatic lesion   Limited assessment of the pancreas without contrast     6   Small bilateral pleural effusions with bibasilar compressive atelectasis and large hiatal hernia      I personally discussed this study and recommendations with Dr Rosa Christian from GI on 1/1/2019 at 6:12 AM "  Imaging Personally Reviewed by Myself Includes:  As above   Recent Cultures (last 7 days):       Results from last 7 days  Lab Units 12/31/18  1857 12/30/18  1228 12/30/18  1219 12/30/18  1216 12/30/18  1153   BLOOD CULTURE   --   --   --  No Growth After 4 Days  No Growth After 4 Days  URINE CULTURE   --   --  >100,000 cfu/ml Escherichia coli*  --   --    INFLUENZA B PCR   --  None Detected  --   --   --    RSV PCR   --  None Detected  --   --   --    C DIFF TOXIN B  NEGATIVE for C difficle toxin by PCR    --   --   --   --        Last 24 Hours Medication List:     Current Facility-Administered Medications:  acetaminophen 650 mg Oral Q6H PRN Arland Specter, DO    cephalexin 500 mg Oral Q12H [address] Anson Parkinson MD    escitalopram 10 mg Oral Daily Arland Specter, DO    ferrous sulfate 325 mg Oral BID With Meals Arland Specter, DO    LORazepam 1 mg Oral BID PRN Arland Specter, DO    metoclopramide 10 mg Oral 4x Daily PRN Arland Specter, DO    ondansetron 4 mg Intravenous Q6H PRN Arland Specter, DO    sertraline 50 mg Oral HS Arland Specter, DO    sodium chloride 100 mL/hr Intravenous Continuous Arland Specter, DO Last Rate: 100 mL/hr (01/03/19 1950)   SUMAtriptan 100 mg Oral Once PRN Arland Specter, DO    verapamil 240 mg Oral Daily Arland Specter, DO         Today, Patient Was Seen By: Anson Parkinson MD    ** Please Note: Dictation voice to text software may have been used in the creation of this document   **

## 2019-01-05 LAB
ANION GAP SERPL CALCULATED.3IONS-SCNC: 13 MMOL/L (ref 4–13)
BASOPHILS # BLD AUTO: 0.01 THOUSANDS/ΜL (ref 0–0.1)
BASOPHILS NFR BLD AUTO: 0 % (ref 0–1)
BUN SERPL-MCNC: 5 MG/DL (ref 5–25)
CALCIUM SERPL-MCNC: 8.2 MG/DL (ref 8.3–10.1)
CHLORIDE SERPL-SCNC: 109 MMOL/L (ref 100–108)
CO2 SERPL-SCNC: 18 MMOL/L (ref 21–32)
CREAT SERPL-MCNC: 0.7 MG/DL (ref 0.6–1.3)
EOSINOPHIL # BLD AUTO: 0 THOUSAND/ΜL (ref 0–0.61)
EOSINOPHIL NFR BLD AUTO: 0 % (ref 0–6)
ERYTHROCYTE [DISTWIDTH] IN BLOOD BY AUTOMATED COUNT: 20.5 % (ref 11.6–15.1)
GFR SERPL CREATININE-BSD FRML MDRD: 96 ML/MIN/1.73SQ M
GLUCOSE SERPL-MCNC: 111 MG/DL (ref 65–140)
HCT VFR BLD AUTO: 27.9 % (ref 34.8–46.1)
HGB BLD-MCNC: 8.6 G/DL (ref 11.5–15.4)
IMM GRANULOCYTES # BLD AUTO: 0.05 THOUSAND/UL (ref 0–0.2)
IMM GRANULOCYTES NFR BLD AUTO: 1 % (ref 0–2)
LYMPHOCYTES # BLD AUTO: 0.34 THOUSANDS/ΜL (ref 0.6–4.47)
LYMPHOCYTES NFR BLD AUTO: 5 % (ref 14–44)
MCH RBC QN AUTO: 34 PG (ref 26.8–34.3)
MCHC RBC AUTO-ENTMCNC: 30.8 G/DL (ref 31.4–37.4)
MCV RBC AUTO: 110 FL (ref 82–98)
MONOCYTES # BLD AUTO: 0.11 THOUSAND/ΜL (ref 0.17–1.22)
MONOCYTES NFR BLD AUTO: 2 % (ref 4–12)
NEUTROPHILS # BLD AUTO: 6 THOUSANDS/ΜL (ref 1.85–7.62)
NEUTS SEG NFR BLD AUTO: 92 % (ref 43–75)
NRBC BLD AUTO-RTO: 0 /100 WBCS
PLATELET # BLD AUTO: 335 THOUSANDS/UL (ref 149–390)
PMV BLD AUTO: 10.1 FL (ref 8.9–12.7)
POTASSIUM SERPL-SCNC: 4.3 MMOL/L (ref 3.5–5.3)
RBC # BLD AUTO: 2.53 MILLION/UL (ref 3.81–5.12)
SODIUM SERPL-SCNC: 140 MMOL/L (ref 136–145)
WBC # BLD AUTO: 6.51 THOUSAND/UL (ref 4.31–10.16)

## 2019-01-05 PROCEDURE — 80048 BASIC METABOLIC PNL TOTAL CA: CPT | Performed by: INTERNAL MEDICINE

## 2019-01-05 PROCEDURE — G8979 MOBILITY GOAL STATUS: HCPCS | Performed by: PHYSICAL THERAPIST

## 2019-01-05 PROCEDURE — 99231 SBSQ HOSP IP/OBS SF/LOW 25: CPT | Performed by: INTERNAL MEDICINE

## 2019-01-05 PROCEDURE — 97163 PT EVAL HIGH COMPLEX 45 MIN: CPT | Performed by: PHYSICAL THERAPIST

## 2019-01-05 PROCEDURE — 85025 COMPLETE CBC W/AUTO DIFF WBC: CPT | Performed by: INTERNAL MEDICINE

## 2019-01-05 PROCEDURE — 99232 SBSQ HOSP IP/OBS MODERATE 35: CPT | Performed by: INTERNAL MEDICINE

## 2019-01-05 PROCEDURE — G8978 MOBILITY CURRENT STATUS: HCPCS | Performed by: PHYSICAL THERAPIST

## 2019-01-05 RX ADMIN — FERROUS SULFATE TAB 325 MG (65 MG ELEMENTAL FE) 325 MG: 325 (65 FE) TAB at 17:08

## 2019-01-05 RX ADMIN — CEPHALEXIN 500 MG: 500 CAPSULE ORAL at 21:42

## 2019-01-05 RX ADMIN — APIXABAN 5 MG: 5 TABLET, FILM COATED ORAL at 17:08

## 2019-01-05 RX ADMIN — SODIUM CHLORIDE 100 ML/HR: 0.9 INJECTION, SOLUTION INTRAVENOUS at 19:27

## 2019-01-05 RX ADMIN — CEPHALEXIN 500 MG: 500 CAPSULE ORAL at 08:17

## 2019-01-05 RX ADMIN — SERTRALINE HYDROCHLORIDE 50 MG: 50 TABLET ORAL at 21:42

## 2019-01-05 RX ADMIN — FERROUS SULFATE TAB 325 MG (65 MG ELEMENTAL FE) 325 MG: 325 (65 FE) TAB at 08:17

## 2019-01-05 RX ADMIN — APIXABAN 5 MG: 5 TABLET, FILM COATED ORAL at 08:17

## 2019-01-05 RX ADMIN — VERAPAMIL HYDROCHLORIDE 240 MG: 120 TABLET, FILM COATED, EXTENDED RELEASE ORAL at 08:17

## 2019-01-05 RX ADMIN — ESCITALOPRAM OXALATE 10 MG: 10 TABLET, FILM COATED ORAL at 08:17

## 2019-01-05 RX ADMIN — SODIUM CHLORIDE 100 ML/HR: 0.9 INJECTION, SOLUTION INTRAVENOUS at 10:41

## 2019-01-05 NOTE — PROGRESS NOTES
Progress Note- Diana Park 63 y o  female MRN: [id number]    Unit/Bed#: -18 Encounter: [id number]      Assessment and Plan:    Elevate liver enzymes with dilated CBD  -MRCP shows dilated CBD with possible stricture  -ERCP 1/4 with diffusely dilated CBD, no stricture, stone, lesion observed  Sphincterotomy and stent placement were completed  -f/u brushings  -She will need EGD with stent removal in 1-2 months, also recommend outpatient EUS which could be done then as well  -discussed with primary team     ? Pancreatic lesion versus partial thrombosis splenic artery aneurysm  -recommending outpatient EUS    Acute blood loss anemia with hematuria  -hemorrhagic cystitis, no overt GI bleeding  -she continues to refuse colonoscopy  ______________________________________________________________________    Subjective:     Ms Marty Ivey feels well today, no complaints of abdominal pain, nausea, vomiting after her ERCP   No rectal bleeding    Medication Administration - last 24 hours from 01/04/2019 0946 to 01/05/2019 5911       Date/Time Order Dose Route Action Action by     01/05/2019 0817 escitalopram (LEXAPRO) tablet 10 mg 10 mg Oral Given Silva Andrews RN     01/04/2019 1746 escitalopram (LEXAPRO) tablet 10 mg   Oral MAR Unhold Tomasa Cuenca, KIMBER     01/04/2019 1621 escitalopram (LEXAPRO) tablet 10 mg   Oral MAR Hold Automatic Transfer Provider     01/05/2019 0817 ferrous sulfate tablet 325 mg 325 mg Oral Given Silva Andrews RN     01/04/2019 1746 ferrous sulfate tablet 325 mg   Oral MAR Unhold Tomasa Cuenca, KIMBER     01/04/2019 1630 ferrous sulfate tablet 325 mg   Oral Dose Auto Held Automatic Transfer Provider     01/04/2019 1621 ferrous sulfate tablet 325 mg   Oral MAR Hold Automatic Transfer Provider     01/04/2019 1933 LORazepam (ATIVAN) tablet 1 mg 1 mg Oral Given Priti Christie, KIMBER     01/04/2019 1746 LORazepam (ATIVAN) tablet 1 mg   Oral MAR Unhold Tomasa Cuenca RN     01/04/2019 1621 LORazepam (ATIVAN) tablet 1 mg   Oral MAR Hold Automatic Transfer Provider     01/04/2019 1746 metoclopramide (REGLAN) tablet 10 mg   Oral MAR Unhold Brien Aguilar, RN     01/04/2019 1621 metoclopramide (REGLAN) tablet 10 mg   Oral MAR Hold Automatic Transfer Provider     01/04/2019 2206 sertraline (ZOLOFT) tablet 50 mg 50 mg Oral Given Lamona Olimpia, RN     01/04/2019 1746 sertraline (ZOLOFT) tablet 50 mg   Oral MAR Unhold Brien Aguilar, RN     01/04/2019 1621 sertraline (ZOLOFT) tablet 50 mg   Oral MAR Hold Automatic Transfer Provider     01/04/2019 1746 SUMAtriptan (IMITREX) tablet 100 mg   Oral MAR Unhold Brien Aguilar, RN     01/04/2019 1621 SUMAtriptan (IMITREX) tablet 100 mg   Oral MAR Hold Automatic Transfer Provider     01/05/2019 0817 verapamil (CALAN-SR) CR tablet 240 mg 240 mg Oral Given Adline Deepthi, RN     01/04/2019 1746 verapamil (CALAN-SR) CR tablet 240 mg   Oral MAR Unhold Brien Aguilar, RN     01/04/2019 1621 verapamil (CALAN-SR) CR tablet 240 mg   Oral MAR Hold Automatic Transfer Provider     01/04/2019 1746 ondansetron (ZOFRAN) injection 4 mg   Intravenous MAR Unhold Brien Aguilar, RN     01/04/2019 1621 ondansetron (ZOFRAN) injection 4 mg   Intravenous MAR Hold Automatic Transfer Provider     01/04/2019 1745 sodium chloride 0 9 % infusion 100 mL/hr Intravenous Continued from [address] [place] [address] [address], RN     01/04/2019 1731 sodium chloride 0 9 % infusion   Intravenous Stopped Sofi Velazquez, CRNA     01/04/2019 1624 sodium chloride 0 9 % infusion   Intravenous New Bag Sofi Velazquez, CRNA     01/04/2019 1746 acetaminophen (TYLENOL) tablet 650 mg   Oral MAR Unhold Brien Aguilar, RN     01/04/2019 1621 acetaminophen (TYLENOL) tablet 650 mg   Oral MAR Hold Automatic Transfer Provider     01/05/2019 0817 cephalexin (KEFLEX) capsule 500 mg 500 mg Oral Given Adline Deepthi, RN     01/04/2019 2006 cephalexin (KEFLEX) capsule 500 mg 500 mg Oral Given Lamona Rinks, RN     01/04/2019 1746 cephalexin (KEFLEX) capsule 500 mg   Oral MAR Unhold Ирина Hurtado, RN     01/04/2019 1621 cephalexin (KEFLEX) capsule 500 mg   Oral MAR Hold Automatic Transfer Provider     01/04/2019 0949 cephalexin (KEFLEX) capsule 500 mg 500 mg Oral Given Giorgio Cabrera, RN     01/04/2019 1013 potassium chloride 20 mEq IVPB (premix) 20 mEq Intravenous New Bag Giorgio Cabrera, KIMBER     01/04/2019 1252 alteplase (CATHFLO) injection 2 mg 2 mg Intracatheter Given Giorgio Cabrera, RN     01/04/2019 1840 iohexol (OMNIPAQUE) 240 MG/ML solution 50 mL 13 mL Intravenous Given Jessica Elba     01/05/2019 0817 apixaban (ELIQUIS) tablet 5 mg 5 mg Oral Given Lorenso Canavan, RN     01/04/2019 2006 apixaban (ELIQUIS) tablet 5 mg 5 mg Oral Given Cat Deal RN          Objective:     Vitals: Blood pressure 118/73, pulse 93, temperature 97 8 °F (36 6 °C), resp  rate 16, height 5' 5" (1 651 m), weight 60 8 kg (134 lb), SpO2 97 %, not currently breastfeeding  ,Body mass index is 22 3 kg/m²  Intake/Output Summary (Last 24 hours) at 01/05/19 0946  Last data filed at 01/05/19 0700   Gross per 24 hour   Intake              450 ml   Output              575 ml   Net             -125 ml       Physical Exam:   General Appearance: Awake and alert, in no acute distress, chronically ill appearing  Abdomen: Soft, non-tender, non-distended; bowel sounds normal  LE: no edema    Invasive Devices     Central Venous Catheter Line            Port A Cath 12/30/18 Right Chest 5 days                Lab Results:  Admission on 12/30/2018   No results displayed because visit has over 200 results  Imaging Studies: I have personally reviewed pertinent imaging studies

## 2019-01-05 NOTE — PHYSICAL THERAPY NOTE
PHYSICAL THERAPY EVALUATION  NAME:  Emily Beard  DATE: 01/05/19    AGE:   63 y o    Mrn:   [id number]  ADMIT DX:  Weakness [R53 1]  Hematuria [R31 9]  Fever [R50 9]  Generalized weakness [R53 1]    Past Medical History:   Diagnosis Date    Acute DVT (deep venous thrombosis) (HCC)     of LLE>     Congenital prolapsed rectum     Hydronephrosis     right kidney    Hypertension     Malignant neoplasm of overlapping sites of left female breast ([address] )     Migraine      Length Of Stay: 6  Performed at least 2 patient identifiers during session: Name and Birthday  PHYSICAL THERAPY EVALUATION :      01/05/19 1202   Note Type   Note type Eval only   Pain Assessment   Pain Assessment No/denies pain   Home Living   Type of [address] Two level  (with first floor set up )   Bathroom Shower/Tub Walk-in shower   Prior Function   Level of [place] Independent with ADLs and functional mobility  ( primary , pt can drive)   Lives With Spouse  (dog and cat )   Receives Help From Family   ADL Assistance Independent   IADLs Independent   Falls in the last 6 months 0   Vocational Full time employment  (at [employer] )   Comments pt works primarily desk job at [employer]   Restrictions/Precautions   [place] Precautions Per Order No   Other Precautions Multiple lines;Immunosuppressed   General   Family/Caregiver Present No   Cognition   Overall Cognitive Status WFL   Arousal/Participation Alert   Orientation Level Oriented to person;Oriented to place;Oriented to situation   Memory Within functional limits   Following Commands Follows all commands and directions without difficulty   RUE Assessment   RUE Assessment WFL   LUE Assessment   LUE Assessment WFL   RLE Assessment   RLE Assessment WFL   Strength RLE   R Knee Flexion 4-/5   R Knee Extension 4-/5   R Ankle Dorsiflexion 4-/5   R Ankle Plantar Flexion 4-/5   Strength LLE   L Knee Flexion 4-/5   L Knee Extension 4-/5   L Ankle Dorsiflexion 4-/5 L Ankle Plantar Flexion 4-/5   Coordination   Movements are Fluid and Coordinated 1   Bed Mobility   Supine to Sit 6  Modified independent   Sit to Supine 6  Modified independent   Transfers   Sit to Stand 5  Supervision   Stand to Sit 5  Supervision   Ambulation/Elevation   Gait pattern Short stride; Excessively slow  (mild LOB and drifting )   Gait Assistance 5  Supervision   Additional items (with unilateral IV pole support )   Assistive Device Other (Comment)  (unilateral IV pole support  and Supervision)   Distance 60 feet    Stair Management Assistance 3  Moderate assist   Additional items Assist x 1  (1 step with mod A at hand for support and balnce)   Stair Management Technique (step to, on stair )   Number of Stairs 1   Balance   Static Sitting Good   Dynamic Sitting Fair +   Dynamic Standing Fair +   Ambulatory Fair -   Endurance Deficit   Endurance Deficit Yes   Endurance Deficit Description weakness, fatigue   Activity Tolerance   Activity Tolerance Patient limited by fatigue;Treatment limited secondary to medical complications (Comment)   Medical Staff Made Aware yes, nurse Rody Norton    Nurse Made Aware yes    Assessment   Prognosis Good   Problem List Decreased strength;Decreased endurance; Impaired balance;Decreased mobility   Barriers to Discharge None   Goals   Patient Goals to return to work   STG Expiration Date 01/15/19   Short Term Goal #1 Pt will: increase gait speed to at least 1 m/s in order to improve dynamic balance in community  Perform transfers to modified I to increase Indep in home environment  Perform ambulation [place]  for 150 feet to   to return to work    Treatment Day 0   Plan   Treatment/Interventions LE strengthening/ROM; Elevations; Therapeutic exercise; Endurance training;Gait training;Spoke to nursing   PT Frequency (3x/wk)   Recommendation   Recommendation Outpatient PT   Barthel Index   Feeding 10   Bathing 5   Grooming Score 5   Dressing Score 10   Bladder Score 10   Bowels Score 10   Toilet Use Score 10   Transfers (Bed/Chair) Score 15   Mobility (Level Surface) Score 0   Stairs Score 5   Barthel Index Score 80     (Please find full objective findings from PT assessment regarding body systems outlined above)  Assessment: Pt is a 63 y o  female seen for PT evaluation s/p admit to [address] [place] [address] on 12/30/2018 w/ Hemorrhagic cystitis  Order placed for PT  Upon evaluation: Pt requires supervision assistance for ambulation with one hand IV pole assist     Pt's clinical presentation is currently unstable/unpredictable given the functional mobility deficits above, especially weakness and gait deviations, coupled with fall risks including decreased balance from baseline, and combined with medical complications of abnormal H&H, multiple readmissions and abnormal potassium values  Pt to benefit from continued skilled PT tx while in hospital and upon DC to address deficits as defined above and maximize level of functional mobility  From PT/mobility standpoint, recommendation at time of d/c would be OP PT and home with family support pending progress in order to maximize pt's functional independence and consistency w/ mobility in order to facilitate return to PLOF  Goals: Pt will: increase gait speed to at least 1 m/s in order to improve dynamic balance in community  Perform transfers to modified I to increase Indep in home environment  Perform ambulation [place]  for 150 feet to   to return to work   Comorbidities affecting pt's physical performance at time of assessment include: cancer history and/or treatment and decreased hemoglobin, HTN, current DVT, immunocompromised  Personal factors affecting pt at time of IE include: steps to enter environment, inability to perform current job functions, inability to ambulate household distances and inability to navigate community distances       Timi Rizvi, PT, DPT

## 2019-01-05 NOTE — PROGRESS NOTES
Progress Note - Eddie Doshi 1960, 63 y o  female MRN: [id number]    Unit/Bed#: -01 Encounter: [id number]    Primary Care Provider: Merlyn Wang DC   Date and time admitted to hospital: 12/30/2018 10:57 AM        Hemorrhagic Cystitis in Immunosuppressed Patient -   C/w abx  PO abx  Also reviewed Urology note  Outpatient cystoscopy planned for now  On eliquis  If hemoglobin is stable can DC tomorrow       Heme positive stool   GI - patient not wanting to pursue scope         Diarrhea   likely chemo induced vs  Bleed related  Resolved  C-diff negative  Diarrhea resolved       History of DVT   Eliquis         History of Breast Cancer -    Has been receiving chemotherapy under the direction of Dr Kendra Ramirez  Outpatient follow up       Hyperbilirubinemia   LFTs have normalized today  [facility] team is planning on an MRCP to evaluate this further   CMP again in am      Moderate Protein Calorie Malnutrition -   dietician evaluation   Nutritional supplements      Essential Hypertension   continue home meds   Monitor blood pressures    VTE Pharmacologic Prophylaxis:   Pharmacologic: Heparin  Mechanical VTE Prophylaxis in Place: Yes    Patient Centered Rounds: I have performed bedside rounds with nursing staff today  Discussions with Specialists or Other Care Team Provider: discussed with patient and with GI  Education and Discussions with Family / Patient: Discussed with  and neighbor  Time Spent for Care: 30 minutes  More than 50% of total time spent on counseling and coordination of care as described above  Current Length of Stay: 6 day(s)    Current Patient Status: Inpatient   Certification Statement: The patient will continue to require additional inpatient hospital stay due to monitor on eliquis  DC tomorrow  Discharge Plan: not medically stable for DC       Code Status: Level 3 - DNAR and DNI      Subjective:   Patient seen and examined      " I feel carlos"    Objective:     Vitals:   Temp (24hrs), Av °F (36 7 °C), Min:97 4 °F (36 3 °C), Max:98 7 °F (37 1 °C)    Temp:  [97 4 °F (36 3 °C)-98 7 °F (37 1 °C)] 98 5 °F (36 9 °C)  HR:  [78-93] 79  Resp:  [16-25] 16  BP: (111-146)/(59-83) 114/69  SpO2:  [92 %-98 %] 95 %  Body mass index is 22 3 kg/m²  Input and Output Summary (last 24 hours): Intake/Output Summary (Last 24 hours) at 19 1646  Last data filed at 19 1040   Gross per 24 hour   Intake             1420 ml   Output              575 ml   Net              845 ml       Physical Exam:     Physical Exam   Constitutional: She is oriented to person, place, and time  No distress  Frail  HENT:   Head: Normocephalic  Mouth/Throat: No oropharyngeal exudate  Eyes: Right eye exhibits no discharge  Left eye exhibits no discharge  No scleral icterus  Neck: No JVD present  No tracheal deviation present  No thyromegaly present  Cardiovascular: Exam reveals no gallop and no friction rub  No murmur heard  Pulmonary/Chest: No respiratory distress  She has no wheezes  She has no rales  She exhibits no tenderness  Abdominal: Soft  She exhibits no distension and no mass  There is no tenderness  There is no rebound and no guarding  Musculoskeletal: She exhibits edema  She exhibits no tenderness or deformity  Lymphadenopathy:     She has no cervical adenopathy  Neurological: She is alert and oriented to person, place, and time  She displays normal reflexes  No cranial nerve deficit  She exhibits normal muscle tone  Coordination normal    Skin: No rash noted  She is not diaphoretic  No erythema  There is pallor  Psychiatric: She has a normal mood and affect           Additional Data:     Labs:      Results from last 7 days  Lab Units 19  0551   WBC Thousand/uL 6 51   HEMOGLOBIN g/dL 8 6*   HEMATOCRIT % 27 9*   PLATELETS Thousands/uL 335   NEUTROS PCT % 92*   LYMPHS PCT % 5*   MONOS PCT % 2*   EOS PCT % 0       Results from last 7 days  Lab Units 01/05/19  0551  01/02/19  0813   SODIUM mmol/L 140  < > 137   POTASSIUM mmol/L 4 3  < > 3 7   CHLORIDE mmol/L 109*  < > 107   CO2 mmol/L 18*  < > 20*   BUN mg/dL 5  < > 8   CREATININE mg/dL 0 70  < > 0 80   ANION GAP mmol/L 13  < > 10   CALCIUM mg/dL 8 2*  < > 8 1*   ALBUMIN g/dL  --   --  1 6*   TOTAL BILIRUBIN mg/dL  --   --  0 80   ALK PHOS U/L  --   --  140*   ALT U/L  --   --  20   AST U/L  --   --  33   GLUCOSE RANDOM mg/dL 111  < > 90   < > = values in this interval not displayed  Results from last 7 days  Lab Units 01/04/19  0956   INR  1 49*               Results from last 7 days  Lab Units 12/30/18  1153   LACTIC ACID mmol/L 1 2   PROCALCITONIN ng/ml 0 52*           * I Have Reviewed All Lab Data Listed Above  * Additional Pertinent Lab Tests Reviewed: All Labs For Current Hospital Admission Reviewed    Imaging:    Imaging Reports Reviewed Today Include:   None pertinent to this encounter  Imaging Personally Reviewed by Myself Includes:  As above  Recent Cultures (last 7 days):       Results from last 7 days  Lab Units 12/31/18  1857 12/30/18  1228 12/30/18  1219 12/30/18  1216 12/30/18  1153   BLOOD CULTURE   --   --   --  No Growth After 5 Days  No Growth After 5 Days     URINE CULTURE   --   --  >100,000 cfu/ml Escherichia coli*  --   --    INFLUENZA B PCR   --  None Detected  --   --   --    RSV PCR   --  None Detected  --   --   --    C DIFF TOXIN B  NEGATIVE for C difficle toxin by PCR    --   --   --   --        Last 24 Hours Medication List:     Current Facility-Administered Medications:  acetaminophen 650 mg Oral Q6H PRN Keanu Spinner, DO    apixaban 5 mg Oral BID Xander Samaniego PA-C    cephalexin 500 mg Oral Q12H [facility] Evangelina Vogt MD    escitalopram 10 mg Oral Daily Keanu Spinner, DO    ferrous sulfate 325 mg Oral BID With Meals Keanu Spinner, DO    LORazepam 1 mg Oral BID PRN Keanu Spinner, DO    metoclopramide 10 mg Oral 4x Daily PRN Keanu Guillory DO ondansetron 4 mg Intravenous Q6H PRN Kyleigh Mowers, DO    sertraline 50 mg Oral HS Kyleigh Mowers, DO    sodium chloride 100 mL/hr Intravenous Continuous Kyleigh Mowers, DO Last Rate: 100 mL/hr (01/05/19 1041)   SUMAtriptan 100 mg Oral Once PRN Kyleigh Mowers, DO    verapamil 240 mg Oral Daily Kyleigh Angelicas, DO         Today, Patient Was Seen By: Jeff Fay MD    ** Please Note: Dictation voice to text software may have been used in the creation of this document   **

## 2019-01-05 NOTE — PLAN OF CARE
Problem: PHYSICAL THERAPY ADULT  Goal: Performs mobility at highest level of function for planned discharge setting  See evaluation for individualized goals  Treatment/Interventions: LE strengthening/ROM, Elevations, Therapeutic exercise, Endurance training, Gait training, Spoke to [address] [place] [address], PT      See flowsheet documentation for full assessment, interventions and recommendations  Prognosis: Good  Problem List: Decreased strength, Decreased endurance, Impaired balance, Decreased mobility  Assessment:  Pt is a 63 y o  female seen for PT evaluation s/p [place] 12/30/2018 w/ Hemorrhagic cystitis  Order placed for PT  Upon evaluation: Pt requires supervision assistance for ambulation with one hand IV pole assist     Pt's clinical presentation is currently unstable/unpredictable given the functional mobility deficits above, especially weakness and gait deviations, coupled with fall risks including decreased balance from baseline, and combined with medical complications of abnormal H&H, multiple readmissions and abnormal potassium values  Pt to benefit from continued skilled PT tx while in hospital and upon DC to address deficits as defined above and maximize level of functional mobility  From PT/mobility standpoint, recommendation at time of d/c would be OP PT and home with family support pending progress in order to maximize pt's functional independence and consistency w/ mobility in order to facilitate return to PLOF  Barriers to Discharge: None  Barriers to Discharge Comments: Comorbidities affecting pt's physical performance at time of assessment include: cancer history and/or treatment and decreased hemoglobin, HTN, current DVT, immunocompromised   Personal factors affecting pt at time of IE include: steps to enter environment, inability to perform current job functions, inability to ambulate household distances and inability to navigate community distances  Recommendation: Outpatient PT          See flowsheet documentation for full assessment       Roberto Harvey, PT

## 2019-01-06 VITALS
WEIGHT: 134 LBS | OXYGEN SATURATION: 95 % | SYSTOLIC BLOOD PRESSURE: 131 MMHG | HEIGHT: 65 IN | DIASTOLIC BLOOD PRESSURE: 76 MMHG | BODY MASS INDEX: 22.33 KG/M2 | HEART RATE: 96 BPM | TEMPERATURE: 98.2 F | RESPIRATION RATE: 18 BRPM

## 2019-01-06 LAB
ANION GAP SERPL CALCULATED.3IONS-SCNC: 10 MMOL/L (ref 4–13)
BASOPHILS # BLD AUTO: 0.03 THOUSANDS/ΜL (ref 0–0.1)
BASOPHILS NFR BLD AUTO: 0 % (ref 0–1)
BUN SERPL-MCNC: 5 MG/DL (ref 5–25)
CALCIUM SERPL-MCNC: 8.4 MG/DL (ref 8.3–10.1)
CHLORIDE SERPL-SCNC: 110 MMOL/L (ref 100–108)
CO2 SERPL-SCNC: 21 MMOL/L (ref 21–32)
CREAT SERPL-MCNC: 0.68 MG/DL (ref 0.6–1.3)
EOSINOPHIL # BLD AUTO: 0.12 THOUSAND/ΜL (ref 0–0.61)
EOSINOPHIL NFR BLD AUTO: 2 % (ref 0–6)
ERYTHROCYTE [DISTWIDTH] IN BLOOD BY AUTOMATED COUNT: 20.1 % (ref 11.6–15.1)
GFR SERPL CREATININE-BSD FRML MDRD: 97 ML/MIN/1.73SQ M
GLUCOSE SERPL-MCNC: 89 MG/DL (ref 65–140)
HCT VFR BLD AUTO: 28.5 % (ref 34.8–46.1)
HGB BLD-MCNC: 9 G/DL (ref 11.5–15.4)
IMM GRANULOCYTES # BLD AUTO: 0.05 THOUSAND/UL (ref 0–0.2)
IMM GRANULOCYTES NFR BLD AUTO: 1 % (ref 0–2)
LYMPHOCYTES # BLD AUTO: 0.62 THOUSANDS/ΜL (ref 0.6–4.47)
LYMPHOCYTES NFR BLD AUTO: 9 % (ref 14–44)
MCH RBC QN AUTO: 34.1 PG (ref 26.8–34.3)
MCHC RBC AUTO-ENTMCNC: 31.6 G/DL (ref 31.4–37.4)
MCV RBC AUTO: 108 FL (ref 82–98)
MONOCYTES # BLD AUTO: 0.51 THOUSAND/ΜL (ref 0.17–1.22)
MONOCYTES NFR BLD AUTO: 7 % (ref 4–12)
NEUTROPHILS # BLD AUTO: 5.52 THOUSANDS/ΜL (ref 1.85–7.62)
NEUTS SEG NFR BLD AUTO: 81 % (ref 43–75)
NRBC BLD AUTO-RTO: 0 /100 WBCS
PLATELET # BLD AUTO: 457 THOUSANDS/UL (ref 149–390)
PMV BLD AUTO: 10.1 FL (ref 8.9–12.7)
POTASSIUM SERPL-SCNC: 3.5 MMOL/L (ref 3.5–5.3)
RBC # BLD AUTO: 2.64 MILLION/UL (ref 3.81–5.12)
SODIUM SERPL-SCNC: 141 MMOL/L (ref 136–145)
WBC # BLD AUTO: 6.85 THOUSAND/UL (ref 4.31–10.16)

## 2019-01-06 PROCEDURE — 99239 HOSP IP/OBS DSCHRG MGMT >30: CPT | Performed by: INTERNAL MEDICINE

## 2019-01-06 PROCEDURE — 85025 COMPLETE CBC W/AUTO DIFF WBC: CPT | Performed by: INTERNAL MEDICINE

## 2019-01-06 PROCEDURE — 80048 BASIC METABOLIC PNL TOTAL CA: CPT | Performed by: INTERNAL MEDICINE

## 2019-01-06 RX ORDER — CEPHALEXIN 500 MG/1
500 CAPSULE ORAL EVERY 12 HOURS SCHEDULED
Qty: 2 CAPSULE | Refills: 0 | Status: SHIPPED | OUTPATIENT
Start: 2019-01-06 | End: 2019-01-07

## 2019-01-06 RX ADMIN — APIXABAN 5 MG: 5 TABLET, FILM COATED ORAL at 09:18

## 2019-01-06 RX ADMIN — CEPHALEXIN 500 MG: 500 CAPSULE ORAL at 09:18

## 2019-01-06 RX ADMIN — VERAPAMIL HYDROCHLORIDE 240 MG: 120 TABLET, FILM COATED, EXTENDED RELEASE ORAL at 09:18

## 2019-01-06 RX ADMIN — ESCITALOPRAM OXALATE 10 MG: 10 TABLET, FILM COATED ORAL at 09:18

## 2019-01-06 RX ADMIN — SODIUM CHLORIDE 100 ML/HR: 0.9 INJECTION, SOLUTION INTRAVENOUS at 04:10

## 2019-01-06 RX ADMIN — FERROUS SULFATE TAB 325 MG (65 MG ELEMENTAL FE) 325 MG: 325 (65 FE) TAB at 09:18

## 2019-01-06 NOTE — ASSESSMENT & PLAN NOTE
Patient to continue with eliquis  She has some intermittent bleeding from bladder, however hemoglobin is stable and she is hemodynamically stable  Given risks and benefits at this time will c/w [facility] and plan for CBC on Wednesday with PCP/Urology/heme to follow up on results  1 more day of Keflex for UTI     Received 6 days of antibiotics in hospital

## 2019-01-06 NOTE — DISCHARGE SUMMARY
Discharge- Alex Watkins 1960, 63 y o  female MRN: [id number]    Unit/Bed#: -01 Encounter: [id number]    Primary Care Provider: Mindy Dow DC   Date and time admitted to hospital: 12/30/2018 10:57 AM        Hemorrhagic Cystitis in Immunosuppressed Patient -   C/w abx  PO abx  Also reviewed Urology note  Outpatient cystoscopy planned for now  On eliquis- c w this  CBC on Wednesday and results to be followed up by PCP/heme/urology         Heme positive stool   GI - patient not wanting to pursue scope          Diarrhea   likely chemo induced vs  Bleed related  Resolved  C-diff negative  Diarrhea resolved       History of DVT   Eliquis          History of Breast Cancer -   Has been receiving chemotherapy under the direction of Dr Sally Barnes  Outpatient follow up       Hyperbilirubinemia   LFTs have normalized today  ERCP on Friday - needs follow up with GI for biopsy follow up       Moderate Protein Calorie Malnutrition -   dietician evaluation   Nutritional supplements      Essential Hypertension   continue home meds   Monitor blood pressures       Discharging Physician / Practitioner: Georgina Ocampo MD  PCP: Mindy Dow DC  Admission Date:   Admission Orders     Ordered        12/30/18 1425  Inpatient Admission (expected length of stay for this patient is greater than two midnights)  Once             Discharge Date: 01/06/19    Resolved Problems  Date Reviewed: 1/6/2019    None          Consultations During Hospital Stay:  · Gastroenterology- Dr Steve Gonzales  · Hematology - Dr Radha Guevara  · Urology - Dr Pilar Sierra  Procedures Performed:   · ERCP -   "FINDINGS:     1  Cholangiogram showed dilated common bile duct to about 15 mm all the way down to the ampulla  Sphincterotomy was performed with immediate drainage of bile    Biliary brushings were performed from the ampullary area and placed 10 Moldovan x 7 cm biliary stent        IMPRESSIONS:       As above     RECOMMENDATIONS:      Check cytology     EGD with stent removal in 1-2 months     Spoke to pts   Discussed with Dr Olaf Sanchez about resuming heparin tonight     COMPLICATIONS: None; patient tolerated the procedure well  DISPOSITION: PACU           CONDITION: Stable"    MRI abdomen -   "   Dilated CBD up to 15 mm with abrupt transitioning distal CBD proximal to the ampulla for which the possibility of stricture cannot be excluded  Correlation with ERCP with brush biopsy is recommended   No discernible choledocholithiasis  2   Evidence of pancreatic divisum   No MRI evidence of pancreatitis  3   Abnormal hepatic signal suggestive of iron overload   Correlate with serologic testing  4   Severe right hydroureteronephrosis again identified   Presence of distal right ureteral stricture cannot be excluded  5  1 cm hypoattenuating focus seen along the superior posterior margin of the pancreatic body on previous CT imaging in retrospect, 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   Follow-up CTA abdomen in 3 months recommended to exclude   less likely possibility of a subtle pancreatic lesion   Limited assessment of the pancreas without contrast     6   Small bilateral pleural effusions with bibasilar compressive atelectasis and large hiatal hernia      I personally discussed this study and recommendations with Dr Shira Guevara from GI on 1/1/2019 at 6:12 AM "    Significant Findings / Test Results:   · As above  Incidental Findings:   · Elevated ferritin with MRI findings above  Discussed with hematology- can follow up with Dr Sally Barnes regarding these findings  Test Results Pending at Discharge (will require follow up):   · MRI and ferritin - needs eval by hematology  Outpatient Tests Requested:  · CBC on Wednesday  Complications:  None     Reason for Admission:   Weakness and fatigue       Hospital Course:     Alex Watkins is a 63 y o  female patient who originally presented to the hospital on 12/30/2018 due to weakness and fatigue  She also reported hematuria for the last 2 weeks he was found to have a UTI and hemorrhagic cystitis  She is on Eliquis for DVT that was diagnosed in December of 2018  Her seen by Urology Hematology Oncology and GI while in hospital     She is receiving chemotherapy for breast cancer under the direction of Dr Ulysses Abdi as well  The patient was initially started on cefepime her urine came back with fully sensitive E coli and she was transitioned to tablet antibiotics  An MRI of abdomen was done which showed a liver which had changes in keeping with possible iron overload and she also had a high ferritin- hematology aware of this and this will need to probably be addressed at her next heme Onc visit  With regard to the issues that brought her into hospital the patient was transitioned to PO  Antibiotics and received a total of 6 days of antibiotics while in hospital she was discharged on reminder of 1 day and asked to follow up with Urology for cystoscopy which is planned in the outpatient setting within the next week or 2  The patient also underwent an ERCP based on MRI findings - please see above for details of this procedure  She will need to follow up with GI for EGD with stent removal in 1-2 months as well as biopsy results  She will need follow-up with Urology sooner than this in order to have cystoscopy if she continues to have intermittent hematuria and is on Eliquis  I have gone over risks and benefits of discontinuing anticoagulation in this setting and given that her hemoglobin has been stable and she is hemodynamically stable I have recommended that she continue this medication  She will need a CBC to be done on Wednesday she was given a script for this but her PCP hematologist her urologist please follow up on the results  Please see above list of diagnoses and related plan for additional information       Condition at Discharge: stable     Discharge Day Visit / Exam:     Subjective:      Patient seen and examined      " I feel great"    Ready to go home  Vitals: Blood Pressure: 131/76 (01/06/19 0716)  Pulse: 96 (01/06/19 0716)  Temperature: 98 2 °F (36 8 °C) (01/06/19 0716)  Temp Source: Oral (01/06/19 0716)  Respirations: 18 (01/06/19 0716)  Height: 5' 5" (165 1 cm) (01/04/19 1615)  Weight - Scale: 60 8 kg (134 lb) (01/04/19 1615)  SpO2: 95 % (01/06/19 0716)  Exam:   Physical Exam   Constitutional:   Frail  HENT:   Head: Normocephalic and atraumatic  Mouth/Throat: No oropharyngeal exudate  Eyes: Pupils are equal, round, and reactive to light  Right eye exhibits no discharge  Left eye exhibits no discharge  No scleral icterus  Neck: Normal range of motion  No JVD present  No tracheal deviation present  No thyromegaly present  Cardiovascular: Normal rate  Exam reveals no gallop and no friction rub  No murmur heard  Pulmonary/Chest: No respiratory distress  She has no wheezes  She has no rales  She exhibits no tenderness  Abdominal: Soft  She exhibits no distension and no mass  There is no tenderness  There is no rebound and no guarding  Genitourinary: Rectal exam shows guaiac negative stool  No vaginal discharge found  Musculoskeletal: She exhibits no edema or tenderness  Lymphadenopathy:     She has no cervical adenopathy  Neurological: She is alert  She displays normal reflexes  No cranial nerve deficit  She exhibits normal muscle tone  Coordination normal    Skin: No rash noted  No erythema  There is pallor  Psychiatric: She has a normal mood and affect  Discussion with Family: Dicussed with patient  Updated  Valentina Simms yesterday in detail regarding discharge plan which was unchanged today  Discharge instructions/Information to patient and family:   See after visit summary for information provided to patient and family        Provisions for Follow-Up Care:  See after visit summary for information related to follow-up care and any pertinent home health orders  Disposition:     Home    For Discharges to [facility] SNF:   · Not Applicable to this Patient - Not Applicable to this Patient    Planned Readmission: none  Discharge Statement:  I spent 45 minutes discharging the patient  This time was spent on the day of discharge  I had direct contact with the patient on the day of discharge  Greater than 50% of the total time was spent examining patient, answering all patient questions, arranging and discussing plan of care with patient as well as directly providing post-discharge instructions  Additional time then spent on discharge activities  Discharge Medications:  See after visit summary for reconciled discharge medications provided to patient and family        ** Please Note: This note has been constructed using a voice recognition system **

## 2019-01-06 NOTE — DISCHARGE INSTRUCTIONS
Call your PCP on Monday and get a script for a CBC on Wednesday  Follow up results with your hematologist or PCP

## 2019-01-07 NOTE — TELEPHONE ENCOUNTER
Patient has been discharged  There was an opening for cysto on 1/9/19 at 1:30pm with Dr Jorge Hodges at [facility]  I booked this and left a message with appointment details asking for a call back to confirm if this will work  Will try again tomorrow if I do not hear back

## 2019-01-08 NOTE — PATIENT INSTRUCTIONS

## 2019-01-08 NOTE — PROGRESS NOTES
Office Cystoscopy Procedure Note        Indication:    Hematuria and  History of hemorrhagic cystitis, history of acute blood-loss anemia    Informed consent   The risks, benefits, complications, treatment options, and expected outcomes were discussed with the patient  The patient concurred with the proposed plan and provided informed consent  Anesthesia  Lidocaine jelly 2%    Antibiotic prophylaxis   None    Procedure  In the presence of a female nurse, the patient was placed in the supine frog-legged position, was prepped and draped in the usual manner using sterile technique, and 2% lidocaine jelly instilled into the urethra  Prior to this pelvic examination showed atrophic labia majora and minora, a normal caliber vaginal introitus, for quality vaginal mucosa, and showed moderate pelvic floor descensus and no urethral hypermobility  Upon stress maneuvers there was no stress incontinence  A 17 F flexible cystoscope was then inserted into the urethra and the urethra and bladder carefully examined  The following findings were noted:    Findings:  Urethra:  Normal  Bladder:  No discrete papillary tumors, there is diffuse, redness and inflammation at the dome of the bladder consistent with hemorrhagic cystitis  There are other areas which are showing normal bladder mucosa  Ureteral orifices:  Normal, no efflux from the right kidney consistent with history of hydronephrosis, clear urine exiting from the left ureteral orifice  Other findings:  None     Specimens: None                 Complications:    None; patient tolerated the procedure well           Disposition: To home            Condition: Stable    Plan: Proceed to cystoscopic evaluation and biopsy and fulguration of her hemorrhagic cystitis to stop bleeding      Should this not be successful, the next resort would be to treat her with hyperbaric oxygen therapy, however there has been some concern in these patients for worsening of neoplasm in patients with history of neoplasm and hemorrhagic cystitis requiring hyperbaric oxygen therapy        Cystoscopy  Date/Time: 1/9/2019 1:43 PM  Performed by: Bridget Zieglre  Authorized by: Bridget Ziegler     Procedure details: cystoscopy    Patient tolerance: Patient tolerated the procedure well with no immediate complications    Additional Procedure Details: Cystoscopy shows hemorrhagic cystitis, no bladder masses lesions or defects, the amount of bleeding is slight and oozing

## 2019-01-09 ENCOUNTER — TELEPHONE (OUTPATIENT)
Dept: GASTROENTEROLOGY | Facility: CLINIC | Age: 59
End: 2019-01-09

## 2019-01-09 ENCOUNTER — PROCEDURE VISIT (OUTPATIENT)
Dept: UROLOGY | Facility: CLINIC | Age: 59
End: 2019-01-09
Payer: COMMERCIAL

## 2019-01-09 ENCOUNTER — TELEPHONE (OUTPATIENT)
Dept: UROLOGY | Facility: CLINIC | Age: 59
End: 2019-01-09

## 2019-01-09 VITALS
DIASTOLIC BLOOD PRESSURE: 62 MMHG | HEIGHT: 66 IN | HEART RATE: 104 BPM | BODY MASS INDEX: 20.41 KG/M2 | SYSTOLIC BLOOD PRESSURE: 112 MMHG | WEIGHT: 127 LBS

## 2019-01-09 DIAGNOSIS — R31.0 GROSS HEMATURIA: Primary | ICD-10-CM

## 2019-01-09 DIAGNOSIS — N32.9 LESION OF BLADDER: ICD-10-CM

## 2019-01-09 DIAGNOSIS — N30.91 HEMORRHAGIC CYSTITIS: ICD-10-CM

## 2019-01-09 DIAGNOSIS — D50.0 BLOOD LOSS ANEMIA: ICD-10-CM

## 2019-01-09 DIAGNOSIS — N13.30 HYDRONEPHROSIS, UNSPECIFIED HYDRONEPHROSIS TYPE: ICD-10-CM

## 2019-01-09 LAB
SL AMB  POCT GLUCOSE, UA: ABNORMAL
SL AMB LEUKOCYTE ESTERASE,UA: ABNORMAL
SL AMB POCT BILIRUBIN,UA: ABNORMAL
SL AMB POCT BLOOD,UA: 3
SL AMB POCT CLARITY,UA: CLEAR
SL AMB POCT COLOR,UA: YELLOW
SL AMB POCT KETONES,UA: ABNORMAL
SL AMB POCT NITRITE,UA: ABNORMAL
SL AMB POCT PH,UA: 6.5
SL AMB POCT SPECIFIC GRAVITY,UA: 1.01
SL AMB POCT URINE PROTEIN: ABNORMAL
SL AMB POCT UROBILINOGEN: ABNORMAL

## 2019-01-09 PROCEDURE — 52000 CYSTOURETHROSCOPY: CPT | Performed by: UROLOGY

## 2019-01-09 PROCEDURE — 81002 URINALYSIS NONAUTO W/O SCOPE: CPT | Performed by: UROLOGY

## 2019-01-09 PROCEDURE — 99214 OFFICE O/P EST MOD 30 MIN: CPT | Performed by: UROLOGY

## 2019-01-09 NOTE — ASSESSMENT & PLAN NOTE
Patient's cystoscopic evaluation is negative for cancer, she has no blood coming from either ureteral orifice  Her whose of gross hematuria is due to hemorrhagic cystitis, likely precipitated by cyclophosphamide      Plan:  Proceed to cystoscopic biopsy and fulguration of the most inflamed areas which are bleeding, albeit at a slow rate

## 2019-01-09 NOTE — LETTER
January 9, 2019     [place], [address]  [facility]    Patient: Rakesh Rasmussen   YOB: 1960   Date of Visit: 1/9/2019       Dear Dr Rosey Stubbs: Thank you for referring Celia Parisi to me for evaluation  Below are my notes for this consultation  If you have questions, please do not hesitate to call me  I look forward to following your patient along with you  Sincerely,        Cher Ding MD        CC: MD Cher Carr MD  1/9/2019  1:46 PM  Sign at close encounter       Problem List Items Addressed This Visit     Hydronephrosis     Patient with a history of right-sided hydronephrosis with likely no function in this kidney  Upon cystoscopy today she has no blood coming from the ureteral orifice and no indication for stent placement         Hematuria - Primary     Patient's cystoscopic evaluation is negative for cancer, she has no blood coming from either ureteral orifice  Her whose of gross hematuria is due to hemorrhagic cystitis, likely precipitated by cyclophosphamide  Plan:  Proceed to cystoscopic biopsy and fulguration of the most inflamed areas which are bleeding, albeit at a slow rate         Relevant Orders    POCT urine dip (Completed)    Case request operating room: CYSTOSCOPY WITH BIOPSIES and fulguration (Completed)    Cystoscopy    Hemorrhagic cystitis     Patient's cystoscopy today shows at the dome of the bladder in approximately quarter-sized area of inflamed mucosa of the bladder, this is also present in other areas of the bladder  This is likely hemorrhagic cystitis due to her previous exposure to cyclophosphamide  There are no papillary tumors  I spoke with the patient about expected management versus palliation versus cystoscopy with biopsy of this area and fulguration  Given her long-term need for Eliquis, this is likely the best option in terms of stopping her continued hematuria      She does understand the increased risk of anesthesia and death and procedural complications given her very complex case and multiple comorbidities  Informed consent was signed, we will proceed to cystoscopy and biopsy with fulguration on a elective basis  Should she return to the emergency room with worsening bleeding, she will require emergent cystoscopy with fulguration           Other Visit Diagnoses     Lesion of bladder        Relevant Orders    Case request operating room: Steve Barboza and fulguration (Completed)    Blood loss anemia        Relevant Orders    Case request operating room: CYSTOSCOPY WITH BIOPSIES and fulguration (Completed)              Assessment and plan:     Please see problem oriented charting for the assessment plan of today's urological complaints    Kael Castle MD      Chief Complaint     Chief Complaint   Patient presents with    Cystoscopy    hyronephrosis    ureteral obstruction    Hemorrhagic cystitis  New diagnosis of bladder lesion      History of Present Illness     Griselda Cruz is a 63 y o  woman that I have seen previously for right-sided hydronephrosis and atrophic right kidney  She has undergone cystoscopy today which showed changes consistent with hemorrhagic cystitis due to her previous exposure to cyclophosphamide  She has no discrete bladder tumors or stones  I counseled her on cystoscopic evaluation with biopsy and fulguration and attempts to stop her ongoing bleeding  She wishes to proceed with this and signed informed consent  She denies dysuria, incontinence, hesitancy, she does have hematuria, she awakens 5-6 times at night to void, her stream quality is good      The following portions of the patient's history were reviewed and updated as appropriate: allergies, current medications, past family history, past medical history, past social history, past surgical history and problem list     Detailed Urologic History     - please refer to HPI    Review of Systems Review of Systems   Constitutional: Negative  HENT: Negative  Eyes: Negative  Respiratory: Negative  Cardiovascular: Negative  Gastrointestinal: Negative  Endocrine: Negative  Genitourinary:        As per HPI   Musculoskeletal: Negative  Skin: Negative  Allergic/Immunologic: Negative  Neurological: Negative  Hematological: Negative  Psychiatric/Behavioral: Negative  Allergies     Allergies   Allergen Reactions    Gluten Meal     Lactose        Physical Exam     Physical Exam   Constitutional: She is oriented to person, place, and time  No distress  Patient has dry mucus membranes, she is pale, she is wearing a hat and heavy clothing in the office today   HENT:   Head: Normocephalic and atraumatic  Right Ear: External ear normal    Left Ear: External ear normal    Nose: Nose normal    Eyes: Pupils are equal, round, and reactive to light  Conjunctivae and EOM are normal  Right eye exhibits no discharge  Left eye exhibits no discharge  No scleral icterus  Neck: Normal range of motion  Neck supple  Cardiovascular: Normal rate, regular rhythm and intact distal pulses  Pulmonary/Chest: Effort normal  No stridor  No respiratory distress  She has no wheezes  Abdominal: Soft  She exhibits no distension and no mass  There is no tenderness  There is no rebound and no guarding  No hernia  Genitourinary:   Genitourinary Comments: Atrophic vaginal mucosa, some degree of pelvic organ prolapse is present   Musculoskeletal: She exhibits no edema, tenderness or deformity  Neurological: She is alert and oriented to person, place, and time  No cranial nerve deficit  Coordination normal    Skin: Skin is warm and dry  No rash noted  She is not diaphoretic  No erythema  No pallor  Psychiatric: She has a normal mood and affect  Her behavior is normal  Judgment and thought content normal    Nursing note and vitals reviewed            Vital Signs  Vitals:    01/09/19 1308   BP: 112/62   BP Location: Left arm   Patient Position: Sitting   Cuff Size: Adult   Pulse: 104   Weight: 57 6 kg (127 lb)   Height: 5' 6" (1 676 m)         Current Medications       Current Outpatient Prescriptions:     apixaban (ELIQUIS) 5 mg, Take 1 tablet (5 mg total) by mouth 2 (two) times a day, Disp: 60 tablet, Rfl: 2    Elastic Bandages & Supports MISC, by Does not apply route daily, Disp: 6 each, Rfl: 0    escitalopram (LEXAPRO) 10 mg tablet, Take 10 mg by mouth daily, Disp: , Rfl: 0    ferrous sulfate 325 (65 Fe) mg tablet, Take 1 tablet (325 mg total) by mouth 2 (two) times a day with meals, Disp: 60 tablet, Rfl: 0    LORazepam (ATIVAN) 1 mg tablet, Take 1 mg by mouth 2 (two) times a day as needed, Disp: , Rfl: 0    ondansetron (ZOFRAN) 4 mg tablet, Take 1 tablet (4 mg total) by mouth every 6 (six) hours as needed for nausea or vomiting, Disp: 30 tablet, Rfl: 1    sertraline (ZOLOFT) 50 mg tablet, Take 50 mg by mouth daily at bedtime, Disp: , Rfl: 0    SUMAtriptan (IMITREX) 100 mg tablet, Take 100 mg by mouth once as needed for migraine, Disp: , Rfl:     verapamil (VERELAN) 240 MG 24 hr capsule, Take 240 mg by mouth daily, Disp: , Rfl: 0    metoclopramide (REGLAN) 10 mg tablet, Take 1 tablet (10 mg total) by mouth 4 (four) times a day as needed (nausea) (Patient not taking: Reported on 1/9/2019 ), Disp: 30 tablet, Rfl: 1      Active Problems     Patient Active Problem List   Diagnosis    Malignant neoplasm of overlapping sites of left breast in female, estrogen receptor positive ([facility] [address]    Hydronephrosis    Cellulitis    History of DVT (deep vein thrombosis)    Anemia following use of chemotherapeutic drug    Hematuria    Hemorrhagic cystitis    Heme positive stool    Essential hypertension    Acute deep vein thrombosis (DVT) of left lower extremity (HCC)    Hyperbilirubinemia    Acute blood loss anemia    Moderate protein-calorie malnutrition (HCC)    Immunocompromised (HCC) Past Medical History     Past Medical History:   Diagnosis Date    Acute DVT (deep venous thrombosis) (HCC)     of LLE>     Congenital prolapsed rectum     Hydronephrosis     right kidney    Hypertension     Malignant neoplasm of overlapping sites of left female breast ([address] )     Migraine          Surgical History     Past Surgical History:   Procedure Laterality Date    BREAST BIOPSY      COLON SURGERY      colon resection    ERCP N/A 1/4/2019    Procedure: ENDOSCOPIC RETROGRADE CHOLANGIOPANCREATOGRAPHY (ERCP); Surgeon: Annette Mckenna MD;  [facility];  Service: Gastroenterology    NV INSJ TUNNELED CTR VAD W/SUBQ PORT AGE 5 YR/> N/A 6/26/2018    Procedure: INSERTION VENOUS PORT ( PORT-A-CATH) IR;  Surgeon: Madelyn Case DO;  [facility];  Service: Interventional Radiology    NV MASTECTOMY, MODIFIED RADICAL Left 5/15/2018    Procedure: BREAST MODIFIED RADICAL MASTECTOMY;  Surgeon: Jasper Wood MD;  [facility];  Service: Surgical Oncology    US GUIDANCE BREAST BIOPSY LEFT EACH ADDITIONAL Left 4/3/2018    US GUIDED BREAST BIOPSY LEFT COMPLETE Left 4/3/2018    US GUIDED BREAST LYMPH NODE BIOPSY LEFT Left 4/3/2018         Family History     Family History   Problem Relation Age of Onset    No Known Problems Mother     No Known Problems Father          Social History     Social History     History   Smoking Status    Never Smoker   Smokeless Tobacco    Never Used         Pertinent Lab Values     Lab Results   Component Value Date    CREATININE 0 68 01/06/2019                 Pertinent Imaging      Recent imaging reviewed    Tre Narvaez MD  1/9/2019  1:44 PM  Sign at close encounter  Office Cystoscopy Procedure Note        Indication:    Hematuria and  History of hemorrhagic cystitis, history of acute blood-loss anemia    Informed consent   The risks, benefits, complications, treatment options, and expected outcomes were discussed with the patient   The patient concurred with the proposed plan and provided informed consent  Anesthesia  Lidocaine jelly 2%    Antibiotic prophylaxis   None    Procedure  In the presence of a female nurse, the patient was placed in the supine frog-legged position, was prepped and draped in the usual manner using sterile technique, and 2% lidocaine jelly instilled into the urethra  Prior to this pelvic examination showed atrophic labia majora and minora, a normal caliber vaginal introitus, for quality vaginal mucosa, and showed moderate pelvic floor descensus and no urethral hypermobility  Upon stress maneuvers there was no stress incontinence  A 17 F flexible cystoscope was then inserted into the urethra and the urethra and bladder carefully examined  The following findings were noted:    Findings:  Urethra:  Normal  Bladder:  No discrete papillary tumors, there is diffuse, redness and inflammation at the dome of the bladder consistent with hemorrhagic cystitis  There are other areas which are showing normal bladder mucosa  Ureteral orifices:  Normal, no efflux from the right kidney consistent with history of hydronephrosis, clear urine exiting from the left ureteral orifice  Other findings:  None     Specimens: None                 Complications:    None; patient tolerated the procedure well           Disposition: To home            Condition: Stable    Plan: Proceed to cystoscopic evaluation and biopsy and fulguration of her hemorrhagic cystitis to stop bleeding  Should this not be successful, the next resort would be to treat her with hyperbaric oxygen therapy, however there has been some concern in these patients for worsening of neoplasm in patients with history of neoplasm and hemorrhagic cystitis requiring hyperbaric oxygen therapy        Cystoscopy  Date/Time: 1/9/2019 1:43 PM  Performed by: Montserrat Porter  Authorized by: Montserrat Porter     Procedure details: cystoscopy    Patient tolerance: Patient tolerated the procedure well with no immediate complications    Additional Procedure Details: Cystoscopy shows hemorrhagic cystitis, no bladder masses lesions or defects, the amount of bleeding is slight and oozing

## 2019-01-09 NOTE — ASSESSMENT & PLAN NOTE
Patient's cystoscopy today shows at the dome of the bladder in approximately quarter-sized area of inflamed mucosa of the bladder, this is also present in other areas of the bladder  This is likely hemorrhagic cystitis due to her previous exposure to cyclophosphamide  There are no papillary tumors  I spoke with the patient about expected management versus palliation versus cystoscopy with biopsy of this area and fulguration  Given her long-term need for Eliquis, this is likely the best option in terms of stopping her continued hematuria  She does understand the increased risk of anesthesia and death and procedural complications given her very complex case and multiple comorbidities  Informed consent was signed, we will proceed to cystoscopy and biopsy with fulguration on a elective basis    Should she return to the emergency room with worsening bleeding, she will require emergent cystoscopy with fulguration

## 2019-01-09 NOTE — TELEPHONE ENCOUNTER
----- Message from Refugio Kraus PA-C sent at 1/5/2019  9:51 AM EST -----  Regarding: eus/egd  Pt needs egd with stent removal/EUS in 1-2 months with any doctor

## 2019-01-09 NOTE — ASSESSMENT & PLAN NOTE
Patient with a history of right-sided hydronephrosis with likely no function in this kidney    Upon cystoscopy today she has no blood coming from the ureteral orifice and no indication for stent placement

## 2019-01-09 NOTE — PROGRESS NOTES
Problem List Items Addressed This Visit     Hydronephrosis     Patient with a history of right-sided hydronephrosis with likely no function in this kidney  Upon cystoscopy today she has no blood coming from the ureteral orifice and no indication for stent placement         Hematuria - Primary     Patient's cystoscopic evaluation is negative for cancer, she has no blood coming from either ureteral orifice  Her whose of gross hematuria is due to hemorrhagic cystitis, likely precipitated by cyclophosphamide  Plan:  Proceed to cystoscopic biopsy and fulguration of the most inflamed areas which are bleeding, albeit at a slow rate         Relevant Orders    POCT urine dip (Completed)    Case request operating room: CYSTOSCOPY WITH BIOPSIES and fulguration (Completed)    Cystoscopy    Hemorrhagic cystitis     Patient's cystoscopy today shows at the dome of the bladder in approximately quarter-sized area of inflamed mucosa of the bladder, this is also present in other areas of the bladder  This is likely hemorrhagic cystitis due to her previous exposure to cyclophosphamide  There are no papillary tumors  I spoke with the patient about expected management versus palliation versus cystoscopy with biopsy of this area and fulguration  Given her long-term need for Eliquis, this is likely the best option in terms of stopping her continued hematuria  She does understand the increased risk of anesthesia and death and procedural complications given her very complex case and multiple comorbidities  Informed consent was signed, we will proceed to cystoscopy and biopsy with fulguration on a elective basis    Should she return to the emergency room with worsening bleeding, she will require emergent cystoscopy with fulguration           Other Visit Diagnoses     Lesion of bladder        Relevant Orders    Case request operating room: Steve Barboza and fulguration (Completed)    Blood loss anemia Relevant Orders    Case request operating room: CYSTOSCOPY WITH BIOPSIES and fulguration (Completed)              Assessment and plan:     Please see problem oriented charting for the assessment plan of today's urological complaints    Hang Luis MD      Chief Complaint     Chief Complaint   Patient presents with    Cystoscopy    hyronephrosis    ureteral obstruction    Hemorrhagic cystitis  New diagnosis of bladder lesion      History of Present Illness     Marcie Naqvi is a 63 y o  woman that I have seen previously for right-sided hydronephrosis and atrophic right kidney  She has undergone cystoscopy today which showed changes consistent with hemorrhagic cystitis due to her previous exposure to cyclophosphamide  She has no discrete bladder tumors or stones  I counseled her on cystoscopic evaluation with biopsy and fulguration and attempts to stop her ongoing bleeding  She wishes to proceed with this and signed informed consent  She denies dysuria, incontinence, hesitancy, she does have hematuria, she awakens 5-6 times at night to void, her stream quality is good  The following portions of the patient's history were reviewed and updated as appropriate: allergies, current medications, past family history, past medical history, past social history, past surgical history and problem list     Detailed Urologic History     - please refer to HPI    Review of Systems     Review of Systems   Constitutional: Negative  HENT: Negative  Eyes: Negative  Respiratory: Negative  Cardiovascular: Negative  Gastrointestinal: Negative  Endocrine: Negative  Genitourinary:        As per HPI   Musculoskeletal: Negative  Skin: Negative  Allergic/Immunologic: Negative  Neurological: Negative  Hematological: Negative  Psychiatric/Behavioral: Negative                Allergies     Allergies   Allergen Reactions    Gluten Meal     Lactose        Physical Exam     Physical Exam Constitutional: She is oriented to person, place, and time  No distress  Patient has dry mucus membranes, she is pale, she is wearing a hat and heavy clothing in the office today   HENT:   Head: Normocephalic and atraumatic  Right Ear: External ear normal    Left Ear: External ear normal    Nose: Nose normal    Eyes: Pupils are equal, round, and reactive to light  Conjunctivae and EOM are normal  Right eye exhibits no discharge  Left eye exhibits no discharge  No scleral icterus  Neck: Normal range of motion  Neck supple  Cardiovascular: Normal rate, regular rhythm and intact distal pulses  Pulmonary/Chest: Effort normal  No stridor  No respiratory distress  She has no wheezes  Abdominal: Soft  She exhibits no distension and no mass  There is no tenderness  There is no rebound and no guarding  No hernia  Genitourinary:   Genitourinary Comments: Atrophic vaginal mucosa, some degree of pelvic organ prolapse is present   Musculoskeletal: She exhibits no edema, tenderness or deformity  Neurological: She is alert and oriented to person, place, and time  No cranial nerve deficit  Coordination normal    Skin: Skin is warm and dry  No rash noted  She is not diaphoretic  No erythema  No pallor  Psychiatric: She has a normal mood and affect  Her behavior is normal  Judgment and thought content normal    Nursing note and vitals reviewed            Vital Signs  Vitals:    01/09/19 1308   BP: 112/62   BP Location: Left arm   Patient Position: Sitting   Cuff Size: Adult   Pulse: 104   Weight: 57 6 kg (127 lb)   Height: 5' 6" (1 676 m)         Current Medications       Current Outpatient Prescriptions:     apixaban (ELIQUIS) 5 mg, Take 1 tablet (5 mg total) by mouth 2 (two) times a day, Disp: 60 tablet, Rfl: 2    Elastic Bandages & Supports MISC, by Does not apply route daily, Disp: 6 each, Rfl: 0    escitalopram (LEXAPRO) 10 mg tablet, Take 10 mg by mouth daily, Disp: , Rfl: 0    ferrous sulfate 325 (65 Fe) mg tablet, Take 1 tablet (325 mg total) by mouth 2 (two) times a day with meals, Disp: 60 tablet, Rfl: 0    LORazepam (ATIVAN) 1 mg tablet, Take 1 mg by mouth 2 (two) times a day as needed, Disp: , Rfl: 0    ondansetron (ZOFRAN) 4 mg tablet, Take 1 tablet (4 mg total) by mouth every 6 (six) hours as needed for nausea or vomiting, Disp: 30 tablet, Rfl: 1    sertraline (ZOLOFT) 50 mg tablet, Take 50 mg by mouth daily at bedtime, Disp: , Rfl: 0    SUMAtriptan (IMITREX) 100 mg tablet, Take 100 mg by mouth once as needed for migraine, Disp: , Rfl:     verapamil (VERELAN) 240 MG 24 hr capsule, Take 240 mg by mouth daily, Disp: , Rfl: 0    metoclopramide (REGLAN) 10 mg tablet, Take 1 tablet (10 mg total) by mouth 4 (four) times a day as needed (nausea) (Patient not taking: Reported on 1/9/2019 ), Disp: 30 tablet, Rfl: 1      Active Problems     Patient Active Problem List   Diagnosis    Malignant neoplasm of overlapping sites of left breast in female, estrogen receptor positive ([facility] [address] )    Hydronephrosis    Cellulitis    History of DVT (deep vein thrombosis)    Anemia following use of chemotherapeutic drug    Hematuria    Hemorrhagic cystitis    Heme positive stool    Essential hypertension    Acute deep vein thrombosis (DVT) of left lower extremity (HCC)    Hyperbilirubinemia    Acute blood loss anemia    Moderate protein-calorie malnutrition (HCC)    Immunocompromised (HCC)         Past Medical History     Past Medical History:   Diagnosis Date    Acute DVT (deep venous thrombosis) (HCC)     of LLE>     Congenital prolapsed rectum     Hydronephrosis     right kidney    Hypertension     Malignant neoplasm of overlapping sites of left female breast ([address] )     Migraine          Surgical History     Past Surgical History:   Procedure Laterality Date    BREAST BIOPSY      COLON SURGERY      colon resection    ERCP N/A 1/4/2019    Procedure: ENDOSCOPIC RETROGRADE CHOLANGIOPANCREATOGRAPHY (ERCP); Surgeon: Betty Gorman MD;  [facility];  Service: Gastroenterology    SD INSJ TUNNELED CTR VAD W/SUBQ PORT AGE 5 YR/> N/A 6/26/2018    Procedure: INSERTION VENOUS PORT ( PORT-A-CATH) IR;  Surgeon: Hermes Fletcher DO;  [facility];  Service: Interventional Radiology    SD MASTECTOMY, MODIFIED RADICAL Left 5/15/2018    Procedure: BREAST MODIFIED RADICAL MASTECTOMY;  Surgeon: Mariajose Dong MD;  [facility];  Service: Surgical Oncology    US GUIDANCE BREAST BIOPSY LEFT EACH ADDITIONAL Left 4/3/2018    US GUIDED BREAST BIOPSY LEFT COMPLETE Left 4/3/2018    US GUIDED BREAST LYMPH NODE BIOPSY LEFT Left 4/3/2018         Family History     Family History   Problem Relation Age of Onset    No Known Problems Mother     No Known Problems Father          Social History     Social History     History   Smoking Status    Never Smoker   Smokeless Tobacco    Never Used         Pertinent Lab Values     Lab Results   Component Value Date    CREATININE 0 68 01/06/2019                 Pertinent Imaging      Recent imaging reviewed Hypothyroid

## 2019-01-10 ENCOUNTER — TELEPHONE (OUTPATIENT)
Dept: SURGICAL ONCOLOGY | Facility: CLINIC | Age: 59
End: 2019-01-10

## 2019-01-10 NOTE — TELEPHONE ENCOUNTER
Spoke with  Cisco Zuniga regarding several issues  Rakel Oliveira has now used all of her sick time and vacation time and her employer would like her to have FMLA forms completed  Instructed him to contact Dr Pat Monroe office to have them completed as Dr Meena Taveras has not been actively treating her since her surgery  In addition, he had questions about initiating disability paperwork  Advised him to contact PCP to see if they can complete for him

## 2019-01-11 DIAGNOSIS — I82.432 ACUTE DEEP VEIN THROMBOSIS (DVT) OF POPLITEAL VEIN OF LEFT LOWER EXTREMITY (HCC): ICD-10-CM

## 2019-01-11 RX ORDER — APIXABAN 5 MG/1
TABLET, FILM COATED ORAL
Qty: 60 TABLET | Refills: 2 | Status: ON HOLD | OUTPATIENT
Start: 2019-01-11 | End: 2019-04-22 | Stop reason: SDUPTHER

## 2019-01-18 NOTE — TELEPHONE ENCOUNTER
Pt called back to schedule her surgery  She said she has a consultation with Radiation Oncology on 1/21 and will be given her radiation treatment at that appt  She states she will have radiation every day for 6 days  She would like to schedule surgery after her radiation ends  I suggested she get her schedule so we know exactly when she will starting her treatments and give me a call so we can schedule her accordingly  She is happy with the plan

## 2019-01-22 ENCOUNTER — CLINICAL SUPPORT (OUTPATIENT)
Dept: RADIATION ONCOLOGY | Facility: HOSPITAL | Age: 59
End: 2019-01-22
Payer: COMMERCIAL

## 2019-01-22 ENCOUNTER — RADIATION ONCOLOGY CONSULT (OUTPATIENT)
Dept: RADIATION ONCOLOGY | Facility: HOSPITAL | Age: 59
End: 2019-01-22
Attending: RADIOLOGY
Payer: COMMERCIAL

## 2019-01-22 VITALS
WEIGHT: 119.4 LBS | RESPIRATION RATE: 16 BRPM | HEIGHT: 65 IN | TEMPERATURE: 97.4 F | HEART RATE: 104 BPM | BODY MASS INDEX: 19.89 KG/M2 | SYSTOLIC BLOOD PRESSURE: 100 MMHG | DIASTOLIC BLOOD PRESSURE: 60 MMHG

## 2019-01-22 DIAGNOSIS — C50.812 MALIGNANT NEOPLASM OF OVERLAPPING SITES OF LEFT BREAST IN FEMALE, ESTROGEN RECEPTOR POSITIVE (HCC): Primary | ICD-10-CM

## 2019-01-22 DIAGNOSIS — Z17.0 MALIGNANT NEOPLASM OF OVERLAPPING SITES OF LEFT BREAST IN FEMALE, ESTROGEN RECEPTOR POSITIVE (HCC): Primary | ICD-10-CM

## 2019-01-22 PROCEDURE — 77290 THER RAD SIMULAJ FIELD CPLX: CPT | Performed by: RADIOLOGY

## 2019-01-22 PROCEDURE — 99215 OFFICE O/P EST HI 40 MIN: CPT | Performed by: RADIOLOGY

## 2019-01-22 NOTE — PROGRESS NOTES
Theodore Zuniga  1960   Ms Holli Caro is a 63 y o  female       Chief Complaint   Patient presents with    Follow-up       Cancer Staging  Malignant neoplasm of overlapping sites of left breast in female, estrogen receptor positive ([facility] [address] )  Staging form: Breast, AJCC 8th Edition  - Clinical stage from 4/10/2018: Stage IIA (cT2, cN1(f), cM0, G2, ER: Positive, VA: Positive, HER2: Negative) - Signed by Nahomy Hook MD on 4/10/2018         Malignant neoplasm of overlapping sites of left breast in female, estrogen receptor positive ([facility] [address] )    4/3/2018 Initial Diagnosis     Left breast biopsy  4 6 cm in size on ultrasound largest tumor   Site A:  6:00 o clock 5 CMFN-Invasive Lobular grade 2  Site B:  11:00 o'clock 4 CMFN Invasive Lobular grade 2  Lymph node:  Positive for metastatic cancer  ER 70  VA 15  Her 2 1+   Performed on site A specimen  Stage IIA         5/15/2018 Surgery     Left breast modified radical mastectomy  Invasive lobular carcinoma  Multi focal  Tumor seen in deep dermis  Grade 2  8 1 cm  6/24 lymph nodes  ER 70  VA 15  Her 2 negative  Stage IIIA            6/27/2018 - 12/6/2018 Chemotherapy        Adjuvant chemotherapy with dose dense AC x4 followed by weekly paclitaxel x12  Clinical Trial: no        Interval History: 6/4/18 Initial Radiation Oncology consult     Theodore Zuniga is a 63y o  year old female diagnosed with  pT3 N2a M0 grade 2 ER/VA positive her 2 - Stage IIIA invasive mammary carcinoma of the left breast status post modified radical mastectomy with 6 of 24 lymph nodes positive  She did not undergo immediate reconstruction, but is considering reconstruction in the future  A course of postmastectomy radiation therapy, was recommended at the time of her initial consult 6/4/18  She required adjuvant chemotherapy, and after completing chemo would return to begin radiation  Chemo was initiated 6/27/18, she began dose dense AC x 6 followed by weekly Taxol x 12   While receiving AC, she required transfusion with PRBCs x2  She continued with Taxol  In Oct she developd LLE swelling, for which she had a doppler, which was + for DVT of the left popliteal vein  She was then prescribed Eliquis  In late  Joanne Mares began experiencing hematuria, and her Eiquis was held  Of note Pt has a massively hydronephrotic right kidney with no residual parenchyma and a torturous right ureter  Functionally solitary left kidney  Kidney disease    She then came to [address] [facility] on 18 with acute pain in the LLE, from the DVT  She was then prescribed Morphine tabs  She completed her chemo 18  Pt's gross hematuria persisted, so a ct scan was done  This revealed circumferential bladder wall thickening in keeping with cystitis  Chronic right hydroureter nephrosis and hydroureter unchanged from the prior study  18- 18 Admitted anemia following chemotherapy  3 units PRBC     18-19 Admitted Hemorrhagic cystitis  19 GI Dr Skylar Nielson- ERCP     19 Urology Dr Patrizia Ahuja- "cystoscopic evaluation is negative for cancer, she has no blood coming from either ureteral orifice  Her whose of gross hematuria is due to hemorrhagic cystitis, likely precipitated by cyclophosphamide    Plan:  Proceed to cystoscopic biopsy and fulguration of the most inflamed areas which are bleeding, albeit at a slow rate"    Patient requested procedure be done after her radiation is complete  GYN HX    Post menopause around age 49  NO HRT  BCP appr 5 yrs           Screening  Tobacco  Current tobacco user: No  If yes, brief counseling provided: NA    Hypertension  Hypertension screening performed: yes  Normotensive:  yes  If no, referred to PCP: n/a    Depression Screening  Screened for depression using PHQ-2: yes    Screened for depression using PHQ-9:  no  Screening positive or negative:  negative  If score >4, was any of the following actions taken?    Additional evaluation for depression, suicide risk assesment, referral to PCP or psychiatry, medication started:  [address] [facility] for Patients >65 years  Advanced Care Planning Discussed:  n/a  Patient named surrogate decision maker or care plan in chart: n/a      Health Maintenance   Topic Date Due    Hepatitis C Screening  1960    CRC Screening: Colonoscopy  1960    Pneumococcal PPSV23 Highest Risk Adult (1 of 3 - PCV13) 07/30/1980    DTaP,Tdap,and Td Vaccines (1 - Tdap) 07/30/1982    PAP SMEAR  07/30/1982    INFLUENZA VACCINE  07/01/2018    MAMMOGRAM  05/15/2019    Depression Screening PHQ  06/04/2019       Patient Active Problem List   Diagnosis    Malignant neoplasm of overlapping sites of left breast in female, estrogen receptor positive ([address] )    Hydronephrosis    Cellulitis    History of DVT (deep vein thrombosis)    Anemia following use of chemotherapeutic drug    Hematuria    Hemorrhagic cystitis    Heme positive stool    Essential hypertension    Acute deep vein thrombosis (DVT) of left lower extremity (HCC)    Hyperbilirubinemia    Acute blood loss anemia    Moderate protein-calorie malnutrition (HCC)    Immunocompromised (HCC)     Past Medical History:   Diagnosis Date    Acute DVT (deep venous thrombosis) (HCC)     of LLE>     Congenital prolapsed rectum     Hydronephrosis     right kidney    Hypertension     Malignant neoplasm of overlapping sites of left female breast ([address] )     Migraine      Past Surgical History:   Procedure Laterality Date    BREAST BIOPSY      COLON SURGERY      colon resection    ERCP N/A 1/4/2019    Procedure: ENDOSCOPIC RETROGRADE CHOLANGIOPANCREATOGRAPHY (ERCP);   Surgeon: Xiao Mancera MD;  [facility];  Service: Gastroenterology    GA INSJ TUNNELED CTR VAD W/SUBQ PORT AGE 5 YR/> N/A 6/26/2018    Procedure: INSERTION VENOUS PORT ( PORT-A-CATH) IR;  Surgeon: Ba Almonte DO;  [facility];  Service: Interventional Radiology    GA MASTECTOMY, MODIFIED RADICAL Left 5/15/2018    Procedure: BREAST MODIFIED RADICAL MASTECTOMY;  Surgeon: Piper Tripp MD;  [facility];  Service: Surgical Oncology    US GUIDANCE BREAST BIOPSY LEFT EACH ADDITIONAL Left 4/3/2018    US GUIDED BREAST BIOPSY LEFT COMPLETE Left 4/3/2018    US GUIDED BREAST LYMPH NODE BIOPSY LEFT Left 4/3/2018     Family History   Problem Relation Age of Onset    No Known Problems Mother     No Known Problems Father      Social History     Social History    Marital status: /Civil Union     Spouse name: N/A    Number of children: N/A    Years of education: N/A     Occupational History    Not on file  Social History Main Topics    Smoking status: Never Smoker    Smokeless tobacco: Never Used    Alcohol use No    Drug use: No    Sexual activity: Yes     Partners: Female     Birth control/ protection: Female Sterilization     Other Topics Concern    Not on file     Social History Narrative    No narrative on file       Current Outpatient Prescriptions:     Elastic Bandages & Supports MISC, by Does not apply route daily, Disp: 6 each, Rfl: 0    ELIQUIS 5 MG, TAKE 1 TABLET BY MOUTH TWICE A DAY, Disp: 60 tablet, Rfl: 2    escitalopram (LEXAPRO) 10 mg tablet, Take 10 mg by mouth daily, Disp: , Rfl: 0    LORazepam (ATIVAN) 1 mg tablet, Take 1 mg by mouth 2 (two) times a day as needed, Disp: , Rfl: 0    ondansetron (ZOFRAN) 4 mg tablet, Take 1 tablet (4 mg total) by mouth every 6 (six) hours as needed for nausea or vomiting, Disp: 30 tablet, Rfl: 1    sertraline (ZOLOFT) 50 mg tablet, Take 50 mg by mouth daily at bedtime, Disp: , Rfl: 0    SUMAtriptan (IMITREX) 100 mg tablet, Take 100 mg by mouth once as needed for migraine, Disp: , Rfl:     verapamil (VERELAN) 240 MG 24 hr capsule, Take 240 mg by mouth daily, Disp: , Rfl: 0  Allergies   Allergen Reactions    Gluten Meal     Lactose        Review of Systems:  Review of Systems   Constitutional: Positive for fatigue (9/10 )     HENT: Negative  Eyes: Negative  Respiratory: Negative  Cardiovascular: Positive for leg swelling (Left lower leg   + DVT )  TEDS to left lower leg  Gastrointestinal: Positive for diarrhea (3 days ago for 2 episodes  Has resolved with Imodium  )  Endocrine: Negative  Genitourinary:        + Hematuria, following up with Dr Ty Hardy  Nocturia x 5  Musculoskeletal: Negative  Skin: Negative  Allergic/Immunologic: Positive for food allergies (Gluten, Lactose  )  Neurological: Positive for weakness (Bilateral legs  )  Hematological: Negative  Psychiatric/Behavioral: Negative  Vitals:    01/22/19 1321   BP: 100/60   BP Location: Right arm   Patient Position: Sitting   Cuff Size: Standard   Pulse: 104   Resp: 16   Temp: (!) 97 4 °F (36 3 °C)   TempSrc: Temporal   Weight: 54 2 kg (119 lb 6 4 oz)   Height: 5' 5" (1 651 m)       Pain Score: 0-No pain    Imaging:Xr Chest Pa & Lateral    Result Date: 12/31/2018  Narrative: CHEST INDICATION:   fever  COMPARISON:  Chest x-ray 9/21/2011 EXAM PERFORMED/VIEWS:  XR CHEST PA & LATERAL FINDINGS:  Right-sided chest wall port is present with its tip in the cavoatrial region  Multiple surgical clips overlie the left axilla  Cardiomediastinal silhouette appears unremarkable  There is increased retrocardiac opacity which could represent a developing infiltrate in the appropriate clinical setting  No pneumothorax or significant pleural effusion  Osseous structures appear within normal limits for patient age  Impression: Increased retrocardiac opacity could represent a developing infiltrate in the appropriate clinical setting  The study was marked in [facility] for immediate notification  Workstation performed: [id number]     Ct Head Without Contrast    Result Date: 12/30/2018  Narrative: CT BRAIN - WITHOUT CONTRAST INDICATION:   Fall, patient takes eliquis  COMPARISON:  Images from PET/CT 4/16/2018 TECHNIQUE:  CT examination of the brain was performed    In addition to axial images, coronal 2D reformatted images were created and submitted for interpretation  Radiation dose length product (DLP) for this visit:  966 mGy-cm   This examination, like all CT scans performed in the [facility], was performed utilizing techniques to minimize radiation dose exposure, including the use of iterative reconstruction and automated exposure control  IMAGE QUALITY:  Diagnostic  FINDINGS: PARENCHYMA:  No intracranial mass, mass effect or midline shift  No CT signs of acute infarction  No acute parenchymal hemorrhage  Probable bilateral basal ganglia calcifications although not heavily calcified on the left  Age-related cortical atrophy  Periventricular white matter hypodensity which is nonspecific although most compatible with chronic small vessel ischemic disease  VENTRICLES AND EXTRA-AXIAL SPACES:  Normal for the patient's age  VISUALIZED ORBITS AND PARANASAL SINUSES:  Unremarkable  CALVARIUM AND EXTRACRANIAL SOFT TISSUES:  Normal      Impression: No acute intracranial abnormality  Workstation performed: [id number]     Us Liver    Result Date: 12/31/2018  Narrative: RIGHT UPPER QUADRANT ULTRASOUND INDICATION: Jaundice  COMPARISON: CT 12/17/2018  TECHNIQUE:   Real-time ultrasound of the right upper quadrant was performed with a curvilinear transducer with both volumetric sweeps and still imaging techniques  FINDINGS: PANCREAS:  Visualized portions show no abnormality  AORTA AND IVC:  Visualized portions are normal for patient age  LIVER: Normal size  Echogenicity and contour are normal   Subcentimeter hepatic cyst is present  No evidence of mass  Normal directional flow is present within the portal vein  BILIARY: The gallbladder is normal in caliber  No wall thickening or pericholecystic fluid  No stones or sludge  No sonographic Cross's sign  No intrahepatic biliary dilatation   Common bile duct is dilated measuring 16 mm proximally and tapering to 9 mm distally  Suggestion of sludge versus choledocholithiasis  KIDNEY: Right kidney measures 19 8 x 11 9 cm  Chronic severe right-sided hydronephrosis is again seen with advanced cortical thinning    ASCITES:   None  Impression: Dilated common bile duct with suggestion of sludge or choledocholithiasis  Consider ERCP  Chronic right-sided hydronephrosis again identified with advanced cortical thinning  Workstation performed: [id number]     Mri Abdomen Wo Contrast And Mrcp    Result Date: 1/1/2019  Narrative: MRI OF THE ABDOMEN WITHOUT CONTRAST WITH MRCP INDICATION: Dilated CBD with sludge or choledocholithiasis on recent ultrasound  Elevated LFTs  History of breast cancer  COMPARISON: CT abdomen pelvis 12/17/2018, ultrasound 12/31/2018 TECHNIQUE:  The following pulse sequences were obtained on a 1 5 T scanner: 3D MRCP with radial thick slabs and projections  Coronal and axial T2 with TE of 90 and 180 respectively, axial T1-weighted in-and-out-of phase, axial DWI/ADC, axial T2 with fat  saturation, axial FIESTA fat-sat, and axial T1 with fat saturation  3D rendering was performed from the acquisition scanner  Evaluation of the solid abdominal viscera is limited without intravenous contrast  FINDINGS: The ADC map is degraded by artifact  BILIARY/PANCREATIC DUCTS:  No intra-hepatic biliary ductal dilatation  Common bile duct is dilated measuring up to 15 mm, similar to the recent ultrasound  There is fairly abrupt transition of the distal CBD slightly above the level of the expected level of the ampulla  The possibility of a distal stricture cannot be excluded  No discernible choledocholithiasis  No mass identified  The pancreatic duct is not dilated  There is pancreas divisum noted  LIVER:  There is decreased T2 signal seen diffusely throughout the liver with overall decreased signal on the T1 in phase sequence  Findings suggest iron overload    There is a subcentimeter T2 hyperintensity within the lateral segment left lobe  Although this cannot be definitively characterized without IV contrast, statistically is most likely to represent a small cyst or hemangioma  This is not significantly changed from the previous CT study in April 2018  GALLBLADDER:  Normal  PANCREAS:  There is mild atrophy seen  The pancreas demonstrates normal signal on noncontrast imaging  There is a 1 cm hypoattenuating focus seen along the cephalad aspect of the pancreatic body in retrospect series 601 image is 57-59 of the most recent CT in retrospect  This is intimately associated with the splenic artery and has a incomplete rim of calcification, 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  This is not well depicted on the current MRI study  I would  recommend follow-up CTA of the abdomen in 3 months to ensure stability and exclude the less likely possibility of a subtle pancreatic lesion  ADRENAL GLANDS:  Normal  SPLEEN: Normal  KIDNEYS:  There is severe right hydroureteronephrosis again identified with marked cortical thinning, unchanged from previous studies  Although not optimally visualized on this study, abrupt narrowing of the distal right ureter seen on previous CT study  is not well depicted on this exam   The presence of a distal ureteral stricture cannot be excluded  The left kidney is unremarkable  ABDOMINAL CAVITY:  No lymphadenopathy or mass  No ascites  A mildly thick-walled urinary bladder is again noted  BOWEL:  Unremarkable MRI appearance  OSSEOUS STRUCTURES:  No osseous destruction  Degenerative disease seen at L5-S1  VASCULAR STRUCTURES:  No aneurysm  ABDOMINAL WALL:  Normal  LOWER CHEST:  There are small bilateral pleural effusions with mild bibasilar compressive atelectasis  There is a large hiatal hernia seen  Impression: 1  Dilated CBD up to 15 mm with abrupt transitioning distal CBD proximal to the ampulla for which the possibility of stricture cannot be excluded   Correlation with ERCP with brush biopsy is recommended  No discernible choledocholithiasis  2   Evidence of pancreatic divisum  No MRI evidence of pancreatitis  3   Abnormal hepatic signal suggestive of iron overload  Correlate with serologic testing  4   Severe right hydroureteronephrosis again identified  Presence of distal right ureteral stricture cannot be excluded  5  1 cm hypoattenuating focus seen along the superior posterior margin of the pancreatic body on previous CT imaging in retrospect, 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  Follow-up CTA abdomen in 3 months recommended to exclude  less likely possibility of a subtle pancreatic lesion  Limited assessment of the pancreas without contrast  6   Small bilateral pleural effusions with bibasilar compressive atelectasis and large hiatal hernia  I personally discussed this study and recommendations with Dr Aurelia Stephens from GI on 1/1/2019 at 6:12 AM  Workstation performed: [id number]     Fl Ercp Biliary Only    Result Date: 1/5/2019  Narrative: ERCP INDICATION:  Hyperbilirubinemia  Dilated common bile duct  COMPARISON:  CT abdomen pelvis December 17, 2018 hepatic ultrasound December 31, 2018 and MRI/MRCP December 31, 2018  IMAGES:  8 FLUOROSCOPY TIME:   2 09 MINUTES CONTRAST: 13 mL of iohexol (OMNIPAQUE) FINDINGS: Fluoroscopic guidance was provided for performance of ERCP  BILIARY: The common bile duct is dilated  No filling defects are seen  Delayed images demonstrate a stent in the common bile duct  Impression: Fluoroscopic guidance for ERCP  Please see procedure report for full details   Workstation performed: [id number]       Teaching: Skin, Fatigue

## 2019-01-23 NOTE — PROGRESS NOTES
Follow-up - Radiation Oncology   Carley Naylor 1960 63 y o  female [id number]      History of Present Illness   Cancer Staging  Malignant neoplasm of overlapping sites of left breast in female, estrogen receptor positive ([facility] [address] )  Staging form: Breast, AJCC 8th Edition  - Clinical stage from 4/10/2018: Stage IIA (cT2, cN1(f), cM0, G2, ER: Positive, WY: Positive, HER2: Negative) - Signed by Estee Mendez MD on 4/10/2018      Carley Naylor returns today for a follow-up visit, having completed systemic therapy and ready to proceed with postmastectomy radiation  Her main complaint at this time is ongoing significant fatigue  Interval history since initial consultation as described below  Interval History: 6/4/18 Initial Radiation Oncology consult     Carley Naylor is a 63y o  year old female diagnosed with  pT3 N2a M0 grade 2 ER/WY positive her 2 - Stage IIIA invasive mammary carcinoma of the left breast status post modified radical mastectomy with 6 of 24 lymph nodes positive  She did not undergo immediate reconstruction, but is considering reconstruction in the future  A course of postmastectomy radiation therapy, was recommended at the time of her initial consult 6/4/18  She required adjuvant chemotherapy, and after completing chemo would return to begin radiation  Chemo was initiated 6/27/18, she began dose dense AC x 6 followed by weekly Taxol x 12  While receiving AC, she required transfusion with PRBCs x2  She continued with Taxol  In Oct she developd LLE swelling, for which she had a doppler, which was + for DVT of the left popliteal vein  She was then prescribed Eliquis  In late [address] began experiencing hematuria, and her Eiquis was held  Of note Pt has a massively hydronephrotic right kidney with no residual parenchyma and a torturous right ureter  Functionally solitary left kidney  Kidney disease    She then came to [address] [place] [address] on 12/2/18 with acute pain in the LLE, from the DVT   She was then prescribed Morphine tabs  She completed her chemo 12/6/18  Pt's gross hematuria persisted, so a ct scan was done  This revealed circumferential bladder wall thickening in keeping with cystitis  Chronic right hydroureter nephrosis and hydroureter unchanged from the prior study  12/19/18- 12/21/18 Admitted anemia following chemotherapy  3 units PRBC     12/30/18-1/6/19 Admitted Hemorrhagic cystitis  1/4/19 GI Dr Isauro Barriga- ERCP     1/9/19 Urology Dr Neda Abraham- "cystoscopic evaluation is negative for cancer, she has no blood coming from either ureteral orifice  Her whose of gross hematuria is due to hemorrhagic cystitis, likely precipitated by cyclophosphamide    Plan:  Proceed to cystoscopic biopsy and fulguration of the most inflamed areas which are bleeding, albeit at a slow rate"    Patient requested procedure be done after her radiation is complete  Historical Information      Malignant neoplasm of overlapping sites of left breast in female, estrogen receptor positive ([facility] [address] )    4/3/2018 Initial Diagnosis     Left breast biopsy  4 6 cm in size on ultrasound largest tumor   Site A:  6:00 o clock 5 CMFN-Invasive Lobular grade 2  Site B:  11:00 o'clock 4 CMFN Invasive Lobular grade 2  Lymph node:  Positive for metastatic cancer  ER 70  NV 15  Her 2 1+   Performed on site A specimen  Stage IIA         5/15/2018 Surgery     Left breast modified radical mastectomy  Invasive lobular carcinoma  Multi focal  Tumor seen in deep dermis  Grade 2  8 1 cm  6/24 lymph nodes  ER 70  NV 15  Her 2 negative  Stage IIIA            6/27/2018 - 12/6/2018 Chemotherapy        Adjuvant chemotherapy with dose dense AC x4 followed by weekly paclitaxel x12              Past Medical History:   Diagnosis Date    Acute DVT (deep venous thrombosis) (HCC)     of LLE>     Congenital prolapsed rectum     Hydronephrosis     right kidney    Hypertension     Malignant neoplasm of overlapping sites of left female breast (HCC)     Migraine Past Surgical History:   Procedure Laterality Date    BREAST BIOPSY      COLON SURGERY      colon resection    ERCP N/A 1/4/2019    Procedure: ENDOSCOPIC RETROGRADE CHOLANGIOPANCREATOGRAPHY (ERCP);   Surgeon: Xiao Mancera MD;  [facility]: [facility];  Service: Gastroenterology    IL INSJ TUNNELED CTR VAD W/SUBQ PORT AGE 5 YR/> N/A 6/26/2018    Procedure: INSERTION VENOUS PORT ( PORT-A-CATH) IR;  Surgeon: Zaira Palmer DO;  [facility];  Service: Interventional Radiology    IL MASTECTOMY, MODIFIED RADICAL Left 5/15/2018    Procedure: BREAST MODIFIED RADICAL MASTECTOMY;  Surgeon: Hugo Tellez MD;  [facility];  Service: Surgical Oncology    US GUIDANCE BREAST BIOPSY LEFT EACH ADDITIONAL Left 4/3/2018    US GUIDED BREAST BIOPSY LEFT COMPLETE Left 4/3/2018    US GUIDED BREAST LYMPH NODE BIOPSY LEFT Left 4/3/2018       Social History   History   Alcohol Use No     History   Drug Use No     History   Smoking Status    Never Smoker   Smokeless Tobacco    Never Used         Meds/Allergies     Current Outpatient Prescriptions:     Elastic Bandages & Supports MISC, by Does not apply route daily, Disp: 6 each, Rfl: 0    ELIQUIS 5 MG, TAKE 1 TABLET BY MOUTH TWICE A DAY, Disp: 60 tablet, Rfl: 2    escitalopram (LEXAPRO) 10 mg tablet, Take 10 mg by mouth daily, Disp: , Rfl: 0    LORazepam (ATIVAN) 1 mg tablet, Take 1 mg by mouth 2 (two) times a day as needed, Disp: , Rfl: 0    ondansetron (ZOFRAN) 4 mg tablet, Take 1 tablet (4 mg total) by mouth every 6 (six) hours as needed for nausea or vomiting, Disp: 30 tablet, Rfl: 1    sertraline (ZOLOFT) 50 mg tablet, Take 50 mg by mouth daily at bedtime, Disp: , Rfl: 0    SUMAtriptan (IMITREX) 100 mg tablet, Take 100 mg by mouth once as needed for migraine, Disp: , Rfl:     verapamil (VERELAN) 240 MG 24 hr capsule, Take 240 mg by mouth daily, Disp: , Rfl: 0  Allergies   Allergen Reactions    Gluten Meal     Lactose          Review of Systems Review of Systems   Constitutional: Positive for fatigue (9/10 )  HENT: Negative  Eyes: Negative  Respiratory: Negative  Cardiovascular: Positive for leg swelling (Left lower leg   + DVT )  TEDS to left lower leg  Gastrointestinal: Positive for diarrhea (3 days ago for 2 episodes  Has resolved with Imodium  )  Endocrine: Negative  Genitourinary:        + Hematuria, following up with Dr Hernadez More  Nocturia x 5  Musculoskeletal: Negative  Skin: Negative  Allergic/Immunologic: Positive for food allergies (Gluten, Lactose  )  Neurological: Positive for weakness (Bilateral legs  )  Hematological: Negative  Psychiatric/Behavioral: Negative  Screening  Tobacco  Current tobacco user: No  If yes, brief counseling provided: NA    Hypertension  Hypertension screening performed: yes  Normotensive:  yes  If no, referred to PCP: n/a    Depression Screening  Screened for depression using PHQ-2: yes    Screened for depression using PHQ-9:  no  Screening positive or negative:  negative  If score >4, was any of the following actions taken? Additional evaluation for depression, suicide risk assesment, referral to PCP or psychiatry, medication started:  n/a    Advanced Care Planning for Patients >65 years  Advanced Care Planning Discussed:  n/a  Patient named surrogate decision maker or care plan in chart: n/a        OBJECTIVE:   /60 (BP Location: Right arm, Patient Position: Sitting, Cuff Size: Standard)   Pulse 104   Temp (!) 97 4 °F (36 3 °C) (Temporal)   Resp 16   Ht 5' 5" (1 651 m)   Wt 54 2 kg (119 lb 6 4 oz)   LMP  (LMP Unknown)   BMI 19 87 kg/m²   Pain Assessment:  0  Karnofsky: 90 - Able to carry on normal activity; minor signs or symptoms of disease     Physical Exam   The patient presents today no apparent distress  Sclera anicteric  No cervical or supraclavicular lymphadenopathy  The patient is status post left mastectomy    The left chest wall is well healed with no visible or palpable suspicious findings  No axillary lymphadenopathy  No lymphedema          RESULTS    Lab Results:   Recent Results (from the past 672 hour(s))   CBC and differential    Collection Time: 12/30/18 11:53 AM   Result Value Ref Range    WBC 12 79 (H) 4 31 - 10 16 Thousand/uL    RBC 2 95 (L) 3 81 - 5 12 Million/uL    Hemoglobin 10 4 (L) 11 5 - 15 4 g/dL    Hematocrit 31 6 (L) 34 8 - 46 1 %     (H) 82 - 98 fL    MCH 35 3 (H) 26 8 - 34 3 pg    MCHC 32 9 31 4 - 37 4 g/dL    RDW 20 4 (H) 11 6 - 15 1 %    MPV 10 7 8 9 - 12 7 fL    Platelets 504 947 - 616 Thousands/uL    nRBC 0 /100 WBCs    Neutrophils Relative 84 (H) 43 - 75 %    Immat GRANS % 2 0 - 2 %    Lymphocytes Relative 4 (L) 14 - 44 %    Monocytes Relative 8 4 - 12 %    Eosinophils Relative 1 0 - 6 %    Basophils Relative 1 0 - 1 %    Neutrophils Absolute 10 93 (H) 1 85 - 7 62 Thousands/µL    Immature Grans Absolute 0 22 (H) 0 00 - 0 20 Thousand/uL    Lymphocytes Absolute 0 48 (L) 0 60 - 4 47 Thousands/µL    Monocytes Absolute 1 02 0 17 - 1 22 Thousand/µL    Eosinophils Absolute 0 07 0 00 - 0 61 Thousand/µL    Basophils Absolute 0 07 0 00 - 0 10 Thousands/µL   Comprehensive metabolic panel    Collection Time: 12/30/18 11:53 AM   Result Value Ref Range    Sodium 138 136 - 145 mmol/L    Potassium 3 7 3 5 - 5 3 mmol/L    Chloride 102 100 - 108 mmol/L    CO2 26 21 - 32 mmol/L    ANION GAP 10 4 - 13 mmol/L    BUN 17 5 - 25 mg/dL    Creatinine 0 98 0 60 - 1 30 mg/dL    Glucose 81 65 - 140 mg/dL    Calcium 8 8 8 3 - 10 1 mg/dL    AST 49 (H) 5 - 45 U/L    ALT 30 12 - 78 U/L    Alkaline Phosphatase 190 (H) 46 - 116 U/L    Total Protein 5 9 (L) 6 4 - 8 2 g/dL    Albumin 1 8 (L) 3 5 - 5 0 g/dL    Total Bilirubin 1 30 (H) 0 20 - 1 00 mg/dL    eGFR 64 ml/min/1 73sq m   Lactic acid x2    Collection Time: 12/30/18 11:53 AM   Result Value Ref Range    LACTIC ACID 1 2 0 5 - 2 0 mmol/L   Procalcitonin    Collection Time: 12/30/18 11:53 AM   Result Value Ref Range    Procalcitonin 0 52 (H) <=0 25 ng/ml   Protime-INR    Collection Time: 12/30/18 11:53 AM   Result Value Ref Range    Protime 24 5 (H) 11 8 - 14 2 seconds    INR 2 29 (H) 0 86 - 1 17   APTT    Collection Time: 12/30/18 11:53 AM   Result Value Ref Range    PTT 55 (H) 26 - 38 seconds   Blood culture #1    Collection Time: 12/30/18 11:53 AM   Result Value Ref Range    Blood Culture No Growth After 5 Days  Troponin I    Collection Time: 12/30/18 11:53 AM   Result Value Ref Range    Troponin I <0 02 <=0 04 ng/mL   ECG 12 lead    Collection Time: 12/30/18 11:54 AM   Result Value Ref Range    Ventricular Rate 84 BPM    Atrial Rate 84 BPM    SD Interval 184 ms    QRSD Interval 86 ms    QT Interval 340 ms    QTC Interval 401 ms    P Axis 44 degrees    QRS Axis 25 degrees    T Wave Axis 60 degrees   Blood culture #2    Collection Time: 12/30/18 12:16 PM   Result Value Ref Range    Blood Culture No Growth After 5 Days      UA w Reflex to Microscopic w Reflex to Culture    Collection Time: 12/30/18 12:19 PM   Result Value Ref Range    Color, UA Red     Clarity, UA Cloudy     Specific Hodges, UA 1 025 1 003 - 1 030    pH, UA 6 0 4 5 - 8 0    Leukocytes, UA Trace (A) Negative    Nitrite, UA Positive (A) Negative    Protein,  (2+) (A) Negative mg/dl    Glucose, UA Negative Negative mg/dl    Ketones, UA Trace (A) Negative mg/dl    Urobilinogen, UA 4 0 (A) 0 2, 1 0 E U /dl E U /dl    Bilirubin, UA Moderate (A) Negative    Blood, UA Large (A) Negative   Urine Microscopic    Collection Time: 12/30/18 12:19 PM   Result Value Ref Range    RBC, UA Innumerable (A) None Seen, 0-5 /hpf    WBC, UA Field obscured, unable to enumerate (A) None Seen, 0-5, 5-55, 5-65 /hpf    Epithelial Cells Field obscured, unable to enumerate (A) None Seen, Occasional /hpf    Bacteria, UA Field obscured, unable to enumerate (A) None Seen, Occasional /hpf   Urine culture    Collection Time: 12/30/18 12:19 PM   Result Value Ref Range    Urine Culture >100,000 cfu/ml Escherichia coli (A)        Susceptibility    Escherichia coli - ANGELLA     ZID Performed Yes       Amoxicillin + Clavulanate 8/4 Susceptible ug/ml     Ampicillin ($$) >16 00 Resistant ug/ml     Ampicillin + Sulbactam ($) 16/8 Intermediate ug/ml     Aztreonam ($$$)  <4 Susceptible ug/ml     Cefazolin ($) <2 00 Susceptible ug/ml     Ciprofloxacin ($)  <1 00 Susceptible ug/ml     Ertapenem ($$$) <0 5 Susceptible ug/ml     Gentamicin ($$) <1 Susceptible ug/ml     Levofloxacin ($) 0 50 Susceptible ug/ml     Nitrofurantoin <32 Susceptible ug/ml     Tetracycline <4 Susceptible ug/ml     Tobramycin ($) <1 Susceptible ug/ml     Trimethoprim + Sulfamethoxazole ($$$) <2/38 Susceptible ug/ml   Influenza A/B and RSV by PCR    Collection Time: 12/30/18 12:28 PM   Result Value Ref Range    INFLU A PCR None Detected None Detected    INFLU B PCR None Detected None Detected    RSV PCR None Detected None Detected   Bilirubin, direct    Collection Time: 12/30/18  8:43 PM   Result Value Ref Range    Bilirubin, Direct 0 72 (H) 0 00 - 0 20 mg/dL   APTT    Collection Time: 12/30/18  8:43 PM   Result Value Ref Range    PTT 59 (H) 26 - 38 seconds   CBC    Collection Time: 12/30/18  8:43 PM   Result Value Ref Range    WBC 13 13 (H) 4 31 - 10 16 Thousand/uL    RBC 2 71 (L) 3 81 - 5 12 Million/uL    Hemoglobin 9 4 (L) 11 5 - 15 4 g/dL    Hematocrit 29 3 (L) 34 8 - 46 1 %     (H) 82 - 98 fL    MCH 34 7 (H) 26 8 - 34 3 pg    MCHC 32 1 31 4 - 37 4 g/dL    RDW 20 1 (H) 11 6 - 15 1 %    Platelets 170 266 - 497 Thousands/uL    MPV 10 3 8 9 - 12 7 fL   Protime-INR    Collection Time: 12/30/18  8:43 PM   Result Value Ref Range    Protime 24 8 (H) 11 8 - 14 2 seconds    INR 2 32 (H) 0 86 - 1 17   APTT    Collection Time: 12/31/18  3:55 AM   Result Value Ref Range     (HH) 26 - 38 seconds   Haptoglobin    Collection Time: 12/31/18  4:08 AM   Result Value Ref Range    Haptoglobin 305 (H) 34 - 200 mg/dL   CBC and differential    Collection Time: 12/31/18  4:10 AM   Result Value Ref Range    WBC 12 46 (H) 4 31 - 10 16 Thousand/uL    RBC 2 69 (L) 3 81 - 5 12 Million/uL    Hemoglobin 9 3 (L) 11 5 - 15 4 g/dL    Hematocrit 29 5 (L) 34 8 - 46 1 %     (H) 82 - 98 fL    MCH 34 6 (H) 26 8 - 34 3 pg    MCHC 31 5 31 4 - 37 4 g/dL    RDW 20 4 (H) 11 6 - 15 1 %    MPV 10 3 8 9 - 12 7 fL    Platelets 567 785 - 933 Thousands/uL    nRBC 0 /100 WBCs    Neutrophils Relative 86 (H) 43 - 75 %    Immat GRANS % 2 0 - 2 %    Lymphocytes Relative 4 (L) 14 - 44 %    Monocytes Relative 6 4 - 12 %    Eosinophils Relative 1 0 - 6 %    Basophils Relative 1 0 - 1 %    Neutrophils Absolute 10 84 (H) 1 85 - 7 62 Thousands/µL    Immature Grans Absolute 0 22 (H) 0 00 - 0 20 Thousand/uL    Lymphocytes Absolute 0 44 (L) 0 60 - 4 47 Thousands/µL    Monocytes Absolute 0 80 0 17 - 1 22 Thousand/µL    Eosinophils Absolute 0 08 0 00 - 0 61 Thousand/µL    Basophils Absolute 0 08 0 00 - 0 10 Thousands/µL   Comprehensive metabolic panel    Collection Time: 12/31/18  4:10 AM   Result Value Ref Range    Sodium 136 136 - 145 mmol/L    Potassium 3 6 3 5 - 5 3 mmol/L    Chloride 104 100 - 108 mmol/L    CO2 17 (L) 21 - 32 mmol/L    ANION GAP 15 (H) 4 - 13 mmol/L    BUN 14 5 - 25 mg/dL    Creatinine 0 79 0 60 - 1 30 mg/dL    Glucose 73 65 - 140 mg/dL    Calcium 7 9 (L) 8 3 - 10 1 mg/dL    AST 46 (H) 5 - 45 U/L    ALT 22 12 - 78 U/L    Alkaline Phosphatase 152 (H) 46 - 116 U/L    Total Protein 5 3 (L) 6 4 - 8 2 g/dL    Albumin 1 5 (L) 3 5 - 5 0 g/dL    Total Bilirubin 1 00 0 20 - 1 00 mg/dL    eGFR 83 ml/min/1 73sq m   Magnesium    Collection Time: 12/31/18  4:10 AM   Result Value Ref Range    Magnesium 1 7 1 6 - 2 6 mg/dL   Lactate dehydrogenase    Collection Time: 12/31/18  4:10 AM   Result Value Ref Range     81 - 234 U/L   Hemolysis Smear    Collection Time: 12/31/18  4:11 AM   Result Value Ref Range    Hemolysis Smear No Schistocytes or Helmet Cells noted Type and screen    Collection Time: 12/31/18  4:11 AM   Result Value Ref Range    ABO Grouping B     Rh Factor Negative     Antibody Screen Negative     Specimen Expiration Date [id number]    APTT    Collection Time: 12/31/18 12:15 PM   Result Value Ref Range    PTT 94 (H) 26 - 38 seconds   Clostridium difficile toxin by PCR    Collection Time: 12/31/18  6:57 PM   Result Value Ref Range    C difficile toxin by PCR NEGATIVE for C difficle toxin by PCR  NEGATIVE for C difficle toxin by PCR      APTT    Collection Time: 12/31/18  8:07 PM   Result Value Ref Range    PTT 65 (H) 26 - 38 seconds   APTT    Collection Time: 01/01/19  2:01 AM   Result Value Ref Range     (HH) 26 - 38 seconds   Comprehensive metabolic panel    Collection Time: 01/01/19  5:50 AM   Result Value Ref Range    Sodium 138 136 - 145 mmol/L    Potassium 3 0 (L) 3 5 - 5 3 mmol/L    Chloride 107 100 - 108 mmol/L    CO2 20 (L) 21 - 32 mmol/L    ANION GAP 11 4 - 13 mmol/L    BUN 11 5 - 25 mg/dL    Creatinine 0 77 0 60 - 1 30 mg/dL    Glucose 82 65 - 140 mg/dL    Calcium 7 7 (L) 8 3 - 10 1 mg/dL    AST 29 5 - 45 U/L    ALT 17 12 - 78 U/L    Alkaline Phosphatase 124 (H) 46 - 116 U/L    Total Protein 4 8 (L) 6 4 - 8 2 g/dL    Albumin 1 3 (L) 3 5 - 5 0 g/dL    Total Bilirubin 0 80 0 20 - 1 00 mg/dL    eGFR 86 ml/min/1 73sq m   CBC and differential    Collection Time: 01/01/19  5:50 AM   Result Value Ref Range    WBC 10 97 (H) 4 31 - 10 16 Thousand/uL    RBC 2 43 (L) 3 81 - 5 12 Million/uL    Hemoglobin 8 4 (L) 11 5 - 15 4 g/dL    Hematocrit 26 2 (L) 34 8 - 46 1 %     (H) 82 - 98 fL    MCH 34 6 (H) 26 8 - 34 3 pg    MCHC 32 1 31 4 - 37 4 g/dL    RDW 19 9 (H) 11 6 - 15 1 %    MPV 10 2 8 9 - 12 7 fL    Platelets 783 280 - 083 Thousands/uL    nRBC 0 /100 WBCs    Neutrophils Relative 86 (H) 43 - 75 %    Immat GRANS % 2 0 - 2 %    Lymphocytes Relative 3 (L) 14 - 44 %    Monocytes Relative 7 4 - 12 %    Eosinophils Relative 1 0 - 6 %    Basophils Relative 1 0 - 1 %    Neutrophils Absolute 9 56 (H) 1 85 - 7 62 Thousands/µL    Immature Grans Absolute 0 16 0 00 - 0 20 Thousand/uL    Lymphocytes Absolute 0 33 (L) 0 60 - 4 47 Thousands/µL    Monocytes Absolute 0 80 0 17 - 1 22 Thousand/µL    Eosinophils Absolute 0 07 0 00 - 0 61 Thousand/µL    Basophils Absolute 0 05 0 00 - 0 10 Thousands/µL   APTT    Collection Time: 01/01/19  9:46 AM   Result Value Ref Range    PTT 96 (H) 26 - 38 seconds   APTT    Collection Time: 01/01/19  5:04 PM   Result Value Ref Range     (H) 26 - 38 seconds   APTT    Collection Time: 01/02/19 12:24 AM   Result Value Ref Range    PTT 59 (H) 26 - 38 seconds   CBC and differential    Collection Time: 01/02/19  8:13 AM   Result Value Ref Range    WBC 9 97 4 31 - 10 16 Thousand/uL    RBC 2 96 (L) 3 81 - 5 12 Million/uL    Hemoglobin 10 2 (L) 11 5 - 15 4 g/dL    Hematocrit 31 9 (L) 34 8 - 46 1 %     (H) 82 - 98 fL    MCH 34 5 (H) 26 8 - 34 3 pg    MCHC 32 0 31 4 - 37 4 g/dL    RDW 20 1 (H) 11 6 - 15 1 %    MPV 10 2 8 9 - 12 7 fL    Platelets 862 (H) 038 - 390 Thousands/uL    nRBC 0 /100 WBCs    Neutrophils Relative 88 (H) 43 - 75 %    Immat GRANS % 1 0 - 2 %    Lymphocytes Relative 4 (L) 14 - 44 %    Monocytes Relative 5 4 - 12 %    Eosinophils Relative 1 0 - 6 %    Basophils Relative 1 0 - 1 %    Neutrophils Absolute 8 84 (H) 1 85 - 7 62 Thousands/µL    Immature Grans Absolute 0 12 0 00 - 0 20 Thousand/uL    Lymphocytes Absolute 0 38 (L) 0 60 - 4 47 Thousands/µL    Monocytes Absolute 0 51 0 17 - 1 22 Thousand/µL    Eosinophils Absolute 0 06 0 00 - 0 61 Thousand/µL    Basophils Absolute 0 06 0 00 - 0 10 Thousands/µL   Comprehensive metabolic panel    Collection Time: 01/02/19  8:13 AM   Result Value Ref Range    Sodium 137 136 - 145 mmol/L    Potassium 3 7 3 5 - 5 3 mmol/L    Chloride 107 100 - 108 mmol/L    CO2 20 (L) 21 - 32 mmol/L    ANION GAP 10 4 - 13 mmol/L    BUN 8 5 - 25 mg/dL    Creatinine 0 80 0 60 - 1 30 mg/dL Glucose 90 65 - 140 mg/dL    Calcium 8 1 (L) 8 3 - 10 1 mg/dL    AST 33 5 - 45 U/L    ALT 20 12 - 78 U/L    Alkaline Phosphatase 140 (H) 46 - 116 U/L    Total Protein 5 7 (L) 6 4 - 8 2 g/dL    Albumin 1 6 (L) 3 5 - 5 0 g/dL    Total Bilirubin 0 80 0 20 - 1 00 mg/dL    eGFR 82 ml/min/1 73sq m   APTT    Collection Time: 01/02/19  8:13 AM   Result Value Ref Range    PTT 74 (H) 26 - 38 seconds   APTT    Collection Time: 01/02/19 12:01 PM   Result Value Ref Range    PTT 77 (H) 26 - 38 seconds   CBC    Collection Time: 01/02/19 12:01 PM   Result Value Ref Range    WBC 11 10 (H) 4 31 - 10 16 Thousand/uL    RBC 2 62 (L) 3 81 - 5 12 Million/uL    Hemoglobin 9 0 (L) 11 5 - 15 4 g/dL    Hematocrit 28 3 (L) 34 8 - 46 1 %     (H) 82 - 98 fL    MCH 34 4 (H) 26 8 - 34 3 pg    MCHC 31 8 31 4 - 37 4 g/dL    RDW 20 3 (H) 11 6 - 15 1 %    Platelets 299 490 - 275 Thousands/uL    MPV 10 2 8 9 - 12 7 fL   Protime-INR    Collection Time: 01/02/19 12:01 PM   Result Value Ref Range    Protime 22 6 (H) 11 8 - 14 2 seconds    INR 2 06 (H) 0 86 - 1 17   APTT    Collection Time: 01/03/19  5:59 AM   Result Value Ref Range    PTT 63 (H) 26 - 38 seconds   APTT    Collection Time: 01/04/19  5:25 AM   Result Value Ref Range    PTT 88 (H) 26 - 38 seconds   CBC and differential    Collection Time: 01/04/19  5:25 AM   Result Value Ref Range    WBC 8 15 4 31 - 10 16 Thousand/uL    RBC 2 27 (L) 3 81 - 5 12 Million/uL    Hemoglobin 7 7 (L) 11 5 - 15 4 g/dL    Hematocrit 24 4 (L) 34 8 - 46 1 %     (H) 82 - 98 fL    MCH 33 9 26 8 - 34 3 pg    MCHC 31 6 31 4 - 37 4 g/dL    RDW 20 3 (H) 11 6 - 15 1 %    MPV 10 3 8 9 - 12 7 fL    Platelets 219 531 - 367 Thousands/uL    nRBC 0 /100 WBCs    Neutrophils Relative 82 (H) 43 - 75 %    Immat GRANS % 1 0 - 2 %    Lymphocytes Relative 6 (L) 14 - 44 %    Monocytes Relative 8 4 - 12 %    Eosinophils Relative 2 0 - 6 %    Basophils Relative 1 0 - 1 %    Neutrophils Absolute 6 72 1 85 - 7 62 Thousands/µL Immature Grans Absolute 0 07 0 00 - 0 20 Thousand/uL    Lymphocytes Absolute 0 52 (L) 0 60 - 4 47 Thousands/µL    Monocytes Absolute 0 62 0 17 - 1 22 Thousand/µL    Eosinophils Absolute 0 17 0 00 - 0 61 Thousand/µL    Basophils Absolute 0 05 0 00 - 0 10 Thousands/µL   Basic metabolic panel    Collection Time: 01/04/19  5:25 AM   Result Value Ref Range    Sodium 141 136 - 145 mmol/L    Potassium 3 2 (L) 3 5 - 5 3 mmol/L    Chloride 110 (H) 100 - 108 mmol/L    CO2 20 (L) 21 - 32 mmol/L    ANION GAP 11 4 - 13 mmol/L    BUN 4 (L) 5 - 25 mg/dL    Creatinine 0 65 0 60 - 1 30 mg/dL    Glucose 87 65 - 140 mg/dL    Calcium 7 7 (L) 8 3 - 10 1 mg/dL    eGFR 99 ml/min/1 73sq m   Protime-INR    Collection Time: 01/04/19  9:56 AM   Result Value Ref Range    Protime 17 6 (H) 11 8 - 14 2 seconds    INR 1 49 (H) 0 86 - 1 17   Non-gynecologic cytology    Collection Time: 01/04/19  5:41 PM   Result Value Ref Range    Case Report       Non-gynecologic Cytology                          Case: [id number]                                  Authorizing Provider:  Baldo Foote MD          Collected:           01/04/2019 1744              Ordering Location:     [facility]        Received:            01/04/2019 [address] [facility] [address] Room                                                      Pathologist:           Dwaine Phoenix MD                                                        Specimen:    Brushing, common bile duct                                                                 Final Diagnosis       A  Brushing, common bile duct, :  Negative (PSC Category II) - See note  Groups of benign glandular cells  Satisfactory for evaluation  Note:  The above diagnostic category is part of the six-tiered system proposed by The [employer] Society of Cytopathology for the reporting of pancreaticobiliary specimens   This proposed scheme provides terminology that correlates the cytologic diagnostic nomenclature with the 2010 WHO classification of pancreatic tumors and standardizes the categorization of the various diseases of the pancreas, some of which are difficult to diagnose specifically by cytology  *The [employer] Society of Cytopathology System for Reporting Pancreaticobiliary Cytology: Definitions, Criteria and Explanatory Notes  Cierra Chahal; [place], 1466  Gross Description       A  Brushing, common bile duct, : 20ml, slightly bloody, cloudy, received in [employer]        Additional Information       twiDAQ's FDA approved ,  and ThinPrep Imaging Duo System are utilized with strict adherence to the 's instruction manual to prepare gynecologic and non-gynecologic cytology specimens for the production of ThinPrep slides as well as for gynecologic ThinPrep imaging  These processes have been validated by our laboratory and/or by the   These tests were developed and their performance characteristics determined by [facility] or [facility]  They may not be cleared or approved by the [place]  Food and Drug Administration  The FDA has determined that such clearance or approval is not necessary  These tests are used for clinical purposes  They should not be regarded as investigational or for research  This laboratory has been approved by CLIA 88, designated as a high-complexity laboratory and is qualified to perform these tests      - Interpretation performed at [facility], [address] [address]          CBC and differential    Collection Time: 01/05/19  5:51 AM   Result Value Ref Range    WBC 6 51 4 31 - 10 16 Thousand/uL    RBC 2 53 (L) 3 81 - 5 12 Million/uL    Hemoglobin 8 6 (L) 11 5 - 15 4 g/dL    Hematocrit 27 9 (L) 34 8 - 46 1 %     (H) 82 - 98 fL    MCH 34 0 26 8 - 34 3 pg    MCHC 30 8 (L) 31 4 - 37 4 g/dL    RDW 20 5 (H) 11 6 - 15 1 %    MPV 10 1 8 9 - 12 7 fL Platelets 500 321 - 677 Thousands/uL    nRBC 0 /100 WBCs    Neutrophils Relative 92 (H) 43 - 75 %    Immat GRANS % 1 0 - 2 %    Lymphocytes Relative 5 (L) 14 - 44 %    Monocytes Relative 2 (L) 4 - 12 %    Eosinophils Relative 0 0 - 6 %    Basophils Relative 0 0 - 1 %    Neutrophils Absolute 6 00 1 85 - 7 62 Thousands/µL    Immature Grans Absolute 0 05 0 00 - 0 20 Thousand/uL    Lymphocytes Absolute 0 34 (L) 0 60 - 4 47 Thousands/µL    Monocytes Absolute 0 11 (L) 0 17 - 1 22 Thousand/µL    Eosinophils Absolute 0 00 0 00 - 0 61 Thousand/µL    Basophils Absolute 0 01 0 00 - 0 10 Thousands/µL   Basic metabolic panel    Collection Time: 01/05/19  5:51 AM   Result Value Ref Range    Sodium 140 136 - 145 mmol/L    Potassium 4 3 3 5 - 5 3 mmol/L    Chloride 109 (H) 100 - 108 mmol/L    CO2 18 (L) 21 - 32 mmol/L    ANION GAP 13 4 - 13 mmol/L    BUN 5 5 - 25 mg/dL    Creatinine 0 70 0 60 - 1 30 mg/dL    Glucose 111 65 - 140 mg/dL    Calcium 8 2 (L) 8 3 - 10 1 mg/dL    eGFR 96 ml/min/1 73sq m   Basic metabolic panel    Collection Time: 01/06/19 12:00 AM   Result Value Ref Range    Sodium 141 136 - 145 mmol/L    Potassium 3 5 3 5 - 5 3 mmol/L    Chloride 110 (H) 100 - 108 mmol/L    CO2 21 21 - 32 mmol/L    ANION GAP 10 4 - 13 mmol/L    BUN 5 5 - 25 mg/dL    Creatinine 0 68 0 60 - 1 30 mg/dL    Glucose 89 65 - 140 mg/dL    Calcium 8 4 8 3 - 10 1 mg/dL    eGFR 97 ml/min/1 73sq m   CBC and differential    Collection Time: 01/06/19  4:18 AM   Result Value Ref Range    WBC 6 85 4 31 - 10 16 Thousand/uL    RBC 2 64 (L) 3 81 - 5 12 Million/uL    Hemoglobin 9 0 (L) 11 5 - 15 4 g/dL    Hematocrit 28 5 (L) 34 8 - 46 1 %     (H) 82 - 98 fL    MCH 34 1 26 8 - 34 3 pg    MCHC 31 6 31 4 - 37 4 g/dL    RDW 20 1 (H) 11 6 - 15 1 %    MPV 10 1 8 9 - 12 7 fL    Platelets 783 (H) 886 - 390 Thousands/uL    nRBC 0 /100 WBCs    Neutrophils Relative 81 (H) 43 - 75 %    Immat GRANS % 1 0 - 2 %    Lymphocytes Relative 9 (L) 14 - 44 %    Monocytes Relative 7 4 - 12 %    Eosinophils Relative 2 0 - 6 %    Basophils Relative 0 0 - 1 %    Neutrophils Absolute 5 52 1 85 - 7 62 Thousands/µL    Immature Grans Absolute 0 05 0 00 - 0 20 Thousand/uL    Lymphocytes Absolute 0 62 0 60 - 4 47 Thousands/µL    Monocytes Absolute 0 51 0 17 - 1 22 Thousand/µL    Eosinophils Absolute 0 12 0 00 - 0 61 Thousand/µL    Basophils Absolute 0 03 0 00 - 0 10 Thousands/µL   POCT urine dip    Collection Time: 01/09/19  1:15 PM   Result Value Ref Range    LEUKOCYTE ESTERASE,UA -     NITRITE,UA -     SL AMB POCT UROBILINOGEN -     POCT URINE PROTEIN trace      PH,UA 6 5     BLOOD,UA 3     SPECIFIC GRAVITY,UA 1 010     KETONES,UA -     BILIRUBIN,UA -     GLUCOSE, UA -      COLOR,UA yellow     CLARITY,UA clear        Imaging Studies:Xr Chest Pa & Lateral    Result Date: 12/31/2018  Narrative: CHEST INDICATION:   fever  COMPARISON:  Chest x-ray 9/21/2011 EXAM PERFORMED/VIEWS:  XR CHEST PA & LATERAL FINDINGS:  Right-sided chest wall port is present with its tip in the cavoatrial region  Multiple surgical clips overlie the left axilla  Cardiomediastinal silhouette appears unremarkable  There is increased retrocardiac opacity which could represent a developing infiltrate in the appropriate clinical setting  No pneumothorax or significant pleural effusion  Osseous structures appear within normal limits for patient age  Impression: Increased retrocardiac opacity could represent a developing infiltrate in the appropriate clinical setting  The study was marked in [facility] for immediate notification  Workstation performed: [id number]     Ct Head Without Contrast    Result Date: 12/30/2018  Narrative: CT BRAIN - WITHOUT CONTRAST INDICATION:   Fall, patient takes eliquis  COMPARISON:  Images from PET/CT 4/16/2018 TECHNIQUE:  CT examination of the brain was performed  In addition to axial images, coronal 2D reformatted images were created and submitted for interpretation    Radiation dose length product (DLP) for this visit:  966 mGy-cm   This examination, like all CT scans performed in the [facility], was performed utilizing techniques to minimize radiation dose exposure, including the use of iterative reconstruction and automated exposure control  IMAGE QUALITY:  Diagnostic  FINDINGS: PARENCHYMA:  No intracranial mass, mass effect or midline shift  No CT signs of acute infarction  No acute parenchymal hemorrhage  Probable bilateral basal ganglia calcifications although not heavily calcified on the left  Age-related cortical atrophy  Periventricular white matter hypodensity which is nonspecific although most compatible with chronic small vessel ischemic disease  VENTRICLES AND EXTRA-AXIAL SPACES:  Normal for the patient's age  VISUALIZED ORBITS AND PARANASAL SINUSES:  Unremarkable  CALVARIUM AND EXTRACRANIAL SOFT TISSUES:  Normal      Impression: No acute intracranial abnormality  Workstation performed: [id number]     Us Liver    Result Date: 12/31/2018  Narrative: RIGHT UPPER QUADRANT ULTRASOUND INDICATION: Jaundice  COMPARISON: CT 12/17/2018  TECHNIQUE:   Real-time ultrasound of the right upper quadrant was performed with a curvilinear transducer with both volumetric sweeps and still imaging techniques  FINDINGS: PANCREAS:  Visualized portions show no abnormality  AORTA AND IVC:  Visualized portions are normal for patient age  LIVER: Normal size  Echogenicity and contour are normal   Subcentimeter hepatic cyst is present  No evidence of mass  Normal directional flow is present within the portal vein  BILIARY: The gallbladder is normal in caliber  No wall thickening or pericholecystic fluid  No stones or sludge  No sonographic Cross's sign  No intrahepatic biliary dilatation  Common bile duct is dilated measuring 16 mm proximally and tapering to 9 mm distally  Suggestion of sludge versus choledocholithiasis  KIDNEY: Right kidney measures 19 8 x 11 9 cm   Chronic severe right-sided hydronephrosis is again seen with advanced cortical thinning    ASCITES:   None  Impression: Dilated common bile duct with suggestion of sludge or choledocholithiasis  Consider ERCP  Chronic right-sided hydronephrosis again identified with advanced cortical thinning  Workstation performed: [id number]     Mri Abdomen Wo Contrast And Mrcp    Result Date: 1/1/2019  Narrative: MRI OF THE ABDOMEN WITHOUT CONTRAST WITH MRCP INDICATION: Dilated CBD with sludge or choledocholithiasis on recent ultrasound  Elevated LFTs  History of breast cancer  COMPARISON: CT abdomen pelvis 12/17/2018, ultrasound 12/31/2018 TECHNIQUE:  The following pulse sequences were obtained on a 1 5 T scanner: 3D MRCP with radial thick slabs and projections  Coronal and axial T2 with TE of 90 and 180 respectively, axial T1-weighted in-and-out-of phase, axial DWI/ADC, axial T2 with fat  saturation, axial FIESTA fat-sat, and axial T1 with fat saturation  3D rendering was performed from the acquisition scanner  Evaluation of the solid abdominal viscera is limited without intravenous contrast  FINDINGS: The ADC map is degraded by artifact  BILIARY/PANCREATIC DUCTS:  No intra-hepatic biliary ductal dilatation  Common bile duct is dilated measuring up to 15 mm, similar to the recent ultrasound  There is fairly abrupt transition of the distal CBD slightly above the level of the expected level of the ampulla  The possibility of a distal stricture cannot be excluded  No discernible choledocholithiasis  No mass identified  The pancreatic duct is not dilated  There is pancreas divisum noted  LIVER:  There is decreased T2 signal seen diffusely throughout the liver with overall decreased signal on the T1 in phase sequence  Findings suggest iron overload  There is a subcentimeter T2 hyperintensity within the lateral segment left lobe    Although this cannot be definitively characterized without IV contrast, statistically is most likely to represent a small cyst or hemangioma  This is not significantly changed from the previous CT study in April 2018  GALLBLADDER:  Normal  PANCREAS:  There is mild atrophy seen  The pancreas demonstrates normal signal on noncontrast imaging  There is a 1 cm hypoattenuating focus seen along the cephalad aspect of the pancreatic body in retrospect series 601 image is 57-59 of the most recent CT in retrospect  This is intimately associated with the splenic artery and has a incomplete rim of calcification, 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  This is not well depicted on the current MRI study  I would  recommend follow-up CTA of the abdomen in 3 months to ensure stability and exclude the less likely possibility of a subtle pancreatic lesion  ADRENAL GLANDS:  Normal  SPLEEN: Normal  KIDNEYS:  There is severe right hydroureteronephrosis again identified with marked cortical thinning, unchanged from previous studies  Although not optimally visualized on this study, abrupt narrowing of the distal right ureter seen on previous CT study  is not well depicted on this exam   The presence of a distal ureteral stricture cannot be excluded  The left kidney is unremarkable  ABDOMINAL CAVITY:  No lymphadenopathy or mass  No ascites  A mildly thick-walled urinary bladder is again noted  BOWEL:  Unremarkable MRI appearance  OSSEOUS STRUCTURES:  No osseous destruction  Degenerative disease seen at L5-S1  VASCULAR STRUCTURES:  No aneurysm  ABDOMINAL WALL:  Normal  LOWER CHEST:  There are small bilateral pleural effusions with mild bibasilar compressive atelectasis  There is a large hiatal hernia seen  Impression: 1  Dilated CBD up to 15 mm with abrupt transitioning distal CBD proximal to the ampulla for which the possibility of stricture cannot be excluded  Correlation with ERCP with brush biopsy is recommended  No discernible choledocholithiasis  2   Evidence of pancreatic divisum    No MRI evidence of pancreatitis  3   Abnormal hepatic signal suggestive of iron overload  Correlate with serologic testing  4   Severe right hydroureteronephrosis again identified  Presence of distal right ureteral stricture cannot be excluded  5  1 cm hypoattenuating focus seen along the superior posterior margin of the pancreatic body on previous CT imaging in retrospect, 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  Follow-up CTA abdomen in 3 months recommended to exclude  less likely possibility of a subtle pancreatic lesion  Limited assessment of the pancreas without contrast  6   Small bilateral pleural effusions with bibasilar compressive atelectasis and large hiatal hernia  I personally discussed this study and recommendations with Dr Domingo Sparks from GI on 1/1/2019 at 6:12 AM  Workstation performed: [id number]     Fl Ercp Biliary Only    Result Date: 1/5/2019  Narrative: ERCP INDICATION:  Hyperbilirubinemia  Dilated common bile duct  COMPARISON:  CT abdomen pelvis December 17, 2018 hepatic ultrasound December 31, 2018 and MRI/MRCP December 31, 2018  IMAGES:  8 FLUOROSCOPY TIME:   2 09 MINUTES CONTRAST: 13 mL of iohexol (OMNIPAQUE) FINDINGS: Fluoroscopic guidance was provided for performance of ERCP  BILIARY: The common bile duct is dilated  No filling defects are seen  Delayed images demonstrate a stent in the common bile duct  Impression: Fluoroscopic guidance for ERCP  Please see procedure report for full details  Workstation performed: [id number]           Assessment/Plan:  Orders Placed This Encounter   Procedures    Radiation Simulation Treatment        Dayron Zheng is a 63y o  year old female with Stage IIA (cT2, cN1(f), cM0, G2, ER: Positive, UT: Positive, HER2: Negative) status post left mastectomy and axillary dissection  She has not undergone reconstruction and has not met with Plastic surgery although she believes in the future she may want to proceed with reconstruction    She has now completed systemic therapy and today will undergo CT simulation for postmastectomy radiation therapy  The risks and toxicities of postmastectomy radiation were again discussed with the patient in detail, including, but not limited to, fatigue, erythema, hyperpigmentation, desquamation, lymphedema, rib fracture, pneumonitis, cardiac toxicity, and secondary malignancy  The treatment will begin in approximately 1 week  Xochitl Salas MD  1/23/2019,8:32 AM    Portions of the record may have been created with voice recognition software   Occasional wrong word or "sound a like" substitutions may have occurred due to the inherent limitations of voice recognition software   Read the chart carefully and recognize, using context, where substitutions have occurred

## 2019-01-25 PROCEDURE — 77295 3-D RADIOTHERAPY PLAN: CPT | Performed by: RADIOLOGY

## 2019-01-25 PROCEDURE — 77334 RADIATION TREATMENT AID(S): CPT | Performed by: RADIOLOGY

## 2019-01-25 PROCEDURE — 77300 RADIATION THERAPY DOSE PLAN: CPT | Performed by: RADIOLOGY

## 2019-01-28 PROBLEM — D50.0 BLOOD LOSS ANEMIA: Status: ACTIVE | Noted: 2019-01-28

## 2019-01-28 PROBLEM — R31.0 GROSS HEMATURIA: Status: ACTIVE | Noted: 2019-01-28

## 2019-01-28 PROBLEM — N32.9 LESION OF BLADDER: Status: ACTIVE | Noted: 2019-01-28

## 2019-01-28 NOTE — TELEPHONE ENCOUNTER
Pt called and left a message stating she needs to schedule surgery  I called the pt back and reached her voicemail  I left her a message asking for her to call back to schedule

## 2019-01-29 ENCOUNTER — APPOINTMENT (OUTPATIENT)
Dept: RADIATION ONCOLOGY | Facility: HOSPITAL | Age: 59
End: 2019-01-29
Attending: RADIOLOGY
Payer: COMMERCIAL

## 2019-01-29 PROCEDURE — 77280 THER RAD SIMULAJ FIELD SMPL: CPT | Performed by: RADIOLOGY

## 2019-01-30 ENCOUNTER — APPOINTMENT (OUTPATIENT)
Dept: RADIATION ONCOLOGY | Facility: HOSPITAL | Age: 59
End: 2019-01-30
Attending: RADIOLOGY
Payer: COMMERCIAL

## 2019-01-30 PROCEDURE — 77387 GUIDANCE FOR RADJ TX DLVR: CPT | Performed by: RADIOLOGY

## 2019-01-30 PROCEDURE — 77412 RADIATION TX DELIVERY LVL 3: CPT | Performed by: RADIOLOGY

## 2019-01-31 ENCOUNTER — APPOINTMENT (OUTPATIENT)
Dept: RADIATION ONCOLOGY | Facility: HOSPITAL | Age: 59
End: 2019-01-31
Attending: RADIOLOGY
Payer: COMMERCIAL

## 2019-01-31 PROCEDURE — 77387 GUIDANCE FOR RADJ TX DLVR: CPT | Performed by: RADIOLOGY

## 2019-01-31 PROCEDURE — 77412 RADIATION TX DELIVERY LVL 3: CPT | Performed by: RADIOLOGY

## 2019-02-01 ENCOUNTER — APPOINTMENT (OUTPATIENT)
Dept: RADIATION ONCOLOGY | Facility: HOSPITAL | Age: 59
End: 2019-02-01
Attending: RADIOLOGY
Payer: COMMERCIAL

## 2019-02-01 PROCEDURE — 77331 SPECIAL RADIATION DOSIMETRY: CPT | Performed by: RADIOLOGY

## 2019-02-01 PROCEDURE — 77412 RADIATION TX DELIVERY LVL 3: CPT | Performed by: RADIOLOGY

## 2019-02-01 PROCEDURE — 77387 GUIDANCE FOR RADJ TX DLVR: CPT | Performed by: RADIOLOGY

## 2019-02-04 ENCOUNTER — APPOINTMENT (OUTPATIENT)
Dept: RADIATION ONCOLOGY | Facility: HOSPITAL | Age: 59
End: 2019-02-04
Attending: RADIOLOGY
Payer: COMMERCIAL

## 2019-02-04 DIAGNOSIS — C50.812 MALIGNANT NEOPLASM OF OVERLAPPING SITES OF LEFT BREAST IN FEMALE, ESTROGEN RECEPTOR POSITIVE (HCC): Primary | ICD-10-CM

## 2019-02-04 DIAGNOSIS — Z17.0 MALIGNANT NEOPLASM OF OVERLAPPING SITES OF LEFT BREAST IN FEMALE, ESTROGEN RECEPTOR POSITIVE (HCC): Primary | ICD-10-CM

## 2019-02-04 PROCEDURE — 77412 RADIATION TX DELIVERY LVL 3: CPT | Performed by: RADIOLOGY

## 2019-02-04 PROCEDURE — 77387 GUIDANCE FOR RADJ TX DLVR: CPT | Performed by: RADIOLOGY

## 2019-02-05 ENCOUNTER — APPOINTMENT (OUTPATIENT)
Dept: RADIATION ONCOLOGY | Facility: HOSPITAL | Age: 59
End: 2019-02-05
Attending: RADIOLOGY
Payer: COMMERCIAL

## 2019-02-05 PROCEDURE — 77387 GUIDANCE FOR RADJ TX DLVR: CPT | Performed by: RADIOLOGY

## 2019-02-05 PROCEDURE — 77336 RADIATION PHYSICS CONSULT: CPT | Performed by: RADIOLOGY

## 2019-02-05 PROCEDURE — 77412 RADIATION TX DELIVERY LVL 3: CPT | Performed by: RADIOLOGY

## 2019-02-06 ENCOUNTER — APPOINTMENT (OUTPATIENT)
Dept: RADIATION ONCOLOGY | Facility: HOSPITAL | Age: 59
End: 2019-02-06
Attending: RADIOLOGY
Payer: COMMERCIAL

## 2019-02-06 ENCOUNTER — TRANSCRIBE ORDERS (OUTPATIENT)
Dept: LAB | Facility: CLINIC | Age: 59
End: 2019-02-06

## 2019-02-06 ENCOUNTER — APPOINTMENT (OUTPATIENT)
Dept: LAB | Facility: CLINIC | Age: 59
End: 2019-02-06
Payer: COMMERCIAL

## 2019-02-06 DIAGNOSIS — D50.0 BLOOD LOSS ANEMIA: ICD-10-CM

## 2019-02-06 DIAGNOSIS — N32.9 LESION OF BLADDER: ICD-10-CM

## 2019-02-06 DIAGNOSIS — R31.0 GROSS HEMATURIA: ICD-10-CM

## 2019-02-06 LAB
ALBUMIN SERPL BCP-MCNC: 2.7 G/DL (ref 3.5–5)
ALP SERPL-CCNC: 84 U/L (ref 46–116)
ALT SERPL W P-5'-P-CCNC: 22 U/L (ref 12–78)
ANION GAP SERPL CALCULATED.3IONS-SCNC: 9 MMOL/L (ref 4–13)
APTT PPP: 34 SECONDS (ref 26–38)
AST SERPL W P-5'-P-CCNC: 26 U/L (ref 5–45)
BILIRUB SERPL-MCNC: 0.3 MG/DL (ref 0.2–1)
BUN SERPL-MCNC: 9 MG/DL (ref 5–25)
CALCIUM SERPL-MCNC: 9 MG/DL (ref 8.3–10.1)
CHLORIDE SERPL-SCNC: 107 MMOL/L (ref 100–108)
CO2 SERPL-SCNC: 27 MMOL/L (ref 21–32)
CREAT SERPL-MCNC: 0.84 MG/DL (ref 0.6–1.3)
GFR SERPL CREATININE-BSD FRML MDRD: 77 ML/MIN/1.73SQ M
GLUCOSE SERPL-MCNC: 83 MG/DL (ref 65–140)
INR PPP: 1.28 (ref 0.86–1.17)
POTASSIUM SERPL-SCNC: 4.5 MMOL/L (ref 3.5–5.3)
PROT SERPL-MCNC: 6.7 G/DL (ref 6.4–8.2)
PROTHROMBIN TIME: 15.6 SECONDS (ref 11.8–14.2)
SODIUM SERPL-SCNC: 143 MMOL/L (ref 136–145)

## 2019-02-06 PROCEDURE — 77387 GUIDANCE FOR RADJ TX DLVR: CPT | Performed by: RADIOLOGY

## 2019-02-06 PROCEDURE — 87086 URINE CULTURE/COLONY COUNT: CPT

## 2019-02-06 PROCEDURE — 77412 RADIATION TX DELIVERY LVL 3: CPT | Performed by: RADIOLOGY

## 2019-02-06 PROCEDURE — 36415 COLL VENOUS BLD VENIPUNCTURE: CPT

## 2019-02-06 PROCEDURE — 85730 THROMBOPLASTIN TIME PARTIAL: CPT

## 2019-02-06 PROCEDURE — 85610 PROTHROMBIN TIME: CPT

## 2019-02-06 PROCEDURE — 80053 COMPREHEN METABOLIC PANEL: CPT | Performed by: INTERNAL MEDICINE

## 2019-02-07 ENCOUNTER — APPOINTMENT (OUTPATIENT)
Dept: RADIATION ONCOLOGY | Facility: HOSPITAL | Age: 59
End: 2019-02-07
Attending: RADIOLOGY
Payer: COMMERCIAL

## 2019-02-07 PROBLEM — K83.8 DILATED CBD, ACQUIRED: Status: ACTIVE | Noted: 2019-02-07

## 2019-02-07 LAB — BACTERIA UR CULT: NORMAL

## 2019-02-07 PROCEDURE — 77387 GUIDANCE FOR RADJ TX DLVR: CPT | Performed by: RADIOLOGY

## 2019-02-07 PROCEDURE — 77412 RADIATION TX DELIVERY LVL 3: CPT | Performed by: RADIOLOGY

## 2019-02-08 ENCOUNTER — APPOINTMENT (OUTPATIENT)
Dept: RADIATION ONCOLOGY | Facility: HOSPITAL | Age: 59
End: 2019-02-08
Attending: RADIOLOGY
Payer: COMMERCIAL

## 2019-02-08 PROCEDURE — 77387 GUIDANCE FOR RADJ TX DLVR: CPT | Performed by: RADIOLOGY

## 2019-02-08 PROCEDURE — 77412 RADIATION TX DELIVERY LVL 3: CPT | Performed by: RADIOLOGY

## 2019-02-11 ENCOUNTER — APPOINTMENT (OUTPATIENT)
Dept: RADIATION ONCOLOGY | Facility: HOSPITAL | Age: 59
End: 2019-02-11
Attending: RADIOLOGY
Payer: COMMERCIAL

## 2019-02-11 PROCEDURE — 77412 RADIATION TX DELIVERY LVL 3: CPT | Performed by: RADIOLOGY

## 2019-02-11 PROCEDURE — 77387 GUIDANCE FOR RADJ TX DLVR: CPT | Performed by: RADIOLOGY

## 2019-02-12 ENCOUNTER — APPOINTMENT (OUTPATIENT)
Dept: RADIATION ONCOLOGY | Facility: HOSPITAL | Age: 59
End: 2019-02-12
Attending: RADIOLOGY
Payer: COMMERCIAL

## 2019-02-12 PROCEDURE — 77387 GUIDANCE FOR RADJ TX DLVR: CPT | Performed by: RADIOLOGY

## 2019-02-12 PROCEDURE — 77336 RADIATION PHYSICS CONSULT: CPT | Performed by: RADIOLOGY

## 2019-02-12 PROCEDURE — 77412 RADIATION TX DELIVERY LVL 3: CPT | Performed by: RADIOLOGY

## 2019-02-13 ENCOUNTER — APPOINTMENT (OUTPATIENT)
Dept: RADIATION ONCOLOGY | Facility: HOSPITAL | Age: 59
End: 2019-02-13
Attending: RADIOLOGY
Payer: COMMERCIAL

## 2019-02-13 PROCEDURE — 77412 RADIATION TX DELIVERY LVL 3: CPT | Performed by: RADIOLOGY

## 2019-02-13 PROCEDURE — 77387 GUIDANCE FOR RADJ TX DLVR: CPT | Performed by: RADIOLOGY

## 2019-02-14 ENCOUNTER — APPOINTMENT (OUTPATIENT)
Dept: RADIATION ONCOLOGY | Facility: HOSPITAL | Age: 59
End: 2019-02-14
Attending: RADIOLOGY
Payer: COMMERCIAL

## 2019-02-14 PROCEDURE — 77387 GUIDANCE FOR RADJ TX DLVR: CPT | Performed by: RADIOLOGY

## 2019-02-14 PROCEDURE — 77412 RADIATION TX DELIVERY LVL 3: CPT | Performed by: RADIOLOGY

## 2019-02-15 ENCOUNTER — APPOINTMENT (OUTPATIENT)
Dept: RADIATION ONCOLOGY | Facility: HOSPITAL | Age: 59
End: 2019-02-15
Attending: RADIOLOGY
Payer: COMMERCIAL

## 2019-02-15 PROCEDURE — 77387 GUIDANCE FOR RADJ TX DLVR: CPT | Performed by: RADIOLOGY

## 2019-02-15 PROCEDURE — 77412 RADIATION TX DELIVERY LVL 3: CPT | Performed by: RADIOLOGY

## 2019-02-18 ENCOUNTER — APPOINTMENT (OUTPATIENT)
Dept: RADIATION ONCOLOGY | Facility: HOSPITAL | Age: 59
End: 2019-02-18
Attending: RADIOLOGY
Payer: COMMERCIAL

## 2019-02-18 PROCEDURE — 77387 GUIDANCE FOR RADJ TX DLVR: CPT | Performed by: RADIOLOGY

## 2019-02-18 PROCEDURE — 77412 RADIATION TX DELIVERY LVL 3: CPT | Performed by: RADIOLOGY

## 2019-02-19 ENCOUNTER — APPOINTMENT (OUTPATIENT)
Dept: RADIATION ONCOLOGY | Facility: HOSPITAL | Age: 59
End: 2019-02-19
Attending: RADIOLOGY
Payer: COMMERCIAL

## 2019-02-19 PROCEDURE — 77412 RADIATION TX DELIVERY LVL 3: CPT | Performed by: RADIOLOGY

## 2019-02-19 PROCEDURE — 77336 RADIATION PHYSICS CONSULT: CPT | Performed by: RADIOLOGY

## 2019-02-19 PROCEDURE — 77387 GUIDANCE FOR RADJ TX DLVR: CPT | Performed by: RADIOLOGY

## 2019-02-20 ENCOUNTER — APPOINTMENT (OUTPATIENT)
Dept: RADIATION ONCOLOGY | Facility: HOSPITAL | Age: 59
End: 2019-02-20
Attending: RADIOLOGY
Payer: COMMERCIAL

## 2019-02-20 ENCOUNTER — ANESTHESIA EVENT (OUTPATIENT)
Dept: PERIOP | Facility: AMBULARY SURGERY CENTER | Age: 59
End: 2019-02-20
Payer: COMMERCIAL

## 2019-02-20 PROCEDURE — 77412 RADIATION TX DELIVERY LVL 3: CPT | Performed by: RADIOLOGY

## 2019-02-20 PROCEDURE — 77387 GUIDANCE FOR RADJ TX DLVR: CPT | Performed by: RADIOLOGY

## 2019-02-21 ENCOUNTER — APPOINTMENT (OUTPATIENT)
Dept: RADIATION ONCOLOGY | Facility: HOSPITAL | Age: 59
End: 2019-02-21
Attending: RADIOLOGY
Payer: COMMERCIAL

## 2019-02-21 PROCEDURE — 77387 GUIDANCE FOR RADJ TX DLVR: CPT | Performed by: RADIOLOGY

## 2019-02-21 PROCEDURE — 77412 RADIATION TX DELIVERY LVL 3: CPT | Performed by: RADIOLOGY

## 2019-02-22 ENCOUNTER — APPOINTMENT (OUTPATIENT)
Dept: RADIATION ONCOLOGY | Facility: HOSPITAL | Age: 59
End: 2019-02-22
Attending: RADIOLOGY
Payer: COMMERCIAL

## 2019-02-22 PROCEDURE — 77412 RADIATION TX DELIVERY LVL 3: CPT | Performed by: RADIOLOGY

## 2019-02-22 PROCEDURE — 77387 GUIDANCE FOR RADJ TX DLVR: CPT | Performed by: RADIOLOGY

## 2019-02-22 NOTE — PRE-PROCEDURE INSTRUCTIONS
Pre-Surgery Instructions:   Medication Instructions    ELIQUIS 5 MG Patient was instructed by Physician and understands   LORazepam (ATIVAN) 1 mg tablet Instructed patient per Anesthesia Guidelines   sertraline (ZOLOFT) 50 mg tablet Instructed patient per Anesthesia Guidelines   SUMAtriptan (IMITREX) 100 mg tablet Instructed patient per Anesthesia Guidelines   verapamil (VERELAN) 240 MG 24 hr capsule Instructed patient per Anesthesia Guidelines  Pre-op and bathing instructions given  Patient was advised to stop Eliquis by her doctory 5 days prior to sx  Patient has hibiclens from surgeons office

## 2019-02-25 ENCOUNTER — APPOINTMENT (OUTPATIENT)
Dept: RADIATION ONCOLOGY | Facility: HOSPITAL | Age: 59
End: 2019-02-25
Attending: RADIOLOGY
Payer: COMMERCIAL

## 2019-02-25 PROCEDURE — 77387 GUIDANCE FOR RADJ TX DLVR: CPT | Performed by: RADIOLOGY

## 2019-02-25 PROCEDURE — 77412 RADIATION TX DELIVERY LVL 3: CPT | Performed by: RADIOLOGY

## 2019-02-26 ENCOUNTER — APPOINTMENT (OUTPATIENT)
Dept: RADIATION ONCOLOGY | Facility: HOSPITAL | Age: 59
End: 2019-02-26
Attending: RADIOLOGY
Payer: COMMERCIAL

## 2019-02-26 ENCOUNTER — OFFICE VISIT (OUTPATIENT)
Dept: HEMATOLOGY ONCOLOGY | Facility: CLINIC | Age: 59
End: 2019-02-26
Payer: COMMERCIAL

## 2019-02-26 VITALS
HEART RATE: 94 BPM | RESPIRATION RATE: 18 BRPM | OXYGEN SATURATION: 98 % | HEIGHT: 66 IN | SYSTOLIC BLOOD PRESSURE: 120 MMHG | WEIGHT: 125 LBS | BODY MASS INDEX: 20.09 KG/M2 | TEMPERATURE: 97.5 F | DIASTOLIC BLOOD PRESSURE: 80 MMHG

## 2019-02-26 DIAGNOSIS — Z17.0 MALIGNANT NEOPLASM OF OVERLAPPING SITES OF LEFT BREAST IN FEMALE, ESTROGEN RECEPTOR POSITIVE (HCC): Primary | ICD-10-CM

## 2019-02-26 DIAGNOSIS — C50.812 MALIGNANT NEOPLASM OF OVERLAPPING SITES OF LEFT BREAST IN FEMALE, ESTROGEN RECEPTOR POSITIVE (HCC): Primary | ICD-10-CM

## 2019-02-26 PROCEDURE — 77412 RADIATION TX DELIVERY LVL 3: CPT | Performed by: RADIOLOGY

## 2019-02-26 PROCEDURE — 77387 GUIDANCE FOR RADJ TX DLVR: CPT | Performed by: RADIOLOGY

## 2019-02-26 PROCEDURE — 77336 RADIATION PHYSICS CONSULT: CPT | Performed by: RADIOLOGY

## 2019-02-26 PROCEDURE — 99215 OFFICE O/P EST HI 40 MIN: CPT | Performed by: INTERNAL MEDICINE

## 2019-02-26 RX ORDER — ZOLPIDEM TARTRATE 10 MG/1
10 TABLET ORAL
Refills: 1 | COMMUNITY
Start: 2019-02-22 | End: 2019-04-05

## 2019-02-26 RX ORDER — GABAPENTIN 100 MG/1
100 CAPSULE ORAL 3 TIMES DAILY
Refills: 4 | COMMUNITY
Start: 2019-02-22 | End: 2019-05-28 | Stop reason: SDUPTHER

## 2019-02-26 RX ORDER — ANASTROZOLE 1 MG/1
1 TABLET ORAL DAILY
Qty: 90 TABLET | Refills: 1 | Status: ON HOLD | OUTPATIENT
Start: 2019-02-26 | End: 2019-03-07

## 2019-02-26 NOTE — PROGRESS NOTES
Hematology / Oncology Outpatient Follow Up Note    Lilia Rosas 63 y o  female :1960 CLAIR:[id number]         Date:  2019    Assessment / Plan:  A 60-year-old postmenopausal woman with stage IIIA left breast cancer, grade 2, invasive lobular histology, ER 75% positive, WI 15% positive, HER2 negative disease   She underwent mastectomy and axillary lymph node dissection, resulting in IBAN   She had 6 positive lymph nodes as well as 8 1 cm of primary tumor size  She completed adjuvant chemotherapy with dose dense AC followed by paclitaxel in 2018 with significant toxicity, including chemotherapy-induced anemia, neuropathy, heart appeared to be hemorrhagic cystitis as well as left lower extremity DVT  Her hematuria has stopped  She is back on apixaban 5 mg b i d  Her swelling has improved  I recommended her to continue with apixaban 5 mg b i d  which sample was provided  She is going to finish postmastectomy radiation therapy in a month  In April, I recommended her to start anastrozole 1 mg once a day as adjuvant hormonal therapy  Side effects of anastrozole was thoroughly discussed, including but not limited to hot flashes, musculoskeletal symptoms and bone mineral density loss  She understood and wished to proceed  I am going to obtain Doppler ultrasound in the left lower extremity in late May 2019 followed by office visit    She is in agreement with my recommendations                                                                                                                        Subjective:      HPI:  A 60-year-old postmenopausal woman who palpated lump in the left breast which she brought to medical attention  Rg Levi was found to have radiographic abnormality for which she underwent left breast biopsy in April 3, 2018   Biopsy showed invasive lobular carcinoma, grade 2   This was ER % positive, WI 15% positive, HER2 negative disease   She had led to be large breast tumor as well as clinically positive lymph nodes  Therefore, she underwent mastectomy and axillary lymph node dissection by Dr Mary Rios in May 15, 2018   She had 8 1 cm of invasive ductal carcinoma, grade 2   Lymphovascular invasion was present  6/24 axillary lymph nodes were positive for metastatic disease with largest tumor measuring 1 9 cm with extranodal extension   She did not have reconstruction  [place] to the surgery, PET-CT scan showed no evidence of distant metastasis   She presents today to discuss adjuvant treatment options   She has no breast related symptomatology  Randal Venegas weight has been stable   She has no complaint of bone pain   She denied any respiratory symptoms   Her performance status is normal  She is a lifetime never smoker         Interval History:   A 71-year-old postmenopausal woman with stage IIIA left breast cancer, grade 2, invasive lobular histology, ER 75% positive, WV 15% positive, HER2 negative disease   She underwent mastectomy and axillary lymph node dissection, resulting in IBAN   She had 6 positive lymph nodes as well as 8 1 cm of primary tumor size  She was treated with adjuvant chemotherapy with dose dense AC followed by weekly paclitaxel with significant toxicities  She had severe chemotherapy-induced anemia for which she required tried several transfusions  She also has neuropathy  During the weekly paclitaxel, she was found to have left lower extremity DVT for which she started apixaban  At the end of chemotherapy, she developed hematuria  Cystoscopy showed what appeared to be hemorrhagic cystitis  Her hematuria has stopped a month ago  She is currently on apixaban 5 mg b i d  Her hemoglobin has been improving  She has normal WBC and platelet count  She is 2 weeks into the postmastectomy radiation therapy with mild skin toxicity  She feels well  She is to have neuropathy  She denied any respiratory symptoms  She has no complaint of bone pain    Her performance status is normal                                              Objective:      Primary Diagnosis:     1  Left breast cancer, stage IIIA (pT3, pN2, M0) grade 2, invasive lobular histology, ER 75% positive, UT 15% positive, HER2 negative disease   6 positive lymph nodes  Diagnosed in May 2018  2    Left lower extremity DVT diagnosed in October 2018      Cancer Staging:  Cancer Staging  Malignant neoplasm of overlapping sites of left breast in female, estrogen receptor positive ([address] )  Staging form: Breast, AJCC 8th Edition  - Clinical stage from 4/10/2018: Stage IIA (cT2, cN1(f), cM0, G2, ER: Positive, UT: Positive, HER2: Negative) - Signed by Casimiro Magaña MD on 4/10/2018           Previous Hematologic/ Oncologic Treatment:       Adjuvant chemotherapy with dose dense AC x4 followed by weekly paclitaxel times 12  Completed in December 2018      Current Hematologic/ Oncologic Treatment:       Postmastectomy radiation therapy  Adjuvant hormonal therapy with anastrozole to be started in April 2019        Disease Status:      IBAN status post mastectomy and axillary lymph node dissection      Test Results:     Pathology:     8 1 cm of invasive lobular carcinoma, grade 2   Lymphovascular invasion was present   6/24 axillary lymph nodes were positive for metastatic disease with largest tumor 1 9 cm   Extranodal extension was positive   ER 75 to 100% positive, UT 15 % positive, HER2 negative disease   Stage IIIA (pT3, pN2, M0)     Radiology:     PET-CT scan showed hypermetabolic left breast mass with no evidence of distant metastasis      MUGA scan in June 2018 showed ejection fraction 62%      Doppler study showed acute occlusive left distal lower extremity DVT      Laboratory:     See below      Physical Exam:        General Appearance:    Alert, oriented          Eyes:    PERRL   Ears:    Normal external ear canals, both ears   Nose:   Nares normal, septum midline   Throat:   Mucosa moist  Pharynx without injection  Neck:   Supple         Lungs:     Clear to auscultation bilaterally   Chest Wall:    No tenderness or deformity    Heart:    Regular rate and rhythm         Abdomen:     Soft, non-tender, bowel sounds +, no organomegaly               Extremities:   Extremities no cyanosis, left lower extremity swelling          Skin:   no rash or icterus  Lymph nodes:   Cervical, supraclavicular, and axillary nodes normal   Neurologic:   CNII-XII intact, normal strength, sensation and reflexes     Throughout             Breast exam: Status post left mastectomy without reconstruction   No palpable abnormality in her left chest wall   Minor redness around the mastectomy scar   Right breast exam is negative  ROS: Review of Systems   Constitutional:        Fatigue   Neurological:        Neuropathy   All other systems reviewed and are negative  Imaging: No results found  Labs:   Lab Results   Component Value Date    WBC 4 67 02/06/2019    HGB 9 4 (L) 02/06/2019    HCT 30 7 (L) 02/06/2019     (H) 02/06/2019     02/06/2019     Lab Results   Component Value Date    K 4 5 02/06/2019     02/06/2019    CO2 27 02/06/2019    BUN 9 02/06/2019    CREATININE 0 84 02/06/2019    GLUF 114 (H) 11/26/2018    CALCIUM 9 0 02/06/2019    AST 26 02/06/2019    ALT 22 02/06/2019    ALKPHOS 84 02/06/2019    EGFR 77 02/06/2019         Lab Results   Component Value Date     12/31/2018         Lab Results   Component Value Date    IRON 12 (L) 12/19/2018    TIBC 145 (L) 12/19/2018    FERRITIN 1,033 (H) 12/19/2018       Lab Results   Component Value Date    ZJOMBYHA27 1,811 (H) 12/19/2018       Lab Results   Component Value Date    FOLATE 6 7 12/19/2018         Current Medications: Reviewed  Allergies: Reviewed  PMH/FH/SH:  Reviewed      Vital Sign:    Body surface area is 1 63 meters squared      Wt Readings from Last 3 Encounters: 02/26/19 56 7 kg (125 lb)   01/22/19 54 2 kg (119 lb 6 4 oz)   01/09/19 57 6 kg (127 lb)        Temp Readings from Last 3 Encounters:   02/26/19 97 5 °F (36 4 °C) (Oral)   01/22/19 (!) 97 4 °F (36 3 °C) (Temporal)   01/06/19 98 2 °F (36 8 °C) (Oral)        BP Readings from Last 3 Encounters:   02/26/19 120/80   01/22/19 100/60   01/09/19 112/62         Pulse Readings from Last 3 Encounters:   02/26/19 94   01/22/19 104   01/09/19 104     @LASTSAO2(3)@

## 2019-02-27 ENCOUNTER — PREP FOR PROCEDURE (OUTPATIENT)
Dept: UROLOGY | Facility: CLINIC | Age: 59
End: 2019-02-27

## 2019-02-27 ENCOUNTER — TELEPHONE (OUTPATIENT)
Dept: UROLOGY | Facility: CLINIC | Age: 59
End: 2019-02-27

## 2019-02-27 ENCOUNTER — APPOINTMENT (OUTPATIENT)
Dept: RADIATION ONCOLOGY | Facility: HOSPITAL | Age: 59
End: 2019-02-27
Attending: RADIOLOGY
Payer: COMMERCIAL

## 2019-02-27 PROCEDURE — 77387 GUIDANCE FOR RADJ TX DLVR: CPT | Performed by: RADIOLOGY

## 2019-02-27 PROCEDURE — 77412 RADIATION TX DELIVERY LVL 3: CPT | Performed by: RADIOLOGY

## 2019-02-27 NOTE — TELEPHONE ENCOUNTER
Spoke with Brina Flores in Dr Randal Sandra office  She states the pt was seen yesterday for clearance for surgery on 3/4/19  She said Dr Minal Fragoso requested clearance from her oncologist Dr Theresa Sullivan and additional lab work to be done prior to clearing the pt  Brina Flores said they have the clearance from Dr Theresa Sullivan and the lab work was done yesterday  She said she will have the results either this afternoon or tomorrow morning  She said as soon as she has the results and the pt is cleared she will send the office note clearing the pt to me

## 2019-02-28 ENCOUNTER — APPOINTMENT (OUTPATIENT)
Dept: RADIATION ONCOLOGY | Facility: HOSPITAL | Age: 59
End: 2019-02-28
Attending: RADIOLOGY
Payer: COMMERCIAL

## 2019-02-28 PROCEDURE — 77334 RADIATION TREATMENT AID(S): CPT | Performed by: RADIOLOGY

## 2019-02-28 PROCEDURE — 77321 SPECIAL TELETX PORT PLAN: CPT | Performed by: RADIOLOGY

## 2019-02-28 PROCEDURE — 77387 GUIDANCE FOR RADJ TX DLVR: CPT | Performed by: RADIOLOGY

## 2019-02-28 PROCEDURE — 77412 RADIATION TX DELIVERY LVL 3: CPT | Performed by: RADIOLOGY

## 2019-03-01 ENCOUNTER — APPOINTMENT (OUTPATIENT)
Dept: RADIATION ONCOLOGY | Facility: HOSPITAL | Age: 59
End: 2019-03-01
Attending: RADIOLOGY
Payer: COMMERCIAL

## 2019-03-01 PROCEDURE — 77417 THER RADIOLOGY PORT IMAGE(S): CPT | Performed by: RADIOLOGY

## 2019-03-01 PROCEDURE — 77387 GUIDANCE FOR RADJ TX DLVR: CPT | Performed by: RADIOLOGY

## 2019-03-01 PROCEDURE — 77412 RADIATION TX DELIVERY LVL 3: CPT | Performed by: RADIOLOGY

## 2019-03-04 ENCOUNTER — TELEPHONE (OUTPATIENT)
Dept: UROLOGY | Facility: CLINIC | Age: 59
End: 2019-03-04

## 2019-03-04 ENCOUNTER — APPOINTMENT (OUTPATIENT)
Dept: RADIATION ONCOLOGY | Facility: HOSPITAL | Age: 59
End: 2019-03-04
Attending: RADIOLOGY
Payer: COMMERCIAL

## 2019-03-04 ENCOUNTER — HOSPITAL ENCOUNTER (OUTPATIENT)
Facility: AMBULARY SURGERY CENTER | Age: 59
Setting detail: OUTPATIENT SURGERY
Discharge: HOME | End: 2019-03-04
Attending: UROLOGY | Admitting: UROLOGY
Payer: COMMERCIAL

## 2019-03-04 ENCOUNTER — ANESTHESIA (OUTPATIENT)
Dept: PERIOP | Facility: AMBULARY SURGERY CENTER | Age: 59
End: 2019-03-04
Payer: COMMERCIAL

## 2019-03-04 VITALS
DIASTOLIC BLOOD PRESSURE: 63 MMHG | WEIGHT: 123 LBS | HEART RATE: 78 BPM | TEMPERATURE: 97.4 F | BODY MASS INDEX: 19.77 KG/M2 | RESPIRATION RATE: 20 BRPM | HEIGHT: 66 IN | SYSTOLIC BLOOD PRESSURE: 115 MMHG | OXYGEN SATURATION: 97 %

## 2019-03-04 DIAGNOSIS — N32.9 LESION OF BLADDER: ICD-10-CM

## 2019-03-04 DIAGNOSIS — D50.0 BLOOD LOSS ANEMIA: ICD-10-CM

## 2019-03-04 DIAGNOSIS — R31.0 GROSS HEMATURIA: Primary | ICD-10-CM

## 2019-03-04 DIAGNOSIS — K62.3: Primary | ICD-10-CM

## 2019-03-04 PROCEDURE — 88342 IMHCHEM/IMCYTCHM 1ST ANTB: CPT | Performed by: PATHOLOGY

## 2019-03-04 PROCEDURE — 88305 TISSUE EXAM BY PATHOLOGIST: CPT | Performed by: PATHOLOGY

## 2019-03-04 PROCEDURE — 52204 CYSTOSCOPY W/BIOPSY(S): CPT | Performed by: UROLOGY

## 2019-03-04 PROCEDURE — C1769 GUIDE WIRE: HCPCS | Performed by: UROLOGY

## 2019-03-04 RX ORDER — MIDAZOLAM HYDROCHLORIDE 1 MG/ML
INJECTION INTRAMUSCULAR; INTRAVENOUS AS NEEDED
Status: DISCONTINUED | OUTPATIENT
Start: 2019-03-04 | End: 2019-03-04 | Stop reason: SURG

## 2019-03-04 RX ORDER — ONDANSETRON 2 MG/ML
4 INJECTION INTRAMUSCULAR; INTRAVENOUS ONCE AS NEEDED
Status: DISCONTINUED | OUTPATIENT
Start: 2019-03-04 | End: 2019-03-04 | Stop reason: HOSPADM

## 2019-03-04 RX ORDER — ONDANSETRON 2 MG/ML
4 INJECTION INTRAMUSCULAR; INTRAVENOUS EVERY 6 HOURS PRN
Status: DISCONTINUED | OUTPATIENT
Start: 2019-03-04 | End: 2019-03-04 | Stop reason: HOSPADM

## 2019-03-04 RX ORDER — ACETAMINOPHEN 325 MG/1
650 TABLET ORAL EVERY 6 HOURS PRN
Status: DISCONTINUED | OUTPATIENT
Start: 2019-03-04 | End: 2019-03-04 | Stop reason: HOSPADM

## 2019-03-04 RX ORDER — FENTANYL CITRATE 50 UG/ML
INJECTION, SOLUTION INTRAMUSCULAR; INTRAVENOUS AS NEEDED
Status: DISCONTINUED | OUTPATIENT
Start: 2019-03-04 | End: 2019-03-04 | Stop reason: SURG

## 2019-03-04 RX ORDER — CEFAZOLIN SODIUM 2 G/50ML
2000 SOLUTION INTRAVENOUS ONCE
Status: DISCONTINUED | OUTPATIENT
Start: 2019-03-04 | End: 2019-03-04

## 2019-03-04 RX ORDER — DIPHENHYDRAMINE HYDROCHLORIDE 50 MG/ML
25 INJECTION INTRAMUSCULAR; INTRAVENOUS EVERY 6 HOURS PRN
Status: DISCONTINUED | OUTPATIENT
Start: 2019-03-04 | End: 2019-03-04 | Stop reason: HOSPADM

## 2019-03-04 RX ORDER — OXYCODONE HYDROCHLORIDE AND ACETAMINOPHEN 5; 325 MG/1; MG/1
1 TABLET ORAL EVERY 4 HOURS PRN
Qty: 8 TABLET | Refills: 0 | Status: SHIPPED | OUTPATIENT
Start: 2019-03-04 | End: 2019-03-09

## 2019-03-04 RX ORDER — PROPOFOL 10 MG/ML
INJECTION, EMULSION INTRAVENOUS AS NEEDED
Status: DISCONTINUED | OUTPATIENT
Start: 2019-03-04 | End: 2019-03-04 | Stop reason: SURG

## 2019-03-04 RX ORDER — ONDANSETRON 2 MG/ML
INJECTION INTRAMUSCULAR; INTRAVENOUS AS NEEDED
Status: DISCONTINUED | OUTPATIENT
Start: 2019-03-04 | End: 2019-03-04 | Stop reason: SURG

## 2019-03-04 RX ORDER — LIDOCAINE HYDROCHLORIDE 10 MG/ML
INJECTION, SOLUTION INFILTRATION; PERINEURAL AS NEEDED
Status: DISCONTINUED | OUTPATIENT
Start: 2019-03-04 | End: 2019-03-04 | Stop reason: SURG

## 2019-03-04 RX ORDER — SODIUM CHLORIDE 9 MG/ML
INJECTION, SOLUTION INTRAVENOUS CONTINUOUS PRN
Status: DISCONTINUED | OUTPATIENT
Start: 2019-03-04 | End: 2019-03-04 | Stop reason: SURG

## 2019-03-04 RX ORDER — OXYCODONE HYDROCHLORIDE AND ACETAMINOPHEN 5; 325 MG/1; MG/1
2 TABLET ORAL EVERY 6 HOURS PRN
Status: DISCONTINUED | OUTPATIENT
Start: 2019-03-04 | End: 2019-03-04 | Stop reason: HOSPADM

## 2019-03-04 RX ORDER — SODIUM CHLORIDE 9 MG/ML
50 INJECTION, SOLUTION INTRAVENOUS CONTINUOUS
Status: CANCELLED | OUTPATIENT
Start: 2019-03-04

## 2019-03-04 RX ORDER — FENTANYL CITRATE/PF 50 MCG/ML
25 SYRINGE (ML) INJECTION
Status: DISCONTINUED | OUTPATIENT
Start: 2019-03-04 | End: 2019-03-04 | Stop reason: HOSPADM

## 2019-03-04 RX ORDER — DEXAMETHASONE SODIUM PHOSPHATE 4 MG/ML
INJECTION, SOLUTION INTRA-ARTICULAR; INTRALESIONAL; INTRAMUSCULAR; INTRAVENOUS; SOFT TISSUE AS NEEDED
Status: DISCONTINUED | OUTPATIENT
Start: 2019-03-04 | End: 2019-03-04 | Stop reason: SURG

## 2019-03-04 RX ORDER — CEFAZOLIN SODIUM 1 G/50ML
SOLUTION INTRAVENOUS AS NEEDED
Status: DISCONTINUED | OUTPATIENT
Start: 2019-03-04 | End: 2019-03-04 | Stop reason: SURG

## 2019-03-04 RX ORDER — OXYBUTYNIN CHLORIDE 5 MG/1
5 TABLET ORAL 3 TIMES DAILY PRN
Status: DISCONTINUED | OUTPATIENT
Start: 2019-03-04 | End: 2019-03-04 | Stop reason: HOSPADM

## 2019-03-04 RX ORDER — DOCUSATE SODIUM 100 MG/1
100 CAPSULE, LIQUID FILLED ORAL 3 TIMES DAILY
Qty: 90 CAPSULE | Refills: 0 | Status: SHIPPED | OUTPATIENT
Start: 2019-03-04 | End: 2019-04-05

## 2019-03-04 RX ADMIN — MIDAZOLAM HYDROCHLORIDE 1 MG: 1 INJECTION, SOLUTION INTRAMUSCULAR; INTRAVENOUS at 07:30

## 2019-03-04 RX ADMIN — LIDOCAINE HYDROCHLORIDE ANHYDROUS 50 MG: 10 INJECTION, SOLUTION INFILTRATION at 07:34

## 2019-03-04 RX ADMIN — SODIUM CHLORIDE: 0.9 INJECTION, SOLUTION INTRAVENOUS at 07:30

## 2019-03-04 RX ADMIN — CEFAZOLIN SODIUM 1000 MG: 1 SOLUTION INTRAVENOUS at 07:25

## 2019-03-04 RX ADMIN — PHENYLEPHRINE HYDROCHLORIDE 100 MCG: 10 INJECTION INTRAVENOUS at 07:44

## 2019-03-04 RX ADMIN — PROPOFOL 140 MG: 10 INJECTION, EMULSION INTRAVENOUS at 07:34

## 2019-03-04 RX ADMIN — ONDANSETRON 4 MG: 2 INJECTION INTRAMUSCULAR; INTRAVENOUS at 07:41

## 2019-03-04 RX ADMIN — DEXAMETHASONE SODIUM PHOSPHATE 4 MG: 4 INJECTION, SOLUTION INTRAMUSCULAR; INTRAVENOUS at 07:41

## 2019-03-04 RX ADMIN — PROPOFOL 40 MG: 10 INJECTION, EMULSION INTRAVENOUS at 07:51

## 2019-03-04 RX ADMIN — FENTANYL CITRATE 25 MCG: 50 INJECTION, SOLUTION INTRAMUSCULAR; INTRAVENOUS at 07:34

## 2019-03-04 RX ADMIN — FENTANYL CITRATE 25 MCG: 50 INJECTION, SOLUTION INTRAMUSCULAR; INTRAVENOUS at 07:51

## 2019-03-04 NOTE — TELEPHONE ENCOUNTER
Uncomplicated bladder biopsy and fulguration with mitomycin-C instillation, will see me in 2 weeks    I have also referred her to colorectal surgery for their evaluation for rectal prolapse

## 2019-03-04 NOTE — ANESTHESIA POSTPROCEDURE EVALUATION
Post-Op Assessment Note    CV Status:  Stable  Pain Score: 0    Pain management: adequate     Mental Status:  Alert and awake   Hydration Status:  Euvolemic   PONV Controlled:  Controlled   Airway Patency:  Patent   Post Op Vitals Reviewed: Yes      Staff: CRNA, Anesthesiologist           /58 (03/04/19 0808)    Temp (!) 97 1 °F (36 2 °C) (03/04/19 0808)    Pulse 80 (03/04/19 0808)   Resp 18 (03/04/19 0808)    SpO2 98 % (03/04/19 0808)

## 2019-03-04 NOTE — DISCHARGE INSTRUCTIONS
Cystoscopy   WHAT YOU NEED TO KNOW:     Juliana Roberto,    Today you had cystoscopy with bladder biopsy and fulguration in which I took biopsies of the red area within your bladder and then burned the surrounding area to hopefully decrease the chance of repeat bleeding in the future  This lesion is likely due to your exposure to the chemotherapy that has been necessary for your aggressive treatment  Please restart your Eliquis in 2 days  Your pathology should be back within the next 2 weeks  Another thing that I noticed when you were under anesthesia today is that you had a moderate to severe case of rectal prolapse  In this disease process the rectum falls through the anal sphincter and exits the body  I am not sure if you have noticed this previously while having bowel movements  I did reduce your rectum back into the pelvis manually, but this may occur again due to poor support of the rectum  I have placed a referral to colorectal surgery if you wish to consult with them regarding this disease process  After a bladder biopsy and fulguration it is normal to have urgency of urination, frequency, and some bleeding  These will resolve with the passing of time  If you have issues or questions or concerns in the postoperative time frame, please do not hesitate to contact my office  Take care and be well,  Dr Joseph Nascimento  A cystoscopy is a procedure to look inside of your urethra and bladder using a cystoscope  A cystoscope is a small tube with a light and magnifying camera on the end  The procedure is used to diagnose and treat conditions of the bladder, urethra, and prostate  The procedure is also done to remove stones or blood clots from the urethra or bladder  Your healthcare provider may do other tests, such as ureteroscopy, during a cystoscopy  DISCHARGE INSTRUCTIONS:   Call 911 if:   · You suddenly have chest pain or trouble breathing      Seek care immediately if:   · Your urine turns from pink to red, or you have clots in your urine  · You cannot urinate and your bladder feels full  · Your pain or burning becomes worse or lasts longer than 2 days  Contact your healthcare provider or urologist if:   · Your urine stays pink for longer than 3 days  · You urinate less than normal, or still feel like you have to urinate after you use the bathroom  · Your skin is itchy, swollen, or has a new rash  · You have a fever and chills  · You have questions or concerns about your condition or care  Medicines: You may  be given any of the following:  · Antibiotics  help treat or prevent a bacterial infection  · Acetaminophen  decreases pain and fever  It is available without a doctor's order  Ask how much to take and how often to take it  Follow directions  Read the labels of all other medicines you are using to see if they also contain acetaminophen, or ask your doctor or pharmacist  Acetaminophen can cause liver damage if not taken correctly  Do not use more than 4 grams (4,000 milligrams) total of acetaminophen in one day  · Take your medicine as directed  Contact your healthcare provider if you think your medicine is not helping or if you have side effects  Tell him or her if you are allergic to any medicine  Keep a list of the medicines, vitamins, and herbs you take  Include the amounts, and when and why you take them  Bring the list or the pill bottles to follow-up visits  Carry your medicine list with you in case of an emergency  Follow up with your healthcare provider as directed: You may need to have another cystoscopy  Write down your questions so you remember to ask them during your visits  Self-care:   · Drink at least 3 to 4 glasses of water daily for 2 days after your procedure  Do not drink acidic juices such as orange juice and lemonade  Drink water to help prevent blood clots from forming  It can also help decrease the amount of acid in your urine   Acid in your urine may increase the burning feeling when you urinate  · Sit in a warm tub of water  Warm water may relieve pain and bladder spasms  · Do not have sex  until your healthcare provider tells you it is okay  Sex may increase your risk for a urinary tract infection  © 2017 [employer] [employer] Information is for End User's use only and may not be sold, redistributed or otherwise used for commercial purposes  All illustrations and images included in CareNotes® are the copyrighted property of [employer] , [employer]  or Roberto Butterfield  The above information is an  only  It is not intended as medical advice for individual conditions or treatments  Talk to your doctor, nurse or pharmacist before following any medical regimen to see if it is safe and effective for you

## 2019-03-04 NOTE — H&P
H&P Exam - Urology   Griselda Cruz 63 y o  female MRN: [id number]  Unit/Bed#: [facility]ILK[facility] Encounter: [id number]    Assessment/Plan     Assessment:  27-year-old woman with a complicated oncologic history, multiple admissions with gross hematuria, office cystoscopy showed some velvet like red lesions  She is here today for cystoscopy with bladder biopsy and fulguration and mitomycin-C administration  Informed consent was verified, no changes in her state of health since the last time we saw her  Plan:  Proceed to the operating room for cystoscopy with bladder biopsy and fulguration and mitomycin-C instillation    History of Present Illness   HPI:  Griselda Cruz is a 63 y o  female who presents with gross hematuria, recurrent, red lesions within the bladder concerning for potential carcinoma in situ versus cystitis  She wishes to proceed with cystoscopy and bladder biopsy and fulguration and has been appropriately prepared for surgery today       Review of Systems   Constitutional: Negative  HENT: Negative  Eyes: Negative  Respiratory: Negative  Cardiovascular: Negative  Gastrointestinal: Negative  Endocrine: Negative  Genitourinary:        As per HPI   Musculoskeletal: Negative  Skin: Negative  Allergic/Immunologic: Negative  Neurological: Negative  Hematological: Negative  Psychiatric/Behavioral: Negative          Historical Information   Past Medical History:   Diagnosis Date    Acute DVT (deep venous thrombosis) (HCC)     of LLE>     Congenital prolapsed rectum     History of chemotherapy     History of transfusion     x2 s/p chemo treatments, no reaction    Hydronephrosis     right kidney    Hypertension     Malignant neoplasm of overlapping sites of left female breast ([address] )     Migraine      Past Surgical History:   Procedure Laterality Date    BREAST BIOPSY      COLON SURGERY      colon resection    ERCP N/A 1/4/2019    Procedure: ENDOSCOPIC RETROGRADE CHOLANGIOPANCREATOGRAPHY (ERCP); Surgeon: Maria Alejandra Kay MD;  [facility];  Service: Gastroenterology    IN INSJ TUNNELED CTR VAD W/SUBQ PORT AGE 5 YR/> N/A 6/26/2018    Procedure: INSERTION VENOUS PORT ( PORT-A-CATH) IR;  Surgeon: Jerica Hedrick DO;  [facility];  Service: Interventional Radiology    IN MASTECTOMY, MODIFIED RADICAL Left 5/15/2018    Procedure: BREAST MODIFIED RADICAL MASTECTOMY;  Surgeon: Leonel Luna MD;  [facility];  Service: Surgical Oncology    TUBAL LIGATION      US GUIDANCE BREAST BIOPSY LEFT EACH ADDITIONAL Left 4/3/2018    US GUIDED BREAST BIOPSY LEFT COMPLETE Left 4/3/2018    US GUIDED BREAST LYMPH NODE BIOPSY LEFT Left 4/3/2018     Social History   Social History     Substance and Sexual Activity   Alcohol Use No     Social History     Substance and Sexual Activity   Drug Use No     Social History     Tobacco Use   Smoking Status Never Smoker   Smokeless Tobacco Never Used     Family History:   Family History   Problem Relation Age of Onset    No Known Problems Mother     No Known Problems Father        Meds/Allergies   PTA meds:   Prior to Admission Medications   Prescriptions Last Dose Informant Patient Reported? Taking?    ELIQUIS 5 MG 3/2/2019  No Yes   Sig: TAKE 1 TABLET BY MOUTH TWICE A DAY   Elastic Bandages & Supports MISC  Self No No   Sig: by Does not apply route daily   LORazepam (ATIVAN) 1 mg tablet 3/3/2019 at Unknown time Self Yes Yes   Sig: Take 1 mg by mouth 2 (two) times a day as needed   SUMAtriptan (IMITREX) 100 mg tablet More than a month at Unknown time Self Yes No   Sig: Take 100 mg by mouth once as needed for migraine   anastrozole (ARIMIDEX) 1 mg tablet   No No   Sig: Take 1 tablet (1 mg total) by mouth daily   gabapentin (NEURONTIN) 100 mg capsule 3/3/2019 at Unknown time  Yes Yes   Sig: Take 100 mg by mouth 3 (three) times a day   sertraline (ZOLOFT) 50 mg tablet 3/3/2019 at Unknown time Self Yes Yes   Sig: Take 50 mg by mouth daily at bedtime   verapamil (VERELAN) 240 MG 24 hr capsule 3/3/2019 at Unknown time Self Yes Yes   Sig: Take 240 mg by mouth daily   zolpidem (AMBIEN) 10 mg tablet 3/3/2019 at Unknown time  Yes Yes   Sig: Take 10 mg by mouth daily at bedtime as needed      Facility-Administered Medications: None     Allergies   Allergen Reactions    Gluten Meal GI Intolerance    Lactose GI Intolerance       Objective   Vitals: Blood pressure 123/73, pulse 100, temperature 98 4 °F (36 9 °C), temperature source Temporal, resp  rate 20, height 5' 6" (1 676 m), weight 55 8 kg (123 lb), SpO2 94 %, not currently breastfeeding  No intake/output data recorded  Invasive Devices     Peripheral Intravenous Line            Peripheral IV 03/04/19 Right Arm less than 1 day                Physical Exam   Constitutional: She is oriented to person, place, and time  No distress  Wearing a hat, thin, pale   HENT:   Head: Normocephalic and atraumatic  Right Ear: External ear normal    Left Ear: External ear normal    Nose: Nose normal    Eyes: Pupils are equal, round, and reactive to light  Conjunctivae and EOM are normal  Right eye exhibits no discharge  Left eye exhibits no discharge  No scleral icterus  Neck: Normal range of motion  Neck supple  No tracheal deviation present  No thyromegaly present  Cardiovascular: Normal rate, regular rhythm and intact distal pulses  Pulmonary/Chest: Effort normal  No stridor  No respiratory distress  She has no wheezes  Abdominal: Soft  She exhibits no distension and no mass  There is no tenderness  There is no rebound and no guarding  No hernia  Genitourinary:   Genitourinary Comments: Deferred for the operating room today   Musculoskeletal: She exhibits no edema, tenderness or deformity  Neurological: She is alert and oriented to person, place, and time  No cranial nerve deficit  Coordination normal    Skin: Skin is warm and dry  No rash noted  She is not diaphoretic  No erythema   No pallor  Psychiatric: She has a normal mood and affect  Her behavior is normal  Judgment and thought content normal    Nursing note and vitals reviewed  Lab Results: I have personally reviewed pertinent reports  Imaging: I have personally reviewed pertinent reports  EKG, Pathology, and Other Studies: I have personally reviewed pertinent reports  VTE Prophylaxis: Sequential compression device Altagracia Rushing)     Code Status: Prior  Advance Directive and Living Will: Yes    Power of : Yes  POLST:      Counseling / Coordination of Care  Total floor / unit time spent today 15 minutes  Greater than 50% of total time was spent with the patient and / or family counseling and / or coordination of care  A description of the counseling / coordination of care:  Review of the pre, shira, and postoperative care for this procedure

## 2019-03-04 NOTE — OP NOTE
OPERATIVE REPORT  PATIENT NAME: Maura Isaacs    :  1960  MRN: [id number]  Pt [facility]    SURGERY DATE: 3/4/2019    Surgeon(s) and Role:     * Angel Ramsey MD - Primary    Preop Diagnosis:  Gross hematuria [R31 0]  Lesion of bladder [N32 9]  Blood loss anemia [D50 0]    Post-Op Diagnosis Codes:     * Gross hematuria [R31 0]     * Lesion of bladder [N32 9]     * Blood loss anemia [D50 0]    Procedure(s) (LRB):  CYSTOSCOPY WITH BIOPSIES AND FULGERATION AND REDCUTION OF RECTUM PROLAPSE (N/A)  INSTILLATION MITOMYCIN (N/A)    Specimen(s):  ID Type Source Tests Collected by Time Destination   1 : Posterior Bladder Lesion Biopsy Tissue Urinary Bladder TISSUE EXAM Angel Ramsey MD 3/4/2019 9690        Estimated Blood Loss:   Minimal    Drains:  [de-identified] Latvian Salcedo catheter, to be removed in the recovery room after mitomycin C protocol    Anesthesia Type:   General    Operative Indications:  Gross hematuria [R31 0]  Lesion of bladder [N32 9]  Blood loss anemia [D50 0]      Operative Findings:  Diffuse, erythematous patch at the dome of the bladder, biopsy x4, roller ball electrocautery was used to rise biopsy sites as well as surrounding erythematous lesions  When patient was placed in lithotomy position she had severe rectal prolapse, this was reduced with manual reduction twice  At the end the case her rectum was and the anatomic position within the true pelvis    Complications:   None    Procedure and Technique:    OVERALL IMPRESSION:      Informed consent was obtained including the risks, benefits, and alternatives of the proposed procedure  Specific risks of this procedure include bleeding, infection, bladder injury, and need for secondary procedures  A full time-out confirming the proper patient, position, and procedure was performed and general anesthesia was induced without issue, event, or complication   The patient was then was placed in the lithotomy position in the operating room theater taking care to pad all pressure points  Sequential compression devices were placed  The patient was prepped and draped in the usual sterile fashion  Moderate to severe rectal prolapse was seen when the patient was placed in the lithotomy position  At the beginning of the case this was reduced into the anatomic position, it then recurred during the procedure, and prior to her legs coming down this was again reduced manually  Cystoscopy was performed with a 21 Romanian rigid cystoscope with a 30° lens  This revealed a patent urethra, and a relatively normal bladder except for a diffuse, erythematous patch at the dome of the bladder with poor quality blood vessels  Cold-cup biopsy forceps were used to biopsy this area times four  Samples were sent for permanent section  Attempts were made at [facility] electrocautery, however flow through the cystoscope was not adequate given the degree of bleeding, a resectoscope was then used to perform roller ball electrocautery of the biopsy sites and surrounding erythematous patch  A 20 Romanian Salcedo catheter was then placed and mitomycin-C 40 milligrams in 40 milliliters of sterile water was instilled into the bladder using chemotherapy precautions  The catheter was clamped and plugged  The dwell time will be for 1 hour  The patient's rectal prolapse was then again reduced manually as she was taken out of the lithotomy position  The rectum was seen to stay within the true pelvis      The bladder was drained and the patient was awakened from anesthesia having tolerated the procedure well  There were no complications  All instrument and sponge counts were correct  PLAN:  Patient will follow up with me in a number of weeks to discuss her pathology from bladder biopsy  I have placed a referral to colorectal surgery for rectal prolapse           I was present for the entire procedure and A qualified resident physician was not available    Patient Disposition:  PACU     SIGNATURE: Kathya Xiao MD  DATE: March 4, 2019  TIME: 8:02 AM

## 2019-03-04 NOTE — TELEPHONE ENCOUNTER
Please assist with scheduling discussion/ pathology appt with DR Hawley in 2 weeks, please assist with scheduling referral to colorectal   Thank you

## 2019-03-05 ENCOUNTER — APPOINTMENT (OUTPATIENT)
Dept: RADIATION ONCOLOGY | Facility: HOSPITAL | Age: 59
End: 2019-03-05
Attending: RADIOLOGY
Payer: COMMERCIAL

## 2019-03-05 PROCEDURE — 77412 RADIATION TX DELIVERY LVL 3: CPT | Performed by: RADIOLOGY

## 2019-03-05 PROCEDURE — 77387 GUIDANCE FOR RADJ TX DLVR: CPT | Performed by: RADIOLOGY

## 2019-03-06 ENCOUNTER — APPOINTMENT (OUTPATIENT)
Dept: RADIATION ONCOLOGY | Facility: HOSPITAL | Age: 59
End: 2019-03-06
Attending: RADIOLOGY
Payer: COMMERCIAL

## 2019-03-06 PROCEDURE — 77387 GUIDANCE FOR RADJ TX DLVR: CPT | Performed by: RADIOLOGY

## 2019-03-06 PROCEDURE — 77412 RADIATION TX DELIVERY LVL 3: CPT | Performed by: RADIOLOGY

## 2019-03-06 PROCEDURE — 77336 RADIATION PHYSICS CONSULT: CPT | Performed by: RADIOLOGY

## 2019-03-07 ENCOUNTER — ANESTHESIA EVENT (OUTPATIENT)
Dept: GASTROENTEROLOGY | Facility: HOSPITAL | Age: 59
End: 2019-03-07
Payer: COMMERCIAL

## 2019-03-07 ENCOUNTER — ANESTHESIA (OUTPATIENT)
Dept: GASTROENTEROLOGY | Facility: HOSPITAL | Age: 59
End: 2019-03-07
Payer: COMMERCIAL

## 2019-03-07 ENCOUNTER — APPOINTMENT (OUTPATIENT)
Dept: RADIATION ONCOLOGY | Facility: HOSPITAL | Age: 59
End: 2019-03-07
Attending: RADIOLOGY
Payer: COMMERCIAL

## 2019-03-07 ENCOUNTER — HOSPITAL ENCOUNTER (OUTPATIENT)
Facility: HOSPITAL | Age: 59
Setting detail: OUTPATIENT SURGERY
Discharge: HOME | End: 2019-03-07
Attending: INTERNAL MEDICINE | Admitting: INTERNAL MEDICINE
Payer: COMMERCIAL

## 2019-03-07 VITALS
SYSTOLIC BLOOD PRESSURE: 105 MMHG | HEIGHT: 66 IN | TEMPERATURE: 98.3 F | DIASTOLIC BLOOD PRESSURE: 58 MMHG | HEART RATE: 88 BPM | BODY MASS INDEX: 20.41 KG/M2 | RESPIRATION RATE: 18 BRPM | WEIGHT: 127 LBS | OXYGEN SATURATION: 96 %

## 2019-03-07 DIAGNOSIS — K83.8 DILATED CBD, ACQUIRED: Primary | ICD-10-CM

## 2019-03-07 PROCEDURE — 43247 EGD REMOVE FOREIGN BODY: CPT | Performed by: INTERNAL MEDICINE

## 2019-03-07 PROCEDURE — 43237 ENDOSCOPIC US EXAM ESOPH: CPT | Performed by: INTERNAL MEDICINE

## 2019-03-07 RX ORDER — PROPOFOL 10 MG/ML
INJECTION, EMULSION INTRAVENOUS CONTINUOUS PRN
Status: DISCONTINUED | OUTPATIENT
Start: 2019-03-07 | End: 2019-03-07 | Stop reason: SURG

## 2019-03-07 RX ORDER — SODIUM CHLORIDE, SODIUM LACTATE, POTASSIUM CHLORIDE, CALCIUM CHLORIDE 600; 310; 30; 20 MG/100ML; MG/100ML; MG/100ML; MG/100ML
125 INJECTION, SOLUTION INTRAVENOUS CONTINUOUS
Status: CANCELLED | OUTPATIENT
Start: 2019-03-07

## 2019-03-07 RX ORDER — SODIUM CHLORIDE 9 MG/ML
50 INJECTION, SOLUTION INTRAVENOUS CONTINUOUS
Status: DISCONTINUED | OUTPATIENT
Start: 2019-03-07 | End: 2019-03-07 | Stop reason: HOSPADM

## 2019-03-07 RX ORDER — SODIUM CHLORIDE 9 MG/ML
INJECTION, SOLUTION INTRAVENOUS CONTINUOUS PRN
Status: DISCONTINUED | OUTPATIENT
Start: 2019-03-07 | End: 2019-03-07 | Stop reason: SURG

## 2019-03-07 RX ORDER — FENTANYL CITRATE 50 UG/ML
INJECTION, SOLUTION INTRAMUSCULAR; INTRAVENOUS AS NEEDED
Status: DISCONTINUED | OUTPATIENT
Start: 2019-03-07 | End: 2019-03-07 | Stop reason: SURG

## 2019-03-07 RX ORDER — LIDOCAINE HYDROCHLORIDE 10 MG/ML
INJECTION, SOLUTION INFILTRATION; PERINEURAL AS NEEDED
Status: DISCONTINUED | OUTPATIENT
Start: 2019-03-07 | End: 2019-03-07 | Stop reason: SURG

## 2019-03-07 RX ORDER — PROPOFOL 10 MG/ML
INJECTION, EMULSION INTRAVENOUS AS NEEDED
Status: DISCONTINUED | OUTPATIENT
Start: 2019-03-07 | End: 2019-03-07 | Stop reason: SURG

## 2019-03-07 RX ADMIN — PHENYLEPHRINE HYDROCHLORIDE 100 MCG: 10 INJECTION INTRAVENOUS at 11:56

## 2019-03-07 RX ADMIN — PHENYLEPHRINE HYDROCHLORIDE 100 MCG: 10 INJECTION INTRAVENOUS at 11:31

## 2019-03-07 RX ADMIN — PROPOFOL 30 MG: 10 INJECTION, EMULSION INTRAVENOUS at 11:28

## 2019-03-07 RX ADMIN — PROPOFOL 70 MG: 10 INJECTION, EMULSION INTRAVENOUS at 11:27

## 2019-03-07 RX ADMIN — PHENYLEPHRINE HYDROCHLORIDE 200 MCG: 10 INJECTION INTRAVENOUS at 12:01

## 2019-03-07 RX ADMIN — FENTANYL CITRATE 25 MCG: 50 INJECTION, SOLUTION INTRAMUSCULAR; INTRAVENOUS at 11:44

## 2019-03-07 RX ADMIN — LIDOCAINE HYDROCHLORIDE 50 MG: 10 INJECTION, SOLUTION INFILTRATION; PERINEURAL at 11:27

## 2019-03-07 RX ADMIN — FENTANYL CITRATE 25 MCG: 50 INJECTION, SOLUTION INTRAMUSCULAR; INTRAVENOUS at 11:27

## 2019-03-07 RX ADMIN — FENTANYL CITRATE 25 MCG: 50 INJECTION, SOLUTION INTRAMUSCULAR; INTRAVENOUS at 11:35

## 2019-03-07 RX ADMIN — PHENYLEPHRINE HYDROCHLORIDE 200 MCG: 10 INJECTION INTRAVENOUS at 11:48

## 2019-03-07 RX ADMIN — SODIUM CHLORIDE: 0.9 INJECTION, SOLUTION INTRAVENOUS at 11:19

## 2019-03-07 RX ADMIN — SODIUM CHLORIDE 50 ML/HR: 0.9 INJECTION, SOLUTION INTRAVENOUS at 10:49

## 2019-03-07 RX ADMIN — FENTANYL CITRATE 25 MCG: 50 INJECTION, SOLUTION INTRAMUSCULAR; INTRAVENOUS at 11:40

## 2019-03-07 RX ADMIN — PROPOFOL 100 MCG/KG/MIN: 10 INJECTION, EMULSION INTRAVENOUS at 11:27

## 2019-03-07 NOTE — ANESTHESIA PREPROCEDURE EVALUATION
Review of Systems/Medical History  Patient summary reviewed  Chart reviewed      Cardiovascular  Hypertension controlled,    Pulmonary  Negative pulmonary ROS        GI/Hepatic  Negative GI/hepatic ROS          Negative  ROS        Endo/Other  Negative endo/other ROS      GYN  Negative gynecology ROS          Hematology  Anemia ,     Musculoskeletal  Negative musculoskeletal ROS        Neurology    Headaches,    Psychology   Negative psychology ROS              Physical Exam    Airway    Mallampati score: II  TM Distance: >3 FB  Neck ROM: full     Dental   No notable dental hx     Cardiovascular  Rhythm: regular, Rate: normal, Cardiovascular exam normal    Pulmonary  Pulmonary exam normal Breath sounds clear to auscultation,     Other Findings        Anesthesia Plan  ASA Score- 2     Anesthesia Type- IV sedation with anesthesia with ASA Monitors  Additional Monitors:   Airway Plan:         Plan Factors-    Induction- intravenous  Postoperative Plan- Plan for postoperative opioid use  Informed Consent- Anesthetic plan and risks discussed with patient  I personally reviewed this patient with the CRNA  Discussed and agreed on the Anesthesia Plan with the CRNA  Greg Armijo

## 2019-03-07 NOTE — ANESTHESIA POSTPROCEDURE EVALUATION
Post-Op Assessment Note    CV Status:  Stable  Pain Score: 0    Pain management: adequate     Mental Status:  Alert and awake   Hydration Status:  Euvolemic   PONV Controlled:  Controlled   Airway Patency:  Patent   Post Op Vitals Reviewed: Yes      Staff: AnesthesiologistJOSE           /67 (03/07/19 1208)    Temp      Pulse 88 (03/07/19 1208)   Resp 16 (03/07/19 1208)    SpO2 98 % (03/07/19 1208)

## 2019-03-07 NOTE — H&P
History and Physical - [facility]  Bridgette Metz 63 y o  female MRN: [id number]    HPI: Bridgette Metz is a 63y o  year old female who presents with dilated CBD  Review of Systems    Historical Information   Past Medical History:   Diagnosis Date    Acute DVT (deep venous thrombosis) (HCC)     of LLE>     Congenital prolapsed rectum     History of biliary stent insertion     History of chemotherapy     History of transfusion     x2 s/p chemo treatments, no reaction    Hydronephrosis     right kidney    Hypertension     Malignant neoplasm of overlapping sites of left female breast [address] [address]     Migraine      Past Surgical History:   Procedure Laterality Date    BREAST BIOPSY      COLON SURGERY      colon resection    CYSTOSCOPY N/A 3/4/2019    Procedure: CYSTOSCOPY WITH BIOPSIES AND FULGERATION AND REDCUTION OF RECTUM PROLAPSE;  Surgeon: Luma Wheat MD;  [facility];  Service: Urology    ERCP N/A 1/4/2019    Procedure: ENDOSCOPIC RETROGRADE CHOLANGIOPANCREATOGRAPHY (ERCP);   Surgeon: Kaela Pozo MD;  [facility];  Service: Gastroenterology    INSTILLATION MYTOMYCIN N/A 3/4/2019    Procedure: INSTILLATION MITOMYCIN;  Surgeon: Luma Wheat MD;  [facility];  Service: Urology    [place] INSJ TUNNELED CTR VAD W/SUBQ PORT AGE 5 YR/> N/A 6/26/2018    Procedure: INSERTION VENOUS PORT ( PORT-A-CATH) IR;  Surgeon: Ronny Angel DO;  [facility];  Service: Interventional Radiology    [place] MASTECTOMY, MODIFIED RADICAL Left 5/15/2018    Procedure: BREAST MODIFIED RADICAL MASTECTOMY;  Surgeon: Melania Monroy MD;  [facility];  Service: Surgical Oncology    TUBAL LIGATION      US GUIDANCE BREAST BIOPSY LEFT EACH ADDITIONAL Left 4/3/2018    US GUIDED BREAST BIOPSY LEFT COMPLETE Left 4/3/2018    US GUIDED BREAST LYMPH NODE BIOPSY LEFT Left 4/3/2018     Social History   Social History     Substance and Sexual Activity   Alcohol Use No Social History     Substance and Sexual Activity   Drug Use No     Social History     Tobacco Use   Smoking Status Never Smoker   Smokeless Tobacco Never Used     Family History   Problem Relation Age of Onset    No Known Problems Mother     No Known Problems Father        Meds/Allergies     Medications Prior to Admission   Medication    LORazepam (ATIVAN) 1 mg tablet    sertraline (ZOLOFT) 50 mg tablet    verapamil (VERELAN) 240 MG 24 hr capsule    zolpidem (AMBIEN) 10 mg tablet    docusate sodium (COLACE) 100 mg capsule    Elastic Bandages & Supports MISC    ELIQUIS 5 MG    gabapentin (NEURONTIN) 100 mg capsule    oxyCODONE-acetaminophen (PERCOCET) 5-325 mg per tablet    SUMAtriptan (IMITREX) 100 mg tablet       Allergies   Allergen Reactions    Gluten Meal GI Intolerance    Lactose GI Intolerance       Objective     Blood pressure 129/79, pulse 99, temperature 98 3 °F (36 8 °C), temperature source Tympanic, resp  rate 20, height 5' 6" (1 676 m), weight 57 6 kg (127 lb), SpO2 97 %, not currently breastfeeding  PHYSICAL EXAM    Gen: NAD  CV: RRR  CHEST: Clear  ABD: soft, NT/ND  EXT: no edema  Neuro: AAO      ASSESSMENT/PLAN:  This is a 63y o  year old female here for dilated CBD      PLAN:   Procedure: egd, eus, removal of cbd stent

## 2019-03-07 NOTE — TELEPHONE ENCOUNTER
Spoke with [employer]  Patient is schedule for 04/03/19 at 3:30 per patient  Appointment for colorectal is scheduled for 03/25/19

## 2019-03-07 NOTE — OP NOTE
ENDOSCOPIC ULTRASOUND    PROCEDURE: ENDOSCOPIC ULTRASOUND    SEDATION: Monitored anesthesia care, check anesthesia records    ASA Class: 3    INDICATIONS:  A dilated CBD and elevated LFTs    CONSENT:  Informed consent was obtained for the procedure, including sedation after explaining the risks and benefits of the procedure  Risks including but not limited to bleeding, perforation, infection, and missed lesion  PREPARATION:   Telemetry, pulse oximetry, blood pressure were monitored throughout the procedure  Patient was identified by myself both verbally and by visual inspection of ID band  DESCRIPTION:   Patient was placed in the left lateral decubitus position and was sedated with the above medication  The echoendoscope was introduced in to the oropharynx and the esophagus was intubated under direct visualization  Scope was passed down the esophagus up to 3rd part of the duodenum  A careful inspection was made as the echoendoscope was withdrawn, including a retroflexed view of the stomach; findings and interventions are described below  FINDINGS:    EGD FINDINGS:  Limited endoscopic view with oblique viewing echoendoscope was unremarkable  #1  Esophagus- normal esophagus    #2  Stomach- mild gastritis    #3  Duodenum- duodenal stent    EUS FINDINGS:      The celiac axis was normal  The splenic artery was observed to have a dilated aortic calcification what appears to be partial thrombus, this could have been the splenic vein as well  Pancreatic body was normal with a normal pancreatic duct which tapered toward the tail of the pancreas  The findings on CT scan and MRI correlate structure, not a primary pancreatic stricture  Scope was advanced into the duodenum with common bile duct was identified maximal diameter of 17 millimeters tapering smoothly to the ampulla with the previously placed biliary stent observed  Ampulla itself was normal     Pancreatic divisum was clearly seen      There is no pancreatic lesion to explain market dilation of the common bile duct, the ampulla itself was normal in appearan  The common bile duct stent was removed successfully in its entirety without any complications  Left lobe of liver was unremarkable  There is no lymphadenopathy  Normal celiac, SMA and portal confluence  Gallbladder was identified was unremarkable with no stones or sludge identified  IMPRESSIONS:      Markedly dilated common bile duct, CBD stent was removed with no lesion or source of biliary obstruction  Pancreas divisum was seen  No masses, seen within the pancreas, no lymphadenopathy, normal vascular structures  Splenic artery was dilated, ectatic with calcification and what appeared to be possible filling defect within it with decreased flow  Likely correlates with finding seen on recent CT scan  RECOMMENDATIONS:     Discharge home  Resume regular diet  Recommend repeating LFTs in 2 to 3 weeks  Call with any abdominal pain, bleeding, fevers  Resume home medications including blood thinners    COMPLICATIONS:  None; patient tolerated the procedure well      SPECIMENS:  * No specimens in log *    ESTIMATED BLOOD LOSS:  Minimal

## 2019-03-07 NOTE — DISCHARGE INSTRUCTIONS
Discharge home  Resume regular diet  Recommend repeating LFTs in 2 to 3 weeks    Lab slips have been provided  Call with any abdominal pain, bleeding, fevers  Resume home medications including blood thinners

## 2019-03-08 ENCOUNTER — APPOINTMENT (OUTPATIENT)
Dept: RADIATION ONCOLOGY | Facility: HOSPITAL | Age: 59
End: 2019-03-08
Attending: RADIOLOGY
Payer: COMMERCIAL

## 2019-03-08 PROCEDURE — 77412 RADIATION TX DELIVERY LVL 3: CPT | Performed by: RADIOLOGY

## 2019-03-08 PROCEDURE — 77280 THER RAD SIMULAJ FIELD SMPL: CPT | Performed by: RADIOLOGY

## 2019-03-08 PROCEDURE — 77331 SPECIAL RADIATION DOSIMETRY: CPT | Performed by: RADIOLOGY

## 2019-03-11 ENCOUNTER — APPOINTMENT (OUTPATIENT)
Dept: RADIATION ONCOLOGY | Facility: HOSPITAL | Age: 59
End: 2019-03-11
Attending: RADIOLOGY
Payer: COMMERCIAL

## 2019-03-11 PROCEDURE — 77412 RADIATION TX DELIVERY LVL 3: CPT | Performed by: RADIOLOGY

## 2019-03-11 PROCEDURE — 77387 GUIDANCE FOR RADJ TX DLVR: CPT | Performed by: RADIOLOGY

## 2019-03-12 ENCOUNTER — TELEPHONE (OUTPATIENT)
Dept: GASTROENTEROLOGY | Facility: AMBULARY SURGERY CENTER | Age: 59
End: 2019-03-12

## 2019-03-12 ENCOUNTER — APPOINTMENT (OUTPATIENT)
Dept: RADIATION ONCOLOGY | Facility: HOSPITAL | Age: 59
End: 2019-03-12
Attending: RADIOLOGY
Payer: COMMERCIAL

## 2019-03-12 PROCEDURE — 77412 RADIATION TX DELIVERY LVL 3: CPT | Performed by: RADIOLOGY

## 2019-03-12 PROCEDURE — 77387 GUIDANCE FOR RADJ TX DLVR: CPT | Performed by: RADIOLOGY

## 2019-03-12 NOTE — TELEPHONE ENCOUNTER
----- Message from Alicia Slater PA-C sent at 3/12/2019  1:07 PM EDT -----  Regarding: labs/ f/u  Can you please contact Janet Juarez to let her know she should have labs that Dr Consuelo Morgan ordered completed in 1-2 weeks  Please have her scheduled for follow up with Dr Denton Kinney in about 6-8 wks  Thank you    ----- Message -----  From: Anthony Carnes MD  Sent: 3/7/2019  12:09 PM  To: Miriam Pham MD, Alicia Slater PA-C    EUS was unremarkable  No lesion in the pancreas to explain the markedly dilated CBD  This stent was removed  Ampulla was normal   Lesion seen on the CT scan is likely a splenic artery aneurysm which is partially calcified and possibly thrombosed partially  Unclear why she has some markedly dilated common bile duct  No evidence of stones or sludge  I have ordered repeat laboratory studies for her to have in the next couple of weeks  Please follow-up

## 2019-03-13 ENCOUNTER — APPOINTMENT (OUTPATIENT)
Dept: RADIATION ONCOLOGY | Facility: HOSPITAL | Age: 59
End: 2019-03-13
Attending: RADIOLOGY
Payer: COMMERCIAL

## 2019-03-13 PROCEDURE — 77412 RADIATION TX DELIVERY LVL 3: CPT | Performed by: RADIOLOGY

## 2019-03-13 PROCEDURE — 77387 GUIDANCE FOR RADJ TX DLVR: CPT | Performed by: RADIOLOGY

## 2019-03-14 ENCOUNTER — APPOINTMENT (OUTPATIENT)
Dept: RADIATION ONCOLOGY | Facility: HOSPITAL | Age: 59
End: 2019-03-14
Attending: RADIOLOGY
Payer: COMMERCIAL

## 2019-03-14 PROCEDURE — 77387 GUIDANCE FOR RADJ TX DLVR: CPT | Performed by: RADIOLOGY

## 2019-03-14 PROCEDURE — 77336 RADIATION PHYSICS CONSULT: CPT | Performed by: RADIOLOGY

## 2019-03-14 PROCEDURE — 77412 RADIATION TX DELIVERY LVL 3: CPT | Performed by: RADIOLOGY

## 2019-03-15 NOTE — PATIENT INSTRUCTIONS
Hematuria   WHAT YOU NEED TO KNOW:   What is hematuria? Hematuria is blood in your urine  Your urine may be bright red to dark brown  What other signs and symptoms might I have with hematuria? · Fever     · Nausea and vomiting     · Pain or bruising on your lower back or sides     · Pain when you urinate     · More urination than usual, or the need to urinate right away  What causes hematuria? Ask your healthcare provider for more information about these and other causes of hematuria:  · Urinary tract infection    · Kidney or bladder stones    · Swollen prostate    · Kidney disease    · Abdomen or pelvic injury    · Kidney, bladder, or prostate cancer    · Intense exercise  How is hematuria diagnosed? Your healthcare provider will ask when you first saw a change in the color of your urine  Tell him about any medical conditions or medicines you take  Some medicines can damage your kidneys or increase your risk for bleeding  You may need any of the following:  · Blood and urine tests  may show infection and how well your kidneys are working  · An x-ray, ultrasound, or CT  may show the cause of your hematuria  You may be given contrast liquid to help your urinary tract show up better in the pictures  Tell the healthcare provider if you have ever had an allergic reaction to contrast liquid  How is hematuria treated? Hematuria may go away without treatment  You may need medicines to treat an infection  Treatment depends on the cause of your hematuria  Ask your healthcare provider for more information about the treatment you may need  How can I manage my symptoms? Drink liquids as directed  You may need to drink extra liquids to help flush the blood from your body through your urine  Water is the best liquid to drink  Ask how much liquid to drink each day and which liquids are best for you  When should I seek immediate care? · You have blood in your urine after a new injury, such as a fall       · You are urinating very small amounts or not at all  · You feel like you cannot empty your bladder  · You have severe back or side pain that does not go away with treatment  When should I contact my healthcare provider? · You have a fever that gets worse or does not go away with treatment  · You cannot keep liquids or medicines down  · Your urine gets darker, even after you drink extra liquids  · You have questions or concerns about your condition, treatment, or care  CARE AGREEMENT:   You have the right to help plan your care  Learn about your health condition and how it may be treated  Discuss treatment options with your caregivers to decide what care you want to receive  You always have the right to refuse treatment  The above information is an  only  It is not intended as medical advice for individual conditions or treatments  Talk to your doctor, nurse or pharmacist before following any medical regimen to see if it is safe and effective for you  © 2017 [employer] Information is for End User's use only and may not be sold, redistributed or otherwise used for commercial purposes  All illustrations and images included in CareNotes® are the copyrighted property of A D A M [employer] Inc  or [employer] Butterfield

## 2019-03-15 NOTE — PROGRESS NOTES
Problem List Items Addressed This Visit        Genitourinary    Hydronephrosis     Patient with longstanding right-sided hydronephrosis, severe, asymptomatic from this perspective, no intervention is indicated         Hemorrhagic cystitis - Primary     Patient is status post cystoscopy and bladder biopsy with fulguration  Pathology was negative in showed only inflammation  The patient was very happy to hear this news  She has no bleeding at this time and is doing well from a urologic perspective  Plan:  She will follow up with us on a p r n  Basis         RESOLVED: Gross hematuria                Assessment and plan:     Please see problem oriented charting for the assessment plan of today's urological complaints    Kael Castle MD      Chief Complaint     Chief Complaint   Patient presents with    Post-op    TUR-BT    History of hemorrhagic cystitis  Right hydronephrosis      History of Present Illness     Griselda Cruz is a 63 y o  woman with an extensive medical an oncologic history  She has previously seen me for incidentally noted severe hydronephrosis on the right with atrophic renal parenchyma as well as recurrent hemorrhagic cystitis  Her cystoscopy here in the office sometime ago showed no bloody efflux from the right ureteral orifice, but did show a inflamed area at the dome and posterior wall the bladder  She underwent cystoscopy and bladder biopsy with fulguration and instillation of mitomycin-C sometime ago  Pathology was reviewed with her today which is negative for malignancy and she was happy to hear this news  At the time of surgery this red area was also fulgurated and she has had no issues with recurrent bleeding at this time  She did have significant rectal prolapse at the time surgery which was manually reduced, this is a recurrent problem for her and previously she underwent surgical repair for this  Previous surgical repair lasted for 7 years    I had referred her again for consideration of repeat surgical repair if desired by her  On review of systems today she denies dysuria, incontinence, hesitancy of urination, gross hematuria, urgency, nocturia, she feels empty after voiding and stream quality is good  No other complaints  The following portions of the patient's history were reviewed and updated as appropriate: allergies, current medications, past family history, past medical history, past social history, past surgical history and problem list     Detailed Urologic History     - please refer to HPI    Review of Systems     Review of Systems   Constitutional: Negative  HENT: Negative  Eyes: Negative  Respiratory: Negative  Cardiovascular: Negative  Gastrointestinal: Negative  Endocrine: Negative  Genitourinary: Negative  Musculoskeletal: Negative  Skin: Negative  Allergic/Immunologic: Negative  Neurological: Negative  Hematological: Negative  Psychiatric/Behavioral: Negative  Allergies     Allergies   Allergen Reactions    Gluten Meal GI Intolerance    Lactose GI Intolerance       Physical Exam     Physical Exam   Constitutional: She is oriented to person, place, and time  No distress  Thin woman, in good spirits, appears nontoxic   HENT:   Head: Normocephalic and atraumatic  Right Ear: External ear normal    Left Ear: External ear normal    Nose: Nose normal    Eyes: Pupils are equal, round, and reactive to light  Conjunctivae and EOM are normal  Right eye exhibits no discharge  Left eye exhibits no discharge  No scleral icterus  Neck: Normal range of motion  Neck supple  No tracheal deviation present  No thyromegaly present  Cardiovascular: Normal rate, regular rhythm and intact distal pulses  Pulmonary/Chest: Effort normal  No stridor  No respiratory distress  She has no wheezes  Abdominal: Soft  She exhibits no distension and no mass  There is no tenderness  There is no rebound and no guarding  No hernia  Genitourinary:   Genitourinary Comments: No suprapubic tenderness   Musculoskeletal: She exhibits no edema, tenderness or deformity  Neurological: She is alert and oriented to person, place, and time  No cranial nerve deficit  Coordination normal    Skin: Skin is warm and dry  No rash noted  She is not diaphoretic  No erythema  No pallor  Psychiatric: She has a normal mood and affect  Her behavior is normal  Judgment and thought content normal    Nursing note and vitals reviewed            Vital Signs  Vitals:    03/19/19 0959   BP: 110/84   BP Location: Right arm   Patient Position: Sitting   Cuff Size: Adult   Pulse: 96   Weight: 56 7 kg (125 lb)   Height: 5' 6" (1 676 m)         Current Medications       Current Outpatient Medications:     docusate sodium (COLACE) 100 mg capsule, Take 1 capsule (100 mg total) by mouth 3 (three) times a day for 30 days, Disp: 90 capsule, Rfl: 0    Elastic Bandages & Supports MISC, by Does not apply route daily, Disp: 6 each, Rfl: 0    ELIQUIS 5 MG, TAKE 1 TABLET BY MOUTH TWICE A DAY, Disp: 60 tablet, Rfl: 2    gabapentin (NEURONTIN) 100 mg capsule, Take 100 mg by mouth 3 (three) times a day, Disp: , Rfl: 4    LORazepam (ATIVAN) 1 mg tablet, Take 1 mg by mouth 2 (two) times a day as needed, Disp: , Rfl: 0    sertraline (ZOLOFT) 50 mg tablet, Take 50 mg by mouth daily at bedtime, Disp: , Rfl: 0    sulfamethoxazole-trimethoprim (BACTRIM DS) 800-160 mg per tablet, Take 1 tablet by mouth 2 (two) times a day, Disp: , Rfl: 0    SUMAtriptan (IMITREX) 100 mg tablet, Take 100 mg by mouth once as needed for migraine, Disp: , Rfl:     verapamil (VERELAN) 240 MG 24 hr capsule, Take 240 mg by mouth daily, Disp: , Rfl: 0    zolpidem (AMBIEN) 10 mg tablet, Take 10 mg by mouth daily at bedtime as needed, Disp: , Rfl: 1      Active Problems     Patient Active Problem List   Diagnosis    Malignant neoplasm of overlapping sites of left breast in female, estrogen receptor positive [facility]    Hydronephrosis    Cellulitis    History of DVT (deep vein thrombosis)    Anemia following use of chemotherapeutic drug    Hematuria    Hemorrhagic cystitis    Heme positive stool    Essential hypertension    Acute deep vein thrombosis (DVT) of left lower extremity (HCC)    Hyperbilirubinemia    Acute blood loss anemia    Moderate protein-calorie malnutrition (HCC)    Immunocompromised (HCC)    Lesion of bladder    Blood loss anemia    Dilated cbd, acquired         Past Medical History     Past Medical History:   Diagnosis Date    Acute DVT (deep venous thrombosis) (HCC)     of LLE>     Congenital prolapsed rectum     History of biliary stent insertion     History of chemotherapy     History of transfusion     x2 s/p chemo treatments, no reaction    Hydronephrosis     right kidney    Hypertension     Malignant neoplasm of overlapping sites of left female breast ([facility] )     Migraine          Surgical History     Past Surgical History:   Procedure Laterality Date    BREAST BIOPSY      COLON SURGERY      colon resection    CYSTOSCOPY N/A 3/4/2019    Procedure: CYSTOSCOPY WITH BIOPSIES AND FULGERATION AND REDCUTION OF RECTUM PROLAPSE;  Surgeon: Orquidea Morales MD;  [facility];  Service: Urology    ERCP N/A 1/4/2019    Procedure: ENDOSCOPIC RETROGRADE CHOLANGIOPANCREATOGRAPHY (ERCP); Surgeon: Carley Harris MD;  [facility];  Service: Gastroenterology    INSTILLATION MYTOMYCIN N/A 3/4/2019    Procedure: Mallika Bowen;  Surgeon: Orquidea Morales MD;  [facility];  Service: Urology    OK EDG US EXAM SURGICAL ALTER STOM DUODENUM/JEJUNUM N/A 3/7/2019    Procedure: LINEAR ENDOSCOPIC U/S;  Surgeon: Cherylene Needy, MD;  [facility]; Service: Gastroenterology    OK ESOPHAGOGASTRODUODENOSCOPY TRANSORAL DIAGNOSTIC N/A 3/7/2019    Procedure: ESOPHAGOGASTRODUODENOSCOPY (EGD); Surgeon: Cherylene Needy, MD;  [facility];   Service: Gastroenterology    IL INSJ TUNNELED CTR VAD W/SUBQ PORT AGE 5 YR/> N/A 6/26/2018    Procedure: INSERTION VENOUS PORT ( PORT-A-CATH) IR;  Surgeon: Gina Joy DO;  [facility];  Service: Interventional Radiology    IL MASTECTOMY, MODIFIED RADICAL Left 5/15/2018    Procedure: BREAST MODIFIED RADICAL MASTECTOMY;  Surgeon: Basilio Rushing MD;  [facility];  Service: Surgical Oncology    TUBAL LIGATION      US GUIDANCE BREAST BIOPSY LEFT EACH ADDITIONAL Left 4/3/2018    US GUIDED BREAST BIOPSY LEFT COMPLETE Left 4/3/2018    US GUIDED BREAST LYMPH NODE BIOPSY LEFT Left 4/3/2018         Family History     Family History   Problem Relation Age of Onset    No Known Problems Mother     No Known Problems Father          Social History     Social History     Social History     Tobacco Use   Smoking Status Never Smoker   Smokeless Tobacco Never Used         Pertinent Lab Values     Lab Results   Component Value Date    CREATININE 0 99 03/18/2019                 Pertinent Imaging      No new imaging for my review

## 2019-03-18 ENCOUNTER — APPOINTMENT (OUTPATIENT)
Dept: LAB | Facility: CLINIC | Age: 59
End: 2019-03-18
Payer: COMMERCIAL

## 2019-03-18 DIAGNOSIS — K83.8 DILATED CBD, ACQUIRED: ICD-10-CM

## 2019-03-18 LAB
ALBUMIN SERPL BCP-MCNC: 3.6 G/DL (ref 3.5–5)
ALP SERPL-CCNC: 78 U/L (ref 46–116)
ALT SERPL W P-5'-P-CCNC: 42 U/L (ref 12–78)
ANION GAP SERPL CALCULATED.3IONS-SCNC: 12 MMOL/L (ref 4–13)
AST SERPL W P-5'-P-CCNC: 61 U/L (ref 5–45)
BILIRUB DIRECT SERPL-MCNC: 0.12 MG/DL (ref 0–0.2)
BILIRUB SERPL-MCNC: 0.2 MG/DL (ref 0.2–1)
BUN SERPL-MCNC: 9 MG/DL (ref 5–25)
CALCIUM SERPL-MCNC: 9.6 MG/DL (ref 8.3–10.1)
CHLORIDE SERPL-SCNC: 96 MMOL/L (ref 100–108)
CO2 SERPL-SCNC: 26 MMOL/L (ref 21–32)
CREAT SERPL-MCNC: 0.99 MG/DL (ref 0.6–1.3)
GFR SERPL CREATININE-BSD FRML MDRD: 63 ML/MIN/1.73SQ M
GLUCOSE P FAST SERPL-MCNC: 83 MG/DL (ref 65–99)
POTASSIUM SERPL-SCNC: 4.8 MMOL/L (ref 3.5–5.3)
PROT SERPL-MCNC: 7.4 G/DL (ref 6.4–8.2)
SODIUM SERPL-SCNC: 134 MMOL/L (ref 136–145)

## 2019-03-18 PROCEDURE — 80048 BASIC METABOLIC PNL TOTAL CA: CPT

## 2019-03-18 PROCEDURE — 80076 HEPATIC FUNCTION PANEL: CPT

## 2019-03-19 ENCOUNTER — OFFICE VISIT (OUTPATIENT)
Dept: UROLOGY | Facility: CLINIC | Age: 59
End: 2019-03-19
Payer: COMMERCIAL

## 2019-03-19 VITALS
SYSTOLIC BLOOD PRESSURE: 110 MMHG | HEART RATE: 96 BPM | HEIGHT: 66 IN | WEIGHT: 125 LBS | DIASTOLIC BLOOD PRESSURE: 84 MMHG | BODY MASS INDEX: 20.09 KG/M2

## 2019-03-19 DIAGNOSIS — N30.91 HEMORRHAGIC CYSTITIS: Primary | ICD-10-CM

## 2019-03-19 DIAGNOSIS — N13.30 HYDRONEPHROSIS, UNSPECIFIED HYDRONEPHROSIS TYPE: ICD-10-CM

## 2019-03-19 DIAGNOSIS — R31.0 GROSS HEMATURIA: ICD-10-CM

## 2019-03-19 PROCEDURE — 99214 OFFICE O/P EST MOD 30 MIN: CPT | Performed by: UROLOGY

## 2019-03-19 RX ORDER — SULFAMETHOXAZOLE AND TRIMETHOPRIM 800; 160 MG/1; MG/1
1 TABLET ORAL 2 TIMES DAILY
Refills: 0 | COMMUNITY
Start: 2019-03-13 | End: 2019-04-12 | Stop reason: ALTCHOICE

## 2019-03-19 NOTE — ASSESSMENT & PLAN NOTE
Patient with longstanding right-sided hydronephrosis, severe, asymptomatic from this perspective, no intervention is indicated

## 2019-03-19 NOTE — LETTER
March 19, 2019     Teresa Metz, [address]  [address] [facility]    Patient: Darrick Rushing   YOB: 1960   Date of Visit: 3/19/2019       Dear Dr Tricia Armando: Thank you for referring Charo Jurado to me for evaluation  Below are my notes for this consultation  If you have questions, please do not hesitate to call me  I look forward to following your patient along with you  Sincerely,        Juan Miles MD        CC: MD Juan Simpson MD  3/19/2019 10:14 AM  Sign at close encounter       Problem List Items Addressed This Visit        Genitourinary    Hydronephrosis     Patient with longstanding right-sided hydronephrosis, severe, asymptomatic from this perspective, no intervention is indicated         Hemorrhagic cystitis - Primary     Patient is status post cystoscopy and bladder biopsy with fulguration  Pathology was negative in showed only inflammation  The patient was very happy to hear this news  She has no bleeding at this time and is doing well from a urologic perspective  Plan:  She will follow up with us on a p r n  Basis         RESOLVED: Gross hematuria                Assessment and plan:     Please see problem oriented charting for the assessment plan of today's urological complaints    Juan Miles MD      Chief Complaint     Chief Complaint   Patient presents with    Post-op    TUR-BT    History of hemorrhagic cystitis  Right hydronephrosis      History of Present Illness     Darrick Rushing is a 63 y o  woman with an extensive medical an oncologic history  She has previously seen me for incidentally noted severe hydronephrosis on the right with atrophic renal parenchyma as well as recurrent hemorrhagic cystitis  Her cystoscopy here in the office sometime ago showed no bloody efflux from the right ureteral orifice, but did show a inflamed area at the dome and posterior wall the bladder      She underwent cystoscopy and bladder biopsy with fulguration and instillation of mitomycin-C sometime ago  Pathology was reviewed with her today which is negative for malignancy and she was happy to hear this news  At the time of surgery this red area was also fulgurated and she has had no issues with recurrent bleeding at this time  She did have significant rectal prolapse at the time surgery which was manually reduced, this is a recurrent problem for her and previously she underwent surgical repair for this  Previous surgical repair lasted for 7 years  I had referred her again for consideration of repeat surgical repair if desired by her  On review of systems today she denies dysuria, incontinence, hesitancy of urination, gross hematuria, urgency, nocturia, she feels empty after voiding and stream quality is good  No other complaints  The following portions of the patient's history were reviewed and updated as appropriate: allergies, current medications, past family history, past medical history, past social history, past surgical history and problem list     Detailed Urologic History     - please refer to HPI    Review of Systems     Review of Systems   Constitutional: Negative  HENT: Negative  Eyes: Negative  Respiratory: Negative  Cardiovascular: Negative  Gastrointestinal: Negative  Endocrine: Negative  Genitourinary: Negative  Musculoskeletal: Negative  Skin: Negative  Allergic/Immunologic: Negative  Neurological: Negative  Hematological: Negative  Psychiatric/Behavioral: Negative  Allergies     Allergies   Allergen Reactions    Gluten Meal GI Intolerance    Lactose GI Intolerance       Physical Exam     Physical Exam   Constitutional: She is oriented to person, place, and time  No distress  Thin woman, in good spirits, appears nontoxic   HENT:   Head: Normocephalic and atraumatic     Right Ear: External ear normal    Left Ear: External ear normal    Nose: Nose normal    Eyes: Pupils are equal, round, and reactive to light  Conjunctivae and EOM are normal  Right eye exhibits no discharge  Left eye exhibits no discharge  No scleral icterus  Neck: Normal range of motion  Neck supple  No tracheal deviation present  No thyromegaly present  Cardiovascular: Normal rate, regular rhythm and intact distal pulses  Pulmonary/Chest: Effort normal  No stridor  No respiratory distress  She has no wheezes  Abdominal: Soft  She exhibits no distension and no mass  There is no tenderness  There is no rebound and no guarding  No hernia  Genitourinary:   Genitourinary Comments: No suprapubic tenderness   Musculoskeletal: She exhibits no edema, tenderness or deformity  Neurological: She is alert and oriented to person, place, and time  No cranial nerve deficit  Coordination normal    Skin: Skin is warm and dry  No rash noted  She is not diaphoretic  No erythema  No pallor  Psychiatric: She has a normal mood and affect  Her behavior is normal  Judgment and thought content normal    Nursing note and vitals reviewed            Vital Signs  Vitals:    03/19/19 0959   BP: 110/84   BP Location: Right arm   Patient Position: Sitting   Cuff Size: Adult   Pulse: 96   Weight: 56 7 kg (125 lb)   Height: 5' 6" (1 676 m)         Current Medications       Current Outpatient Medications:     docusate sodium (COLACE) 100 mg capsule, Take 1 capsule (100 mg total) by mouth 3 (three) times a day for 30 days, Disp: 90 capsule, Rfl: 0    Elastic Bandages & Supports MISC, by Does not apply route daily, Disp: 6 each, Rfl: 0    ELIQUIS 5 MG, TAKE 1 TABLET BY MOUTH TWICE A DAY, Disp: 60 tablet, Rfl: 2    gabapentin (NEURONTIN) 100 mg capsule, Take 100 mg by mouth 3 (three) times a day, Disp: , Rfl: 4    LORazepam (ATIVAN) 1 mg tablet, Take 1 mg by mouth 2 (two) times a day as needed, Disp: , Rfl: 0    sertraline (ZOLOFT) 50 mg tablet, Take 50 mg by mouth daily at bedtime, Disp: , Rfl: 0    sulfamethoxazole-trimethoprim (BACTRIM DS) 800-160 mg per tablet, Take 1 tablet by mouth 2 (two) times a day, Disp: , Rfl: 0    SUMAtriptan (IMITREX) 100 mg tablet, Take 100 mg by mouth once as needed for migraine, Disp: , Rfl:     verapamil (VERELAN) 240 MG 24 hr capsule, Take 240 mg by mouth daily, Disp: , Rfl: 0    zolpidem (AMBIEN) 10 mg tablet, Take 10 mg by mouth daily at bedtime as needed, Disp: , Rfl: 1      Active Problems     Patient Active Problem List   Diagnosis    Malignant neoplasm of overlapping sites of left breast in female, estrogen receptor positive ([facility] )    Hydronephrosis    Cellulitis    History of DVT (deep vein thrombosis)    Anemia following use of chemotherapeutic drug    Hematuria    Hemorrhagic cystitis    Heme positive stool    Essential hypertension    Acute deep vein thrombosis (DVT) of left lower extremity (HCC)    Hyperbilirubinemia    Acute blood loss anemia    Moderate protein-calorie malnutrition (HCC)    Immunocompromised (HCC)    Lesion of bladder    Blood loss anemia    Dilated cbd, acquired         Past Medical History     Past Medical History:   Diagnosis Date    Acute DVT (deep venous thrombosis) (HCC)     of LLE>     Congenital prolapsed rectum     History of biliary stent insertion     History of chemotherapy     History of transfusion     x2 s/p chemo treatments, no reaction    Hydronephrosis     right kidney    Hypertension     Malignant neoplasm of overlapping sites of left female breast ([facility] )     Migraine          Surgical History     Past Surgical History:   Procedure Laterality Date    BREAST BIOPSY      COLON SURGERY      colon resection    CYSTOSCOPY N/A 3/4/2019    Procedure: CYSTOSCOPY WITH BIOPSIES AND FULGERATION AND REDCUTION OF RECTUM PROLAPSE;  Surgeon: Araseli Vega MD;  [facility];  Service: Urology    ERCP N/A 1/4/2019    Procedure: ENDOSCOPIC RETROGRADE CHOLANGIOPANCREATOGRAPHY (ERCP);   Surgeon: Hermes Deutsch MD;  [facility];  Service: Gastroenterology    INSTILLATION MYTOMYCIN N/A 3/4/2019    Procedure: Patrizia iMshra;  Surgeon: Vivek Gómez MD;  [facility];  Service: Urology    MD EDG US EXAM SURGICAL ALTER STOM DUODENUM/JEJUNUM N/A 3/7/2019    Procedure: LINEAR ENDOSCOPIC U/S;  Surgeon: Scott Oliveira MD;  [facility]; Service: Gastroenterology    MD ESOPHAGOGASTRODUODENOSCOPY TRANSORAL DIAGNOSTIC N/A 3/7/2019    Procedure: ESOPHAGOGASTRODUODENOSCOPY (EGD); Surgeon: Scott Oliveira MD;  [facility];   Service: Gastroenterology    MD INSJ TUNNELED CTR VAD W/SUBQ PORT AGE 5 YR/> N/A 6/26/2018    Procedure: INSERTION VENOUS PORT ( PORT-A-CATH) IR;  Surgeon: Charo Yates DO;  [facility];  Service: Interventional Radiology    MD MASTECTOMY, MODIFIED RADICAL Left 5/15/2018    Procedure: BREAST MODIFIED RADICAL MASTECTOMY;  Surgeon: Netta Kaba MD;  [facility];  Service: Surgical Oncology    TUBAL LIGATION      US GUIDANCE BREAST BIOPSY LEFT EACH ADDITIONAL Left 4/3/2018    US GUIDED BREAST BIOPSY LEFT COMPLETE Left 4/3/2018    US GUIDED BREAST LYMPH NODE BIOPSY LEFT Left 4/3/2018         Family History     Family History   Problem Relation Age of Onset    No Known Problems Mother     No Known Problems Father          Social History     Social History     Social History     Tobacco Use   Smoking Status Never Smoker   Smokeless Tobacco Never Used         Pertinent Lab Values     Lab Results   Component Value Date    CREATININE 0 99 03/18/2019                 Pertinent Imaging      No new imaging for my review

## 2019-03-19 NOTE — ASSESSMENT & PLAN NOTE
Patient is status post cystoscopy and bladder biopsy with fulguration  Pathology was negative in showed only inflammation  The patient was very happy to hear this news  She has no bleeding at this time and is doing well from a urologic perspective  Plan:  She will follow up with us on a p r n   Basis

## 2019-03-20 ENCOUNTER — TELEPHONE (OUTPATIENT)
Dept: GASTROENTEROLOGY | Facility: AMBULARY SURGERY CENTER | Age: 59
End: 2019-03-20

## 2019-03-20 NOTE — LETTER
March 20, 2019    Arlen Kayser  [address] [facility] [address]      Dear Ms Hensley: We have attempted to reach you regarding your results  Upon receipt of this letter if you can call our office back          Sincerely,  Laurie Cobos, [address] Gastroenterology  [phone]

## 2019-03-20 NOTE — TELEPHONE ENCOUNTER
----- Message from Erin Bolanos MD sent at 3/20/2019  8:26 AM EDT -----  Please inform patient that laboratory studies including her liver tests are normal which is good news    Please make sure that she has follow-up with Dr Choudhury

## 2019-03-25 PROBLEM — K62.3 RECTAL PROLAPSE: Status: ACTIVE | Noted: 2019-03-25

## 2019-04-01 ENCOUNTER — TELEPHONE (OUTPATIENT)
Dept: UROLOGY | Facility: CLINIC | Age: 59
End: 2019-04-01

## 2019-04-02 ENCOUNTER — TELEPHONE (OUTPATIENT)
Dept: PREADMISSION TESTING | Facility: HOSPITAL | Age: 59
End: 2019-04-02

## 2019-04-03 ENCOUNTER — TRANSCRIBE ORDERS (OUTPATIENT)
Dept: LAB | Facility: CLINIC | Age: 59
End: 2019-04-03

## 2019-04-03 ENCOUNTER — APPOINTMENT (OUTPATIENT)
Dept: LAB | Facility: CLINIC | Age: 59
End: 2019-04-03
Payer: COMMERCIAL

## 2019-04-03 DIAGNOSIS — K62.3 RECTAL PROLAPSE: Primary | ICD-10-CM

## 2019-04-03 DIAGNOSIS — K62.3 RECTAL PROLAPSE: ICD-10-CM

## 2019-04-03 LAB
EST. AVERAGE GLUCOSE BLD GHB EST-MCNC: 71 MG/DL
HBA1C MFR BLD: 4.1 % (ref 4.2–6.3)

## 2019-04-03 PROCEDURE — 83036 HEMOGLOBIN GLYCOSYLATED A1C: CPT

## 2019-04-03 PROCEDURE — 36415 COLL VENOUS BLD VENIPUNCTURE: CPT

## 2019-04-03 PROCEDURE — 86901 BLOOD TYPING SEROLOGIC RH(D): CPT | Performed by: COLON & RECTAL SURGERY

## 2019-04-03 PROCEDURE — 86900 BLOOD TYPING SEROLOGIC ABO: CPT | Performed by: COLON & RECTAL SURGERY

## 2019-04-03 PROCEDURE — 86850 RBC ANTIBODY SCREEN: CPT | Performed by: COLON & RECTAL SURGERY

## 2019-04-08 ENCOUNTER — ANESTHESIA EVENT (OUTPATIENT)
Dept: PERIOP | Facility: HOSPITAL | Age: 59
DRG: 331 | End: 2019-04-08
Payer: COMMERCIAL

## 2019-04-08 ENCOUNTER — HOSPITAL ENCOUNTER (OUTPATIENT)
Dept: MAMMOGRAPHY | Facility: CLINIC | Age: 59
Discharge: HOME | End: 2019-04-08
Payer: COMMERCIAL

## 2019-04-08 VITALS — WEIGHT: 128 LBS | HEIGHT: 66 IN | BODY MASS INDEX: 20.57 KG/M2

## 2019-04-08 DIAGNOSIS — C50.812 MALIGNANT NEOPLASM OF OVERLAPPING SITES OF LEFT BREAST IN FEMALE, ESTROGEN RECEPTOR POSITIVE (HCC): ICD-10-CM

## 2019-04-08 DIAGNOSIS — Z17.0 MALIGNANT NEOPLASM OF OVERLAPPING SITES OF LEFT BREAST IN FEMALE, ESTROGEN RECEPTOR POSITIVE (HCC): ICD-10-CM

## 2019-04-08 PROCEDURE — 77065 DX MAMMO INCL CAD UNI: CPT

## 2019-04-08 PROCEDURE — G0279 TOMOSYNTHESIS, MAMMO: HCPCS

## 2019-04-12 ENCOUNTER — CLINICAL SUPPORT (OUTPATIENT)
Dept: RADIATION ONCOLOGY | Facility: HOSPITAL | Age: 59
End: 2019-04-12
Attending: RADIOLOGY
Payer: COMMERCIAL

## 2019-04-12 ENCOUNTER — TELEPHONE (OUTPATIENT)
Dept: ADMINISTRATIVE | Facility: HOSPITAL | Age: 59
End: 2019-04-12

## 2019-04-12 VITALS
WEIGHT: 131 LBS | SYSTOLIC BLOOD PRESSURE: 114 MMHG | HEIGHT: 66 IN | DIASTOLIC BLOOD PRESSURE: 70 MMHG | BODY MASS INDEX: 21.05 KG/M2 | HEART RATE: 97 BPM | TEMPERATURE: 98.3 F | OXYGEN SATURATION: 5 % | RESPIRATION RATE: 16 BRPM

## 2019-04-12 DIAGNOSIS — Z17.0 MALIGNANT NEOPLASM OF OVERLAPPING SITES OF LEFT BREAST IN FEMALE, ESTROGEN RECEPTOR POSITIVE (HCC): Primary | ICD-10-CM

## 2019-04-12 DIAGNOSIS — C50.812 MALIGNANT NEOPLASM OF OVERLAPPING SITES OF LEFT BREAST IN FEMALE, ESTROGEN RECEPTOR POSITIVE (HCC): Primary | ICD-10-CM

## 2019-04-12 PROCEDURE — 99215 OFFICE O/P EST HI 40 MIN: CPT | Performed by: RADIOLOGY

## 2019-04-12 RX ORDER — ANASTROZOLE 1 MG/1
1 TABLET ORAL DAILY
COMMUNITY
End: 2019-09-03 | Stop reason: SDUPTHER

## 2019-04-15 ENCOUNTER — TELEPHONE (OUTPATIENT)
Dept: HEMATOLOGY ONCOLOGY | Facility: CLINIC | Age: 59
End: 2019-04-15

## 2019-04-15 ENCOUNTER — CONSULT (OUTPATIENT)
Dept: VASCULAR SURGERY | Facility: CLINIC | Age: 59
End: 2019-04-15
Payer: COMMERCIAL

## 2019-04-15 VITALS
TEMPERATURE: 99.6 F | SYSTOLIC BLOOD PRESSURE: 110 MMHG | BODY MASS INDEX: 20.57 KG/M2 | RESPIRATION RATE: 16 BRPM | DIASTOLIC BLOOD PRESSURE: 72 MMHG | WEIGHT: 128 LBS | HEART RATE: 84 BPM | HEIGHT: 66 IN

## 2019-04-15 DIAGNOSIS — K62.3 RECTAL PROLAPSE: ICD-10-CM

## 2019-04-15 DIAGNOSIS — I82.432 ACUTE DEEP VEIN THROMBOSIS (DVT) OF POPLITEAL VEIN OF LEFT LOWER EXTREMITY (HCC): Primary | ICD-10-CM

## 2019-04-15 DIAGNOSIS — C50.812 MALIGNANT NEOPLASM OF OVERLAPPING SITES OF LEFT BREAST IN FEMALE, ESTROGEN RECEPTOR POSITIVE (HCC): ICD-10-CM

## 2019-04-15 DIAGNOSIS — Z17.0 MALIGNANT NEOPLASM OF OVERLAPPING SITES OF LEFT BREAST IN FEMALE, ESTROGEN RECEPTOR POSITIVE (HCC): ICD-10-CM

## 2019-04-15 PROCEDURE — 99244 OFF/OP CNSLTJ NEW/EST MOD 40: CPT | Performed by: PHYSICIAN ASSISTANT

## 2019-04-15 RX ORDER — ESCITALOPRAM OXALATE 10 MG/1
10 TABLET ORAL DAILY
Refills: 0 | COMMUNITY
Start: 2019-04-01 | End: 2019-04-16

## 2019-04-16 ENCOUNTER — OFFICE VISIT (OUTPATIENT)
Dept: SURGICAL ONCOLOGY | Facility: CLINIC | Age: 59
End: 2019-04-16
Payer: COMMERCIAL

## 2019-04-16 VITALS
WEIGHT: 129 LBS | DIASTOLIC BLOOD PRESSURE: 92 MMHG | BODY MASS INDEX: 20.73 KG/M2 | HEART RATE: 88 BPM | HEIGHT: 66 IN | RESPIRATION RATE: 16 BRPM | SYSTOLIC BLOOD PRESSURE: 142 MMHG | TEMPERATURE: 98 F

## 2019-04-16 DIAGNOSIS — C50.812 MALIGNANT NEOPLASM OF OVERLAPPING SITES OF LEFT BREAST IN FEMALE, ESTROGEN RECEPTOR POSITIVE (HCC): Primary | ICD-10-CM

## 2019-04-16 DIAGNOSIS — Z17.0 MALIGNANT NEOPLASM OF OVERLAPPING SITES OF LEFT BREAST IN FEMALE, ESTROGEN RECEPTOR POSITIVE (HCC): Primary | ICD-10-CM

## 2019-04-16 DIAGNOSIS — Z79.811 USE OF ANASTROZOLE (ARIMIDEX): ICD-10-CM

## 2019-04-16 PROCEDURE — 99214 OFFICE O/P EST MOD 30 MIN: CPT | Performed by: NURSE PRACTITIONER

## 2019-04-21 ENCOUNTER — ANESTHESIA EVENT (OUTPATIENT)
Dept: GASTROENTEROLOGY | Facility: HOSPITAL | Age: 59
End: 2019-04-21
Payer: COMMERCIAL

## 2019-04-22 ENCOUNTER — ANESTHESIA (OUTPATIENT)
Dept: GASTROENTEROLOGY | Facility: HOSPITAL | Age: 59
End: 2019-04-22
Payer: COMMERCIAL

## 2019-04-22 ENCOUNTER — HOSPITAL ENCOUNTER (OUTPATIENT)
Facility: HOSPITAL | Age: 59
Setting detail: OUTPATIENT SURGERY
Discharge: HOME | End: 2019-04-22
Attending: COLON & RECTAL SURGERY | Admitting: COLON & RECTAL SURGERY
Payer: COMMERCIAL

## 2019-04-22 VITALS
SYSTOLIC BLOOD PRESSURE: 105 MMHG | RESPIRATION RATE: 1 BRPM | WEIGHT: 129 LBS | HEART RATE: 72 BPM | OXYGEN SATURATION: 98 % | BODY MASS INDEX: 20.73 KG/M2 | DIASTOLIC BLOOD PRESSURE: 50 MMHG | HEIGHT: 66 IN | TEMPERATURE: 98.4 F

## 2019-04-22 DIAGNOSIS — I82.432 ACUTE DEEP VEIN THROMBOSIS (DVT) OF POPLITEAL VEIN OF LEFT LOWER EXTREMITY (HCC): ICD-10-CM

## 2019-04-22 PROCEDURE — 45378 DIAGNOSTIC COLONOSCOPY: CPT | Performed by: COLON & RECTAL SURGERY

## 2019-04-22 RX ORDER — PROPOFOL 10 MG/ML
INJECTION, EMULSION INTRAVENOUS AS NEEDED
Status: DISCONTINUED | OUTPATIENT
Start: 2019-04-22 | End: 2019-04-22 | Stop reason: SURG

## 2019-04-22 RX ORDER — SODIUM CHLORIDE 9 MG/ML
INJECTION, SOLUTION INTRAVENOUS CONTINUOUS PRN
Status: DISCONTINUED | OUTPATIENT
Start: 2019-04-22 | End: 2019-04-22 | Stop reason: SURG

## 2019-04-22 RX ORDER — PROPOFOL 10 MG/ML
INJECTION, EMULSION INTRAVENOUS CONTINUOUS PRN
Status: DISCONTINUED | OUTPATIENT
Start: 2019-04-22 | End: 2019-04-22 | Stop reason: SURG

## 2019-04-22 RX ORDER — LIDOCAINE HYDROCHLORIDE 10 MG/ML
INJECTION, SOLUTION INFILTRATION; PERINEURAL AS NEEDED
Status: DISCONTINUED | OUTPATIENT
Start: 2019-04-22 | End: 2019-04-22 | Stop reason: SURG

## 2019-04-22 RX ORDER — SODIUM CHLORIDE, SODIUM LACTATE, POTASSIUM CHLORIDE, CALCIUM CHLORIDE 600; 310; 30; 20 MG/100ML; MG/100ML; MG/100ML; MG/100ML
125 INJECTION, SOLUTION INTRAVENOUS CONTINUOUS
Status: CANCELLED | OUTPATIENT
Start: 2019-04-22

## 2019-04-22 RX ADMIN — PHENYLEPHRINE HYDROCHLORIDE 100 MCG: 10 INJECTION INTRAVENOUS at 11:05

## 2019-04-22 RX ADMIN — LIDOCAINE HYDROCHLORIDE 50 MG: 10 INJECTION, SOLUTION INFILTRATION; PERINEURAL at 10:51

## 2019-04-22 RX ADMIN — PROPOFOL 100 MG: 10 INJECTION, EMULSION INTRAVENOUS at 10:52

## 2019-04-22 RX ADMIN — PROPOFOL 100 MG: 10 INJECTION, EMULSION INTRAVENOUS at 10:51

## 2019-04-22 RX ADMIN — PHENYLEPHRINE HYDROCHLORIDE 100 MCG: 10 INJECTION INTRAVENOUS at 11:00

## 2019-04-22 RX ADMIN — PROPOFOL 150 MCG/KG/MIN: 10 INJECTION, EMULSION INTRAVENOUS at 10:53

## 2019-04-22 RX ADMIN — SODIUM CHLORIDE: 0.9 INJECTION, SOLUTION INTRAVENOUS at 10:41

## 2019-04-23 ENCOUNTER — ANESTHESIA (OUTPATIENT)
Dept: PERIOP | Facility: HOSPITAL | Age: 59
DRG: 331 | End: 2019-04-23
Payer: COMMERCIAL

## 2019-04-23 ENCOUNTER — HOSPITAL ENCOUNTER (INPATIENT)
Facility: HOSPITAL | Age: 59
LOS: 2 days | Discharge: HOME | DRG: 331 | End: 2019-04-25
Attending: UROLOGY | Admitting: COLON & RECTAL SURGERY
Payer: COMMERCIAL

## 2019-04-23 DIAGNOSIS — K62.3 RECTAL PROLAPSE: ICD-10-CM

## 2019-04-23 LAB
ABO GROUP BLD: NORMAL
ALBUMIN SERPL BCP-MCNC: 3.6 G/DL (ref 3.5–5)
ALP SERPL-CCNC: 78 U/L (ref 46–116)
ALT SERPL W P-5'-P-CCNC: 53 U/L (ref 12–78)
ANION GAP SERPL CALCULATED.3IONS-SCNC: 12 MMOL/L (ref 4–13)
AST SERPL W P-5'-P-CCNC: 75 U/L (ref 5–45)
BASOPHILS # BLD AUTO: 0.03 THOUSANDS/ΜL (ref 0–0.1)
BASOPHILS NFR BLD AUTO: 1 % (ref 0–1)
BILIRUB SERPL-MCNC: 0.36 MG/DL (ref 0.2–1)
BLD GP AB SCN SERPL QL: NEGATIVE
BUN SERPL-MCNC: 9 MG/DL (ref 5–25)
CALCIUM SERPL-MCNC: 8.3 MG/DL (ref 8.3–10.1)
CHLORIDE SERPL-SCNC: 97 MMOL/L (ref 100–108)
CO2 SERPL-SCNC: 23 MMOL/L (ref 21–32)
CREAT SERPL-MCNC: 0.74 MG/DL (ref 0.6–1.3)
EOSINOPHIL # BLD AUTO: 0.16 THOUSAND/ΜL (ref 0–0.61)
EOSINOPHIL NFR BLD AUTO: 5 % (ref 0–6)
ERYTHROCYTE [DISTWIDTH] IN BLOOD BY AUTOMATED COUNT: 14.5 % (ref 11.6–15.1)
GFR SERPL CREATININE-BSD FRML MDRD: 90 ML/MIN/1.73SQ M
GLUCOSE P FAST SERPL-MCNC: 127 MG/DL (ref 65–99)
GLUCOSE SERPL-MCNC: 111 MG/DL (ref 65–140)
GLUCOSE SERPL-MCNC: 127 MG/DL (ref 65–140)
HCT VFR BLD AUTO: 33.9 % (ref 34.8–46.1)
HGB BLD-MCNC: 11.9 G/DL (ref 11.5–15.4)
IMM GRANULOCYTES # BLD AUTO: 0.01 THOUSAND/UL (ref 0–0.2)
IMM GRANULOCYTES NFR BLD AUTO: 0 % (ref 0–2)
LYMPHOCYTES # BLD AUTO: 0.36 THOUSANDS/ΜL (ref 0.6–4.47)
LYMPHOCYTES NFR BLD AUTO: 12 % (ref 14–44)
MCH RBC QN AUTO: 36.5 PG (ref 26.8–34.3)
MCHC RBC AUTO-ENTMCNC: 35.1 G/DL (ref 31.4–37.4)
MCV RBC AUTO: 104 FL (ref 82–98)
MONOCYTES # BLD AUTO: 0.4 THOUSAND/ΜL (ref 0.17–1.22)
MONOCYTES NFR BLD AUTO: 13 % (ref 4–12)
NEUTROPHILS # BLD AUTO: 2.1 THOUSANDS/ΜL (ref 1.85–7.62)
NEUTS SEG NFR BLD AUTO: 69 % (ref 43–75)
NRBC BLD AUTO-RTO: 0 /100 WBCS
PLATELET # BLD AUTO: 102 THOUSANDS/UL (ref 149–390)
PMV BLD AUTO: 9.6 FL (ref 8.9–12.7)
POTASSIUM SERPL-SCNC: 3.4 MMOL/L (ref 3.5–5.3)
PROT SERPL-MCNC: 6.8 G/DL (ref 6.4–8.2)
RBC # BLD AUTO: 3.26 MILLION/UL (ref 3.81–5.12)
RH BLD: NEGATIVE
SODIUM SERPL-SCNC: 132 MMOL/L (ref 136–145)
SPECIMEN EXPIRATION DATE: NORMAL
WBC # BLD AUTO: 3.06 THOUSAND/UL (ref 4.31–10.16)

## 2019-04-23 PROCEDURE — 86900 BLOOD TYPING SEROLOGIC ABO: CPT | Performed by: UROLOGY

## 2019-04-23 PROCEDURE — 0DSP0ZZ REPOSITION RECTUM, OPEN APPROACH: ICD-10-PCS | Performed by: COLON & RECTAL SURGERY

## 2019-04-23 PROCEDURE — 0DNW0ZZ RELEASE PERITONEUM, OPEN APPROACH: ICD-10-PCS | Performed by: COLON & RECTAL SURGERY

## 2019-04-23 PROCEDURE — 85025 COMPLETE CBC W/AUTO DIFF WBC: CPT | Performed by: COLON & RECTAL SURGERY

## 2019-04-23 PROCEDURE — C1769 GUIDE WIRE: HCPCS | Performed by: UROLOGY

## 2019-04-23 PROCEDURE — 80053 COMPREHEN METABOLIC PANEL: CPT | Performed by: COLON & RECTAL SURGERY

## 2019-04-23 PROCEDURE — 94760 N-INVAS EAR/PLS OXIMETRY 1: CPT

## 2019-04-23 PROCEDURE — 45400 LAPAROSCOPIC PROC: CPT | Performed by: COLON & RECTAL SURGERY

## 2019-04-23 PROCEDURE — 82948 REAGENT STRIP/BLOOD GLUCOSE: CPT

## 2019-04-23 PROCEDURE — 86850 RBC ANTIBODY SCREEN: CPT | Performed by: UROLOGY

## 2019-04-23 PROCEDURE — 86901 BLOOD TYPING SEROLOGIC RH(D): CPT | Performed by: UROLOGY

## 2019-04-23 PROCEDURE — 0T788DZ DILATION OF BILATERAL URETERS WITH INTRALUMINAL DEVICE, VIA NATURAL OR ARTIFICIAL OPENING ENDOSCOPIC: ICD-10-PCS | Performed by: UROLOGY

## 2019-04-23 PROCEDURE — 52005 CYSTO W/URTRL CATHJ: CPT | Performed by: UROLOGY

## 2019-04-23 PROCEDURE — 94762 N-INVAS EAR/PLS OXIMTRY CONT: CPT

## 2019-04-23 RX ORDER — VERAPAMIL HYDROCHLORIDE 240 MG/1
240 TABLET, FILM COATED, EXTENDED RELEASE ORAL DAILY
Status: DISCONTINUED | OUTPATIENT
Start: 2019-04-24 | End: 2019-04-25 | Stop reason: HOSPADM

## 2019-04-23 RX ORDER — ANASTROZOLE 1 MG/1
1 TABLET ORAL DAILY
Status: DISCONTINUED | OUTPATIENT
Start: 2019-04-24 | End: 2019-04-25 | Stop reason: HOSPADM

## 2019-04-23 RX ORDER — FENTANYL CITRATE 50 UG/ML
INJECTION, SOLUTION INTRAMUSCULAR; INTRAVENOUS AS NEEDED
Status: DISCONTINUED | OUTPATIENT
Start: 2019-04-23 | End: 2019-04-23 | Stop reason: SURG

## 2019-04-23 RX ORDER — HEPARIN SODIUM 5000 [USP'U]/ML
5000 INJECTION, SOLUTION INTRAVENOUS; SUBCUTANEOUS EVERY 8 HOURS SCHEDULED
Status: DISCONTINUED | OUTPATIENT
Start: 2019-04-23 | End: 2019-04-25 | Stop reason: HOSPADM

## 2019-04-23 RX ORDER — SODIUM CHLORIDE, SODIUM LACTATE, POTASSIUM CHLORIDE, CALCIUM CHLORIDE 600; 310; 30; 20 MG/100ML; MG/100ML; MG/100ML; MG/100ML
125 INJECTION, SOLUTION INTRAVENOUS CONTINUOUS
Status: DISCONTINUED | OUTPATIENT
Start: 2019-04-23 | End: 2019-04-23

## 2019-04-23 RX ORDER — LIDOCAINE HYDROCHLORIDE 10 MG/ML
INJECTION, SOLUTION INFILTRATION; PERINEURAL AS NEEDED
Status: DISCONTINUED | OUTPATIENT
Start: 2019-04-23 | End: 2019-04-23 | Stop reason: SURG

## 2019-04-23 RX ORDER — SUMATRIPTAN 50 MG/1
100 TABLET, FILM COATED ORAL ONCE AS NEEDED
Status: DISCONTINUED | OUTPATIENT
Start: 2019-04-23 | End: 2019-04-25 | Stop reason: HOSPADM

## 2019-04-23 RX ORDER — SODIUM CHLORIDE, SODIUM LACTATE, POTASSIUM CHLORIDE, CALCIUM CHLORIDE 600; 310; 30; 20 MG/100ML; MG/100ML; MG/100ML; MG/100ML
75 INJECTION, SOLUTION INTRAVENOUS CONTINUOUS
Status: DISCONTINUED | OUTPATIENT
Start: 2019-04-23 | End: 2019-04-23

## 2019-04-23 RX ORDER — MIDAZOLAM HYDROCHLORIDE 1 MG/ML
INJECTION INTRAMUSCULAR; INTRAVENOUS AS NEEDED
Status: DISCONTINUED | OUTPATIENT
Start: 2019-04-23 | End: 2019-04-23 | Stop reason: SURG

## 2019-04-23 RX ORDER — MAGNESIUM HYDROXIDE 1200 MG/15ML
LIQUID ORAL AS NEEDED
Status: DISCONTINUED | OUTPATIENT
Start: 2019-04-23 | End: 2019-04-23 | Stop reason: HOSPADM

## 2019-04-23 RX ORDER — SODIUM CHLORIDE, SODIUM LACTATE, POTASSIUM CHLORIDE, CALCIUM CHLORIDE 600; 310; 30; 20 MG/100ML; MG/100ML; MG/100ML; MG/100ML
20 INJECTION, SOLUTION INTRAVENOUS CONTINUOUS
Status: DISCONTINUED | OUTPATIENT
Start: 2019-04-23 | End: 2019-04-23

## 2019-04-23 RX ORDER — DEXAMETHASONE SODIUM PHOSPHATE 10 MG/ML
INJECTION, SOLUTION INTRAMUSCULAR; INTRAVENOUS AS NEEDED
Status: DISCONTINUED | OUTPATIENT
Start: 2019-04-23 | End: 2019-04-23 | Stop reason: SURG

## 2019-04-23 RX ORDER — ROCURONIUM BROMIDE 10 MG/ML
INJECTION, SOLUTION INTRAVENOUS AS NEEDED
Status: DISCONTINUED | OUTPATIENT
Start: 2019-04-23 | End: 2019-04-23 | Stop reason: SURG

## 2019-04-23 RX ORDER — HYDROMORPHONE HCL/PF 1 MG/ML
0.2 SYRINGE (ML) INJECTION
Status: DISCONTINUED | OUTPATIENT
Start: 2019-04-23 | End: 2019-04-23 | Stop reason: HOSPADM

## 2019-04-23 RX ORDER — ONDANSETRON 2 MG/ML
4 INJECTION INTRAMUSCULAR; INTRAVENOUS ONCE AS NEEDED
Status: DISCONTINUED | OUTPATIENT
Start: 2019-04-23 | End: 2019-04-23 | Stop reason: SDUPTHER

## 2019-04-23 RX ORDER — HEPARIN SODIUM 5000 [USP'U]/ML
5000 INJECTION, SOLUTION INTRAVENOUS; SUBCUTANEOUS ONCE
Status: COMPLETED | OUTPATIENT
Start: 2019-04-23 | End: 2019-04-23

## 2019-04-23 RX ORDER — ACETAMINOPHEN 325 MG/1
650 TABLET ORAL EVERY 6 HOURS PRN
Status: DISCONTINUED | OUTPATIENT
Start: 2019-04-23 | End: 2019-04-25 | Stop reason: HOSPADM

## 2019-04-23 RX ORDER — SODIUM CHLORIDE 9 MG/ML
75 INJECTION, SOLUTION INTRAVENOUS CONTINUOUS
Status: DISCONTINUED | OUTPATIENT
Start: 2019-04-23 | End: 2019-04-24

## 2019-04-23 RX ORDER — PROPOFOL 10 MG/ML
INJECTION, EMULSION INTRAVENOUS AS NEEDED
Status: DISCONTINUED | OUTPATIENT
Start: 2019-04-23 | End: 2019-04-23 | Stop reason: SURG

## 2019-04-23 RX ORDER — GABAPENTIN 100 MG/1
100 CAPSULE ORAL 3 TIMES DAILY
Status: DISCONTINUED | OUTPATIENT
Start: 2019-04-23 | End: 2019-04-25 | Stop reason: HOSPADM

## 2019-04-23 RX ORDER — NEOSTIGMINE METHYLSULFATE 1 MG/ML
INJECTION INTRAVENOUS AS NEEDED
Status: DISCONTINUED | OUTPATIENT
Start: 2019-04-23 | End: 2019-04-23 | Stop reason: SURG

## 2019-04-23 RX ORDER — ALBUMIN, HUMAN INJ 5% 5 %
SOLUTION INTRAVENOUS CONTINUOUS PRN
Status: DISCONTINUED | OUTPATIENT
Start: 2019-04-23 | End: 2019-04-23 | Stop reason: SURG

## 2019-04-23 RX ORDER — ONDANSETRON 2 MG/ML
INJECTION INTRAMUSCULAR; INTRAVENOUS AS NEEDED
Status: DISCONTINUED | OUTPATIENT
Start: 2019-04-23 | End: 2019-04-23 | Stop reason: SURG

## 2019-04-23 RX ORDER — GLYCOPYRROLATE 0.2 MG/ML
INJECTION INTRAMUSCULAR; INTRAVENOUS AS NEEDED
Status: DISCONTINUED | OUTPATIENT
Start: 2019-04-23 | End: 2019-04-23 | Stop reason: SURG

## 2019-04-23 RX ORDER — CEFAZOLIN SODIUM 2 G/50ML
2000 SOLUTION INTRAVENOUS ONCE
Status: COMPLETED | OUTPATIENT
Start: 2019-04-23 | End: 2019-04-23

## 2019-04-23 RX ORDER — SODIUM CHLORIDE 9 MG/ML
INJECTION, SOLUTION INTRAVENOUS CONTINUOUS PRN
Status: DISCONTINUED | OUTPATIENT
Start: 2019-04-23 | End: 2019-04-23 | Stop reason: SURG

## 2019-04-23 RX ORDER — ONDANSETRON 2 MG/ML
4 INJECTION INTRAMUSCULAR; INTRAVENOUS EVERY 6 HOURS PRN
Status: DISCONTINUED | OUTPATIENT
Start: 2019-04-23 | End: 2019-04-25 | Stop reason: HOSPADM

## 2019-04-23 RX ORDER — EPHEDRINE SULFATE 50 MG/ML
INJECTION INTRAVENOUS AS NEEDED
Status: DISCONTINUED | OUTPATIENT
Start: 2019-04-23 | End: 2019-04-23 | Stop reason: SURG

## 2019-04-23 RX ADMIN — FENTANYL CITRATE 25 MCG: 50 INJECTION, SOLUTION INTRAMUSCULAR; INTRAVENOUS at 08:43

## 2019-04-23 RX ADMIN — EPHEDRINE SULFATE 5 MG: 50 INJECTION, SOLUTION INTRAVENOUS at 08:11

## 2019-04-23 RX ADMIN — PHENYLEPHRINE HYDROCHLORIDE 100 MCG: 10 INJECTION INTRAVENOUS at 09:08

## 2019-04-23 RX ADMIN — ROCURONIUM BROMIDE 20 MG: 10 INJECTION, SOLUTION INTRAVENOUS at 09:39

## 2019-04-23 RX ADMIN — EPHEDRINE SULFATE 5 MG: 50 INJECTION, SOLUTION INTRAVENOUS at 08:21

## 2019-04-23 RX ADMIN — PHENYLEPHRINE HYDROCHLORIDE 100 MCG: 10 INJECTION INTRAVENOUS at 08:26

## 2019-04-23 RX ADMIN — SODIUM CHLORIDE 75 ML/HR: 0.9 INJECTION, SOLUTION INTRAVENOUS at 13:30

## 2019-04-23 RX ADMIN — GABAPENTIN 100 MG: 100 CAPSULE ORAL at 22:12

## 2019-04-23 RX ADMIN — FENTANYL CITRATE 25 MCG: 50 INJECTION, SOLUTION INTRAMUSCULAR; INTRAVENOUS at 11:24

## 2019-04-23 RX ADMIN — GLYCOPYRROLATE 0.6 MG: 0.2 INJECTION, SOLUTION INTRAMUSCULAR; INTRAVENOUS at 11:18

## 2019-04-23 RX ADMIN — LIDOCAINE HYDROCHLORIDE 50 MG: 10 INJECTION, SOLUTION INFILTRATION; PERINEURAL at 07:47

## 2019-04-23 RX ADMIN — MIDAZOLAM 2 MG: 1 INJECTION INTRAMUSCULAR; INTRAVENOUS at 07:27

## 2019-04-23 RX ADMIN — ROCURONIUM BROMIDE 20 MG: 10 INJECTION, SOLUTION INTRAVENOUS at 08:37

## 2019-04-23 RX ADMIN — NEOSTIGMINE METHYLSULFATE 3 MG: 1 INJECTION, SOLUTION INTRAVENOUS at 11:18

## 2019-04-23 RX ADMIN — ALBUMIN (HUMAN): 12.5 SOLUTION INTRAVENOUS at 10:27

## 2019-04-23 RX ADMIN — ROCURONIUM BROMIDE 10 MG: 10 INJECTION, SOLUTION INTRAVENOUS at 11:06

## 2019-04-23 RX ADMIN — PHENYLEPHRINE HYDROCHLORIDE 100 MCG: 10 INJECTION INTRAVENOUS at 10:08

## 2019-04-23 RX ADMIN — HEPARIN SODIUM 5000 UNITS: 5000 INJECTION INTRAVENOUS; SUBCUTANEOUS at 22:12

## 2019-04-23 RX ADMIN — SERTRALINE HYDROCHLORIDE 50 MG: 50 TABLET ORAL at 22:11

## 2019-04-23 RX ADMIN — ROCURONIUM BROMIDE 40 MG: 10 INJECTION, SOLUTION INTRAVENOUS at 07:48

## 2019-04-23 RX ADMIN — HYDROMORPHONE HYDROCHLORIDE 0.2 MG: 1 INJECTION, SOLUTION INTRAMUSCULAR; INTRAVENOUS; SUBCUTANEOUS at 11:52

## 2019-04-23 RX ADMIN — Medication: at 12:00

## 2019-04-23 RX ADMIN — GABAPENTIN 100 MG: 100 CAPSULE ORAL at 15:55

## 2019-04-23 RX ADMIN — METRONIDAZOLE 500 MG: 500 INJECTION, SOLUTION INTRAVENOUS at 07:48

## 2019-04-23 RX ADMIN — ROCURONIUM BROMIDE 10 MG: 10 INJECTION, SOLUTION INTRAVENOUS at 09:06

## 2019-04-23 RX ADMIN — FENTANYL CITRATE 50 MCG: 50 INJECTION, SOLUTION INTRAMUSCULAR; INTRAVENOUS at 07:47

## 2019-04-23 RX ADMIN — PROPOFOL 150 MG: 10 INJECTION, EMULSION INTRAVENOUS at 07:47

## 2019-04-23 RX ADMIN — PHENYLEPHRINE HYDROCHLORIDE 100 MCG: 10 INJECTION INTRAVENOUS at 09:05

## 2019-04-23 RX ADMIN — PHENYLEPHRINE HYDROCHLORIDE 100 MCG: 10 INJECTION INTRAVENOUS at 08:14

## 2019-04-23 RX ADMIN — CEFAZOLIN SODIUM 2000 MG: 2 SOLUTION INTRAVENOUS at 07:34

## 2019-04-23 RX ADMIN — HEPARIN SODIUM 5000 UNITS: 5000 INJECTION INTRAVENOUS; SUBCUTANEOUS at 08:01

## 2019-04-23 RX ADMIN — EPHEDRINE SULFATE 10 MG: 50 INJECTION, SOLUTION INTRAVENOUS at 09:02

## 2019-04-23 RX ADMIN — SODIUM CHLORIDE, SODIUM LACTATE, POTASSIUM CHLORIDE, AND CALCIUM CHLORIDE: .6; .31; .03; .02 INJECTION, SOLUTION INTRAVENOUS at 07:21

## 2019-04-23 RX ADMIN — PHENYLEPHRINE HYDROCHLORIDE 100 MCG: 10 INJECTION INTRAVENOUS at 11:27

## 2019-04-23 RX ADMIN — ONDANSETRON 4 MG: 2 INJECTION INTRAMUSCULAR; INTRAVENOUS at 09:01

## 2019-04-23 RX ADMIN — EPHEDRINE SULFATE 10 MG: 50 INJECTION, SOLUTION INTRAVENOUS at 09:07

## 2019-04-23 RX ADMIN — PHENYLEPHRINE HYDROCHLORIDE 100 MCG: 10 INJECTION INTRAVENOUS at 08:42

## 2019-04-23 RX ADMIN — PHENYLEPHRINE HYDROCHLORIDE 100 MCG: 10 INJECTION INTRAVENOUS at 11:12

## 2019-04-23 RX ADMIN — SODIUM CHLORIDE: 0.9 INJECTION, SOLUTION INTRAVENOUS at 07:51

## 2019-04-23 RX ADMIN — PHENYLEPHRINE HYDROCHLORIDE 100 MCG: 10 INJECTION INTRAVENOUS at 08:02

## 2019-04-23 RX ADMIN — DEXAMETHASONE SODIUM PHOSPHATE 10 MG: 10 INJECTION, SOLUTION INTRAMUSCULAR; INTRAVENOUS at 08:57

## 2019-04-23 RX ADMIN — PHENYLEPHRINE HYDROCHLORIDE 100 MCG: 10 INJECTION INTRAVENOUS at 07:54

## 2019-04-24 LAB
ANION GAP SERPL CALCULATED.3IONS-SCNC: 4 MMOL/L (ref 4–13)
BASOPHILS # BLD AUTO: 0.01 THOUSANDS/ΜL (ref 0–0.1)
BASOPHILS NFR BLD AUTO: 0 % (ref 0–1)
BUN SERPL-MCNC: 9 MG/DL (ref 5–25)
CALCIUM SERPL-MCNC: 7.8 MG/DL (ref 8.3–10.1)
CHLORIDE SERPL-SCNC: 104 MMOL/L (ref 100–108)
CO2 SERPL-SCNC: 27 MMOL/L (ref 21–32)
CREAT SERPL-MCNC: 0.82 MG/DL (ref 0.6–1.3)
EOSINOPHIL # BLD AUTO: 0 THOUSAND/ΜL (ref 0–0.61)
EOSINOPHIL NFR BLD AUTO: 0 % (ref 0–6)
ERYTHROCYTE [DISTWIDTH] IN BLOOD BY AUTOMATED COUNT: 14.6 % (ref 11.6–15.1)
GFR SERPL CREATININE-BSD FRML MDRD: 80 ML/MIN/1.73SQ M
GLUCOSE SERPL-MCNC: 109 MG/DL (ref 65–140)
HCT VFR BLD AUTO: 32 % (ref 34.8–46.1)
HGB BLD-MCNC: 10.9 G/DL (ref 11.5–15.4)
IMM GRANULOCYTES # BLD AUTO: 0.01 THOUSAND/UL (ref 0–0.2)
IMM GRANULOCYTES NFR BLD AUTO: 0 % (ref 0–2)
LYMPHOCYTES # BLD AUTO: 0.25 THOUSANDS/ΜL (ref 0.6–4.47)
LYMPHOCYTES NFR BLD AUTO: 5 % (ref 14–44)
MAGNESIUM SERPL-MCNC: 1.4 MG/DL (ref 1.6–2.6)
MCH RBC QN AUTO: 36.5 PG (ref 26.8–34.3)
MCHC RBC AUTO-ENTMCNC: 34.1 G/DL (ref 31.4–37.4)
MCV RBC AUTO: 107 FL (ref 82–98)
MONOCYTES # BLD AUTO: 0.47 THOUSAND/ΜL (ref 0.17–1.22)
MONOCYTES NFR BLD AUTO: 9 % (ref 4–12)
NEUTROPHILS # BLD AUTO: 4.41 THOUSANDS/ΜL (ref 1.85–7.62)
NEUTS SEG NFR BLD AUTO: 86 % (ref 43–75)
NRBC BLD AUTO-RTO: 0 /100 WBCS
PLATELET # BLD AUTO: 108 THOUSANDS/UL (ref 149–390)
PMV BLD AUTO: 10.3 FL (ref 8.9–12.7)
POTASSIUM SERPL-SCNC: 4.1 MMOL/L (ref 3.5–5.3)
RBC # BLD AUTO: 2.99 MILLION/UL (ref 3.81–5.12)
SODIUM SERPL-SCNC: 135 MMOL/L (ref 136–145)
WBC # BLD AUTO: 5.15 THOUSAND/UL (ref 4.31–10.16)

## 2019-04-24 PROCEDURE — 85025 COMPLETE CBC W/AUTO DIFF WBC: CPT | Performed by: SURGERY

## 2019-04-24 PROCEDURE — 80048 BASIC METABOLIC PNL TOTAL CA: CPT | Performed by: SURGERY

## 2019-04-24 PROCEDURE — G8987 SELF CARE CURRENT STATUS: HCPCS

## 2019-04-24 PROCEDURE — 83735 ASSAY OF MAGNESIUM: CPT | Performed by: SURGERY

## 2019-04-24 PROCEDURE — G8989 SELF CARE D/C STATUS: HCPCS

## 2019-04-24 PROCEDURE — G8988 SELF CARE GOAL STATUS: HCPCS

## 2019-04-24 PROCEDURE — 94760 N-INVAS EAR/PLS OXIMETRY 1: CPT

## 2019-04-24 PROCEDURE — 97165 OT EVAL LOW COMPLEX 30 MIN: CPT

## 2019-04-24 PROCEDURE — 94762 N-INVAS EAR/PLS OXIMTRY CONT: CPT

## 2019-04-24 RX ORDER — HYDROMORPHONE HCL/PF 1 MG/ML
0.5 SYRINGE (ML) INJECTION
Status: DISCONTINUED | OUTPATIENT
Start: 2019-04-24 | End: 2019-04-25 | Stop reason: HOSPADM

## 2019-04-24 RX ORDER — OXYCODONE HYDROCHLORIDE 5 MG/1
10 TABLET ORAL EVERY 4 HOURS PRN
Status: DISCONTINUED | OUTPATIENT
Start: 2019-04-24 | End: 2019-04-25 | Stop reason: HOSPADM

## 2019-04-24 RX ORDER — OXYCODONE HYDROCHLORIDE 5 MG/1
5 TABLET ORAL EVERY 4 HOURS PRN
Qty: 20 TABLET | Refills: 0 | Status: SHIPPED | OUTPATIENT
Start: 2019-04-24 | End: 2019-05-04

## 2019-04-24 RX ORDER — OXYCODONE HYDROCHLORIDE 5 MG/1
5 TABLET ORAL EVERY 4 HOURS PRN
Status: DISCONTINUED | OUTPATIENT
Start: 2019-04-24 | End: 2019-04-25 | Stop reason: HOSPADM

## 2019-04-24 RX ADMIN — SERTRALINE HYDROCHLORIDE 50 MG: 50 TABLET ORAL at 21:49

## 2019-04-24 RX ADMIN — HYDROMORPHONE HYDROCHLORIDE 0.5 MG: 1 INJECTION, SOLUTION INTRAMUSCULAR; INTRAVENOUS; SUBCUTANEOUS at 11:34

## 2019-04-24 RX ADMIN — ACETAMINOPHEN 650 MG: 325 TABLET, FILM COATED ORAL at 16:07

## 2019-04-24 RX ADMIN — ANASTROZOLE 1 MG: 1 TABLET, COATED ORAL at 10:35

## 2019-04-24 RX ADMIN — HEPARIN SODIUM 5000 UNITS: 5000 INJECTION INTRAVENOUS; SUBCUTANEOUS at 05:27

## 2019-04-24 RX ADMIN — MAGNESIUM SULFATE HEPTAHYDRATE 3 G: 500 INJECTION, SOLUTION INTRAMUSCULAR; INTRAVENOUS at 09:37

## 2019-04-24 RX ADMIN — OXYCODONE HYDROCHLORIDE 10 MG: 10 TABLET ORAL at 09:37

## 2019-04-24 RX ADMIN — OXYCODONE HYDROCHLORIDE 10 MG: 10 TABLET ORAL at 16:07

## 2019-04-24 RX ADMIN — GABAPENTIN 100 MG: 100 CAPSULE ORAL at 21:49

## 2019-04-24 RX ADMIN — VERAPAMIL HYDROCHLORIDE 240 MG: 240 TABLET, FILM COATED, EXTENDED RELEASE ORAL at 10:34

## 2019-04-24 RX ADMIN — GABAPENTIN 100 MG: 100 CAPSULE ORAL at 09:38

## 2019-04-24 RX ADMIN — GABAPENTIN 100 MG: 100 CAPSULE ORAL at 16:07

## 2019-04-24 RX ADMIN — MAGNESIUM OXIDE TAB 400 MG (241.3 MG ELEMENTAL MG) 400 MG: 400 (241.3 MG) TAB at 09:38

## 2019-04-24 RX ADMIN — HEPARIN SODIUM 5000 UNITS: 5000 INJECTION INTRAVENOUS; SUBCUTANEOUS at 13:10

## 2019-04-24 RX ADMIN — SODIUM CHLORIDE 75 ML/HR: 0.9 INJECTION, SOLUTION INTRAVENOUS at 01:50

## 2019-04-24 RX ADMIN — ACETAMINOPHEN 650 MG: 325 TABLET, FILM COATED ORAL at 09:38

## 2019-04-24 RX ADMIN — HEPARIN SODIUM 5000 UNITS: 5000 INJECTION INTRAVENOUS; SUBCUTANEOUS at 21:50

## 2019-04-25 VITALS
HEART RATE: 93 BPM | HEIGHT: 66 IN | BODY MASS INDEX: 20.41 KG/M2 | OXYGEN SATURATION: 92 % | SYSTOLIC BLOOD PRESSURE: 137 MMHG | RESPIRATION RATE: 16 BRPM | WEIGHT: 127 LBS | DIASTOLIC BLOOD PRESSURE: 83 MMHG | TEMPERATURE: 99.3 F

## 2019-04-25 PROCEDURE — 97163 PT EVAL HIGH COMPLEX 45 MIN: CPT

## 2019-04-25 PROCEDURE — G8979 MOBILITY GOAL STATUS: HCPCS

## 2019-04-25 PROCEDURE — G8978 MOBILITY CURRENT STATUS: HCPCS

## 2019-04-25 RX ADMIN — HEPARIN SODIUM 5000 UNITS: 5000 INJECTION INTRAVENOUS; SUBCUTANEOUS at 05:09

## 2019-04-25 RX ADMIN — ANASTROZOLE 1 MG: 1 TABLET, COATED ORAL at 08:04

## 2019-04-25 RX ADMIN — OXYCODONE HYDROCHLORIDE 10 MG: 10 TABLET ORAL at 05:08

## 2019-04-25 RX ADMIN — GABAPENTIN 100 MG: 100 CAPSULE ORAL at 08:04

## 2019-04-25 RX ADMIN — MAGNESIUM OXIDE TAB 400 MG (241.3 MG ELEMENTAL MG) 400 MG: 400 (241.3 MG) TAB at 08:04

## 2019-04-25 RX ADMIN — VERAPAMIL HYDROCHLORIDE 240 MG: 240 TABLET, FILM COATED, EXTENDED RELEASE ORAL at 08:04

## 2019-05-20 DIAGNOSIS — Z17.0 MALIGNANT NEOPLASM OF OVERLAPPING SITES OF LEFT BREAST IN FEMALE, ESTROGEN RECEPTOR POSITIVE (HCC): ICD-10-CM

## 2019-05-20 DIAGNOSIS — Z90.12 HISTORY OF LEFT MASTECTOMY: Primary | ICD-10-CM

## 2019-05-20 DIAGNOSIS — C50.812 MALIGNANT NEOPLASM OF OVERLAPPING SITES OF LEFT BREAST IN FEMALE, ESTROGEN RECEPTOR POSITIVE (HCC): ICD-10-CM

## 2019-05-23 ENCOUNTER — HOSPITAL ENCOUNTER (OUTPATIENT)
Dept: RADIOLOGY | Facility: MEDICAL CENTER | Age: 59
Discharge: HOME | End: 2019-05-23
Payer: COMMERCIAL

## 2019-05-23 DIAGNOSIS — Z17.0 MALIGNANT NEOPLASM OF OVERLAPPING SITES OF LEFT BREAST IN FEMALE, ESTROGEN RECEPTOR POSITIVE (HCC): ICD-10-CM

## 2019-05-23 DIAGNOSIS — C50.812 MALIGNANT NEOPLASM OF OVERLAPPING SITES OF LEFT BREAST IN FEMALE, ESTROGEN RECEPTOR POSITIVE (HCC): ICD-10-CM

## 2019-05-23 PROCEDURE — 93971 EXTREMITY STUDY: CPT

## 2019-05-24 PROCEDURE — 93971 EXTREMITY STUDY: CPT | Performed by: SURGERY

## 2019-05-28 ENCOUNTER — OFFICE VISIT (OUTPATIENT)
Dept: HEMATOLOGY ONCOLOGY | Facility: CLINIC | Age: 59
End: 2019-05-28
Payer: COMMERCIAL

## 2019-05-28 VITALS
SYSTOLIC BLOOD PRESSURE: 130 MMHG | OXYGEN SATURATION: 97 % | TEMPERATURE: 97.6 F | WEIGHT: 129 LBS | HEIGHT: 66 IN | RESPIRATION RATE: 18 BRPM | DIASTOLIC BLOOD PRESSURE: 76 MMHG | BODY MASS INDEX: 20.73 KG/M2 | HEART RATE: 79 BPM

## 2019-05-28 DIAGNOSIS — C50.919 MALIGNANT NEOPLASM OF FEMALE BREAST, UNSPECIFIED ESTROGEN RECEPTOR STATUS, UNSPECIFIED LATERALITY, UNSPECIFIED SITE OF BREAST (HCC): Primary | ICD-10-CM

## 2019-05-28 PROCEDURE — 99214 OFFICE O/P EST MOD 30 MIN: CPT | Performed by: INTERNAL MEDICINE

## 2019-05-28 RX ORDER — SERTRALINE HYDROCHLORIDE 100 MG/1
100 TABLET, FILM COATED ORAL DAILY
Refills: 0 | COMMUNITY
Start: 2019-04-16

## 2019-05-28 RX ORDER — GABAPENTIN 300 MG/1
300 CAPSULE ORAL 3 TIMES DAILY
Qty: 270 CAPSULE | Refills: 1 | Status: SHIPPED | OUTPATIENT
Start: 2019-05-28

## 2019-05-29 ENCOUNTER — TELEPHONE (OUTPATIENT)
Dept: HEMATOLOGY ONCOLOGY | Facility: CLINIC | Age: 59
End: 2019-05-29

## 2019-05-31 ENCOUNTER — ANESTHESIA EVENT (OUTPATIENT)
Dept: PERIOP | Facility: AMBULARY SURGERY CENTER | Age: 59
End: 2019-05-31
Payer: COMMERCIAL

## 2019-06-04 ENCOUNTER — ANESTHESIA (OUTPATIENT)
Dept: PERIOP | Facility: AMBULARY SURGERY CENTER | Age: 59
End: 2019-06-04
Payer: COMMERCIAL

## 2019-06-04 ENCOUNTER — HOSPITAL ENCOUNTER (OUTPATIENT)
Facility: AMBULARY SURGERY CENTER | Age: 59
Setting detail: OUTPATIENT SURGERY
Discharge: HOME | End: 2019-06-04
Attending: RADIOLOGY | Admitting: RADIOLOGY
Payer: COMMERCIAL

## 2019-06-04 VITALS
OXYGEN SATURATION: 95 % | SYSTOLIC BLOOD PRESSURE: 128 MMHG | DIASTOLIC BLOOD PRESSURE: 72 MMHG | BODY MASS INDEX: 19.44 KG/M2 | HEIGHT: 66 IN | HEART RATE: 80 BPM | WEIGHT: 121 LBS | RESPIRATION RATE: 16 BRPM | TEMPERATURE: 97.8 F

## 2019-06-04 PROCEDURE — NC001 PR NO CHARGE: Performed by: RADIOLOGY

## 2019-06-04 PROCEDURE — 77001 FLUOROGUIDE FOR VEIN DEVICE: CPT | Performed by: RADIOLOGY

## 2019-06-04 PROCEDURE — 36590 REMOVAL TUNNELED CV CATH: CPT | Performed by: RADIOLOGY

## 2019-06-04 RX ORDER — MIDAZOLAM HYDROCHLORIDE 1 MG/ML
INJECTION INTRAMUSCULAR; INTRAVENOUS AS NEEDED
Status: DISCONTINUED | OUTPATIENT
Start: 2019-06-04 | End: 2019-06-04 | Stop reason: SURG

## 2019-06-04 RX ORDER — SODIUM CHLORIDE, SODIUM LACTATE, POTASSIUM CHLORIDE, CALCIUM CHLORIDE 600; 310; 30; 20 MG/100ML; MG/100ML; MG/100ML; MG/100ML
INJECTION, SOLUTION INTRAVENOUS CONTINUOUS PRN
Status: DISCONTINUED | OUTPATIENT
Start: 2019-06-04 | End: 2019-06-04 | Stop reason: SURG

## 2019-06-04 RX ORDER — SODIUM CHLORIDE 9 MG/ML
INJECTION, SOLUTION INTRAVENOUS AS NEEDED
Status: DISCONTINUED | OUTPATIENT
Start: 2019-06-04 | End: 2019-06-04 | Stop reason: HOSPADM

## 2019-06-04 RX ORDER — FENTANYL CITRATE 50 UG/ML
INJECTION, SOLUTION INTRAMUSCULAR; INTRAVENOUS AS NEEDED
Status: DISCONTINUED | OUTPATIENT
Start: 2019-06-04 | End: 2019-06-04 | Stop reason: SURG

## 2019-06-04 RX ORDER — LIDOCAINE HYDROCHLORIDE AND EPINEPHRINE 10; 10 MG/ML; UG/ML
INJECTION, SOLUTION INFILTRATION; PERINEURAL AS NEEDED
Status: DISCONTINUED | OUTPATIENT
Start: 2019-06-04 | End: 2019-06-04 | Stop reason: HOSPADM

## 2019-06-04 RX ORDER — LIDOCAINE HYDROCHLORIDE 10 MG/ML
0.5 INJECTION, SOLUTION EPIDURAL; INFILTRATION; INTRACAUDAL; PERINEURAL ONCE AS NEEDED
Status: DISCONTINUED | OUTPATIENT
Start: 2019-06-04 | End: 2019-06-04 | Stop reason: HOSPADM

## 2019-06-04 RX ORDER — SODIUM CHLORIDE, SODIUM LACTATE, POTASSIUM CHLORIDE, CALCIUM CHLORIDE 600; 310; 30; 20 MG/100ML; MG/100ML; MG/100ML; MG/100ML
125 INJECTION, SOLUTION INTRAVENOUS CONTINUOUS
Status: DISCONTINUED | OUTPATIENT
Start: 2019-06-04 | End: 2019-06-04 | Stop reason: HOSPADM

## 2019-06-04 RX ADMIN — FENTANYL CITRATE 25 MCG: 50 INJECTION, SOLUTION INTRAMUSCULAR; INTRAVENOUS at 10:56

## 2019-06-04 RX ADMIN — MIDAZOLAM HYDROCHLORIDE 1 MG: 1 INJECTION, SOLUTION INTRAMUSCULAR; INTRAVENOUS at 10:55

## 2019-06-04 RX ADMIN — FENTANYL CITRATE 25 MCG: 50 INJECTION, SOLUTION INTRAMUSCULAR; INTRAVENOUS at 10:54

## 2019-06-04 RX ADMIN — MIDAZOLAM HYDROCHLORIDE 1 MG: 1 INJECTION, SOLUTION INTRAMUSCULAR; INTRAVENOUS at 10:52

## 2019-06-04 RX ADMIN — SODIUM CHLORIDE, SODIUM LACTATE, POTASSIUM CHLORIDE, AND CALCIUM CHLORIDE: .6; .31; .03; .02 INJECTION, SOLUTION INTRAVENOUS at 08:53

## 2019-06-04 RX ADMIN — FENTANYL CITRATE 50 MCG: 50 INJECTION, SOLUTION INTRAMUSCULAR; INTRAVENOUS at 10:52

## 2019-06-25 ENCOUNTER — APPOINTMENT (EMERGENCY)
Dept: RADIOLOGY | Facility: HOSPITAL | Age: 59
End: 2019-06-25
Payer: COMMERCIAL

## 2019-06-25 ENCOUNTER — APPOINTMENT (EMERGENCY)
Dept: CT IMAGING | Facility: HOSPITAL | Age: 59
End: 2019-06-25
Payer: COMMERCIAL

## 2019-06-25 ENCOUNTER — HOSPITAL ENCOUNTER (EMERGENCY)
Facility: HOSPITAL | Age: 59
Discharge: HOME | End: 2019-06-25
Attending: EMERGENCY MEDICINE | Admitting: EMERGENCY MEDICINE
Payer: COMMERCIAL

## 2019-06-25 VITALS
WEIGHT: 122.36 LBS | HEART RATE: 80 BPM | DIASTOLIC BLOOD PRESSURE: 59 MMHG | OXYGEN SATURATION: 95 % | TEMPERATURE: 98.8 F | SYSTOLIC BLOOD PRESSURE: 103 MMHG | RESPIRATION RATE: 18 BRPM | BODY MASS INDEX: 19.75 KG/M2

## 2019-06-25 DIAGNOSIS — J20.9 ACUTE BRONCHITIS: Primary | ICD-10-CM

## 2019-06-25 DIAGNOSIS — R53.1 ASTHENIA: ICD-10-CM

## 2019-06-25 DIAGNOSIS — D64.9 ANEMIA: ICD-10-CM

## 2019-06-25 DIAGNOSIS — E83.42 HYPOMAGNESEMIA: ICD-10-CM

## 2019-06-25 LAB
ALBUMIN SERPL BCP-MCNC: 2.5 G/DL (ref 3.5–5)
ALP SERPL-CCNC: 83 U/L (ref 46–116)
ALT SERPL W P-5'-P-CCNC: 20 U/L (ref 12–78)
ANION GAP SERPL CALCULATED.3IONS-SCNC: 10 MMOL/L (ref 4–13)
APTT PPP: 35 SECONDS (ref 23–37)
AST SERPL W P-5'-P-CCNC: 44 U/L (ref 5–45)
BASOPHILS # BLD AUTO: 0.06 THOUSANDS/ΜL (ref 0–0.1)
BASOPHILS NFR BLD AUTO: 1 % (ref 0–1)
BILIRUB SERPL-MCNC: 0.4 MG/DL (ref 0.2–1)
BUN SERPL-MCNC: 5 MG/DL (ref 5–25)
CALCIUM SERPL-MCNC: 8.6 MG/DL (ref 8.3–10.1)
CHLORIDE SERPL-SCNC: 100 MMOL/L (ref 100–108)
CO2 SERPL-SCNC: 27 MMOL/L (ref 21–32)
CREAT SERPL-MCNC: 0.81 MG/DL (ref 0.6–1.3)
EOSINOPHIL # BLD AUTO: 0.12 THOUSAND/ΜL (ref 0–0.61)
EOSINOPHIL NFR BLD AUTO: 2 % (ref 0–6)
ERYTHROCYTE [DISTWIDTH] IN BLOOD BY AUTOMATED COUNT: 13.2 % (ref 11.6–15.1)
GFR SERPL CREATININE-BSD FRML MDRD: 81 ML/MIN/1.73SQ M
GLUCOSE SERPL-MCNC: 86 MG/DL (ref 65–140)
HCT VFR BLD AUTO: 30.4 % (ref 34.8–46.1)
HGB BLD-MCNC: 9.9 G/DL (ref 11.5–15.4)
IMM GRANULOCYTES # BLD AUTO: 0.03 THOUSAND/UL (ref 0–0.2)
IMM GRANULOCYTES NFR BLD AUTO: 1 % (ref 0–2)
INR PPP: 1.24 (ref 0.84–1.19)
LACTATE SERPL-SCNC: 0.7 MMOL/L (ref 0.5–2)
LYMPHOCYTES # BLD AUTO: 0.41 THOUSANDS/ΜL (ref 0.6–4.47)
LYMPHOCYTES NFR BLD AUTO: 6 % (ref 14–44)
MAGNESIUM SERPL-MCNC: 1.4 MG/DL (ref 1.6–2.6)
MCH RBC QN AUTO: 35.2 PG (ref 26.8–34.3)
MCHC RBC AUTO-ENTMCNC: 32.6 G/DL (ref 31.4–37.4)
MCV RBC AUTO: 108 FL (ref 82–98)
MONOCYTES # BLD AUTO: 0.74 THOUSAND/ΜL (ref 0.17–1.22)
MONOCYTES NFR BLD AUTO: 11 % (ref 4–12)
NEUTROPHILS # BLD AUTO: 5.16 THOUSANDS/ΜL (ref 1.85–7.62)
NEUTS SEG NFR BLD AUTO: 79 % (ref 43–75)
NRBC BLD AUTO-RTO: 0 /100 WBCS
PLATELET # BLD AUTO: 378 THOUSANDS/UL (ref 149–390)
PMV BLD AUTO: 9.8 FL (ref 8.9–12.7)
POTASSIUM SERPL-SCNC: 4.4 MMOL/L (ref 3.5–5.3)
PROT SERPL-MCNC: 7 G/DL (ref 6.4–8.2)
PROTHROMBIN TIME: 14.9 SECONDS (ref 11.6–14.5)
RBC # BLD AUTO: 2.81 MILLION/UL (ref 3.81–5.12)
SODIUM SERPL-SCNC: 137 MMOL/L (ref 136–145)
TSH SERPL DL<=0.05 MIU/L-ACNC: 1.01 UIU/ML (ref 0.36–3.74)
WBC # BLD AUTO: 6.52 THOUSAND/UL (ref 4.31–10.16)

## 2019-06-25 PROCEDURE — 84443 ASSAY THYROID STIM HORMONE: CPT | Performed by: PHYSICIAN ASSISTANT

## 2019-06-25 PROCEDURE — 85610 PROTHROMBIN TIME: CPT | Performed by: PHYSICIAN ASSISTANT

## 2019-06-25 PROCEDURE — 85025 COMPLETE CBC W/AUTO DIFF WBC: CPT | Performed by: PHYSICIAN ASSISTANT

## 2019-06-25 PROCEDURE — 71046 X-RAY EXAM CHEST 2 VIEWS: CPT

## 2019-06-25 PROCEDURE — 85730 THROMBOPLASTIN TIME PARTIAL: CPT | Performed by: PHYSICIAN ASSISTANT

## 2019-06-25 PROCEDURE — 87040 BLOOD CULTURE FOR BACTERIA: CPT | Performed by: PHYSICIAN ASSISTANT

## 2019-06-25 PROCEDURE — 99284 EMERGENCY DEPT VISIT MOD MDM: CPT | Performed by: PHYSICIAN ASSISTANT

## 2019-06-25 PROCEDURE — 70450 CT HEAD/BRAIN W/O DYE: CPT

## 2019-06-25 PROCEDURE — 99284 EMERGENCY DEPT VISIT MOD MDM: CPT

## 2019-06-25 PROCEDURE — 93005 ELECTROCARDIOGRAM TRACING: CPT

## 2019-06-25 PROCEDURE — 83735 ASSAY OF MAGNESIUM: CPT | Performed by: PHYSICIAN ASSISTANT

## 2019-06-25 PROCEDURE — 96365 THER/PROPH/DIAG IV INF INIT: CPT

## 2019-06-25 PROCEDURE — 83605 ASSAY OF LACTIC ACID: CPT | Performed by: PHYSICIAN ASSISTANT

## 2019-06-25 PROCEDURE — 87147 CULTURE TYPE IMMUNOLOGIC: CPT | Performed by: PHYSICIAN ASSISTANT

## 2019-06-25 PROCEDURE — 36415 COLL VENOUS BLD VENIPUNCTURE: CPT | Performed by: PHYSICIAN ASSISTANT

## 2019-06-25 PROCEDURE — 80053 COMPREHEN METABOLIC PANEL: CPT | Performed by: PHYSICIAN ASSISTANT

## 2019-06-25 PROCEDURE — 96361 HYDRATE IV INFUSION ADD-ON: CPT

## 2019-06-25 RX ORDER — AZITHROMYCIN 250 MG/1
TABLET, FILM COATED ORAL
Qty: 6 TABLET | Refills: 0 | Status: SHIPPED | OUTPATIENT
Start: 2019-06-25 | End: 2019-06-29

## 2019-06-25 RX ORDER — MAGNESIUM SULFATE 1 G/100ML
1 INJECTION INTRAVENOUS ONCE
Status: COMPLETED | OUTPATIENT
Start: 2019-06-25 | End: 2019-06-25

## 2019-06-25 RX ADMIN — SODIUM CHLORIDE 1000 ML: 0.9 INJECTION, SOLUTION INTRAVENOUS at 14:34

## 2019-06-25 RX ADMIN — MAGNESIUM SULFATE HEPTAHYDRATE 1 G: 1 INJECTION, SOLUTION INTRAVENOUS at 16:58

## 2019-06-26 LAB
ATRIAL RATE: 90 BPM
P AXIS: 54 DEGREES
PR INTERVAL: 184 MS
QRS AXIS: -40 DEGREES
QRSD INTERVAL: 74 MS
QT INTERVAL: 362 MS
QTC INTERVAL: 433 MS
T WAVE AXIS: 28 DEGREES
VENTRICULAR RATE: 86 BPM

## 2019-06-26 PROCEDURE — 93010 ELECTROCARDIOGRAM REPORT: CPT | Performed by: INTERNAL MEDICINE

## 2019-06-28 ENCOUNTER — TELEPHONE (OUTPATIENT)
Dept: EMERGENCY DEPT | Facility: HOSPITAL | Age: 59
End: 2019-06-28

## 2019-06-28 LAB
BACTERIA BLD CULT: ABNORMAL
GRAM STN SPEC: ABNORMAL

## 2019-07-15 ENCOUNTER — TRANSCRIBE ORDERS (OUTPATIENT)
Dept: ADMINISTRATIVE | Facility: HOSPITAL | Age: 59
End: 2019-07-15

## 2019-07-15 DIAGNOSIS — R53.83 OTHER FATIGUE: ICD-10-CM

## 2019-07-15 DIAGNOSIS — R11.0 NAUSEA: ICD-10-CM

## 2019-07-15 DIAGNOSIS — R97.8 OTHER ABNORMAL TUMOR MARKERS: ICD-10-CM

## 2019-07-15 DIAGNOSIS — D61.818 OTHER PANCYTOPENIA (HCC): ICD-10-CM

## 2019-07-15 DIAGNOSIS — E86.0 DEHYDRATION: ICD-10-CM

## 2019-07-15 DIAGNOSIS — C50.919 MALIGNANT NEOPLASM OF FEMALE BREAST, UNSPECIFIED ESTROGEN RECEPTOR STATUS, UNSPECIFIED LATERALITY, UNSPECIFIED SITE OF BREAST (HCC): Primary | ICD-10-CM

## 2019-07-15 DIAGNOSIS — D64.9 ANEMIA, UNSPECIFIED TYPE: ICD-10-CM

## 2019-07-31 NOTE — PROGRESS NOTES
Problem List Items Addressed This Visit     Hydronephrosis - Primary     I have reviewed Amelia's imaging and her pathology with her in realtime using our PACS imaging system  There appears to be no functional renal parenchyma remaining in the right kidney  There may be an impacted stone in the right distal ureter accounting for this pathology  I would not be able to place a stent in her ureter given extreme tortuosity proximal to her distal right ureteral calcification and should she ever develop complicated urinary tract infection or right flank pain she will require a nephrostomy tube followed by eventual ureteroscopy to assess for destruction of the right ureteral stone vs incision of stricture to effect drainage of infected fluid  As she does not have the above presentation at this time her imaging findings require no acute urologic or interventional radiology intervention as we stand to gain negligible renal function from any surgical intervention  Plan:  She will continue to follow with Dr Domenico Krishnan, I will be happy to see her again in the future should any symptoms developed  She mentions concerns about financial toxicity of treatments and as such I do not believe that her following with me on a regular basis is absolutely necessary at this time  Other Visit Diagnoses     Ureteral obstruction, right        Hydronephrosis due to obstruction of ureter              Discussion:  Sharona Boswell and her partner and I had a productive consultation today  We reviewed her imaging in realtime using our PACS imaging system and we reviewed the fact that she has no symptoms at this time  She does have concerns about financial impact of treatments and she is having no symptoms is not particularly interested in having any treatments that are not absolutely necessary      I did  her on the need to care after her functionally solitary left kidney and in the event that she has any left flank pain that she Sleep study , office note, demographics faxed to [facility]-they had only received the order.  Fax confirmation was received.    Rn did call the pt and provided her with this update.  Pt was appreciative.  She will reach out to [facility] for a status update.         contact her healthcare providers immediately  I suspect that this will cause her no troubles during her oncological treatment for breast cancer  I will be happy to see her again in the future should she develop other urologic complaints or concerns  Assessment and plan:       Please see problem oriented charting for the assessment plan of today's urological complaints      Lizett Moser MD      Chief Complaint     Right hydronephrosis  Functionally solitary left kidney  Kidney disease      History of Present Illness     Javier Dejesus is a 65 y o  woman with a past medical history as listed below which includes breast cancer with a plan for mastectomy followed by radiation under the guidance of Dr Ely Boucher  Upon her staging workup she was found to have a massively hydronephrotic right kidney with no residual parenchyma and a torturous right ureter  Today she denies dysuria, incontinence, hesitancy urination, gross hematuria, urgency of urination, she has nocturia 2 times per night, she feels empty after voiding in her stream quality is strong  She does not have a known history of kidney stones although there is a calcification within the distal right ureter proximal to which is severe pelvic caliectasis and ureterectasis  Again, there appears to be no residual functional renal parenchyma on her right kidney as evidence by multiplanar cross-sectional imaging  Her review of systems from a urinary standpoint is otherwise negative today and she feels well overall  Her baseline creatinine is 1 42  She has a planned undergo mastectomy with radiation therapy and subsequent breast reconstruction per her report today  Detailed Urologic History     - please refer to HPI    Review of Systems     Review of Systems   Constitutional: Negative  HENT: Negative  Eyes: Negative  Respiratory: Negative  Cardiovascular: Negative  Gastrointestinal: Negative  Endocrine: Negative  Genitourinary:        As per HPI   Musculoskeletal: Negative  Skin: Negative  Allergic/Immunologic: Negative  Neurological: Negative  Hematological: Negative  Psychiatric/Behavioral: Negative  Allergies     No Known Allergies    Physical Exam     Physical Exam   Constitutional: She is oriented to person, place, and time  She appears well-developed and well-nourished  No distress  HENT:   Head: Normocephalic and atraumatic  Right Ear: External ear normal    Left Ear: External ear normal    Nose: Nose normal    Mouth/Throat: Oropharynx is clear and moist  No oropharyngeal exudate  Eyes: Conjunctivae and EOM are normal  Pupils are equal, round, and reactive to light  Right eye exhibits no discharge  Left eye exhibits no discharge  No scleral icterus  Neck: Normal range of motion  Neck supple  No tracheal deviation present  No thyromegaly present  Cardiovascular: Normal rate, regular rhythm and intact distal pulses  Pulmonary/Chest: Effort normal  No stridor  No respiratory distress  She has no wheezes  She exhibits no tenderness  Abdominal: Soft  She exhibits no distension and no mass  There is no tenderness  There is no rebound and no guarding  No hernia  There is no CVA tenderness, the right kidney is not palpable on abdominal examination   Genitourinary:   Genitourinary Comments: A genitourinary examination is not indicated based on today's chief complaint   Musculoskeletal: Normal range of motion  She exhibits no edema, tenderness or deformity  Lymphadenopathy:     She has no cervical adenopathy  Neurological: She is alert and oriented to person, place, and time  No cranial nerve deficit or sensory deficit  She exhibits normal muscle tone  Coordination normal    Skin: Skin is warm and dry  No rash noted  She is not diaphoretic  No erythema  No pallor  Psychiatric: She has a normal mood and affect   Her behavior is normal  Judgment and thought content normal  Vital Signs  Vitals:    04/30/18 1112   BP: 106/74   BP Location: Left arm   Patient Position: Sitting   Cuff Size: Standard   Pulse: 74   Weight: 71 1 kg (156 lb 12 8 oz)   Height: 5' 5" (1 651 m)         Current Medications       Current Outpatient Prescriptions:     ALPRAZolam (XANAX) 0 25 mg tablet, Take 0 25 mg by mouth 2 (two) times a day as needed for anxiety, Disp: , Rfl:     SUMAtriptan (IMITREX) 100 mg tablet, Take 100 mg by mouth once as needed for migraine, Disp: , Rfl:     venlafaxine (EFFEXOR) 75 mg tablet, Take 75 mg by mouth daily in the early morning  , Disp: , Rfl:     verapamil (CALAN-SR) 240 mg CR tablet, Take 240 mg by mouth 2 (two) times a day, Disp: , Rfl:     oxyCODONE-acetaminophen (PERCOCET) 5-325 mg per tablet, Take 1 tablet by mouth every 6 (six) hours as needed for moderate pain Max Daily Amount: 4 tablets, Disp: 28 tablet, Rfl: 0      Active Problems     Patient Active Problem List   Diagnosis    Malignant neoplasm of overlapping sites of left breast in female, estrogen receptor positive ([address] )    Hydronephrosis         Past Medical History     Past Medical History:   Diagnosis Date    Hypertension     Malignant neoplasm of overlapping sites of left female breast ([address] )     Migraine          Surgical History     Past Surgical History:   Procedure Laterality Date    BREAST BIOPSY      COLON SURGERY      colon resection         Family History     Family History   Problem Relation Age of Onset    No Known Problems Mother     No Known Problems Father          Social History     Social History     History   Smoking Status    Never Smoker   Smokeless Tobacco    Never Used         Pertinent Lab Values     Lab Results   Component Value Date    CREATININE 1 42 (H) 04/20/2018                 Pertinent Imaging       The patient's images were reviewed by me personally and also in real time with them in the examination room using our PACS imaging system    The imaging findings are significant for a severely obstructed right kidney with ureterectasis and pelviectasis to the level of a right distal ureteral calcification (stricture vs impacted stone)

## 2019-09-03 DIAGNOSIS — C50.919 MALIGNANT NEOPLASM OF FEMALE BREAST, UNSPECIFIED ESTROGEN RECEPTOR STATUS, UNSPECIFIED LATERALITY, UNSPECIFIED SITE OF BREAST (HCC): Primary | ICD-10-CM

## 2019-09-03 RX ORDER — ANASTROZOLE 1 MG/1
1 TABLET ORAL DAILY
Qty: 90 TABLET | Refills: 1 | Status: SHIPPED | OUTPATIENT
Start: 2019-09-03 | End: 2019-12-03 | Stop reason: SINTOL

## 2019-11-05 ENCOUNTER — OFFICE VISIT (OUTPATIENT)
Dept: SURGICAL ONCOLOGY | Facility: CLINIC | Age: 59
End: 2019-11-05
Payer: COMMERCIAL

## 2019-11-05 VITALS
RESPIRATION RATE: 16 BRPM | HEIGHT: 66 IN | BODY MASS INDEX: 23.46 KG/M2 | SYSTOLIC BLOOD PRESSURE: 120 MMHG | DIASTOLIC BLOOD PRESSURE: 80 MMHG | HEART RATE: 86 BPM | TEMPERATURE: 97.8 F | WEIGHT: 146 LBS

## 2019-11-05 DIAGNOSIS — C50.812 MALIGNANT NEOPLASM OF OVERLAPPING SITES OF LEFT BREAST IN FEMALE, ESTROGEN RECEPTOR POSITIVE (HCC): Primary | ICD-10-CM

## 2019-11-05 DIAGNOSIS — Z79.811 USE OF ANASTROZOLE (ARIMIDEX): ICD-10-CM

## 2019-11-05 DIAGNOSIS — Z17.0 MALIGNANT NEOPLASM OF OVERLAPPING SITES OF LEFT BREAST IN FEMALE, ESTROGEN RECEPTOR POSITIVE (HCC): Primary | ICD-10-CM

## 2019-11-05 DIAGNOSIS — R22.32 AXILLARY LUMP, LEFT: ICD-10-CM

## 2019-11-05 DIAGNOSIS — Z90.12 STATUS POST LEFT MASTECTOMY: ICD-10-CM

## 2019-11-05 PROCEDURE — 99214 OFFICE O/P EST MOD 30 MIN: CPT | Performed by: NURSE PRACTITIONER

## 2019-11-05 NOTE — PROGRESS NOTES
Surgical Oncology Follow Up       [address] [facility]  [facility] [place]  [address] [facility] [address]    [facility]  1960  [id number]  [address]  [facility] [place]  [address] [address]    Chief Complaint   Patient presents with    Breast Cancer     Pt is here for 6 month f/u       Assessment/Plan:  1  Malignant neoplasm of overlapping sites of left breast in female, estrogen receptor positive ([address] )  - Mammo diagnostic right w 3d & cad; Future  - 6 mo f/u visit    2  Use of anastrozole (Arimidex)  - Continue use per medical oncology    3  Axillary lump, left  - US breast left limited (diagnostic); Future    4  Status post left mastectomy  - Ambulatory referral to Plastic Surgery; Future    Discussion/Summary: Patient is a 79-year-old female who presents today for six-month follow-up visit for left breast cancer diagnosed in April 2018  Her pathology revealed invasive lobular carcinoma, multifocal, ER 75 percent, OR 15 percent, her 2-  She underwent a left mastectomy and axillary node dissection  She had 6 positive lymph nodes and her primary tumor size was a 8 1 centimeters  This was a stage IIIA  She received adjuvant chemotherapy  She completed postmastectomy radiation therapy and is currently taking Anastrozole  She had a right 3D diagnostic mammogram on April 8, 2019 which was BI-RADS 1, category 2 density  She has no new complaints today and notices no changes on self exam  She does have significant scarring of the left axillary region  I suspect this is scarring r/t surgery and radiation therapy  This was also assessed by Dr Ronaldo Moreira  Given the patient's history, we have recommended diagnostic u/s  If there are no concerns, we will plan to see the patient back in 6 months after her mammogram or sooner if the need arises    She was instructed to call with any new concerns or symptoms prior to that time  I will place a referral to Plastic surgery to discuss reconstructive options  She is in agree with this plan  All of her questions were answered today    History of Present Illness:        Malignant neoplasm of overlapping sites of left breast in female, estrogen receptor positive ([facility] )    4/3/2018 Initial Diagnosis     Left breast biopsy  4 6 cm in size on ultrasound largest tumor   Site A:  6:00 o clock 5 CMFN-Invasive Lobular grade 2  Site B:  11:00 o'clock 4 CMFN Invasive Lobular grade 2  Lymph node:  Positive for metastatic cancer  ER 70  IL 15  Her 2 1+   Performed on site A specimen  Stage IIA      5/15/2018 Surgery     Left breast modified radical mastectomy  Invasive lobular carcinoma  Multi focal  Tumor seen in deep dermis  Grade 2  8 1 cm  6/24 lymph nodes  ER 70  IL 15  Her 2 negative  Stage IIIA         6/27/2018 - 12/6/2018 Chemotherapy        Adjuvant chemotherapy with dose dense AC x4 followed by weekly paclitaxel x12       1/30/2019 - 3/14/2019 Radiation     a dose of 5000 cGy in 25 fractions to the chest wall and regional lymphatics  An additional 1000 cGy in 5 fractions was then delivered to the mastectomy scar Dr Dianna Levy      3/21/2019 -  Hormone Therapy     Anastrozole started          -Interval History:  Patient presents today for six-month follow-up visit for left breast cancer diagnosed in 2018  She denies headaches, back pain, bone pain, cough, shortness of breath, abdominal pain  She notices no changes on self-exam   She is interested in a consultation for a discussion about reconstruction  Review of Systems:  Review of Systems   Constitutional: Negative for activity change, appetite change, chills, fatigue, fever and unexpected weight change  HENT: Negative for trouble swallowing  Eyes: Negative for pain, redness and visual disturbance  Respiratory: Negative for cough, shortness of breath and wheezing      Cardiovascular: Negative for chest pain, palpitations and leg swelling  Gastrointestinal: Negative for abdominal pain, constipation, diarrhea, nausea and vomiting  Endocrine: Negative for cold intolerance and heat intolerance  Musculoskeletal: Negative for arthralgias, back pain, gait problem and myalgias  Skin: Negative for color change and rash  Neurological: Negative for dizziness, syncope, light-headedness, numbness and headaches  Hematological: Negative for adenopathy  Psychiatric/Behavioral: Negative for agitation and confusion  All other systems reviewed and are negative        Patient Active Problem List   Diagnosis    Malignant neoplasm of overlapping sites of left breast in female, estrogen receptor positive ([facility] )    Hydronephrosis    Cellulitis    History of DVT (deep vein thrombosis)    Anemia following use of chemotherapeutic drug    Hematuria    Hemorrhagic cystitis    Heme positive stool    Essential hypertension    Acute deep vein thrombosis (DVT) of left lower extremity (HCC)    Hyperbilirubinemia    Acute blood loss anemia    Moderate protein-calorie malnutrition (HCC)    Immunocompromised (HCC)    Lesion of bladder    Blood loss anemia    Dilated cbd, acquired    Rectal prolapse    Use of anastrozole (Arimidex)     Past Medical History:   Diagnosis Date    Acute DVT (deep venous thrombosis) (HCC)     of LLE>     Bladder infection     Congenital prolapsed rectum     History of biliary stent insertion     History of chemotherapy     History of transfusion     x2 s/p chemo treatments, no reaction    Hydronephrosis     right kidney    Hypertension     [facility] left [facility] 05/01/2018    Migraine      Past Surgical History:   Procedure Laterality Date    ABDOMINAL ADHESION SURGERY N/A 4/23/2019    Procedure: LYSIS ADHESIONS;  Surgeon: Tyrese Price MD;  [facility];  Service: Colorectal    BREAST BIOPSY      COLON SURGERY      colon resection    CYSTOSCOPY N/A 3/4/2019    Procedure: CYSTOSCOPY WITH BIOPSIES AND FULGERATION AND REDCUTION OF RECTUM PROLAPSE;  Surgeon: Aj Medina MD;  [facility];  Service: Urology    ERCP N/A 1/4/2019    Procedure: ENDOSCOPIC RETROGRADE CHOLANGIOPANCREATOGRAPHY (ERCP); Surgeon: Fatuma Baumann MD;  [facility];  Service: Gastroenterology    INSTILLATION MYTOMYCIN N/A 3/4/2019    Procedure: Joshua Flash;  Surgeon: Aj Medina MD;  [facility];  Service: Urology    TX COLONOSCOPY FLX DX W/COLLJ [facility] WHEN PFRMD N/A 4/22/2019    Procedure: COLONOSCOPY;  Surgeon: Jim Zazueta MD;  Location[facility]; Service: Colorectal    TX EDG US EXAM SURGICAL ALTER STOM DUODENUM/JEJUNUM N/A 3/7/2019    Procedure: LINEAR ENDOSCOPIC U/S;  Surgeon: Sophia Elise MD;  [facility]; Service: Gastroenterology    TX ESOPHAGOGASTRODUODENOSCOPY TRANSORAL DIAGNOSTIC N/A 3/7/2019    Procedure: ESOPHAGOGASTRODUODENOSCOPY (EGD); Surgeon: Sophia Elise MD;  [facility];   Service: Gastroenterology    TX INSJ TUNNELED CTR VAD W/SUBQ PORT AGE 5 YR/> N/A 6/26/2018    Procedure: INSERTION VENOUS PORT ( PORT-A-CATH) IR;  Surgeon: Victoriano Jordan DO;  [facility];  Service: Interventional Radiology    TX LAP, SURG PROCTOPEXY N/A 4/23/2019    Procedure: LAPAROSCOPIC HAND-ASSIST PELVIC DISSECTION; proctopexy;  Surgeon: Jim Zazueta MD;  [facility];  Service: Colorectal    TX MASTECTOMY, MODIFIED RADICAL Left 5/15/2018    Procedure: BREAST MODIFIED RADICAL MASTECTOMY;  Surgeon: Janiya Villavicencio MD;  [facility];  Service: Surgical Oncology    TX RMVL [place] CTR VAD W/SUBQ PORT/ CTR/PRPH INSJ N/A 6/4/2019    Procedure: REMOVAL VENOUS PORT (PORT-A-CATH)IR;  Surgeon: Victoriano Jordan DO;  [facility];  Service: Interventional Radiology    TUBAL LIGATION      US GUIDANCE BREAST BIOPSY LEFT EACH ADDITIONAL Left 4/3/2018    US GUIDED BREAST BIOPSY LEFT COMPLETE Left 4/3/2018    US GUIDED BREAST LYMPH NODE BIOPSY LEFT Left 4/3/2018     Family History   Problem Relation Age of Onset    No Known Problems Mother     No Known Problems Father     Breast cancer Maternal Aunt 48     Social History     Socioeconomic History    Marital status: /Civil Union     Spouse name: Not on file    Number of children: Not on file    Years of education: Not on file    Highest education level: Not on file   Occupational History    Not on file   Social Needs    Financial resource strain: Not on file    Food insecurity:     Worry: Not on file     Inability: Not on file    Transportation needs:     Medical: Not on file     Non-medical: Not on file   Tobacco Use    Smoking status: Never Smoker    Smokeless tobacco: Never Used   Substance and Sexual Activity    Alcohol use: Not Currently    Drug use: No    Sexual activity: Not on file   Lifestyle    Physical activity:     Days per week: Not on file     Minutes per session: Not on file    Stress: Not on file   Relationships    Social connections:     Talks on phone: Not on file     Gets together: Not on file     Attends Oriental orthodox service: Not on file     Active member of club or organization: Not on file     Attends meetings of clubs or organizations: Not on file     Relationship status: Not on file    Intimate partner violence:     Fear of current or ex partner: Not on file     Emotionally abused: Not on file     Physically abused: Not on file     Forced sexual activity: Not on file   Other Topics Concern    Not on file   Social History Narrative    Not on file       Current Outpatient Medications:     anastrozole (ARIMIDEX) 1 mg tablet, Take 1 tablet (1 mg total) by mouth daily, Disp: 90 tablet, Rfl: 1    gabapentin (NEURONTIN) 300 mg capsule, Take 1 capsule (300 mg total) by mouth 3 (three) times a day, Disp: 270 capsule, Rfl: 1    SUMAtriptan (IMITREX) 100 mg tablet, Take 100 mg by mouth once as needed for migraine, Disp: , Rfl:     verapamil (VERELAN) 240 MG 24 hr capsule, Take 240 mg by mouth daily, Disp: , Rfl: 0    apixaban (ELIQUIS) 2 5 mg, Take 2 5 mg by mouth 2 (two) times a day, Disp: , Rfl:     LORazepam (ATIVAN) 1 mg tablet, Take 1 mg by mouth 2 (two) times a day as needed, Disp: , Rfl: 0    sertraline (ZOLOFT) 100 mg tablet, Take 100 mg by mouth daily, Disp: , Rfl: 0    sertraline (ZOLOFT) 50 mg tablet, Take 50 mg by mouth daily at bedtime, Disp: , Rfl: 0  Allergies   Allergen Reactions    Gluten Meal GI Intolerance    Lactose GI Intolerance     Vitals:    11/05/19 1536   BP: 120/80   Pulse: 86   Resp: 16   Temp: 97 8 °F (36 6 °C)       Physical Exam   Constitutional: She is oriented to person, place, and time  Vital signs are normal  She appears well-developed and well-nourished  No distress  HENT:   Head: Normocephalic and atraumatic  Neck: Normal range of motion  Cardiovascular: Normal rate, regular rhythm and normal heart sounds  Pulmonary/Chest: Effort normal and breath sounds normal    Left chest wall without dominant masses or skin nodules  Hyperpigmentation s/p RT  There is significant scarring vs mass at the left axillary region- this most likely represents scar tissue but will assess with u/s  Right breast examined in the sitting and supine position  There are no masses, skin changes, nipple changes or nipple discharge  There is no bilateral supraclavicular or axillary lymphadenopathy noted  No left arm lymphedema appreciated  Abdominal: Soft  Normal appearance  She exhibits no mass  There is no hepatosplenomegaly  There is no tenderness  Musculoskeletal: Normal range of motion  Lymphadenopathy:     She has no axillary adenopathy  Right: No supraclavicular adenopathy present  Left: No supraclavicular adenopathy present  Neurological: She is alert and oriented to person, place, and time  Skin: Skin is warm, dry and intact  No rash noted   She is not diaphoretic  Psychiatric: She has a normal mood and affect  Her speech is normal    Vitals reviewed  Advance Care Planning/Advance Directives:  Discussed disease status, cancer treatment plans and/or cancer treatment goals with the patient

## 2019-11-11 ENCOUNTER — HOSPITAL ENCOUNTER (OUTPATIENT)
Dept: ULTRASOUND IMAGING | Facility: CLINIC | Age: 59
Discharge: HOME | End: 2019-11-11
Payer: COMMERCIAL

## 2019-11-11 DIAGNOSIS — R22.32 AXILLARY LUMP, LEFT: ICD-10-CM

## 2019-11-11 PROCEDURE — 76642 ULTRASOUND BREAST LIMITED: CPT

## 2019-11-19 ENCOUNTER — TELEPHONE (OUTPATIENT)
Dept: SURGICAL ONCOLOGY | Facility: CLINIC | Age: 59
End: 2019-11-19

## 2019-11-21 ENCOUNTER — TELEPHONE (OUTPATIENT)
Dept: SURGICAL ONCOLOGY | Facility: CLINIC | Age: 59
End: 2019-11-21

## 2019-12-03 ENCOUNTER — OFFICE VISIT (OUTPATIENT)
Dept: HEMATOLOGY ONCOLOGY | Facility: CLINIC | Age: 59
End: 2019-12-03
Payer: COMMERCIAL

## 2019-12-03 VITALS
SYSTOLIC BLOOD PRESSURE: 136 MMHG | DIASTOLIC BLOOD PRESSURE: 82 MMHG | OXYGEN SATURATION: 95 % | WEIGHT: 150 LBS | RESPIRATION RATE: 18 BRPM | TEMPERATURE: 97.3 F | HEART RATE: 76 BPM | BODY MASS INDEX: 24.11 KG/M2 | HEIGHT: 66 IN

## 2019-12-03 DIAGNOSIS — C50.812 MALIGNANT NEOPLASM OF OVERLAPPING SITES OF LEFT BREAST IN FEMALE, ESTROGEN RECEPTOR POSITIVE (HCC): Primary | ICD-10-CM

## 2019-12-03 DIAGNOSIS — Z17.0 MALIGNANT NEOPLASM OF OVERLAPPING SITES OF LEFT BREAST IN FEMALE, ESTROGEN RECEPTOR POSITIVE (HCC): Primary | ICD-10-CM

## 2019-12-03 PROCEDURE — 99214 OFFICE O/P EST MOD 30 MIN: CPT | Performed by: INTERNAL MEDICINE

## 2019-12-03 RX ORDER — LETROZOLE 2.5 MG/1
2.5 TABLET, FILM COATED ORAL DAILY
Qty: 90 TABLET | Refills: 1 | Status: SHIPPED | OUTPATIENT
Start: 2019-12-03 | End: 2020-06-23 | Stop reason: SDUPTHER

## 2019-12-05 NOTE — PROGRESS NOTES
Patient to [facility] for Paclitaxel: Offers no complaints at present time: Lab work ( 11/21/18 ) reviewed:  Within parameters to treat: Right PAC accessed without difficulty: Good blood return noted
Tolerated infusion without incident: No adverse reactions noted: Verified follow up appt with patient: Declined AVS
Home

## 2020-02-28 ENCOUNTER — TELEPHONE (OUTPATIENT)
Dept: HEMATOLOGY ONCOLOGY | Facility: CLINIC | Age: 60
End: 2020-02-28

## 2020-02-28 NOTE — TELEPHONE ENCOUNTER
I called the patient and left her a message to inform her that Dr Vero Lewis is rounding the week of June 8 until 6/11/20 due to a change of the hospital rounds schedule  I then stated that her follow up appointment was moved to 6/02/20 @ 5pm at the [facility]  I  then proceeded to verbalize if for some reason patient needs to reschedule the appointment to call the office back

## 2020-03-04 ENCOUNTER — TRANSCRIBE ORDERS (OUTPATIENT)
Dept: ADMINISTRATIVE | Facility: HOSPITAL | Age: 60
End: 2020-03-04

## 2020-03-04 DIAGNOSIS — R22.32 MASS OF LEFT AXILLA: Primary | ICD-10-CM

## 2020-03-04 DIAGNOSIS — R22.30 AXILLARY FULLNESS: Primary | ICD-10-CM

## 2020-03-09 ENCOUNTER — CONSULT (OUTPATIENT)
Dept: PLASTIC SURGERY | Facility: CLINIC | Age: 60
End: 2020-03-09
Payer: COMMERCIAL

## 2020-03-09 VITALS
HEIGHT: 64 IN | TEMPERATURE: 98.7 F | WEIGHT: 153.4 LBS | BODY MASS INDEX: 26.19 KG/M2 | DIASTOLIC BLOOD PRESSURE: 93 MMHG | RESPIRATION RATE: 18 BRPM | SYSTOLIC BLOOD PRESSURE: 158 MMHG | HEART RATE: 91 BPM

## 2020-03-09 DIAGNOSIS — C50.812 MALIGNANT NEOPLASM OF OVERLAPPING SITES OF LEFT BREAST IN FEMALE, ESTROGEN RECEPTOR POSITIVE (HCC): ICD-10-CM

## 2020-03-09 DIAGNOSIS — Z90.12 STATUS POST LEFT MASTECTOMY: ICD-10-CM

## 2020-03-09 DIAGNOSIS — Z17.0 MALIGNANT NEOPLASM OF OVERLAPPING SITES OF LEFT BREAST IN FEMALE, ESTROGEN RECEPTOR POSITIVE (HCC): ICD-10-CM

## 2020-03-09 DIAGNOSIS — Z71.89 ENCOUNTER TO DISCUSS BREAST RECONSTRUCTION: Primary | ICD-10-CM

## 2020-03-09 PROCEDURE — 99244 OFF/OP CNSLTJ NEW/EST MOD 40: CPT | Performed by: SURGERY

## 2020-03-09 NOTE — LETTER
March 9, 2020     JANN Parikh  [address] [facility] [employer] [address]    Patient: Trye Roberts   YOB: 1960   Date of Visit: 3/9/2020       Dear Dr Alix Myers: Thank you for referring Beth Emerson to me for evaluation  Below are my notes for this consultation  If you have questions, please do not hesitate to call me  I look forward to following your patient along with you  Sincerely,        Abdifatah Myrick MD        CC: MD Abdifatah Aviles MD  3/9/2020  4:42 PM  Incomplete  Assessment/Plan:  Please see HPI  She is status post left mastectomy without reconstruction, adjuvant chemotherapy and postmastectomy radiation  She is here today to discuss options for left breast reconstruction  Given her prior radiation options are somewhat limited  She wears a 36 D cup bra, and would be interested in ultimately a C cup both for the reconstructed breast, and for the opposite, right breast as well  We discussed tissue expander/implant based reconstruction, autologous reconstruction to include latissimus dorsi flap with a tissue expander, the abdominal flaps including the tram flap, both pedicled and free, deep inferior epigastric artery  flap, and other micro surgical options  She is aware that I do not perform micro surgery, if she were interested in micro surgery we would refer her to our practice micro surgeon  Latissimus dorsi contractions are moderately strong, the abdominal pannus would not be a good choice for flap reconstruction given her prior surgery, scarring, and paucity of central abdominal fat  The appropriate measurements and photographs were obtained  We reviewed photographs of reasonable results    Given a change in her insurance, she would not be inclined to undergo any procedures until July or later and we will have her return for a 2nd visit in July so that we may discuss these options again, and determine the reconstructive modality that best suits her  Diagnoses and all orders for this visit:    Malignant neoplasm of overlapping sites of left breast in female, estrogen receptor positive ([facility] [address] )  -     Ambulatory referral to Plastic Surgery    Status post left mastectomy  -     Ambulatory referral to Plastic Surgery          Subjective:  Status post left mastectomy, adjuvant chemotherapy and radiation for breast cancer     Patient ID: Derrick Lyman is a 63 y o  female  HPI More Tatum presents today to discuss options for postmastectomy reconstruction, she is status post left mastectomy, adjuvant chemotherapy and postmastectomy radiation  She has been referred by Cara Alfonso  and Dr Edgar Morocho  She is accompanied by her  ANGIE       The following portions of the patient's history were reviewed and updated as appropriate: allergies, current medications, past family history, past medical history, past social history, past surgical history and problem list     Review of Systems   Constitutional: Negative for chills and fever  HENT: Negative for hearing loss  Eyes: Positive for visual disturbance  Negative for discharge  Respiratory: Negative for chest tightness and shortness of breath  Cardiovascular: Negative for chest pain and leg swelling  Gastrointestinal: Negative for blood in stool, constipation, diarrhea and nausea  Genitourinary: Negative for dysuria  Musculoskeletal: Negative for gait problem  Skin: Negative for rash  Allergic/Immunologic: Negative for immunocompromised state  Neurological: Negative for seizures and headaches  Hematological: Does not bruise/bleed easily  Psychiatric/Behavioral: Negative for dysphoric mood  The patient is nervous/anxious            Objective:      /93   Pulse 91   Temp 98 7 °F (37 1 °C)   Resp 18   Ht 5' 4" (1 626 m)   Wt 69 6 kg (153 lb 6 4 oz)   LMP  (LMP Unknown)   BMI 26 33 kg/m²           Physical Exam   Constitutional: She is oriented to person, place, and time  She appears well-developed and well-nourished  HENT:   Head: Normocephalic  Eyes: Pupils are equal, round, and reactive to light  Neck: Normal range of motion  Pulmonary/Chest: Effort normal    Abdominal: Soft  Midline vertical abdominal scar, with paucity of central abdominal fat, prominent fat superior to umbilicus   Musculoskeletal: Normal range of motion  Neurological: She is alert and oriented to person, place, and time  Skin: Skin is warm  Breast examination reveals a surgically absent left breast, there is evidence of prior radiation with significant fibrosis and hyperpigmentation, the right breast is full, D cup in size, the sternal notch to nipple distance is 28 cm, the inframammary fold to nipple distance is 10 cm, the base diameter is 13 5 cm   Psychiatric: She has a normal mood and affect  Sukhjinder Landeros MD  3/9/2020  4:39 PM  Sign at close encounter  Assessment/Plan:  Please see HPI  She is status post left mastectomy without reconstruction, adjuvant chemotherapy and postmastectomy radiation  She is here today to discuss options for left breast reconstruction  Given her prior radiation options are somewhat limited  She wears a 36 D cup bra, and would be interested in ultimately a C cup both for the reconstructed breast, and for the opposite, right breast as well  We discussed tissue expander/implant based reconstruction, autologous reconstruction to include latissimus dorsi flap with a tissue expander, the abdominal flaps including the tram flap, both pedicled and free, deep inferior epigastric artery  flap, and other micro surgical options  She is aware that I do not perform micro surgery, if she were interested in micro surgery we would refer her to our practice micro surgeon    Latissimus dorsi contractions are moderately strong, the abdominal pannus would not be a good choice for flap reconstruction given her prior surgery, scarring, and paucity of central abdominal fat  The appropriate measurements and photographs were obtained  We reviewed photographs of reasonable results  Given a change in her insurance, she would not be inclined to undergo any procedures until July or later and we will have her return for a 2nd visit in July so that we may discuss these options again, and determine the reconstructive modality that best suits her  Diagnoses and all orders for this visit:    Malignant neoplasm of overlapping sites of left breast in female, estrogen receptor positive ([facility] [address] )  -     Ambulatory referral to Plastic Surgery    Status post left mastectomy  -     Ambulatory referral to Plastic Surgery          Subjective:      Patient ID: Jaimee Cruz is a 63 y o  female  HPI    The following portions of the patient's history were reviewed and updated as appropriate: allergies, current medications, past family history, past medical history, past social history, past surgical history and problem list     Review of Systems   Constitutional: Negative for chills and fever  HENT: Negative for hearing loss  Eyes: Positive for visual disturbance  Negative for discharge  Respiratory: Negative for chest tightness and shortness of breath  Cardiovascular: Negative for chest pain and leg swelling  Gastrointestinal: Negative for blood in stool, constipation, diarrhea and nausea  Genitourinary: Negative for dysuria  Musculoskeletal: Negative for gait problem  Skin: Negative for rash  Allergic/Immunologic: Negative for immunocompromised state  Neurological: Negative for seizures and headaches  Hematological: Does not bruise/bleed easily  Psychiatric/Behavioral: Negative for dysphoric mood  The patient is nervous/anxious            Objective:      /93   Pulse 91   Temp 98 7 °F (37 1 °C)   Resp 18   Ht 5' 4" (1 626 m)   Wt 69 6 kg (153 lb 6 4 oz)   LMP  (LMP Unknown)   BMI 26 33 kg/m²           Physical Exam   Constitutional: She is oriented to person, place, and time  She appears well-developed and well-nourished  HENT:   Head: Normocephalic  Eyes: Pupils are equal, round, and reactive to light  Neck: Normal range of motion  Pulmonary/Chest: Effort normal    Abdominal: Soft  Midline vertical abdominal scar, with paucity of central abdominal fat, prominent fat superior to umbilicus   Musculoskeletal: Normal range of motion  Neurological: She is alert and oriented to person, place, and time  Skin: Skin is warm  Breast examination reveals a surgically absent left breast, there is evidence of prior radiation with significant fibrosis and hyperpigmentation, the right breast is full, D cup in size, the sternal notch to nipple distance is 28 cm, the inframammary fold to nipple distance is 10 cm, the base diameter is 13 5 cm   Psychiatric: She has a normal mood and affect

## 2020-03-09 NOTE — PROGRESS NOTES
Assessment/Plan:  Please see HPI  She is status post left mastectomy without reconstruction, adjuvant chemotherapy and postmastectomy radiation  She is here today to discuss options for left breast reconstruction  Given her prior radiation options are somewhat limited  She wears a 36 D cup bra, and would be interested in ultimately a C cup both for the reconstructed breast, and for the opposite, right breast as well  We discussed tissue expander/implant based reconstruction, autologous reconstruction to include latissimus dorsi flap with a tissue expander, the abdominal flaps including the tram flap, both pedicled and free, deep inferior epigastric artery  flap, and other micro surgical options  She is aware that I do not perform micro surgery, if she were interested in micro surgery we would refer her to our practice micro surgeon  Latissimus dorsi contractions are moderately strong, the abdominal pannus would not be a good choice for flap reconstruction given her prior surgery, scarring, and paucity of central abdominal fat  The appropriate measurements and photographs were obtained  We reviewed photographs of reasonable results  Given a change in her insurance, she would not be inclined to undergo any procedures until July or later and we will have her return for a 2nd visit in July so that we may discuss these options again, and determine the reconstructive modality that best suits her  Diagnoses and all orders for this visit:    Malignant neoplasm of overlapping sites of left breast in female, estrogen receptor positive ([facility] [address] )  -     Ambulatory referral to Plastic Surgery    Status post left mastectomy  -     Ambulatory referral to Plastic Surgery          Subjective:  Status post left mastectomy, adjuvant chemotherapy and radiation for breast cancer     Patient ID: Oumou Ram is a 63 y o  female      HPI Yefri Sifuentes presents today to discuss options for postmastectomy reconstruction, she is status post left mastectomy, adjuvant chemotherapy and postmastectomy radiation  She has been referred by Ambrosio Woods  and Dr Yoana Julien  She is accompanied by her  Tyra Montana       The following portions of the patient's history were reviewed and updated as appropriate: allergies, current medications, past family history, past medical history, past social history, past surgical history and problem list     Review of Systems   Constitutional: Negative for chills and fever  HENT: Negative for hearing loss  Eyes: Positive for visual disturbance  Negative for discharge  Respiratory: Negative for chest tightness and shortness of breath  Cardiovascular: Negative for chest pain and leg swelling  Gastrointestinal: Negative for blood in stool, constipation, diarrhea and nausea  Genitourinary: Negative for dysuria  Musculoskeletal: Negative for gait problem  Skin: Negative for rash  Allergic/Immunologic: Negative for immunocompromised state  Neurological: Negative for seizures and headaches  Hematological: Does not bruise/bleed easily  Psychiatric/Behavioral: Negative for dysphoric mood  The patient is nervous/anxious  Objective:      /93   Pulse 91   Temp 98 7 °F (37 1 °C)   Resp 18   Ht 5' 4" (1 626 m)   Wt 69 6 kg (153 lb 6 4 oz)   LMP  (LMP Unknown)   BMI 26 33 kg/m²          Physical Exam   Constitutional: She is oriented to person, place, and time  She appears well-developed and well-nourished  HENT:   Head: Normocephalic  Eyes: Pupils are equal, round, and reactive to light  Neck: Normal range of motion  Pulmonary/Chest: Effort normal    Abdominal: Soft  Midline vertical abdominal scar, with paucity of central abdominal fat, prominent fat superior to umbilicus   Musculoskeletal: Normal range of motion  Neurological: She is alert and oriented to person, place, and time  Skin: Skin is warm     Breast examination reveals a surgically absent left breast, there is evidence of prior radiation with significant fibrosis and hyperpigmentation, the right breast is full, D cup in size, the sternal notch to nipple distance is 28 cm, the inframammary fold to nipple distance is 10 cm, the base diameter is 13 5 cm   Psychiatric: She has a normal mood and affect

## 2020-03-13 ENCOUNTER — HOSPITAL ENCOUNTER (OUTPATIENT)
Dept: ULTRASOUND IMAGING | Facility: CLINIC | Age: 60
Discharge: HOME | End: 2020-03-13
Payer: COMMERCIAL

## 2020-03-13 DIAGNOSIS — R22.32 MASS OF LEFT AXILLA: ICD-10-CM

## 2020-03-13 PROCEDURE — 76642 ULTRASOUND BREAST LIMITED: CPT

## 2020-03-27 ENCOUNTER — HOSPITAL ENCOUNTER (OUTPATIENT)
Dept: MAMMOGRAPHY | Facility: CLINIC | Age: 60
Discharge: HOME | End: 2020-03-27
Payer: COMMERCIAL

## 2020-03-27 VITALS — HEIGHT: 62 IN | BODY MASS INDEX: 28.16 KG/M2 | WEIGHT: 153 LBS

## 2020-03-27 DIAGNOSIS — C50.812 MALIGNANT NEOPLASM OF OVERLAPPING SITES OF LEFT BREAST IN FEMALE, ESTROGEN RECEPTOR POSITIVE (HCC): ICD-10-CM

## 2020-03-27 DIAGNOSIS — Z17.0 MALIGNANT NEOPLASM OF OVERLAPPING SITES OF LEFT BREAST IN FEMALE, ESTROGEN RECEPTOR POSITIVE (HCC): ICD-10-CM

## 2020-03-27 PROCEDURE — 77065 DX MAMMO INCL CAD UNI: CPT

## 2020-03-27 PROCEDURE — G0279 TOMOSYNTHESIS, MAMMO: HCPCS

## 2020-05-04 ENCOUNTER — TELEPHONE (OUTPATIENT)
Dept: SURGICAL ONCOLOGY | Facility: CLINIC | Age: 60
End: 2020-05-04

## 2020-05-06 ENCOUNTER — TELEMEDICINE (OUTPATIENT)
Dept: SURGICAL ONCOLOGY | Facility: CLINIC | Age: 60
End: 2020-05-06
Payer: COMMERCIAL

## 2020-05-06 DIAGNOSIS — Z79.811 USE OF LETROZOLE (FEMARA): ICD-10-CM

## 2020-05-06 DIAGNOSIS — C50.812 MALIGNANT NEOPLASM OF OVERLAPPING SITES OF LEFT BREAST IN FEMALE, ESTROGEN RECEPTOR POSITIVE (HCC): Primary | ICD-10-CM

## 2020-05-06 DIAGNOSIS — Z17.0 MALIGNANT NEOPLASM OF OVERLAPPING SITES OF LEFT BREAST IN FEMALE, ESTROGEN RECEPTOR POSITIVE (HCC): Primary | ICD-10-CM

## 2020-05-06 DIAGNOSIS — M25.612 DECREASED ROM OF LEFT SHOULDER: ICD-10-CM

## 2020-05-06 PROCEDURE — 99213 OFFICE O/P EST LOW 20 MIN: CPT | Performed by: NURSE PRACTITIONER

## 2020-06-02 ENCOUNTER — TELEPHONE (OUTPATIENT)
Dept: HEMATOLOGY ONCOLOGY | Facility: CLINIC | Age: 60
End: 2020-06-02

## 2020-06-03 ENCOUNTER — TELEPHONE (OUTPATIENT)
Dept: HEMATOLOGY ONCOLOGY | Facility: CLINIC | Age: 60
End: 2020-06-03

## 2020-06-23 ENCOUNTER — OFFICE VISIT (OUTPATIENT)
Dept: HEMATOLOGY ONCOLOGY | Facility: CLINIC | Age: 60
End: 2020-06-23
Payer: COMMERCIAL

## 2020-06-23 VITALS
HEART RATE: 73 BPM | OXYGEN SATURATION: 96 % | WEIGHT: 155 LBS | TEMPERATURE: 98.6 F | RESPIRATION RATE: 18 BRPM | BODY MASS INDEX: 27.46 KG/M2 | SYSTOLIC BLOOD PRESSURE: 118 MMHG | HEIGHT: 63 IN | DIASTOLIC BLOOD PRESSURE: 82 MMHG

## 2020-06-23 DIAGNOSIS — D53.9 MACROCYTIC ANEMIA: ICD-10-CM

## 2020-06-23 DIAGNOSIS — Z17.0 MALIGNANT NEOPLASM OF OVERLAPPING SITES OF LEFT BREAST IN FEMALE, ESTROGEN RECEPTOR POSITIVE (HCC): Primary | ICD-10-CM

## 2020-06-23 DIAGNOSIS — C50.812 MALIGNANT NEOPLASM OF OVERLAPPING SITES OF LEFT BREAST IN FEMALE, ESTROGEN RECEPTOR POSITIVE (HCC): Primary | ICD-10-CM

## 2020-06-23 PROCEDURE — 99214 OFFICE O/P EST MOD 30 MIN: CPT | Performed by: INTERNAL MEDICINE

## 2020-06-23 RX ORDER — LETROZOLE 2.5 MG/1
2.5 TABLET, FILM COATED ORAL DAILY
Qty: 90 TABLET | Refills: 1 | Status: SHIPPED | OUTPATIENT
Start: 2020-06-23 | End: 2020-11-16

## 2020-06-29 ENCOUNTER — OFFICE VISIT (OUTPATIENT)
Dept: PLASTIC SURGERY | Facility: CLINIC | Age: 60
End: 2020-06-29
Payer: COMMERCIAL

## 2020-06-29 VITALS — WEIGHT: 155.8 LBS | BODY MASS INDEX: 27.61 KG/M2 | RESPIRATION RATE: 16 BRPM | HEIGHT: 63 IN | TEMPERATURE: 99.3 F

## 2020-06-29 DIAGNOSIS — Z90.12 STATUS POST LEFT MASTECTOMY: Primary | ICD-10-CM

## 2020-06-29 PROCEDURE — 99214 OFFICE O/P EST MOD 30 MIN: CPT | Performed by: SURGERY

## 2020-07-15 ENCOUNTER — PATIENT MESSAGE (OUTPATIENT)
Dept: HEMATOLOGY ONCOLOGY | Facility: CLINIC | Age: 60
End: 2020-07-15

## 2020-07-23 ENCOUNTER — EVALUATION (OUTPATIENT)
Dept: PHYSICAL THERAPY | Facility: CLINIC | Age: 60
End: 2020-07-23
Payer: COMMERCIAL

## 2020-07-23 DIAGNOSIS — G89.28 POST-MASTECTOMY PAIN SYNDROME: Primary | ICD-10-CM

## 2020-07-23 DIAGNOSIS — Z90.12 STATUS POST LEFT MASTECTOMY: ICD-10-CM

## 2020-07-23 PROCEDURE — 97162 PT EVAL MOD COMPLEX 30 MIN: CPT | Performed by: PHYSICAL THERAPIST

## 2020-07-23 NOTE — PROGRESS NOTES
PT Evaluation     Today's date: 2020  Patient name: Arlen Kayser  : 1960  MRN: [id number]  Referring provider: Keya Ferrara MD  Dx:   Encounter Diagnosis     ICD-10-CM    1  Status post left mastectomy Z90 12 Ambulatory referral to Physical Therapy                  Assessment  Assessment details: Arlen Kayser is a 63 y o  female with Post-mastectomy pain syndrome with radiation fibrosis and stage 1 lymphedema of the L UE  She presents with pain, fibrosis, decreased ROM,  and postural  dysfunction  Due to these impairments, Patient has difficulty performing a/iadls, recreational activities and engaging in social activities  Patient's clinical presentation is consistent with their referring diagnosis  Patient would benefit from skilled physical therapy to address their aforementioned impairments, improve their level of function and to improve their overall quality of life  She would also benefit from a compression garment 20-30 mmHG for stage 1 lymphedema  She has been given a home exercise program and is in agreement with the plan of care  Thank you for your referral   Impairments: abnormal muscle tone, abnormal or restricted ROM, lacks appropriate home exercise program and pain with function  Other impairment: fibrosis due to radiation  Functional limitations: lifting, reaching overhead  Symptom irritability: moderateUnderstanding of Dx/Px/POC: good   Prognosis: good  Prognosis details: Duration of sx    Goals  ST Goals - 2-4 weeks  1  Patient will report decreased pain with activity by at least 2 points within 4 weeks  2  Patient will improve ROM 5-10 degrees within 4 weeks  3  Patient will demonstrate ability to actively correct posture without cueing within 4 weeks  4  Patient will perform IADLs without pain in 4 weeks  5  Patient will don and doff compression garment independently in 2 weeks    LT Goals - Discharge  1   Patient will improve FOTO score to maximum stated or greater by discharge  2  Patient will return to preferred recreational activity without significant pain increase by discharge   3  Patient will return to all housework related activities without pain by discharge     Plan  Patient would benefit from: skilled physical therapy  Planned therapy interventions: joint mobilization, manual therapy, massage, muscle pump exercises, neuromuscular re-education, strengthening, stretching, therapeutic activities, therapeutic exercise and home exercise program  Frequency: 2x week  Duration in visits: 20  Duration in weeks: 20  Plan of Care beginning date: 2020  Plan of Care expiration date: 10/23/2020  Treatment plan discussed with: patient        Subjective Evaluation    History of Present Illness  Date of surgery: 5/15/2018  Mechanism of injury: surgery  Mechanism of injury: Patient was diagnosed with L breast cancer and underwent chemotherapy and radiation therapy  She went for a consult for reconstruction and expressed having pain and moderately limited ROM in her LUE  CC:  Pain and pulling when she lifts her UE up  Function:  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  She has most difficulty with reaching overhead  She feels she is able to pick something up off a counter at shoulder height  She also has difficulty with reaching behind her back to wipe her back or put something behind her  Recurrent probem    Quality of life: excellent    Pain  Current pain rating: 3  At best pain ratin  At worst pain rating: 3  Quality: pulling  Relieving factors: rest  Aggravating factors: lifting and overhead activity    Hand dominance: right    Patient Goals  Patient goals for therapy: increased motion          Objective     Observations   Left Shoulder   Positive for adhesive scar and deformity  Additional Observation Details  Moderate to severe scarring/fibrosis of the chest wall and axilla  Palpation   Left   Hypotonic in the pectoralis minor     Tenderness of the pectoralis major and pectoralis minor       Active Range of Motion   Left Shoulder   Flexion: 90 degrees   Abduction: 63 degrees   Internal rotation BTB: [facility]    Right Shoulder   Flexion: 145 degrees   Abduction: 145 degrees   External rotation 0°: WFL  Internal rotation BTB: [facility]    Additional Active Range of Motion Details  Patient unable to place L hand on back of head for ER    Passive Range of Motion   Left Shoulder   Flexion: 145 degrees   Abduction: 120 degrees   External rotation 90°: 73 degrees     Strength/Myotome Testing     Left Shoulder   Normal muscle strength    Right Shoulder   Normal muscle strength    Swelling   Left shoulder swelling details: Lymphedema MMTs RIGHT LEFT  palm                            18 5  wrist                             16 5  Wrist + 10 cm                 19 5  Wrist + 16 cm                             23   Elbow                              25   Wrist + 32                  29  Wrist + 40                  31      Right shoulder swelling details: Lymphedema MMTs                                  RIGHT    palm                      19 5             wrist            16  Wrist + 10 cm           18   Wrist + 16 cm            22   Elbow             23  Wrist + 32            26 5   Wrist + 40            30 2           Flowsheet Rows      Most Recent Value   PT/OT G-Codes   Current Score  55   Projected Score  69             Precautions: HTN, BCA      Manuals 7/23            MFR             IASTM             (-) pressure IASTM             PROM             Neuro Re-Ed                          Table Slides hep            [employer]             Doorway pec                                       Ther Ex                                                                                                                     Ther Activity                                       Gait Training                                       Modalities

## 2020-07-27 DIAGNOSIS — Z17.0 MALIGNANT NEOPLASM OF OVERLAPPING SITES OF LEFT BREAST IN FEMALE, ESTROGEN RECEPTOR POSITIVE (HCC): Primary | ICD-10-CM

## 2020-07-27 DIAGNOSIS — I89.0 LYMPHEDEMA: ICD-10-CM

## 2020-07-27 DIAGNOSIS — C50.812 MALIGNANT NEOPLASM OF OVERLAPPING SITES OF LEFT BREAST IN FEMALE, ESTROGEN RECEPTOR POSITIVE (HCC): Primary | ICD-10-CM

## 2020-07-27 DIAGNOSIS — R22.32 AXILLARY LUMP, LEFT: ICD-10-CM

## 2020-07-27 NOTE — TELEPHONE ENCOUNTER
Left message for patient to call back. Will add this to other telephone encounter in the clerical pool and done this one so there is no confusion

## 2020-08-06 ENCOUNTER — OFFICE VISIT (OUTPATIENT)
Dept: PHYSICAL THERAPY | Facility: CLINIC | Age: 60
End: 2020-08-06
Payer: COMMERCIAL

## 2020-08-06 DIAGNOSIS — Z90.12 STATUS POST LEFT MASTECTOMY: Primary | ICD-10-CM

## 2020-08-06 DIAGNOSIS — G89.28 POST-MASTECTOMY PAIN SYNDROME: ICD-10-CM

## 2020-08-06 PROCEDURE — 97110 THERAPEUTIC EXERCISES: CPT | Performed by: PHYSICAL THERAPIST

## 2020-08-06 PROCEDURE — 97140 MANUAL THERAPY 1/> REGIONS: CPT | Performed by: PHYSICAL THERAPIST

## 2020-08-06 NOTE — PROGRESS NOTES
Daily Note     Today's date: 2020  Patient name: Alex Watkins  : 1960  MRN: [id number]  Referring provider: Valentino Pair, MD  Dx:   Encounter Diagnosis     ICD-10-CM    1  Status post left mastectomy  Z90 12    2  Post-mastectomy pain syndrome  G89 28                   Subjective: Patient reports no significant difficulty with UE since IE  Objective: See treatment diary below      Assessment: Tolerated treatment well  Patient would benefit from continued PT      Plan: Continue per plan of care        Precautions: HTN, BCA      Manuals            MFR             IASTM  8'           (-) pressure IASTM  6'           PROM - flex/abd/horiz abd  10           Neuro Re-Ed                          Table Slides hep 5 x :10 ea           Pulley  5'           Ladder             Doorway pec  5 x :20                                     Ther Ex                                                                                                                     Ther Activity                                       Gait Training                                       Modalities

## 2020-08-10 ENCOUNTER — OFFICE VISIT (OUTPATIENT)
Dept: PHYSICAL THERAPY | Facility: CLINIC | Age: 60
End: 2020-08-10
Payer: COMMERCIAL

## 2020-08-10 DIAGNOSIS — Z90.12 STATUS POST LEFT MASTECTOMY: Primary | ICD-10-CM

## 2020-08-10 DIAGNOSIS — G89.28 POST-MASTECTOMY PAIN SYNDROME: ICD-10-CM

## 2020-08-10 PROCEDURE — 97140 MANUAL THERAPY 1/> REGIONS: CPT

## 2020-08-10 PROCEDURE — 97110 THERAPEUTIC EXERCISES: CPT

## 2020-08-10 NOTE — PROGRESS NOTES
Daily Note     Today's date: 8/10/2020  Patient name: Efrain Llamas  : 1960  MRN: [id number]  Referring provider: Honey Gore MD  Dx: No diagnosis found  Subjective: Patient remains limited with reaching [place] and behind her back   Objective: See treatment diary below      Assessment: Tolerated treatment well  Patient exhibited good technique with therapeutic exercises      Plan: Continue per plan of care        Precautions: HTN, BCA      Manuals  8/10          MFR             IASTM  8' 8          (-) pressure IASTM  6' 6          PROM - flex/abd/horiz abd  10 10          Neuro Re-Ed                          Table Slides hep 5 x :10 ea 5 x :10 ea          Pulley  5' 5 min          Ladder   :10 x10 flex          Doorway pec  5 x :20 5 x:20                                     Ther Ex             Supine wand flexion   :10x10           IR cane    NV                                                                                        Ther Activity                                       Gait Training                                       Modalities

## 2020-08-13 ENCOUNTER — OFFICE VISIT (OUTPATIENT)
Dept: PHYSICAL THERAPY | Facility: CLINIC | Age: 60
End: 2020-08-13
Payer: COMMERCIAL

## 2020-08-13 DIAGNOSIS — G89.28 POST-MASTECTOMY PAIN SYNDROME: ICD-10-CM

## 2020-08-13 DIAGNOSIS — Z90.12 STATUS POST LEFT MASTECTOMY: Primary | ICD-10-CM

## 2020-08-13 PROCEDURE — 97112 NEUROMUSCULAR REEDUCATION: CPT | Performed by: PHYSICAL THERAPIST

## 2020-08-13 PROCEDURE — 97140 MANUAL THERAPY 1/> REGIONS: CPT | Performed by: PHYSICAL THERAPIST

## 2020-08-13 PROCEDURE — 97110 THERAPEUTIC EXERCISES: CPT | Performed by: PHYSICAL THERAPIST

## 2020-08-13 NOTE — PROGRESS NOTES
Daily Note     Today's date: 2020  Patient name: Zuleyka Mccurdy  : 1960  MRN: [id number]  Referring provider: Terence Mehta MD  Dx:   Encounter Diagnosis     ICD-10-CM    1  Status post left mastectomy  Z90 12    2  Post-mastectomy pain syndrome  G89 28                   Subjective: Patient reports that she feels she is making good improvements  Objective: See treatment diary below      Assessment: Tolerated treatment well  Patient would benefit from continued PT      Plan: Continue per plan of care        Precautions: HTN, BCA      Manuals 7/23 8/6 8/10 8/13         MFR - ELBOW    10         IASTM  8' 8 8         (-) pressure IASTM  6' 6 8         PROM - flex/abd/horiz abd  10 10 10         Neuro Re-Ed                          Table Slides hep 5 x :10 ea 5 x :10 ea          Pulley  5' 5 min 5'         Ladder   :10 x10 flex 10 x :10         Doorway pec  5 x :20 5 x:20                                     Ther Ex             Supine wand flexion   :10x10  10 x :10         IR cane    NV 10 x :10         Cane ER    10 x :10                                                                          Ther Activity                                       Gait Training                                       Modalities

## 2020-08-17 ENCOUNTER — OFFICE VISIT (OUTPATIENT)
Dept: PHYSICAL THERAPY | Facility: CLINIC | Age: 60
End: 2020-08-17
Payer: COMMERCIAL

## 2020-08-17 DIAGNOSIS — G89.28 POST-MASTECTOMY PAIN SYNDROME: Primary | ICD-10-CM

## 2020-08-17 PROCEDURE — 97140 MANUAL THERAPY 1/> REGIONS: CPT | Performed by: PHYSICAL THERAPIST

## 2020-08-17 PROCEDURE — 97110 THERAPEUTIC EXERCISES: CPT | Performed by: PHYSICAL THERAPIST

## 2020-08-17 NOTE — PROGRESS NOTES
Daily Note     Today's date: 2020  Patient name: Najma Yen  : 1960  MRN: [id number]  Referring provider: Ansley Gao MD  Dx:   Encounter Diagnosis     ICD-10-CM    1  Post-mastectomy pain syndrome  G89 28                   Subjective: Patient reports that she feels improvement  Objective: See treatment diary below      Assessment: Tolerated treatment well  Patient would benefit from continued PT      Plan: Continue per plan of care        Precautions: HTN, BCA      Manuals 7/23 8/6 8/10 8/13 8/17        MFR - ELBOW    10 10        IASTM  8' 8 8 8        (-) pressure IASTM  6' 6 8 8        PROM - flex/abd/horiz abd  10 10 10 10        Neuro Re-Ed                          Table Slides hep 5 x :10 ea 5 x :10 ea          Pulley  5' 5 min 5' 5'        Ladder   :10 x10 flex 10 x :10 10x:10        Doorway pec  5 x :20 5 x:20   5 x :20                                  Ther Ex             Supine wand flexion   :10x10  10 x :10 10 x :10        IR cane    NV 10 x :10 10 x :10        Cane ER    10 x :10 10 x :10                                                                         Ther Activity                                       Gait Training                                       Modalities

## 2020-08-20 ENCOUNTER — OFFICE VISIT (OUTPATIENT)
Dept: PHYSICAL THERAPY | Facility: CLINIC | Age: 60
End: 2020-08-20
Payer: COMMERCIAL

## 2020-08-20 DIAGNOSIS — Z90.12 STATUS POST LEFT MASTECTOMY: ICD-10-CM

## 2020-08-20 DIAGNOSIS — G89.28 POST-MASTECTOMY PAIN SYNDROME: Primary | ICD-10-CM

## 2020-08-20 PROCEDURE — 97110 THERAPEUTIC EXERCISES: CPT

## 2020-08-20 PROCEDURE — 97140 MANUAL THERAPY 1/> REGIONS: CPT

## 2020-08-20 NOTE — PROGRESS NOTES
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  Objective: See treatment diary below      Assessment: Tolerated treatment fair  Patient exhibited good technique with therapeutic exercises      Plan: Continue per plan of care        Precautions: HTN, BCA      Manuals 7/23 8/6 8/10 8/13 8/17 8/20       MFR - ELBOW    10 10 10       IASTM  8' 8 8 8 8       (-) pressure IASTM  6' 6 8 8 7       PROM - flex/abd/horiz abd  10 10 10 10 10       Neuro Re-Ed                          Table Slides hep 5 x :10 ea 5 x :10 ea          Pulley  5' 5 min 5' 5' 5       Ladder   :10 x10 flex 10 x :10 10x:10 10x:10       Doorway pec  5 x :20 5 x:20   5 x :20 5x:20                                 Ther Ex             Supine wand flexion   :10x10  10 x :10 10 x :10 :10x10        IR cane    NV 10 x :10 10 x :10 10x:10       Cane ER    10 x :10 10 x :10 10x:10                                                                        Ther Activity                                       Gait Training                                       Modalities

## 2020-08-24 ENCOUNTER — OFFICE VISIT (OUTPATIENT)
Dept: PHYSICAL THERAPY | Facility: CLINIC | Age: 60
End: 2020-08-24
Payer: COMMERCIAL

## 2020-08-24 DIAGNOSIS — Z90.12 STATUS POST LEFT MASTECTOMY: ICD-10-CM

## 2020-08-24 DIAGNOSIS — G89.28 POST-MASTECTOMY PAIN SYNDROME: Primary | ICD-10-CM

## 2020-08-24 PROCEDURE — 97140 MANUAL THERAPY 1/> REGIONS: CPT | Performed by: PHYSICAL THERAPIST

## 2020-08-24 PROCEDURE — 97112 NEUROMUSCULAR REEDUCATION: CPT | Performed by: PHYSICAL THERAPIST

## 2020-08-24 PROCEDURE — 97110 THERAPEUTIC EXERCISES: CPT | Performed by: PHYSICAL THERAPIST

## 2020-08-24 NOTE — PROGRESS NOTES
Daily Note     Today's date: 2020  Patient name: Thu Bryson  : 1960  MRN: [id number]  Referring provider: Ryley Zuñiga MD  Dx:   Encounter Diagnosis     ICD-10-CM    1  Post-mastectomy pain syndrome  G89 28    2  Status post left mastectomy  Z90 12                   Subjective: Patient reports that she continues to feel improvement in her function  Objective: See treatment diary below      Assessment: Tolerated treatment well  Patient would benefit from continued PT      Plan: Continue per plan of care        Precautions: HTN, BCA      Manuals  8/10 8/13 8/17 8/20 8/24      MFR - ELBOW    10 10 10       IASTM  8' 8 8 8 8 8      (-) pressure IASTM  6' 6 8 8 7 7      PROM - flex/abd/horiz abd  10 10 10 10 10 15      Neuro Re-Ed                          Table Slides hep 5 x :10 ea 5 x :10 ea          Pulley  5' 5 min 5' 5' 5 5'      Ladder   :10 x10 flex 10 x :10 10x:10 10x:10 20 x :10      Doorway pec  5 x :20 5 x:20   5 x :20 5x:20 5x:20                                Ther Ex             Supine wand flexion   :10x10  10 x :10 10 x :10 :10x10  10 x :10      IR cane    NV 10 x :10 10 x :10 10x:10 10 x :10      Cane ER    10 x :10 10 x :10 10x:10 10x :10                                                                       Ther Activity                                       Gait Training                                       Modalities

## 2020-08-27 ENCOUNTER — OFFICE VISIT (OUTPATIENT)
Dept: PHYSICAL THERAPY | Facility: CLINIC | Age: 60
End: 2020-08-27
Payer: COMMERCIAL

## 2020-08-27 DIAGNOSIS — G89.28 POST-MASTECTOMY PAIN SYNDROME: Primary | ICD-10-CM

## 2020-08-27 DIAGNOSIS — Z90.12 STATUS POST LEFT MASTECTOMY: ICD-10-CM

## 2020-08-27 PROCEDURE — 97112 NEUROMUSCULAR REEDUCATION: CPT | Performed by: PHYSICAL THERAPIST

## 2020-08-27 PROCEDURE — 97110 THERAPEUTIC EXERCISES: CPT | Performed by: PHYSICAL THERAPIST

## 2020-08-27 PROCEDURE — 97140 MANUAL THERAPY 1/> REGIONS: CPT | Performed by: PHYSICAL THERAPIST

## 2020-08-31 ENCOUNTER — OFFICE VISIT (OUTPATIENT)
Dept: PHYSICAL THERAPY | Facility: CLINIC | Age: 60
End: 2020-08-31
Payer: COMMERCIAL

## 2020-08-31 DIAGNOSIS — G89.28 POST-MASTECTOMY PAIN SYNDROME: Primary | ICD-10-CM

## 2020-08-31 DIAGNOSIS — Z90.12 STATUS POST LEFT MASTECTOMY: ICD-10-CM

## 2020-08-31 PROCEDURE — 97110 THERAPEUTIC EXERCISES: CPT

## 2020-08-31 PROCEDURE — 97140 MANUAL THERAPY 1/> REGIONS: CPT

## 2020-08-31 NOTE — PROGRESS NOTES
Daily Note     Today's date: 2020  Patient name: Brian Julien  : 1960  MRN: [id number]  Referring provider: Jorge Nguyen MD  Dx:   Encounter Diagnosis     ICD-10-CM    1  Post-mastectomy pain syndrome  G89 28    2  Status post left mastectomy  Z90 12                   Subjective: Patient states that she noticed a significant improvement with her ROM since starting therapy  Objective: See treatment diary below      Assessment: Tolerated treatment fair  Patient exhibited good technique with therapeutic exercises      Plan: Continue per plan of care        Precautions: HTN, BCA      Manuals  8/10 8/13 8/17 8/20 8/24 8/27 8/31    MFR - ELBOW    10 10 10       IASTM  8' 8 8 8 8 8 10 10     (-) pressure IASTM  6 6 8 8 7 7 10 10     PROM - flex/abd/horiz abd  10 10 10 10 10 15 10 10    Neuro Re-Ed                          Table Slides hep 5 x :10 ea 5 x :10 ea          Pulley  5' 5 min 5' 5' 5 5' 5' 5 min     Ladder with bye bye flex/scap   :10 x10 flex 10 x :10 10x:10 10x:10 20 x :10 20 x :10 :10 x 10     Doorway pec  5 x :20 5 x:20   5 x :20 5x:20 5x:20 5 x :20 5 x:20                               Ther Ex             Supine wand flexion-elbows extended   :10x10  10 x :10 10 x :10 :10x10  10 x :10 10 x :10 1 5# :10x10    IR cane    NV 10 x :10 10 x :10 10x:10 10 x :10 10 x :10 :10x10     Cane ER - supine    10 x :10 10 x :10 10x:10 10x :10 10 x :10 1 5# :10x10     TB Row/ Ext/ER        RTB x 10 RTB 2x10    Ball roll shoulder abduction          :10 x 10     Supine pec stretch         NV 3 min                              Ther Activity                                       Gait Training                                       Modalities

## 2020-09-02 ENCOUNTER — OFFICE VISIT (OUTPATIENT)
Dept: PHYSICAL THERAPY | Facility: CLINIC | Age: 60
End: 2020-09-02
Payer: COMMERCIAL

## 2020-09-02 DIAGNOSIS — Z90.12 STATUS POST LEFT MASTECTOMY: ICD-10-CM

## 2020-09-02 DIAGNOSIS — G89.28 POST-MASTECTOMY PAIN SYNDROME: Primary | ICD-10-CM

## 2020-09-02 PROCEDURE — 97140 MANUAL THERAPY 1/> REGIONS: CPT | Performed by: PHYSICAL THERAPIST

## 2020-09-02 PROCEDURE — 97112 NEUROMUSCULAR REEDUCATION: CPT | Performed by: PHYSICAL THERAPIST

## 2020-09-02 PROCEDURE — 97110 THERAPEUTIC EXERCISES: CPT | Performed by: PHYSICAL THERAPIST

## 2020-09-02 NOTE — PROGRESS NOTES
Daily Note     Today's date: 2020  Patient name: Delgado Carrillo  : 1960  MRN: [id number]  Referring provider: Adriana Chinchilla MD  Dx:   Encounter Diagnosis     ICD-10-CM    1  Post-mastectomy pain syndrome  G89 28    2  Status post left mastectomy  Z90 12                   Subjective: Patint reports that she is feeling well  Objective: See treatment diary below      Assessment: Tolerated treatment well  Patient would benefit from continued PT      Plan: Continue per plan of care        Precautions: HTN, BCA      Manuals  8/10 8/13 8/17 8/20 8/24 8/27 8/31 9   MFR - ELBOW    10 10 10       IASTM  8' 8 8 8 8 8 10 10  8   (-) pressure IASTM  6' 6 8 8 7 7 10 10  8   PROM - flex/abd/horiz abd  10 10 10 10 10 15 10 10 8   Neuro Re-Ed                          Table Slides hep 5 x :10 ea 5 x :10 ea          Pulley  5' 5 min 5' 5' 5 5' 5' 5 min  5'   Ladder with bye bye flex/scap   :10 x10 flex 10 x :10 10x:10 10x:10 20 x :10 20 x :10 :10 x 10  10 x :10   Doorway pec  5 x :20 5 x:20   5 x :20 5x:20 5x:20 5 x :20 5 x:20  5 x :20                             Ther Ex             Supine wand flexion-elbows extended   :10x10  10 x :10 10 x :10 :10x10  10 x :10 10 x :10 1 5# :10x10 1 5#  10 x :10   IR cane    NV 10 x :10 10 x :10 10x:10 10 x :10 10 x :10 :10x10  10 x :10   Cane ER - supine    10 x :10 10 x :10 10x:10 10x :10 10 x :10 1 5# :10x10  1 5# 10 x :10   TB Row/ Ext/ER        RTB x 10 RTB 2x10 RTB 2 x 10   Ball roll shoulder abduction          :10 x 10  10x :10   Supine pec stretch         NV 3 min with iastm                             Ther Activity                                       Gait Training                                       Modalities

## 2020-09-08 ENCOUNTER — APPOINTMENT (EMERGENCY)
Dept: RADIOLOGY | Facility: HOSPITAL | Age: 60
DRG: 642 | End: 2020-09-08
Payer: COMMERCIAL

## 2020-09-08 ENCOUNTER — HOSPITAL ENCOUNTER (INPATIENT)
Facility: HOSPITAL | Age: 60
LOS: 2 days | Discharge: HOME | DRG: 642 | End: 2020-09-10
Attending: EMERGENCY MEDICINE | Admitting: INTERNAL MEDICINE
Payer: COMMERCIAL

## 2020-09-08 ENCOUNTER — APPOINTMENT (OUTPATIENT)
Dept: PHYSICAL THERAPY | Facility: CLINIC | Age: 60
End: 2020-09-08
Payer: COMMERCIAL

## 2020-09-08 DIAGNOSIS — E87.2 ACIDOSIS, METABOLIC: Primary | ICD-10-CM

## 2020-09-08 DIAGNOSIS — M25.512 LEFT SHOULDER PAIN: ICD-10-CM

## 2020-09-08 DIAGNOSIS — E88.89 ALCOHOLIC KETOSIS (HCC): ICD-10-CM

## 2020-09-08 PROBLEM — R06.02 SOB (SHORTNESS OF BREATH): Status: ACTIVE | Noted: 2020-09-08

## 2020-09-08 PROBLEM — E16.2 HYPOGLYCEMIA: Status: ACTIVE | Noted: 2020-09-08

## 2020-09-08 PROBLEM — E87.1 HYPONATREMIA: Status: ACTIVE | Noted: 2020-09-08

## 2020-09-08 LAB
ALBUMIN SERPL BCP-MCNC: 4.5 G/DL (ref 3.5–5)
ALP SERPL-CCNC: 78 U/L (ref 46–116)
ALT SERPL W P-5'-P-CCNC: 57 U/L (ref 12–78)
ANION GAP SERPL CALCULATED.3IONS-SCNC: 22 MMOL/L (ref 4–13)
APAP SERPL-MCNC: <2 UG/ML (ref 10–20)
APTT PPP: 20 SECONDS (ref 23–37)
AST SERPL W P-5'-P-CCNC: 80 U/L (ref 5–45)
BACTERIA UR QL AUTO: ABNORMAL /HPF
BASE EX.OXY STD BLDV CALC-SCNC: 52.6 % (ref 60–80)
BASE EXCESS BLDA CALC-SCNC: -6 MMOL/L (ref -2–3)
BASE EXCESS BLDV CALC-SCNC: -6.7 MMOL/L
BASOPHILS # BLD AUTO: 0.05 THOUSANDS/ΜL (ref 0–0.1)
BASOPHILS NFR BLD AUTO: 1 % (ref 0–1)
BETA-HYDROXYBUTYRATE: 7.1 MMOL/L
BILIRUB SERPL-MCNC: 0.47 MG/DL (ref 0.2–1)
BILIRUB UR QL STRIP: ABNORMAL
BUN SERPL-MCNC: 15 MG/DL (ref 5–25)
CA-I BLD-SCNC: 1.09 MMOL/L (ref 1.12–1.32)
CALCIUM SERPL-MCNC: 8.7 MG/DL (ref 8.3–10.1)
CHLORIDE SERPL-SCNC: 93 MMOL/L (ref 100–108)
CLARITY UR: ABNORMAL
CO2 SERPL-SCNC: 17 MMOL/L (ref 21–32)
COLOR UR: YELLOW
CREAT SERPL-MCNC: 1.27 MG/DL (ref 0.6–1.3)
D DIMER PPP FEU-MCNC: 0.41 UG/ML FEU
EOSINOPHIL # BLD AUTO: 0 THOUSAND/ΜL (ref 0–0.61)
EOSINOPHIL NFR BLD AUTO: 0 % (ref 0–6)
ERYTHROCYTE [DISTWIDTH] IN BLOOD BY AUTOMATED COUNT: 13.3 % (ref 11.6–15.1)
EST. AVERAGE GLUCOSE BLD GHB EST-MCNC: 103 MG/DL
ETHANOL SERPL-MCNC: <3 MG/DL (ref 0–3)
FIO2 GAS DIL.REBREATH: 21 L
GFR SERPL CREATININE-BSD FRML MDRD: 46 ML/MIN/1.73SQ M
GLUCOSE SERPL-MCNC: 59 MG/DL (ref 65–140)
GLUCOSE SERPL-MCNC: 60 MG/DL (ref 65–140)
GLUCOSE SERPL-MCNC: 77 MG/DL (ref 65–140)
GLUCOSE SERPL-MCNC: 78 MG/DL (ref 65–140)
GLUCOSE UR STRIP-MCNC: NEGATIVE MG/DL
HBA1C MFR BLD: 5.2 %
HCO3 BLDA-SCNC: 15 MMOL/L (ref 22–28)
HCO3 BLDV-SCNC: 20.2 MMOL/L (ref 24–30)
HCT VFR BLD AUTO: 45 % (ref 34.8–46.1)
HCT VFR BLD CALC: 41 % (ref 34.8–46.1)
HGB BLD-MCNC: 15.1 G/DL (ref 11.5–15.4)
HGB BLDA-MCNC: 13.9 G/DL (ref 11.5–15.4)
HGB UR QL STRIP.AUTO: ABNORMAL
IMM GRANULOCYTES # BLD AUTO: 0.02 THOUSAND/UL (ref 0–0.2)
IMM GRANULOCYTES NFR BLD AUTO: 0 % (ref 0–2)
INR PPP: 0.98 (ref 0.84–1.19)
KETONES UR STRIP-MCNC: ABNORMAL MG/DL
LACTATE SERPL-SCNC: 1.6 MMOL/L (ref 0.5–2)
LACTATE SERPL-SCNC: 2.2 MMOL/L (ref 0.5–2)
LEUKOCYTE ESTERASE UR QL STRIP: ABNORMAL
LYMPHOCYTES # BLD AUTO: 0.36 THOUSANDS/ΜL (ref 0.6–4.47)
LYMPHOCYTES NFR BLD AUTO: 6 % (ref 14–44)
MAGNESIUM SERPL-MCNC: 1.9 MG/DL (ref 1.6–2.6)
MCH RBC QN AUTO: 34.7 PG (ref 26.8–34.3)
MCHC RBC AUTO-ENTMCNC: 33.6 G/DL (ref 31.4–37.4)
MCV RBC AUTO: 103 FL (ref 82–98)
MONOCYTES # BLD AUTO: 0.26 THOUSAND/ΜL (ref 0.17–1.22)
MONOCYTES NFR BLD AUTO: 4 % (ref 4–12)
NEUTROPHILS # BLD AUTO: 5.43 THOUSANDS/ΜL (ref 1.85–7.62)
NEUTS SEG NFR BLD AUTO: 89 % (ref 43–75)
NITRITE UR QL STRIP: NEGATIVE
NON-SQ EPI CELLS URNS QL MICRO: ABNORMAL /HPF
NRBC BLD AUTO-RTO: 0 /100 WBCS
NT-PROBNP SERPL-MCNC: 1101 PG/ML
O2 CT BLDV-SCNC: 11.2 ML/DL
OSMOLALITY UR/SERPL-RTO: 307 MMOL/KG (ref 282–298)
PCO2 BLD: 16 MMOL/L (ref 21–32)
PCO2 BLD: 20.7 MM HG (ref 36–44)
PCO2 BLDV: 45.6 MM HG (ref 42–50)
PH BLD: 7.47 [PH] (ref 7.35–7.45)
PH BLDV: 7.26 [PH] (ref 7.3–7.4)
PH UR STRIP.AUTO: 6 [PH]
PHOSPHATE SERPL-MCNC: 3.8 MG/DL (ref 2.7–4.5)
PLATELET # BLD AUTO: 250 THOUSANDS/UL (ref 149–390)
PMV BLD AUTO: 9.2 FL (ref 8.9–12.7)
PO2 BLD: 96 MM HG (ref 75–129)
PO2 BLDV: 31.6 MM HG (ref 35–45)
POTASSIUM BLD-SCNC: 4.2 MMOL/L (ref 3.5–5.3)
POTASSIUM SERPL-SCNC: 4.4 MMOL/L (ref 3.5–5.3)
PROCALCITONIN SERPL-MCNC: <0.05 NG/ML
PROT SERPL-MCNC: 9.4 G/DL (ref 6.4–8.2)
PROT UR STRIP-MCNC: ABNORMAL MG/DL
PROTHROMBIN TIME: 13 SECONDS (ref 11.6–14.5)
RBC # BLD AUTO: 4.35 MILLION/UL (ref 3.81–5.12)
RBC #/AREA URNS AUTO: ABNORMAL /HPF
SALICYLATES SERPL-MCNC: <3 MG/DL (ref 3–20)
SAO2 % BLD FROM PO2: 98 % (ref 60–85)
SARS-COV-2 RNA RESP QL NAA+PROBE: NEGATIVE
SODIUM BLD-SCNC: 133 MMOL/L (ref 136–145)
SODIUM SERPL-SCNC: 132 MMOL/L (ref 136–145)
SP GR UR STRIP.AUTO: 1.02 (ref 1–1.03)
SPECIMEN SOURCE: ABNORMAL
TROPONIN I SERPL-MCNC: <0.02 NG/ML
TSH SERPL DL<=0.05 MIU/L-ACNC: 0.88 UIU/ML (ref 0.36–3.74)
UROBILINOGEN UR QL STRIP.AUTO: 0.2 E.U./DL
WBC # BLD AUTO: 6.12 THOUSAND/UL (ref 4.31–10.16)
WBC #/AREA URNS AUTO: ABNORMAL /HPF

## 2020-09-08 PROCEDURE — 71046 X-RAY EXAM CHEST 2 VIEWS: CPT

## 2020-09-08 PROCEDURE — 81001 URINALYSIS AUTO W/SCOPE: CPT | Performed by: PHYSICIAN ASSISTANT

## 2020-09-08 PROCEDURE — 84145 PROCALCITONIN (PCT): CPT | Performed by: PSYCHIATRY & NEUROLOGY

## 2020-09-08 PROCEDURE — 82803 BLOOD GASES ANY COMBINATION: CPT

## 2020-09-08 PROCEDURE — 85014 HEMATOCRIT: CPT

## 2020-09-08 PROCEDURE — 84443 ASSAY THYROID STIM HORMONE: CPT | Performed by: PSYCHIATRY & NEUROLOGY

## 2020-09-08 PROCEDURE — 82330 ASSAY OF CALCIUM: CPT

## 2020-09-08 PROCEDURE — 80053 COMPREHEN METABOLIC PANEL: CPT | Performed by: EMERGENCY MEDICINE

## 2020-09-08 PROCEDURE — 85610 PROTHROMBIN TIME: CPT | Performed by: EMERGENCY MEDICINE

## 2020-09-08 PROCEDURE — 96361 HYDRATE IV INFUSION ADD-ON: CPT

## 2020-09-08 PROCEDURE — 82010 KETONE BODYS QUAN: CPT | Performed by: PSYCHIATRY & NEUROLOGY

## 2020-09-08 PROCEDURE — 96360 HYDRATION IV INFUSION INIT: CPT

## 2020-09-08 PROCEDURE — 82947 ASSAY GLUCOSE BLOOD QUANT: CPT

## 2020-09-08 PROCEDURE — 83605 ASSAY OF LACTIC ACID: CPT | Performed by: PSYCHIATRY & NEUROLOGY

## 2020-09-08 PROCEDURE — 87147 CULTURE TYPE IMMUNOLOGIC: CPT | Performed by: PHYSICIAN ASSISTANT

## 2020-09-08 PROCEDURE — 87635 SARS-COV-2 COVID-19 AMP PRB: CPT | Performed by: PSYCHIATRY & NEUROLOGY

## 2020-09-08 PROCEDURE — 80320 DRUG SCREEN QUANTALCOHOLS: CPT | Performed by: PSYCHIATRY & NEUROLOGY

## 2020-09-08 PROCEDURE — 85730 THROMBOPLASTIN TIME PARTIAL: CPT | Performed by: EMERGENCY MEDICINE

## 2020-09-08 PROCEDURE — 84484 ASSAY OF TROPONIN QUANT: CPT | Performed by: EMERGENCY MEDICINE

## 2020-09-08 PROCEDURE — 93005 ELECTROCARDIOGRAM TRACING: CPT

## 2020-09-08 PROCEDURE — 84132 ASSAY OF SERUM POTASSIUM: CPT

## 2020-09-08 PROCEDURE — 36415 COLL VENOUS BLD VENIPUNCTURE: CPT

## 2020-09-08 PROCEDURE — 87086 URINE CULTURE/COLONY COUNT: CPT | Performed by: PHYSICIAN ASSISTANT

## 2020-09-08 PROCEDURE — 82948 REAGENT STRIP/BLOOD GLUCOSE: CPT

## 2020-09-08 PROCEDURE — 99223 1ST HOSP IP/OBS HIGH 75: CPT | Performed by: INTERNAL MEDICINE

## 2020-09-08 PROCEDURE — 83735 ASSAY OF MAGNESIUM: CPT | Performed by: PSYCHIATRY & NEUROLOGY

## 2020-09-08 PROCEDURE — 83880 ASSAY OF NATRIURETIC PEPTIDE: CPT | Performed by: PSYCHIATRY & NEUROLOGY

## 2020-09-08 PROCEDURE — 84295 ASSAY OF SERUM SODIUM: CPT

## 2020-09-08 PROCEDURE — 85379 FIBRIN DEGRADATION QUANT: CPT | Performed by: PSYCHIATRY & NEUROLOGY

## 2020-09-08 PROCEDURE — 83036 HEMOGLOBIN GLYCOSYLATED A1C: CPT | Performed by: PHYSICIAN ASSISTANT

## 2020-09-08 PROCEDURE — 83930 ASSAY OF BLOOD OSMOLALITY: CPT | Performed by: EMERGENCY MEDICINE

## 2020-09-08 PROCEDURE — 99285 EMERGENCY DEPT VISIT HI MDM: CPT

## 2020-09-08 PROCEDURE — 94760 N-INVAS EAR/PLS OXIMETRY 1: CPT

## 2020-09-08 PROCEDURE — 84100 ASSAY OF PHOSPHORUS: CPT | Performed by: PSYCHIATRY & NEUROLOGY

## 2020-09-08 PROCEDURE — 82805 BLOOD GASES W/O2 SATURATION: CPT | Performed by: PSYCHIATRY & NEUROLOGY

## 2020-09-08 PROCEDURE — 94762 N-INVAS EAR/PLS OXIMTRY CONT: CPT

## 2020-09-08 PROCEDURE — 87040 BLOOD CULTURE FOR BACTERIA: CPT | Performed by: PSYCHIATRY & NEUROLOGY

## 2020-09-08 PROCEDURE — 85025 COMPLETE CBC W/AUTO DIFF WBC: CPT | Performed by: EMERGENCY MEDICINE

## 2020-09-08 PROCEDURE — 36600 WITHDRAWAL OF ARTERIAL BLOOD: CPT

## 2020-09-08 PROCEDURE — 99285 EMERGENCY DEPT VISIT HI MDM: CPT | Performed by: EMERGENCY MEDICINE

## 2020-09-08 PROCEDURE — 80329 ANALGESICS NON-OPIOID 1 OR 2: CPT | Performed by: PSYCHIATRY & NEUROLOGY

## 2020-09-08 RX ORDER — DEXTROSE AND SODIUM CHLORIDE 5; .9 G/100ML; G/100ML
100 INJECTION, SOLUTION INTRAVENOUS CONTINUOUS
Status: DISCONTINUED | OUTPATIENT
Start: 2020-09-08 | End: 2020-09-09

## 2020-09-08 RX ORDER — THIAMINE MONONITRATE (VIT B1) 100 MG
100 TABLET ORAL DAILY
Status: DISCONTINUED | OUTPATIENT
Start: 2020-09-09 | End: 2020-09-10 | Stop reason: HOSPADM

## 2020-09-08 RX ORDER — GABAPENTIN 300 MG/1
300 CAPSULE ORAL 3 TIMES DAILY
Status: DISCONTINUED | OUTPATIENT
Start: 2020-09-08 | End: 2020-09-10 | Stop reason: HOSPADM

## 2020-09-08 RX ORDER — ONDANSETRON 2 MG/ML
4 INJECTION INTRAMUSCULAR; INTRAVENOUS EVERY 6 HOURS PRN
Status: DISCONTINUED | OUTPATIENT
Start: 2020-09-08 | End: 2020-09-10 | Stop reason: HOSPADM

## 2020-09-08 RX ORDER — SERTRALINE HYDROCHLORIDE 100 MG/1
100 TABLET, FILM COATED ORAL DAILY
Status: DISCONTINUED | OUTPATIENT
Start: 2020-09-09 | End: 2020-09-10 | Stop reason: HOSPADM

## 2020-09-08 RX ORDER — FOLIC ACID 1 MG/1
1 TABLET ORAL DAILY
Status: DISCONTINUED | OUTPATIENT
Start: 2020-09-09 | End: 2020-09-10 | Stop reason: HOSPADM

## 2020-09-08 RX ORDER — HYDRALAZINE HYDROCHLORIDE 20 MG/ML
10 INJECTION INTRAMUSCULAR; INTRAVENOUS EVERY 6 HOURS PRN
Status: DISCONTINUED | OUTPATIENT
Start: 2020-09-08 | End: 2020-09-10 | Stop reason: HOSPADM

## 2020-09-08 RX ORDER — LETROZOLE 2.5 MG/1
2.5 TABLET, FILM COATED ORAL DAILY
Status: DISCONTINUED | OUTPATIENT
Start: 2020-09-09 | End: 2020-09-10 | Stop reason: HOSPADM

## 2020-09-08 RX ORDER — ACETAMINOPHEN 325 MG/1
650 TABLET ORAL EVERY 6 HOURS PRN
Status: DISCONTINUED | OUTPATIENT
Start: 2020-09-08 | End: 2020-09-10 | Stop reason: HOSPADM

## 2020-09-08 RX ADMIN — DEXTROSE AND SODIUM CHLORIDE 100 ML/HR: 5; .9 INJECTION, SOLUTION INTRAVENOUS at 14:22

## 2020-09-08 RX ADMIN — SERTRALINE HYDROCHLORIDE 50 MG: 50 TABLET ORAL at 21:47

## 2020-09-08 RX ADMIN — ACETAMINOPHEN 650 MG: 325 TABLET, FILM COATED ORAL at 21:47

## 2020-09-08 RX ADMIN — HYDRALAZINE HYDROCHLORIDE 10 MG: 20 INJECTION INTRAMUSCULAR; INTRAVENOUS at 19:17

## 2020-09-08 RX ADMIN — GABAPENTIN 300 MG: 300 CAPSULE ORAL at 21:47

## 2020-09-08 NOTE — ASSESSMENT & PLAN NOTE
· Noted at 62   Likely in the setting of starvation ketosis and alcohol intake  · QID accuchecks   · Check A1c   · Continue fluids with dextrose as above

## 2020-09-08 NOTE — ASSESSMENT & PLAN NOTE
· Mild, noted at 132   Likely in the setting of ETOH use and poor solute intake   · Monitor BMP with IV fluids

## 2020-09-08 NOTE — LETTER
[facility]  [address] [facility] [address]  Dept: [phone]    September 10, 2020     Patient: Brian Julien   YOB: 1960   Date of Visit: 9/8/2020       To Whom it May Concern:    Sherrie Carbone is under my professional care  She was seen in the hospital from 9/8/2020   to 09/10/20  She may return to work on 9/14/20 without limitations  If you have any questions or concerns, please don't hesitate to call           Sincerely,          Nerissa Centeno PA-C

## 2020-09-08 NOTE — ED PROVIDER NOTES
History  Chief Complaint   Patient presents with    Shortness of Breath     PT presents to ED with c/o "I dont feel good"  reports "she hasnt eaten or drank in tow days and cant walk very far with out getting SOB"     Cesar Ruiz is a 62 y o  postmenopausal female with PMHx of HTN, DVT, and stage IIIA left breast cancer, who presents today with a complaint of generalized fatigue and myalgia  The patient reports that starting Sunday she could not get out of bed and has been unable to eat or really drink since then  The patient reports chest pain, dizziness, headache, feeling hot but at the same time per the  her skin feels cold, and reports nausea  The patient denies any fever, or vomiting, change in urination, chest pain, wheezing  Of note the patient does not know of any Covid exposure in the last 14 days but she does work at [employer] [place]  Prior to Admission Medications   Prescriptions Last Dose Informant Patient Reported? Taking?    SUMAtriptan (IMITREX) 100 mg tablet More than a month at Unknown time Self Yes No   Sig: Take 100 mg by mouth once as needed for migraine   gabapentin (NEURONTIN) 300 mg capsule Past Week at Unknown time Self No Yes   Sig: Take 1 capsule (300 mg total) by mouth 3 (three) times a day   letrozole (FEMARA) 2 5 mg tablet Past Week at Unknown time  No Yes   Sig: Take 1 tablet (2 5 mg total) by mouth daily   sertraline (ZOLOFT) 100 mg tablet Past Week at Unknown time Self Yes Yes   Sig: Take 100 mg by mouth daily   sertraline (ZOLOFT) 50 mg tablet Past Week at Unknown time Self Yes Yes   Sig: Take 50 mg by mouth daily at bedtime   verapamil (VERELAN) 240 MG 24 hr capsule Past Week at Unknown time Self Yes Yes   Sig: Take 240 mg by mouth daily      Facility-Administered Medications: None       Past Medical History:   Diagnosis Date    Acute DVT (deep venous thrombosis) (HCC)     of LLE>     Bladder infection     Congenital prolapsed rectum     History of biliary stent insertion     History of chemotherapy     History of radiation therapy 05/2018    left breast ca    History of transfusion     x2 s/p chemo treatments, no reaction    Hydronephrosis     right kidney    Hypertension     Malignant neoplasm of overlapping sites of left female breast ([facility] [address] ) 05/01/2018    Migraine        Past Surgical History:   Procedure Laterality Date    ABDOMINAL ADHESION SURGERY N/A 4/23/2019    Procedure: LYSIS ADHESIONS;  Surgeon: Stevie Nagel MD;  [facility];  Service: Colorectal    BREAST BIOPSY      COLON SURGERY      colon resection    CYSTOSCOPY N/A 3/4/2019    Procedure: CYSTOSCOPY WITH BIOPSIES AND [place] OF RECTUM PROLAPSE;  Surgeon: Tye Mcneil MD;  [facility];  Service: Urology    ERCP N/A 1/4/2019    Procedure: ENDOSCOPIC RETROGRADE CHOLANGIOPANCREATOGRAPHY (ERCP); Surgeon: Daisy Mendoza MD;  [facility];  Service: Gastroenterology    INSTILLATION MYTOMYCIN N/A 3/4/2019    Procedure: Carry [place];  Surgeon: Tye Mcneil MD;  [facility];  Service: Urology    MASTECTOMY Left     2018    WA COLONOSCOPY FLX DX W/COLLJ SPEC WHEN PFRMD N/A 4/22/2019    Procedure: COLONOSCOPY;  Surgeon: Stevie Nagel MD;  Location: [facility]; Service: Colorectal    WA EDG US EXAM SURGICAL ALTER STOM DUODENUM/JEJUNUM N/A 3/7/2019    Procedure: LINEAR ENDOSCOPIC U/S;  Surgeon: Diaz Jacobo MD;  Location: [facility]; Service: Gastroenterology    WA ESOPHAGOGASTRODUODENOSCOPY TRANSORAL DIAGNOSTIC N/A 3/7/2019    Procedure: ESOPHAGOGASTRODUODENOSCOPY (EGD); Surgeon: Diaz Jacobo MD;  [facility];   Service: Gastroenterology    WA INSJ TUNNELED CTR VAD W/SUBQ PORT AGE 5 YR/> N/A 6/26/2018    Procedure: INSERTION VENOUS PORT ( PORT-A-CATH) IR;  Surgeon: Alice Penny DO;  [facility];  Service: Interventional Radiology    WA LAP, SURG PROCTOPEXY N/A 4/23/2019    Procedure: LAPAROSCOPIC HAND-ASSIST PELVIC DISSECTION; proctopexy;  Surgeon: Laverne Boeck, MD;  [facility];  Service: Colorectal    PA MASTECTOMY, MODIFIED RADICAL Left 5/15/2018    Procedure: BREAST MODIFIED RADICAL MASTECTOMY;  Surgeon: Olamide Harvey MD;  [facility];  Service: Surgical Oncology    PA RMVL [place] CTR VAD W/SUBQ PORT/ CTR/PRPH INSJ N/A 6/4/2019    Procedure: REMOVAL VENOUS PORT (PORT-A-CATH)IR;  Surgeon: Rick Zambrano DO;  [facility];  Service: Interventional Radiology    TUBAL LIGATION      US GUIDANCE BREAST BIOPSY LEFT EACH ADDITIONAL Left 4/3/2018    US GUIDED BREAST BIOPSY LEFT COMPLETE Left 4/3/2018    US GUIDED BREAST LYMPH NODE BIOPSY LEFT Left 4/3/2018       Family History   Problem Relation Age of Onset    No Known Problems Mother     No Known Problems Father     Breast cancer Maternal Aunt 48    No Known Problems Sister     No Known Problems Daughter     No Known Problems Maternal Grandmother     No Known Problems Maternal Grandfather     No Known Problems Paternal Grandmother     No Known Problems Paternal Grandfather      I have reviewed and agree with the history as documented  E-Cigarette/Vaping    E-Cigarette Use Never User      E-Cigarette/Vaping Substances     Social History     Tobacco Use    Smoking status: Never Smoker    Smokeless tobacco: Never Used   Substance Use Topics    Alcohol use: Yes     Alcohol/week: 2 0 standard drinks     Types: 2 Shots of liquor per week     Frequency: 4 or more times a week     Drinks per session: 1 or 2     Binge frequency: Never    Drug use: No        Review of Systems   Constitutional: Positive for activity change, appetite change, chills, fatigue and fever  Negative for diaphoresis  HENT: Negative for congestion  Respiratory: Positive for shortness of breath  Negative for wheezing  Cardiovascular: Negative for chest pain, palpitations and leg swelling     Gastrointestinal: Positive for nausea  Negative for constipation, diarrhea and vomiting  Genitourinary: Negative for dysuria and enuresis  Musculoskeletal: Positive for myalgias  Neurological: Positive for dizziness, weakness and headaches  Negative for numbness  Hematological: Does not bruise/bleed easily  Physical Exam  ED Triage Vitals   Temperature Pulse Respirations Blood Pressure SpO2   09/08/20 1220 09/08/20 1220 09/08/20 1220 09/08/20 1220 09/08/20 1220   97 8 °F (36 6 °C) 88 18 (!) 199/92 100 %      Temp Source Heart Rate Source Patient Position - Orthostatic VS BP Location FiO2 (%)   09/08/20 1220 09/08/20 1220 09/08/20 1220 09/08/20 1536 --   Oral Monitor Sitting Right arm       Pain Score       09/08/20 2019       6             Orthostatic Vital Signs  Vitals:    09/08/20 1633 09/08/20 1906 09/08/20 1929 09/08/20 2014   BP: (!) 189/105 (!) 173/101 160/92 144/90   Pulse: 87 91 83    Patient Position - Orthostatic VS: Lying Lying Lying Lying       Physical Exam  Vitals signs and nursing note reviewed  Constitutional:       General: She is not in acute distress  Appearance: She is well-developed  She is ill-appearing  She is not toxic-appearing or diaphoretic  HENT:      Head: Normocephalic and atraumatic  Mouth/Throat:      Mouth: Mucous membranes are moist    Cardiovascular:      Rate and Rhythm: Normal rate  Heart sounds: No murmur  No friction rub  No gallop  Pulmonary:      Effort: Pulmonary effort is normal  No respiratory distress  Breath sounds: Normal breath sounds  No decreased breath sounds, wheezing, rhonchi or rales  Abdominal:      General: Bowel sounds are normal       Palpations: Abdomen is soft  Musculoskeletal:      Right lower leg: She exhibits no tenderness  No edema  Left lower leg: She exhibits no tenderness  No edema  Skin:     General: Skin is warm and dry  Neurological:      General: No focal deficit present  Mental Status: She is alert     Psychiatric: Mood and Affect: Mood normal          Behavior: Behavior normal          ED Medications  Medications   dextrose 5 % and sodium chloride 0 9 % infusion (100 mL/hr Intravenous New Bag 9/8/20 1422)   gabapentin (NEURONTIN) capsule 300 mg (has no administration in time range)   letrozole [facility]) tablet 2 5 mg (has no administration in time range)   sertraline (ZOLOFT) tablet 100 mg (has no administration in time range)   sertraline (ZOLOFT) tablet 50 mg (has no administration in time range)   verapamil (CALAN-SR) CR tablet 240 mg (has no administration in time range)   ondansetron (ZOFRAN) injection 4 mg (has no administration in time range)   enoxaparin (LOVENOX) subcutaneous injection 40 mg (has no administration in time range)   thiamine (VITAMIN B1) tablet 100 mg (has no administration in time range)   folic acid (FOLVITE) tablet 1 mg (has no administration in time range)   multivitamin-minerals (CENTRUM) tablet 1 tablet (has no administration in time range)   hydrALAZINE (APRESOLINE) injection 10 mg (10 mg Intravenous Given 9/8/20 1917)       Diagnostic Studies  Results Reviewed     Procedure Component Value Units Date/Time    Lactic acid 2 Hours [[id number]]  (Normal) Collected:  09/08/20 1912    Lab Status:  Final result Specimen:  Blood from Arm, Right Updated:  09/08/20 2015     LACTIC ACID 1 6 mmol/L     Narrative:       Result may be elevated if tourniquet was used during collection      Procalcitonin with AM Reflex [[id number]]  (Normal) Collected:  09/08/20 1611    Lab Status:  Final result Specimen:  Blood from Arm, Right Updated:  09/08/20 2014     Procalcitonin <0 05 ng/ml     Osmolality-"If this is regarding a toxic alcohol, please STOP and consult medical  for further guidance " [[id number]]  (Abnormal) Collected:  09/08/20 1422    Lab Status:  Final result Specimen:  Blood from Arm, Right Updated:  09/08/20 1941     Osmolality Serum 307 mmol/KG     Rapid drug screen, urine [[id number]] Lab Status:  No result Specimen:  Urine     UA w Reflex to Microscopic w Reflex to Culture [[id number]]     Lab Status:  No result Specimen:  Urine     Fingerstick Glucose (POCT) [[id number]]  (Abnormal) Collected:  09/08/20 1723    Lab Status:  Final result Updated:  09/08/20 1725     POC Glucose 60 mg/dl     Ethanol [[id number]]  (Normal) Collected:  09/08/20 1611    Lab Status:  Final result Specimen:  Blood from Arm, Right Updated:  09/08/20 1712     Ethanol Lvl <3 mg/dL     Lactic acid, plasma [[id number]]  (Abnormal) Collected:  09/08/20 1611    Lab Status:  Final result Specimen:  Blood from Arm, Right Updated:  09/08/20 1658     LACTIC ACID 2 2 mmol/L     Narrative:       Result may be elevated if tourniquet was used during collection  Salicylate level [[id number]]  (Abnormal) Collected:  09/08/20 1611    Lab Status:  Final result Specimen:  Blood from Arm, Right Updated:  85/45/50 9803     Salicylate Lvl <3 mg/dL     Acetaminophen level-If concentration is detectable, please discuss with medical  on call  [[id number]]  (Abnormal) Collected:  09/08/20 1611    Lab Status:  Final result Specimen:  Blood from Arm, Right Updated:  09/08/20 1642     Acetaminophen Level <2 ug/mL     Beta Hydroxybutyrate [[id number]]  (Abnormal) Collected:  09/08/20 1611    Lab Status:  Final result Specimen:  Blood from Arm, Right Updated:  09/08/20 1640     BETA-HYDROXYBUTYRATE 7 1 mmol/L     Novel Coronavirus (Covid-19),PCR [facility] [[id number]]  (Normal) Collected:  09/08/20 1422    Lab Status:  Final result Specimen:  Nares from Nose Updated:  09/08/20 1635     SARS-CoV-2 Negative    Narrative: The specimen collection materials, transport medium, and/or testing methodology utilized in the production of these test results have been proven to be reliable in a limited validation with an abbreviated program under the Emergency Utilization Authorization provided by the FDA    Testing reported as "Presumptive positive" will be confirmed with secondary testing with a reference laboratory to ensure result accuracy  Clinical caution and judgement should be used with the interpretation of these results with consideration of the clinical impression and other laboratory testing  Testing reported as "Positive" or "Negative" has been proven to be accurate according to standard laboratory validation requirements  All testing is performed with control materials showing appropriate reactivity at standard intervals  D-dimer, quantitative [[id number]]  (Normal) Collected:  09/08/20 1303    Lab Status:  Final result Specimen:  Blood from Arm, Right Updated:  09/08/20 1619     D-Dimer, Quant 0 41 ug/ml FEU     Blood culture [[id number]] Collected:  09/08/20 1611    Lab Status: In process Specimen:  Blood from Arm, Left Updated:  09/08/20 1617    POCT Blood Gas (CG8+) [[id number]]  (Abnormal) Collected:  09/08/20 1554    Lab Status:  Final result Specimen:  Arterial Updated:  09/08/20 1600     pH, Art i-STAT 7 468     pCO2, Art i-STAT 20 7 mm HG      pO2, ART i-STAT 96 0 mm HG      BE, i-STAT -6 mmol/L      HCO3, Art i-STAT 15 0 mmol/L      CO2, i-STAT 16 mmol/L      O2 Sat, i-STAT 98 %      SODIUM, I-STAT 133 mmol/l      Potassium, i-STAT 4 2 mmol/L      Calcium, Ionized i-STAT 1 09 mmol/L      Hct, i-STAT 41 %      Hgb, i-STAT 13 9 g/dl      Glucose, i-STAT 77 mg/dl      POC FIO2 21 L      Specimen Type ARTERIAL    Blood culture [[id number]] Collected:  09/08/20 1535    Lab Status:   In process Specimen:  Blood from Arm, Right Updated:  09/08/20 1542    Blood gas, arterial [[id number]]     Lab Status:  No result Specimen:  Blood, Arterial from Radial, Right     Blood gas, venous [[id number]]  (Abnormal) Collected:  09/08/20 1422    Lab Status:  Final result Specimen:  Blood from Arm, Right Updated:  09/08/20 1434     pH, Luis Carlos 7 265     pCO2, Luis Carlos 45 6 mm Hg      pO2, Luis Carlos 31 6 mm Hg      HCO3, Luis Carlos 20 2 mmol/L      Base Excess, Luis Carlos -6 7 mmol/L O2 Content, Luis Carlos 11 2 ml/dL      O2 HGB, VENOUS 52 6 %     Magnesium [[id number]]  (Normal) Collected:  09/08/20 1303    Lab Status:  Final result Specimen:  Blood from Arm, Right Updated:  09/08/20 1425     Magnesium 1 9 mg/dL     Phosphorus [[id number]]  (Normal) Collected:  09/08/20 1303    Lab Status:  Final result Specimen:  Blood from Arm, Right Updated:  09/08/20 1425     Phosphorus 3 8 mg/dL     NT-BNP PRO [[id number]]  (Abnormal) Collected:  09/08/20 1303    Lab Status:  Final result Specimen:  Blood from Arm, Right Updated:  09/08/20 1425     NT-proBNP 1,101 pg/mL     TSH [[id number]]  (Normal) Collected:  09/08/20 1303    Lab Status:  Final result Specimen:  Blood from Arm, Right Updated:  09/08/20 1425     TSH 3RD GENERATON 0 884 uIU/mL     Narrative:       Patients undergoing fluorescein dye angiography may retain small amounts of fluorescein in the body for 48-72 hours post procedure  Samples containing fluorescein can produce falsely depressed TSH values  If the patient had this procedure,a specimen should be resubmitted post fluorescein clearance        Troponin I [[id number]]  (Normal) Collected:  09/08/20 1303    Lab Status:  Final result Specimen:  Blood from Arm, Right Updated:  09/08/20 1353     Troponin I <0 02 ng/mL     Comprehensive metabolic panel [[id number]]  (Abnormal) Collected:  09/08/20 1303    Lab Status:  Final result Specimen:  Blood from Arm, Right Updated:  09/08/20 1338     Sodium 132 mmol/L      Potassium 4 4 mmol/L      Chloride 93 mmol/L      CO2 17 mmol/L      ANION GAP 22 mmol/L      BUN 15 mg/dL      Creatinine 1 27 mg/dL      Glucose 59 mg/dL      Calcium 8 7 mg/dL      AST 80 U/L      ALT 57 U/L      Alkaline Phosphatase 78 U/L      Total Protein 9 4 g/dL      Albumin 4 5 g/dL      Total Bilirubin 0 47 mg/dL      eGFR 46 ml/min/1 73sq m     Narrative:       [place] guidelines for Chronic Kidney Disease (CKD):     Stage 1 with normal or high GFR (GFR > 90 mL/min/1 73 square meters)    Stage 2 Mild CKD (GFR = 60-89 mL/min/1 73 square meters)    Stage 3A Moderate CKD (GFR = 45-59 mL/min/1 73 square meters)    Stage 3B Moderate CKD (GFR = 30-44 mL/min/1 73 square meters)    Stage 4 Severe CKD (GFR = 15-29 mL/min/1 73 square meters)    Stage 5 End Stage CKD (GFR <15 mL/min/1 73 square meters)  Note: GFR calculation is accurate only with a steady state creatinine    Protime-INR [[id number]]  (Normal) Collected:  09/08/20 1303    Lab Status:  Final result Specimen:  Blood from Arm, Right Updated:  09/08/20 1338     Protime 13 0 seconds      INR 0 98    APTT [[id number]]  (Abnormal) Collected:  09/08/20 1303    Lab Status:  Final result Specimen:  Blood from Arm, Right Updated:  09/08/20 1338     PTT 20 seconds     CBC and differential [[id number]]  (Abnormal) Collected:  09/08/20 1303    Lab Status:  Final result Specimen:  Blood from Arm, Right Updated:  09/08/20 1323     WBC 6 12 Thousand/uL      RBC 4 35 Million/uL      Hemoglobin 15 1 g/dL      Hematocrit 45 0 %       fL      MCH 34 7 pg      MCHC 33 6 g/dL      RDW 13 3 %      MPV 9 2 fL      Platelets 589 Thousands/uL      nRBC 0 /100 WBCs      Neutrophils Relative 89 %      Immat GRANS % 0 %      Lymphocytes Relative 6 %      Monocytes Relative 4 %      Eosinophils Relative 0 %      Basophils Relative 1 %      Neutrophils Absolute 5 43 Thousands/µL      Immature Grans Absolute 0 02 Thousand/uL      Lymphocytes Absolute 0 36 Thousands/µL      Monocytes Absolute 0 26 Thousand/µL      Eosinophils Absolute 0 00 Thousand/µL      Basophils Absolute 0 05 Thousands/µL                  XR chest 2 views   Final Result by Roberta Winston MD (09/08 1336)      No acute cardiopulmonary disease              Workstation performed: [id number]               Procedures  Procedures      ED Course  ED Course as of Sep 08 2025   Tue Sep 08, 2020   1446 Patient has an elevated BNP, normal troponin, normal TSH the BG showed acidosis 7 26, CMP showed an anion gap of 22  Cause is uncertain at this time  We are going to order additional lab work including an ABG,, panel, D-dimer  1620 ABG now shows respiratory alkalosis, lactic acid  is elevated 2 2, beta-hydroxybutyrate elevated 7 1      1720 Pt reports drinking 2 cups of wine daily at night  SBIRT 20yo+      Most Recent Value   SBIRT (22 yo +)   In order to provide better care to our patients, we are screening all of our patients for alcohol and drug use  Would it be okay to ask you these screening questions? Yes Filed at: 09/08/2020 1622   Initial Alcohol Screen: US AUDIT-C    1  How often do you have a drink containing alcohol? 3 Filed at: 09/08/2020 1622   2  How many drinks containing alcohol do you have on a typical day you are drinking? 2 Filed at: 09/08/2020 1622   3a  Male UNDER 65: How often do you have five or more drinks on one occasion? 0 Filed at: 09/08/2020 1622   3b  FEMALE Any Age, or MALE 65+: How often do you have 4 or more drinks on one occassion? 0 Filed at: 09/08/2020 1622   Audit-C Score  5 Filed at: 09/08/2020 1622   IRIS: How many times in the past year have you    Used an illegal drug or used a prescription medication for non-medical reasons? Never Filed at: 09/08/2020 1622                  MDM  Number of Diagnoses or Management Options  Acidosis, metabolic:   Diagnosis management comments: Patient is a 27-year-old female who is presenting with complaints of shortness of breath, myalgias, nausea, decreased appetite, dizziness, and general fatigue  Patient denies any history of fever  Patient does have a anion gap on labs will obtain a VBG, pulmonary exam was unremarkable including chest x-ray cause of breath at this time is uncertain  Will obtain additional labs and continue to the monitor the patient and also obtain COVID-19 testing        The patient is to be admitted to the hospital for further treatment and workup  Amount and/or Complexity of Data Reviewed  Clinical lab tests: ordered and reviewed  Tests in the radiology section of CPT®: ordered and reviewed  Tests in the medicine section of CPT®: ordered and reviewed  Review and summarize past medical records: yes  Independent visualization of images, tracings, or specimens: yes          Disposition  Final diagnoses:   Acidosis, metabolic     Time reflects when diagnosis was documented in both MDM as applicable and the Disposition within this note     Time User Action Codes Description Comment    9/8/2020  5:48 PM Laura Olmedo Add [R65 10] Systemic inflammatory response syndrome (SIRS) ([facility] )     9/8/2020  5:48 PM Young Sy Remove [R65 10] Systemic inflammatory response syndrome (SIRS) ([facility] )     9/8/2020  5:48 PM Yossi [address] [place] [address] [E87 2] Acidosis, metabolic       ED Disposition     ED Disposition Condition Date/Time Comment    Admit Stable Tue Sep 8, 2020  5:47 PM Case was discussed with MESFIN and the patient's admission status was agreed to be Admission Status: inpatient status to the service of Dr Wild Stahl  Follow-up Information    None         Current Discharge Medication List      CONTINUE these medications which have NOT CHANGED    Details   gabapentin (NEURONTIN) 300 mg capsule Take 1 capsule (300 mg total) by mouth 3 (three) times a day  Qty: 270 capsule, Refills: 1    Associated Diagnoses: Malignant neoplasm of female breast, unspecified estrogen receptor status, unspecified laterality, unspecified site of breast (HCC)      letrozole (FEMARA) 2 5 mg tablet Take 1 tablet (2 5 mg total) by mouth daily  Qty: 90 tablet, Refills: 1    Associated Diagnoses: Malignant neoplasm of overlapping sites of left breast in female, estrogen receptor positive ([place] [id number] )      ! ! sertraline (ZOLOFT) 100 mg tablet Take 100 mg by mouth daily  Refills: 0      !! sertraline (ZOLOFT) 50 mg tablet Take 50 mg by mouth daily at bedtime  Refills: 0      verapamil (VERELAN) 240 MG 24 hr capsule Take 240 mg by mouth daily  Refills: 0      SUMAtriptan (IMITREX) 100 mg tablet Take 100 mg by mouth once as needed for migraine       !! - Potential duplicate medications found  Please discuss with provider  No discharge procedures on file  PDMP Review     None           ED Provider  Attending physically available and evaluated Dionte Gonzalez I managed the patient along with the ED Attending      Electronically Signed by         Jesus Lopez DO  09/08/20 2025

## 2020-09-08 NOTE — ASSESSMENT & PLAN NOTE
· Possibly in the setting of ketosis being the primary problem  However, patient does drink alcohol daily   BNP elevated at 1100   · Check echo to rule out NICM in the setting of ETOH use/abuse

## 2020-09-08 NOTE — H&P
[facility]  H&P- Bridgette Metz 1960, 62 y o  female MRN: [id number]    Unit/Bed#: ED 15 Encounter: [id number]    Primary Care Provider: Neal Evans DC   Date and time admitted to hospital: 9/8/2020  1:23 PM        * Alcoholic ketosis  Assessment & Plan  · Suspect present on admission, patient with noted acidosis on admission with increased anion gap, beta hydroxybutarate elevation to 7 1, in the setting of poor oral intake with additional daily alcohol intake of at least 4 shots of vodka per day  No history of diabetes  Serum osmolality pending  Patient clinically sober at this time   · Admit patient to med/surg under inpatient status   · Continue D5 NSS at this time at 100 cc/hr  · CIWA protocol   · MV, Folic Acid  Thiamine   · Monitor BMP, accuchecks Q6     SOB (shortness of breath)  Assessment & Plan  · Possibly in the setting of ketosis being the primary problem  However, patient does drink alcohol daily  BNP elevated at 1100   · Check echo to rule out NICM in the setting of ETOH use/abuse     Hypoglycemia  Assessment & Plan  · Noted at 62  Likely in the setting of starvation ketosis and alcohol intake  · QID accuchecks   · Check A1c   · Continue fluids with dextrose as above     Hyponatremia  Assessment & Plan  · Mild, noted at 132  Likely in the setting of ETOH use and poor solute intake   · Monitor BMP with IV fluids     Moderate protein-calorie malnutrition (HCC)  Assessment & Plan  Malnutrition Findings:           BMI Findings: Body mass index is 26 95 kg/m²     · Will provide vitamin supplementation in accordance with CIWA protocol  · Regular diet     Malignant neoplasm of overlapping sites of left breast in female, estrogen receptor positive ([facility] [address] )  Assessment & Plan  · Status post mastectomy   · Continue Femara     Essential hypertension  Assessment & Plan  · BP above goal at this time   · Continue Verapamil   · Add Hydralazine IV Q6 PRN       VTE Prophylaxis: Enoxaparin (Lovenox)  / sequential compression device   Code Status: Full Code   POLST: POLST form is not discussed and not completed at this time  Discussion with family:  at bedside     Anticipated Length of Stay:  Patient will be admitted on an Inpatient basis with an anticipated length of stay of  Greater than 2 midnights  Justification for Hospital Stay: As per above assessment and plan     Total Time for Visit, including Counseling / Coordination of Care: 1 hour  Greater than 50% of this total time spent on direct patient counseling and coordination of care  Chief Complaint:   Poor appetite, Fatigue     History of Present Illness:    Tete Curry is a 62 y o  female with a history of breast cancer, HTN who presents with fatigue  Patient states that she felt like this starting this morning  She reports that she has not had a strong appetite for the last few days and her last solid meal was Sunday  She states that she felt intermittently hot and cold, but was checking her temperature throughout the day, however this was persistently normal  She reports some shortness of breath and her  states that she was "huffing and puffing" walking back from the bathroom  She denies leg swelling, coughing, wheezing, or chest pain  She denies abdominal pain, nausea, vomiting, or diarrhea  Denies urinary symptoms besides for a decrease in urine amount  The patient does report that she is a daily drinker  She states that she has two drinks every night consisting of roughly "2 shots" of vodka  She denies ever having a history of withdrawal and denies withdrawal symptoms currently  She reports that despite not eating over the last few days she did continue to drink as she had been  Review of Systems:    Review of Systems   Constitutional: Positive for appetite change, chills and diaphoresis  Negative for fatigue and fever  HENT: Negative for congestion, rhinorrhea and sore throat      Eyes: Negative for visual disturbance  Respiratory: Positive for shortness of breath  Negative for cough, chest tightness and wheezing  Cardiovascular: Negative for chest pain, palpitations and leg swelling  Gastrointestinal: Negative for abdominal pain, constipation, diarrhea, nausea and vomiting  Genitourinary: Positive for decreased urine volume  Negative for dysuria  Musculoskeletal: Negative for arthralgias and myalgias  Neurological: Negative for dizziness, syncope, weakness, light-headedness, numbness and headaches  All other systems reviewed and are negative  Past Medical and Surgical History:     Past Medical History:   Diagnosis Date    Acute DVT (deep venous thrombosis) (HCC)     of LLE>     Bladder infection     Congenital prolapsed rectum     History of biliary stent insertion     History of chemotherapy     History of radiation therapy 05/2018    left breast ca    History of transfusion     x2 s/p chemo treatments, no reaction    Hydronephrosis     right kidney    Hypertension     Malignant neoplasm of overlapping sites of left female breast ([facility] [address] ) 05/01/2018    Migraine        Past Surgical History:   Procedure Laterality Date    ABDOMINAL ADHESION SURGERY N/A 4/23/2019    Procedure: LYSIS ADHESIONS;  Surgeon: Rios Feliciano MD;  Location: [facility];  Service: Colorectal    BREAST BIOPSY      COLON SURGERY      colon resection    CYSTOSCOPY N/A 3/4/2019    Procedure: CYSTOSCOPY WITH BIOPSIES AND FULGERATION AND REDCUTION OF RECTUM PROLAPSE;  Surgeon: Ozzie Nieto MD;  [facility];  Service: Urology    ERCP N/A 1/4/2019    Procedure: ENDOSCOPIC RETROGRADE CHOLANGIOPANCREATOGRAPHY (ERCP);   Surgeon: Vinod aH MD;  Location: [facility];  Service: Gastroenterology    INSTILLATION MYTOMYCIN N/A 3/4/2019    Procedure: Yonas Salas;  Surgeon: Ozzie Nieto MD;  [facility];  Service: Urology    MASTECTOMY Left     2018    AL COLONOSCOPY FLX DX W/COLLJ SPEC WHEN PFRMD N/A 4/22/2019    Procedure: COLONOSCOPY;  Surgeon: Nomi Causey MD;  Location: [facility] GI LAB; Service: Colorectal    KS EDG US EXAM SURGICAL ALTER STOM DUODENUM/JEJUNUM N/A 3/7/2019    Procedure: LINEAR ENDOSCOPIC U/S;  Surgeon: Sulaiman Iglesias MD;  Location: [facility]; Service: Gastroenterology    KS ESOPHAGOGASTRODUODENOSCOPY TRANSORAL DIAGNOSTIC N/A 3/7/2019    Procedure: ESOPHAGOGASTRODUODENOSCOPY (EGD); Surgeon: Sulaiman Iglesias MD;  Location: [facility]; Service: Gastroenterology    KS INSJ TUNNELED CTR VAD W/SUBQ PORT AGE 5 YR/> N/A 6/26/2018    Procedure: INSERTION VENOUS PORT ( PORT-A-CATH) IR;  Surgeon: Dario Libman, DO;  [facility];  Service: Interventional Radiology    KS LAP, SURG PROCTOPEXY N/A 4/23/2019    Procedure: LAPAROSCOPIC HAND-ASSIST PELVIC DISSECTION; proctopexy;  Surgeon: Nomi Causey MD;  Location: [facility];  Service: Colorectal    KS MASTECTOMY, MODIFIED RADICAL Left 5/15/2018    Procedure: BREAST MODIFIED RADICAL MASTECTOMY;  Surgeon: Paola Mishra MD;  Location: [facility];  Service: Surgical Oncology    KS RMVL [place] CTR VAD W/SUBQ PORT/ CTR/PRPH INSJ N/A 6/4/2019    Procedure: REMOVAL VENOUS PORT (PORT-A-CATH)IR;  Surgeon: Dario Libman, DO;  [facility];  Service: Interventional Radiology    TUBAL LIGATION      US GUIDANCE BREAST BIOPSY LEFT EACH ADDITIONAL Left 4/3/2018    US GUIDED BREAST BIOPSY LEFT COMPLETE Left 4/3/2018    US GUIDED BREAST LYMPH NODE BIOPSY LEFT Left 4/3/2018       Meds/Allergies:    Prior to Admission medications    Medication Sig Start Date End Date Taking?  Authorizing Provider   gabapentin (NEURONTIN) 300 mg capsule Take 1 capsule (300 mg total) by mouth 3 (three) times a day 5/28/19  Yes Jonathan Patel MD   letrozole [facility]) 2 5 mg tablet Take 1 tablet (2 5 mg total) by mouth daily 6/23/20  Yes Jonathan Patel MD   sertraline (ZOLOFT) 100 mg tablet Take 100 mg by mouth daily 4/16/19  Yes Historical Provider, MD   sertraline (ZOLOFT) 50 mg tablet Take 50 mg by mouth daily at bedtime 10/11/18  Yes Historical Provider, MD   SUMAtriptan (IMITREX) 100 mg tablet Take 100 mg by mouth once as needed for migraine   Yes Historical Provider, MD   verapamil (VERELAN) 240 MG 24 hr capsule Take 240 mg by mouth daily 10/16/18  Yes Historical Provider, MD     I have reviewed home medications with patient personally  Allergies: Allergies   Allergen Reactions    Gluten Meal GI Intolerance    Lactose GI Intolerance       Social History:     Marital Status: /Civil Union   Occupation: Works at [employer]   Patient Pre-hospital Living Situation: Home  Patient Pre-hospital Level of Mobility: Full  Patient Pre-hospital Diet Restrictions: None  Substance Use History:   Social History     Substance and Sexual Activity   Alcohol Use Not Currently     Social History     Tobacco Use   Smoking Status Never Smoker   Smokeless Tobacco Never Used     Social History     Substance and Sexual Activity   Drug Use No       Family History:    Family History   Problem Relation Age of Onset    No Known Problems Mother     No Known Problems Father     Breast cancer Maternal Aunt 48    No Known Problems Sister     No Known Problems Daughter     No Known Problems Maternal Grandmother     No Known Problems Maternal Grandfather     No Known Problems Paternal Grandmother     No Known Problems Paternal Grandfather        Physical Exam:     Vitals:   Blood Pressure: (!) 189/105 (09/08/20 1633)  Pulse: 87 (09/08/20 1633)  Temperature: 98 4 °F (36 9 °C) (09/08/20 1536)  Temp Source: Oral (09/08/20 1536)  Respirations: 22 (09/08/20 1633)  Weight - Scale: 69 kg (152 lb 1 9 oz) (09/08/20 1536)  SpO2: 98 % (09/08/20 1633)    Physical Exam  Constitutional:       General: She is not in acute distress  Appearance: Normal appearance  She is normal weight  She is not ill-appearing or diaphoretic     HENT:      Head: Normocephalic and atraumatic  Mouth/Throat:      Mouth: Mucous membranes are moist    Eyes:      General: No scleral icterus  Pupils: Pupils are equal, round, and reactive to light  Cardiovascular:      Rate and Rhythm: Regular rhythm  Tachycardia present  Pulses: Normal pulses  Heart sounds: Normal heart sounds, S1 normal and S2 normal  No murmur  No systolic murmur  No diastolic murmur  No gallop  No S3 or S4 sounds  Pulmonary:      Effort: Pulmonary effort is normal  Tachypnea present  No accessory muscle usage or respiratory distress  Breath sounds: Normal breath sounds  No stridor  No decreased breath sounds, wheezing, rhonchi or rales  Chest:      Chest wall: No tenderness  Abdominal:      General: Bowel sounds are normal  There is no distension  Palpations: Abdomen is soft  Tenderness: There is no abdominal tenderness  There is no guarding  Musculoskeletal:      Right lower leg: No edema  Left lower leg: No edema  Skin:     General: Skin is warm and dry  Coloration: Skin is not jaundiced  Neurological:      General: No focal deficit present  Mental Status: She is alert  Mental status is at baseline  Motor: No tremor or seizure activity  Psychiatric:         Behavior: Behavior is cooperative  Additional Data:     Lab Results: I have personally reviewed pertinent reports        Results from last 7 days   Lab Units 09/08/20  1554 09/08/20  1303   WBC Thousand/uL  --  6 12   HEMOGLOBIN g/dL  --  15 1   I STAT HEMOGLOBIN g/dl 13 9  --    HEMATOCRIT %  --  45 0   HEMATOCRIT, ISTAT % 41  --    PLATELETS Thousands/uL  --  250   NEUTROS PCT %  --  89*   LYMPHS PCT %  --  6*   MONOS PCT %  --  4   EOS PCT %  --  0     Results from last 7 days   Lab Units 09/08/20  1554 09/08/20  1303   SODIUM mmol/L  --  132*   POTASSIUM mmol/L  --  4 4   CHLORIDE mmol/L  --  93*   CO2 mmol/L  --  17*   CO2, I-STAT mmol/L 16*  --    BUN mg/dL  --  15   CREATININE mg/dL --  1 27   ANION GAP mmol/L  --  22*   CALCIUM mg/dL  --  8 7   ALBUMIN g/dL  --  4 5   TOTAL BILIRUBIN mg/dL  --  0 47   ALK PHOS U/L  --  78   ALT U/L  --  57   AST U/L  --  80*   GLUCOSE RANDOM mg/dL  --  59*     Results from last 7 days   Lab Units 09/08/20  1303   INR  0 98     Results from last 7 days   Lab Units 09/08/20  1723   POC GLUCOSE mg/dl 60*         Results from last 7 days   Lab Units 09/08/20  1611   LACTIC ACID mmol/L 2 2*       Imaging: I have personally reviewed pertinent reports  XR chest 2 views   Final Result by Jenny Rico MD (09/08 1336)      No acute cardiopulmonary disease  Workstation performed: [id number]             EKG, Pathology, and Other Studies Reviewed on Admission:   · CXR: No acute pulmonary disease    Allscripts / Epic Records Reviewed: Yes     ** Please Note: This note has been constructed using a voice recognition system   **

## 2020-09-08 NOTE — ASSESSMENT & PLAN NOTE
Malnutrition Findings:           BMI Findings: Body mass index is 26 95 kg/m²     · Will provide vitamin supplementation in accordance with CIWA protocol  · Regular diet

## 2020-09-08 NOTE — ASSESSMENT & PLAN NOTE
· Suspect present on admission, patient with noted acidosis on admission with increased anion gap, beta hydroxybutarate elevation to 7 1, in the setting of poor oral intake with additional daily alcohol intake of at least 4 shots of vodka per day  No history of diabetes  Serum osmolality pending  Patient clinically sober at this time   · Admit patient to med/surg under inpatient status   · Continue D5 NSS at this time at 100 cc/hr  · CIWA protocol   · MV, Folic Acid   Thiamine   · Monitor BMP, accuchecks Q6

## 2020-09-09 ENCOUNTER — APPOINTMENT (INPATIENT)
Dept: NON INVASIVE DIAGNOSTICS | Facility: HOSPITAL | Age: 60
DRG: 642 | End: 2020-09-09
Payer: COMMERCIAL

## 2020-09-09 LAB
ALBUMIN SERPL BCP-MCNC: 3.8 G/DL (ref 3.5–5)
ALP SERPL-CCNC: 61 U/L (ref 46–116)
ALT SERPL W P-5'-P-CCNC: 50 U/L (ref 12–78)
AMPHETAMINES SERPL QL SCN: NEGATIVE
ANION GAP SERPL CALCULATED.3IONS-SCNC: 12 MMOL/L (ref 4–13)
AST SERPL W P-5'-P-CCNC: 68 U/L (ref 5–45)
ATRIAL RATE: 73 BPM
ATRIAL RATE: 84 BPM
BARBITURATES UR QL: NEGATIVE
BENZODIAZ UR QL: NEGATIVE
BILIRUB SERPL-MCNC: 0.66 MG/DL (ref 0.2–1)
BUN SERPL-MCNC: 15 MG/DL (ref 5–25)
CALCIUM SERPL-MCNC: 8.5 MG/DL (ref 8.3–10.1)
CHLORIDE SERPL-SCNC: 97 MMOL/L (ref 100–108)
CO2 SERPL-SCNC: 22 MMOL/L (ref 21–32)
COCAINE UR QL: NEGATIVE
CREAT SERPL-MCNC: 1.01 MG/DL (ref 0.6–1.3)
ERYTHROCYTE [DISTWIDTH] IN BLOOD BY AUTOMATED COUNT: 13.2 % (ref 11.6–15.1)
GFR SERPL CREATININE-BSD FRML MDRD: 61 ML/MIN/1.73SQ M
GLUCOSE SERPL-MCNC: 105 MG/DL (ref 65–140)
GLUCOSE SERPL-MCNC: 123 MG/DL (ref 65–140)
GLUCOSE SERPL-MCNC: 132 MG/DL (ref 65–140)
GLUCOSE SERPL-MCNC: 138 MG/DL (ref 65–140)
GLUCOSE SERPL-MCNC: 140 MG/DL (ref 65–140)
HCT VFR BLD AUTO: 38.7 % (ref 34.8–46.1)
HGB BLD-MCNC: 13.4 G/DL (ref 11.5–15.4)
MCH RBC QN AUTO: 34.8 PG (ref 26.8–34.3)
MCHC RBC AUTO-ENTMCNC: 34.6 G/DL (ref 31.4–37.4)
MCV RBC AUTO: 101 FL (ref 82–98)
METHADONE UR QL: NEGATIVE
OPIATES UR QL SCN: NEGATIVE
OXYCODONE+OXYMORPHONE UR QL SCN: NEGATIVE
P AXIS: 45 DEGREES
P AXIS: 74 DEGREES
PCP UR QL: NEGATIVE
PLATELET # BLD AUTO: 227 THOUSANDS/UL (ref 149–390)
PMV BLD AUTO: 9.5 FL (ref 8.9–12.7)
POTASSIUM SERPL-SCNC: 4.7 MMOL/L (ref 3.5–5.3)
PR INTERVAL: 166 MS
PR INTERVAL: 180 MS
PROCALCITONIN SERPL-MCNC: <0.05 NG/ML
PROT SERPL-MCNC: 7.9 G/DL (ref 6.4–8.2)
QRS AXIS: 12 DEGREES
QRS AXIS: 13 DEGREES
QRSD INTERVAL: 76 MS
QRSD INTERVAL: 78 MS
QT INTERVAL: 380 MS
QT INTERVAL: 390 MS
QTC INTERVAL: 429 MS
QTC INTERVAL: 449 MS
RBC # BLD AUTO: 3.85 MILLION/UL (ref 3.81–5.12)
SODIUM SERPL-SCNC: 131 MMOL/L (ref 136–145)
T WAVE AXIS: 56 DEGREES
T WAVE AXIS: 65 DEGREES
THC UR QL: NEGATIVE
VENTRICULAR RATE: 73 BPM
VENTRICULAR RATE: 84 BPM
WBC # BLD AUTO: 4.29 THOUSAND/UL (ref 4.31–10.16)

## 2020-09-09 PROCEDURE — 99232 SBSQ HOSP IP/OBS MODERATE 35: CPT | Performed by: PHYSICIAN ASSISTANT

## 2020-09-09 PROCEDURE — 93306 TTE W/DOPPLER COMPLETE: CPT | Performed by: INTERNAL MEDICINE

## 2020-09-09 PROCEDURE — 84145 PROCALCITONIN (PCT): CPT | Performed by: PSYCHIATRY & NEUROLOGY

## 2020-09-09 PROCEDURE — 82948 REAGENT STRIP/BLOOD GLUCOSE: CPT

## 2020-09-09 PROCEDURE — 85027 COMPLETE CBC AUTOMATED: CPT | Performed by: PHYSICIAN ASSISTANT

## 2020-09-09 PROCEDURE — 93010 ELECTROCARDIOGRAM REPORT: CPT | Performed by: INTERNAL MEDICINE

## 2020-09-09 PROCEDURE — 80053 COMPREHEN METABOLIC PANEL: CPT | Performed by: PHYSICIAN ASSISTANT

## 2020-09-09 PROCEDURE — 94760 N-INVAS EAR/PLS OXIMETRY 1: CPT

## 2020-09-09 PROCEDURE — 93306 TTE W/DOPPLER COMPLETE: CPT

## 2020-09-09 PROCEDURE — 80307 DRUG TEST PRSMV CHEM ANLYZR: CPT | Performed by: PHYSICIAN ASSISTANT

## 2020-09-09 PROCEDURE — 94762 N-INVAS EAR/PLS OXIMTRY CONT: CPT

## 2020-09-09 RX ORDER — SODIUM CHLORIDE 9 MG/ML
75 INJECTION, SOLUTION INTRAVENOUS CONTINUOUS
Status: DISCONTINUED | OUTPATIENT
Start: 2020-09-09 | End: 2020-09-10 | Stop reason: HOSPADM

## 2020-09-09 RX ADMIN — SERTRALINE HYDROCHLORIDE 100 MG: 100 TABLET ORAL at 08:26

## 2020-09-09 RX ADMIN — VERAPAMIL HYDROCHLORIDE 240 MG: 120 TABLET, FILM COATED, EXTENDED RELEASE ORAL at 08:25

## 2020-09-09 RX ADMIN — SERTRALINE HYDROCHLORIDE 50 MG: 50 TABLET ORAL at 21:18

## 2020-09-09 RX ADMIN — ACETAMINOPHEN 650 MG: 325 TABLET, FILM COATED ORAL at 05:13

## 2020-09-09 RX ADMIN — THIAMINE HCL TAB 100 MG 100 MG: 100 TAB at 08:26

## 2020-09-09 RX ADMIN — SODIUM CHLORIDE 75 ML/HR: 0.9 INJECTION, SOLUTION INTRAVENOUS at 12:08

## 2020-09-09 RX ADMIN — GABAPENTIN 300 MG: 300 CAPSULE ORAL at 08:26

## 2020-09-09 RX ADMIN — DEXTROSE AND SODIUM CHLORIDE 100 ML/HR: 5; .9 INJECTION, SOLUTION INTRAVENOUS at 03:36

## 2020-09-09 RX ADMIN — GABAPENTIN 300 MG: 300 CAPSULE ORAL at 15:57

## 2020-09-09 RX ADMIN — ENOXAPARIN SODIUM 40 MG: 40 INJECTION SUBCUTANEOUS at 08:26

## 2020-09-09 RX ADMIN — Medication 1 TABLET: at 08:26

## 2020-09-09 RX ADMIN — LETROZOLE 2.5 MG: 2.5 TABLET, FILM COATED ORAL at 08:27

## 2020-09-09 RX ADMIN — SODIUM CHLORIDE 75 ML/HR: 0.9 INJECTION, SOLUTION INTRAVENOUS at 21:19

## 2020-09-09 RX ADMIN — FOLIC ACID 1 MG: 1 TABLET ORAL at 08:26

## 2020-09-09 RX ADMIN — GABAPENTIN 300 MG: 300 CAPSULE ORAL at 21:18

## 2020-09-09 NOTE — UTILIZATION REVIEW
Initial Clinical Review    Admission: Date/Time/Statement:   Admission Orders (From admission, onward)     Ordered        09/08/20 1749  Inpatient Admission  Once                   Orders Placed This Encounter   Procedures    Inpatient Admission     Standing Status:   Standing     Number of Occurrences:   1     Order Specific Question:   Admitting Physician     Answer:   Lillian Parker     Order Specific Question:   Level of Care     Answer:   Med Surg [16]     Order Specific Question:   Estimated length of stay     Answer:   More than 2 Midnights     Order Specific Question:   Certification     Answer:   I certify that inpatient services are medically necessary for this patient for a duration of greater than two midnights  See H&P and MD Progress Notes for additional information about the patient's course of treatment  ED Arrival Information     Expected Arrival Acuity Means of Arrival Escorted By Service Admission Type    - 9/8/2020 11:26 Urgent Walk-In Spouse General Medicine Urgent    Arrival Complaint    Chills/SOB        Chief Complaint   Patient presents with    Shortness of Breath     PT presents to ED with c/o "I dont feel good"  reports "she hasnt eaten or drank in tow days and cant walk very far with out getting SOB"     Assessment/Plan:   51y Female to ED presents with fatigue, shortness of breath and poor appetite for past few days  PMH for Breast cancer, HTN  Admit Inpatient level of care for Alcoholic ketosis, Shortness of breath, Hypoglycemia, Hyponatremia and Hypertension  Noted acidosis on admission with increased anion gap, beta hydroxybutarate elevation to 7 1, in the setting of poor oral intake with additional daily alcohol intake of at least 4 shots of vodka per day  Serum osmolality pending  Patient clinically sober at this time   Continue IVF of D5 NSS  CIWA assess  MV, Folic acid and Thiamine  Monitor BMP  Accu Q6h  BNP elevated at 1100   Check Echo r/o NICM   Bld glucose 59 check HgbA1c  Na 132 on admission  Monitor BMP with Iv fluids  BP above goal at this time  Continue Verapmil  Add IV Hydralazine prn    9/9 Progress notes; Transition to NS without D5  Continue CIWA assess  Monitor BMP  Na 131 today, decrease from yesterday  CIWA Score = 1     ED Triage Vitals   Temperature Pulse Respirations Blood Pressure SpO2   09/08/20 1220 09/08/20 1220 09/08/20 1220 09/08/20 1220 09/08/20 1220   97 8 °F (36 6 °C) 88 18 (!) 199/92 100 %      Temp Source Heart Rate Source Patient Position - Orthostatic VS BP Location FiO2 (%)   09/08/20 1220 09/08/20 1220 09/08/20 1220 09/08/20 1536 --   Oral Monitor Sitting Right arm       Pain Score       09/08/20 2019       6          Wt Readings from Last 1 Encounters:   09/08/20 67 3 kg (148 lb 4 8 oz)     Additional Vital Signs:   09/09/20 0700   97 7 °F (36 5 °C)   80   18   168/92   94 %   None (Room air)   Lying    09/09/20 0513   98 3 °F (36 8 °C)   75   18   154/89         Lying    09/09/20 0400      75      154/89             09/09/20 0126               96 %   None (Room air)       09/09/20 0107      81   18   150/79           09/08/20 1929   98 8 °F (37 1 °C)   83   18   160/92   99 %   None (Room air)   Lying    09/08/20 1906      91   18   173/101Abnormal     100 %   None (Room air)   Lying    09/08/20 1633      87   22   189/105Abnormal             Pertinent Labs/Diagnostic Test Results:   9/8 CXR - No acute cardiopulmonary disease      Results from last 7 days   Lab Units 09/08/20  1422   SARS-COV-2  Negative     Results from last 7 days   Lab Units 09/09/20  0540 09/08/20  1554 09/08/20  1303   WBC Thousand/uL 4 29*  --  6 12   HEMOGLOBIN g/dL 13 4  --  15 1   I STAT HEMOGLOBIN g/dl  --  13 9  --    HEMATOCRIT % 38 7  --  45 0   HEMATOCRIT, ISTAT %  --  41  --    PLATELETS Thousands/uL 227  --  250   NEUTROS ABS Thousands/µL  --   --  5 43         Results from last 7 days   Lab Units 09/09/20  0540 09/08/20  1555 09/08/20  1303   SODIUM mmol/L 131*  --  132*   POTASSIUM mmol/L 4 7  --  4 4   CHLORIDE mmol/L 97*  --  93*   CO2 mmol/L 22  --  17*   CO2, I-STAT mmol/L  --  16*  --    ANION GAP mmol/L 12  --  22*   BUN mg/dL 15  --  15   CREATININE mg/dL 1 01  --  1 27   EGFR ml/min/1 73sq m 61  --  46   CALCIUM mg/dL 8 5  --  8 7   CALCIUM, IONIZED, ISTAT mmol/L  --  1 09*  --    MAGNESIUM mg/dL  --   --  1 9   PHOSPHORUS mg/dL  --   --  3 8     Results from last 7 days   Lab Units 09/09/20  0540 09/08/20  1303   AST U/L 68* 80*   ALT U/L 50 57   ALK PHOS U/L 61 78   TOTAL PROTEIN g/dL 7 9 9 4*   ALBUMIN g/dL 3 8 4 5   TOTAL BILIRUBIN mg/dL 0 66 0 47     Results from last 7 days   Lab Units 09/09/20  1105 09/09/20  0707 09/08/20  2041 09/08/20  1723   POC GLUCOSE mg/dl 138 140 78 60*     Results from last 7 days   Lab Units 09/09/20  0540 09/08/20  1303   GLUCOSE RANDOM mg/dL 132 59*     Results from last 7 days   Lab Units 09/08/20  1422   OSMOLALITY, SERUM mmol/*     Results from last 7 days   Lab Units 09/08/20  1303   HEMOGLOBIN A1C % 5 2   EAG mg/dl 103     BETA-HYDROXYBUTYRATE   Date Value Ref Range Status   09/08/2020 7 1 (H) <0 6 mmol/L Final          Results from last 7 days   Lab Units 09/08/20  1422   PH CARLOS  7 265*   PCO2 CARLOS mm Hg 45 6   PO2 CARLOS mm Hg 31 6*   HCO3 CARLOS mmol/L 20 2*   BASE EXC CARLOS mmol/L -6 7   O2 CONTENT CARLOS ml/dL 11 2   O2 HGB, VENOUS % 52 6*     Results from last 7 days   Lab Units 09/08/20  1554   I STAT BASE EXC mmol/L -6*   I STAT O2 SAT % 98*   ISTAT PH ART  7 468*   I STAT ART PCO2 mm HG 20 7*   I STAT ART PO2 mm HG 96 0   I STAT ART HCO3 mmol/L 15 0*         Results from last 7 days   Lab Units 09/08/20  1303   TROPONIN I ng/mL <0 02     Results from last 7 days   Lab Units 09/08/20  1303   D-DIMER QUANTITATIVE ug/ml FEU 0 41     Results from last 7 days   Lab Units 09/08/20  1303   PROTIME seconds 13 0   INR  0 98   PTT seconds 20*     Results from last 7 days   Lab Units 09/08/20  1303   TSH 3RD GENERATON uIU/mL 0 884     Results from last 7 days   Lab Units 09/09/20  0540 09/08/20  1611   PROCALCITONIN ng/ml <0 05 <0 05     Results from last 7 days   Lab Units 09/08/20  1912 09/08/20  1611   LACTIC ACID mmol/L 1 6 2 2*             Results from last 7 days   Lab Units 09/08/20  1303   NT-PRO BNP pg/mL 1,101*       Results from last 7 days   Lab Units 09/08/20  2245   CLARITY UA  Cloudy   COLOR UA  Yellow   SPEC GRAV UA  1 025   PH UA  6 0   GLUCOSE UA mg/dl Negative   KETONES UA mg/dl >=80 (3+)*   BLOOD UA  Large*   PROTEIN UA mg/dl 100 (2+)*   NITRITE UA  Negative   BILIRUBIN UA  Small*   UROBILINOGEN UA E U /dl 0 2   LEUKOCYTES UA  Small*   WBC UA /hpf 20-30*   RBC UA /hpf Innumerable*   BACTERIA UA /hpf Occasional   EPITHELIAL CELLS WET PREP /hpf Occasional             Results from last 7 days   Lab Units 09/09/20  0253   AMPH/METH  Negative   BARBITURATE UR  Negative   BENZODIAZEPINE UR  Negative   COCAINE UR  Negative   METHADONE URINE  Negative   OPIATE UR  Negative   PCP UR  Negative   THC UR  Negative     Results from last 7 days   Lab Units 09/08/20  1611   ETHANOL LVL mg/dL <3   ACETAMINOPHEN LVL ug/mL <2*   SALICYLATE LVL mg/dL <3*     Results from last 7 days   Lab Units 09/08/20  1611 09/08/20  1535   BLOOD CULTURE  Received in Microbiology Lab  Culture in Progress  Received in Microbiology Lab  Culture in Progress         ED Treatment:   Medication Administration from 09/08/2020 1125 to 09/08/2020 1926       Date/Time Order Dose Route Action     09/08/2020 1422 dextrose 5 % and sodium chloride 0 9 % infusion 100 mL/hr Intravenous New Bag     09/08/2020 1917 hydrALAZINE (APRESOLINE) injection 10 mg 10 mg Intravenous Given        Past Medical History:   Diagnosis Date    Acute DVT (deep venous thrombosis) (HCC)     of LLE>     Bladder infection     Congenital prolapsed rectum     History of biliary stent insertion     History of chemotherapy     History of radiation therapy 05/2018    left breast ca    History of transfusion     x2 s/p chemo treatments, no reaction    Hydronephrosis     right kidney    Hypertension     Malignant neoplasm of overlapping sites of left female breast ([facility] [address] ) 05/01/2018    Migraine      Present on Admission:   Alcoholic ketosis   Hyponatremia   Moderate protein-calorie malnutrition (HCC)   SOB (shortness of breath)   Essential hypertension   Hypoglycemia      Admitting Diagnosis: SOB (shortness of breath) [R06 02]  Acidosis, metabolic [U71 1]  Age/Sex: 62 y o  female     Admission Orders:  Scheduled Medications:  enoxaparin, 40 mg, Subcutaneous, Daily  folic acid, 1 mg, Oral, Daily  gabapentin, 300 mg, Oral, TID  letrozole, 2 5 mg, Oral, Daily  multivitamin-minerals, 1 tablet, Oral, Daily  sertraline, 100 mg, Oral, Daily  sertraline, 50 mg, Oral, HS  thiamine, 100 mg, Oral, Daily  verapamil, 240 mg, Oral, Daily      Continuous IV Infusions:  sodium chloride, 75 mL/hr, Intravenous, Continuous    dextrose 5 % and sodium chloride 0 9 % infusion    Rate: 100 mL/hr Dose: 100 mL/hr  Freq: Continuous Route: IV  Indications of Use: IV Hydration,IV Resuscitation  Last Dose: Stopped (09/09/20 1208)  Start: 09/08/20 1415 End: 09/09/20 1133     PRN Meds:  acetaminophen, 650 mg, Oral, Q6H PRN 9/8 x1, 9/9 x1  hydrALAZINE, 10 mg, Intravenous, Q6H PRN  ondansetron, 4 mg, Intravenous, Q6H PRN      Echo  Urine culture  Bld culture x2  Continuous cardiac monitoring    Network Utilization Review Department  [email or web address]  ATTENTION: Please call with any questions or concerns to [phone] and carefully listen to the prompts so that you are directed to the right person  All voicemails are confidential   Frandy Desai all requests for admission clinical reviews, approved or denied determinations and any other requests to dedicated fax number below belonging to the campus where the patient is receiving treatment   List of dedicated fax numbers for the Facilities:  FACILITY NAME UR FAX NUMBER   ADMISSION DENIALS (Administrative/Medical Necessity) [phone]   [address]  (Maternity/NICU/Pediatrics) [phone]   Althea Gutierrez [phone]   Mary David [phone]   [address] [place] [address] [phone]   [facility] [phone]   [address] [place] [address] [phone]   [facility] [phone]   [address] [facility]  [address] [address] [address] [facility] [phone]

## 2020-09-09 NOTE — APP STUDENT NOTE
RASHAUN STUDENT  Inpatient Progress Note for TRAINING ONLY  Not Part of Legal Medical Record       Progress Note - Sarika Wilson 62 y o  female MRN: [id number]    Unit/Bed#: S -01 Encounter: [id number]    Alcoholic ketosis   -Suspect present on admission, patient with noted acidosis on admission with increased anion gap, beta hydroxybutarate elevation to 7 1, in the setting of poor oral intake with additional daily alcohol intake of at least 4 shots of vodka per day  Serum osmolality was 307  No history of diabetes  Patient clinically sober at this time    -Today anion gap is 12   -CIWA protocol   -Continue MV, folic acid, thiamine   -Continue NSS 100ml/hr   -Continue to howard BMP and BS  SOB   -was SOB on admission possibly due to ketosis  -Today pt states that it was resolved  Hypoglycemia   -BS was 59 in the ED which was most likey due to starvation ketosis and EtOH intake   -BS today is 140   -Now resoled   -Switch to 0 9%  ml/hr form D5  Hyponatremia   -Today Na is 131   -Likely due to ETOH/ poor solute intake   -Continue to monitor BMP & IV fluids   -Can discharge once corrected  Moderate Protein-Calorie malnutrition   -BMI 26 95   -Continue regular diet and MV's  Malignant neoplasm of overlapping sites of left breast   -s/p mastectomy   -Continue Femara  HTN   -BP today is 168/92   -Continue Verapamil and IV hydralazine      Subjective:   Pt A& O x 4  Stated that she is not Sob any more  Pt is tolerating foods  Last BM was this morning  Denies headache, vision changes, sob, cough, chest pain, abdominal pain, n/v/d, edema  Objective:     Vitals: Blood pressure 168/92, pulse 80, temperature 97 7 °F (36 5 °C), temperature source Oral, resp  rate 18, height 5' 5" (1 651 m), weight 67 3 kg (148 lb 4 8 oz), SpO2 96 %, not currently breastfeeding  ,Body mass index is 24 68 kg/m²        Intake/Output Summary (Last 24 hours) at 9/9/2020 1019  Last data filed at 9/9/2020 0336  Gross per 24 hour   Intake 1323 33 ml   Output 50 ml   Net 1273 33 ml       Physical Exam: /92 (BP Location: Right arm)   Pulse 80   Temp 97 7 °F (36 5 °C) (Oral)   Resp 18   Ht 5' 5" (1 651 m)   Wt 67 3 kg (148 lb 4 8 oz)   LMP  (LMP Unknown)   SpO2 97%   BMI 24 68 kg/m²   General appearance: alert and oriented, in no acute distress  Lungs: clear to auscultation bilaterally  Heart: regular rate and rhythm, S1, S2 normal, no murmur, click, rub or gallop  Abdomen: soft, non-tender; bowel sounds normal; no masses,  no organomegaly  Extremities: extremities normal, warm and well-perfused; no cyanosis, clubbing, or edema  Pulses: 2+ and symmetric  Skin: Skin color, texture, turgor normal  No rashes or lesions     Invasive Devices     Peripheral Intravenous Line            Peripheral IV 09/09/20 Distal;Right;Ventral (anterior) Forearm less than 1 day                Lab, Imaging and other studies: I have personally reviewed pertinent reports      VTE Pharmacologic Prophylaxis: Enoxaparin (Lovenox)  VTE Mechanical Prophylaxis: sequential compression device

## 2020-09-09 NOTE — ASSESSMENT & PLAN NOTE
· Mild, noted at 132   Likely in the setting of ETOH use and poor solute intake   · Decreased with IVF to 131  · Monitor BMP with IV fluids for an additional 24 hours

## 2020-09-09 NOTE — PLAN OF CARE
Problem: Potential for Falls  Goal: Patient will remain free of falls  Description: INTERVENTIONS:  - Assess patient frequently for physical needs  -  Identify cognitive and physical deficits and behaviors that affect risk of falls    -  [place] fall precautions as indicated by assessment   - Educate patient/family on patient safety including physical limitations  - Instruct patient to call for assistance with activity based on assessment  - Modify environment to reduce risk of injury  - Consider OT/PT consult to assist with strengthening/mobility  Outcome: Progressing     Problem: PAIN - ADULT  Goal: Verbalizes/displays adequate comfort level or baseline comfort level  Description: Interventions:  - Encourage patient to monitor pain and request assistance  - Assess pain using appropriate pain scale  - Administer analgesics based on type and severity of pain and evaluate response  - Implement non-pharmacological measures as appropriate and evaluate response  - Consider cultural and social influences on pain and pain management  - Notify physician/advanced practitioner if interventions unsuccessful or patient reports new pain  Outcome: Progressing     Problem: INFECTION - ADULT  Goal: Absence or prevention of progression during hospitalization  Description: INTERVENTIONS:  - Assess and monitor for signs and symptoms of infection  - Monitor lab/diagnostic results  - Monitor all insertion sites, i e  indwelling lines, tubes, and drains  - Monitor endotracheal if appropriate and nasal secretions for changes in amount and color  - [place] appropriate cooling/warming therapies per order  - Administer medications as ordered  - Instruct and encourage patient and family to use good hand hygiene technique  - Identify and instruct in appropriate isolation precautions for identified infection/condition  Outcome: Progressing  Goal: Absence of fever/infection during neutropenic period  Description: INTERVENTIONS:  - Monitor WBC    Outcome: Progressing     Problem: SAFETY ADULT  Goal: Patient will remain free of falls  Description: INTERVENTIONS:  - Assess patient frequently for physical needs  -  Identify cognitive and physical deficits and behaviors that affect risk of falls    -  [place] fall precautions as indicated by assessment   - Educate patient/family on patient safety including physical limitations  - Instruct patient to call for assistance with activity based on assessment  - Modify environment to reduce risk of injury  - Consider OT/PT consult to assist with strengthening/mobility  Outcome: Progressing  Goal: Maintain or return to baseline ADL function  Description: INTERVENTIONS:  -  Assess patient's ability to carry out ADLs; assess patient's baseline for ADL function and identify physical deficits which impact ability to perform ADLs (bathing, care of mouth/teeth, toileting, grooming, dressing, etc )  - Assess/evaluate cause of self-care deficits   - Assess range of motion  - Assess patient's mobility; develop plan if impaired  - Assess patient's need for assistive devices and provide as appropriate  - Encourage maximum independence but intervene and supervise when necessary  - Involve family in performance of ADLs  - Assess for home care needs following discharge   - Consider OT consult to assist with ADL evaluation and planning for discharge  - Provide patient education as appropriate  Outcome: Progressing  Goal: Maintain or return mobility status to optimal level  Description: INTERVENTIONS:  - Assess patient's baseline mobility status (ambulation, transfers, stairs, etc )    - Identify cognitive and physical deficits and behaviors that affect mobility  - Identify mobility aids required to assist with transfers and/or ambulation (gait belt, sit-to-stand, lift, walker, cane, etc )  - [place] fall precautions as indicated by assessment  - Record patient progress and toleration of activity level on Mobility SBAR; progress patient to next Phase/Stage  - Instruct patient to call for assistance with activity based on assessment  - Consider rehabilitation consult to assist with strengthening/weightbearing, etc   Outcome: Progressing     Problem: DISCHARGE PLANNING  Goal: Discharge to home or other facility with appropriate resources  Description: INTERVENTIONS:  - Identify barriers to discharge w/patient and caregiver  - Arrange for needed discharge resources and transportation as appropriate  - Identify discharge learning needs (meds, wound care, etc )  - Arrange for interpretive services to assist at discharge as needed  - Refer to Case Management Department for coordinating discharge planning if the patient needs post-hospital services based on physician/advanced practitioner order or complex needs related to functional status, cognitive ability, or social support system  Outcome: Progressing     Problem: Knowledge Deficit  Goal: Patient/family/caregiver demonstrates understanding of disease process, treatment plan, medications, and discharge instructions  Description: Complete learning assessment and assess knowledge base    Interventions:  - Provide teaching at level of understanding  - Provide teaching via preferred learning methods  Outcome: Progressing

## 2020-09-09 NOTE — ASSESSMENT & PLAN NOTE
Malnutrition Findings:           BMI Findings: Body mass index is 24 68 kg/m²     · Will provide vitamin supplementation in accordance with CIWA protocol  · Regular diet

## 2020-09-09 NOTE — PLAN OF CARE
Problem: Potential for Falls  Goal: Patient will remain free of falls  Description: INTERVENTIONS:  - Assess patient frequently for physical needs  -  Identify cognitive and physical deficits and behaviors that affect risk of falls    -  [place] fall precautions as indicated by assessment   - Educate patient/family on patient safety including physical limitations  - Instruct patient to call for assistance with activity based on assessment  - Modify environment to reduce risk of injury  - Consider OT/PT consult to assist with strengthening/mobility  Outcome: Progressing

## 2020-09-09 NOTE — ASSESSMENT & PLAN NOTE
· Noted at 61   Likely in the setting of starvation ketosis and alcohol intake  · QID accuchecks   · A1c 5 2%

## 2020-09-09 NOTE — ASSESSMENT & PLAN NOTE
· Suspect present on admission, patient with noted acidosis on admission with increased anion gap, beta hydroxybutarate elevation to 7 1, in the setting of poor oral intake with additional daily alcohol intake of at least 4 shots of vodka per day  No history of diabetes  Serum osmolality pending  Patient clinically sober at this time  · Transition to NS without D5  · CIWA protocol   · MV, Folic Acid   Thiamine   · Monitor BMP, accuchecks Q6

## 2020-09-09 NOTE — PLAN OF CARE
Problem: Potential for Falls  Goal: Patient will remain free of falls  Description: INTERVENTIONS:  - Assess patient frequently for physical needs  -  Identify cognitive and physical deficits and behaviors that affect risk of falls    -  [place] fall precautions as indicated by assessment   - Educate patient/family on patient safety including physical limitations  - Instruct patient to call for assistance with activity based on assessment  - Modify environment to reduce risk of injury  - Consider OT/PT consult to assist with strengthening/mobility  9/9/2020 0001 by Johnie Guidry RN  Outcome: Progressing  9/8/2020 2015 by Johnie Guidry RN  Outcome: Progressing

## 2020-09-09 NOTE — PROGRESS NOTES
Progress Note - Najma Yen 1960, 62 y o  female MRN: [id number]    Unit/Bed#: S -87 Encounter: [id number]    Primary Care Provider: Ryan Robles DC   Date and time admitted to hospital: 9/8/2020  3:14 PM    * Alcoholic ketosis  Assessment & Plan  · Suspect present on admission, patient with noted acidosis on admission with increased anion gap, beta hydroxybutarate elevation to 7 1, in the setting of poor oral intake with additional daily alcohol intake of at least 4 shots of vodka per day  No history of diabetes  Serum osmolality pending  Patient clinically sober at this time  · Transition to NS without D5  · CIWA protocol   · MV, Folic Acid  Thiamine   · Monitor BMP, accuchecks Q6     Hyponatremia  Assessment & Plan  · Mild, noted at 132  Likely in the setting of ETOH use and poor solute intake   · Decreased with IVF to 131  · Monitor BMP with IV fluids for an additional 24 hours    Hypoglycemia  Assessment & Plan  · Noted at 62  Likely in the setting of starvation ketosis and alcohol intake  · QID accuchecks   · A1c 5 2%    SOB (shortness of breath)  Assessment & Plan  · Possibly in the setting of ketosis being the primary problem  However, patient does drink alcohol daily  BNP elevated at 1100   · Check echo to rule out NICM in the setting of ETOH use/abuse     Moderate protein-calorie malnutrition (HCC)  Assessment & Plan  Malnutrition Findings:           BMI Findings: Body mass index is 24 68 kg/m²     · Will provide vitamin supplementation in accordance with CIWA protocol  · Regular diet     Essential hypertension  Assessment & Plan  · BP above goal at this time   · Continue Verapamil   · Add Hydralazine IV Q6 PRN     Malignant neoplasm of overlapping sites of left breast in female, estrogen receptor positive ([facility] [address] )  Assessment & Plan  · Status post mastectomy   · Continue Femara       VTE Pharmacologic Prophylaxis:   Pharmacologic: Pharmacologic VTE Prophylaxis contraindicated due to low risk  Mechanical VTE Prophylaxis in Place: No    Patient Centered Rounds: I have performed bedside rounds with nursing staff today  Discussions with Specialists or Other Care Team Provider: CM    Education and Discussions with Family / Patient: patient    Time Spent for Care: 30 minutes  More than 50% of total time spent on counseling and coordination of care as described above  Current Length of Stay: 1 day(s)    Current Patient Status: Inpatient   Certification Statement: The patient will continue to require additional inpatient hospital stay due to ongoing management of electrolytes    Discharge Plan: d/c to home likely tomorrow AM     Code Status: Level 1 - Full Code      Subjective:   Patient is improving-- eating/drinking  No signs/sx of withdrawal      Objective:     Vitals:   Temp (24hrs), Av 1 °F (36 7 °C), Min:97 4 °F (36 3 °C), Max:98 8 °F (37 1 °C)    Temp:  [97 4 °F (36 3 °C)-98 8 °F (37 1 °C)] 97 7 °F (36 5 °C)  HR:  [75-91] 80  Resp:  [16-22] 18  BP: (144-189)/() 168/92  SpO2:  [94 %-100 %] 97 %  Body mass index is 24 68 kg/m²  Input and Output Summary (last 24 hours): Intake/Output Summary (Last 24 hours) at 2020 1426  Last data filed at 2020 0336  Gross per 24 hour   Intake 1323 33 ml   Output 50 ml   Net 1273 33 ml       Physical Exam:     Physical Exam  Constitutional:       Appearance: Normal appearance  HENT:      Head: Normocephalic  Mouth/Throat:      Mouth: Mucous membranes are moist    Eyes:      Pupils: Pupils are equal, round, and reactive to light  Cardiovascular:      Rate and Rhythm: Normal rate and regular rhythm  Heart sounds: No murmur  No friction rub  No gallop  Pulmonary:      Effort: Pulmonary effort is normal    Abdominal:      General: Abdomen is flat  Bowel sounds are normal       Palpations: Abdomen is soft  Musculoskeletal:         General: No swelling  Skin:     General: Skin is warm and dry        Coloration: Skin is not jaundiced  Neurological:      General: No focal deficit present  Mental Status: She is alert and oriented to person, place, and time  Psychiatric:         Mood and Affect: Mood normal          Additional Data:     Labs:    Results from last 7 days   Lab Units 09/09/20  0540  09/08/20  1303   WBC Thousand/uL 4 29*  --  6 12   HEMOGLOBIN g/dL 13 4  --  15 1   I STAT HEMOGLOBIN   --    < >  --    HEMATOCRIT % 38 7  --  45 0   HEMATOCRIT, ISTAT   --    < >  --    PLATELETS Thousands/uL 227  --  250   NEUTROS PCT %  --   --  89*   LYMPHS PCT %  --   --  6*   MONOS PCT %  --   --  4   EOS PCT %  --   --  0    < > = values in this interval not displayed  Results from last 7 days   Lab Units 09/09/20  0540   SODIUM mmol/L 131*   POTASSIUM mmol/L 4 7   CHLORIDE mmol/L 97*   CO2 mmol/L 22   BUN mg/dL 15   CREATININE mg/dL 1 01   ANION GAP mmol/L 12   CALCIUM mg/dL 8 5   ALBUMIN g/dL 3 8   TOTAL BILIRUBIN mg/dL 0 66   ALK PHOS U/L 61   ALT U/L 50   AST U/L 68*   GLUCOSE RANDOM mg/dL 132     Results from last 7 days   Lab Units 09/08/20  1303   INR  0 98     Results from last 7 days   Lab Units 09/09/20  1105 09/09/20  0707 09/08/20  2041 09/08/20  1723   POC GLUCOSE mg/dl 138 140 78 60*     Results from last 7 days   Lab Units 09/08/20  1303   HEMOGLOBIN A1C % 5 2     Results from last 7 days   Lab Units 09/09/20  0540 09/08/20  1912 09/08/20  1611   LACTIC ACID mmol/L  --  1 6 2 2*   PROCALCITONIN ng/ml <0 05  --  <0 05           * I Have Reviewed All Lab Data Listed Above  * Additional Pertinent Lab Tests Reviewed: Roxanne 67 Admission Reviewed    Imaging:    Imaging Reports Reviewed Today Include: CXR  Imaging Personally Reviewed by Myself Includes:  CXR    Recent Cultures (last 7 days):     Results from last 7 days   Lab Units 09/08/20  1611 09/08/20  1535   BLOOD CULTURE  Received in Microbiology Lab  Culture in Progress  Received in Microbiology Lab  Culture in Progress         Last 24 Hours Medication List:   Current Facility-Administered Medications   Medication Dose Route Frequency Provider Last Rate    acetaminophen  650 mg Oral Q6H PRN Xander Torres PA-C      enoxaparin  40 mg Subcutaneous Daily Xander Torres PA-C      folic acid  1 mg Oral Daily Xander Torres, SHANELL      gabapentin  300 mg Oral TID Ernesta Plan, SHANELL      hydrALAZINE  10 mg Intravenous Q6H PRN Ernesta PlanSHANELL      letrozole  2 5 mg Oral Daily Xander Torres PA-C      multivitamin-minerals  1 tablet Oral Daily Xander Torres PA-C      ondansetron  4 mg Intravenous Q6H PRN Ernesta PlanSHANELL      sertraline  100 mg Oral Daily Xander Torres PA-C      sertraline  50 mg Oral HS Xander Torres PA-C      sodium chloride  75 mL/hr Intravenous Continuous Amaya Duenas PA-C 75 mL/hr (09/09/20 1208)    thiamine  100 mg Oral Daily Xander Torres PA-C      verapamil  240 mg Oral Daily Xander Torres PA-C          Today, Patient Was Seen By: Joseph Arambula PA-C    ** Please Note: Dictation voice to text software may have been used in the creation of this document   **

## 2020-09-10 ENCOUNTER — APPOINTMENT (OUTPATIENT)
Dept: PHYSICAL THERAPY | Facility: CLINIC | Age: 60
End: 2020-09-10
Payer: COMMERCIAL

## 2020-09-10 VITALS
RESPIRATION RATE: 16 BRPM | HEIGHT: 65 IN | HEART RATE: 68 BPM | WEIGHT: 148.3 LBS | BODY MASS INDEX: 24.71 KG/M2 | DIASTOLIC BLOOD PRESSURE: 102 MMHG | TEMPERATURE: 97.5 F | SYSTOLIC BLOOD PRESSURE: 174 MMHG | OXYGEN SATURATION: 97 %

## 2020-09-10 LAB
ANION GAP SERPL CALCULATED.3IONS-SCNC: 12 MMOL/L (ref 4–13)
BUN SERPL-MCNC: 10 MG/DL (ref 5–25)
CALCIUM SERPL-MCNC: 8.1 MG/DL (ref 8.3–10.1)
CHLORIDE SERPL-SCNC: 103 MMOL/L (ref 100–108)
CO2 SERPL-SCNC: 23 MMOL/L (ref 21–32)
CREAT SERPL-MCNC: 0.77 MG/DL (ref 0.6–1.3)
ERYTHROCYTE [DISTWIDTH] IN BLOOD BY AUTOMATED COUNT: 13.4 % (ref 11.6–15.1)
GFR SERPL CREATININE-BSD FRML MDRD: 85 ML/MIN/1.73SQ M
GLUCOSE SERPL-MCNC: 85 MG/DL (ref 65–140)
GLUCOSE SERPL-MCNC: 93 MG/DL (ref 65–140)
GLUCOSE SERPL-MCNC: 94 MG/DL (ref 65–140)
HCT VFR BLD AUTO: 41.9 % (ref 34.8–46.1)
HGB BLD-MCNC: 13.7 G/DL (ref 11.5–15.4)
MAGNESIUM SERPL-MCNC: 1.8 MG/DL (ref 1.6–2.6)
MCH RBC QN AUTO: 34.8 PG (ref 26.8–34.3)
MCHC RBC AUTO-ENTMCNC: 32.7 G/DL (ref 31.4–37.4)
MCV RBC AUTO: 106 FL (ref 82–98)
PLATELET # BLD AUTO: 195 THOUSANDS/UL (ref 149–390)
PMV BLD AUTO: 9.6 FL (ref 8.9–12.7)
POTASSIUM SERPL-SCNC: 3.3 MMOL/L (ref 3.5–5.3)
RBC # BLD AUTO: 3.94 MILLION/UL (ref 3.81–5.12)
SODIUM SERPL-SCNC: 138 MMOL/L (ref 136–145)
WBC # BLD AUTO: 3.56 THOUSAND/UL (ref 4.31–10.16)

## 2020-09-10 PROCEDURE — 99239 HOSP IP/OBS DSCHRG MGMT >30: CPT | Performed by: PHYSICIAN ASSISTANT

## 2020-09-10 PROCEDURE — 85027 COMPLETE CBC AUTOMATED: CPT | Performed by: PHYSICIAN ASSISTANT

## 2020-09-10 PROCEDURE — 82948 REAGENT STRIP/BLOOD GLUCOSE: CPT

## 2020-09-10 PROCEDURE — 94762 N-INVAS EAR/PLS OXIMTRY CONT: CPT

## 2020-09-10 PROCEDURE — 94760 N-INVAS EAR/PLS OXIMETRY 1: CPT

## 2020-09-10 PROCEDURE — 83735 ASSAY OF MAGNESIUM: CPT | Performed by: PHYSICIAN ASSISTANT

## 2020-09-10 PROCEDURE — 80048 BASIC METABOLIC PNL TOTAL CA: CPT | Performed by: PHYSICIAN ASSISTANT

## 2020-09-10 RX ORDER — LANOLIN ALCOHOL/MO/W.PET/CERES
100 CREAM (GRAM) TOPICAL DAILY
Refills: 0
Start: 2020-09-11 | End: 2022-03-02 | Stop reason: HOSPADM

## 2020-09-10 RX ORDER — FOLIC ACID 1 MG/1
1 TABLET ORAL DAILY
Refills: 0
Start: 2020-09-11

## 2020-09-10 RX ADMIN — SERTRALINE HYDROCHLORIDE 100 MG: 100 TABLET ORAL at 09:33

## 2020-09-10 RX ADMIN — THIAMINE HCL TAB 100 MG 100 MG: 100 TAB at 09:33

## 2020-09-10 RX ADMIN — FOLIC ACID 1 MG: 1 TABLET ORAL at 09:33

## 2020-09-10 RX ADMIN — LETROZOLE 2.5 MG: 2.5 TABLET, FILM COATED ORAL at 09:33

## 2020-09-10 RX ADMIN — Medication 1 TABLET: at 09:33

## 2020-09-10 RX ADMIN — SODIUM CHLORIDE 75 ML/HR: 0.9 INJECTION, SOLUTION INTRAVENOUS at 10:17

## 2020-09-10 RX ADMIN — HYDRALAZINE HYDROCHLORIDE 10 MG: 20 INJECTION INTRAMUSCULAR; INTRAVENOUS at 11:40

## 2020-09-10 RX ADMIN — VERAPAMIL HYDROCHLORIDE 240 MG: 120 TABLET, FILM COATED, EXTENDED RELEASE ORAL at 09:33

## 2020-09-10 RX ADMIN — GABAPENTIN 300 MG: 300 CAPSULE ORAL at 09:33

## 2020-09-10 RX ADMIN — ENOXAPARIN SODIUM 40 MG: 40 INJECTION SUBCUTANEOUS at 09:33

## 2020-09-10 NOTE — DISCHARGE INSTRUCTIONS
At-Risk Alcohol Use   WHAT YOU NEED TO KNOW:   At-risk alcohol use occurs when the amount of alcohol you drink increases your risk of health problems  You may be drinking alcohol regularly or all at once (binge drinking)  In men, at-risk alcohol use is having more than 14 drinks per week, or more than 4 drinks at one time  For women, it is more than 7 drinks per week, or more than 3 drinks at one time  DISCHARGE INSTRUCTIONS:   Follow up with your healthcare provider as directed:  Write down your questions so you remember to ask them during your visits  Brief intervention therapy:  A healthcare provider meets with you to discuss ways to control your risky behaviors, such as drinking and driving  This therapy also helps you set goals to decrease the amount of alcohol you drink  Avoid alcohol:  You should stop drinking entirely, or at least decrease the amount you drink  Alcohol can damage your brain, heart, and liver  It also increases your risk for injury, high blood pressure, and certain types of cancer  Alcohol is dangerous when you combine it with certain medicines  Manage stress:  Learn ways to manage stress  Deep breathing, meditation, and listening to music may help you cope with stressful events  Talk to your healthcare provider about other ways to manage stress  Do not drive if you have had alcohol:  Make sure someone who has not been drinking can help you get home  For support and more information:   · Alcoholics Anonymous  Web Address: http://www [employer]/  · [employer] on Drug and Alcohol Information  Phone: 8- 662 - 9574736  Web Address: www health  org  Contact your healthcare provider if:   · You need help to stop drinking alcohol  · You have new symptoms since your last visit  · You have questions or concerns about your condition or care  Seek care immediately or call 911 if:   · You feel like hurting yourself or someone else      · You have trouble breathing, chest pain, or a fast heartbeat  · You have a seizure  © 2017 [address] [employer] Information is for End User's use only and may not be sold, redistributed or otherwise used for commercial purposes  All illustrations and images included in CareNotes® are the copyrighted property of A D A M , [employer]  or [employer]  The above information is an  only  It is not intended as medical advice for individual conditions or treatments  Talk to your doctor, nurse or pharmacist before following any medical regimen to see if it is safe and effective for you  Hypertension   WHAT YOU NEED TO KNOW:   Hypertension is high blood pressure (BP)  Your BP is the force of your blood moving against the walls of your arteries  Normal BP is less than 120/80  Prehypertension is between 120/80 and 139/89  Hypertension is 140/90 or higher  Hypertension causes your BP to get so high that your heart has to work much harder than normal  This can damage your heart  You can control hypertension with a healthy lifestyle or medicines  A controlled blood pressure helps protect your organs, such as your heart, lungs, brain, and kidneys  DISCHARGE INSTRUCTIONS:   Call 911 for any of the following:   · You have discomfort in your chest that feels like squeezing, pressure, fullness, or pain  · You become confused or have difficulty speaking  · You suddenly feel lightheaded or have trouble breathing  · You have pain or discomfort in your back, neck, jaw, stomach, or arm  Seek care immediately if:   · You have a severe headache or vision loss  · You have weakness in an arm or leg  Contact your healthcare provider if:   · You feel faint, dizzy, confused, or drowsy  · You have been taking your BP medicine and your BP is still higher than your healthcare provider says it should be  · You have questions or concerns about your condition or care  Medicines:   You may  need any of the following:  · Medicine may be used to help lower your BP  You may need more than one type of medicine  Take the medicine exactly as directed  · Diuretics  help decrease extra fluid that collects in your body  This will help lower your BP  You may urinate more often while you take this medicine  · Cholesterol medicine  helps lower your cholesterol level  A low cholesterol level helps prevent heart disease and makes it easier to control your blood pressure  · Take your medicine as directed  Contact your healthcare provider if you think your medicine is not helping or if you have side effects  Tell him or her if you are allergic to any medicine  Keep a list of the medicines, vitamins, and herbs you take  Include the amounts, and when and why you take them  Bring the list or the pill bottles to follow-up visits  Carry your medicine list with you in case of an emergency  Follow up with your healthcare provider as directed: You will need to return to have your BP checked and to have other lab tests done  Write down your questions so you remember to ask them during your visits  Manage hypertension:  Talk with your healthcare provider about these and other ways to manage hypertension:  · Check your BP at home  Sit and rest for 5 minutes before you take your BP  Extend your arm and support it on a flat surface  Your arm should be at the same level as your heart  Follow the directions that came with your BP monitor  If possible, take at least 2 BP readings each time  Take your BP at least twice a day at the same times each day, such as morning and evening  Keep a record of your BP readings and bring it to your follow-up visits  Ask your healthcare provider what your BP should be  · Limit sodium (salt) as directed  Too much sodium can affect your fluid balance  Check labels to find low-sodium or no-salt-added foods  Some low-sodium foods use potassium salts for flavor  Too much potassium can also cause health problems  Your healthcare provider will tell you how much sodium and potassium are safe for you to have in a day  He or she may recommend that you limit sodium to 2,300 mg a day  · Follow the meal plan recommended by your healthcare provider  A dietitian or your provider can give you more information on low-sodium plans or the DASH (Dietary Approaches to Stop Hypertension) eating plan  The DASH plan is low in sodium, unhealthy fats, and total fat  It is high in potassium, calcium, and fiber  · Exercise to maintain a healthy weight  Exercise at least 30 minutes per day, on most days of the week  This will help decrease your blood pressure  Ask your healthcare provider about the best exercise plan for you  · Decrease stress  This may help lower your BP  Learn ways to relax, such as deep breathing or listening to music  · Limit alcohol  Women should limit alcohol to 1 drink a day  Men should limit alcohol to 2 drinks a day  A drink of alcohol is 12 ounces of beer, 5 ounces of wine, or 1½ ounces of liquor  · Do not smoke  Nicotine and other chemicals in cigarettes and cigars can increase your BP and also cause lung damage  Ask your healthcare provider for information if you currently smoke and need help to quit  E-cigarettes or smokeless tobacco still contain nicotine  Talk to your healthcare provider before you use these products  · Manage any other health conditions you have  Health conditions such as diabetes can increase your risk for hypertension  Follow your healthcare provider's instructions and take all your medicines as directed  © 2017 [address] [employer] Information is for End User's use only and may not be sold, redistributed or otherwise used for commercial purposes  All illustrations and images included in CareNotes® are the copyrighted property of A [employer] M , Inc  or [employer] Butterfield  The above information is an  only   It is not intended as medical advice for individual conditions or treatments  Talk to your doctor, nurse or pharmacist before following any medical regimen to see if it is safe and effective for you

## 2020-09-10 NOTE — ASSESSMENT & PLAN NOTE
· Suspect present on admission, patient with noted acidosis on admission with increased anion gap, beta hydroxybutarate elevation to 7 1, in the setting of poor oral intake with additional daily alcohol intake of at least 4 shots of vodka per day  No history of diabetes  Serum osmolality pending   Patient clinically sober at this time  · Now resolved with IV fluids

## 2020-09-10 NOTE — ASSESSMENT & PLAN NOTE
· Mild, noted at 132   Likely in the setting of ETOH use and poor solute intake   · Decreased with IVF to 131  · Increased to 138 this was transitioned to normal saline

## 2020-09-10 NOTE — ASSESSMENT & PLAN NOTE
· Possibly in the setting of ketosis being the primary problem  However, patient does drink alcohol daily   BNP elevated at 1100   · Echocardiogram reveals EF of 60% with grade 1 diastolic dysfunction

## 2020-09-10 NOTE — PLAN OF CARE
Problem: Potential for Falls  Goal: Patient will remain free of falls  Description: INTERVENTIONS:  - Assess patient frequently for physical needs  -  Identify cognitive and physical deficits and behaviors that affect risk of falls    -  [place] fall precautions as indicated by assessment   - Educate patient/family on patient safety including physical limitations  - Instruct patient to call for assistance with activity based on assessment  - Modify environment to reduce risk of injury  - Consider OT/PT consult to assist with strengthening/mobility  Outcome: Progressing     Problem: PAIN - ADULT  Goal: Verbalizes/displays adequate comfort level or baseline comfort level  Description: Interventions:  - Encourage patient to monitor pain and request assistance  - Assess pain using appropriate pain scale  - Administer analgesics based on type and severity of pain and evaluate response  - Implement non-pharmacological measures as appropriate and evaluate response  - Consider cultural and social influences on pain and pain management  - Notify physician/advanced practitioner if interventions unsuccessful or patient reports new pain  Outcome: Progressing     Problem: INFECTION - ADULT  Goal: Absence or prevention of progression during hospitalization  Description: INTERVENTIONS:  - Assess and monitor for signs and symptoms of infection  - Monitor lab/diagnostic results  - Monitor all insertion sites, i e  indwelling lines, tubes, and drains  - Monitor endotracheal if appropriate and nasal secretions for changes in amount and color  - [place] appropriate cooling/warming therapies per order  - Administer medications as ordered  - Instruct and encourage patient and family to use good hand hygiene technique  - Identify and instruct in appropriate isolation precautions for identified infection/condition  Outcome: Progressing  Goal: Absence of fever/infection during neutropenic period  Description: INTERVENTIONS:  - Monitor WBC    Outcome: Progressing     Problem: SAFETY ADULT  Goal: Patient will remain free of falls  Description: INTERVENTIONS:  - Assess patient frequently for physical needs  -  Identify cognitive and physical deficits and behaviors that affect risk of falls    -  [place] fall precautions as indicated by assessment   - Educate patient/family on patient safety including physical limitations  - Instruct patient to call for assistance with activity based on assessment  - Modify environment to reduce risk of injury  - Consider OT/PT consult to assist with strengthening/mobility  Outcome: Progressing  Goal: Maintain or return to baseline ADL function  Description: INTERVENTIONS:  -  Assess patient's ability to carry out ADLs; assess patient's baseline for ADL function and identify physical deficits which impact ability to perform ADLs (bathing, care of mouth/teeth, toileting, grooming, dressing, etc )  - Assess/evaluate cause of self-care deficits   - Assess range of motion  - Assess patient's mobility; develop plan if impaired  - Assess patient's need for assistive devices and provide as appropriate  - Encourage maximum independence but intervene and supervise when necessary  - Involve family in performance of ADLs  - Assess for home care needs following discharge   - Consider OT consult to assist with ADL evaluation and planning for discharge  - Provide patient education as appropriate  Outcome: Progressing  Goal: Maintain or return mobility status to optimal level  Description: INTERVENTIONS:  - Assess patient's baseline mobility status (ambulation, transfers, stairs, etc )    - Identify cognitive and physical deficits and behaviors that affect mobility  - Identify mobility aids required to assist with transfers and/or ambulation (gait belt, sit-to-stand, lift, walker, cane, etc )  - [place] fall precautions as indicated by assessment  - Record patient progress and toleration of activity level on Mobility SBAR; progress patient to next Phase/Stage  - Instruct patient to call for assistance with activity based on assessment  - Consider rehabilitation consult to assist with strengthening/weightbearing, etc   Outcome: Progressing     Problem: DISCHARGE PLANNING  Goal: Discharge to home or other facility with appropriate resources  Description: INTERVENTIONS:  - Identify barriers to discharge w/patient and caregiver  - Arrange for needed discharge resources and transportation as appropriate  - Identify discharge learning needs (meds, wound care, etc )  - Arrange for interpretive services to assist at discharge as needed  - Refer to Case Management Department for coordinating discharge planning if the patient needs post-hospital services based on physician/advanced practitioner order or complex needs related to functional status, cognitive ability, or social support system  Outcome: Progressing     Problem: Knowledge Deficit  Goal: Patient/family/caregiver demonstrates understanding of disease process, treatment plan, medications, and discharge instructions  Description: Complete learning assessment and assess knowledge base    Interventions:  - Provide teaching at level of understanding  - Provide teaching via preferred learning methods  Outcome: Progressing

## 2020-09-10 NOTE — DISCHARGE SUMMARY
Discharge- Milena Re 1960, 62 y o  female MRN: [id number]    Unit/Bed#: S -13 Encounter: [id number]    Primary Care Provider: Sadie Rios DC   Date and time admitted to hospital: 9/8/2020  7:91 PM    * Alcoholic ketosis  Assessment & Plan  · Suspect present on admission, patient with noted acidosis on admission with increased anion gap, beta hydroxybutarate elevation to 7 1, in the setting of poor oral intake with additional daily alcohol intake of at least 4 shots of vodka per day  No history of diabetes  Serum osmolality pending  Patient clinically sober at this time  · Now resolved with IV fluids    Hyponatremia  Assessment & Plan  · Mild, noted at 132  Likely in the setting of ETOH use and poor solute intake   · Decreased with IVF to 131  · Increased to 138 this was transitioned to normal saline    Hypoglycemia  Assessment & Plan  · Noted at 61  Likely in the setting of starvation ketosis and alcohol intake  · QID accuchecks   · A1c 5 2%    SOB (shortness of breath)  Assessment & Plan  · Possibly in the setting of ketosis being the primary problem  However, patient does drink alcohol daily  BNP elevated at 1100   · Echocardiogram reveals EF of 60% with grade 1 diastolic dysfunction     Moderate protein-calorie malnutrition (HCC)  Assessment & Plan  Malnutrition Findings:           BMI Findings: Body mass index is 24 68 kg/m²     · Will provide vitamin supplementation in accordance with CIWA protocol  · Regular diet     Essential hypertension  Assessment & Plan  · BP above goal at this time   · Continue Verapamil   · Add Hydralazine IV Q6 PRN     Malignant neoplasm of overlapping sites of left breast in female, estrogen receptor positive ([facility] [address] )  Assessment & Plan  · Status post mastectomy   · Continue Femara       Discharging Physician / Practitioner: Epi Hill PA-C  PCP: Sadie Rios DC  Admission Date:   Admission Orders (From admission, onward)     Ordered        09/08/20 [address] [facility]  Inpatient Admission  Once                   Discharge Date: 09/10/20    Resolved Problems  Date Reviewed: 9/10/2020    None          Consultations During Hospital Stay:  · Chest x-ray PA lateral  · Echocardiogram    Procedures Performed:   · Chest x-ray PA lateral  · No acute cardiopulmonary disease  · Echocardiogram  · EF 60% with grade 1 diastolic dysfunction    Significant Findings / Test Results:   · None    Incidental Findings:   · None     Test Results Pending at Discharge (will require follow up): · None     Outpatient Tests Requested:  · None    Complications:  None    Reason for Admission:  Alcoholic ketosis    Hospital Course:     Isadora Blake is a 62 y o  female patient who originally presented to the hospital on 9/8/2020 due to fatigue  Patient reports decreased appetite and dyspnea on exertion  Patient also drinks 4 shots of vodka daily, and despite not eating regulation continue drink alcohol  Patient was noted to be in a ketotic state with elevated anion gap, likely secondary to either alcohol use or starvation  She is also noted to be hypoglycemic  Patient was started on D5 normal saline  Patient's anion gap closed, and patient's symptoms improved over the course of 48 hours of IV fluid hydration  After 24 hours a D5 normal saline, patient's sodium decreased to 131  This was transitioned to normal saline, patient's sodium corrected  Patient has no complaints of shortness of breath, chest pain, abdominal pain/nausea/vomiting prior to discharge  She is eating and drinking normally  Patient's blood pressure is slightly elevated compared to baseline, but her PCP has been managed in the outpatient  I recommend continue verapamil and monitor for signs/symptoms of hypertensive urgency and outpatient follow up with the PCP  Please see above list of diagnoses and related plan for additional information       Condition at Discharge: fair     Discharge Day Visit / Exam:     Subjective: Patient feels well today  Eating and drinking normally  No headache, shortness of breath, chest pain  Vitals: Blood Pressure: (!) 174/102 (09/10/20 1100)  Pulse: 68 (09/10/20 1100)  Temperature: 97 5 °F (36 4 °C) (09/10/20 1100)  Temp Source: Oral (09/10/20 1100)  Respirations: 16 (09/10/20 1100)  Height: 5' 5" (165 1 cm) (09/08/20 1929)  Weight - Scale: 67 3 kg (148 lb 4 8 oz) (09/08/20 1929)  SpO2: 97 % (09/10/20 1100)  Exam:   Physical Exam  Constitutional:       Appearance: Normal appearance  HENT:      Head: Normocephalic and atraumatic  Mouth/Throat:      Mouth: Mucous membranes are moist    Eyes:      Pupils: Pupils are equal, round, and reactive to light  Cardiovascular:      Rate and Rhythm: Normal rate and regular rhythm  Heart sounds: No murmur  No friction rub  No gallop  Pulmonary:      Effort: Pulmonary effort is normal       Breath sounds: Normal breath sounds  Abdominal:      General: Abdomen is flat  Bowel sounds are normal       Palpations: Abdomen is soft  Tenderness: There is no abdominal tenderness  Musculoskeletal: Normal range of motion  General: No swelling  Skin:     General: Skin is warm and dry  Neurological:      General: No focal deficit present  Mental Status: She is alert and oriented to person, place, and time  Psychiatric:         Mood and Affect: Mood normal        Discussion with Family:  Family updated by patient    Discharge instructions/Information to patient and family:   See after visit summary for information provided to patient and family  Provisions for Follow-Up Care:  See after visit summary for information related to follow-up care and any pertinent home health orders  Disposition:     Home    For Discharges to   [facility]:   · Not Applicable to this Patient - Not Applicable to this Patient    Planned Readmission:  Non     Discharge Statement:  I spent 35 minutes discharging the patient   This time was spent on the day of discharge  I had direct contact with the patient on the day of discharge  Greater than 50% of the total time was spent examining patient, answering all patient questions, arranging and discussing plan of care with patient as well as directly providing post-discharge instructions  Additional time then spent on discharge activities  Discharge Medications:  See after visit summary for reconciled discharge medications provided to patient and family        ** Please Note: This note has been constructed using a voice recognition system **

## 2020-09-11 LAB — BACTERIA UR CULT: NORMAL

## 2020-09-13 LAB
BACTERIA BLD CULT: NORMAL
BACTERIA BLD CULT: NORMAL

## 2020-09-14 ENCOUNTER — APPOINTMENT (OUTPATIENT)
Dept: PHYSICAL THERAPY | Facility: CLINIC | Age: 60
End: 2020-09-14
Payer: COMMERCIAL

## 2020-09-16 ENCOUNTER — OFFICE VISIT (OUTPATIENT)
Dept: PHYSICAL THERAPY | Facility: CLINIC | Age: 60
End: 2020-09-16
Payer: COMMERCIAL

## 2020-09-16 DIAGNOSIS — G89.28 POST-MASTECTOMY PAIN SYNDROME: Primary | ICD-10-CM

## 2020-09-16 PROCEDURE — 97112 NEUROMUSCULAR REEDUCATION: CPT | Performed by: PHYSICAL THERAPIST

## 2020-09-16 PROCEDURE — 97140 MANUAL THERAPY 1/> REGIONS: CPT | Performed by: PHYSICAL THERAPIST

## 2020-09-16 PROCEDURE — 97110 THERAPEUTIC EXERCISES: CPT | Performed by: PHYSICAL THERAPIST

## 2020-09-16 NOTE — PROGRESS NOTES
Daily Note and Discharge     Today's date: 2020  Patient name: Diana Park  : 1960  MRN: [id number]  Referring provider: Jeffery Monroe MD  Dx:   Encounter Diagnosis     ICD-10-CM    1  Post-mastectomy pain syndrome  G89 28            Rakel Oliveira attended physical therapy for post mastectomy pain and reduced ROM  She made good progress in increasing her ROM and function  She has discontinued treatment due to personal reasons at this time  Thank you for your referral          Subjective: Patient reports that she is feeling improved from her hospital stay  She notes no dysfunction with her UE      Objective: See treatment diary below      Assessment: Tolerated treatment well  Patient would benefit from continued PT      Plan: Continue per plan of care        Precautions: HTN, BCA      Manuals  8/10 8/13 8/17 8/20 8/24 8/27 8/31 9/2   MFR - ELBOW    10 10 10       IASTM 8 8' 8 8 8 8 8 10 10  8   (-) pressure IASTM 8 6' 6 8 8 7 7 10 10  8   PROM - flex/abd/horiz abd 10 10 10 10 10 10 15 10 10 8   Neuro Re-Ed                          Table Slides hep 5 x :10 ea 5 x :10 ea          P[place]ley  5' 5 min 5' 5' 5 5' 5' 5 min  5'   Ladder with bye bye flex/scap 10  :10 x10 flex 10 x :10 10x:10 10x:10 20 x :10 20 x :10 :10 x 10  10 x :10   Doorway pec 5 x :20 5 x :20 5 x:20   5 x :20 5x:20 5x:20 5 x :20 5 x:20  5 x :20                             Ther Ex             Supine wand flexion-elbows extended   :10x10  10 x :10 10 x :10 :10x10  10 x :10 10 x :10 1 5# :10x10 1 5#  10 x :10   IR cane  1 5 10 x :10  NV 10 x :10 10 x :10 10x:10 10 x :10 10 x :10 :10x10  10 x :10   Cane ER - supine 1 5# 1 x 10   10 x :10 10 x :10 10x:10 10x :10 10 x :10 1 5# :10x10  1 5# 10 x :10   TB Row/ Ext/ER        RTB x 10 RTB 2x10 RTB 2 x 10   Ball roll shoulder abduction          :10 x 10  10x :10   Supine pec stretch         NV 3 min with iastm                             Ther Activity                                       Gait Training                                       Modalities

## 2020-11-10 ENCOUNTER — NURSE TRIAGE (OUTPATIENT)
Dept: OTHER | Facility: OTHER | Age: 60
End: 2020-11-10

## 2020-11-11 DIAGNOSIS — K62.3 RECTAL PROLAPSE: ICD-10-CM

## 2020-11-11 PROCEDURE — U0003 INFECTIOUS AGENT DETECTION BY NUCLEIC ACID (DNA OR RNA); SEVERE ACUTE RESPIRATORY SYNDROME CORONAVIRUS 2 (SARS-COV-2) (CORONAVIRUS DISEASE [COVID-19]), AMPLIFIED PROBE TECHNIQUE, MAKING USE OF HIGH THROUGHPUT TECHNOLOGIES AS DESCRIBED BY CMS-2020-01-R: HCPCS | Performed by: COLON & RECTAL SURGERY

## 2020-11-12 LAB — SARS-COV-2 RNA SPEC QL NAA+PROBE: NOT DETECTED

## 2020-11-16 ENCOUNTER — ANESTHESIA EVENT (OUTPATIENT)
Dept: PERIOP | Facility: HOSPITAL | Age: 60
DRG: 330 | End: 2020-11-16
Payer: COMMERCIAL

## 2020-11-16 DIAGNOSIS — C50.812 MALIGNANT NEOPLASM OF OVERLAPPING SITES OF LEFT BREAST IN FEMALE, ESTROGEN RECEPTOR POSITIVE (HCC): ICD-10-CM

## 2020-11-16 DIAGNOSIS — Z17.0 MALIGNANT NEOPLASM OF OVERLAPPING SITES OF LEFT BREAST IN FEMALE, ESTROGEN RECEPTOR POSITIVE (HCC): ICD-10-CM

## 2020-11-16 RX ORDER — LETROZOLE 2.5 MG/1
TABLET, FILM COATED ORAL
Qty: 90 TABLET | Refills: 1 | Status: SHIPPED | OUTPATIENT
Start: 2020-11-16 | End: 2021-05-26

## 2020-11-17 PROBLEM — Z92.3 HISTORY OF RADIATION THERAPY: Status: ACTIVE | Noted: 2018-05-01

## 2020-11-18 ENCOUNTER — ANESTHESIA (OUTPATIENT)
Dept: PERIOP | Facility: HOSPITAL | Age: 60
DRG: 330 | End: 2020-11-18
Payer: COMMERCIAL

## 2020-11-18 ENCOUNTER — HOSPITAL ENCOUNTER (INPATIENT)
Facility: HOSPITAL | Age: 60
LOS: 3 days | Discharge: HOME | DRG: 330 | End: 2020-11-21
Attending: UROLOGY | Admitting: COLON & RECTAL SURGERY
Payer: COMMERCIAL

## 2020-11-18 VITALS — HEART RATE: 82 BPM

## 2020-11-18 DIAGNOSIS — N28.9 RENAL INSUFFICIENCY: Primary | ICD-10-CM

## 2020-11-18 DIAGNOSIS — K62.3 RECTAL PROLAPSE: ICD-10-CM

## 2020-11-18 PROCEDURE — 86900 BLOOD TYPING SEROLOGIC ABO: CPT | Performed by: COLON & RECTAL SURGERY

## 2020-11-18 PROCEDURE — 86901 BLOOD TYPING SEROLOGIC RH(D): CPT | Performed by: COLON & RECTAL SURGERY

## 2020-11-18 PROCEDURE — 0DNU4ZZ RELEASE OMENTUM, PERCUTANEOUS ENDOSCOPIC APPROACH: ICD-10-PCS | Performed by: COLON & RECTAL SURGERY

## 2020-11-18 PROCEDURE — NC001 PR NO CHARGE: Performed by: PHYSICIAN ASSISTANT

## 2020-11-18 PROCEDURE — 87086 URINE CULTURE/COLONY COUNT: CPT | Performed by: UROLOGY

## 2020-11-18 PROCEDURE — 45400 LAPAROSCOPIC PROC: CPT | Performed by: COLON & RECTAL SURGERY

## 2020-11-18 PROCEDURE — 52005 CYSTO W/URTRL CATHJ: CPT | Performed by: UROLOGY

## 2020-11-18 PROCEDURE — 0T788DZ DILATION OF BILATERAL URETERS WITH INTRALUMINAL DEVICE, VIA NATURAL OR ARTIFICIAL OPENING ENDOSCOPIC: ICD-10-PCS | Performed by: COLON & RECTAL SURGERY

## 2020-11-18 PROCEDURE — C1769 GUIDE WIRE: HCPCS | Performed by: UROLOGY

## 2020-11-18 PROCEDURE — 0DQP0ZZ REPAIR RECTUM, OPEN APPROACH: ICD-10-PCS | Performed by: COLON & RECTAL SURGERY

## 2020-11-18 PROCEDURE — 86850 RBC ANTIBODY SCREEN: CPT | Performed by: COLON & RECTAL SURGERY

## 2020-11-18 RX ORDER — HEPARIN SODIUM 5000 [USP'U]/ML
5000 INJECTION, SOLUTION INTRAVENOUS; SUBCUTANEOUS EVERY 8 HOURS SCHEDULED
Status: DISCONTINUED | OUTPATIENT
Start: 2020-11-18 | End: 2020-11-21 | Stop reason: HOSPADM

## 2020-11-18 RX ORDER — ACETAMINOPHEN 325 MG/1
650 TABLET ORAL EVERY 6 HOURS PRN
Status: DISCONTINUED | OUTPATIENT
Start: 2020-11-18 | End: 2020-11-19

## 2020-11-18 RX ORDER — LIDOCAINE HYDROCHLORIDE 10 MG/ML
0.5 INJECTION, SOLUTION EPIDURAL; INFILTRATION; INTRACAUDAL; PERINEURAL ONCE AS NEEDED
Status: COMPLETED | OUTPATIENT
Start: 2020-11-18 | End: 2020-11-18

## 2020-11-18 RX ORDER — SODIUM CHLORIDE 9 MG/ML
INJECTION, SOLUTION INTRAVENOUS CONTINUOUS PRN
Status: DISCONTINUED | OUTPATIENT
Start: 2020-11-18 | End: 2020-11-18

## 2020-11-18 RX ORDER — GLYCOPYRROLATE 0.2 MG/ML
INJECTION INTRAMUSCULAR; INTRAVENOUS AS NEEDED
Status: DISCONTINUED | OUTPATIENT
Start: 2020-11-18 | End: 2020-11-18

## 2020-11-18 RX ORDER — ONDANSETRON 2 MG/ML
4 INJECTION INTRAMUSCULAR; INTRAVENOUS ONCE AS NEEDED
Status: DISCONTINUED | OUTPATIENT
Start: 2020-11-18 | End: 2020-11-18 | Stop reason: HOSPADM

## 2020-11-18 RX ORDER — MAGNESIUM HYDROXIDE 1200 MG/15ML
LIQUID ORAL AS NEEDED
Status: DISCONTINUED | OUTPATIENT
Start: 2020-11-18 | End: 2020-11-18 | Stop reason: HOSPADM

## 2020-11-18 RX ORDER — ONDANSETRON 2 MG/ML
4 INJECTION INTRAMUSCULAR; INTRAVENOUS EVERY 6 HOURS PRN
Status: DISCONTINUED | OUTPATIENT
Start: 2020-11-18 | End: 2020-11-21 | Stop reason: HOSPADM

## 2020-11-18 RX ORDER — SODIUM CHLORIDE, SODIUM LACTATE, POTASSIUM CHLORIDE, CALCIUM CHLORIDE 600; 310; 30; 20 MG/100ML; MG/100ML; MG/100ML; MG/100ML
125 INJECTION, SOLUTION INTRAVENOUS CONTINUOUS
Status: DISCONTINUED | OUTPATIENT
Start: 2020-11-18 | End: 2020-11-18

## 2020-11-18 RX ORDER — FENTANYL CITRATE 50 UG/ML
INJECTION, SOLUTION INTRAMUSCULAR; INTRAVENOUS AS NEEDED
Status: DISCONTINUED | OUTPATIENT
Start: 2020-11-18 | End: 2020-11-18

## 2020-11-18 RX ORDER — NEOMYCIN SULFATE 500 MG/1
1000 TABLET ORAL 3 TIMES DAILY
Status: DISCONTINUED | OUTPATIENT
Start: 2020-11-18 | End: 2020-11-18

## 2020-11-18 RX ORDER — DEXTROSE, SODIUM CHLORIDE, AND POTASSIUM CHLORIDE 5; .45; .15 G/100ML; G/100ML; G/100ML
100 INJECTION INTRAVENOUS CONTINUOUS
Status: DISCONTINUED | OUTPATIENT
Start: 2020-11-18 | End: 2020-11-18

## 2020-11-18 RX ORDER — HEPARIN SODIUM 5000 [USP'U]/ML
5000 INJECTION, SOLUTION INTRAVENOUS; SUBCUTANEOUS ONCE
Status: COMPLETED | OUTPATIENT
Start: 2020-11-18 | End: 2020-11-18

## 2020-11-18 RX ORDER — ROCURONIUM BROMIDE 10 MG/ML
INJECTION, SOLUTION INTRAVENOUS AS NEEDED
Status: DISCONTINUED | OUTPATIENT
Start: 2020-11-18 | End: 2020-11-18

## 2020-11-18 RX ORDER — HYDROMORPHONE HCL/PF 1 MG/ML
SYRINGE (ML) INJECTION AS NEEDED
Status: DISCONTINUED | OUTPATIENT
Start: 2020-11-18 | End: 2020-11-18

## 2020-11-18 RX ORDER — PROPOFOL 10 MG/ML
INJECTION, EMULSION INTRAVENOUS AS NEEDED
Status: DISCONTINUED | OUTPATIENT
Start: 2020-11-18 | End: 2020-11-18

## 2020-11-18 RX ORDER — LIDOCAINE HYDROCHLORIDE 10 MG/ML
INJECTION, SOLUTION EPIDURAL; INFILTRATION; INTRACAUDAL; PERINEURAL AS NEEDED
Status: DISCONTINUED | OUTPATIENT
Start: 2020-11-18 | End: 2020-11-18

## 2020-11-18 RX ORDER — NEOSTIGMINE METHYLSULFATE 1 MG/ML
INJECTION INTRAVENOUS AS NEEDED
Status: DISCONTINUED | OUTPATIENT
Start: 2020-11-18 | End: 2020-11-18

## 2020-11-18 RX ORDER — HYDROMORPHONE HCL/PF 1 MG/ML
0.5 SYRINGE (ML) INJECTION
Status: DISCONTINUED | OUTPATIENT
Start: 2020-11-18 | End: 2020-11-20

## 2020-11-18 RX ORDER — METRONIDAZOLE 500 MG/1
1000 TABLET ORAL 3 TIMES DAILY
Status: DISCONTINUED | OUTPATIENT
Start: 2020-11-18 | End: 2020-11-18

## 2020-11-18 RX ORDER — EPHEDRINE SULFATE 50 MG/ML
INJECTION INTRAVENOUS AS NEEDED
Status: DISCONTINUED | OUTPATIENT
Start: 2020-11-18 | End: 2020-11-18

## 2020-11-18 RX ORDER — ONDANSETRON 2 MG/ML
INJECTION INTRAMUSCULAR; INTRAVENOUS AS NEEDED
Status: DISCONTINUED | OUTPATIENT
Start: 2020-11-18 | End: 2020-11-18

## 2020-11-18 RX ORDER — MIDAZOLAM HYDROCHLORIDE 2 MG/2ML
INJECTION, SOLUTION INTRAMUSCULAR; INTRAVENOUS AS NEEDED
Status: DISCONTINUED | OUTPATIENT
Start: 2020-11-18 | End: 2020-11-18

## 2020-11-18 RX ORDER — DEXAMETHASONE SODIUM PHOSPHATE 10 MG/ML
INJECTION, SOLUTION INTRAMUSCULAR; INTRAVENOUS AS NEEDED
Status: DISCONTINUED | OUTPATIENT
Start: 2020-11-18 | End: 2020-11-18

## 2020-11-18 RX ORDER — SODIUM CHLORIDE, SODIUM LACTATE, POTASSIUM CHLORIDE, CALCIUM CHLORIDE 600; 310; 30; 20 MG/100ML; MG/100ML; MG/100ML; MG/100ML
125 INJECTION, SOLUTION INTRAVENOUS CONTINUOUS
Status: DISCONTINUED | OUTPATIENT
Start: 2020-11-18 | End: 2020-11-19

## 2020-11-18 RX ORDER — CEFAZOLIN SODIUM 2 G/50ML
SOLUTION INTRAVENOUS AS NEEDED
Status: DISCONTINUED | OUTPATIENT
Start: 2020-11-18 | End: 2020-11-18

## 2020-11-18 RX ORDER — FENTANYL CITRATE/PF 50 MCG/ML
25 SYRINGE (ML) INJECTION
Status: DISCONTINUED | OUTPATIENT
Start: 2020-11-18 | End: 2020-11-18 | Stop reason: HOSPADM

## 2020-11-18 RX ORDER — CEFAZOLIN SODIUM 1 G/50ML
1000 SOLUTION INTRAVENOUS ONCE
Status: DISCONTINUED | OUTPATIENT
Start: 2020-11-18 | End: 2020-11-18 | Stop reason: HOSPADM

## 2020-11-18 RX ADMIN — MIDAZOLAM 1 MG: 1 INJECTION INTRAMUSCULAR; INTRAVENOUS at 09:51

## 2020-11-18 RX ADMIN — ROCURONIUM BROMIDE 50 MG: 50 INJECTION, SOLUTION INTRAVENOUS at 09:54

## 2020-11-18 RX ADMIN — HEPARIN SODIUM 5000 UNITS: 5000 INJECTION INTRAVENOUS; SUBCUTANEOUS at 10:05

## 2020-11-18 RX ADMIN — ROCURONIUM BROMIDE 20 MG: 50 INJECTION, SOLUTION INTRAVENOUS at 11:36

## 2020-11-18 RX ADMIN — DEXAMETHASONE SODIUM PHOSPHATE 10 MG: 10 INJECTION, SOLUTION INTRAMUSCULAR; INTRAVENOUS at 09:54

## 2020-11-18 RX ADMIN — MIDAZOLAM 1 MG: 1 INJECTION INTRAMUSCULAR; INTRAVENOUS at 09:43

## 2020-11-18 RX ADMIN — PROPOFOL 150 MG: 10 INJECTION, EMULSION INTRAVENOUS at 09:53

## 2020-11-18 RX ADMIN — LIDOCAINE HYDROCHLORIDE 0.2 ML: 10 INJECTION, SOLUTION EPIDURAL; INFILTRATION; INTRACAUDAL; PERINEURAL at 08:32

## 2020-11-18 RX ADMIN — NEOSTIGMINE METHYLSULFATE 4 MG: 1 INJECTION, SOLUTION INTRAVENOUS at 13:58

## 2020-11-18 RX ADMIN — Medication: at 15:14

## 2020-11-18 RX ADMIN — HYDROMORPHONE HYDROCHLORIDE 0.25 MG: 1 INJECTION, SOLUTION INTRAMUSCULAR; INTRAVENOUS; SUBCUTANEOUS at 14:05

## 2020-11-18 RX ADMIN — GLYCOPYRROLATE 0.4 MG: 0.2 INJECTION, SOLUTION INTRAMUSCULAR; INTRAVENOUS at 13:57

## 2020-11-18 RX ADMIN — LIDOCAINE HYDROCHLORIDE 50 MG: 10 INJECTION, SOLUTION EPIDURAL; INFILTRATION; INTRACAUDAL; PERINEURAL at 09:52

## 2020-11-18 RX ADMIN — Medication 500 MG: at 10:04

## 2020-11-18 RX ADMIN — FENTANYL CITRATE 50 MCG: 50 INJECTION INTRAMUSCULAR; INTRAVENOUS at 10:32

## 2020-11-18 RX ADMIN — PHENYLEPHRINE HYDROCHLORIDE 30 MCG/MIN: 10 INJECTION INTRAVENOUS at 09:59

## 2020-11-18 RX ADMIN — ROCURONIUM BROMIDE 20 MG: 50 INJECTION, SOLUTION INTRAVENOUS at 12:46

## 2020-11-18 RX ADMIN — SODIUM CHLORIDE: 0.9 INJECTION, SOLUTION INTRAVENOUS at 09:59

## 2020-11-18 RX ADMIN — HYDROMORPHONE HYDROCHLORIDE 0.25 MG: 1 INJECTION, SOLUTION INTRAMUSCULAR; INTRAVENOUS; SUBCUTANEOUS at 13:52

## 2020-11-18 RX ADMIN — SODIUM CHLORIDE, SODIUM LACTATE, POTASSIUM CHLORIDE, AND CALCIUM CHLORIDE 125 ML/HR: .6; .31; .03; .02 INJECTION, SOLUTION INTRAVENOUS at 08:32

## 2020-11-18 RX ADMIN — ROCURONIUM BROMIDE 10 MG: 50 INJECTION, SOLUTION INTRAVENOUS at 10:55

## 2020-11-18 RX ADMIN — FENTANYL CITRATE 50 MCG: 50 INJECTION INTRAMUSCULAR; INTRAVENOUS at 09:52

## 2020-11-18 RX ADMIN — EPHEDRINE SULFATE 5 MG: 50 INJECTION, SOLUTION INTRAVENOUS at 11:08

## 2020-11-18 RX ADMIN — HEPARIN SODIUM 5000 UNITS: 5000 INJECTION INTRAVENOUS; SUBCUTANEOUS at 23:00

## 2020-11-18 RX ADMIN — CEFAZOLIN SODIUM 2000 MG: 2 SOLUTION INTRAVENOUS at 09:48

## 2020-11-18 RX ADMIN — ONDANSETRON 4 MG: 2 INJECTION INTRAMUSCULAR; INTRAVENOUS at 13:20

## 2020-11-19 LAB
ANION GAP SERPL CALCULATED.3IONS-SCNC: 6 MMOL/L (ref 4–13)
BUN SERPL-MCNC: 19 MG/DL (ref 5–25)
CALCIUM SERPL-MCNC: 8.1 MG/DL (ref 8.3–10.1)
CHLORIDE SERPL-SCNC: 104 MMOL/L (ref 100–108)
CO2 SERPL-SCNC: 25 MMOL/L (ref 21–32)
CREAT SERPL-MCNC: 1.25 MG/DL (ref 0.6–1.3)
ERYTHROCYTE [DISTWIDTH] IN BLOOD BY AUTOMATED COUNT: 14.4 % (ref 11.6–15.1)
GFR SERPL CREATININE-BSD FRML MDRD: 47 ML/MIN/1.73SQ M
GLUCOSE SERPL-MCNC: 139 MG/DL (ref 65–140)
GLUCOSE SERPL-MCNC: 145 MG/DL (ref 65–140)
HCT VFR BLD AUTO: 31.6 % (ref 34.8–46.1)
HGB BLD-MCNC: 10.1 G/DL (ref 11.5–15.4)
MAGNESIUM SERPL-MCNC: 1.9 MG/DL (ref 1.6–2.6)
MCH RBC QN AUTO: 34.6 PG (ref 26.8–34.3)
MCHC RBC AUTO-ENTMCNC: 32 G/DL (ref 31.4–37.4)
MCV RBC AUTO: 108 FL (ref 82–98)
PLATELET # BLD AUTO: 176 THOUSANDS/UL (ref 149–390)
PLATELET # BLD AUTO: 179 THOUSANDS/UL (ref 149–390)
PMV BLD AUTO: 10.1 FL (ref 8.9–12.7)
PMV BLD AUTO: 9.9 FL (ref 8.9–12.7)
POTASSIUM SERPL-SCNC: 4.6 MMOL/L (ref 3.5–5.3)
RBC # BLD AUTO: 2.92 MILLION/UL (ref 3.81–5.12)
SODIUM SERPL-SCNC: 135 MMOL/L (ref 136–145)
WBC # BLD AUTO: 6.28 THOUSAND/UL (ref 4.31–10.16)

## 2020-11-19 PROCEDURE — 97163 PT EVAL HIGH COMPLEX 45 MIN: CPT

## 2020-11-19 PROCEDURE — 85049 AUTOMATED PLATELET COUNT: CPT | Performed by: SURGERY

## 2020-11-19 PROCEDURE — 97167 OT EVAL HIGH COMPLEX 60 MIN: CPT

## 2020-11-19 PROCEDURE — 80048 BASIC METABOLIC PNL TOTAL CA: CPT | Performed by: SURGERY

## 2020-11-19 PROCEDURE — 83735 ASSAY OF MAGNESIUM: CPT | Performed by: PHYSICIAN ASSISTANT

## 2020-11-19 PROCEDURE — 85027 COMPLETE CBC AUTOMATED: CPT | Performed by: SURGERY

## 2020-11-19 PROCEDURE — 82948 REAGENT STRIP/BLOOD GLUCOSE: CPT

## 2020-11-19 RX ORDER — ACETAMINOPHEN 325 MG/1
650 TABLET ORAL EVERY 6 HOURS SCHEDULED
Status: DISCONTINUED | OUTPATIENT
Start: 2020-11-19 | End: 2020-11-21 | Stop reason: HOSPADM

## 2020-11-19 RX ORDER — MAGNESIUM SULFATE 1 G/100ML
1 INJECTION INTRAVENOUS ONCE
Status: COMPLETED | OUTPATIENT
Start: 2020-11-19 | End: 2020-11-19

## 2020-11-19 RX ORDER — DEXTROSE AND SODIUM CHLORIDE 5; .45 G/100ML; G/100ML
60 INJECTION, SOLUTION INTRAVENOUS CONTINUOUS
Status: DISCONTINUED | OUTPATIENT
Start: 2020-11-19 | End: 2020-11-20

## 2020-11-19 RX ORDER — DEXTROSE, SODIUM CHLORIDE, AND POTASSIUM CHLORIDE 5; .45; .15 G/100ML; G/100ML; G/100ML
84 INJECTION INTRAVENOUS CONTINUOUS
Status: DISCONTINUED | OUTPATIENT
Start: 2020-11-19 | End: 2020-11-19

## 2020-11-19 RX ADMIN — ACETAMINOPHEN 650 MG: 325 TABLET, FILM COATED ORAL at 23:28

## 2020-11-19 RX ADMIN — HEPARIN SODIUM 5000 UNITS: 5000 INJECTION INTRAVENOUS; SUBCUTANEOUS at 21:38

## 2020-11-19 RX ADMIN — MAGNESIUM SULFATE HEPTAHYDRATE 1 G: 1 INJECTION, SOLUTION INTRAVENOUS at 09:38

## 2020-11-19 RX ADMIN — DEXTROSE AND SODIUM CHLORIDE 84 ML/HR: 5; .45 INJECTION, SOLUTION INTRAVENOUS at 09:37

## 2020-11-19 RX ADMIN — HEPARIN SODIUM 5000 UNITS: 5000 INJECTION INTRAVENOUS; SUBCUTANEOUS at 05:37

## 2020-11-19 RX ADMIN — ACETAMINOPHEN 650 MG: 325 TABLET, FILM COATED ORAL at 15:06

## 2020-11-19 RX ADMIN — SODIUM CHLORIDE, SODIUM LACTATE, POTASSIUM CHLORIDE, AND CALCIUM CHLORIDE 125 ML/HR: .6; .31; .03; .02 INJECTION, SOLUTION INTRAVENOUS at 03:17

## 2020-11-19 RX ADMIN — HEPARIN SODIUM 5000 UNITS: 5000 INJECTION INTRAVENOUS; SUBCUTANEOUS at 15:06

## 2020-11-19 RX ADMIN — ACETAMINOPHEN 650 MG: 325 TABLET, FILM COATED ORAL at 09:39

## 2020-11-19 RX ADMIN — DEXTROSE, SODIUM CHLORIDE, AND POTASSIUM CHLORIDE 84 ML/HR: 5; .45; .15 INJECTION INTRAVENOUS at 05:39

## 2020-11-20 LAB
ANION GAP SERPL CALCULATED.3IONS-SCNC: 5 MMOL/L (ref 4–13)
BACTERIA UR CULT: NORMAL
BUN SERPL-MCNC: 18 MG/DL (ref 5–25)
CALCIUM SERPL-MCNC: 7.9 MG/DL (ref 8.3–10.1)
CHLORIDE SERPL-SCNC: 102 MMOL/L (ref 100–108)
CO2 SERPL-SCNC: 24 MMOL/L (ref 21–32)
CREAT SERPL-MCNC: 1.39 MG/DL (ref 0.6–1.3)
GFR SERPL CREATININE-BSD FRML MDRD: 42 ML/MIN/1.73SQ M
GLUCOSE SERPL-MCNC: 102 MG/DL (ref 65–140)
MAGNESIUM SERPL-MCNC: 1.9 MG/DL (ref 1.6–2.6)
POTASSIUM SERPL-SCNC: 3.6 MMOL/L (ref 3.5–5.3)
SODIUM SERPL-SCNC: 131 MMOL/L (ref 136–145)

## 2020-11-20 PROCEDURE — 80048 BASIC METABOLIC PNL TOTAL CA: CPT | Performed by: PHYSICIAN ASSISTANT

## 2020-11-20 PROCEDURE — 83735 ASSAY OF MAGNESIUM: CPT | Performed by: PHYSICIAN ASSISTANT

## 2020-11-20 PROCEDURE — 99254 IP/OBS CNSLTJ NEW/EST MOD 60: CPT | Performed by: INTERNAL MEDICINE

## 2020-11-20 RX ORDER — SODIUM CHLORIDE 9 MG/ML
80 INJECTION, SOLUTION INTRAVENOUS CONTINUOUS
Status: DISCONTINUED | OUTPATIENT
Start: 2020-11-20 | End: 2020-11-21

## 2020-11-20 RX ORDER — OXYCODONE HYDROCHLORIDE 5 MG/1
5 TABLET ORAL EVERY 4 HOURS PRN
Status: DISCONTINUED | OUTPATIENT
Start: 2020-11-20 | End: 2020-11-21 | Stop reason: HOSPADM

## 2020-11-20 RX ORDER — DEXTROSE, SODIUM CHLORIDE, AND POTASSIUM CHLORIDE 5; .45; .15 G/100ML; G/100ML; G/100ML
60 INJECTION INTRAVENOUS CONTINUOUS
Status: DISCONTINUED | OUTPATIENT
Start: 2020-11-20 | End: 2020-11-20

## 2020-11-20 RX ORDER — OXYCODONE HYDROCHLORIDE 10 MG/1
10 TABLET ORAL EVERY 4 HOURS PRN
Status: DISCONTINUED | OUTPATIENT
Start: 2020-11-20 | End: 2020-11-21 | Stop reason: HOSPADM

## 2020-11-20 RX ORDER — POTASSIUM CHLORIDE 20 MEQ/1
20 TABLET, EXTENDED RELEASE ORAL 2 TIMES DAILY
Status: DISCONTINUED | OUTPATIENT
Start: 2020-11-20 | End: 2020-11-21 | Stop reason: HOSPADM

## 2020-11-20 RX ADMIN — MORPHINE SULFATE 2 MG: 2 INJECTION, SOLUTION INTRAMUSCULAR; INTRAVENOUS at 14:36

## 2020-11-20 RX ADMIN — ACETAMINOPHEN 650 MG: 325 TABLET, FILM COATED ORAL at 23:55

## 2020-11-20 RX ADMIN — OXYCODONE HYDROCHLORIDE 10 MG: 10 TABLET ORAL at 13:35

## 2020-11-20 RX ADMIN — HEPARIN SODIUM 5000 UNITS: 5000 INJECTION INTRAVENOUS; SUBCUTANEOUS at 05:13

## 2020-11-20 RX ADMIN — ACETAMINOPHEN 650 MG: 325 TABLET, FILM COATED ORAL at 05:13

## 2020-11-20 RX ADMIN — POTASSIUM CHLORIDE 20 MEQ: 1500 TABLET, EXTENDED RELEASE ORAL at 11:18

## 2020-11-20 RX ADMIN — OXYCODONE HYDROCHLORIDE 5 MG: 5 TABLET ORAL at 22:29

## 2020-11-20 RX ADMIN — ACETAMINOPHEN 650 MG: 325 TABLET, FILM COATED ORAL at 17:03

## 2020-11-20 RX ADMIN — OXYCODONE HYDROCHLORIDE 5 MG: 5 TABLET ORAL at 06:33

## 2020-11-20 RX ADMIN — HEPARIN SODIUM 5000 UNITS: 5000 INJECTION INTRAVENOUS; SUBCUTANEOUS at 14:36

## 2020-11-20 RX ADMIN — DEXTROSE AND SODIUM CHLORIDE 84 ML/HR: 5; .45 INJECTION, SOLUTION INTRAVENOUS at 02:00

## 2020-11-20 RX ADMIN — HEPARIN SODIUM 5000 UNITS: 5000 INJECTION INTRAVENOUS; SUBCUTANEOUS at 22:42

## 2020-11-20 RX ADMIN — SODIUM CHLORIDE 1000 ML: 0.9 INJECTION, SOLUTION INTRAVENOUS at 11:16

## 2020-11-20 RX ADMIN — POTASSIUM CHLORIDE 20 MEQ: 1500 TABLET, EXTENDED RELEASE ORAL at 17:03

## 2020-11-20 RX ADMIN — ACETAMINOPHEN 650 MG: 325 TABLET, FILM COATED ORAL at 11:18

## 2020-11-21 VITALS
OXYGEN SATURATION: 97 % | TEMPERATURE: 97.1 F | BODY MASS INDEX: 25.83 KG/M2 | HEART RATE: 62 BPM | SYSTOLIC BLOOD PRESSURE: 141 MMHG | WEIGHT: 155 LBS | RESPIRATION RATE: 15 BRPM | HEIGHT: 65 IN | DIASTOLIC BLOOD PRESSURE: 75 MMHG

## 2020-11-21 LAB
ANION GAP SERPL CALCULATED.3IONS-SCNC: 6 MMOL/L (ref 4–13)
BUN SERPL-MCNC: 9 MG/DL (ref 5–25)
CALCIUM SERPL-MCNC: 9.3 MG/DL (ref 8.3–10.1)
CHLORIDE SERPL-SCNC: 109 MMOL/L (ref 100–108)
CO2 SERPL-SCNC: 26 MMOL/L (ref 21–32)
CREAT SERPL-MCNC: 0.94 MG/DL (ref 0.6–1.3)
GFR SERPL CREATININE-BSD FRML MDRD: 67 ML/MIN/1.73SQ M
GLUCOSE SERPL-MCNC: 84 MG/DL (ref 65–140)
POTASSIUM SERPL-SCNC: 3.5 MMOL/L (ref 3.5–5.3)
SODIUM SERPL-SCNC: 141 MMOL/L (ref 136–145)

## 2020-11-21 PROCEDURE — 99232 SBSQ HOSP IP/OBS MODERATE 35: CPT | Performed by: INTERNAL MEDICINE

## 2020-11-21 PROCEDURE — 80048 BASIC METABOLIC PNL TOTAL CA: CPT | Performed by: PHYSICIAN ASSISTANT

## 2020-11-21 RX ORDER — ACETAMINOPHEN 325 MG/1
650 TABLET ORAL EVERY 6 HOURS SCHEDULED
Qty: 56 TABLET | Refills: 0 | Status: SHIPPED | OUTPATIENT
Start: 2020-11-21 | End: 2020-11-28

## 2020-11-21 RX ADMIN — OXYCODONE HYDROCHLORIDE 5 MG: 5 TABLET ORAL at 11:30

## 2020-11-21 RX ADMIN — POTASSIUM CHLORIDE 20 MEQ: 1500 TABLET, EXTENDED RELEASE ORAL at 08:15

## 2020-11-21 RX ADMIN — ACETAMINOPHEN 650 MG: 325 TABLET, FILM COATED ORAL at 05:13

## 2020-11-21 RX ADMIN — HEPARIN SODIUM 5000 UNITS: 5000 INJECTION INTRAVENOUS; SUBCUTANEOUS at 05:14

## 2020-11-21 RX ADMIN — ACETAMINOPHEN 650 MG: 325 TABLET, FILM COATED ORAL at 11:30

## 2020-11-21 RX ADMIN — SODIUM CHLORIDE 80 ML/HR: 0.9 INJECTION, SOLUTION INTRAVENOUS at 05:17

## 2020-11-21 RX ADMIN — OXYCODONE HYDROCHLORIDE 5 MG: 5 TABLET ORAL at 05:13

## 2020-12-15 ENCOUNTER — APPOINTMENT (EMERGENCY)
Dept: RADIOLOGY | Facility: HOSPITAL | Age: 60
DRG: 176 | End: 2020-12-15
Payer: COMMERCIAL

## 2020-12-15 ENCOUNTER — TELEPHONE (OUTPATIENT)
Dept: OTHER | Facility: HOSPITAL | Age: 60
End: 2020-12-15

## 2020-12-15 ENCOUNTER — HOSPITAL ENCOUNTER (INPATIENT)
Facility: HOSPITAL | Age: 60
LOS: 4 days | Discharge: HOME HEALTH | DRG: 176 | End: 2020-12-19
Attending: EMERGENCY MEDICINE | Admitting: INTERNAL MEDICINE
Payer: COMMERCIAL

## 2020-12-15 DIAGNOSIS — S82.402A CLOSED LEFT FIBULAR FRACTURE: Primary | ICD-10-CM

## 2020-12-15 DIAGNOSIS — R55 SYNCOPE: ICD-10-CM

## 2020-12-15 DIAGNOSIS — S82.839A CLOSED AVULSION FRACTURE OF DISTAL FIBULA: ICD-10-CM

## 2020-12-15 DIAGNOSIS — R79.89 ELEVATED D-DIMER: ICD-10-CM

## 2020-12-15 DIAGNOSIS — R26.2 AMBULATORY DYSFUNCTION: ICD-10-CM

## 2020-12-15 DIAGNOSIS — I26.99 ACUTE PULMONARY EMBOLISM (HCC): ICD-10-CM

## 2020-12-15 DIAGNOSIS — Z86.718 HISTORY OF DVT (DEEP VEIN THROMBOSIS): ICD-10-CM

## 2020-12-15 DIAGNOSIS — N17.9 AKI (ACUTE KIDNEY INJURY) (HCC): ICD-10-CM

## 2020-12-15 PROBLEM — R74.01 ELEVATED AST (SGOT): Status: ACTIVE | Noted: 2020-12-15

## 2020-12-15 LAB
ALBUMIN SERPL BCP-MCNC: 3.7 G/DL (ref 3.5–5)
ALP SERPL-CCNC: 83 U/L (ref 46–116)
ALT SERPL W P-5'-P-CCNC: 61 U/L (ref 12–78)
ANION GAP SERPL CALCULATED.3IONS-SCNC: 10 MMOL/L (ref 4–13)
APTT PPP: 23 SECONDS (ref 23–37)
AST SERPL W P-5'-P-CCNC: 58 U/L (ref 5–45)
ATRIAL RATE: 83 BPM
BASOPHILS # BLD AUTO: 0.03 THOUSANDS/ΜL (ref 0–0.1)
BASOPHILS NFR BLD AUTO: 1 % (ref 0–1)
BILIRUB SERPL-MCNC: 0.43 MG/DL (ref 0.2–1)
BUN SERPL-MCNC: 28 MG/DL (ref 5–25)
CALCIUM SERPL-MCNC: 9.6 MG/DL (ref 8.3–10.1)
CHLORIDE SERPL-SCNC: 100 MMOL/L (ref 100–108)
CO2 SERPL-SCNC: 26 MMOL/L (ref 21–32)
CREAT SERPL-MCNC: 1.96 MG/DL (ref 0.6–1.3)
D DIMER PPP FEU-MCNC: 2.33 UG/ML FEU
EOSINOPHIL # BLD AUTO: 0.14 THOUSAND/ΜL (ref 0–0.61)
EOSINOPHIL NFR BLD AUTO: 2 % (ref 0–6)
ERYTHROCYTE [DISTWIDTH] IN BLOOD BY AUTOMATED COUNT: 13.8 % (ref 11.6–15.1)
FLUAV RNA RESP QL NAA+PROBE: NEGATIVE
FLUBV RNA RESP QL NAA+PROBE: NEGATIVE
GFR SERPL CREATININE-BSD FRML MDRD: 27 ML/MIN/1.73SQ M
GLUCOSE SERPL-MCNC: 107 MG/DL (ref 65–140)
HCT VFR BLD AUTO: 35.5 % (ref 34.8–46.1)
HGB BLD-MCNC: 12 G/DL (ref 11.5–15.4)
IMM GRANULOCYTES # BLD AUTO: 0.03 THOUSAND/UL (ref 0–0.2)
IMM GRANULOCYTES NFR BLD AUTO: 1 % (ref 0–2)
INR PPP: 1.02 (ref 0.84–1.19)
LYMPHOCYTES # BLD AUTO: 0.61 THOUSANDS/ΜL (ref 0.6–4.47)
LYMPHOCYTES NFR BLD AUTO: 11 % (ref 14–44)
MCH RBC QN AUTO: 35.2 PG (ref 26.8–34.3)
MCHC RBC AUTO-ENTMCNC: 33.8 G/DL (ref 31.4–37.4)
MCV RBC AUTO: 104 FL (ref 82–98)
MONOCYTES # BLD AUTO: 0.64 THOUSAND/ΜL (ref 0.17–1.22)
MONOCYTES NFR BLD AUTO: 11 % (ref 4–12)
NEUTROPHILS # BLD AUTO: 4.36 THOUSANDS/ΜL (ref 1.85–7.62)
NEUTS SEG NFR BLD AUTO: 74 % (ref 43–75)
NRBC BLD AUTO-RTO: 0 /100 WBCS
P AXIS: 15 DEGREES
PLATELET # BLD AUTO: 188 THOUSANDS/UL (ref 149–390)
PMV BLD AUTO: 10.2 FL (ref 8.9–12.7)
POTASSIUM SERPL-SCNC: 4 MMOL/L (ref 3.5–5.3)
PR INTERVAL: 184 MS
PROT SERPL-MCNC: 7.6 G/DL (ref 6.4–8.2)
PROTHROMBIN TIME: 13.4 SECONDS (ref 11.6–14.5)
QRS AXIS: -10 DEGREES
QRSD INTERVAL: 80 MS
QT INTERVAL: 394 MS
QTC INTERVAL: 460 MS
RBC # BLD AUTO: 3.41 MILLION/UL (ref 3.81–5.12)
RSV RNA RESP QL NAA+PROBE: NEGATIVE
SARS-COV-2 RNA RESP QL NAA+PROBE: NEGATIVE
SODIUM SERPL-SCNC: 136 MMOL/L (ref 136–145)
T WAVE AXIS: 33 DEGREES
TROPONIN I SERPL-MCNC: <0.02 NG/ML
VENTRICULAR RATE: 82 BPM
WBC # BLD AUTO: 5.81 THOUSAND/UL (ref 4.31–10.16)

## 2020-12-15 PROCEDURE — 0241U HB NFCT DS VIR RESP RNA 4 TRGT: CPT | Performed by: EMERGENCY MEDICINE

## 2020-12-15 PROCEDURE — 36415 COLL VENOUS BLD VENIPUNCTURE: CPT | Performed by: EMERGENCY MEDICINE

## 2020-12-15 PROCEDURE — G1004 CDSM NDSC: HCPCS

## 2020-12-15 PROCEDURE — A9540 TC99M MAA: HCPCS

## 2020-12-15 PROCEDURE — 99285 EMERGENCY DEPT VISIT HI MDM: CPT

## 2020-12-15 PROCEDURE — 71046 X-RAY EXAM CHEST 2 VIEWS: CPT

## 2020-12-15 PROCEDURE — 85610 PROTHROMBIN TIME: CPT | Performed by: EMERGENCY MEDICINE

## 2020-12-15 PROCEDURE — 85730 THROMBOPLASTIN TIME PARTIAL: CPT | Performed by: EMERGENCY MEDICINE

## 2020-12-15 PROCEDURE — 84484 ASSAY OF TROPONIN QUANT: CPT | Performed by: EMERGENCY MEDICINE

## 2020-12-15 PROCEDURE — 99223 1ST HOSP IP/OBS HIGH 75: CPT | Performed by: INTERNAL MEDICINE

## 2020-12-15 PROCEDURE — 99285 EMERGENCY DEPT VISIT HI MDM: CPT | Performed by: EMERGENCY MEDICINE

## 2020-12-15 PROCEDURE — 78580 LUNG PERFUSION IMAGING: CPT

## 2020-12-15 PROCEDURE — 76706 US ABDL AORTA SCREEN AAA: CPT | Performed by: EMERGENCY MEDICINE

## 2020-12-15 PROCEDURE — 80053 COMPREHEN METABOLIC PANEL: CPT | Performed by: EMERGENCY MEDICINE

## 2020-12-15 PROCEDURE — 93005 ELECTROCARDIOGRAM TRACING: CPT

## 2020-12-15 PROCEDURE — 85025 COMPLETE CBC W/AUTO DIFF WBC: CPT | Performed by: EMERGENCY MEDICINE

## 2020-12-15 PROCEDURE — 93010 ELECTROCARDIOGRAM REPORT: CPT | Performed by: INTERNAL MEDICINE

## 2020-12-15 PROCEDURE — 73610 X-RAY EXAM OF ANKLE: CPT

## 2020-12-15 PROCEDURE — 96361 HYDRATE IV INFUSION ADD-ON: CPT

## 2020-12-15 PROCEDURE — 70450 CT HEAD/BRAIN W/O DYE: CPT

## 2020-12-15 PROCEDURE — 96360 HYDRATION IV INFUSION INIT: CPT

## 2020-12-15 PROCEDURE — 85379 FIBRIN DEGRADATION QUANT: CPT | Performed by: EMERGENCY MEDICINE

## 2020-12-15 RX ORDER — SODIUM CHLORIDE 9 MG/ML
75 INJECTION, SOLUTION INTRAVENOUS CONTINUOUS
Status: DISCONTINUED | OUTPATIENT
Start: 2020-12-15 | End: 2020-12-16

## 2020-12-15 RX ORDER — FOLIC ACID 1 MG/1
1 TABLET ORAL DAILY
Status: DISCONTINUED | OUTPATIENT
Start: 2020-12-16 | End: 2020-12-19 | Stop reason: HOSPADM

## 2020-12-15 RX ORDER — ACETAMINOPHEN 325 MG/1
650 TABLET ORAL ONCE
Status: COMPLETED | OUTPATIENT
Start: 2020-12-15 | End: 2020-12-15

## 2020-12-15 RX ORDER — SODIUM CHLORIDE, SODIUM GLUCONATE, SODIUM ACETATE, POTASSIUM CHLORIDE, MAGNESIUM CHLORIDE, SODIUM PHOSPHATE, DIBASIC, AND POTASSIUM PHOSPHATE .53; .5; .37; .037; .03; .012; .00082 G/100ML; G/100ML; G/100ML; G/100ML; G/100ML; G/100ML; G/100ML
100 INJECTION, SOLUTION INTRAVENOUS CONTINUOUS
Status: DISCONTINUED | OUTPATIENT
Start: 2020-12-15 | End: 2020-12-15

## 2020-12-15 RX ORDER — HEPARIN SODIUM 1000 [USP'U]/ML
5600 INJECTION, SOLUTION INTRAVENOUS; SUBCUTANEOUS ONCE
Status: COMPLETED | OUTPATIENT
Start: 2020-12-15 | End: 2020-12-15

## 2020-12-15 RX ORDER — THIAMINE MONONITRATE (VIT B1) 100 MG
100 TABLET ORAL DAILY
Status: DISCONTINUED | OUTPATIENT
Start: 2020-12-16 | End: 2020-12-19 | Stop reason: HOSPADM

## 2020-12-15 RX ORDER — HEPARIN SODIUM 1000 [USP'U]/ML
2800 INJECTION, SOLUTION INTRAVENOUS; SUBCUTANEOUS
Status: DISCONTINUED | OUTPATIENT
Start: 2020-12-15 | End: 2020-12-18

## 2020-12-15 RX ORDER — OXYCODONE HYDROCHLORIDE 5 MG/1
2.5 TABLET ORAL EVERY 4 HOURS PRN
Status: DISCONTINUED | OUTPATIENT
Start: 2020-12-15 | End: 2020-12-19 | Stop reason: HOSPADM

## 2020-12-15 RX ORDER — SERTRALINE HYDROCHLORIDE 100 MG/1
100 TABLET, FILM COATED ORAL DAILY
Status: DISCONTINUED | OUTPATIENT
Start: 2020-12-16 | End: 2020-12-19 | Stop reason: HOSPADM

## 2020-12-15 RX ORDER — HEPARIN SODIUM 10000 [USP'U]/100ML
3-30 INJECTION, SOLUTION INTRAVENOUS
Status: DISCONTINUED | OUTPATIENT
Start: 2020-12-15 | End: 2020-12-18

## 2020-12-15 RX ORDER — GABAPENTIN 300 MG/1
300 CAPSULE ORAL 3 TIMES DAILY
Status: DISCONTINUED | OUTPATIENT
Start: 2020-12-15 | End: 2020-12-19 | Stop reason: HOSPADM

## 2020-12-15 RX ORDER — OXYCODONE HYDROCHLORIDE 5 MG/1
5 TABLET ORAL EVERY 4 HOURS PRN
Status: DISCONTINUED | OUTPATIENT
Start: 2020-12-15 | End: 2020-12-19 | Stop reason: HOSPADM

## 2020-12-15 RX ORDER — ACETAMINOPHEN 325 MG/1
975 TABLET ORAL EVERY 8 HOURS SCHEDULED
Status: DISCONTINUED | OUTPATIENT
Start: 2020-12-15 | End: 2020-12-19 | Stop reason: HOSPADM

## 2020-12-15 RX ORDER — HEPARIN SODIUM 1000 [USP'U]/ML
5600 INJECTION, SOLUTION INTRAVENOUS; SUBCUTANEOUS
Status: DISCONTINUED | OUTPATIENT
Start: 2020-12-15 | End: 2020-12-18

## 2020-12-15 RX ORDER — LETROZOLE 2.5 MG/1
2.5 TABLET, FILM COATED ORAL DAILY
Status: DISCONTINUED | OUTPATIENT
Start: 2020-12-16 | End: 2020-12-19 | Stop reason: HOSPADM

## 2020-12-15 RX ADMIN — ACETAMINOPHEN 650 MG: 325 TABLET, FILM COATED ORAL at 16:40

## 2020-12-15 RX ADMIN — OXYCODONE HYDROCHLORIDE 5 MG: 5 TABLET ORAL at 20:27

## 2020-12-15 RX ADMIN — SODIUM CHLORIDE 1000 ML: 0.9 INJECTION, SOLUTION INTRAVENOUS at 10:07

## 2020-12-15 RX ADMIN — HEPARIN SODIUM 18 UNITS/KG/HR: 10000 INJECTION, SOLUTION INTRAVENOUS at 19:09

## 2020-12-15 RX ADMIN — SODIUM CHLORIDE 75 ML/HR: 0.9 INJECTION, SOLUTION INTRAVENOUS at 20:32

## 2020-12-15 RX ADMIN — HEPARIN SODIUM 5600 UNITS: 1000 INJECTION INTRAVENOUS; SUBCUTANEOUS at 19:09

## 2020-12-15 RX ADMIN — GABAPENTIN 300 MG: 300 CAPSULE ORAL at 20:27

## 2020-12-15 RX ADMIN — ACETAMINOPHEN 975 MG: 325 TABLET, FILM COATED ORAL at 21:02

## 2020-12-16 ENCOUNTER — APPOINTMENT (INPATIENT)
Dept: RADIOLOGY | Facility: HOSPITAL | Age: 60
DRG: 176 | End: 2020-12-16
Payer: COMMERCIAL

## 2020-12-16 ENCOUNTER — APPOINTMENT (INPATIENT)
Dept: NON INVASIVE DIAGNOSTICS | Facility: HOSPITAL | Age: 60
DRG: 176 | End: 2020-12-16
Payer: COMMERCIAL

## 2020-12-16 LAB
ALBUMIN SERPL BCP-MCNC: 3.4 G/DL (ref 3.5–5)
ALP SERPL-CCNC: 74 U/L (ref 46–116)
ALT SERPL W P-5'-P-CCNC: 50 U/L (ref 12–78)
ANION GAP SERPL CALCULATED.3IONS-SCNC: 6 MMOL/L (ref 4–13)
APTT PPP: 105 SECONDS (ref 23–37)
APTT PPP: 166 SECONDS (ref 23–37)
APTT PPP: 93 SECONDS (ref 23–37)
AST SERPL W P-5'-P-CCNC: 42 U/L (ref 5–45)
BASOPHILS # BLD AUTO: 0.03 THOUSANDS/ΜL (ref 0–0.1)
BASOPHILS NFR BLD AUTO: 1 % (ref 0–1)
BILIRUB SERPL-MCNC: 0.49 MG/DL (ref 0.2–1)
BUN SERPL-MCNC: 29 MG/DL (ref 5–25)
CALCIUM ALBUM COR SERPL-MCNC: 9.5 MG/DL (ref 8.3–10.1)
CALCIUM SERPL-MCNC: 9 MG/DL (ref 8.3–10.1)
CHLORIDE SERPL-SCNC: 104 MMOL/L (ref 100–108)
CO2 SERPL-SCNC: 25 MMOL/L (ref 21–32)
CREAT SERPL-MCNC: 1.41 MG/DL (ref 0.6–1.3)
EOSINOPHIL # BLD AUTO: 0.14 THOUSAND/ΜL (ref 0–0.61)
EOSINOPHIL NFR BLD AUTO: 3 % (ref 0–6)
ERYTHROCYTE [DISTWIDTH] IN BLOOD BY AUTOMATED COUNT: 14 % (ref 11.6–15.1)
GFR SERPL CREATININE-BSD FRML MDRD: 41 ML/MIN/1.73SQ M
GLUCOSE SERPL-MCNC: 99 MG/DL (ref 65–140)
HCT VFR BLD AUTO: 32.4 % (ref 34.8–46.1)
HGB BLD-MCNC: 10.6 G/DL (ref 11.5–15.4)
IMM GRANULOCYTES # BLD AUTO: 0.01 THOUSAND/UL (ref 0–0.2)
IMM GRANULOCYTES NFR BLD AUTO: 0 % (ref 0–2)
LYMPHOCYTES # BLD AUTO: 0.84 THOUSANDS/ΜL (ref 0.6–4.47)
LYMPHOCYTES NFR BLD AUTO: 16 % (ref 14–44)
MCH RBC QN AUTO: 34.6 PG (ref 26.8–34.3)
MCHC RBC AUTO-ENTMCNC: 32.7 G/DL (ref 31.4–37.4)
MCV RBC AUTO: 106 FL (ref 82–98)
MONOCYTES # BLD AUTO: 0.67 THOUSAND/ΜL (ref 0.17–1.22)
MONOCYTES NFR BLD AUTO: 13 % (ref 4–12)
NEUTROPHILS # BLD AUTO: 3.57 THOUSANDS/ΜL (ref 1.85–7.62)
NEUTS SEG NFR BLD AUTO: 67 % (ref 43–75)
NRBC BLD AUTO-RTO: 0 /100 WBCS
PLATELET # BLD AUTO: 157 THOUSANDS/UL (ref 149–390)
PMV BLD AUTO: 10.6 FL (ref 8.9–12.7)
POTASSIUM SERPL-SCNC: 3.9 MMOL/L (ref 3.5–5.3)
PROT SERPL-MCNC: 6.9 G/DL (ref 6.4–8.2)
RBC # BLD AUTO: 3.06 MILLION/UL (ref 3.81–5.12)
SODIUM SERPL-SCNC: 135 MMOL/L (ref 136–145)
WBC # BLD AUTO: 5.26 THOUSAND/UL (ref 4.31–10.16)

## 2020-12-16 PROCEDURE — 93308 TTE F-UP OR LMTD: CPT | Performed by: INTERNAL MEDICINE

## 2020-12-16 PROCEDURE — 85730 THROMBOPLASTIN TIME PARTIAL: CPT | Performed by: INTERNAL MEDICINE

## 2020-12-16 PROCEDURE — 97163 PT EVAL HIGH COMPLEX 45 MIN: CPT

## 2020-12-16 PROCEDURE — 93308 TTE F-UP OR LMTD: CPT

## 2020-12-16 PROCEDURE — 93321 DOPPLER ECHO F-UP/LMTD STD: CPT | Performed by: INTERNAL MEDICINE

## 2020-12-16 PROCEDURE — 80053 COMPREHEN METABOLIC PANEL: CPT | Performed by: INTERNAL MEDICINE

## 2020-12-16 PROCEDURE — 93325 DOPPLER ECHO COLOR FLOW MAPG: CPT | Performed by: INTERNAL MEDICINE

## 2020-12-16 PROCEDURE — 27780 TREATMENT OF FIBULA FRACTURE: CPT | Performed by: ORTHOPAEDIC SURGERY

## 2020-12-16 PROCEDURE — 99232 SBSQ HOSP IP/OBS MODERATE 35: CPT | Performed by: INTERNAL MEDICINE

## 2020-12-16 PROCEDURE — 85025 COMPLETE CBC W/AUTO DIFF WBC: CPT | Performed by: INTERNAL MEDICINE

## 2020-12-16 PROCEDURE — 99254 IP/OBS CNSLTJ NEW/EST MOD 60: CPT | Performed by: ORTHOPAEDIC SURGERY

## 2020-12-16 PROCEDURE — 97166 OT EVAL MOD COMPLEX 45 MIN: CPT

## 2020-12-16 RX ORDER — WARFARIN SODIUM 5 MG/1
10 TABLET ORAL
Status: COMPLETED | OUTPATIENT
Start: 2020-12-16 | End: 2020-12-16

## 2020-12-16 RX ADMIN — HEPARIN SODIUM 11 UNITS/KG/HR: 10000 INJECTION, SOLUTION INTRAVENOUS at 22:45

## 2020-12-16 RX ADMIN — GABAPENTIN 300 MG: 300 CAPSULE ORAL at 08:55

## 2020-12-16 RX ADMIN — ACETAMINOPHEN 975 MG: 325 TABLET, FILM COATED ORAL at 05:32

## 2020-12-16 RX ADMIN — OXYCODONE HYDROCHLORIDE 5 MG: 5 TABLET ORAL at 19:34

## 2020-12-16 RX ADMIN — ACETAMINOPHEN 975 MG: 325 TABLET, FILM COATED ORAL at 21:02

## 2020-12-16 RX ADMIN — GABAPENTIN 300 MG: 300 CAPSULE ORAL at 21:02

## 2020-12-16 RX ADMIN — ACETAMINOPHEN 975 MG: 325 TABLET, FILM COATED ORAL at 13:56

## 2020-12-16 RX ADMIN — LETROZOLE 2.5 MG: 2.5 TABLET, FILM COATED ORAL at 08:55

## 2020-12-16 RX ADMIN — WARFARIN SODIUM 10 MG: 5 TABLET ORAL at 17:12

## 2020-12-16 RX ADMIN — OXYCODONE HYDROCHLORIDE 2.5 MG: 5 TABLET ORAL at 05:32

## 2020-12-16 RX ADMIN — SERTRALINE HYDROCHLORIDE 100 MG: 100 TABLET ORAL at 08:55

## 2020-12-16 RX ADMIN — OXYCODONE HYDROCHLORIDE 5 MG: 5 TABLET ORAL at 13:14

## 2020-12-16 RX ADMIN — GABAPENTIN 300 MG: 300 CAPSULE ORAL at 16:34

## 2020-12-16 RX ADMIN — FOLIC ACID 1 MG: 1 TABLET ORAL at 08:55

## 2020-12-16 RX ADMIN — THIAMINE HCL TAB 100 MG 100 MG: 100 TAB at 08:55

## 2020-12-17 PROBLEM — N17.9 AKI (ACUTE KIDNEY INJURY) (HCC): Status: RESOLVED | Noted: 2020-12-15 | Resolved: 2020-12-17

## 2020-12-17 LAB
ANION GAP SERPL CALCULATED.3IONS-SCNC: 10 MMOL/L (ref 4–13)
APTT PPP: 71 SECONDS (ref 23–37)
APTT PPP: 77 SECONDS (ref 23–37)
BUN SERPL-MCNC: 14 MG/DL (ref 5–25)
CALCIUM SERPL-MCNC: 9.2 MG/DL (ref 8.3–10.1)
CHLORIDE SERPL-SCNC: 106 MMOL/L (ref 100–108)
CO2 SERPL-SCNC: 24 MMOL/L (ref 21–32)
CREAT SERPL-MCNC: 1 MG/DL (ref 0.6–1.3)
GFR SERPL CREATININE-BSD FRML MDRD: 62 ML/MIN/1.73SQ M
GLUCOSE SERPL-MCNC: 80 MG/DL (ref 65–140)
INR PPP: 1.08 (ref 0.84–1.19)
POTASSIUM SERPL-SCNC: 3.7 MMOL/L (ref 3.5–5.3)
PROTHROMBIN TIME: 14 SECONDS (ref 11.6–14.5)
SODIUM SERPL-SCNC: 140 MMOL/L (ref 136–145)

## 2020-12-17 PROCEDURE — 85610 PROTHROMBIN TIME: CPT | Performed by: INTERNAL MEDICINE

## 2020-12-17 PROCEDURE — 85730 THROMBOPLASTIN TIME PARTIAL: CPT | Performed by: INTERNAL MEDICINE

## 2020-12-17 PROCEDURE — 99232 SBSQ HOSP IP/OBS MODERATE 35: CPT | Performed by: INTERNAL MEDICINE

## 2020-12-17 PROCEDURE — 80048 BASIC METABOLIC PNL TOTAL CA: CPT | Performed by: INTERNAL MEDICINE

## 2020-12-17 RX ORDER — VERAPAMIL HYDROCHLORIDE 240 MG/1
240 TABLET, FILM COATED, EXTENDED RELEASE ORAL DAILY
Status: DISCONTINUED | OUTPATIENT
Start: 2020-12-17 | End: 2020-12-19 | Stop reason: HOSPADM

## 2020-12-17 RX ORDER — WARFARIN SODIUM 5 MG/1
5 TABLET ORAL
Status: DISCONTINUED | OUTPATIENT
Start: 2020-12-17 | End: 2020-12-17

## 2020-12-17 RX ORDER — WARFARIN SODIUM 5 MG/1
10 TABLET ORAL
Status: DISCONTINUED | OUTPATIENT
Start: 2020-12-17 | End: 2020-12-18

## 2020-12-17 RX ADMIN — FOLIC ACID 1 MG: 1 TABLET ORAL at 08:56

## 2020-12-17 RX ADMIN — GABAPENTIN 300 MG: 300 CAPSULE ORAL at 15:09

## 2020-12-17 RX ADMIN — WARFARIN SODIUM 10 MG: 5 TABLET ORAL at 17:24

## 2020-12-17 RX ADMIN — VERAPAMIL HYDROCHLORIDE 240 MG: 240 TABLET ORAL at 14:03

## 2020-12-17 RX ADMIN — ACETAMINOPHEN 975 MG: 325 TABLET, FILM COATED ORAL at 05:35

## 2020-12-17 RX ADMIN — ACETAMINOPHEN 975 MG: 325 TABLET, FILM COATED ORAL at 21:29

## 2020-12-17 RX ADMIN — THIAMINE HCL TAB 100 MG 100 MG: 100 TAB at 08:56

## 2020-12-17 RX ADMIN — SERTRALINE HYDROCHLORIDE 100 MG: 100 TABLET ORAL at 08:56

## 2020-12-17 RX ADMIN — OXYCODONE HYDROCHLORIDE 5 MG: 5 TABLET ORAL at 15:09

## 2020-12-17 RX ADMIN — OXYCODONE HYDROCHLORIDE 5 MG: 5 TABLET ORAL at 22:45

## 2020-12-17 RX ADMIN — LETROZOLE 2.5 MG: 2.5 TABLET, FILM COATED ORAL at 08:56

## 2020-12-17 RX ADMIN — ACETAMINOPHEN 975 MG: 325 TABLET, FILM COATED ORAL at 14:03

## 2020-12-17 RX ADMIN — GABAPENTIN 300 MG: 300 CAPSULE ORAL at 21:29

## 2020-12-17 RX ADMIN — GABAPENTIN 300 MG: 300 CAPSULE ORAL at 08:56

## 2020-12-18 ENCOUNTER — APPOINTMENT (INPATIENT)
Dept: NON INVASIVE DIAGNOSTICS | Facility: HOSPITAL | Age: 60
DRG: 176 | End: 2020-12-18
Payer: COMMERCIAL

## 2020-12-18 LAB
APTT PPP: 81 SECONDS (ref 23–37)
INR PPP: 1.47 (ref 0.84–1.19)
PROTHROMBIN TIME: 17.8 SECONDS (ref 11.6–14.5)

## 2020-12-18 PROCEDURE — 99239 HOSP IP/OBS DSCHRG MGMT >30: CPT | Performed by: INTERNAL MEDICINE

## 2020-12-18 PROCEDURE — 85730 THROMBOPLASTIN TIME PARTIAL: CPT | Performed by: INTERNAL MEDICINE

## 2020-12-18 PROCEDURE — 99233 SBSQ HOSP IP/OBS HIGH 50: CPT | Performed by: INTERNAL MEDICINE

## 2020-12-18 PROCEDURE — 93970 EXTREMITY STUDY: CPT | Performed by: SURGERY

## 2020-12-18 PROCEDURE — 85610 PROTHROMBIN TIME: CPT | Performed by: INTERNAL MEDICINE

## 2020-12-18 PROCEDURE — 93970 EXTREMITY STUDY: CPT

## 2020-12-18 RX ADMIN — GABAPENTIN 300 MG: 300 CAPSULE ORAL at 16:58

## 2020-12-18 RX ADMIN — FOLIC ACID 1 MG: 1 TABLET ORAL at 08:18

## 2020-12-18 RX ADMIN — ACETAMINOPHEN 975 MG: 325 TABLET, FILM COATED ORAL at 21:57

## 2020-12-18 RX ADMIN — ACETAMINOPHEN 975 MG: 325 TABLET, FILM COATED ORAL at 13:26

## 2020-12-18 RX ADMIN — HEPARIN SODIUM 11 UNITS/KG/HR: 10000 INJECTION, SOLUTION INTRAVENOUS at 05:29

## 2020-12-18 RX ADMIN — GABAPENTIN 300 MG: 300 CAPSULE ORAL at 21:57

## 2020-12-18 RX ADMIN — APIXABAN 10 MG: 5 TABLET, FILM COATED ORAL at 17:00

## 2020-12-18 RX ADMIN — VERAPAMIL HYDROCHLORIDE 240 MG: 240 TABLET ORAL at 08:17

## 2020-12-18 RX ADMIN — LETROZOLE 2.5 MG: 2.5 TABLET, FILM COATED ORAL at 08:18

## 2020-12-18 RX ADMIN — OXYCODONE HYDROCHLORIDE 2.5 MG: 5 TABLET ORAL at 21:57

## 2020-12-18 RX ADMIN — GABAPENTIN 300 MG: 300 CAPSULE ORAL at 08:17

## 2020-12-18 RX ADMIN — THIAMINE HCL TAB 100 MG 100 MG: 100 TAB at 08:17

## 2020-12-18 RX ADMIN — ACETAMINOPHEN 975 MG: 325 TABLET, FILM COATED ORAL at 05:21

## 2020-12-18 RX ADMIN — SERTRALINE HYDROCHLORIDE 100 MG: 100 TABLET ORAL at 08:17

## 2020-12-19 VITALS
HEIGHT: 65 IN | SYSTOLIC BLOOD PRESSURE: 158 MMHG | BODY MASS INDEX: 25.83 KG/M2 | TEMPERATURE: 98.9 F | WEIGHT: 155 LBS | DIASTOLIC BLOOD PRESSURE: 97 MMHG | HEART RATE: 69 BPM | RESPIRATION RATE: 16 BRPM | OXYGEN SATURATION: 97 %

## 2020-12-19 PROBLEM — R74.01 ELEVATED AST (SGOT): Status: RESOLVED | Noted: 2020-12-15 | Resolved: 2020-12-19

## 2020-12-19 LAB — APTT PPP: 44 SECONDS (ref 23–37)

## 2020-12-19 PROCEDURE — 85730 THROMBOPLASTIN TIME PARTIAL: CPT | Performed by: INTERNAL MEDICINE

## 2020-12-19 PROCEDURE — 99239 HOSP IP/OBS DSCHRG MGMT >30: CPT | Performed by: INTERNAL MEDICINE

## 2020-12-19 RX ORDER — ACETAMINOPHEN 325 MG/1
975 TABLET ORAL EVERY 8 HOURS SCHEDULED
Qty: 63 TABLET | Refills: 0 | Status: SHIPPED | OUTPATIENT
Start: 2020-12-19 | End: 2020-12-26

## 2020-12-19 RX ORDER — OXYCODONE HYDROCHLORIDE 5 MG/1
2.5 TABLET ORAL EVERY 8 HOURS PRN
Qty: 15 TABLET | Refills: 0 | Status: SHIPPED | OUTPATIENT
Start: 2020-12-19 | End: 2020-12-29

## 2020-12-19 RX ADMIN — GABAPENTIN 300 MG: 300 CAPSULE ORAL at 08:10

## 2020-12-19 RX ADMIN — LETROZOLE 2.5 MG: 2.5 TABLET, FILM COATED ORAL at 08:10

## 2020-12-19 RX ADMIN — APIXABAN 10 MG: 5 TABLET, FILM COATED ORAL at 08:10

## 2020-12-19 RX ADMIN — ACETAMINOPHEN 975 MG: 325 TABLET, FILM COATED ORAL at 13:24

## 2020-12-19 RX ADMIN — VERAPAMIL HYDROCHLORIDE 240 MG: 240 TABLET ORAL at 08:10

## 2020-12-19 RX ADMIN — OXYCODONE HYDROCHLORIDE 5 MG: 5 TABLET ORAL at 07:23

## 2020-12-19 RX ADMIN — ACETAMINOPHEN 975 MG: 325 TABLET, FILM COATED ORAL at 05:56

## 2020-12-19 RX ADMIN — FOLIC ACID 1 MG: 1 TABLET ORAL at 08:10

## 2020-12-19 RX ADMIN — THIAMINE HCL TAB 100 MG 100 MG: 100 TAB at 08:10

## 2020-12-19 RX ADMIN — SERTRALINE HYDROCHLORIDE 100 MG: 100 TABLET ORAL at 08:10

## 2020-12-21 ENCOUNTER — TELEPHONE (OUTPATIENT)
Dept: UROLOGY | Facility: MEDICAL CENTER | Age: 60
End: 2020-12-21

## 2020-12-21 ENCOUNTER — TELEPHONE (OUTPATIENT)
Dept: HEMATOLOGY ONCOLOGY | Facility: CLINIC | Age: 60
End: 2020-12-21

## 2020-12-21 DIAGNOSIS — Z17.0 MALIGNANT NEOPLASM OF OVERLAPPING SITES OF LEFT BREAST IN FEMALE, ESTROGEN RECEPTOR POSITIVE (HCC): ICD-10-CM

## 2020-12-21 DIAGNOSIS — R31.9 HEMATURIA, UNSPECIFIED TYPE: Primary | ICD-10-CM

## 2020-12-21 DIAGNOSIS — C50.812 MALIGNANT NEOPLASM OF OVERLAPPING SITES OF LEFT BREAST IN FEMALE, ESTROGEN RECEPTOR POSITIVE (HCC): ICD-10-CM

## 2020-12-22 ENCOUNTER — OFFICE VISIT (OUTPATIENT)
Dept: HEMATOLOGY ONCOLOGY | Facility: CLINIC | Age: 60
End: 2020-12-22
Payer: COMMERCIAL

## 2020-12-22 ENCOUNTER — TELEPHONE (OUTPATIENT)
Dept: OBGYN CLINIC | Facility: HOSPITAL | Age: 60
End: 2020-12-22

## 2020-12-22 VITALS
TEMPERATURE: 97.7 F | HEART RATE: 99 BPM | BODY MASS INDEX: 26.33 KG/M2 | WEIGHT: 158 LBS | DIASTOLIC BLOOD PRESSURE: 78 MMHG | HEIGHT: 65 IN | RESPIRATION RATE: 18 BRPM | OXYGEN SATURATION: 98 % | SYSTOLIC BLOOD PRESSURE: 122 MMHG

## 2020-12-22 DIAGNOSIS — Z17.0 MALIGNANT NEOPLASM OF OVERLAPPING SITES OF LEFT BREAST IN FEMALE, ESTROGEN RECEPTOR POSITIVE (HCC): Primary | ICD-10-CM

## 2020-12-22 DIAGNOSIS — C50.812 MALIGNANT NEOPLASM OF OVERLAPPING SITES OF LEFT BREAST IN FEMALE, ESTROGEN RECEPTOR POSITIVE (HCC): Primary | ICD-10-CM

## 2020-12-22 PROCEDURE — 99215 OFFICE O/P EST HI 40 MIN: CPT | Performed by: INTERNAL MEDICINE

## 2020-12-23 ENCOUNTER — TELEPHONE (OUTPATIENT)
Dept: HEMATOLOGY ONCOLOGY | Facility: CLINIC | Age: 60
End: 2020-12-23

## 2020-12-28 ENCOUNTER — TELEPHONE (OUTPATIENT)
Dept: HEMATOLOGY ONCOLOGY | Facility: CLINIC | Age: 60
End: 2020-12-28

## 2021-01-07 DIAGNOSIS — Z09 FRACTURE FOLLOW-UP: Primary | ICD-10-CM

## 2021-01-14 ENCOUNTER — OFFICE VISIT (OUTPATIENT)
Dept: OBGYN CLINIC | Facility: HOSPITAL | Age: 61
End: 2021-01-14

## 2021-01-14 ENCOUNTER — HOSPITAL ENCOUNTER (OUTPATIENT)
Dept: RADIOLOGY | Facility: HOSPITAL | Age: 61
Discharge: HOME | End: 2021-01-14
Attending: ORTHOPAEDIC SURGERY
Payer: COMMERCIAL

## 2021-01-14 VITALS
SYSTOLIC BLOOD PRESSURE: 133 MMHG | HEIGHT: 65 IN | BODY MASS INDEX: 26.29 KG/M2 | HEART RATE: 97 BPM | DIASTOLIC BLOOD PRESSURE: 79 MMHG

## 2021-01-14 DIAGNOSIS — Z09 FRACTURE FOLLOW-UP: ICD-10-CM

## 2021-01-14 DIAGNOSIS — S82.832A CLOSED FRACTURE OF DISTAL END OF LEFT FIBULA, UNSPECIFIED FRACTURE MORPHOLOGY, INITIAL ENCOUNTER: Primary | ICD-10-CM

## 2021-01-14 PROCEDURE — 99024 POSTOP FOLLOW-UP VISIT: CPT | Performed by: ORTHOPAEDIC SURGERY

## 2021-01-14 PROCEDURE — 73600 X-RAY EXAM OF ANKLE: CPT

## 2021-01-14 RX ORDER — MELOXICAM 15 MG/1
15 TABLET ORAL DAILY PRN
Qty: 15 TABLET | Refills: 0 | Status: SHIPPED | OUTPATIENT
Start: 2021-01-14 | End: 2021-02-13 | Stop reason: HOSPADM

## 2021-01-14 NOTE — PROGRESS NOTES
Assessment  Diagnoses and all orders for this visit:    Closed nondisplaced fracture of distal end of left fibula      Discussion and Plan:    Weightbearing as tolerated left lower extremity  Continue to wean of cam boot  A prescription for Mobic was sent to her pharmacy to take as needed for pain  Okay for patient to return to work, was discussed with her that since she spent most of her day walking/standing up at work she should consider using the cam boot  During it patient may follow up as in as needed basis or if any new problems arise  All questions were answered to her satisfaction    Subjective:   Patient ID: Candace Means is a 62 y o  female      72-year-old female that presents for follow-up for a left nondisplaced distal fibula fracture that she sustained 1 month ago  Patient was evaluated while she was in the hospital at the time  She was given a cam boot and made weight-bearing as tolerated  She states that she has been doing well, she still has some pain over the distal fibula which is worse with ambulation, improves at rest   She has not been taken any pain medications recently  She has been ambulating in the cam boot on and of the, depending on the distance that she has to ambulate  She presents today alone  She is interested in returning back to work next week, she currently works at [employer]  The following portions of the patient's history were reviewed and updated as appropriate: allergies, current medications, past family history, past medical history, past social history, past surgical history and problem list     Review of Systems   Constitutional: Negative for appetite change and fever  HENT: Negative for sore throat  Eyes: Negative for visual disturbance  Respiratory: Negative for shortness of breath  Cardiovascular: Negative for chest pain  Gastrointestinal: Negative for nausea and vomiting  Musculoskeletal: Positive for arthralgias and myalgias     Skin: Negative for rash and wound  Objective:  /79   Pulse 97   Ht 5' 5" (1 651 m)   LMP  (LMP Unknown)   BMI 26 29 kg/m²       Left Ankle Exam     Tenderness   The patient is experiencing tenderness in the lateral malleolus  Swelling: mild    Muscle Strength   The patient has normal left ankle strength  Dorsiflexion:  5/5   Plantar flexion:  5/5     Other   Erythema: absent  Scars: absent  Sensation: normal  Pulse: present    Comments:  TTP over distal fibula and anterolateral tibiotalar joint  Sensation intact in all distributions  Trace edema   CAM Boot present   No calf tenderness                Physical Exam  Vitals signs and nursing note reviewed  Constitutional:       Appearance: Normal appearance  HENT:      Head: Normocephalic and atraumatic  Nose: Nose normal       Mouth/Throat:      Mouth: Mucous membranes are moist    Eyes:      Extraocular Movements: Extraocular movements intact  Conjunctiva/sclera: Conjunctivae normal    Neck:      Musculoskeletal: Normal range of motion  Cardiovascular:      Rate and Rhythm: Normal rate  Pulses: Normal pulses  Pulmonary:      Effort: Pulmonary effort is normal       Breath sounds: Normal breath sounds  Abdominal:      Palpations: Abdomen is soft  Musculoskeletal:      Comments: See Ortho Exam   Skin:     General: Skin is warm  Capillary Refill: Capillary refill takes less than 2 seconds  Neurological:      General: No focal deficit present  Mental Status: She is alert and oriented to person, place, and time  Mental status is at baseline  Psychiatric:         Mood and Affect: Mood normal          Behavior: Behavior normal        I have personally reviewed pertinent films in PACS and my interpretation is as follows      Xrays Left ankle from today 1/14/2021 show a healing distal fibula fracture, no evidence of fracture propagation or medial clear space widening

## 2021-01-15 ENCOUNTER — OFFICE VISIT (OUTPATIENT)
Dept: UROLOGY | Facility: CLINIC | Age: 61
End: 2021-01-15
Payer: COMMERCIAL

## 2021-01-15 ENCOUNTER — TELEPHONE (OUTPATIENT)
Dept: OBGYN CLINIC | Facility: HOSPITAL | Age: 61
End: 2021-01-15

## 2021-01-15 VITALS
HEIGHT: 65 IN | HEART RATE: 90 BPM | WEIGHT: 160 LBS | BODY MASS INDEX: 26.66 KG/M2 | SYSTOLIC BLOOD PRESSURE: 126 MMHG | DIASTOLIC BLOOD PRESSURE: 84 MMHG

## 2021-01-15 DIAGNOSIS — R31.0 GROSS HEMATURIA: Primary | ICD-10-CM

## 2021-01-15 PROCEDURE — 99213 OFFICE O/P EST LOW 20 MIN: CPT | Performed by: PHYSICIAN ASSISTANT

## 2021-01-15 NOTE — TELEPHONE ENCOUNTER
Patient sees Dr Pinky Canas  Patient is currently taking meloxicam (MOBIC) 15 mg tablet but she wants to know, if something stronger can be prescribe for the pain in her Left ankle?    # [phone]

## 2021-01-15 NOTE — PROGRESS NOTES
UROLOGY PROGRESS NOTE   Patient Identifiers: Janie Mccullough (MRN [id number])  Date of Service: 1/15/2021    Subjective:      61-year-old female who was seen by our practice previously for gross hematuria as well as Dr Ousmane Velasquez for the same reason  She recently had rectal prolapse surgery and required cystoscopy and bilateral ureteral catheter placement perioperatively  She has been on Eliquis for DVTs  When taking  Eliquis twice a day she developed gross hematuria  When decreased to 1 a day her hematuria has stopped  She was unable to provide a urine sample today  She has no pain burning or UTI symptoms        Reason for visit:  Gross hematuria     Objective:     VITALS:    Vitals:    01/15/21 1533   BP: 126/84   Pulse: 90           LABS:  Lab Results   Component Value Date    HGB 10 6 (L) 12/16/2020    HCT 32 4 (L) 12/16/2020    WBC 5 26 12/16/2020     12/16/2020   ]    Lab Results   Component Value Date    K 3 7 12/17/2020     12/17/2020    CO2 24 12/17/2020    BUN 14 12/17/2020    CREATININE 1 00 12/17/2020    CALCIUM 9 2 12/17/2020    GLUCOSE 77 09/08/2020   ]        INPATIENT MEDS:    Current Outpatient Medications:     apixaban (ELIQUIS) 5 mg, Take 2 tablets (10 mg total) by mouth 2 (two) times a day (Patient taking differently: Take 5 mg by mouth daily ), Disp: 13 tablet, Rfl: 0    folic acid (FOLVITE) 1 mg tablet, Take 1 tablet (1 mg total) by mouth daily, Disp: , Rfl: 0    gabapentin (NEURONTIN) 300 mg capsule, Take 1 capsule (300 mg total) by mouth 3 (three) times a day, Disp: 270 capsule, Rfl: 1    letrozole (FEMARA) 2 5 mg tablet, TAKE 1 TABLET BY MOUTH EVERY DAY, Disp: 90 tablet, Rfl: 1    meloxicam (MOBIC) 15 mg tablet, Take 1 tablet (15 mg total) by mouth daily as needed for moderate pain for up to 15 days, Disp: 15 tablet, Rfl: 0    sertraline (ZOLOFT) 50 mg tablet, Take 50 mg by mouth 2 (two) times a day , Disp: , Rfl: 0    SUMAtriptan (IMITREX) 100 mg tablet, Take 100 mg by mouth once as needed for migraine, Disp: , Rfl:     verapamil (VERELAN) 240 MG 24 hr capsule, Take 240 mg by mouth daily, Disp: , Rfl: 0    sertraline (ZOLOFT) 100 mg tablet, Take 100 mg by mouth daily, Disp: , Rfl: 0    thiamine 100 MG tablet, Take 1 tablet (100 mg total) by mouth daily (Patient not taking: Reported on 1/15/2021), Disp: , Rfl: 0      Physical Exam:   /84 (BP Location: Right arm, Patient Position: Sitting, Cuff Size: Standard)   Pulse 90   Ht 5' 5" (1 651 m)   Wt 72 6 kg (160 lb)   LMP  (LMP Unknown)   BMI 26 63 kg/m²   GEN: no acute distress    RESP: breathing comfortably with no accessory muscle use    ABD: soft, non-tender, non-distended   INCISION:    EXT: no significant peripheral edema     RADIOLOGY:    none     Assessment:    1   Gross hematuria     Plan:   - will schedule a CT scan abdomen and pelvis for hematuria study  - I will call her with the results  - and decide if repeat endoscopic examination is necessary  -

## 2021-01-15 NOTE — TELEPHONE ENCOUNTER
This patient may wish to reach out to her primary care doctor    The strongest medication I would allow for this healing fracture is meloxicam which she currently takes

## 2021-01-18 DIAGNOSIS — Z23 ENCOUNTER FOR IMMUNIZATION: ICD-10-CM

## 2021-02-11 ENCOUNTER — OFFICE VISIT (OUTPATIENT)
Dept: URGENT CARE | Facility: MEDICAL CENTER | Age: 61
End: 2021-02-11
Payer: COMMERCIAL

## 2021-02-11 ENCOUNTER — APPOINTMENT (EMERGENCY)
Dept: VASCULAR ULTRASOUND | Facility: HOSPITAL | Age: 61
DRG: 682 | End: 2021-02-11
Payer: COMMERCIAL

## 2021-02-11 ENCOUNTER — HOSPITAL ENCOUNTER (INPATIENT)
Facility: HOSPITAL | Age: 61
LOS: 2 days | Discharge: HOME | DRG: 682 | End: 2021-02-13
Attending: EMERGENCY MEDICINE | Admitting: HOSPITALIST
Payer: COMMERCIAL

## 2021-02-11 ENCOUNTER — APPOINTMENT (EMERGENCY)
Dept: RADIOLOGY | Facility: HOSPITAL | Age: 61
DRG: 682 | End: 2021-02-11
Payer: COMMERCIAL

## 2021-02-11 VITALS
TEMPERATURE: 97 F | BODY MASS INDEX: 26.66 KG/M2 | OXYGEN SATURATION: 97 % | DIASTOLIC BLOOD PRESSURE: 63 MMHG | HEIGHT: 65 IN | HEART RATE: 82 BPM | SYSTOLIC BLOOD PRESSURE: 134 MMHG | RESPIRATION RATE: 18 BRPM | WEIGHT: 160 LBS

## 2021-02-11 DIAGNOSIS — M79.89 LEFT LEG SWELLING: ICD-10-CM

## 2021-02-11 DIAGNOSIS — M79.89 LOCALIZED SWELLING OF LOWER EXTREMITY: Primary | ICD-10-CM

## 2021-02-11 DIAGNOSIS — N17.9 AKI (ACUTE KIDNEY INJURY) (HCC): ICD-10-CM

## 2021-02-11 DIAGNOSIS — R22.42 LOCALIZED SWELLING OF LEFT LOWER LEG: Primary | ICD-10-CM

## 2021-02-11 DIAGNOSIS — E87.70 FLUID OVERLOAD: ICD-10-CM

## 2021-02-11 PROBLEM — F10.10 ALCOHOL ABUSE: Status: ACTIVE | Noted: 2021-02-11

## 2021-02-11 LAB
ALBUMIN SERPL BCP-MCNC: 3.8 G/DL (ref 3.5–5)
ALP SERPL-CCNC: 87 U/L (ref 46–116)
ALT SERPL W P-5'-P-CCNC: 46 U/L (ref 12–78)
ANION GAP SERPL CALCULATED.3IONS-SCNC: 11 MMOL/L (ref 4–13)
AST SERPL W P-5'-P-CCNC: 50 U/L (ref 5–45)
BACTERIA UR QL AUTO: NORMAL /HPF
BASOPHILS # BLD AUTO: 0.04 THOUSANDS/ΜL (ref 0–0.1)
BASOPHILS NFR BLD AUTO: 1 % (ref 0–1)
BILIRUB SERPL-MCNC: 0.47 MG/DL (ref 0.2–1)
BILIRUB UR QL STRIP: NEGATIVE
BUN SERPL-MCNC: 32 MG/DL (ref 5–25)
CALCIUM SERPL-MCNC: 9.3 MG/DL (ref 8.3–10.1)
CHLORIDE SERPL-SCNC: 100 MMOL/L (ref 100–108)
CLARITY UR: CLEAR
CO2 SERPL-SCNC: 25 MMOL/L (ref 21–32)
COLOR UR: YELLOW
CREAT SERPL-MCNC: 1.63 MG/DL (ref 0.6–1.3)
EOSINOPHIL # BLD AUTO: 0.14 THOUSAND/ΜL (ref 0–0.61)
EOSINOPHIL NFR BLD AUTO: 4 % (ref 0–6)
ERYTHROCYTE [DISTWIDTH] IN BLOOD BY AUTOMATED COUNT: 13.7 % (ref 11.6–15.1)
GFR SERPL CREATININE-BSD FRML MDRD: 34 ML/MIN/1.73SQ M
GLUCOSE SERPL-MCNC: 89 MG/DL (ref 65–140)
GLUCOSE UR STRIP-MCNC: NEGATIVE MG/DL
HCT VFR BLD AUTO: 32.9 % (ref 34.8–46.1)
HGB BLD-MCNC: 10.9 G/DL (ref 11.5–15.4)
HGB UR QL STRIP.AUTO: ABNORMAL
IMM GRANULOCYTES # BLD AUTO: 0.02 THOUSAND/UL (ref 0–0.2)
IMM GRANULOCYTES NFR BLD AUTO: 1 % (ref 0–2)
KETONES UR STRIP-MCNC: NEGATIVE MG/DL
LEUKOCYTE ESTERASE UR QL STRIP: NEGATIVE
LIPASE SERPL-CCNC: 129 U/L (ref 73–393)
LYMPHOCYTES # BLD AUTO: 0.76 THOUSANDS/ΜL (ref 0.6–4.47)
LYMPHOCYTES NFR BLD AUTO: 19 % (ref 14–44)
MCH RBC QN AUTO: 34.2 PG (ref 26.8–34.3)
MCHC RBC AUTO-ENTMCNC: 33.1 G/DL (ref 31.4–37.4)
MCV RBC AUTO: 103 FL (ref 82–98)
MONOCYTES # BLD AUTO: 0.5 THOUSAND/ΜL (ref 0.17–1.22)
MONOCYTES NFR BLD AUTO: 13 % (ref 4–12)
NEUTROPHILS # BLD AUTO: 2.54 THOUSANDS/ΜL (ref 1.85–7.62)
NEUTS SEG NFR BLD AUTO: 62 % (ref 43–75)
NITRITE UR QL STRIP: NEGATIVE
NON-SQ EPI CELLS URNS QL MICRO: NORMAL /HPF
NRBC BLD AUTO-RTO: 0 /100 WBCS
NT-PROBNP SERPL-MCNC: 5455 PG/ML
PH UR STRIP.AUTO: 6 [PH] (ref 4.5–8)
PLATELET # BLD AUTO: 278 THOUSANDS/UL (ref 149–390)
PMV BLD AUTO: 10.5 FL (ref 8.9–12.7)
POTASSIUM SERPL-SCNC: 4.6 MMOL/L (ref 3.5–5.3)
PROT SERPL-MCNC: 8.1 G/DL (ref 6.4–8.2)
PROT UR STRIP-MCNC: ABNORMAL MG/DL
RBC # BLD AUTO: 3.19 MILLION/UL (ref 3.81–5.12)
RBC #/AREA URNS AUTO: NORMAL /HPF
SODIUM SERPL-SCNC: 136 MMOL/L (ref 136–145)
SP GR UR STRIP.AUTO: 1.01 (ref 1–1.03)
TROPONIN I SERPL-MCNC: <0.02 NG/ML
TROPONIN I SERPL-MCNC: <0.02 NG/ML
UROBILINOGEN UR QL STRIP.AUTO: 0.2 E.U./DL
WBC # BLD AUTO: 4 THOUSAND/UL (ref 4.31–10.16)
WBC #/AREA URNS AUTO: NORMAL /HPF

## 2021-02-11 PROCEDURE — 99223 1ST HOSP IP/OBS HIGH 75: CPT | Performed by: PHYSICIAN ASSISTANT

## 2021-02-11 PROCEDURE — 94760 N-INVAS EAR/PLS OXIMETRY 1: CPT

## 2021-02-11 PROCEDURE — 99285 EMERGENCY DEPT VISIT HI MDM: CPT | Performed by: EMERGENCY MEDICINE

## 2021-02-11 PROCEDURE — G0382 LEV 3 HOSP TYPE B ED VISIT: HCPCS | Performed by: PHYSICIAN ASSISTANT

## 2021-02-11 PROCEDURE — S9083 URGENT CARE CENTER GLOBAL: HCPCS | Performed by: PHYSICIAN ASSISTANT

## 2021-02-11 PROCEDURE — 71045 X-RAY EXAM CHEST 1 VIEW: CPT

## 2021-02-11 PROCEDURE — 84484 ASSAY OF TROPONIN QUANT: CPT | Performed by: EMERGENCY MEDICINE

## 2021-02-11 PROCEDURE — 99285 EMERGENCY DEPT VISIT HI MDM: CPT

## 2021-02-11 PROCEDURE — 83690 ASSAY OF LIPASE: CPT | Performed by: EMERGENCY MEDICINE

## 2021-02-11 PROCEDURE — 81001 URINALYSIS AUTO W/SCOPE: CPT

## 2021-02-11 PROCEDURE — 84484 ASSAY OF TROPONIN QUANT: CPT | Performed by: PHYSICIAN ASSISTANT

## 2021-02-11 PROCEDURE — 83880 ASSAY OF NATRIURETIC PEPTIDE: CPT | Performed by: EMERGENCY MEDICINE

## 2021-02-11 PROCEDURE — 80053 COMPREHEN METABOLIC PANEL: CPT | Performed by: EMERGENCY MEDICINE

## 2021-02-11 PROCEDURE — 85025 COMPLETE CBC W/AUTO DIFF WBC: CPT | Performed by: EMERGENCY MEDICINE

## 2021-02-11 PROCEDURE — 36415 COLL VENOUS BLD VENIPUNCTURE: CPT | Performed by: EMERGENCY MEDICINE

## 2021-02-11 PROCEDURE — 93971 EXTREMITY STUDY: CPT

## 2021-02-11 PROCEDURE — 93005 ELECTROCARDIOGRAM TRACING: CPT

## 2021-02-11 RX ORDER — FOLIC ACID 1 MG/1
1 TABLET ORAL DAILY
Status: DISCONTINUED | OUTPATIENT
Start: 2021-02-12 | End: 2021-02-12 | Stop reason: SDUPTHER

## 2021-02-11 RX ORDER — SERTRALINE HYDROCHLORIDE 100 MG/1
100 TABLET, FILM COATED ORAL
Status: DISCONTINUED | OUTPATIENT
Start: 2021-02-11 | End: 2021-02-13 | Stop reason: HOSPADM

## 2021-02-11 RX ORDER — ACETAMINOPHEN AND CODEINE PHOSPHATE 300; 30 MG/1; MG/1
1 TABLET ORAL EVERY 8 HOURS PRN
COMMUNITY
Start: 2021-01-27 | End: 2022-03-02 | Stop reason: HOSPADM

## 2021-02-11 RX ORDER — FUROSEMIDE 10 MG/ML
20 INJECTION INTRAMUSCULAR; INTRAVENOUS DAILY
Status: DISCONTINUED | OUTPATIENT
Start: 2021-02-12 | End: 2021-02-13 | Stop reason: HOSPADM

## 2021-02-11 RX ORDER — TIZANIDINE 2 MG/1
2 TABLET ORAL
Status: DISCONTINUED | OUTPATIENT
Start: 2021-02-11 | End: 2021-02-13 | Stop reason: HOSPADM

## 2021-02-11 RX ORDER — GABAPENTIN 300 MG/1
300 CAPSULE ORAL 3 TIMES DAILY
Status: DISCONTINUED | OUTPATIENT
Start: 2021-02-11 | End: 2021-02-13 | Stop reason: HOSPADM

## 2021-02-11 RX ORDER — LETROZOLE 2.5 MG/1
2.5 TABLET, FILM COATED ORAL DAILY
Status: DISCONTINUED | OUTPATIENT
Start: 2021-02-12 | End: 2021-02-13 | Stop reason: HOSPADM

## 2021-02-11 RX ORDER — TIZANIDINE 2 MG/1
2-4 TABLET ORAL
COMMUNITY
Start: 2021-01-27 | End: 2022-03-02 | Stop reason: HOSPADM

## 2021-02-11 RX ADMIN — GABAPENTIN 300 MG: 300 CAPSULE ORAL at 23:15

## 2021-02-11 RX ADMIN — SERTRALINE HYDROCHLORIDE 100 MG: 100 TABLET ORAL at 23:15

## 2021-02-11 RX ADMIN — TIZANIDINE 2 MG: 2 TABLET ORAL at 23:15

## 2021-02-11 NOTE — PROGRESS NOTES
[address] [facility]        NAME: Derrick Lyman is a 62 y o  female  : 1960    MRN: [id number]  DATE: 2021  TIME: 5:24 PM    Assessment and Plan   No primary diagnosis found  No diagnosis found  Patient Instructions     1  Patient transferred to [address] [place] [address] ED for further evaluation and treatment  Chief Complaint     Chief Complaint   Patient presents with    Leg Swelling     Pt states she has generalized left lower leg pain and tingling when she stands and walks x 2-3 days  Pt has a hx of bilateral neuropathy             History of Present Illness       More Tatum is a 67-year-old female presents with generalized swelling to her left lower leg that occurred over the past 3 days  Patient denies any fall, trauma, recent surgery or travel since the onset of her symptoms  She denies any numbness, tingling or weakness; she does have a history of a prior left DVT  Review of Systems   Review of Systems   Constitutional: Negative  HENT: Negative  Respiratory: Negative  Cardiovascular: Positive for leg swelling  Gastrointestinal: Negative            Current Medications       Current Outpatient Medications:     acetaminophen-codeine (TYLENOL with CODEINE #3) 300-30 MG per tablet, Take 1 tablet by mouth every 8 (eight) hours as needed, Disp: , Rfl:     apixaban (ELIQUIS) 5 mg, Take 2 tablets (10 mg total) by mouth 2 (two) times a day (Patient taking differently: Take 5 mg by mouth daily ), Disp: 13 tablet, Rfl: 0    folic acid (FOLVITE) 1 mg tablet, Take 1 tablet (1 mg total) by mouth daily, Disp: , Rfl: 0    gabapentin (NEURONTIN) 300 mg capsule, Take 1 capsule (300 mg total) by mouth 3 (three) times a day, Disp: 270 capsule, Rfl: 1    letrozole (FEMARA) 2 5 mg tablet, TAKE 1 TABLET BY MOUTH EVERY DAY, Disp: 90 tablet, Rfl: 1    sertraline (ZOLOFT) 100 mg tablet, Take 100 mg by mouth daily, Disp: , Rfl: 0    sertraline (ZOLOFT) 50 mg tablet, Take 50 mg by mouth 2 (two) times a day , Disp: , Rfl: 0    SUMAtriptan (IMITREX) 100 mg tablet, Take 100 mg by mouth once as needed for migraine, Disp: , Rfl:     tiZANidine (ZANAFLEX) 2 mg tablet, Take 2-4 mg by mouth daily at bedtime, Disp: , Rfl:     verapamil (VERELAN) 240 MG 24 hr capsule, Take 240 mg by mouth daily, Disp: , Rfl: 0    meloxicam (MOBIC) 15 mg tablet, Take 1 tablet (15 mg total) by mouth daily as needed for moderate pain for up to 15 days (Patient not taking: Reported on 2/11/2021), Disp: 15 tablet, Rfl: 0    thiamine 100 MG tablet, Take 1 tablet (100 mg total) by mouth daily (Patient not taking: Reported on 1/15/2021), Disp: , Rfl: 0    Current Allergies     Allergies as of 02/11/2021 - Reviewed 02/11/2021   Allergen Reaction Noted    Gluten meal GI Intolerance 12/30/2018    Lactose GI Intolerance 12/30/2018            The following portions of the patient's history were reviewed and updated as appropriate: allergies, current medications, past family history, past medical history, past social history, past surgical history and problem list      Past Medical History:   Diagnosis Date    Acute DVT (deep venous thrombosis) (HCC)     of LLE>     Bladder infection     Congenital prolapsed rectum     History of biliary stent insertion     History of chemotherapy     History of radiation therapy 05/2018    left breast ca    History of transfusion     x2 s/p chemo treatments, no reaction    Hydronephrosis     right kidney    Hypertension     Malignant neoplasm of overlapping sites of left female breast ([facility] [address] ) 05/01/2018    Migraine        Past Surgical History:   Procedure Laterality Date    ABDOMINAL ADHESION SURGERY N/A 4/23/2019    Procedure: LYSIS ADHESIONS;  Surgeon: Dennie Pang, MD;  [facility];  Service: Colorectal    BREAST BIOPSY      COLON SURGERY      colon resection    CYSTOSCOPY N/A 3/4/2019    Procedure: CYSTOSCOPY WITH BIOPSIES AND FULGERATION AND REDCUTION OF RECTUM PROLAPSE;  Surgeon: Aj Medina MD;  [facility];  Service: Urology    ERCP N/A 1/4/2019    Procedure: ENDOSCOPIC RETROGRADE CHOLANGIOPANCREATOGRAPHY (ERCP); Surgeon: Fatuma Baumann MD;  [facility];  Service: Gastroenterology    INSTILLATION MYTOMYCIN N/A 3/4/2019    Procedure: Dinorah Conner;  Surgeon: Aj Medina MD;  [facility];  Service: Urology    MASTECTOMY Left     2018    AR COLONOSCOPY FLX DX W/COLLJ SPEC WHEN PFRMD N/A 4/22/2019    Procedure: COLONOSCOPY;  Surgeon: Jim Zazueta MD;  Location: [facility]; Service: Colorectal    AR EDG US EXAM SURGICAL ALTER STOM DUODENUM/JEJUNUM N/A 3/7/2019    Procedure: LINEAR ENDOSCOPIC U/S;  Surgeon: Sophia Elise MD;  [facility]; Service: Gastroenterology    AR ESOPHAGOGASTRODUODENOSCOPY TRANSORAL DIAGNOSTIC N/A 3/7/2019    Procedure: ESOPHAGOGASTRODUODENOSCOPY (EGD); Surgeon: Sophia Elise MD;  [facility];   Service: Gastroenterology    AR INSJ TUNNELED CTR VAD W/SUBQ PORT AGE 5 YR/> N/A 6/26/2018    Procedure: INSERTION VENOUS PORT ( PORT-A-CATH) IR;  Surgeon: Victoriano Jordan DO;  [facility];  Service: Interventional Radiology    AR LAP, SURG PROCTOPEXY N/A 4/23/2019    Procedure: LAPAROSCOPIC HAND-ASSIST PELVIC DISSECTION; proctopexy;  Surgeon: Jim Zazueta MD;  [facility];  Service: Colorectal    AR LAP, SURG PROCTOPEXY N/A 11/18/2020    Procedure: LAPAROSCOPIC LYSIS OF ADHESIONS, MOBILIZATION OF RECTUM AND SUTURE RECTOPEXY;  Surgeon: Magdiel Krishnamurthy MD;  [facility];  Service: Colorectal    AR MASTECTOMY, MODIFIED RADICAL Left 5/15/2018    Procedure: BREAST MODIFIED RADICAL MASTECTOMY;  Surgeon: Janiya Villavicencio MD;  [facility];  Service: Surgical Oncology    AR RMVL [place] CTR VAD W/SUBQ PORT/ CTR/PRPH INSJ N/A 6/4/2019    Procedure: REMOVAL VENOUS PORT (PORT-A-CATH)IR;  Surgeon: Victoriano Jordan DO;  [facility]; Service: Interventional Radiology    TUBAL LIGATION      US GUIDANCE BREAST BIOPSY LEFT EACH ADDITIONAL Left 4/3/2018    US GUIDED BREAST BIOPSY LEFT COMPLETE Left 4/3/2018    US GUIDED BREAST LYMPH NODE BIOPSY LEFT Left 4/3/2018       Family History   Problem Relation Age of Onset    No Known Problems Mother     No Known Problems Father     Breast cancer Maternal Aunt 48    Colon cancer Maternal Aunt     No Known Problems Sister     No Known Problems Daughter     No Known Problems Maternal Grandmother     No Known Problems Maternal Grandfather     No Known Problems Paternal Grandmother     No Known Problems Paternal Grandfather          Medications have been verified  Objective   /63   Pulse 82   Temp (!) 97 °F (36 1 °C) (Temporal)   Resp 18   Ht 5' 5" (1 651 m)   Wt 72 6 kg (160 lb)   LMP  (LMP Unknown)   SpO2 97%   BMI 26 63 kg/m²   No LMP recorded (lmp unknown)  Patient is postmenopausal        Physical Exam     Physical Exam  Constitutional:       General: She is not in acute distress  Appearance: Normal appearance  She is not ill-appearing  Cardiovascular:      Rate and Rhythm: Normal rate and regular rhythm  Pulses:           Dorsalis pedis pulses are 1+ on the left side  Posterior tibial pulses are 1+ on the left side  Heart sounds: Normal heart sounds  No murmur  Pulmonary:      Effort: Pulmonary effort is normal       Breath sounds: Normal breath sounds  Musculoskeletal:      Left lower le+ Pitting Edema present  Legs:    Neurological:      Mental Status: She is alert

## 2021-02-11 NOTE — PATIENT INSTRUCTIONS
1  Patient transferred to [facility] ED for further evaluation and treatment

## 2021-02-11 NOTE — LETTER
[facility] FLO[facility] MED SURG UNIT  [address] [place] [address]  Dept: [phone]    February 13, 2021     Patient: Ramírez Brady   YOB: 1960   Date of Visit: 2/11/2021       To Whom it May Concern:    Oksana Gooden is under my professional care  She was seen in the hospital from 2/11/2021   to 02/13/21  She may return to work on 02/15/2021 without limitations  If you have any questions or concerns, please don't hesitate to call           Sincerely,          Jessi Malave MD

## 2021-02-12 LAB
ALBUMIN SERPL BCP-MCNC: 3.2 G/DL (ref 3.5–5)
ALP SERPL-CCNC: 77 U/L (ref 46–116)
ALT SERPL W P-5'-P-CCNC: 37 U/L (ref 12–78)
ANION GAP SERPL CALCULATED.3IONS-SCNC: 8 MMOL/L (ref 4–13)
AST SERPL W P-5'-P-CCNC: 28 U/L (ref 5–45)
ATRIAL RATE: 71 BPM
BILIRUB SERPL-MCNC: 0.32 MG/DL (ref 0.2–1)
BUN SERPL-MCNC: 30 MG/DL (ref 5–25)
CALCIUM ALBUM COR SERPL-MCNC: 9 MG/DL (ref 8.3–10.1)
CALCIUM SERPL-MCNC: 8.4 MG/DL (ref 8.3–10.1)
CHLORIDE SERPL-SCNC: 104 MMOL/L (ref 100–108)
CHOLEST SERPL-MCNC: 199 MG/DL (ref 50–200)
CO2 SERPL-SCNC: 26 MMOL/L (ref 21–32)
CREAT SERPL-MCNC: 1.74 MG/DL (ref 0.6–1.3)
ERYTHROCYTE [DISTWIDTH] IN BLOOD BY AUTOMATED COUNT: 13.8 % (ref 11.6–15.1)
GFR SERPL CREATININE-BSD FRML MDRD: 32 ML/MIN/1.73SQ M
GLUCOSE SERPL-MCNC: 121 MG/DL (ref 65–140)
HCT VFR BLD AUTO: 31.7 % (ref 34.8–46.1)
HDLC SERPL-MCNC: 98 MG/DL
HGB BLD-MCNC: 10.3 G/DL (ref 11.5–15.4)
LDLC SERPL CALC-MCNC: 90 MG/DL (ref 0–100)
MCH RBC QN AUTO: 34.2 PG (ref 26.8–34.3)
MCHC RBC AUTO-ENTMCNC: 32.5 G/DL (ref 31.4–37.4)
MCV RBC AUTO: 105 FL (ref 82–98)
P AXIS: 53 DEGREES
PLATELET # BLD AUTO: 257 THOUSANDS/UL (ref 149–390)
PMV BLD AUTO: 9.6 FL (ref 8.9–12.7)
POTASSIUM SERPL-SCNC: 3.7 MMOL/L (ref 3.5–5.3)
PR INTERVAL: 234 MS
PROT SERPL-MCNC: 6.7 G/DL (ref 6.4–8.2)
QRS AXIS: 83 DEGREES
QRSD INTERVAL: 86 MS
QT INTERVAL: 404 MS
QTC INTERVAL: 433 MS
RBC # BLD AUTO: 3.01 MILLION/UL (ref 3.81–5.12)
SODIUM SERPL-SCNC: 138 MMOL/L (ref 136–145)
T WAVE AXIS: 45 DEGREES
TRIGL SERPL-MCNC: 54 MG/DL
TROPONIN I SERPL-MCNC: <0.02 NG/ML
VENTRICULAR RATE: 69 BPM
WBC # BLD AUTO: 3.87 THOUSAND/UL (ref 4.31–10.16)

## 2021-02-12 PROCEDURE — 80061 LIPID PANEL: CPT | Performed by: HOSPITALIST

## 2021-02-12 PROCEDURE — 99232 SBSQ HOSP IP/OBS MODERATE 35: CPT | Performed by: INTERNAL MEDICINE

## 2021-02-12 PROCEDURE — 80053 COMPREHEN METABOLIC PANEL: CPT | Performed by: HOSPITALIST

## 2021-02-12 PROCEDURE — 94760 N-INVAS EAR/PLS OXIMETRY 1: CPT

## 2021-02-12 PROCEDURE — 84484 ASSAY OF TROPONIN QUANT: CPT | Performed by: PHYSICIAN ASSISTANT

## 2021-02-12 PROCEDURE — 99254 IP/OBS CNSLTJ NEW/EST MOD 60: CPT | Performed by: INTERNAL MEDICINE

## 2021-02-12 PROCEDURE — 85027 COMPLETE CBC AUTOMATED: CPT | Performed by: HOSPITALIST

## 2021-02-12 PROCEDURE — 94762 N-INVAS EAR/PLS OXIMTRY CONT: CPT

## 2021-02-12 PROCEDURE — 93971 EXTREMITY STUDY: CPT | Performed by: SURGERY

## 2021-02-12 PROCEDURE — 93010 ELECTROCARDIOGRAM REPORT: CPT | Performed by: INTERNAL MEDICINE

## 2021-02-12 RX ADMIN — FUROSEMIDE 20 MG: 10 INJECTION, SOLUTION INTRAVENOUS at 08:35

## 2021-02-12 RX ADMIN — LETROZOLE 2.5 MG: 2.5 TABLET, FILM COATED ORAL at 08:41

## 2021-02-12 RX ADMIN — GABAPENTIN 300 MG: 300 CAPSULE ORAL at 21:41

## 2021-02-12 RX ADMIN — TIZANIDINE 2 MG: 2 TABLET ORAL at 21:40

## 2021-02-12 RX ADMIN — GABAPENTIN 300 MG: 300 CAPSULE ORAL at 17:08

## 2021-02-12 RX ADMIN — GABAPENTIN 300 MG: 300 CAPSULE ORAL at 08:34

## 2021-02-12 RX ADMIN — FOLIC ACID: 5 INJECTION, SOLUTION INTRAMUSCULAR; INTRAVENOUS; SUBCUTANEOUS at 08:46

## 2021-02-12 RX ADMIN — VERAPAMIL HYDROCHLORIDE 240 MG: 120 TABLET, FILM COATED, EXTENDED RELEASE ORAL at 08:34

## 2021-02-12 RX ADMIN — APIXABAN 5 MG: 5 TABLET, FILM COATED ORAL at 08:34

## 2021-02-12 RX ADMIN — SERTRALINE HYDROCHLORIDE 100 MG: 100 TABLET ORAL at 21:41

## 2021-02-12 RX ADMIN — SERTRALINE HYDROCHLORIDE 50 MG: 50 TABLET ORAL at 08:35

## 2021-02-12 NOTE — ASSESSMENT & PLAN NOTE
Recent Labs     02/11/21  1847 02/12/21  0237   CREATININE 1 63* 1 74*   EGFR 34 32       Workup:   Present on admission (POA) ; admission creatinine:  1 63 (baseline 1-1 1),   Urinalysis:  spec grav 1 010   Etiology:   This is likely cardiorenal syndrome    Plan:   Monitor BMP daily and observe for downward trend of creatinine   Diuresis as noted above   Avoid hypoperfusion of the kidneys, minimize nephrotoxins  · Medications being held:  None

## 2021-02-12 NOTE — UTILIZATION REVIEW
Initial Clinical Review    Admission: Date/Time/Statement:   Admission Orders (From admission, onward)     Ordered        02/11/21 2101  INPATIENT ADMISSION  Once                   Orders Placed This Encounter   Procedures    INPATIENT ADMISSION     Standing Status:   Standing     Number of Occurrences:   1     Order Specific Question:   Level of Care     Answer:   Med Surg [16]     Order Specific Question:   Estimated length of stay     Answer:   More than 2 Midnights     Order Specific Question:   Certification     Answer:   I certify that inpatient services are medically necessary for this patient for a duration of greater than two midnights  See H&P and MD Progress Notes for additional information about the patient's course of treatment  ED Arrival Information     Expected Arrival Acuity Means of Arrival Escorted By Service Admission Type    2/11/2021 2/11/2021 17:53 Urgent Walk-In Spouse Hospitalist Urgent    Arrival Complaint    Localized swelling of left lower leg        Chief Complaint   Patient presents with    Leg Swelling     PT present with LLE "swelling, pain, and itching x2/3 days" Was sent here for further eval, has paperwork for a dopplar  HPI:  62 y o  female who presents to the ED from home with left leg swelling and RODRIGUEZ  Past medical history significant for breast cancer status post mastectomy, venous thromboembolism on Eliquis, migraines, hypertension and daily alcohol use  Also reports approximately a 20 lb weight gain over the past 3 months    US negative for DVT  BNP >5,000  Creatinine elevated from baseline of 1-1 1  Physical exam: +2 edema LLE, rash on inner aspect of LLE  Lungs decreased at bases  Plan: Inpatient Med Surg admission for evaluation and treatment of swelling of LE, OPAL and ETOH abuse:  IV Lasix, daily I&O and standing weight, consult Cardiology  Trend renal function  Place on CIWA monitoring       2/12 Cardiology consult:  Lower extremity edema and RODRIGUEZ likely in the setting of acute diastolic heart failure:  Furosemide 20 mg IV daily, monitor creatinine with digregorio  Internal Medicine: continue furosemide 20 mg IV daily, 2 g sodium restriction, 1800 ml fluid restriction  Physical exam: 2+ B/L LE edema  ED Triage Vitals [02/11/21 1817]   Temperature Pulse Respirations Blood Pressure SpO2   98 7 °F (37 1 °C) 73 18 129/65 99 %      Temp Source Heart Rate Source Patient Position - Orthostatic VS BP Location FiO2 (%)   Oral Monitor Lying Right arm --      Pain Score       5          Wt Readings from Last 1 Encounters:   02/12/21 74 3 kg (163 lb 12 8 oz)     Additional Vital Signs:     Date/Time  Temp  Pulse  Resp  BP  MAP (mmHg)  SpO2  CIWA O2 Device   02/12/21 1059  98 2   84  16  148/78  --  97 %   None (Room air)   02/12/21 0900  --  --  --  --  --  88 %Abnormal   None (Room air)   02/12/21 0756                  0    02/12/21 0733  --  72  14  156/76  --  95 %  None (Room air)   02/12/21 0232  98   66  16  104/63  77  91 %      0 None (Room air)   02/11/21 2245                  0    02/11/21 2149  97 7   64  16  124/72  --  91 %  None (Room air)   02/11/21 2135  --  66  16  116/70  --  94 %  None (Room air)   02/11/21 2030  98 5   62  18  123/70  91  95 %  None (Room air)           Pertinent Labs/Diagnostic Test Results:         2/11 CXR:   No focal consolidation  Hiatal hernia  2/11 venous duplex LE:      RIGHT LOWER LIMB LIMITED:  Evaluation shows no evidence of thrombus in the common femoral vein  Doppler evaluation shows a normal response to augmentation maneuvers  LEFT LOWER LIMB:  No evidence of acute or chronic deep vein thrombosis  No evidence of superficial thrombophlebitis noted  Doppler evaluation shows a normal response to augmentation maneuvers  Popliteal, posterior tibial and anterior tibial arterial Doppler waveforms are triphasic        2/11 EKG:      Sinus rhythm with 1st degree A-V block  Otherwise normal ECG  When compared with ECG of 15-DEC-2020 09:33,  ID interval has increased  Questionable change in QRS axis    Results from last 7 days   Lab Units 02/12/21  0237 02/11/21  1847   WBC Thousand/uL 3 87* 4 00*   HEMOGLOBIN g/dL 10 3* 10 9*   HEMATOCRIT % 31 7* 32 9*   PLATELETS Thousands/uL 257 278   NEUTROS ABS Thousands/µL  --  2 54         Results from last 7 days   Lab Units 02/12/21  0237 02/11/21  1847   SODIUM mmol/L 138 136   POTASSIUM mmol/L 3 7 4 6   CHLORIDE mmol/L 104 100   CO2 mmol/L 26 25   ANION GAP mmol/L 8 11   BUN mg/dL 30* 32*   CREATININE mg/dL 1 74* 1 63*   EGFR ml/min/1 73sq m 32 34   CALCIUM mg/dL 8 4 9 3     Results from last 7 days   Lab Units 02/12/21  0237 02/11/21  1847   AST U/L 28 50*   ALT U/L 37 46   ALK PHOS U/L 77 87   TOTAL PROTEIN g/dL 6 7 8 1   ALBUMIN g/dL 3 2* 3 8   TOTAL BILIRUBIN mg/dL 0 32 0 47         Results from last 7 days   Lab Units 02/12/21  0237 02/11/21  1847   GLUCOSE RANDOM mg/dL 121 89             BETA-HYDROXYBUTYRATE   Date Value Ref Range Status   09/08/2020 7 1 (H) <0 6 mmol/L Final          Results from last 7 days   Lab Units 02/12/21  0230 02/11/21  2300 02/11/21  1847   TROPONIN I ng/mL <0 02 <0 02 <0 02     Results from last 7 days   Lab Units 02/11/21  1847   NT-PRO BNP pg/mL 5,455*             Results from last 7 days   Lab Units 02/11/21  1847   LIPASE u/L 129             Results from last 7 days   Lab Units 02/11/21  2039   CLARITY UA  Clear   COLOR UA  Yellow   SPEC GRAV UA  1 010   PH UA  6 0   GLUCOSE UA mg/dl Negative   KETONES UA mg/dl Negative   BLOOD UA  Moderate*   PROTEIN UA mg/dl Trace*   NITRITE UA  Negative   BILIRUBIN UA  Negative   UROBILINOGEN UA E U /dl 0 2   LEUKOCYTES UA  Negative   WBC UA /hpf None Seen   RBC UA /hpf None Seen   BACTERIA UA /hpf None Seen   EPITHELIAL CELLS WET PREP /hpf Occasional               ED Treatment:   Medication Administration from 02/11/2021 1729 to 02/11/2021 2149     None        Past Medical History:   Diagnosis Date    Acute DVT (deep venous thrombosis) (HCC)     of LLE>     Bladder infection     Congenital prolapsed rectum     History of biliary stent insertion     History of chemotherapy     History of radiation therapy 05/2018    left breast ca    History of transfusion     x2 s/p chemo treatments, no reaction    Hydronephrosis     right kidney    Hypertension     Malignant neoplasm of overlapping sites of left female breast ([facility] ) 05/01/2018    Migraine      Present on Admission:   Essential hypertension      Admitting Diagnosis: Leg swelling [M79 89]  OPAL (acute kidney injury) ([facility] ) [N17 9]  Fluid overload [E87 70]  Left leg swelling [M79 89]  Localized swelling of lower extremity [M79 89]  Age/Sex: 62 y o  female       Admission Orders:    SCD, continuous pulse oximetry, daily weight, VICTORINA curry,           Scheduled Medications:      apixaban, 5 mg, Oral, Daily  folic acid 1 mg, thiamine 100 mg in 0 9% sodium chloride 100 mL IVPB, , Intravenous, Daily  furosemide, 20 mg, Intravenous, Daily  gabapentin, 300 mg, Oral, TID  letrozole, 2 5 mg, Oral, Daily  sertraline, 100 mg, Oral, HS  sertraline, 50 mg, Oral, Daily  tiZANidine, 2 mg, Oral, HS  verapamil, 240 mg, Oral, Daily      Continuous IV Infusions:  None  PRN Meds:       IP CONSULT TO NUTRITION SERVICES  IP CONSULT TO CARDIOLOGY    Network Utilization Review Department  ATTENTION: Please call with any questions or concerns to [phone] and carefully listen to the prompts so that you are directed to the right person  All voicemails are confidential   Joao Tavera all requests for admission clinical reviews, approved or denied determinations and any other requests to dedicated fax number below belonging to the campus where the patient is receiving treatment   List of dedicated fax numbers for the Facilities:  [address] DENIALS (Administrative/Medical Necessity) [phone]   [address] (Maternity/NICU/Pediatrics) [phone] [address] [facility] [address]) [address] [address] [place] [address] [address] [place]) [address] [place] [address] 951 [phone]

## 2021-02-12 NOTE — CONSULTS
Consultation - Cardiology Team One  Josefa Rajan 62 y o  female MRN: [id number]  Unit/Bed#: S -79 Encounter: [id number]    Inpatient consult to Cardiology  Consult performed by: JANN Frank  Consult ordered by: Cassandra Davis PA-C      Physician Requesting Consult: Titi Duran MD  Reason for Consult / Principal Problem: LE edema       Assessment/ Plan    1  Lower extremity edema and RODRIGUEZ likely in the setting of acute diastolic heart failure  Pro BNP 5,455  Chest x-ray reviewed showing clear lungs with no pleural effusions  Echocardiogram 12/16/2020 showed EF 60%, no regional wall motion abnormalities and no significant valvular disease  On furosemide 20 mg IV daily, check BMP in am    Will likely need low dose maintenance diuretic at discharge   Monitor I/Os   Daily weights 163 lbs today  Patient reporting 20 lbs weight gain over 3 months  Consider stress test as outpatient     2  OPAL   Creatinine 1 74 today from 1 63 yesterday  Baseline creatinine around 1 0  Followed by primary team   Monitor creatinine with diuresis     3  Hypertension- 127/68 average BP   On Verapamil  mg p o  Daily    4  Alcohol abuse quite 3 months ago     5  Breast cancer on letrozole       History of Present Illness   HPI: Josefa Rajan is a 62y o  year old female who has hypertension, breast cancer and DVT/PE  She does not follow with a cardiologist  She has no history of heart failure, dysrhythmias or known CAD  She presents to [facility] ER [facility]/11/2021 with complaint of LLE edema and dyspnea on exertion  Patient reports for the past month feeling SOB with exertion and recently noticed LLE edema  She has history of DVTs and takes eliquis for [address]  Duplex of LLE showed no DVT  She was started on IV lasix 20 mg daily this admission  She reports swelling has already improved  No complaint of chest pain or palpitations  No fever or chills  She denies orthopnea or abdominal bloating   She does note a 20 lb weight gain over the past 3 months  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  She reports she walks the halls and up and down stairs without complaint of chest pain but does have RODRIGUEZ for the past month  She denies tobacco abuse  She has history of alcohol use but quit 3 months ago  She denies family history of heart disease  Echocardiogram 12/16/2020 showed EF 60%, no regional wall motion abnormalities and no significant valvular disease  EKG reviewed personally:  Sinus rhythm   Ventricular rate 69 bpm  RI interval 234 ms  QRSD 86 ms  QT interval 404 ms  QTc interval 433 ms     Telemetry reviewed personally:   No telemetry     Review of Systems   Constitution: Positive for weight gain  Negative for chills, fever and malaise/fatigue  HENT: Negative for congestion  Cardiovascular: Positive for dyspnea on exertion  Negative for chest pain, leg swelling, orthopnea, palpitations and paroxysmal nocturnal dyspnea  Respiratory: Negative for cough and shortness of breath  Musculoskeletal: Negative for falls  Gastrointestinal: Negative for bloating, nausea and vomiting  Neurological: Negative for dizziness and light-headedness  Psychiatric/Behavioral: Negative for altered mental status  All other systems reviewed and are negative      Historical Information   Past Medical History:   Diagnosis Date    Acute DVT (deep venous thrombosis) (HCC)     of LLE>     Bladder infection     Congenital prolapsed rectum     History of biliary stent insertion     History of chemotherapy     History of radiation therapy 05/2018    left breast ca    History of transfusion     x2 s/p chemo treatments, no reaction    Hydronephrosis     right kidney    Hypertension     Malignant neoplasm of overlapping sites of left female breast ([facility] [address] ) 05/01/2018    Migraine      Past Surgical History:   Procedure Laterality Date    ABDOMINAL ADHESION SURGERY N/A 4/23/2019    Procedure: LYSIS ADHESIONS;  Surgeon: Anushka Jackson Charmayne Harsh, MD;  [facility];  Service: Colorectal    BREAST BIOPSY      COLON SURGERY      colon resection    CYSTOSCOPY N/A 3/4/2019    Procedure: CYSTOSCOPY WITH BIOPSIES AND [place] OF RECTUM PROLAPSE;  Surgeon: Miryam Kaplan MD;  [facility];  Service: Urology    ERCP N/A 1/4/2019    Procedure: ENDOSCOPIC RETROGRADE CHOLANGIOPANCREATOGRAPHY (ERCP); Surgeon: Leonardo Smith MD;  [facility];  Service: Gastroenterology    INSTILLATION MYTOMYCIN N/A 3/4/2019    Procedure: Wenceslao Torres;  Surgeon: Miryam Kaplan MD;  [facility];  Service: Urology    MASTECTOMY Left     2018    MN COLONOSCOPY FLX DX W/COLLJ SPEC WHEN PFRMD N/A 4/22/2019    Procedure: COLONOSCOPY;  Surgeon: Niles Lehman MD;  Location: [facility]; Service: Colorectal    MN EDG US EXAM SURGICAL ALTER STOM DUODENUM/JEJUNUM N/A 3/7/2019    Procedure: LINEAR ENDOSCOPIC U/S;  Surgeon: Sai Aviles MD;  [facility]; Service: Gastroenterology    MN ESOPHAGOGASTRODUODENOSCOPY TRANSORAL DIAGNOSTIC N/A 3/7/2019    Procedure: ESOPHAGOGASTRODUODENOSCOPY (EGD); Surgeon: Sai Aviles MD;  [facility];   Service: Gastroenterology    MN INSJ TUNNELED CTR VAD W/SUBQ PORT AGE 5 YR/> N/A 6/26/2018    Procedure: INSERTION VENOUS PORT ( PORT-A-CATH) IR;  Surgeon: Sera Baker DO;  [facility];  Service: Interventional Radiology    MN LAP, SURG PROCTOPEXY N/A 4/23/2019    Procedure: LAPAROSCOPIC HAND-ASSIST PELVIC DISSECTION; proctopexy;  Surgeon: Niles Lehman MD;  [facility];  Service: Colorectal    MN LAP, SURG PROCTOPEXY N/A 11/18/2020    Procedure: LAPAROSCOPIC LYSIS OF ADHESIONS, MOBILIZATION OF RECTUM AND SUTURE RECTOPEXY;  Surgeon: Teddy Paulino MD;  [facility];  Service: Colorectal    MN MASTECTOMY, MODIFIED RADICAL Left 5/15/2018    Procedure: BREAST MODIFIED RADICAL MASTECTOMY;  Surgeon: Noel Beltran MD; [facility];  Service: Surgical Oncology    [place] RMVL [place] CTR VAD W/SUBQ PORT/ CTR/PRPH INSJ N/A 6/4/2019    Procedure: REMOVAL VENOUS PORT (PORT-A-CATH)IR;  Surgeon: Tim Lorenz DO;  [facility];  Service: Interventional Radiology    TUBAL LIGATION      US GUIDANCE BREAST BIOPSY LEFT EACH ADDITIONAL Left 4/3/2018    US GUIDED BREAST BIOPSY LEFT COMPLETE Left 4/3/2018    US GUIDED BREAST LYMPH NODE BIOPSY LEFT Left 4/3/2018     Social History     Substance and Sexual Activity   Alcohol Use Not Currently     Social History     Substance and Sexual Activity   Drug Use Not Currently     Social History     Tobacco Use   Smoking Status Never Smoker   Smokeless Tobacco Never Used     Family History:   Family History   Problem Relation Age of Onset    No Known Problems Mother     No Known Problems Father     Breast cancer Maternal Aunt 48    Colon cancer Maternal Aunt     No Known Problems Sister     No Known Problems Daughter     No Known Problems Maternal Grandmother     No Known Problems Maternal Grandfather     No Known Problems Paternal Grandmother     No Known Problems Paternal Grandfather        Meds/Allergies   all current active meds have been reviewed and current meds:   Current Facility-Administered Medications   Medication Dose Route Frequency    apixaban (ELIQUIS) tablet 5 mg  5 mg Oral Daily    folic acid 1 mg, thiamine (VITAMIN B1) 100 mg in sodium chloride 0 9 % 100 mL IV piggyback   Intravenous Daily    furosemide (LASIX) injection 20 mg  20 mg Intravenous Daily    gabapentin (NEURONTIN) capsule 300 mg  300 mg Oral TID    letrozole (FEMARA) tablet 2 5 mg  2 5 mg Oral Daily    sertraline (ZOLOFT) tablet 100 mg  100 mg Oral HS    sertraline (ZOLOFT) tablet 50 mg  50 mg Oral Daily    tiZANidine (ZANAFLEX) tablet 2 mg  2 mg Oral HS    verapamil (CALAN-SR) CR tablet 240 mg  240 mg Oral Daily          Allergies   Allergen Reactions    Gluten Meal GI Intolerance  Lactose GI Intolerance       Objective   Vitals: Blood pressure 156/76, pulse 72, temperature 98 °F (36 7 °C), temperature source Oral, resp  rate 14, height 5' 5" (1 651 m), weight 74 3 kg (163 lb 12 8 oz), SpO2 (!) 88 %, not currently breastfeeding ,     Body mass index is 27 26 kg/m²  ,     Systolic (96BIF), SPN:317 , Min:104 , GGA:069     Diastolic (45OTT), VEB:37, Min:63, Max:76      Intake/Output Summary (Last 24 hours) at 2/12/2021 1015  Last data filed at 2/12/2021 0940  Gross per 24 hour   Intake --   Output 1075 ml   Net -1075 ml     Weight (last 2 days)     Date/Time   Weight    02/12/21 0600   74 3 (163 8)    02/11/21 2149   73 9 (163)    02/11/21 1817   75 5 (166 45)            Invasive Devices     Peripheral Intravenous Line            Peripheral IV 02/11/21 Right Wrist less than 1 day          Drain            Closed/Suction Drain Right RLQ Bulb 85 days              Physical Exam  Constitutional:       General: She is not in acute distress  Appearance: She is obese  HENT:      Head: Normocephalic  Mouth/Throat:      Mouth: Mucous membranes are moist    Neck:      Musculoskeletal: Neck supple  Cardiovascular:      Rate and Rhythm: Normal rate and regular rhythm  Pulses: Normal pulses  Heart sounds: No murmur  Pulmonary:      Effort: Pulmonary effort is normal  No respiratory distress  Breath sounds: Normal breath sounds  Abdominal:      General: Bowel sounds are normal       Palpations: Abdomen is soft  Musculoskeletal:         General: Swelling present  Comments: Trace LLE    Skin:     General: Skin is warm and dry  Capillary Refill: Capillary refill takes less than 2 seconds  Neurological:      General: No focal deficit present  Mental Status: She is alert and oriented to person, place, and time     Psychiatric:         Mood and Affect: Mood normal            LABORATORY RESULTS:  Results from last 7 days   Lab Units 02/12/21  0230 02/11/21  2300 21  1847   TROPONIN I ng/mL <0 02 <0 02 <0 02     CBC with diff:   Results from last 7 days   Lab Units 21  0237 21  1847   WBC Thousand/uL 3 87* 4 00*   HEMOGLOBIN g/dL 10 3* 10 9*   HEMATOCRIT % 31 7* 32 9*   MCV fL 105* 103*   PLATELETS Thousands/uL 257 278   MCH pg 34 2 34 2   MCHC g/dL 32 5 33 1   RDW % 13 8 13 7   MPV fL 9 6 10 5   NRBC AUTO /100 WBCs  --  0       CMP:  Results from last 7 days   Lab Units 21  0237 21  1847   POTASSIUM mmol/L 3 7 4 6   CHLORIDE mmol/L 104 100   CO2 mmol/L 26 25   BUN mg/dL 30* 32*   CREATININE mg/dL 1 74* 1 63*   CALCIUM mg/dL 8 4 9 3   AST U/L 28 50*   ALT U/L 37 46   ALK PHOS U/L 77 87   EGFR ml/min/1 73sq m 32 34       BMP:  Results from last 7 days   Lab Units 21  1847   POTASSIUM mmol/L 3 7 4 6   CHLORIDE mmol/L 104 100   CO2 mmol/L 26 25   BUN mg/dL 30* 32*   CREATININE mg/dL 1 74* 1 63*   CALCIUM mg/dL 8 4 9 3          Lab Results   Component Value Date    NTBNP 5,455 (H) 2021    NTBNP 1,101 (H) 2020         Lipid Profile:   No results found for: CHOL  Lab Results   Component Value Date    HDL 98 2021     Lab Results   Component Value Date    LDLCALC 90 2021     Lab Results   Component Value Date    TRIG 54 2021         Cardiac testing:   Results for orders placed during the hospital encounter of 20   Echo complete with contrast if indicated    Narrative [place] [address], [address] [facility]  [phone]    Transthoracic Echocardiogram  2D, M-mode, Doppler, and Color Doppler    Study date:  09-Sep-2020    Patient: Lore Myrick  MR number: [id number]  Account number: [de-identified]  : 1960  Age: 62 years  Gender: Female  Status: Inpatient  Location: Bedside  Height: 65 in  Weight: 147 6 lb  BP: 168/ 92 mmHg    Indications: Shortness of breath      Diagnoses: R06 00 - Dyspnea, unspecified    Sonographer:  MICHELE Lawrence  Referring Physician:  Wu Wick Sean Poole PA-C  Group:  [facility]  Interpreting Physician:  Francine Monzon MD    SUMMARY    PROCEDURE INFORMATION:  This was a technically difficult study  LEFT VENTRICLE:  Size was normal   Systolic function was normal  Ejection fraction was estimated to be 60 %  Wall thickness was mildly increased  Doppler parameters were consistent with abnormal left ventricular relaxation (grade 1 diastolic dysfunction)  RIGHT VENTRICLE:  The size was normal   Systolic function was normal     TRICUSPID VALVE:  There was trace regurgitation  HISTORY: PRIOR HISTORY: Dyspnea, Breast cancer with left sided mastectomy, Hypertension, Alcohol ketosis    PROCEDURE: The procedure was performed at the bedside  This was a routine study  The transthoracic approach was used  The study included complete 2D imaging, M-mode, complete spectral Doppler, and color Doppler  The heart rate was 86 bpm,  at the start of the study  Images were obtained from the parasternal, apical, subcostal, and suprasternal notch acoustic windows  This was a technically difficult study  LEFT VENTRICLE: Size was normal  Systolic function was normal  Ejection fraction was estimated to be 60 %  There were no regional wall motion abnormalities  Wall thickness was mildly increased  DOPPLER: Doppler parameters were consistent  with abnormal left ventricular relaxation (grade 1 diastolic dysfunction)  RIGHT VENTRICLE: The size was normal  Systolic function was normal  Wall thickness was normal     LEFT ATRIUM: Size was normal     RIGHT ATRIUM: Size was normal     MITRAL VALVE: Valve structure was normal  There was normal leaflet separation  DOPPLER: The transmitral velocity was within the normal range  There was no evidence for stenosis  There was no regurgitation  AORTIC VALVE: The valve was trileaflet  Leaflets exhibited normal thickness and normal cuspal separation  DOPPLER: Transaortic velocity was within the normal range   There was no evidence for stenosis  There was no regurgitation  TRICUSPID VALVE: The valve structure was normal  There was normal leaflet separation  DOPPLER: The transtricuspid velocity was within the normal range  There was no evidence for stenosis  There was trace regurgitation  The tricuspid jet  envelope definition was inadequate for estimation of RV systolic pressure  There are no indirect findings suggestive of moderate or severe pulmonary hypertension  PULMONIC VALVE: Leaflets exhibited normal thickness, no calcification, and normal cuspal separation  DOPPLER: The transpulmonic velocity was within the normal range  There was no regurgitation  PERICARDIUM: There was no pericardial effusion  The pericardium was normal in appearance  AORTA: The root exhibited normal size  SYSTEMIC VEINS: IVC: The inferior vena cava was normal in size and course  Respirophasic changes were normal     SYSTEM MEASUREMENT TABLES    2D  %FS: 27 21 %  AV Diam: 3 22 cm  EDV(Teich): 51 26 ml  EF(Cube): 61 44 %  EF(Teich): 54 %  ESV(Cube): 16 69 ml  ESV(Teich): 23 58 ml  IVSd: 0 77 cm  LA Area: 14 43 cm2  LA Diam: 3 08 cm  LVEDV MOD A4C: 59 97 ml  LVEF MOD A4C: 73 7 %  LVESV MOD A4C: 15 77 ml  LVIDd: 3 51 cm  LVIDs: 2 56 cm  LVLd A4C: 6 92 cm  LVLs A4C: 4 91 cm  LVPWd: 0 77 cm  RA Area: 13 16 cm2  RV Diam: 2 89 cm  SI(Cube): 15 29 ml/m2  SI(Teich): 15 91 ml/m2  SV MOD A4C: 44 19 ml  SV(Cube): 26 6 ml  SV(Teich): 27 68 ml    MM  TAPSE: 1 64 cm    PW  AVC: 350 29 ms  MV A Joel: 0 86 m/s  MV Dec [place]: 5 9 m/s2  MV DecT: 128 44 ms  MV E Joel: 0 76 m/s  MV E/A Ratio: 0 88    [employer] Accredited Echocardiography Laboratory    Prepared and electronically signed by    Lesley Coto MD  Signed 09-Sep-2020 16:59:56       Imaging:   Xr Chest 1 View Portable    Result Date: 2/12/2021  Narrative: CHEST INDICATION:   Shortness of breath  COMPARISON:  December 15, 2020   EXAM PERFORMED/VIEWS:  XR CHEST PORTABLE FINDINGS: Clips overlie the left axilla  Cardiomediastinal silhouette appears unremarkable  Hiatal hernia  The lungs are clear  No pneumothorax or pleural effusion  Osseous structures appear within normal limits for patient age  Impression: No focal consolidation  Hiatal hernia  Workstation performed: [id number]     Xr Ankle 2 Vw Left    Result Date: 1/21/2021  Narrative: LEFT ANKLE INDICATION:   J60: Encounter for follow-up examination after completed treatment for conditions other than malignant neoplasm  COMPARISON:  Left ankle series 12/15/2020 VIEWS:  XR ANKLE 2 VW LEFT FINDINGS: Essentially nondisplaced distal fibular fracture again noted  Fracture line is less well seen compatible with healing fracture  Overall alignment is not significantly changed  No significant degenerative changes  No lytic or blastic osseous lesion  Decreased soft tissue swelling at the lateral malleolus  Impression: Healing fracture of the distal fibula  Workstation performed: [id number]     Vas Lower Limb Venous Duplex Study, Unilateral/limited    Result Date: 2/11/2021  Narrative:  [facility] REPORT CLINICAL: Indications: Patient presents with left lower extremity edema and pain in the foot for approximately 4 days  Patient has history of left lower extremity DVT and is on Eliquis  Operative History: 2019-06-04 Venous port removal 2018-06-26 Venous port insertion 2018-01-01 Left Mastectomy Risk Factors The patient has history of HTN and DVT  She has no history of Obesity  CONCLUSION: RIGHT LOWER LIMB LIMITED: Evaluation shows no evidence of thrombus in the common femoral vein  Doppler evaluation shows a normal response to augmentation maneuvers  LEFT LOWER LIMB: No evidence of acute or chronic deep vein thrombosis No evidence of superficial thrombophlebitis noted  Doppler evaluation shows a normal response to augmentation maneuvers   Popliteal, posterior tibial and anterior tibial arterial Doppler waveforms are triphasic  Technical findings were given to Dr Chan Arreola via tiger text  Thank you for allowing us to participate in this patient's care  Counseling / Coordination of Care  Total floor / unit time spent today 45 minutes  Greater than 50% of total time was spent with the patient and / or family counseling and / or coordination of care  A description of the counseling / coordination of care: Review of history, current assessment, development of a plan  Code Status: Level 1 - Full Code    ** Please Note: Dragon 360 Dictation voice to text software may have been used in the creation of this document   **

## 2021-02-12 NOTE — PROGRESS NOTES
[address] [facility] TEAM 2      Progress Note - Black Gomez 1960, 62 y o  female MRN: [id number]    Unit/Bed#: S -01 Encounter: [id number]    Primary Care Provider: Erna Flores DC   Date and time admitted to hospital: 2/11/2021  6:11 PM        * Localized swelling of lower extremity  Assessment & Plan  Lab Results   Component Value Date    NTBNP 5,455 (H) 02/11/2021    NTBNP 1,101 (H) 09/08/2020     Weight (last 2 days)     Date/Time   Weight    02/12/21 0600   74 3 (163 8)    02/11/21 2149   73 9 (163)    02/11/21 1817   75 5 (166 45)              Intake/Output Summary (Last 24 hours) at 2/12/2021 1156  Last data filed at 2/12/2021 1047  Gross per 24 hour   Intake --   Output 1550 ml   Net -1550 ml     · Noted with unilateral lower extremity swelling  Prior history of distal fibular fracture  No DVT noted on ultrasound  · Noted 10 lb weight gain prior weight 155 currently 166  · This is likely a bed weight and may be inaccurate  · Prior echo shows evidence of grade 1 diastolic dysfunction  · Venous duplex negative for DVT    Plan:  · Cardiology on board- no need to perform a repeat echo  · Continue furosemide 20 mg IV daily  · Daily weight-standing weight only  · Ensure strict I/Os  · Cardiac diet, sodium restriction 2 g, fluid restriction 1800 mL  · Discussed the importance of maintaining well-balanced diet, plan predominant, reducing red meat consumption and salty foods    OPAL (acute kidney injury) [facility])  Assessment & Plan  Recent Labs     02/11/21  1847 02/12/21  0237   CREATININE 1 63* 1 74*   EGFR 34 32       Workup:   Present on admission (POA) ; admission creatinine:  1 63 (baseline 1-1 1),   Urinalysis:  spec grav 1 010   Etiology:   This is likely cardiorenal syndrome    Plan:   Monitor BMP daily and observe for downward trend of creatinine   Diuresis as noted above   Avoid hypoperfusion of the kidneys, minimize nephrotoxins  · Medications being held:  None    Alcohol abuse  Assessment & Plan  · Patient admits to drinking daily  She states she drinks approximately 3-4 shots every day  · No history of withdrawal seizures or withdrawal symptoms  · [facility] protocol    Essential hypertension  Assessment & Plan  Blood Pressure: 148/78    · Continue verapamil  · Monitor BP    Malignant neoplasm of overlapping sites of left breast in female, estrogen receptor positive ([facility] [address] )  Assessment & Plan  · Outpatient follow-up  · Continue letrozole  Nutrition Assessment and Intervention:     Recommended completion of food recall journal    Referral given to nutritionist or nutrition shared medical appointment      Other interventions: Recommend lifestyle modifications such as: Incorporating a more whole foods plant-predominant diet along with decreasing consumption of red meats and processed foods      Physical Activity Assessment and Intervention:    Activity journal completion recommended    Exercise capacity assessment recommended      Other interventions:   Recommend lifestyle modifications such as:  Per CDC guidelines, recommend moderate-vigorous intensity exercise for 30 minutes a day for 5 days a week or a total of 150 min/week      Emotional and Mental Well-being, Sleep, Connectedness Assessment and Intervention:    Counseled regarding sleep hygiene and aspects of healthy sleep    Mindfulness program recommended/explained      Tobacco and Toxic Substance Assessment and Intervention:     Tobacco cessation counseling provided    Alcohol cessation counseling provided          VTE Pharmacologic Prophylaxis:   Pharmacologic: Apixaban (Eliquis)  Mechanical VTE Prophylaxis in Place: Yes    Discussions with Specialists or Other Care Team Provider:  Primary care team, Cardiology    Education and Discussions with Family / Patient:  Patient only    Current Length of Stay: 1 day(s)    Current Patient Status: Inpatient     Discharge Plan / Estimated Discharge Date:   Within 24-48 hours    Code Status: Level 1 - Full Code      Subjective: Today, patient states she has no complaints  She denies feeling shortness of breath unless she is walking to the bathroom and rushing  He denies any cough, any orthopnea, PND  Patient admits to having poorly controlled diet, primarily fast foods and salty foods  Agreeable to increasing vegetable intake  Objective:     Vitals:   Temp (24hrs), Av °F (36 7 °C), Min:97 °F (36 1 °C), Max:98 7 °F (37 1 °C)    Temp:  [97 °F (36 1 °C)-98 7 °F (37 1 °C)] 98 2 °F (36 8 °C)  HR:  [62-84] 84  Resp:  [14-18] 16  BP: (104-156)/(63-78) 148/78  SpO2:  [88 %-99 %] 97 %  Body mass index is 27 26 kg/m²  Input and Output Summary (last 24 hours): Intake/Output Summary (Last 24 hours) at 2021 1213  Last data filed at 2021 1047  Gross per 24 hour   Intake --   Output 1550 ml   Net -1550 ml       Physical Exam:     Physical Exam  Vitals signs and nursing note reviewed  Constitutional:       General: She is not in acute distress  Appearance: Normal appearance  She is well-developed  She is not toxic-appearing or diaphoretic  HENT:      Head: Normocephalic and atraumatic  Eyes:      General: No scleral icterus  Conjunctiva/sclera: Conjunctivae normal    Neck:      Musculoskeletal: Neck supple  Trachea: Phonation normal    Cardiovascular:      Rate and Rhythm: Normal rate and regular rhythm  Pulses: Normal pulses  Heart sounds: Normal heart sounds, S1 normal and S2 normal  Heart sounds not distant  No murmur  No friction rub  No gallop  Pulmonary:      Effort: Pulmonary effort is normal  No accessory muscle usage or respiratory distress  Breath sounds: Normal breath sounds  No wheezing, rhonchi or rales  Chest:      Chest wall: No mass  Abdominal:      General: Bowel sounds are normal  There is no distension  Palpations: Abdomen is soft  There is no mass  Tenderness: There is no abdominal tenderness     Musculoskeletal: Right lower le+ Edema present  Left lower le+ Edema present  Skin:     General: Skin is warm and dry  Neurological:      Mental Status: She is alert and oriented to person, place, and time  Psychiatric:         Mood and Affect: Mood normal          Behavior: Behavior normal          Thought Content: Thought content normal          Judgment: Judgment normal        Additional Data:     Labs:    Results from last 7 days   Lab Units 21  0237 21  1847   WBC Thousand/uL 3 87* 4 00*   HEMOGLOBIN g/dL 10 3* 10 9*   HEMATOCRIT % 31 7* 32 9*   PLATELETS Thousands/uL 257 278   NEUTROS PCT %  --  62   LYMPHS PCT %  --  19   MONOS PCT %  --  13*   EOS PCT %  --  4     Results from last 7 days   Lab Units 21  0237   POTASSIUM mmol/L 3 7   CHLORIDE mmol/L 104   CO2 mmol/L 26   BUN mg/dL 30*   CREATININE mg/dL 1 74*   CALCIUM mg/dL 8 4   ALK PHOS U/L 77   ALT U/L 37   AST U/L 28           * I Have Reviewed All Lab Data Listed Above  * Additional Pertinent Lab Tests Reviewed:  Roxanne 66 Admission Reviewed    Imaging:    Imaging Reports Reviewed Today Include:  None  Imaging Personally Reviewed by Myself Includes:  None    Recent Cultures (last 7 days):           Last 24 Hours Medication List:   Current Facility-Administered Medications   Medication Dose Route Frequency Provider Last Rate    apixaban  5 mg Oral Daily Froilan Salgado PA-C      folic acid 1 mg, thiamine 100 mg in 0 9% sodium chloride 100 mL IVPB   Intravenous Daily Froilan Salgado PA-C      furosemide  20 mg Intravenous Daily Froilan Salgado PA-C      gabapentin  300 mg Oral TID Esau Crane PA-C      letrozole  2 5 mg Oral Daily Froilan Salgado PA-C      sertraline  100 mg Oral HS Froilan Salgado PA-C      sertraline  50 mg Oral Daily Froilan Salgado PA-C      tiZANidine  2 mg Oral HS Froilan Salgado PA-C      verapamil  240 mg Oral Daily Froilan Salgado PA-C          Today, Patient Was Seen By: Gordo Tatum, DO    ** Please Note: This note has been constructed using a voice recognition system   **

## 2021-02-12 NOTE — ASSESSMENT & PLAN NOTE
Lab Results   Component Value Date    NTBNP 5,455 (H) 02/11/2021    NTBNP 1,101 (H) 09/08/2020     Weight (last 2 days)     Date/Time   Weight    02/12/21 0600   74 3 (163 8)    02/11/21 2149   73 9 (163)    02/11/21 1817   75 5 (166 45)              Intake/Output Summary (Last 24 hours) at 2/12/2021 1156  Last data filed at 2/12/2021 1047  Gross per 24 hour   Intake --   Output 1550 ml   Net -1550 ml     · Noted with unilateral lower extremity swelling  Prior history of distal fibular fracture  No DVT noted on ultrasound    · Noted 10 lb weight gain prior weight 155 currently 166  · This is likely a bed weight and may be inaccurate  · Prior echo shows evidence of grade 1 diastolic dysfunction  · Venous duplex negative for DVT    Plan:  · Cardiology on board- no need to perform a repeat echo  · Continue furosemide 20 mg IV daily  · Daily weight-standing weight only  · Ensure strict I/Os  · Cardiac diet, sodium restriction 2 g, fluid restriction 1800 mL  · Discussed the importance of maintaining well-balanced diet, plan predominant, reducing red meat consumption and salty foods

## 2021-02-12 NOTE — ASSESSMENT & PLAN NOTE
· Present on admission with creatinine 1 63   · Prior baseline appears to be around 1-1 1  · This is likely in the setting of cardiorenal syndrome  · Prior has diuresis    · Trend renal function

## 2021-02-12 NOTE — H&P
H&P- Oumou Ram 1960, 62 y o  female MRN: [id number]  Unit/Bed#: ED 06 Encounter: [id number]  Primary Care Provider: Sam Carolina DC   Date and time admitted to hospital: 2/11/2021  6:11 PM    * Localized swelling of lower extremity  Assessment & Plan  · Noted with unilateral lower extremity swelling  Prior history of distal fibular fracture  No DVT noted on ultrasound  · Noted 10 lb weight gain prior weight 155 currently 166  This is likely a bed weight and may be inaccurate  · Prior echo shows evidence of grade 1 diastolic dysfunction  · bnp elevated at > 5k   · Will treat for congestive heart failure for now  · Hold a new echo and defer to Cardiology  · Consult Cardiology  · IV Lasix  · Daily weight-standing weight  · I/O   · Cardiac diet    [facility]  Assessment & Plan  · Present on admission with creatinine 1 63   · Prior baseline appears to be around 1-1 1  · This is likely in the setting of cardiorenal syndrome  · Prior has diuresis  · Trend renal function    Alcohol abuse  Assessment & Plan  · Patient admits to drinking daily  She states she drinks approximately 3-4 shots every day  · No history of withdrawal seizures or withdrawal symptoms  · [facility] protocol    Malignant neoplasm of overlapping sites of left breast in female, estrogen receptor positive ([facility] )  Assessment & Plan  · Outpatient follow-up  · Continue letrozole    Essential hypertension  Assessment & Plan  · BP reviewed and stable  · Continue verapamil      VTE Prophylaxis: Apixaban (Eliquis)  / sequential compression device   Code Status: full   POLST: There is no POLST form on file for this patient (pre-hospital)  Discussion with family: patient with the  at the bed side     Anticipated Length of Stay:  Patient will be admitted on an Inpatient basis with an anticipated length of stay of  > 2 midnights  Justification for Hospital Stay: dyspnea on exertion and lower leg swelling   Will need to be evaluated by cardiology     Total Time for Visit, including Counseling / Coordination of Care: 1 hour  Greater than 50% of this total time spent on direct patient counseling and coordination of care  Chief Complaint:   Leg swelling    History of Present Illness:    Noble Rios is a 62 y o  female who presents with leg swelling  Past medical history significant for breast cancer status post mastectomy, venous thromboembolism on Eliquis, migraines, hypertension  Presents to the emergency department for 2 week history of lower extremity edema  Patient had negative DVT Dopplers done in the ED and has been anticoagulated on Eliquis  She states that her lower extremity edema has worsened over the past 2 weeks  She states that she is also short of breath with exertion especially while walking through the grocery store  She denies any orthopnea  She states that she has also had approximately a 20 lb weight gain over the past 3 months  She does have dietary indiscretions and states that she eats salt frequently  Last known echo shows no acute heart failure signs  Patient also states that she is developing a rash on her left lower extremity and that is very itchy  She denies any changes in her diet  She denies any new detergents, or any new lotions  Review of Systems:    Review of Systems   Constitutional: Positive for fatigue  Negative for chills, fever and unexpected weight change  Respiratory: Positive for shortness of breath  Negative for cough and chest tightness  Cardiovascular: Negative for chest pain and palpitations  Gastrointestinal: Negative for abdominal pain, diarrhea, nausea and vomiting  Genitourinary: Negative for decreased urine volume, dysuria, frequency and urgency  Musculoskeletal: Negative for arthralgias  Neurological: Negative for dizziness, syncope, light-headedness and headaches  All other systems reviewed and are negative        Past Medical and Surgical History:     Past Medical History:   Diagnosis Date    Acute DVT (deep venous thrombosis) (HCC)     of LLE>     Bladder infection     Congenital prolapsed rectum     History of biliary stent insertion     History of chemotherapy     History of radiation therapy 05/2018    left breast ca    History of transfusion     x2 s/p chemo treatments, no reaction    Hydronephrosis     right kidney    Hypertension     Malignant neoplasm of overlapping sites of left female breast ([facility] [address] ) 05/01/2018    Migraine        Past Surgical History:   Procedure Laterality Date    ABDOMINAL ADHESION SURGERY N/A 4/23/2019    Procedure: LYSIS ADHESIONS;  Surgeon: Dennie Pang, MD;  [facility];  Service: Colorectal    BREAST BIOPSY      COLON SURGERY      colon resection    CYSTOSCOPY N/A 3/4/2019    Procedure: CYSTOSCOPY WITH BIOPSIES AND [place] OF RECTUM PROLAPSE;  Surgeon: Marisa Felton MD;  [facility];  Service: Urology    ERCP N/A 1/4/2019    Procedure: ENDOSCOPIC RETROGRADE CHOLANGIOPANCREATOGRAPHY (ERCP); Surgeon: Adriana Tabor MD;  [facility];  Service: Gastroenterology    INSTILLATION MYTOMYCIN N/A 3/4/2019    Procedure: Elinuris Vega;  Surgeon: Marisa Felton MD;  [facility];  Service: Urology    MASTECTOMY Left     2018    WI COLONOSCOPY FLX DX W/COLLJ SPEC WHEN PFRMD N/A 4/22/2019    Procedure: COLONOSCOPY;  Surgeon: Dennie Pang, MD;  Location: [facility]; Service: Colorectal    WI EDG US EXAM SURGICAL ALTER STOM DUODENUM/JEJUNUM N/A 3/7/2019    Procedure: LINEAR ENDOSCOPIC U/S;  Surgeon: Remi Collins MD;  Location: [facility]; Service: Gastroenterology    WI ESOPHAGOGASTRODUODENOSCOPY TRANSORAL DIAGNOSTIC N/A 3/7/2019    Procedure: ESOPHAGOGASTRODUODENOSCOPY (EGD); Surgeon: Remi Collins MD;  [facility]: [facility];   Service: Gastroenterology    WI INSJ TUNNELED CTR VAD W/SUBQ PORT AGE 5 YR/> N/A 6/26/2018    Procedure: INSERTION VENOUS PORT ( PORT-A-CATH) IR;  Surgeon: Lucinda Bermudez DO;  [facility];  Service: Interventional Radiology    DC LAP, SURG PROCTOPEXY N/A 4/23/2019    Procedure: LAPAROSCOPIC HAND-ASSIST PELVIC DISSECTION; proctopexy;  Surgeon: Seda Quintanilla MD;  [facility];  Service: Colorectal    DC LAP, SURG PROCTOPEXY N/A 11/18/2020    Procedure: LAPAROSCOPIC LYSIS OF ADHESIONS, MOBILIZATION OF RECTUM AND SUTURE RECTOPEXY;  Surgeon: Soumya Ricci MD;  [facility];  Service: Colorectal    DC MASTECTOMY, MODIFIED RADICAL Left 5/15/2018    Procedure: BREAST MODIFIED RADICAL MASTECTOMY;  Surgeon: Luma Gaines MD;  [facility];  Service: Surgical Oncology    DC RMVL [place] CTR VAD W/SUBQ PORT/ CTR/PRPH INSJ N/A 6/4/2019    Procedure: REMOVAL VENOUS PORT (PORT-A-CATH)IR;  Surgeon: Lucinda Bermudez DO;  [facility];  Service: Interventional Radiology    TUBAL LIGATION      US GUIDANCE BREAST BIOPSY LEFT EACH ADDITIONAL Left 4/3/2018    US GUIDED BREAST BIOPSY LEFT COMPLETE Left 4/3/2018    US GUIDED BREAST LYMPH NODE BIOPSY LEFT Left 4/3/2018       Meds/Allergies:    Prior to Admission medications    Medication Sig Start Date End Date Taking?  Authorizing Provider   acetaminophen-codeine (TYLENOL with CODEINE #3) 300-30 MG per tablet Take 1 tablet by mouth every 8 (eight) hours as needed 1/27/21   Historical Provider, MD   apixaban (ELIQUIS) 5 mg Take 2 tablets (10 mg total) by mouth 2 (two) times a day  Patient taking differently: Take 5 mg by mouth daily  12/18/20   Fannie Chris MD   folic acid (FOLVITE) 1 mg tablet Take 1 tablet (1 mg total) by mouth daily 9/11/20   Amaya Duenas PA-C   gabapentin (NEURONTIN) 300 mg capsule Take 1 capsule (300 mg total) by mouth 3 (three) times a day 5/28/19   Jimmy Del Rio MD   letrozole [facility]) 2 5 mg tablet TAKE 1 TABLET BY MOUTH EVERY DAY 11/16/20   Jimmy Del Rio MD   meloxicam (MOBIC) 15 mg tablet Take 1 tablet (15 mg total) by mouth daily as needed for moderate pain for up to 15 days  Patient not taking: Reported on 2/11/2021 1/14/21 1/29/21  Wendy Anthony MD   sertraline (ZOLOFT) 100 mg tablet Take 100 mg by mouth daily 4/16/19   Historical Provider, MD   sertraline (ZOLOFT) 50 mg tablet Take 50 mg by mouth 2 (two) times a day  10/11/18   Historical Provider, MD   SUMAtriptan (IMITREX) 100 mg tablet Take 100 mg by mouth once as needed for migraine    Historical Provider, MD   thiamine 100 MG tablet Take 1 tablet (100 mg total) by mouth daily  Patient not taking: Reported on 1/15/2021 9/11/20   Yordy Duenas PA-C   tiZANidine (ZANAFLEX) 2 mg tablet Take 2-4 mg by mouth daily at bedtime 1/27/21   Historical Provider, MD   verapamil (VERELAN) 240 MG 24 hr capsule Take 240 mg by mouth daily 10/16/18   Historical Provider, MD     I have reviewed home medications with patient personally  Allergies:    Allergies   Allergen Reactions    Gluten Meal GI Intolerance    Lactose GI Intolerance       Social History:     Marital Status: /Civil Union   Occupation: unknown   Patient Pre-hospital Living Situation: lives at home   Patient Pre-hospital Level of Mobility: ambulates   Patient Pre-hospital Diet Restrictions: cardiac   Substance Use History:   Social History     Substance and Sexual Activity   Alcohol Use Not Currently     Social History     Tobacco Use   Smoking Status Never Smoker   Smokeless Tobacco Never Used     Social History     Substance and Sexual Activity   Drug Use Not Currently       Family History:    Family History   Problem Relation Age of Onset    No Known Problems Mother     No Known Problems Father     Breast cancer Maternal Aunt 48    Colon cancer Maternal Aunt     No Known Problems Sister     No Known Problems Daughter     No Known Problems Maternal Grandmother     No Known Problems Maternal Grandfather     No Known Problems Paternal Grandmother     No Known Problems Paternal Grandfather        Physical Exam:     Vitals:   Blood Pressure: 123/70 (02/11/21 2030)  Pulse: 62 (02/11/21 2030)  Temperature: 98 5 °F (36 9 °C) (02/11/21 2030)  Temp Source: Oral (02/11/21 2030)  Respirations: 18 (02/11/21 2030)  Weight - Scale: 75 5 kg (166 lb 7 2 oz) (02/11/21 1817)  SpO2: 95 % (02/11/21 2030)    Physical Exam  Constitutional:       General: She is not in acute distress  Appearance: She is not diaphoretic  HENT:      Head: Normocephalic and atraumatic  Mouth/Throat:      Mouth: Mucous membranes are moist       Pharynx: Oropharynx is clear  No oropharyngeal exudate  Eyes:      General:         Right eye: No discharge  Left eye: No discharge  Conjunctiva/sclera: Conjunctivae normal       Pupils: Pupils are equal, round, and reactive to light  Neck:      Musculoskeletal: Neck supple  No muscular tenderness  Cardiovascular:      Rate and Rhythm: Normal rate and regular rhythm  Pulses: Normal pulses  Heart sounds: Normal heart sounds  No murmur  Pulmonary:      Effort: Pulmonary effort is normal  No respiratory distress  Breath sounds: No wheezing or rales  Comments: Decreased at the bases  Abdominal:      General: Abdomen is flat  There is no distension  Palpations: Abdomen is soft  Tenderness: There is no abdominal tenderness  Musculoskeletal: Normal range of motion  Right lower leg: Edema present  Left lower leg: Edema present  Comments: Plus two left lower extremity edema  Skin:     General: Skin is warm and dry  Capillary Refill: Capillary refill takes less than 2 seconds  Coloration: Skin is not jaundiced  Findings: Rash present  Comments: Rash on the inner aspect of left lower extremity  Neurological:      General: No focal deficit present  Mental Status: She is alert and oriented to person, place, and time  Cranial Nerves: No cranial nerve deficit  Psychiatric:         Mood and Affect: Mood normal            Additional Data:     Lab Results: I have personally reviewed pertinent reports  Results from last 7 days   Lab Units 02/11/21  1847   WBC Thousand/uL 4 00*   HEMOGLOBIN g/dL 10 9*   HEMATOCRIT % 32 9*   PLATELETS Thousands/uL 278   NEUTROS PCT % 62   LYMPHS PCT % 19   MONOS PCT % 13*   EOS PCT % 4     Results from last 7 days   Lab Units 02/11/21  1847   SODIUM mmol/L 136   POTASSIUM mmol/L 4 6   CHLORIDE mmol/L 100   CO2 mmol/L 25   BUN mg/dL 32*   CREATININE mg/dL 1 63*   ANION GAP mmol/L 11   CALCIUM mg/dL 9 3   ALBUMIN g/dL 3 8   TOTAL BILIRUBIN mg/dL 0 47   ALK PHOS U/L 87   ALT U/L 46   AST U/L 50*   GLUCOSE RANDOM mg/dL 89                       Imaging: I have personally reviewed pertinent reports  VAS lower limb venous duplex study, unilateral/limited    (Results Pending)   XR chest 1 view portable    (Results Pending)       EKG, Pathology, and Other Studies Reviewed on Admission:   · CXR: no acute disease on my read     [place]ri[place] / [place] Records Reviewed: Yes     ** Please Note: This note has been constructed using a voice recognition system   **

## 2021-02-12 NOTE — ASSESSMENT & PLAN NOTE
· Patient admits to drinking daily  She states she drinks approximately 3-4 shots every day    · No history of withdrawal seizures or withdrawal symptoms  · [facility] protocol

## 2021-02-12 NOTE — ED PROVIDER NOTES
History  Chief Complaint   Patient presents with    Leg Swelling     PT present with LLE "swelling, pain, and itching x2/3 days" Was sent here for further eval, has paperwork for a dopplar  50-year-old female presents the emergency department from urgent care for evaluation of left lower extremity swelling  Patient states that she has noticed a moderate increase in the size of her left calf compared to her right over the past 2 to 3 days  Patient states that she has pain in the calf and heel when she ambulates  She has also noticed occasional itching of the lower extremity  She denies associated fevers or chills  She has noticed that she has been very short of breath over the past 1 month  This is worsening  She reports having increased weight gain  She has a history of left lower extremity DVT and pulmonary embolism  She takes Eliquis  She has been compliant with her medications  History provided by:  Patient and medical records   used: No    Other  Location:  Left lower extremity swelling  Quality:  Pain with ambulation  Severity:  Severe  Onset quality:  Gradual  Duration:  3 days  Timing:  Constant  Progression:  Worsening  Chronicity:  New  Context:  Patient has history of left lower extremity DVT, also had recent left fibular fracture that has healed  Relieved by:  Nothing  Worsened by:  Ambulating  Ineffective treatments:  None  Associated symptoms: shortness of breath    Associated symptoms: no abdominal pain, no chest pain, no cough, no fever, no headaches and no vomiting        Prior to Admission Medications   Prescriptions Last Dose Informant Patient Reported? Taking?    SUMAtriptan (IMITREX) 100 mg tablet  Self Yes No   Sig: Take 100 mg by mouth once as needed for migraine   acetaminophen-codeine (TYLENOL with CODEINE #3) 300-30 MG per tablet   Yes No   Sig: Take 1 tablet by mouth every 8 (eight) hours as needed   apixaban (ELIQUIS) 5 mg  Self No No   Sig: Take 2 tablets (10 mg total) by mouth 2 (two) times a day   Patient taking differently: Take 5 mg by mouth daily    folic acid (FOLVITE) 1 mg tablet  Self No No   Sig: Take 1 tablet (1 mg total) by mouth daily   gabapentin (NEURONTIN) 300 mg capsule  Self No No   Sig: Take 1 capsule (300 mg total) by mouth 3 (three) times a day   letrozole (FEMARA) 2 5 mg tablet  Self No No   Sig: TAKE 1 TABLET BY MOUTH EVERY DAY   meloxicam (MOBIC) 15 mg tablet  Self No No   Sig: Take 1 tablet (15 mg total) by mouth daily as needed for moderate pain for up to 15 days   Patient not taking: Reported on 2/11/2021   sertraline (ZOLOFT) 100 mg tablet  Self Yes No   Sig: Take 100 mg by mouth daily   sertraline (ZOLOFT) 50 mg tablet  Self Yes No   Sig: Take 50 mg by mouth 2 (two) times a day    thiamine 100 MG tablet  Self No No   Sig: Take 1 tablet (100 mg total) by mouth daily   Patient not taking: Reported on 1/15/2021   tiZANidine (ZANAFLEX) 2 mg tablet   Yes No   Sig: Take 2-4 mg by mouth daily at bedtime   verapamil (VERELAN) 240 MG 24 hr capsule  Self Yes No   Sig: Take 240 mg by mouth daily      Facility-Administered Medications: None       Past Medical History:   Diagnosis Date    Acute DVT (deep venous thrombosis) (HCC)     of LLE>     Bladder infection     Congenital prolapsed rectum     History of biliary stent insertion     History of chemotherapy     History of radiation therapy 05/2018    left breast ca    History of transfusion     x2 s/p chemo treatments, no reaction    Hydronephrosis     right kidney    Hypertension     Malignant neoplasm of overlapping sites of left female breast ([facility] [address] ) 05/01/2018    Migraine        Past Surgical History:   Procedure Laterality Date    ABDOMINAL ADHESION SURGERY N/A 4/23/2019    Procedure: LYSIS ADHESIONS;  Surgeon: Nancy Payne MD;  [facility];  Service: Colorectal    BREAST BIOPSY      COLON SURGERY      colon resection    CYSTOSCOPY N/A 3/4/2019    Procedure: CYSTOSCOPY WITH BIOPSIES AND FULGERATION AND REDCUTION OF RECTUM PROLAPSE;  Surgeon: Mere Prince MD;  [facility];  Service: Urology    ERCP N/A 1/4/2019    Procedure: ENDOSCOPIC RETROGRADE CHOLANGIOPANCREATOGRAPHY (ERCP); Surgeon: Tamanna Donahue MD;  [facility];  Service: Gastroenterology    INSTILLATION MYTOMYCIN N/A 3/4/2019    Procedure: Bret Kempl;  Surgeon: Mere Prince MD;  [facility];  Service: Urology    MASTECTOMY Left     2018    NE COLONOSCOPY FLX DX W/COLLJ SPEC WHEN PFRMD N/A 4/22/2019    Procedure: COLONOSCOPY;  Surgeon: Chavez Raymond MD;  Location: [facility]; Service: Colorectal    NE EDG US EXAM SURGICAL ALTER STOM DUODENUM/JEJUNUM N/A 3/7/2019    Procedure: LINEAR ENDOSCOPIC U/S;  Surgeon: Chidi Dumont MD;  [facility]; Service: Gastroenterology    NE ESOPHAGOGASTRODUODENOSCOPY TRANSORAL DIAGNOSTIC N/A 3/7/2019    Procedure: ESOPHAGOGASTRODUODENOSCOPY (EGD); Surgeon: Chidi Dumont MD;  [facility];   Service: Gastroenterology    NE INSJ TUNNELED CTR VAD W/SUBQ PORT AGE 5 YR/> N/A 6/26/2018    Procedure: INSERTION VENOUS PORT ( PORT-A-CATH) IR;  Surgeon: Amber Taylor DO;  [facility];  Service: Interventional Radiology    NE LAP, SURG PROCTOPEXY N/A 4/23/2019    Procedure: LAPAROSCOPIC HAND-ASSIST PELVIC DISSECTION; proctopexy;  Surgeon: Chavez Raymond MD;  [facility];  Service: Colorectal    NE LAP, SURG PROCTOPEXY N/A 11/18/2020    Procedure: LAPAROSCOPIC LYSIS OF ADHESIONS, MOBILIZATION OF RECTUM AND SUTURE RECTOPEXY;  Surgeon: Conor Mckoy MD;  [facility];  Service: Colorectal    NE MASTECTOMY, MODIFIED RADICAL Left 5/15/2018    Procedure: BREAST MODIFIED RADICAL MASTECTOMY;  Surgeon: Bryon Maharaj MD;  [facility];  Service: Surgical Oncology    NE RMVL [place] CTR VAD W/SUBQ PORT/ CTR/PRPH INSJ N/A 6/4/2019    Procedure: REMOVAL VENOUS PORT (PORT-A-CATH)IR;  Surgeon: Vanessa Garcia DO;  [facility];  Service: Interventional Radiology    TUBAL LIGATION      US GUIDANCE BREAST BIOPSY LEFT EACH ADDITIONAL Left 4/3/2018    US GUIDED BREAST BIOPSY LEFT COMPLETE Left 4/3/2018    US GUIDED BREAST LYMPH NODE BIOPSY LEFT Left 4/3/2018       Family History   Problem Relation Age of Onset    No Known Problems Mother     No Known Problems Father     Breast cancer Maternal Aunt 48    Colon cancer Maternal Aunt     No Known Problems Sister     No Known Problems Daughter     No Known Problems Maternal Grandmother     No Known Problems Maternal Grandfather     No Known Problems Paternal Grandmother     No Known Problems Paternal Grandfather      I have reviewed and agree with the history as documented  E-Cigarette/Vaping    E-Cigarette Use Never User      E-Cigarette/Vaping Substances     Social History     Tobacco Use    Smoking status: Never Smoker    Smokeless tobacco: Never Used   Substance Use Topics    Alcohol use: Not Currently    Drug use: Not Currently       Review of Systems   Constitutional: Negative for fever  Respiratory: Positive for shortness of breath  Negative for cough  Cardiovascular: Positive for leg swelling  Negative for chest pain  Gastrointestinal: Negative for abdominal pain and vomiting  Genitourinary: Negative for dysuria  Skin: Negative for wound  Neurological: Negative for weakness and headaches  All other systems reviewed and are negative  Physical Exam  Physical Exam  Vitals signs and nursing note reviewed  Constitutional:       Appearance: She is well-developed  HENT:      Head: Normocephalic  Nose: Nose normal       Mouth/Throat:      Mouth: Mucous membranes are dry  Pharynx: No oropharyngeal exudate  Eyes:      Conjunctiva/sclera: Conjunctivae normal       Pupils: Pupils are equal, round, and reactive to light     Neck:      Musculoskeletal: Normal range of motion and neck supple  Cardiovascular:      Rate and Rhythm: Normal rate and regular rhythm  Heart sounds: Normal heart sounds  Pulmonary:      Effort: Pulmonary effort is normal       Breath sounds: Rales present  Abdominal:      General: Bowel sounds are normal  There is no distension  Palpations: Abdomen is soft  Tenderness: There is no abdominal tenderness  There is no guarding or rebound  Musculoskeletal: Normal range of motion  General: Swelling present  No deformity  Right lower leg: No edema  Left lower leg: She exhibits tenderness and swelling  Edema present  Legs:    Lymphadenopathy:      Cervical: No cervical adenopathy  Skin:     General: Skin is warm and dry  Findings: No rash  Neurological:      Mental Status: She is alert and oriented to person, place, and time  Cranial Nerves: No cranial nerve deficit  Sensory: No sensory deficit  Motor: No abnormal muscle tone  Coordination: Coordination normal       Gait: Gait normal       Deep Tendon Reflexes: Reflexes are normal and symmetric  Psychiatric:         Behavior: Behavior normal          Thought Content:  Thought content normal          Judgment: Judgment normal          Vital Signs  ED Triage Vitals [02/11/21 1817]   Temperature Pulse Respirations Blood Pressure SpO2   98 7 °F (37 1 °C) 73 18 129/65 99 %      Temp Source Heart Rate Source Patient Position - Orthostatic VS BP Location FiO2 (%)   Oral Monitor Lying Right arm --      Pain Score       5           Vitals:    02/11/21 1817 02/11/21 2030 02/11/21 2135 02/11/21 2149   BP: 129/65 123/70 116/70 124/72   Pulse: 73 62 66 64   Patient Position - Orthostatic VS: Lying Lying Lying Lying         Visual Acuity      ED Medications  Medications   apixaban (ELIQUIS) tablet 5 mg (has no administration in time range)   folic acid (FOLVITE) tablet 1 mg (has no administration in time range)   gabapentin (NEURONTIN) capsule 300 mg (300 mg Oral Given 2/11/21 2315)   letrozole On license of [facility]) tablet 2 5 mg (has no administration in time range)   sertraline (ZOLOFT) tablet 100 mg (100 mg Oral Given 2/11/21 2315)   sertraline (ZOLOFT) tablet 50 mg (has no administration in time range)   tiZANidine (ZANAFLEX) tablet 2 mg (2 mg Oral Given 2/11/21 2315)   verapamil (CALAN-SR) CR tablet 240 mg (has no administration in time range)   furosemide (LASIX) injection 20 mg (has no administration in time range)   folic acid 1 mg, thiamine (VITAMIN B1) 100 mg in sodium chloride 0 9 % 100 mL IV piggyback (has no administration in time range)       Diagnostic Studies  Results Reviewed     Procedure Component Value Units Date/Time    Urine Microscopic [[id number]]  (Normal) Collected: 02/11/21 2039    Lab Status: Final result Specimen: Urine Updated: 02/11/21 2131     RBC, UA None Seen /hpf      WBC, UA None Seen /hpf      Epithelial Cells Occasional /hpf      Bacteria, UA None Seen /hpf     Urine Macroscopic, POC [[id number]]  (Abnormal) Collected: 02/11/21 2039    Lab Status: Final result Specimen: Urine Updated: 02/11/21 2041     Color, UA Yellow     Clarity, UA Clear     pH, UA 6 0     Leukocytes, UA Negative     Nitrite, UA Negative     Protein, UA Trace mg/dl      Glucose, UA Negative mg/dl      Ketones, UA Negative mg/dl      Urobilinogen, UA 0 2 E U /dl      Bilirubin, UA Negative     Blood, UA Moderate     Specific Delong, UA 1 010    Narrative:      CLINITEK RESULT    Lipase [[id number]]  (Normal) Collected: 02/11/21 1847    Lab Status: Final result Specimen: Blood from Arm, Right Updated: 02/11/21 1937     Lipase 129 u/L     Comprehensive metabolic panel [[id number]]  (Abnormal) Collected: 02/11/21 1847    Lab Status: Final result Specimen: Blood from Arm, Right Updated: 02/11/21 1937     Sodium 136 mmol/L      Potassium 4 6 mmol/L      Chloride 100 mmol/L      CO2 25 mmol/L      ANION GAP 11 mmol/L      BUN 32 mg/dL      Creatinine 1 63 mg/dL      Glucose 89 mg/dL Calcium 9 3 mg/dL      AST 50 U/L      ALT 46 U/L      Alkaline Phosphatase 87 U/L      Total Protein 8 1 g/dL      Albumin 3 8 g/dL      Total Bilirubin 0 47 mg/dL      eGFR 34 ml/min/1 73sq m     Narrative:      [place] guidelines for Chronic Kidney Disease (CKD):     Stage 1 with normal or high GFR (GFR > 90 mL/min/1 73 square meters)    Stage 2 Mild CKD (GFR = 60-89 mL/min/1 73 square meters)    Stage 3A Moderate CKD (GFR = 45-59 mL/min/1 73 square meters)    Stage 3B Moderate CKD (GFR = 30-44 mL/min/1 73 square meters)    Stage 4 Severe CKD (GFR = 15-29 mL/min/1 73 square meters)    Stage 5 End Stage CKD (GFR <15 mL/min/1 73 square meters)  Note: GFR calculation is accurate only with a steady state creatinine    NT-BNP PRO [[id number]]  (Abnormal) Collected: 02/11/21 1847    Lab Status: Final result Specimen: Blood from Arm, Right Updated: 02/11/21 1937     NT-proBNP 5,455 pg/mL     Troponin I [[id number]]  (Normal) Collected: 02/11/21 1847    Lab Status: Final result Specimen: Blood from Arm, Right Updated: 02/11/21 1929     Troponin I <0 02 ng/mL     CBC and differential [[id number]]  (Abnormal) Collected: 02/11/21 1847    Lab Status: Final result Specimen: Blood from Arm, Right Updated: 02/11/21 1910     WBC 4 00 Thousand/uL      RBC 3 19 Million/uL      Hemoglobin 10 9 g/dL      Hematocrit 32 9 %       fL      MCH 34 2 pg      MCHC 33 1 g/dL      RDW 13 7 %      MPV 10 5 fL      Platelets 771 Thousands/uL      nRBC 0 /100 WBCs      Neutrophils Relative 62 %      Immat GRANS % 1 %      Lymphocytes Relative 19 %      Monocytes Relative 13 %      Eosinophils Relative 4 %      Basophils Relative 1 %      Neutrophils Absolute 2 54 Thousands/µL      Immature Grans Absolute 0 02 Thousand/uL      Lymphocytes Absolute 0 76 Thousands/µL      Monocytes Absolute 0 50 Thousand/µL      Eosinophils Absolute 0 14 Thousand/µL      Basophils Absolute 0 04 Thousands/µL VAS lower limb venous duplex study, unilateral/limited    (Results Pending)   XR chest 1 view portable    (Results Pending)              Procedures  ECG 12 Lead Documentation Only    Date/Time: 2/11/2021 8:47 PM  Performed by: Nabeel Vasquez DO  Authorized by: Nabeel Vasquez DO     Indications / Diagnosis:  Edema  ECG reviewed by me, the ED Provider: yes    Patient location:  ED  Previous ECG:     Previous ECG:  Unavailable  Interpretation:     Interpretation: abnormal    Rate:     ECG rate:  69    ECG rate assessment: normal    Rhythm:     Rhythm: sinus rhythm    Ectopy:     Ectopy: none    QRS:     QRS axis:  Normal  Conduction:     Conduction: abnormal      Abnormal conduction: 1st degree    ST segments:     ST segments:  Normal  T waves:     T waves: normal               ED Course                                           MDM  Number of Diagnoses or Management Options  OPAL (acute kidney injury) ([facility] [address] ): new and requires workup  Fluid overload: new and requires workup  Left leg swelling: new and requires workup     Amount and/or Complexity of Data Reviewed  Clinical lab tests: reviewed and ordered  Tests in the radiology section of CPT®: reviewed and ordered  Tests in the medicine section of CPT®: ordered and reviewed  Decide to obtain previous medical records or to obtain history from someone other than the patient: yes  Obtain history from someone other than the patient: yes  Independent visualization of images, tracings, or specimens: yes        Disposition  Final diagnoses:   OPAL (acute kidney injury) ([facility] [address] )   Left leg swelling   Fluid overload     Time reflects when diagnosis was documented in both MDM as applicable and the Disposition within this note     Time User Action Codes Description Comment    2/11/2021  8:59 PM Naila Mensah Add [M79 89] Localized swelling of lower extremity     2/11/2021  8:59 PM Naila Mensah Modify [M79 89] Localized swelling of lower extremity     2/11/2021  9:03 PM Dia Lopez Add [N17 9] OPAL (acute kidney injury) ([facility] [address] )     2/11/2021  9:03 PM WhiteDia Add [M79 89] Left leg swelling     2/11/2021  9:03 PM Brayan Pena Add [E87 [id number]] Fluid overload       ED Disposition     ED Disposition Condition Date/Time Comment    Admit Stable Thu Feb 11, 2021  9:02 PM Case was discussed with TIFFANIE Chowdhury and the patient's admission status was agreed to be Admission Status: inpatient status to the service of Dr Barrie Pagan           Follow-up Information    None         Current Discharge Medication List      CONTINUE these medications which have NOT CHANGED    Details   acetaminophen-codeine (TYLENOL with CODEINE #3) 300-30 MG per tablet Take 1 tablet by mouth every 8 (eight) hours as needed      apixaban (ELIQUIS) 5 mg Take 2 tablets (10 mg total) by mouth 2 (two) times a day  Qty: 13 tablet, Refills: 0    Associated Diagnoses: Acute pulmonary embolism (HCC)      folic acid (FOLVITE) 1 mg tablet Take 1 tablet (1 mg total) by mouth daily  Refills: 0    Associated Diagnoses: Alcoholic ketosis      gabapentin (NEURONTIN) 300 mg capsule Take 1 capsule (300 mg total) by mouth 3 (three) times a day  Qty: 270 capsule, Refills: 1    Associated Diagnoses: Malignant neoplasm of female breast, unspecified estrogen receptor status, unspecified laterality, unspecified site of breast (HCC)      letrozole (FEMARA) 2 5 mg tablet TAKE 1 TABLET BY MOUTH EVERY DAY  Qty: 90 tablet, Refills: 1    Associated Diagnoses: Malignant neoplasm of overlapping sites of left breast in female, estrogen receptor positive (HCC)      meloxicam (MOBIC) 15 mg tablet Take 1 tablet (15 mg total) by mouth daily as needed for moderate pain for up to 15 days  Qty: 15 tablet, Refills: 0    Associated Diagnoses: Closed fracture of distal end of left fibula, unspecified fracture morphology, initial encounter      !! sertraline (ZOLOFT) 100 mg tablet Take 100 mg by mouth daily  Refills: 0      !! sertraline (ZOLOFT) 50 mg tablet Take 50 mg by mouth 2 (two) times a day   Refills: 0      SUMAtriptan (IMITREX) 100 mg tablet Take 100 mg by mouth once as needed for migraine      thiamine 100 MG tablet Take 1 tablet (100 mg total) by mouth daily  Refills: 0    Associated Diagnoses: Alcoholic ketosis      tiZANidine (ZANAFLEX) 2 mg tablet Take 2-4 mg by mouth daily at bedtime      verapamil (VERELAN) 240 MG 24 hr capsule Take 240 mg by mouth daily  Refills: 0       !! - Potential duplicate medications found  Please discuss with provider  No discharge procedures on file      PDMP Review     None          ED Provider  Electronically Signed by           Rayna Flores DO  02/12/21 3543

## 2021-02-12 NOTE — ASSESSMENT & PLAN NOTE
· Noted with unilateral lower extremity swelling  Prior history of distal fibular fracture  No DVT noted on ultrasound  · Noted 10 lb weight gain prior weight 155 currently 166  This is likely a bed weight and may be inaccurate  · Prior echo shows evidence of grade 1 diastolic dysfunction  · bnp elevated at > 5k   · Will treat for congestive heart failure for now  · Hold a new echo and defer to Cardiology  · Consult Cardiology    · IV Lasix  · Daily weight-standing weight  · I/O   · Cardiac diet

## 2021-02-12 NOTE — PLAN OF CARE
Problem: Potential for Falls  Goal: Patient will remain free of falls  Description: INTERVENTIONS:  - Assess patient frequently for physical needs  -  Identify cognitive and physical deficits and behaviors that affect risk of falls    -  [place] fall precautions as indicated by assessment   - Educate patient/family on patient safety including physical limitations  - Instruct patient to call for assistance with activity based on assessment  - Modify environment to reduce risk of injury  - Consider OT/PT consult to assist with strengthening/mobility  Outcome: Progressing

## 2021-02-13 VITALS
HEIGHT: 65 IN | OXYGEN SATURATION: 94 % | HEART RATE: 72 BPM | BODY MASS INDEX: 27.29 KG/M2 | WEIGHT: 163.8 LBS | RESPIRATION RATE: 18 BRPM | DIASTOLIC BLOOD PRESSURE: 72 MMHG | TEMPERATURE: 98.1 F | SYSTOLIC BLOOD PRESSURE: 134 MMHG

## 2021-02-13 PROBLEM — E87.6 HYPOKALEMIA: Status: ACTIVE | Noted: 2021-02-13

## 2021-02-13 LAB
ANION GAP SERPL CALCULATED.3IONS-SCNC: 13 MMOL/L (ref 4–13)
BUN SERPL-MCNC: 17 MG/DL (ref 5–25)
CALCIUM SERPL-MCNC: 8.6 MG/DL (ref 8.3–10.1)
CHLORIDE SERPL-SCNC: 97 MMOL/L (ref 100–108)
CO2 SERPL-SCNC: 24 MMOL/L (ref 21–32)
CREAT SERPL-MCNC: 1.12 MG/DL (ref 0.6–1.3)
GFR SERPL CREATININE-BSD FRML MDRD: 54 ML/MIN/1.73SQ M
GLUCOSE SERPL-MCNC: 93 MG/DL (ref 65–140)
MAGNESIUM SERPL-MCNC: 1.8 MG/DL (ref 1.6–2.6)
POTASSIUM SERPL-SCNC: 3.2 MMOL/L (ref 3.5–5.3)
SODIUM SERPL-SCNC: 134 MMOL/L (ref 136–145)

## 2021-02-13 PROCEDURE — 94762 N-INVAS EAR/PLS OXIMTRY CONT: CPT

## 2021-02-13 PROCEDURE — 99232 SBSQ HOSP IP/OBS MODERATE 35: CPT | Performed by: INTERNAL MEDICINE

## 2021-02-13 PROCEDURE — 99239 HOSP IP/OBS DSCHRG MGMT >30: CPT | Performed by: INTERNAL MEDICINE

## 2021-02-13 PROCEDURE — 80048 BASIC METABOLIC PNL TOTAL CA: CPT | Performed by: INTERNAL MEDICINE

## 2021-02-13 PROCEDURE — 83735 ASSAY OF MAGNESIUM: CPT | Performed by: INTERNAL MEDICINE

## 2021-02-13 PROCEDURE — 94760 N-INVAS EAR/PLS OXIMETRY 1: CPT

## 2021-02-13 RX ORDER — POTASSIUM CHLORIDE 20 MEQ/1
20 TABLET, EXTENDED RELEASE ORAL 2 TIMES DAILY
Qty: 28 TABLET | Refills: 0 | Status: SHIPPED | OUTPATIENT
Start: 2021-02-13 | End: 2021-02-24 | Stop reason: SDUPTHER

## 2021-02-13 RX ORDER — TORSEMIDE 20 MG/1
20 TABLET ORAL DAILY
Qty: 30 TABLET | Refills: 0 | Status: SHIPPED | OUTPATIENT
Start: 2021-02-13 | End: 2021-02-24 | Stop reason: SDUPTHER

## 2021-02-13 RX ORDER — POTASSIUM CHLORIDE 20 MEQ/1
40 TABLET, EXTENDED RELEASE ORAL ONCE
Status: COMPLETED | OUTPATIENT
Start: 2021-02-13 | End: 2021-02-13

## 2021-02-13 RX ADMIN — POTASSIUM CHLORIDE 40 MEQ: 1500 TABLET, EXTENDED RELEASE ORAL at 10:38

## 2021-02-13 RX ADMIN — LETROZOLE 2.5 MG: 2.5 TABLET, FILM COATED ORAL at 09:01

## 2021-02-13 RX ADMIN — VERAPAMIL HYDROCHLORIDE 240 MG: 120 TABLET, FILM COATED, EXTENDED RELEASE ORAL at 09:00

## 2021-02-13 RX ADMIN — APIXABAN 5 MG: 5 TABLET, FILM COATED ORAL at 09:00

## 2021-02-13 RX ADMIN — FOLIC ACID: 5 INJECTION, SOLUTION INTRAMUSCULAR; INTRAVENOUS; SUBCUTANEOUS at 09:00

## 2021-02-13 RX ADMIN — GABAPENTIN 300 MG: 300 CAPSULE ORAL at 09:00

## 2021-02-13 RX ADMIN — SERTRALINE HYDROCHLORIDE 50 MG: 50 TABLET ORAL at 09:00

## 2021-02-13 NOTE — ASSESSMENT & PLAN NOTE
· Potassium this morning 3 2  · Replaced  · Likely due to continued diuresis  · Will repeat BMP in 3 days prior to out patient visit with PCP

## 2021-02-13 NOTE — DISCHARGE SUMMARY
Discharge- Diana Living 1960, 62 y o  female MRN: [id number]    Unit/Bed#: S -01 Encounter: [id number]    Primary Care Provider: Myles Guerin DC   Date and time admitted to hospital: 2/11/2021  6:11 PM        * Localized swelling of lower extremity  Assessment & Plan  Lab Results   Component Value Date    NTBNP 5,455 (H) 02/11/2021    NTBNP 1,101 (H) 09/08/2020     Weight (last 2 days)     Date/Time   Weight    02/12/21 0600   74 3 (163 8)    02/11/21 2149   73 9 (163)    02/11/21 1817   75 5 (166 45)              Intake/Output Summary (Last 24 hours) at 2/13/2021 1151  Last data filed at 2/12/2021 1900  Gross per 24 hour   Intake --   Output 1350 ml   Net -1350 ml     · Noted with unilateral lower extremity swelling  Prior history of distal fibular fracture  No DVT noted on ultrasound  · Noted 10 lb weight gain prior weight 155 currently 163  · This is likely a bed weight and may be inaccurate  · Prior echo shows evidence of grade 1 diastolic dysfunction  · Venous duplex negative for DVT  · Symptoms improved with IV lasix 20 mg bid with good UOP (2 9L), no intake recorded  · Appreciate cardiology recommendations; OK to transition to po torsemide 20 mg daily  · Potassium replaced    Plan:  · Patient stable for discharge  · Start torsemide 20 mg daily  · KDUR 20 mEq daily  · Repeat BMP in 3 days  · Continue fluid 1 8L and salt 2g restriction  · Follow up with cardiology on 2/22  · Follow up with PCP in 1 week with repeat BMP    OPAL (acute kidney injury) [facility])  Assessment & Plan  Recent Labs     02/11/21  1847 02/12/21  0237 02/13/21  0854   CREATININE 1 63* 1 74* 1 12   EGFR 34 32 54       Workup:   Present on admission (POA) ; admission creatinine:  1 63 (baseline 1-1 1),   Urinalysis:  spec grav 1 010   Etiology:   This is likely cardiorenal syndrome   Now resolved after diuresis    Plan:  · Torsemide 20 mEq daily  · BMP in 3 days    Hypokalemia  Assessment & Plan  · Potassium this morning 3  2  · Replaced  · Likely due to continued diuresis  · Will repeat BMP in 3 days prior to out patient visit with PCP    Alcohol abuse  Assessment & Plan  · Patient admits to drinking daily  She states she drinks approximately 3-4 shots every day  · No history of withdrawal seizures or withdrawal symptoms  · Stable for discharge  · Continue home thiamine     Essential hypertension  Assessment & Plan  Blood Pressure: 134/72    · Continue verapamil  · Monitor BP    Malignant neoplasm of overlapping sites of left breast in female, estrogen receptor positive [facility])  Assessment & Plan  · Outpatient follow-up  · Continue letrozole      Discharging Resident Physician: Obdulio He MD  Attending: Babak Nicholas MD  PCP: Merrick Jackson DC  Admission Date: 2/11/2021  Discharge Date: 02/13/21    Disposition:     Home    Reason for Admission: lower extremity swelling secondary to volume overload    Consultations During Hospital Stay:  · Cardiology    Procedures Performed:     · None    Significant Findings / Test Results:     · Chest x-ray:  No acute cardiopulmonary disease, hiatal hernia  · NT proBNP 5455  · Troponin 0 02 x 3    Incidental Findings:   · none     Test Results Pending at Discharge (will require follow up):   · none     Outpatient Tests Requested:  · BMP in 3 days    Complications:  None    Hospital Course:     Shira Mcmullen is a 62 y o  female patient who originally presented to the hospital on 2/11/2021 due to worsening lower extremity swelling  She has a history of breast cancer status post mastectomy, VTE anticoagulated on Eliquis, tension, and migraines  On admission, she had stable vital signs, chest x-ray did not show signs of pulmonary congestion, however her BNP was elevated at 5000 with an OPAL (creatinine 1 6)  Further, she had noticed a 20 lb weight gain over the last 3 months  She was managed for volume overload with IV Lasix 20 mg b i d  Cardiology was consulted    As patient had a recent echocardiogram in December, this was not repeated  Lower extremity swelling as well as creatinine improved with IV Lasix  Today, her creatinine is baseline  Lower extremity swelling has improved significantly  Lasix was transition to p o  Torsemide 20 mg daily  She was discharged with your torsemide, potassium supplements, and was advised to follow-up with her primary care physician and Cardiology with a repeat BMP in 3 days  Condition at Discharge: stable     Discharge Day Visit / Exam:     Subjective:  Feels well  Denies shortness of breath, orthopnea, PND  No chest pain or palpitations  Lower extremity swelling has improved significantly  Vitals: Blood Pressure: 134/72 (02/13/21 1100)  Pulse: 72 (02/13/21 1100)  Temperature: 98 1 °F (36 7 °C) (02/13/21 1100)  Temp Source: Oral (02/13/21 1100)  Respirations: 18 (02/13/21 1100)  Height: 5' 5" (165 1 cm) (02/11/21 2149)  Weight - Scale: 74 3 kg (163 lb 12 8 oz) (02/12/21 0600)  SpO2: 94 % (02/13/21 1119)     Exam:   Physical Exam  Vitals signs and nursing note reviewed  Constitutional:       General: She is not in acute distress  Appearance: Normal appearance  She is well-developed  She is not toxic-appearing or diaphoretic  HENT:      Head: Normocephalic and atraumatic  Eyes:      General: No scleral icterus  Conjunctiva/sclera: Conjunctivae normal    Neck:      Musculoskeletal: Neck supple  Trachea: Phonation normal    Cardiovascular:      Rate and Rhythm: Normal rate and regular rhythm  Pulses: Normal pulses  Heart sounds: Normal heart sounds, S1 normal and S2 normal  Heart sounds not distant  No murmur  No friction rub  No gallop  Pulmonary:      Effort: Pulmonary effort is normal  No accessory muscle usage or respiratory distress  Breath sounds: Normal breath sounds  No wheezing, rhonchi or rales  Chest:      Chest wall: No mass  Abdominal:      General: Bowel sounds are normal  There is no distension  Palpations: Abdomen is soft  There is no mass  Tenderness: There is no abdominal tenderness  Musculoskeletal:      Right lower leg: No edema  Left lower leg: Edema (trace) present  Skin:     General: Skin is warm and dry  Neurological:      Mental Status: She is alert and oriented to person, place, and time  Mental status is at baseline  Psychiatric:         Mood and Affect: Mood normal          Behavior: Behavior normal          Thought Content: Thought content normal          Judgment: Judgment normal        Discussion with Family: offered to call patients family  Patient declined  Informed that her family can call us if they have questions/concerns    Discharge instructions/Information to patient and family:   See after visit summary for information provided to patient and family  Provisions for Follow-Up Care:  See after visit summary for information related to follow-up care and any pertinent home health orders  Planned Readmission: none      Discharge Medications:  See after visit summary for reconciled discharge medications provided to patient and family        ** Please Note: This note has been constructed using a voice recognition system **

## 2021-02-13 NOTE — ASSESSMENT & PLAN NOTE
Recent Labs     02/11/21  1847 02/12/21  0237 02/13/21  0854   CREATININE 1 63* 1 74* 1 12   EGFR 34 32 54       Workup:   Present on admission (POA) ; admission creatinine:  1 63 (baseline 1-1 1),   Urinalysis:  spec grav 1 010   Etiology:   This is likely cardiorenal syndrome   Now resolved after diuresis    Plan:  · Torsemide 20 mEq daily  · BMP in 3 days

## 2021-02-13 NOTE — ASSESSMENT & PLAN NOTE
· Patient admits to drinking daily  She states she drinks approximately 3-4 shots every day    · No history of withdrawal seizures or withdrawal symptoms  · Stable for discharge  · Continue home thiamine

## 2021-02-13 NOTE — ASSESSMENT & PLAN NOTE
Lab Results   Component Value Date    NTBNP 5,455 (H) 02/11/2021    NTBNP 1,101 (H) 09/08/2020     Weight (last 2 days)     Date/Time   Weight    02/12/21 0600   74 3 (163 8)    02/11/21 2149   73 9 (163)    02/11/21 1817   75 5 (166 45)              Intake/Output Summary (Last 24 hours) at 2/13/2021 1151  Last data filed at 2/12/2021 1900  Gross per 24 hour   Intake --   Output 1350 ml   Net -1350 ml     · Noted with unilateral lower extremity swelling  Prior history of distal fibular fracture  No DVT noted on ultrasound  · Noted 10 lb weight gain prior weight 155 currently 163  · This is likely a bed weight and may be inaccurate  · Prior echo shows evidence of grade 1 diastolic dysfunction  · Venous duplex negative for DVT  · Symptoms improved with IV lasix 20 mg bid with good UOP (2 9L), no intake recorded    · Appreciate cardiology recommendations; OK to transition to po torsemide 20 mg daily  · Potassium replaced    Plan:  · Patient stable for discharge  · Start torsemide 20 mg daily  · KDUR 20 mEq daily  · Repeat BMP in 3 days  · Continue fluid 1 8L and salt 2g restriction  · Follow up with cardiology on 2/22  · Follow up with PCP in 1 week with repeat BMP

## 2021-02-13 NOTE — DISCHARGE INSTR - AVS FIRST PAGE
Dear Mehran Burnham,     It was our pleasure to care for you here at [facility], [facility]  It is our hope that we were always able to exceed the expected standards for your care during your stay  You were hospitalized due to volume overload (increased fluid) and acute kidney injury  You were cared for on the [facility] 4th floor by Doretha Jha MD under the service of Sivakumar Raphael MD with the [facility] who covers for your primary care physician (PCP), Flakita Javed DC, while you were hospitalized  If you have any questions or concerns related to this hospitalization, you may contact us at [phone]  For follow up as well as any medication refills, we recommend that you follow up with your primary care physician  A registered nurse will reach out to you by phone within a few days after your discharge to answer any additional questions that you may have after going home    However, at this time we provide for you here, the most important instructions / recommendations at discharge:     · Notable Medication Adjustments -   · Start taking Torsemide (a diuretic) 20 mg daily  · Start taking potassium supplements 20mEq daily  · Continue all other medications (including thiamine) as you did prior to admission  · Testing Required after Discharge -   · Repeat blood work (basic metabolic panel) on Monday 2/15 to check your kidney function and potassium level  · Important follow up information -   · Follow up with cardiology on 2/22 (an appointment has been scheduled)  · Follow up with your PCP in 1 week with your lab work (please call and schedule an appointment)  · Other Instructions -   · Continue to restrict fluids 1 8L / day and salt 2g/day  · Please review this entire after visit summary as additional general instructions including medication list, appointments, activity, diet, any pertinent wound care, and other additional recommendations from your care team that may be provided for you        Sincerely,     Rogelio Mccain MD

## 2021-02-13 NOTE — NURSING NOTE
Discharge instructions reviewed with patient  Time allotted for questions  No concerns at this time

## 2021-02-13 NOTE — PROGRESS NOTES
Cardiology Progress Note - Tory Crain 62 y o  female MRN: [id number]    Unit/Bed#: S -01 Encounter: [id number]      Assessment:  1  Acute on chronic diastolic CHF  2  OPAL  3  Benign essential hypertension   4  Alcohol abuse  5  History of DVT/PE    Plan:  1  Good response to IV Lasix - net negative fluid balance but no input recorded  2  BMP this AM pending to reassess creatinine  3  Blood pressure acceptable this AM   4  On Eliquis given #5 - duplex negative this admission   5  Transition to oral medications today pending blood work, recommend torsemide 20 mg daily upon discharge     Subjective:   Patient seen and examined  No significant events overnight  Objective:     Vitals: Blood pressure 136/76, pulse 85, temperature 98 °F (36 7 °C), temperature source Oral, resp   rate 18, height 5' 5" (1 651 m), weight 74 3 kg (163 lb 12 8 oz), SpO2 96 %, not currently breastfeeding , Body mass index is 27 26 kg/m² ,   Orthostatic Blood Pressures      Most Recent Value   Blood Pressure  136/76 filed at 02/13/2021 0700   Patient Position - Orthostatic VS  Sitting filed at 02/13/2021 0700            Intake/Output Summary (Last 24 hours) at 2/13/2021 4433  Last data filed at 2/12/2021 1900  Gross per 24 hour   Intake --   Output 2900 ml   Net -2900 ml         Physical Exam:    GEN: Tory Crain appears well, alert and oriented x 3, pleasant and cooperative   HEENT: pupils equal, round, and reactive to light; extraocular muscles intact  NECK: supple, no carotid bruits, no elevated JVP   HEART: regular rhythm, normal S1 and S2, no murmurs, clicks, gallops or rubs   LUNGS: clear to auscultation bilaterally; no wheezes, rales, or rhonchi   ABDOMEN: normal bowel sounds, soft, no tenderness, no distention  EXTREMITIES: no edema   NEURO: no focal findings   SKIN: normal without suspicious lesions on exposed skin    Medications:      Current Facility-Administered Medications:     apixaban (ELIQUIS) tablet 5 mg, 5 mg, Oral, Daily, Froilan Salgado PA-C, 5 mg at 78/42/80 8751    folic acid 1 mg, thiamine (VITAMIN B1) 100 mg in sodium chloride 0 9 % 100 mL IV piggyback, , Intravenous, Daily, Sage Valverde PA-C, New Bag at 02/12/21 0846    furosemide (LASIX) injection 20 mg, 20 mg, Intravenous, Daily, Froilan Salgado PA-C, 20 mg at 02/12/21 9700    gabapentin (NEURONTIN) capsule 300 mg, 300 mg, Oral, TID, Froilan Salgado PA-C, 300 mg at 02/12/21 2141    letrozole [facility]) tablet 2 5 mg, 2 5 mg, Oral, Daily, Froilan Salgado PA-C, 2 5 mg at 02/12/21 0841    sertraline (ZOLOFT) tablet 100 mg, 100 mg, Oral, HS, Froilan Salgado PA-C, 100 mg at 02/12/21 2141    sertraline (ZOLOFT) tablet 50 mg, 50 mg, Oral, Daily, Froilan Salgado PA-C, 50 mg at 02/12/21 0835    tiZANidine (ZANAFLEX) tablet 2 mg, 2 mg, Oral, HS, Froilan Salgado PA-C, 2 mg at 02/12/21 2140    verapamil (CALAN-SR) CR tablet 240 mg, 240 mg, Oral, Daily, Froilan Salgado PA-C, 240 mg at 02/12/21 0834     Labs & Results:    Results from last 7 days   Lab Units 02/12/21  0230 02/11/21  2300 02/11/21  1847   TROPONIN I ng/mL <0 02 <0 02 <0 02     Results from last 7 days   Lab Units 02/12/21  0237 02/11/21  1847   WBC Thousand/uL 3 87* 4 00*   HEMOGLOBIN g/dL 10 3* 10 9*   HEMATOCRIT % 31 7* 32 9*   PLATELETS Thousands/uL 257 278     Results from last 7 days   Lab Units 02/12/21  0237   TRIGLYCERIDES mg/dL 54   HDL mg/dL 98     Results from last 7 days   Lab Units 02/12/21  0237 02/11/21  1847   POTASSIUM mmol/L 3 7 4 6   CHLORIDE mmol/L 104 100   CO2 mmol/L 26 25   BUN mg/dL 30* 32*   CREATININE mg/dL 1 74* 1 63*   CALCIUM mg/dL 8 4 9 3   ALK PHOS U/L 77 87   ALT U/L 37 46   AST U/L 28 50*         Counseling / Coordination of Care  Total floor / unit time spent today 25 minutes  Greater than 50% of total time was spent with the patient and / or family counseling and / or coordination of care

## 2021-02-13 NOTE — PLAN OF CARE
Problem: Potential for Falls  Goal: Patient will remain free of falls  Description: INTERVENTIONS:  - Assess patient frequently for physical needs  -  Identify cognitive and physical deficits and behaviors that affect risk of falls  -  [place]eman fall precautions as indicated by assessment   - Educate patient/family on patient safety including physical limitations  - Instruct patient to call for assistance with activity based on assessment  - Modify environment to reduce risk of injury  - Consider OT/PT consult to assist with strengthening/mobility  Outcome: Progressing     Problem: Nutrition/Hydration-ADULT  Goal: Nutrient/Hydration intake appropriate for improving, restoring or maintaining nutritional needs  Description: Monitor and assess patient's nutrition/hydration status for malnutrition  Collaborate with interdisciplinary team and initiate plan and interventions as ordered  Monitor patient's weight and dietary intake as ordered or per policy  Utilize nutrition screening tool and intervene as necessary  Determine patient's food preferences and provide high-protein, high-caloric foods as appropriate       INTERVENTIONS:  - Monitor oral intake, urinary output, labs, and treatment plans  - Assess nutrition and hydration status and recommend course of action  - Evaluate amount of meals eaten  - Assist patient with eating if necessary   - Allow adequate time for meals  - Recommend/ encourage appropriate diets, oral nutritional supplements, and vitamin/mineral supplements  - Order, calculate, and assess calorie counts as needed  - Recommend, monitor, and adjust tube feedings and TPN/PPN based on assessed needs  - Assess need for intravenous fluids  - Provide specific nutrition/hydration education as appropriate  - Include patient/family/caregiver in decisions related to nutrition  Outcome: Progressing

## 2021-02-16 ENCOUNTER — HOSPITAL ENCOUNTER (OUTPATIENT)
Dept: RADIOLOGY | Facility: MEDICAL CENTER | Age: 61
Discharge: HOME | End: 2021-02-16

## 2021-02-16 DIAGNOSIS — R31.0 GROSS HEMATURIA: ICD-10-CM

## 2021-02-21 NOTE — PROGRESS NOTES
Heart Failure   Follow Up Office Visit Note -     Nikos Parkinson   62 y o    female   MRN: [id number]  [address]  [address]  [place] [address] [facility] [address] Michael Marcos [address] [phone]    PCP: Romaine Gonzalez DC  Cardiologist : Will be Dr Ivory Concepcion            Summary of Recommendations  low-sodium diet, fluid restriction as below  Education provided  continue the current plan   BMP today  Follow up will be scheduled with Dr Ivory Concepcion 2 months        Impression/plan  Chronic diastolic heart failure, recent hospital admission 2/11-2/13/21 for decompensation,  Related to high sodium consumption    Wt Readings from Last 3 Encounters:   02/24/21 73 kg (161 lb)   02/12/21 74 3 kg (163 lb 12 8 oz)   02/11/21 72 6 kg (160 lb)       Wt Readings from Last 3 Encounters:   02/24/21 73 kg (161 lb)   02/12/21 74 3 kg (163 lb 12 8 oz)   02/11/21 72 6 kg (160 lb)      Diuretic:  Torsemide 20 mg daily plus potassium 20 mEq BID   Educated regarding adherence to a 1 5g -2 gram Na diet/ 1 5L(50oz)fluid restriction, daily weights and to call us if she gains  3 lbs/ 1 day or 5 lbs/ 1 week  Hypertension, essential  /88  On verapamil 240 mg daily, loop diuretic  Home blood pressure monitoring period low-salt diet   Hx LLE DVT Oct 2018; /PE 12/2020 on Eliquis  Breast cancer status post mastectomy, S/p adjuvant chemotherapy 2018, AC and paclitaxel, Completed in December 2018   on adjuvant hormonal therapy with letrozole   Cardiac testing   TTE 12/16/20  EF 60%  No regional motion abnormality  RV normal size and motion  HPI: Nikos Parkinson is a 62 y o  female  with  Hypertension  Reports a history of alcohol abuse quitting a few months ago  She has  breast cancer status post mastectomy, and  a prior lower extremity DVT/ PE on  [address] with Eliquis  Interval history  Adm 2/11/21 with LE edema  and RODRIGUEZ for over a month    She admitted to left greater than right lower extremity edema, abdominal bloating and 20 lb weight gain over few months  She was followed by Cardiology  A lower extremity duplex showed no new DVT  An echocardiogram in December 2020 showed an ejection fraction of 60% without regional motion abnormality  RV was normal size and function  Does not repeated  Her NT proBNP was elevated 5000  She had OPAL with creatinine 1 6  She was diuresed with Lasix 20 mg IV b i d     When euvolemic she transitioned to torsemide 20 mg daily      2/24/21  Hospital follow-up  She is doing well  She denies any chest pain or shortness of breath  No palpitations, lightheadedness or dizziness  She tells me she was consuming a diet very high in sodium  She used to eat a lot of fast food  She has made some changes  I  provided her some education about how to read food labels  She was provided a list of foods that have high salt content, for her to avoid  Her vital signs are satisfactory  Her weight is down  She is agreeable to getting some blood work today  She will return to see her cardiologist in 2 months  Medications were refilled        I have spent 25 minutes with Patient  today in which greater than 50% of this time was spent in counseling/coordination of care regarding Intructions for management, Patient and family education, Importance of tx compliance and Risk factor reductions  Assessment  Diagnoses and all orders for this visit:    Hospital discharge follow-up    Chronic diastolic heart failure ([address] )  -     Basic metabolic panel; Future    Essential hypertension    Fluid overload  -     torsemide (DEMADEX) 20 mg tablet; Take 1 tablet (20 mg total) by mouth daily  -     potassium chloride (K-DUR,KLOR-CON) 20 mEq tablet;  Take 1 tablet (20 mEq total) by mouth 2 (two) times a day        Past Medical History:   Diagnosis Date    Acute DVT (deep venous thrombosis) (HCC)     of LLE>     Bladder infection     Congenital prolapsed rectum     History of biliary stent insertion     History of chemotherapy     History of radiation therapy 05/2018    left breast ca    History of transfusion     x2 s/p chemo treatments, no reaction    Hydronephrosis     right kidney    Hypertension     Malignant neoplasm of overlapping sites of left female breast ([facility] ) 05/01/2018    Migraine        Review of Systems   Constitution: Negative for chills  Cardiovascular: Negative for chest pain, claudication, cyanosis, dyspnea on exertion, irregular heartbeat, leg swelling, near-syncope, orthopnea, palpitations, paroxysmal nocturnal dyspnea and syncope  Respiratory: Negative for cough and shortness of breath  Gastrointestinal: Negative for heartburn and nausea  Neurological: Negative for dizziness, focal weakness, headaches, light-headedness and weakness  All other systems reviewed and are negative  Allergies   Allergen Reactions    Gluten Meal GI Intolerance    Lactose GI Intolerance           Current Outpatient Medications:     acetaminophen-codeine (TYLENOL with CODEINE #3) 300-30 MG per tablet, Take 1 tablet by mouth every 8 (eight) hours as needed, Disp: , Rfl:     apixaban (ELIQUIS) 5 mg, Take 2 tablets (10 mg total) by mouth 2 (two) times a day (Patient taking differently: Take 5 mg by mouth daily ), Disp: 13 tablet, Rfl: 0    folic acid (FOLVITE) 1 mg tablet, Take 1 tablet (1 mg total) by mouth daily, Disp: , Rfl: 0    gabapentin (NEURONTIN) 300 mg capsule, Take 1 capsule (300 mg total) by mouth 3 (three) times a day, Disp: 270 capsule, Rfl: 1    letrozole (FEMARA) 2 5 mg tablet, TAKE 1 TABLET BY MOUTH EVERY DAY, Disp: 90 tablet, Rfl: 1    potassium chloride (K-DUR,KLOR-CON) 20 mEq tablet, Take 1 tablet (20 mEq total) by mouth 2 (two) times a day, Disp: 60 tablet, Rfl: 2    sertraline (ZOLOFT) 100 mg tablet, Take 100 mg by mouth daily, Disp: , Rfl: 0    sertraline (ZOLOFT) 50 mg tablet, Take 50 mg by mouth 2 (two) times a day , Disp: , Rfl: 0    SUMAtriptan (IMITREX) 100 mg tablet, Take 100 mg by mouth once as needed for migraine, Disp: , Rfl:     tiZANidine (ZANAFLEX) 2 mg tablet, Take 2-4 mg by mouth daily at bedtime, Disp: , Rfl:     torsemide (DEMADEX) 20 mg tablet, Take 1 tablet (20 mg total) by mouth daily, Disp: 30 tablet, Rfl: 3    verapamil (VERELAN) 240 MG 24 hr capsule, Take 240 mg by mouth daily, Disp: , Rfl: 0    thiamine 100 MG tablet, Take 1 tablet (100 mg total) by mouth daily (Patient not taking: Reported on 1/15/2021), Disp: , Rfl: 0    Social History     Socioeconomic History    Marital status: /Civil Union     Spouse name: Not on file    Number of children: Not on file    Years of education: Not on file    Highest education level: Not on file   Occupational History    Not on file   Social Needs    Financial resource strain: Not on file    Food insecurity     Worry: Not on file     Inability: Not on file   [employer] needs     Medical: Not on file     Non-medical: Not on file   Tobacco Use    Smoking status: Never Smoker    Smokeless tobacco: Never Used   Substance and Sexual Activity    Alcohol use: Not Currently    Drug use: Not Currently    Sexual activity: Yes     Birth control/protection: Female Sterilization   Lifestyle    Physical activity     Days per week: Not on file     Minutes per session: Not on file    Stress: Not on file   Relationships    Social connections     Talks on phone: Not on file     Gets together: Not on file     Attends Latter day service: Not on file     Active member of club or organization: Not on file     Attends meetings of clubs or organizations: Not on file     Relationship status: Not on file    Intimate partner violence     Fear of current or ex partner: Not on file     Emotionally abused: Not on file     Physically abused: Not on file     Forced sexual activity: Not on file   Other Topics Concern    Not on file   Social History Narrative    Not on file       Family History   Problem Relation Age of Onset  No Known Problems Mother     No Known Problems Father     Breast cancer Maternal Aunt 48    Colon cancer Maternal Aunt     No Known Problems Sister     No Known Problems Daughter     No Known Problems Maternal Grandmother     No Known Problems Maternal Grandfather     No Known Problems Paternal Grandmother     No Known Problems Paternal Grandfather        Physical Exam   Constitutional: She is oriented to person, place, and time  No distress  HENT:   Head: Normocephalic and atraumatic  Eyes: Conjunctivae and EOM are normal    Neck: Normal range of motion  Neck supple  Cardiovascular: Normal rate, regular rhythm, normal heart sounds and intact distal pulses  Pulmonary/Chest: Effort normal and breath sounds normal    Abdominal: Soft  Bowel sounds are normal    Musculoskeletal: Normal range of motion  Neurological: She is alert and oriented to person, place, and time  Skin: Skin is warm and dry  She is not diaphoretic  Psychiatric: She has a normal mood and affect  Nursing note and vitals reviewed  Vitals: Blood pressure 148/88, pulse 93, height 5' 5" (1 651 m), weight 73 kg (161 lb), SpO2 100 %, not currently breastfeeding     Wt Readings from Last 3 Encounters:   21 73 kg (161 lb)   21 74 3 kg (163 lb 12 8 oz)   21 72 6 kg (160 lb)         Labs & Results:  Lab Results   Component Value Date    WBC 3 87 (L) 2021    HGB 10 3 (L) 2021    HCT 31 7 (L) 2021     (H) 2021     2021     No results found for: BNP  No components found for: CHEM    Results for orders placed during the hospital encounter of 20   Echo complete with contrast if indicated    Narrative [facility], [address] [facility]  [phone]    Transthoracic Echocardiogram  2D, M-mode, Doppler, and Color Doppler    Study date:  09-Sep-2020    Patient: Nasim Chang  MR number: [id number]  Account number: [de-identified]  : 1960  Age: 62 years  Gender: Female  Status: Inpatient  Location: Bedside  Height: 65 in  Weight: 147 6 lb  BP: 168/ 92 mmHg    Indications: Shortness of breath  Diagnoses: R06 00 - Dyspnea, unspecified    Sonographer:  MICHELE Richardson  Referring Physician:  Nahid Cramer PA-C  Group:  [facility]  Interpreting Physician:  Giorgio Lu MD    SUMMARY    PROCEDURE INFORMATION:  This was a technically difficult study  LEFT VENTRICLE:  Size was normal   Systolic function was normal  Ejection fraction was estimated to be 60 %  Wall thickness was mildly increased  Doppler parameters were consistent with abnormal left ventricular relaxation (grade 1 diastolic dysfunction)  RIGHT VENTRICLE:  The size was normal   Systolic function was normal     TRICUSPID VALVE:  There was trace regurgitation  HISTORY: PRIOR HISTORY: Dyspnea, Breast cancer with left sided mastectomy, Hypertension, Alcohol ketosis    PROCEDURE: The procedure was performed at the bedside  This was a routine study  The transthoracic approach was used  The study included complete 2D imaging, M-mode, complete spectral Doppler, and color Doppler  The heart rate was 86 bpm,  at the start of the study  Images were obtained from the parasternal, apical, subcostal, and suprasternal notch acoustic windows  This was a technically difficult study  LEFT VENTRICLE: Size was normal  Systolic function was normal  Ejection fraction was estimated to be 60 %  There were no regional wall motion abnormalities  Wall thickness was mildly increased  DOPPLER: Doppler parameters were consistent  with abnormal left ventricular relaxation (grade 1 diastolic dysfunction)  RIGHT VENTRICLE: The size was normal  Systolic function was normal  Wall thickness was normal     LEFT ATRIUM: Size was normal     RIGHT ATRIUM: Size was normal     MITRAL VALVE: Valve structure was normal  There was normal leaflet separation   DOPPLER: The transmitral velocity was within the normal range  There was no evidence for stenosis  There was no regurgitation  AORTIC VALVE: The valve was trileaflet  Leaflets exhibited normal thickness and normal cuspal separation  DOPPLER: Transaortic velocity was within the normal range  There was no evidence for stenosis  There was no regurgitation  TRICUSPID VALVE: The valve structure was normal  There was normal leaflet separation  DOPPLER: The transtricuspid velocity was within the normal range  There was no evidence for stenosis  There was trace regurgitation  The tricuspid jet  envelope definition was inadequate for estimation of RV systolic pressure  There are no indirect findings suggestive of moderate or severe pulmonary hypertension  PULMONIC VALVE: Leaflets exhibited normal thickness, no calcification, and normal cuspal separation  DOPPLER: The transpulmonic velocity was within the normal range  There was no regurgitation  PERICARDIUM: There was no pericardial effusion  The pericardium was normal in appearance  AORTA: The root exhibited normal size  SYSTEMIC VEINS: IVC: The inferior vena cava was normal in size and course   Respirophasic changes were normal     SYSTEM MEASUREMENT TABLES    2D  %FS: 27 21 %  AV Diam: 3 22 cm  EDV(Teich): 51 26 ml  EF(Cube): 61 44 %  EF(Teich): 54 %  ESV(Cube): 16 69 ml  ESV(Teich): 23 58 ml  IVSd: 0 77 cm  LA Area: 14 43 cm2  LA Diam: 3 08 cm  LVEDV MOD A4C: 59 97 ml  LVEF MOD A4C: 73 7 %  LVESV MOD A4C: 15 77 ml  LVIDd: 3 51 cm  LVIDs: 2 56 cm  LVLd A4C: 6 92 cm  LVLs A4C: 4 91 cm  LVPWd: 0 77 cm  RA Area: 13 16 cm2  RV Diam: 2 89 cm  SI(Cube): 15 29 ml/m2  SI(Teich): 15 91 ml/m2  SV MOD A4C: 44 19 ml  SV(Cube): 26 6 ml  SV(Teich): 27 68 ml    MM  TAPSE: 1 64 cm    PW  AVC: 350 29 ms  MV A Joel: 0 86 m/s  MV Dec Yates: 5 9 m/s2  MV DecT: 128 44 ms  MV E Joel: 0 76 m/s  MV E/A Ratio: 0 88    [employer] Accredited Echocardiography Laboratory    Prepared and electronically signed by    Chente Arciniega MD  Signed 09-Sep-2020 16:59:56       No results found for this or any previous visit  This note was completed in part utilizing m-modal fluency direct voice recognition software  Grammatical errors, random word insertion, spelling mistakes, and incomplete sentences may be an occasional consequence of the system secondary to software limitations, ambient noise and hardware issues  At the time of dictation, efforts were made to edit, clarify and /or correct errors  Please read the chart carefully and recognize, using context, where substitutions have occurred    If you have any questions or concerns about the context, text or information contained within the body of this dictation, please contact myself, the provider, for further clarification

## 2021-02-22 ENCOUNTER — HOSPITAL ENCOUNTER (OUTPATIENT)
Dept: RADIOLOGY | Facility: MEDICAL CENTER | Age: 61
Discharge: HOME | End: 2021-02-22

## 2021-02-24 ENCOUNTER — OFFICE VISIT (OUTPATIENT)
Dept: CARDIOLOGY CLINIC | Facility: CLINIC | Age: 61
End: 2021-02-24
Payer: COMMERCIAL

## 2021-02-24 ENCOUNTER — APPOINTMENT (OUTPATIENT)
Dept: LAB | Facility: CLINIC | Age: 61
End: 2021-02-24
Payer: COMMERCIAL

## 2021-02-24 VITALS
DIASTOLIC BLOOD PRESSURE: 88 MMHG | SYSTOLIC BLOOD PRESSURE: 148 MMHG | HEART RATE: 93 BPM | WEIGHT: 161 LBS | BODY MASS INDEX: 26.82 KG/M2 | OXYGEN SATURATION: 100 % | HEIGHT: 65 IN

## 2021-02-24 DIAGNOSIS — I10 ESSENTIAL HYPERTENSION: ICD-10-CM

## 2021-02-24 DIAGNOSIS — I50.32 CHRONIC DIASTOLIC HEART FAILURE (HCC): ICD-10-CM

## 2021-02-24 DIAGNOSIS — M79.89 LOCALIZED SWELLING OF LOWER EXTREMITY: ICD-10-CM

## 2021-02-24 DIAGNOSIS — E87.70 FLUID OVERLOAD: ICD-10-CM

## 2021-02-24 DIAGNOSIS — Z09 HOSPITAL DISCHARGE FOLLOW-UP: Primary | ICD-10-CM

## 2021-02-24 DIAGNOSIS — N17.9 AKI (ACUTE KIDNEY INJURY) (HCC): ICD-10-CM

## 2021-02-24 LAB
ANION GAP SERPL CALCULATED.3IONS-SCNC: 14 MMOL/L (ref 4–13)
BUN SERPL-MCNC: 14 MG/DL (ref 5–25)
CALCIUM SERPL-MCNC: 9.4 MG/DL (ref 8.3–10.1)
CHLORIDE SERPL-SCNC: 96 MMOL/L (ref 100–108)
CO2 SERPL-SCNC: 24 MMOL/L (ref 21–32)
CREAT SERPL-MCNC: 1.22 MG/DL (ref 0.6–1.3)
GFR SERPL CREATININE-BSD FRML MDRD: 49 ML/MIN/1.73SQ M
GLUCOSE SERPL-MCNC: 76 MG/DL (ref 65–140)
POTASSIUM SERPL-SCNC: 4.7 MMOL/L (ref 3.5–5.3)
SODIUM SERPL-SCNC: 134 MMOL/L (ref 136–145)

## 2021-02-24 PROCEDURE — 80048 BASIC METABOLIC PNL TOTAL CA: CPT

## 2021-02-24 PROCEDURE — 99214 OFFICE O/P EST MOD 30 MIN: CPT | Performed by: NURSE PRACTITIONER

## 2021-02-24 PROCEDURE — 36415 COLL VENOUS BLD VENIPUNCTURE: CPT

## 2021-02-24 RX ORDER — POTASSIUM CHLORIDE 20 MEQ/1
20 TABLET, EXTENDED RELEASE ORAL 2 TIMES DAILY
Qty: 60 TABLET | Refills: 2 | Status: SHIPPED | OUTPATIENT
Start: 2021-02-24 | End: 2021-03-25

## 2021-02-24 RX ORDER — TORSEMIDE 20 MG/1
20 TABLET ORAL DAILY
Qty: 30 TABLET | Refills: 3 | Status: SHIPPED | OUTPATIENT
Start: 2021-02-24 | End: 2021-03-25

## 2021-02-24 NOTE — PATIENT INSTRUCTIONS
DASH Eating Plan   WHAT YOU NEED TO KNOW:   The DASH (Dietary Approaches to Stop Hypertension) Eating Plan is designed to help prevent or lower high blood pressure  It can also help to lower LDL (bad) cholesterol and decrease your risk of heart disease  The plan is low in sodium, sugar, unhealthy fats, and total fat  It is high in potassium, calcium, magnesium, and fiber  These nutrients are added when you eat more fruits, vegetables, and whole grains  DISCHARGE INSTRUCTIONS:   Your sodium limit each day: Your dietitian will tell you how much sodium is safe for you to have each day  People with high blood pressure should have no more than 1,500 to 2,300 mg of sodium in a day  A teaspoon (tsp) of salt has 2,300 mg of sodium  This may seem like a difficult goal, but small changes to the foods you eat can make a big difference  Your healthcare provider or dietitian can help you create a meal plan that follows your sodium limit  How to limit sodium:   · Read food labels  Food labels can help you choose foods that are low in sodium  The amount of sodium is listed in milligrams (mg)  The % Daily Value (DV) column tells you how much of your daily needs are met by 1 serving of the food for each nutrient listed  Choose foods that have less than 5% of the DV of sodium  These foods are considered low in sodium  Foods that have 20% or more of the DV of sodium are considered high in sodium  Avoid foods that have more than 300 mg of sodium in each serving  Choose foods that say low-sodium, reduced-sodium, or no salt added on the food label  · Avoid salt  Do not salt food at the table, and add very little salt to foods during cooking  Use herbs and spices, such as onions, garlic, and salt-free seasonings to add flavor to foods  Try lemon or lime juice or vinegar to give foods a tart flavor  Use hot peppers or a small amount of hot pepper sauce to add a spicy flavor to foods  · Ask about salt substitutes    Ask your healthcare provider if you may use salt substitutes  Some salt substitutes have ingredients that can be harmful if you have certain health conditions  · Choose foods carefully at restaurants  Meals from restaurants, especially fast food restaurants, are often high in sodium  Some restaurants have nutrition information that tells you the amount of sodium in their foods  Ask to have your food prepared with less, or no salt  What you need to know about fats:   · Include healthy fats  Examples are unsaturated fats and omega-3 fatty acids  Unsaturated fats are found in soybean, canola, olive, or sunflower oil, and liquid and soft tub margarines  Omega-3 fatty acids are found in fatty fish, such as salmon, tuna, mackerel, and sardines  It is also found in flaxseed oil and ground flaxseed  · Avoid unhealthy fats  Do not eat unhealthy fats, such as saturated fats and trans fats  Saturated fats are found in foods that contain fat from animals  Examples are fatty meats, whole milk, butter, cream, and other dairy foods  It is also found in shortening, stick margarine, palm oil, and coconut oil  Trans fats are found in fried foods, crackers, chips, and baked goods made with margarine or shortening  Foods to include: With the DASH eating plan, you need to eat a certain number of servings from each food group  This will help you get enough of certain nutrients and limit others  The amount of servings you should eat depends on how many calories you need  Your dietitian can tell you how many calories you need  The number of servings listed next to the food groups below are for people who need about 2,000 calories each day  · Grains:  6 to 8 servings (3 of these servings should be whole-grain foods)    ? 1 slice of whole-grain bread     ? 1 ounce of dry cereal    ? ½ cup of cooked cereal, pasta, or brown rice    · Vegetables and fruits:  4 to 5 servings of fruits and 4 to 5 servings of vegetables    ?  1 medium fruit    ? ½ cup of frozen, canned (no added salt), or chopped fresh vegetables     ? ½ cup of fresh, frozen, dried, or canned fruit (canned in light syrup or fruit juice)    ? ½ cup of vegetable or fruit juice    · Dairy:  2 to 3 servings    ? 1 cup of nonfat (skim) or 1% milk    ? 1½ ounces of fat-free or low-fat cheese    ? 6 ounces of nonfat or low-fat yogurt    · Lean meat, poultry, and fish:  6 ounces or less    ? Poultry (chicken, turkey) with no skin    ? Fish (especially fatty fish, such as salmon, fresh tuna, or mackerel)    ? Lean beef and pork (loin, round, extra lean hamburger)    ? Egg whites and egg substitutes    · Nuts, seeds, and legumes:  4 to 5 servings each week    ? ½ cup of cooked beans and peas    ? 1½ ounces of unsalted nuts    ? 2 tablespoons of peanut butter or seeds    · Sweets and added sugars:  5 or less each week    ? 1 tablespoon of sugar, jelly, or jam    ? ½ cup of sorbet or gelatin    ? 1 cup of lemonade    · Fats:  2 to 3 servings each week    ? 1 teaspoon of soft margarine or vegetable oil    ? 1 tablespoon of mayonnaise    ? 2 tablespoons of salad dressing    Foods to avoid:   · Grains:      ? Baked goods, such as doughnuts, pastries, cookies, and biscuits (high in fat and sugar)    ? Mixes for cornbread and biscuits, packaged foods, such as bread stuffing, rice and pasta mixes, macaroni and cheese, and instant cereals (high in sodium)    · Fruits and vegetables:      ? Regular, canned vegetables (high in sodium)    ? Sauerkraut, pickled vegetables, and other foods prepared in brine (high in sodium)    ? Fried vegetables or vegetables in butter or high-fat sauces    ? Fruit in cream or butter sauce (high in fat)    · Dairy:      ? Whole milk, 2% milk, and cream (high in fat)    ?  Regular cheese and processed cheese (high in fat and sodium)    · Meats and protein foods:      ? Smoked or cured meat, such as corned beef, de la cruz, ham, hot dogs, and sausage (high in fat and sodium)    ? Canned beans and canned meats or spreads, such as potted meats, sardines, anchovies, and imitation seafood (high in sodium)    ? Deli or lunch meats, such as bologna, ham, turkey, and roast beef (high in sodium)    ? High-fat meat (T-bone steak, regular hamburger, and ribs)    ? Whole eggs and egg yolks (high in fat)    · Other:      ? Seasonings made with salt, such as garlic salt, celery salt, onion salt, seasoned salt, meat tenderizers, and monosodium glutamate (MSG)    ? Miso soup and canned or dried soup mixes (high in sodium)    ? Regular soy sauce, barbecue sauce, teriyaki sauce, steak sauce, Worcestershire sauce, and most flavored vinegars (high in sodium)    ? Regular condiments, such as mustard, ketchup, and salad dressings (high in sodium)    ? Gravy and sauces, such as Parker or cheese sauces (high in sodium and fat)    ? Drinks high in sugar, such as soda or fruit drinks    ? Snack foods, such as salted chips, popcorn, pretzels, pork rinds, salted crackers, and salted nuts    ? Frozen foods, such as dinners, entrees, vegetables with sauces, and breaded meats (high in sodium)    Other guidelines to follow:   · Maintain a healthy weight  Your risk for heart disease is higher if you are overweight  Your healthcare provider may suggest that you lose weight if you are overweight  You can lose weight by eating fewer calories and foods that have added sugars and fat  The DASH meal plan can help you do this  Decrease calories by eating smaller portions at each meal and fewer snacks  Ask your healthcare provider for more information about how to lose weight  · Exercise regularly  Regular exercise can help you reach or maintain a healthy weight  Regular exercise can also help decrease your blood pressure and improve your cholesterol levels  Get 30 minutes or more of moderate exercise each day of the week  To lose weight, get at least 60 minutes of exercise   Talk to your healthcare provider about the best exercise program for you  · Limit alcohol  Women should limit alcohol to 1 drink a day  Men should limit alcohol to 2 drinks a day  A drink of alcohol is 12 ounces of beer, 5 ounces of wine, or 1½ ounces of liquor  © Copyright [address] Information is for End User's use only and may not be sold, redistributed or otherwise used for commercial purposes  All illustrations and images included in CareNotes® are the copyrighted property of A D A M , Inc  or [facility] Wyman   The above information is an  only  It is not intended as medical advice for individual conditions or treatments  Talk to your doctor, nurse or pharmacist before following any medical regimen to see if it is safe and effective for you

## 2021-02-24 NOTE — LETTER
February 24, 2021     Mariam Ricky, [address]  [address] [facility]    Patient: Trey Roberts   YOB: 1960   Date of Visit: 2/24/2021       Dear Dr Raquel Olson: Thank you for referring Beth Emerson to me for evaluation  Below are my notes for this consultation  If you have questions, please do not hesitate to call me  I look forward to following your patient along with you  Sincerely,        JANN Euceda        CC: Cherise Chavez, JANN Tse  2/24/2021  4:16 PM  Sign when Signing Visit  Heart Failure   Follow Up Office Visit Note -     Trey Roberts   62 y o    female   MRN: [id number]  [address]  [address] [facility] [address] [facility] [address] [place] [address]  [phone]  [phone]    PCP: Mariam García DC  Cardiologist : Will be Dr Josh Fox            Summary of Recommendations  low-sodium diet, fluid restriction as below  Education provided  continue the current plan   BMP today  Follow up will be scheduled with Dr Josh Fox 2 months        Impression/plan  Chronic diastolic heart failure, recent hospital admission 2/11-2/13/21 for decompensation,  Related to high sodium consumption    Wt Readings from Last 3 Encounters:   02/24/21 73 kg (161 lb)   02/12/21 74 3 kg (163 lb 12 8 oz)   02/11/21 72 6 kg (160 lb)       Wt Readings from Last 3 Encounters:   02/24/21 73 kg (161 lb)   02/12/21 74 3 kg (163 lb 12 8 oz)   02/11/21 72 6 kg (160 lb)      Diuretic:  Torsemide 20 mg daily plus potassium 20 mEq BID   Educated regarding adherence to a 1 5g -2 gram Na diet/ 1 5L(50oz)fluid restriction, daily weights and to call us if she gains  3 lbs/ 1 day or 5 lbs/ 1 week  Hypertension, essential  /88  On verapamil 240 mg daily, loop diuretic  Home blood pressure monitoring period low-salt diet   Hx LLE DVT Oct 2018; /PE 12/2020 on Eliquis  Breast cancer status post mastectomy, S/p adjuvant chemotherapy 2018, AC and paclitaxel, Completed in December 2018   on adjuvant hormonal therapy with letrozole   Cardiac testing   TTE 12/16/20  EF 60%  No regional motion abnormality  RV normal size and motion  HPI: Lorin Oro is a 62 y o  female  with  Hypertension  Reports a history of alcohol abuse quitting a few months ago  She has  breast cancer status post mastectomy, and  a prior lower extremity DVT/ PE on  [address] with Eliquis  Interval history  Adm 2/11/21 with LE edema  and RODRIGUEZ for over a month  She admitted to left greater than right lower extremity edema, abdominal bloating and 20 lb weight gain over few months  She was followed by Cardiology  A lower extremity duplex showed no new DVT  An echocardiogram in December 2020 showed an ejection fraction of 60% without regional motion abnormality  RV was normal size and function  Does not repeated  Her NT proBNP was elevated 5000  She had POAL with creatinine 1 6  She was diuresed with Lasix 20 mg IV b i d     When euvolemic she transitioned to torsemide 20 mg daily      2/24/21  Hospital follow-up  She is doing well  She denies any chest pain or shortness of breath  No palpitations, lightheadedness or dizziness  She tells me she was consuming a diet very high in sodium  She used to eat a lot of fast food  She has made some changes  I  provided her some education about how to read food labels  She was provided a list of foods that have high salt content, for her to avoid  Her vital signs are satisfactory  Her weight is down  She is agreeable to getting some blood work today  She will return to see her cardiologist in 2 months  Medications were refilled        I have spent 25 minutes with Patient  today in which greater than 50% of this time was spent in counseling/coordination of care regarding Intructions for management, Patient and family education, Importance of tx compliance and Risk factor reductions       Assessment  Diagnoses and all orders for this visit:    Hospital discharge follow-up    Chronic diastolic heart failure (HCC)  -     Basic metabolic panel; Future    Essential hypertension    Fluid overload  -     torsemide (DEMADEX) 20 mg tablet; Take 1 tablet (20 mg total) by mouth daily  -     potassium chloride (K-DUR,KLOR-CON) 20 mEq tablet; Take 1 tablet (20 mEq total) by mouth 2 (two) times a day        Past Medical History:   Diagnosis Date    Acute DVT (deep venous thrombosis) (HCC)     of LLE>     Bladder infection     Congenital prolapsed rectum     History of biliary stent insertion     History of chemotherapy     History of radiation therapy 05/2018    left breast ca    History of transfusion     x2 s/p chemo treatments, no reaction    Hydronephrosis     right kidney    Hypertension     Malignant neoplasm of overlapping sites of left female breast ([facility] [address] ) 05/01/2018    Migraine        Review of Systems   Constitution: Negative for chills  Cardiovascular: Negative for chest pain, claudication, cyanosis, dyspnea on exertion, irregular heartbeat, leg swelling, near-syncope, orthopnea, palpitations, paroxysmal nocturnal dyspnea and syncope  Respiratory: Negative for cough and shortness of breath  Gastrointestinal: Negative for heartburn and nausea  Neurological: Negative for dizziness, focal weakness, headaches, light-headedness and weakness  All other systems reviewed and are negative  Allergies   Allergen Reactions    Gluten Meal GI Intolerance    Lactose GI Intolerance           Current Outpatient Medications:     acetaminophen-codeine (TYLENOL with CODEINE #3) 300-30 MG per tablet, Take 1 tablet by mouth every 8 (eight) hours as needed, Disp: , Rfl:     apixaban (ELIQUIS) 5 mg, Take 2 tablets (10 mg total) by mouth 2 (two) times a day (Patient taking differently: Take 5 mg by mouth daily ), Disp: 13 tablet, Rfl: 0    folic acid (FOLVITE) 1 mg tablet, Take 1 tablet (1 mg total) by mouth daily, Disp: , Rfl: 0    gabapentin (NEURONTIN) 300 mg capsule, Take 1 capsule (300 mg total) by mouth 3 (three) times a day, Disp: 270 capsule, Rfl: 1    letrozole (FEMARA) 2 5 mg tablet, TAKE 1 TABLET BY MOUTH EVERY DAY, Disp: 90 tablet, Rfl: 1    potassium chloride (K-DUR,KLOR-CON) 20 mEq tablet, Take 1 tablet (20 mEq total) by mouth 2 (two) times a day, Disp: 60 tablet, Rfl: 2    sertraline (ZOLOFT) 100 mg tablet, Take 100 mg by mouth daily, Disp: , Rfl: 0    sertraline (ZOLOFT) 50 mg tablet, Take 50 mg by mouth 2 (two) times a day , Disp: , Rfl: 0    SUMAtriptan (IMITREX) 100 mg tablet, Take 100 mg by mouth once as needed for migraine, Disp: , Rfl:     tiZANidine (ZANAFLEX) 2 mg tablet, Take 2-4 mg by mouth daily at bedtime, Disp: , Rfl:     torsemide (DEMADEX) 20 mg tablet, Take 1 tablet (20 mg total) by mouth daily, Disp: 30 tablet, Rfl: 3    verapamil (VERELAN) 240 MG 24 hr capsule, Take 240 mg by mouth daily, Disp: , Rfl: 0    thiamine 100 MG tablet, Take 1 tablet (100 mg total) by mouth daily (Patient not taking: Reported on 1/15/2021), Disp: , Rfl: 0    Social History     Socioeconomic History    Marital status: /Civil Union     Spouse name: Not on file    Number of children: Not on file    Years of education: Not on file    Highest education level: Not on file   Occupational History    Not on file   Social Needs    Financial resource strain: Not on file    Food insecurity     Worry: Not on file     Inability: Not on file   [email or web address] needs     Medical: Not on file     Non-medical: Not on file   Tobacco Use    Smoking status: Never Smoker    Smokeless tobacco: Never Used   Substance and Sexual Activity    Alcohol use: Not Currently    Drug use: Not Currently    Sexual activity: Yes     Birth control/protection: Female Sterilization   Lifestyle    Physical activity     Days per week: Not on file     Minutes per session: Not on file    Stress: Not on file   Relationships    Social connections     Talks on phone: Not on file Gets together: Not on file     Attends Rastafarian service: Not on file     Active member of club or organization: Not on file     Attends meetings of clubs or organizations: Not on file     Relationship status: Not on file    Intimate partner violence     Fear of current or ex partner: Not on file     Emotionally abused: Not on file     Physically abused: Not on file     Forced sexual activity: Not on file   Other Topics Concern    Not on file   Social History Narrative    Not on file       Family History   Problem Relation Age of Onset    No Known Problems Mother     No Known Problems Father     Breast cancer Maternal Aunt 48    Colon cancer Maternal Aunt     No Known Problems Sister     No Known Problems Daughter     No Known Problems Maternal Grandmother     No Known Problems Maternal Grandfather     No Known Problems Paternal Grandmother     No Known Problems Paternal Grandfather        Physical Exam   Constitutional: She is oriented to person, place, and time  No distress  HENT:   Head: Normocephalic and atraumatic  Eyes: Conjunctivae and EOM are normal    Neck: Normal range of motion  Neck supple  Cardiovascular: Normal rate, regular rhythm, normal heart sounds and intact distal pulses  Pulmonary/Chest: Effort normal and breath sounds normal    Abdominal: Soft  Bowel sounds are normal    Musculoskeletal: Normal range of motion  Neurological: She is alert and oriented to person, place, and time  Skin: Skin is warm and dry  She is not diaphoretic  Psychiatric: She has a normal mood and affect  Nursing note and vitals reviewed  Vitals: Blood pressure 148/88, pulse 93, height 5' 5" (1 651 m), weight 73 kg (161 lb), SpO2 100 %, not currently breastfeeding     Wt Readings from Last 3 Encounters:   02/24/21 73 kg (161 lb)   02/12/21 74 3 kg (163 lb 12 8 oz)   02/11/21 72 6 kg (160 lb)         Labs & Results:  Lab Results   Component Value Date    WBC 3 87 (L) 02/12/2021    HGB 10 3 (L) 2021    HCT 31 7 (L) 2021     (H) 2021     2021     No results found for: BNP  No components found for: CHEM    Results for orders placed during the hospital encounter of 20   Echo complete with contrast if indicated    Narrative [facility] [address] [facility]  [phone]    Transthoracic Echocardiogram  2D, M-mode, Doppler, and Color Doppler    Study date:  09-Sep-2020    Patient: Gladys Madison  MR number: [id number]  Account number: [de-identified]  : 1960  Age: 62 years  Gender: Female  Status: Inpatient  Location: Bedside  Height: 65 in  Weight: 147 6 lb  BP: 168/ 92 mmHg    Indications: Shortness of breath  Diagnoses: R06 00 - Dyspnea, unspecified    Sonographer:  MICHELE Shah  Referring Physician:  Janis Rose PA-C  Group:  [facility]  Interpreting Physician:  Lamar Dhaliwal MD    SUMMARY    PROCEDURE INFORMATION:  This was a technically difficult study  LEFT VENTRICLE:  Size was normal   Systolic function was normal  Ejection fraction was estimated to be 60 %  Wall thickness was mildly increased  Doppler parameters were consistent with abnormal left ventricular relaxation (grade 1 diastolic dysfunction)  RIGHT VENTRICLE:  The size was normal   Systolic function was normal     TRICUSPID VALVE:  There was trace regurgitation  HISTORY: PRIOR HISTORY: Dyspnea, Breast cancer with left sided mastectomy, Hypertension, Alcohol ketosis    PROCEDURE: The procedure was performed at the bedside  This was a routine study  The transthoracic approach was used  The study included complete 2D imaging, M-mode, complete spectral Doppler, and color Doppler  The heart rate was 86 bpm,  at the start of the study  Images were obtained from the parasternal, apical, subcostal, and suprasternal notch acoustic windows  This was a technically difficult study      LEFT VENTRICLE: Size was normal  Systolic function was normal  Ejection fraction was estimated to be 60 %  There were no regional wall motion abnormalities  Wall thickness was mildly increased  DOPPLER: Doppler parameters were consistent  with abnormal left ventricular relaxation (grade 1 diastolic dysfunction)  RIGHT VENTRICLE: The size was normal  Systolic function was normal  Wall thickness was normal     LEFT ATRIUM: Size was normal     RIGHT ATRIUM: Size was normal     MITRAL VALVE: Valve structure was normal  There was normal leaflet separation  DOPPLER: The transmitral velocity was within the normal range  There was no evidence for stenosis  There was no regurgitation  AORTIC VALVE: The valve was trileaflet  Leaflets exhibited normal thickness and normal cuspal separation  DOPPLER: Transaortic velocity was within the normal range  There was no evidence for stenosis  There was no regurgitation  TRICUSPID VALVE: The valve structure was normal  There was normal leaflet separation  DOPPLER: The transtricuspid velocity was within the normal range  There was no evidence for stenosis  There was trace regurgitation  The tricuspid jet  envelope definition was inadequate for estimation of RV systolic pressure  There are no indirect findings suggestive of moderate or severe pulmonary hypertension  PULMONIC VALVE: Leaflets exhibited normal thickness, no calcification, and normal cuspal separation  DOPPLER: The transpulmonic velocity was within the normal range  There was no regurgitation  PERICARDIUM: There was no pericardial effusion  The pericardium was normal in appearance  AORTA: The root exhibited normal size  SYSTEMIC VEINS: IVC: The inferior vena cava was normal in size and course   Respirophasic changes were normal     SYSTEM MEASUREMENT TABLES    2D  %FS: 27 21 %  AV Diam: 3 22 cm  EDV(Teich): 51 26 ml  EF(Cube): 61 44 %  EF(Teich): 54 %  ESV(Cube): 16 69 ml  ESV(Teich): 23 58 ml  IVSd: 0 77 cm  LA Area: 14 43 cm2  LA Diam: 3 08 cm  LVEDV MOD A4C: 59 97 ml  LVEF MOD A4C: 73 7 %  LVESV MOD A4C: 15 77 ml  LVIDd: 3 51 cm  LVIDs: 2 56 cm  LVLd A4C: 6 92 cm  LVLs A4C: 4 91 cm  LVPWd: 0 77 cm  RA Area: 13 16 cm2  RV Diam: 2 89 cm  SI(Cube): 15 29 ml/m2  SI(Teich): 15 91 ml/m2  SV MOD A4C: 44 19 ml  SV(Cube): 26 6 ml  SV(Teich): 27 68 ml    MM  TAPSE: 1 64 cm    PW  AVC: 350 29 ms  MV A Joel: 0 86 m/s  MV Dec [place]: 5 9 m/s2  MV DecT: 128 44 ms  MV E Joel: 0 76 m/s  MV E/A Ratio: 0 88    [employer] Accredited Echocardiography [facility]    Prepared and electronically signed by    Sachin Harkins MD  Signed 09-Sep-2020 16:59:56       No results found for this or any previous visit  This note was completed in part utilizing [employer] fluency direct voice recognition software  Grammatical errors, random word insertion, spelling mistakes, and incomplete sentences may be an occasional consequence of the system secondary to software limitations, ambient noise and hardware issues  At the time of dictation, efforts were made to edit, clarify and /or correct errors  Please read the chart carefully and recognize, using context, where substitutions have occurred    If you have any questions or concerns about the context, text or information contained within the body of this dictation, please contact myself, the provider, for further clarification

## 2021-03-25 ENCOUNTER — OFFICE VISIT (OUTPATIENT)
Dept: NEPHROLOGY | Facility: CLINIC | Age: 61
End: 2021-03-25
Payer: COMMERCIAL

## 2021-03-25 VITALS
DIASTOLIC BLOOD PRESSURE: 78 MMHG | HEART RATE: 80 BPM | SYSTOLIC BLOOD PRESSURE: 152 MMHG | HEIGHT: 65 IN | WEIGHT: 158 LBS | BODY MASS INDEX: 26.33 KG/M2

## 2021-03-25 DIAGNOSIS — N18.30 BENIGN HYPERTENSION WITH CKD (CHRONIC KIDNEY DISEASE) STAGE III (HCC): Primary | ICD-10-CM

## 2021-03-25 DIAGNOSIS — E87.1 HYPONATREMIA: ICD-10-CM

## 2021-03-25 DIAGNOSIS — N18.31 ANEMIA IN STAGE 3A CHRONIC KIDNEY DISEASE (HCC): ICD-10-CM

## 2021-03-25 DIAGNOSIS — E87.70 FLUID OVERLOAD: ICD-10-CM

## 2021-03-25 DIAGNOSIS — N18.31 STAGE 3A CHRONIC KIDNEY DISEASE (HCC): ICD-10-CM

## 2021-03-25 DIAGNOSIS — D63.1 ANEMIA IN STAGE 3A CHRONIC KIDNEY DISEASE (HCC): ICD-10-CM

## 2021-03-25 DIAGNOSIS — I12.9 BENIGN HYPERTENSION WITH CKD (CHRONIC KIDNEY DISEASE) STAGE III (HCC): Primary | ICD-10-CM

## 2021-03-25 PROBLEM — N17.9 AKI (ACUTE KIDNEY INJURY) (HCC): Status: RESOLVED | Noted: 2020-12-15 | Resolved: 2021-03-25

## 2021-03-25 PROCEDURE — 99214 OFFICE O/P EST MOD 30 MIN: CPT | Performed by: INTERNAL MEDICINE

## 2021-03-25 RX ORDER — POTASSIUM CHLORIDE 20 MEQ/1
20 TABLET, EXTENDED RELEASE ORAL AS NEEDED
Qty: 60 TABLET | Refills: 2 | Status: SHIPPED | OUTPATIENT
Start: 2021-03-25 | End: 2021-04-20 | Stop reason: SDUPTHER

## 2021-03-25 RX ORDER — CARVEDILOL 6.25 MG/1
6.25 TABLET ORAL 2 TIMES DAILY WITH MEALS
Qty: 60 TABLET | Refills: 5 | Status: SHIPPED | OUTPATIENT
Start: 2021-03-25 | End: 2021-07-21

## 2021-03-25 RX ORDER — TORSEMIDE 20 MG/1
20 TABLET ORAL AS NEEDED
Qty: 30 TABLET | Refills: 3 | Status: ON HOLD | OUTPATIENT
Start: 2021-03-25 | End: 2021-07-23

## 2021-03-25 NOTE — PROGRESS NOTES
NEPHROLOGY OUTPATIENT PROGRESS NOTE   Trey Roberts 62 y o  female MRN: [id number]  DATE: 3/25/2021  Reason for visit:   Chief Complaint   Patient presents with    Follow-up    Chronic Kidney Disease     ASSESSMENT and PLAN:  CKD stage 3, baseline creatinine 1 to 1 2 since September 2020, prior 0 7 to 0 8  -recently had an episode of severe OPAL with peak creatinine 3 0 earlier this month, suspect prerenal/ over diuresis  Since then torsemide was held by PCP  Most recent serum creatinine has much improved 1 2 closer to baseline   - CKD suspect in the setting of long-term hypertension causing hypertensive arteriosclerosis  -  Avoid nephrotoxins or NSAIDs   -  UA in February 2021 shows no hematuria, trace proteinuria   -diuretics as below    Borderline hyponatremia, serum sodium 134, continue to closely monitor for now  Anemia in CKD, continue to monitor hemoglobin  Hypertension  -blood pressure remains above goal in the office today  She occasionally checks her blood pressure with wrist BP cuff at home which is generally 140s/80s to 90s  -currently on verapamil  Will start carvedilol 6 25 mg p o  b i d   Continue to monitor BP and call back if remains persistently greater than 140/90   -salt restricted diet recommended    ? Diastolic CHF, recent echo shows EF 60%  -she was recently hospitalized with worsened leg edema when she was started on torsemide with overall much improvement in leg edema  Weight at home seems to be improved and stable around 155 lb   -she has lost 8 to 10 lb since hospitalization  -with significantly worsened renal failure, torsemide was held  She is currently not taking any diuretics  -overall weight stable, advised her to take torsemide as needed along with potassium supplements  Diagnoses and all orders for this visit:    Benign hypertension with CKD (chronic kidney disease) stage III  -     carvedilol (COREG) 6 25 mg tablet;  Take 1 tablet (6 25 mg total) by mouth 2 (two) times a day with meals  -     Basic metabolic panel; Future  -     CBC; Future  -     Phosphorus; Future  -     PTH, intact; Future  -     UA (URINE) with reflex to Scope; Future  -     Microalbumin / creatinine urine ratio; Future    Fluid overload  -     torsemide (DEMADEX) 20 mg tablet; Take 1 tablet (20 mg total) by mouth as needed (for leg swelling, or weight gain >5 lbs in 2-3 days)  -     potassium chloride (K-DUR,KLOR-CON) 20 mEq tablet; Take 1 tablet (20 mEq total) by mouth as needed (take with water pill only)    Stage 3a chronic kidney disease  -     Basic metabolic panel; Future  -     CBC; Future  -     Phosphorus; Future  -     PTH, intact; Future  -     UA (URINE) with reflex to Scope; Future  -     Microalbumin / creatinine urine ratio; Future          SUBJECTIVE / HPI:  Patient is 52-year-old female with hypertension for many years, history of breast carcinoma, status post mastectomy, chemotherapy in 2018, status post radiation, lower extremity DVT in 2018,? PE,? Diastolic CHF, CKD stage 3 with baseline creatinine 1 to 1 2 since September 2020, presents for regular follow-up of CKD management  Recent BMP results were reviewed from [facility]  Recently had an episode of OPAL with peak creatinine 3 0 with suspect secondary to over diuresis, prerenal   She was hospitalized in February 2021 with worsened leg swelling and was diuresed  Since then she has lost about 8 to 10 lb  Weight at home seems to be stable around 155 lb  Due to worsened renal failure, diuretics were held  Currently remains off diuretics  Most recent serum creatinine has much improved closer to baseline  She denies any chest pain, shortness of breath, nausea, vomiting  Denies any urinary complaint  No recent NSAID exposure  REVIEW OF SYSTEMS:  More than 10 point review of systems were obtained and discussed in length with the patient   Complete review of systems were negative / unremarkable except mentioned above      PHYSICAL EXAM:  Vitals:    03/25/21 1551   BP: 152/78   Pulse: 80   Weight: 71 7 kg (158 lb)   Height: 5' 5" (1 651 m)     Body mass index is 26 29 kg/m²  Physical Exam  Vitals signs reviewed  Constitutional:       Appearance: She is well-developed  HENT:      Head: Normocephalic and atraumatic  Right Ear: External ear normal       Left Ear: External ear normal       Nose:      Comments: External examination of nose unremarkable  Eyes:      Comments: Mild conjunctival pallor present   Neck:      Musculoskeletal: Neck supple  Cardiovascular:      Comments: S1, S2 present  Pulmonary:      Effort: Pulmonary effort is normal       Breath sounds: Normal breath sounds  No wheezing or rales  Abdominal:      General: Bowel sounds are normal  There is no distension  Palpations: Abdomen is soft  Tenderness: There is no abdominal tenderness  Musculoskeletal:         General: No tenderness  Right lower leg: No edema  Left lower leg: No edema  Skin:     General: Skin is warm  Findings: No rash  Neurological:      Mental Status: She is alert and oriented to person, place, and time     Psychiatric:         Behavior: Behavior normal          PAST MEDICAL HISTORY:  Past Medical History:   Diagnosis Date    Acute DVT (deep venous thrombosis) (HCC)     of LLE>     Bladder infection     Congenital prolapsed rectum     History of biliary stent insertion     History of chemotherapy     History of radiation therapy 05/2018    left breast ca    History of transfusion     x2 s/p chemo treatments, no reaction    Hydronephrosis     right kidney    Hypertension     Malignant neoplasm of overlapping sites of left female breast ([address] ) 05/01/2018    Migraine        PAST SURGICAL HISTORY:  Past Surgical History:   Procedure Laterality Date    ABDOMINAL ADHESION SURGERY N/A 4/23/2019    Procedure: LYSIS ADHESIONS;  Surgeon: Sakshi Miller MD;  [facility];  Service: Colorectal    BREAST BIOPSY      COLON SURGERY      colon resection    CYSTOSCOPY N/A 3/4/2019    Procedure: CYSTOSCOPY WITH BIOPSIES AND FULGERATION AND REDCUTION OF RECTUM PROLAPSE;  Surgeon: Breanne Camacho MD;  [facility];  Service: Urology    ERCP N/A 1/4/2019    Procedure: ENDOSCOPIC RETROGRADE CHOLANGIOPANCREATOGRAPHY (ERCP); Surgeon: Jeronimo Rodriguez MD;  [facility];  Service: Gastroenterology    INSTILLATION MYTOMYCIN N/A 3/4/2019    Procedure: Auguste Breath;  Surgeon: Breanne Camacho MD;  [facility];  Service: Urology    MASTECTOMY Left     2018    RI COLONOSCOPY FLX DX W/COLLJ SPEC WHEN PFRMD N/A 4/22/2019    Procedure: COLONOSCOPY;  Surgeon: Jessika Trujillo MD;  [facility]; Service: Colorectal    RI EDG US EXAM SURGICAL ALTER STOM DUODENUM/JEJUNUM N/A 3/7/2019    Procedure: LINEAR ENDOSCOPIC U/S;  Surgeon: Rocky Hua MD;  [facility]; Service: Gastroenterology    RI ESOPHAGOGASTRODUODENOSCOPY TRANSORAL DIAGNOSTIC N/A 3/7/2019    Procedure: ESOPHAGOGASTRODUODENOSCOPY (EGD); Surgeon: Rocky Hua MD;  [facility];   Service: Gastroenterology    RI INSJ TUNNELED CTR VAD W/SUBQ PORT AGE 5 YR/> N/A 6/26/2018    Procedure: INSERTION VENOUS PORT ( PORT-A-CATH) IR;  Surgeon: Tim Lorenz DO;  [facility];  Service: Interventional Radiology    RI LAP, SURG PROCTOPEXY N/A 4/23/2019    Procedure: LAPAROSCOPIC HAND-ASSIST PELVIC DISSECTION; proctopexy;  Surgeon: Jessika Trujillo MD;  [facility];  Service: Colorectal    RI LAP, SURG PROCTOPEXY N/A 11/18/2020    Procedure: LAPAROSCOPIC LYSIS OF ADHESIONS, MOBILIZATION OF RECTUM AND SUTURE RECTOPEXY;  Surgeon: Naren Vera MD;  [facility];  Service: Colorectal    RI MASTECTOMY, MODIFIED RADICAL Left 5/15/2018    Procedure: BREAST MODIFIED RADICAL MASTECTOMY;  Surgeon: Rebecca Smith MD;  [facility];  Service: Surgical Oncology  NH RMVL [place] CTR VAD W/SUBQ PORT/ CTR/PRPH INSJ N/A 6/4/2019    Procedure: REMOVAL VENOUS PORT (PORT-A-CATH)IR;  Surgeon: Max Henry DO;  [facility];  Service: Interventional Radiology    TUBAL LIGATION      US GUIDANCE BREAST BIOPSY LEFT EACH ADDITIONAL Left 4/3/2018    US GUIDED BREAST BIOPSY LEFT COMPLETE Left 4/3/2018    US GUIDED BREAST LYMPH NODE BIOPSY LEFT Left 4/3/2018       SOCIAL HISTORY:  Social History     Substance and Sexual Activity   Alcohol Use Not Currently     Social History     Substance and Sexual Activity   Drug Use Not Currently     Social History     Tobacco Use   Smoking Status Never Smoker   Smokeless Tobacco Never Used       FAMILY HISTORY:  Family History   Problem Relation Age of Onset    No Known Problems Mother     No Known Problems Father     Breast cancer Maternal Aunt 48    Colon cancer Maternal Aunt     No Known Problems Sister     No Known Problems Daughter     No Known Problems Maternal Grandmother     No Known Problems Maternal Grandfather     No Known Problems Paternal Grandmother     No Known Problems Paternal Grandfather        MEDICATIONS:    Current Outpatient Medications:     apixaban (ELIQUIS) 5 mg, Take 2 tablets (10 mg total) by mouth 2 (two) times a day (Patient taking differently: Take 5 mg by mouth daily ), Disp: 13 tablet, Rfl: 0    gabapentin (NEURONTIN) 300 mg capsule, Take 1 capsule (300 mg total) by mouth 3 (three) times a day (Patient taking differently: Take 300 mg by mouth as needed ), Disp: 270 capsule, Rfl: 1    letrozole (FEMARA) 2 5 mg tablet, TAKE 1 TABLET BY MOUTH EVERY DAY, Disp: 90 tablet, Rfl: 1    sertraline (ZOLOFT) 100 mg tablet, Take 100 mg by mouth daily, Disp: , Rfl: 0    SUMAtriptan (IMITREX) 100 mg tablet, Take 100 mg by mouth once as needed for migraine, Disp: , Rfl:     verapamil (VERELAN) 240 MG 24 hr capsule, Take 240 mg by mouth daily, Disp: , Rfl: 0    acetaminophen-codeine (TYLENOL with CODEINE #3) 300-30 MG per tablet, Take 1 tablet by mouth every 8 (eight) hours as needed, Disp: , Rfl:     carvedilol (COREG) 6 25 mg tablet, Take 1 tablet (6 25 mg total) by mouth 2 (two) times a day with meals, Disp: 60 tablet, Rfl: 5    folic acid (FOLVITE) 1 mg tablet, Take 1 tablet (1 mg total) by mouth daily (Patient not taking: Reported on 3/25/2021), Disp: , Rfl: 0    potassium chloride (K-DUR,KLOR-CON) 20 mEq tablet, Take 1 tablet (20 mEq total) by mouth as needed (take with water pill only), Disp: 60 tablet, Rfl: 2    sertraline (ZOLOFT) 50 mg tablet, Take 50 mg by mouth 2 (two) times a day , Disp: , Rfl: 0    thiamine 100 MG tablet, Take 1 tablet (100 mg total) by mouth daily (Patient not taking: Reported on 1/15/2021), Disp: , Rfl: 0    tiZANidine (ZANAFLEX) 2 mg tablet, Take 2-4 mg by mouth daily at bedtime, Disp: , Rfl:     torsemide (DEMADEX) 20 mg tablet, Take 1 tablet (20 mg total) by mouth as needed (for leg swelling, or weight gain >5 lbs in 2-3 days), Disp: 30 tablet, Rfl: 3    Lab Results:   Results for orders placed or performed in visit on 14/69/73   Basic metabolic panel (BMP)   Result Value Ref Range    Sodium 134 (L) 136 - 145 mmol/L    Potassium 4 7 3 5 - 5 3 mmol/L    Chloride 96 (L) 100 - 108 mmol/L    CO2 24 21 - 32 mmol/L    ANION GAP 14 (H) 4 - 13 mmol/L    BUN 14 5 - 25 mg/dL    Creatinine 1 22 0 60 - 1 30 mg/dL    Glucose 76 65 - 140 mg/dL    Calcium 9 4 8 3 - 10 1 mg/dL    eGFR 49 ml/min/1 73sq m

## 2021-04-20 DIAGNOSIS — E87.70 FLUID OVERLOAD: ICD-10-CM

## 2021-04-20 RX ORDER — POTASSIUM CHLORIDE 20 MEQ/1
20 TABLET, EXTENDED RELEASE ORAL AS NEEDED
Qty: 60 TABLET | Refills: 2 | Status: SHIPPED | OUTPATIENT
Start: 2021-04-20 | End: 2021-08-06 | Stop reason: SDUPTHER

## 2021-05-26 DIAGNOSIS — C50.812 MALIGNANT NEOPLASM OF OVERLAPPING SITES OF LEFT BREAST IN FEMALE, ESTROGEN RECEPTOR POSITIVE (HCC): ICD-10-CM

## 2021-05-26 DIAGNOSIS — Z17.0 MALIGNANT NEOPLASM OF OVERLAPPING SITES OF LEFT BREAST IN FEMALE, ESTROGEN RECEPTOR POSITIVE (HCC): ICD-10-CM

## 2021-05-26 RX ORDER — LETROZOLE 2.5 MG/1
TABLET, FILM COATED ORAL
Qty: 90 TABLET | Refills: 1 | Status: SHIPPED | OUTPATIENT
Start: 2021-05-26 | End: 2021-11-26

## 2021-06-07 ENCOUNTER — HOSPITAL ENCOUNTER (OUTPATIENT)
Dept: CT IMAGING | Facility: HOSPITAL | Age: 61
End: 2021-06-07
Payer: COMMERCIAL

## 2021-06-07 ENCOUNTER — OFFICE VISIT (OUTPATIENT)
Dept: SURGICAL ONCOLOGY | Facility: CLINIC | Age: 61
End: 2021-06-07
Payer: COMMERCIAL

## 2021-06-07 ENCOUNTER — LAB (OUTPATIENT)
Dept: LAB | Facility: HOSPITAL | Age: 61
End: 2021-06-07
Payer: COMMERCIAL

## 2021-06-07 VITALS
BODY MASS INDEX: 24.76 KG/M2 | DIASTOLIC BLOOD PRESSURE: 90 MMHG | HEIGHT: 65 IN | OXYGEN SATURATION: 99 % | SYSTOLIC BLOOD PRESSURE: 132 MMHG | TEMPERATURE: 97.3 F | HEART RATE: 106 BPM | WEIGHT: 148.6 LBS | RESPIRATION RATE: 17 BRPM

## 2021-06-07 DIAGNOSIS — C50.812 MALIGNANT NEOPLASM OF OVERLAPPING SITES OF LEFT BREAST IN FEMALE, ESTROGEN RECEPTOR POSITIVE (HCC): Primary | ICD-10-CM

## 2021-06-07 DIAGNOSIS — Z79.811 USE OF LETROZOLE (FEMARA): ICD-10-CM

## 2021-06-07 DIAGNOSIS — R06.00 DYSPNEA ON EXERTION: ICD-10-CM

## 2021-06-07 DIAGNOSIS — Z17.0 MALIGNANT NEOPLASM OF OVERLAPPING SITES OF LEFT BREAST IN FEMALE, ESTROGEN RECEPTOR POSITIVE (HCC): Primary | ICD-10-CM

## 2021-06-07 DIAGNOSIS — Z90.12 STATUS POST LEFT MASTECTOMY: ICD-10-CM

## 2021-06-07 LAB
BUN SERPL-MCNC: 13 MG/DL (ref 5–25)
CREAT SERPL-MCNC: 1.3 MG/DL (ref 0.6–1.3)
GFR SERPL CREATININE-BSD FRML MDRD: 45 ML/MIN/1.73SQ M

## 2021-06-07 PROCEDURE — 84520 ASSAY OF UREA NITROGEN: CPT

## 2021-06-07 PROCEDURE — 82565 ASSAY OF CREATININE: CPT

## 2021-06-07 PROCEDURE — 99214 OFFICE O/P EST MOD 30 MIN: CPT | Performed by: NURSE PRACTITIONER

## 2021-06-07 PROCEDURE — 36415 COLL VENOUS BLD VENIPUNCTURE: CPT

## 2021-06-08 ENCOUNTER — TELEPHONE (OUTPATIENT)
Dept: SURGICAL ONCOLOGY | Facility: CLINIC | Age: 61
End: 2021-06-08

## 2021-06-14 ENCOUNTER — HOSPITAL ENCOUNTER (OUTPATIENT)
Dept: MAMMOGRAPHY | Facility: CLINIC | Age: 61
Discharge: HOME | End: 2021-06-14
Payer: COMMERCIAL

## 2021-06-14 DIAGNOSIS — Z17.0 MALIGNANT NEOPLASM OF OVERLAPPING SITES OF LEFT BREAST IN FEMALE, ESTROGEN RECEPTOR POSITIVE (HCC): ICD-10-CM

## 2021-06-14 DIAGNOSIS — C50.812 MALIGNANT NEOPLASM OF OVERLAPPING SITES OF LEFT BREAST IN FEMALE, ESTROGEN RECEPTOR POSITIVE (HCC): ICD-10-CM

## 2021-06-14 PROCEDURE — G0279 TOMOSYNTHESIS, MAMMO: HCPCS

## 2021-06-14 PROCEDURE — 77065 DX MAMMO INCL CAD UNI: CPT

## 2021-06-16 ENCOUNTER — TELEPHONE (OUTPATIENT)
Dept: HEMATOLOGY ONCOLOGY | Facility: CLINIC | Age: 61
End: 2021-06-16

## 2021-07-21 DIAGNOSIS — I12.9 BENIGN HYPERTENSION WITH CKD (CHRONIC KIDNEY DISEASE) STAGE III (HCC): ICD-10-CM

## 2021-07-21 DIAGNOSIS — N18.30 BENIGN HYPERTENSION WITH CKD (CHRONIC KIDNEY DISEASE) STAGE III (HCC): ICD-10-CM

## 2021-07-21 RX ORDER — CARVEDILOL 6.25 MG/1
TABLET ORAL
Qty: 60 TABLET | Refills: 5 | Status: SHIPPED | OUTPATIENT
Start: 2021-07-21 | End: 2022-03-02 | Stop reason: HOSPADM

## 2021-07-28 ENCOUNTER — TELEPHONE (OUTPATIENT)
Dept: NEPHROLOGY | Facility: CLINIC | Age: 61
End: 2021-07-28

## 2021-08-06 DIAGNOSIS — E87.70 FLUID OVERLOAD: ICD-10-CM

## 2021-08-06 RX ORDER — POTASSIUM CHLORIDE 20 MEQ/1
20 TABLET, EXTENDED RELEASE ORAL AS NEEDED
Qty: 60 TABLET | Refills: 2 | Status: SHIPPED | OUTPATIENT
Start: 2021-08-06 | End: 2022-03-02 | Stop reason: HOSPADM

## 2021-11-25 DIAGNOSIS — C50.812 MALIGNANT NEOPLASM OF OVERLAPPING SITES OF LEFT BREAST IN FEMALE, ESTROGEN RECEPTOR POSITIVE (HCC): ICD-10-CM

## 2021-11-25 DIAGNOSIS — Z17.0 MALIGNANT NEOPLASM OF OVERLAPPING SITES OF LEFT BREAST IN FEMALE, ESTROGEN RECEPTOR POSITIVE (HCC): ICD-10-CM

## 2021-11-26 RX ORDER — LETROZOLE 2.5 MG/1
TABLET, FILM COATED ORAL
Qty: 90 TABLET | Refills: 3 | Status: SHIPPED | OUTPATIENT
Start: 2021-11-26

## 2021-12-14 ENCOUNTER — TELEPHONE (OUTPATIENT)
Dept: HEMATOLOGY ONCOLOGY | Facility: CLINIC | Age: 61
End: 2021-12-14

## 2022-02-22 ENCOUNTER — APPOINTMENT (EMERGENCY)
Dept: CT IMAGING | Facility: HOSPITAL | Age: 62
DRG: 432 | End: 2022-02-22
Payer: COMMERCIAL

## 2022-02-22 ENCOUNTER — APPOINTMENT (EMERGENCY)
Dept: RADIOLOGY | Facility: HOSPITAL | Age: 62
DRG: 432 | End: 2022-02-22
Payer: COMMERCIAL

## 2022-02-22 ENCOUNTER — HOSPITAL ENCOUNTER (INPATIENT)
Facility: HOSPITAL | Age: 62
LOS: 7 days | Discharge: HOME HEALTH | DRG: 432 | End: 2022-03-02
Attending: EMERGENCY MEDICINE | Admitting: INTERNAL MEDICINE
Payer: COMMERCIAL

## 2022-02-22 ENCOUNTER — APPOINTMENT (EMERGENCY)
Dept: ULTRASOUND IMAGING | Facility: HOSPITAL | Age: 62
DRG: 432 | End: 2022-02-22
Payer: COMMERCIAL

## 2022-02-22 DIAGNOSIS — C50.919 MALIGNANT NEOPLASM OF FEMALE BREAST, UNSPECIFIED ESTROGEN RECEPTOR STATUS, UNSPECIFIED LATERALITY, UNSPECIFIED SITE OF BREAST (HCC): ICD-10-CM

## 2022-02-22 DIAGNOSIS — R17 SERUM TOTAL BILIRUBIN ELEVATED: ICD-10-CM

## 2022-02-22 DIAGNOSIS — N39.0 UTI (URINARY TRACT INFECTION): ICD-10-CM

## 2022-02-22 DIAGNOSIS — N17.9 AKI (ACUTE KIDNEY INJURY) (HCC): ICD-10-CM

## 2022-02-22 DIAGNOSIS — D62 ACUTE BLOOD LOSS ANEMIA: ICD-10-CM

## 2022-02-22 DIAGNOSIS — Z79.52 CURRENT USE OF STEROID MEDICATION: ICD-10-CM

## 2022-02-22 DIAGNOSIS — D64.81 ANEMIA FOLLOWING USE OF CHEMOTHERAPEUTIC DRUG: ICD-10-CM

## 2022-02-22 DIAGNOSIS — R65.20 SEVERE SEPSIS (HCC): Primary | ICD-10-CM

## 2022-02-22 DIAGNOSIS — R93.2 ABNORMAL GALLBLADDER ULTRASOUND: ICD-10-CM

## 2022-02-22 DIAGNOSIS — R26.2 AMBULATORY DYSFUNCTION: ICD-10-CM

## 2022-02-22 DIAGNOSIS — K70.10 ALCOHOLIC HEPATITIS: ICD-10-CM

## 2022-02-22 DIAGNOSIS — E87.2 HIGH ANION GAP METABOLIC ACIDOSIS: ICD-10-CM

## 2022-02-22 DIAGNOSIS — A41.9 SEVERE SEPSIS (HCC): Primary | ICD-10-CM

## 2022-02-22 LAB
2HR DELTA HS TROPONIN: 1 NG/L
4HR DELTA HS TROPONIN: 1 NG/L
ALBUMIN SERPL BCP-MCNC: 1.6 G/DL (ref 3.5–5)
ALP SERPL-CCNC: 202 U/L (ref 46–116)
ALT SERPL W P-5'-P-CCNC: 26 U/L (ref 12–78)
AMMONIA PLAS-SCNC: <10 UMOL/L (ref 11–35)
ANION GAP SERPL CALCULATED.3IONS-SCNC: 12 MMOL/L (ref 4–13)
APTT PPP: 40 SECONDS (ref 23–37)
AST SERPL W P-5'-P-CCNC: 107 U/L (ref 5–45)
BACTERIA UR QL AUTO: ABNORMAL /HPF
BASOPHILS # BLD AUTO: 0.16 THOUSANDS/ΜL (ref 0–0.1)
BASOPHILS NFR BLD AUTO: 1 % (ref 0–1)
BILIRUB SERPL-MCNC: 11.05 MG/DL (ref 0.2–1)
BILIRUB UR QL STRIP: ABNORMAL
BUN SERPL-MCNC: 34 MG/DL (ref 5–25)
CALCIUM ALBUM COR SERPL-MCNC: 10.5 MG/DL (ref 8.3–10.1)
CALCIUM SERPL-MCNC: 8.6 MG/DL (ref 8.3–10.1)
CARDIAC TROPONIN I PNL SERPL HS: 8 NG/L
CARDIAC TROPONIN I PNL SERPL HS: 9 NG/L
CARDIAC TROPONIN I PNL SERPL HS: 9 NG/L
CHLORIDE SERPL-SCNC: 96 MMOL/L (ref 100–108)
CLARITY UR: ABNORMAL
CO2 SERPL-SCNC: 20 MMOL/L (ref 21–32)
COLOR UR: ABNORMAL
CREAT SERPL-MCNC: 1.29 MG/DL (ref 0.6–1.3)
EOSINOPHIL # BLD AUTO: 0.77 THOUSAND/ΜL (ref 0–0.61)
EOSINOPHIL NFR BLD AUTO: 3 % (ref 0–6)
ERYTHROCYTE [DISTWIDTH] IN BLOOD BY AUTOMATED COUNT: 23.1 % (ref 11.6–15.1)
FLUAV RNA RESP QL NAA+PROBE: NEGATIVE
FLUBV RNA RESP QL NAA+PROBE: NEGATIVE
GFR SERPL CREATININE-BSD FRML MDRD: 45 ML/MIN/1.73SQ M
GLUCOSE SERPL-MCNC: 107 MG/DL (ref 65–140)
GLUCOSE UR STRIP-MCNC: ABNORMAL MG/DL
HCT VFR BLD AUTO: 26.8 % (ref 34.8–46.1)
HGB BLD-MCNC: 8.9 G/DL (ref 11.5–15.4)
HGB UR QL STRIP.AUTO: ABNORMAL
IMM GRANULOCYTES # BLD AUTO: >0.5 THOUSAND/UL (ref 0–0.2)
IMM GRANULOCYTES NFR BLD AUTO: 2 % (ref 0–2)
INR PPP: 1.42 (ref 0.84–1.19)
KETONES UR STRIP-MCNC: NEGATIVE MG/DL
LACTATE SERPL-SCNC: 2 MMOL/L (ref 0.5–2)
LACTATE SERPL-SCNC: 2.2 MMOL/L (ref 0.5–2)
LEUKOCYTE ESTERASE UR QL STRIP: ABNORMAL
LYMPHOCYTES # BLD AUTO: 1.03 THOUSANDS/ΜL (ref 0.6–4.47)
LYMPHOCYTES NFR BLD AUTO: 4 % (ref 14–44)
MCH RBC QN AUTO: 34.8 PG (ref 26.8–34.3)
MCHC RBC AUTO-ENTMCNC: 33.2 G/DL (ref 31.4–37.4)
MCV RBC AUTO: 105 FL (ref 82–98)
MONOCYTES # BLD AUTO: 1.12 THOUSAND/ΜL (ref 0.17–1.22)
MONOCYTES NFR BLD AUTO: 4 % (ref 4–12)
NEUTROPHILS # BLD AUTO: 24.61 THOUSANDS/ΜL (ref 1.85–7.62)
NEUTS SEG NFR BLD AUTO: 86 % (ref 43–75)
NITRITE UR QL STRIP: NEGATIVE
NON-SQ EPI CELLS URNS QL MICRO: ABNORMAL /HPF
NRBC BLD AUTO-RTO: 0 /100 WBCS
NT-PROBNP SERPL-MCNC: 2355 PG/ML
PH UR STRIP.AUTO: 6.5 [PH] (ref 4.5–8)
PLATELET # BLD AUTO: 504 THOUSANDS/UL (ref 149–390)
PMV BLD AUTO: 10.5 FL (ref 8.9–12.7)
POTASSIUM SERPL-SCNC: 3.8 MMOL/L (ref 3.5–5.3)
PROT SERPL-MCNC: 6.5 G/DL (ref 6.4–8.2)
PROT UR STRIP-MCNC: ABNORMAL MG/DL
PROTHROMBIN TIME: 17.3 SECONDS (ref 11.6–14.5)
RBC # BLD AUTO: 2.56 MILLION/UL (ref 3.81–5.12)
RBC #/AREA URNS AUTO: ABNORMAL /HPF
RSV RNA RESP QL NAA+PROBE: NEGATIVE
SARS-COV-2 RNA RESP QL NAA+PROBE: NEGATIVE
SODIUM SERPL-SCNC: 128 MMOL/L (ref 136–145)
SP GR UR STRIP.AUTO: 1.01 (ref 1–1.03)
UROBILINOGEN UR QL STRIP.AUTO: 4 E.U./DL
WBC # BLD AUTO: 28.35 THOUSAND/UL (ref 4.31–10.16)
WBC #/AREA URNS AUTO: ABNORMAL /HPF

## 2022-02-22 PROCEDURE — 36415 COLL VENOUS BLD VENIPUNCTURE: CPT

## 2022-02-22 PROCEDURE — 85610 PROTHROMBIN TIME: CPT | Performed by: EMERGENCY MEDICINE

## 2022-02-22 PROCEDURE — 96361 HYDRATE IV INFUSION ADD-ON: CPT

## 2022-02-22 PROCEDURE — 74177 CT ABD & PELVIS W/CONTRAST: CPT

## 2022-02-22 PROCEDURE — 96365 THER/PROPH/DIAG IV INF INIT: CPT

## 2022-02-22 PROCEDURE — 80053 COMPREHEN METABOLIC PANEL: CPT | Performed by: EMERGENCY MEDICINE

## 2022-02-22 PROCEDURE — 83605 ASSAY OF LACTIC ACID: CPT | Performed by: EMERGENCY MEDICINE

## 2022-02-22 PROCEDURE — 99285 EMERGENCY DEPT VISIT HI MDM: CPT

## 2022-02-22 PROCEDURE — 87086 URINE CULTURE/COLONY COUNT: CPT

## 2022-02-22 PROCEDURE — 71045 X-RAY EXAM CHEST 1 VIEW: CPT

## 2022-02-22 PROCEDURE — 85730 THROMBOPLASTIN TIME PARTIAL: CPT | Performed by: EMERGENCY MEDICINE

## 2022-02-22 PROCEDURE — 93005 ELECTROCARDIOGRAM TRACING: CPT

## 2022-02-22 PROCEDURE — 87040 BLOOD CULTURE FOR BACTERIA: CPT | Performed by: EMERGENCY MEDICINE

## 2022-02-22 PROCEDURE — 84484 ASSAY OF TROPONIN QUANT: CPT | Performed by: EMERGENCY MEDICINE

## 2022-02-22 PROCEDURE — 83880 ASSAY OF NATRIURETIC PEPTIDE: CPT | Performed by: EMERGENCY MEDICINE

## 2022-02-22 PROCEDURE — 0241U HB NFCT DS VIR RESP RNA 4 TRGT: CPT | Performed by: EMERGENCY MEDICINE

## 2022-02-22 PROCEDURE — G1004 CDSM NDSC: HCPCS

## 2022-02-22 PROCEDURE — 76705 ECHO EXAM OF ABDOMEN: CPT

## 2022-02-22 PROCEDURE — 85025 COMPLETE CBC W/AUTO DIFF WBC: CPT | Performed by: EMERGENCY MEDICINE

## 2022-02-22 PROCEDURE — 82140 ASSAY OF AMMONIA: CPT | Performed by: EMERGENCY MEDICINE

## 2022-02-22 PROCEDURE — 71275 CT ANGIOGRAPHY CHEST: CPT

## 2022-02-22 PROCEDURE — 81001 URINALYSIS AUTO W/SCOPE: CPT

## 2022-02-22 PROCEDURE — 99285 EMERGENCY DEPT VISIT HI MDM: CPT | Performed by: EMERGENCY MEDICINE

## 2022-02-22 RX ORDER — METRONIDAZOLE 500 MG/1
500 TABLET ORAL ONCE
Status: COMPLETED | OUTPATIENT
Start: 2022-02-22 | End: 2022-02-22

## 2022-02-22 RX ADMIN — CEFEPIME HYDROCHLORIDE 2000 MG: 2 INJECTION, POWDER, FOR SOLUTION INTRAVENOUS at 19:49

## 2022-02-22 RX ADMIN — SODIUM CHLORIDE 1000 ML: 0.9 INJECTION, SOLUTION INTRAVENOUS at 22:24

## 2022-02-22 RX ADMIN — IOHEXOL 100 ML: 350 INJECTION, SOLUTION INTRAVENOUS at 21:17

## 2022-02-22 RX ADMIN — METRONIDAZOLE 500 MG: 500 TABLET ORAL at 23:35

## 2022-02-22 RX ADMIN — SODIUM CHLORIDE 1000 ML: 0.9 INJECTION, SOLUTION INTRAVENOUS at 18:12

## 2022-02-22 RX ADMIN — SODIUM CHLORIDE 1000 ML/HR: 0.9 INJECTION, SOLUTION INTRAVENOUS at 23:22

## 2022-02-22 NOTE — ED NOTES
First set of BC collected at this time from [address] [place] [address] Alia Iniguez, KIMBER  02/22/22 [id number]

## 2022-02-23 PROBLEM — R65.20 SEVERE SEPSIS (HCC): Status: ACTIVE | Noted: 2022-02-23

## 2022-02-23 PROBLEM — A41.9 SEVERE SEPSIS (HCC): Status: ACTIVE | Noted: 2022-02-23

## 2022-02-23 LAB
ALBUMIN SERPL BCP-MCNC: 1.3 G/DL (ref 3.5–5)
ALP SERPL-CCNC: 149 U/L (ref 46–116)
ALT SERPL W P-5'-P-CCNC: 24 U/L (ref 12–78)
ANION GAP SERPL CALCULATED.3IONS-SCNC: 13 MMOL/L (ref 4–13)
AST SERPL W P-5'-P-CCNC: 100 U/L (ref 5–45)
BASOPHILS # BLD AUTO: 0.09 THOUSANDS/ΜL (ref 0–0.1)
BASOPHILS NFR BLD AUTO: 0 % (ref 0–1)
BILIRUB DIRECT SERPL-MCNC: 7.86 MG/DL (ref 0–0.2)
BILIRUB SERPL-MCNC: 9.63 MG/DL (ref 0.2–1)
BUN SERPL-MCNC: 30 MG/DL (ref 5–25)
CALCIUM ALBUM COR SERPL-MCNC: 9.8 MG/DL (ref 8.3–10.1)
CALCIUM SERPL-MCNC: 7.6 MG/DL (ref 8.3–10.1)
CHLORIDE SERPL-SCNC: 103 MMOL/L (ref 100–108)
CO2 SERPL-SCNC: 17 MMOL/L (ref 21–32)
CREAT SERPL-MCNC: 1.04 MG/DL (ref 0.6–1.3)
EOSINOPHIL # BLD AUTO: 0.38 THOUSAND/ΜL (ref 0–0.61)
EOSINOPHIL NFR BLD AUTO: 2 % (ref 0–6)
ERYTHROCYTE [DISTWIDTH] IN BLOOD BY AUTOMATED COUNT: 23.6 % (ref 11.6–15.1)
GFR SERPL CREATININE-BSD FRML MDRD: 58 ML/MIN/1.73SQ M
GLUCOSE SERPL-MCNC: 76 MG/DL (ref 65–140)
HAV IGM SER QL: NORMAL
HBV CORE IGM SER QL: NORMAL
HBV SURFACE AG SER QL: NORMAL
HCT VFR BLD AUTO: 22.9 % (ref 34.8–46.1)
HCV AB SER QL: NORMAL
HGB BLD-MCNC: 7.4 G/DL (ref 11.5–15.4)
IMM GRANULOCYTES # BLD AUTO: 0.32 THOUSAND/UL (ref 0–0.2)
IMM GRANULOCYTES NFR BLD AUTO: 1 % (ref 0–2)
INR PPP: 1.45 (ref 0.84–1.19)
LYMPHOCYTES # BLD AUTO: 0.74 THOUSANDS/ΜL (ref 0.6–4.47)
LYMPHOCYTES NFR BLD AUTO: 3 % (ref 14–44)
MCH RBC QN AUTO: 35.9 PG (ref 26.8–34.3)
MCHC RBC AUTO-ENTMCNC: 32.3 G/DL (ref 31.4–37.4)
MCV RBC AUTO: 111 FL (ref 82–98)
MONOCYTES # BLD AUTO: 1.19 THOUSAND/ΜL (ref 0.17–1.22)
MONOCYTES NFR BLD AUTO: 5 % (ref 4–12)
NEUTROPHILS # BLD AUTO: 20.47 THOUSANDS/ΜL (ref 1.85–7.62)
NEUTS SEG NFR BLD AUTO: 89 % (ref 43–75)
NRBC BLD AUTO-RTO: 0 /100 WBCS
PLATELET # BLD AUTO: 389 THOUSANDS/UL (ref 149–390)
PMV BLD AUTO: 10.7 FL (ref 8.9–12.7)
POTASSIUM SERPL-SCNC: 3.8 MMOL/L (ref 3.5–5.3)
PROT SERPL-MCNC: 5.4 G/DL (ref 6.4–8.2)
PROTHROMBIN TIME: 17.5 SECONDS (ref 11.6–14.5)
RBC # BLD AUTO: 2.06 MILLION/UL (ref 3.81–5.12)
SODIUM SERPL-SCNC: 133 MMOL/L (ref 136–145)
WBC # BLD AUTO: 23.19 THOUSAND/UL (ref 4.31–10.16)

## 2022-02-23 PROCEDURE — 96361 HYDRATE IV INFUSION ADD-ON: CPT

## 2022-02-23 PROCEDURE — 86645 CMV ANTIBODY IGM: CPT | Performed by: PHYSICIAN ASSISTANT

## 2022-02-23 PROCEDURE — 80074 ACUTE HEPATITIS PANEL: CPT | Performed by: EMERGENCY MEDICINE

## 2022-02-23 PROCEDURE — 87493 C DIFF AMPLIFIED PROBE: CPT | Performed by: INTERNAL MEDICINE

## 2022-02-23 PROCEDURE — 36415 COLL VENOUS BLD VENIPUNCTURE: CPT | Performed by: EMERGENCY MEDICINE

## 2022-02-23 PROCEDURE — 86665 EPSTEIN-BARR CAPSID VCA: CPT | Performed by: PHYSICIAN ASSISTANT

## 2022-02-23 PROCEDURE — 82248 BILIRUBIN DIRECT: CPT

## 2022-02-23 PROCEDURE — 86644 CMV ANTIBODY: CPT | Performed by: PHYSICIAN ASSISTANT

## 2022-02-23 PROCEDURE — 80053 COMPREHEN METABOLIC PANEL: CPT

## 2022-02-23 PROCEDURE — 97163 PT EVAL HIGH COMPLEX 45 MIN: CPT

## 2022-02-23 PROCEDURE — 85025 COMPLETE CBC W/AUTO DIFF WBC: CPT

## 2022-02-23 PROCEDURE — 99223 1ST HOSP IP/OBS HIGH 75: CPT | Performed by: INTERNAL MEDICINE

## 2022-02-23 PROCEDURE — 85610 PROTHROMBIN TIME: CPT

## 2022-02-23 PROCEDURE — 87529 HSV DNA AMP PROBE: CPT | Performed by: PHYSICIAN ASSISTANT

## 2022-02-23 PROCEDURE — 86664 EPSTEIN-BARR NUCLEAR ANTIGEN: CPT | Performed by: PHYSICIAN ASSISTANT

## 2022-02-23 PROCEDURE — 86663 EPSTEIN-BARR ANTIBODY: CPT | Performed by: PHYSICIAN ASSISTANT

## 2022-02-23 PROCEDURE — 87505 NFCT AGENT DETECTION GI: CPT | Performed by: INTERNAL MEDICINE

## 2022-02-23 PROCEDURE — 99255 IP/OBS CONSLTJ NEW/EST HI 80: CPT | Performed by: SURGERY

## 2022-02-23 PROCEDURE — 99254 IP/OBS CNSLTJ NEW/EST MOD 60: CPT | Performed by: INTERNAL MEDICINE

## 2022-02-23 RX ORDER — LETROZOLE 2.5 MG/1
2.5 TABLET, FILM COATED ORAL DAILY
Status: DISCONTINUED | OUTPATIENT
Start: 2022-02-23 | End: 2022-03-02 | Stop reason: HOSPADM

## 2022-02-23 RX ORDER — SODIUM CHLORIDE, SODIUM LACTATE, POTASSIUM CHLORIDE, CALCIUM CHLORIDE 600; 310; 30; 20 MG/100ML; MG/100ML; MG/100ML; MG/100ML
125 INJECTION, SOLUTION INTRAVENOUS CONTINUOUS
Status: DISCONTINUED | OUTPATIENT
Start: 2022-02-23 | End: 2022-02-25

## 2022-02-23 RX ORDER — HEPARIN SODIUM 5000 [USP'U]/ML
5000 INJECTION, SOLUTION INTRAVENOUS; SUBCUTANEOUS EVERY 8 HOURS SCHEDULED
Status: DISCONTINUED | OUTPATIENT
Start: 2022-02-23 | End: 2022-02-23

## 2022-02-23 RX ORDER — SODIUM CHLORIDE 9 MG/ML
1000 INJECTION, SOLUTION INTRAVENOUS CONTINUOUS
Status: DISCONTINUED | OUTPATIENT
Start: 2022-02-23 | End: 2022-02-23

## 2022-02-23 RX ORDER — ONDANSETRON 2 MG/ML
4 INJECTION INTRAMUSCULAR; INTRAVENOUS EVERY 6 HOURS PRN
Status: DISCONTINUED | OUTPATIENT
Start: 2022-02-23 | End: 2022-03-02 | Stop reason: HOSPADM

## 2022-02-23 RX ORDER — ACETAMINOPHEN 325 MG/1
650 TABLET ORAL EVERY 6 HOURS PRN
Status: DISCONTINUED | OUTPATIENT
Start: 2022-02-23 | End: 2022-02-25

## 2022-02-23 RX ORDER — CHOLESTYRAMINE LIGHT 4 G/5.7G
4 POWDER, FOR SUSPENSION ORAL DAILY
Status: DISCONTINUED | OUTPATIENT
Start: 2022-02-23 | End: 2022-03-02 | Stop reason: HOSPADM

## 2022-02-23 RX ORDER — METRONIDAZOLE 500 MG/1
500 TABLET ORAL EVERY 8 HOURS SCHEDULED
Status: DISCONTINUED | OUTPATIENT
Start: 2022-02-23 | End: 2022-02-25

## 2022-02-23 RX ADMIN — CEFEPIME HYDROCHLORIDE 2000 MG: 2 INJECTION, POWDER, FOR SOLUTION INTRAVENOUS at 20:45

## 2022-02-23 RX ADMIN — APIXABAN 5 MG: 5 TABLET, FILM COATED ORAL at 20:45

## 2022-02-23 RX ADMIN — LETROZOLE 2.5 MG: 2.5 TABLET, FILM COATED ORAL at 10:02

## 2022-02-23 RX ADMIN — METRONIDAZOLE 500 MG: 500 TABLET ORAL at 07:48

## 2022-02-23 RX ADMIN — SODIUM CHLORIDE, SODIUM LACTATE, POTASSIUM CHLORIDE, AND CALCIUM CHLORIDE 125 ML/HR: .6; .31; .03; .02 INJECTION, SOLUTION INTRAVENOUS at 23:09

## 2022-02-23 RX ADMIN — APIXABAN 5 MG: 5 TABLET, FILM COATED ORAL at 13:38

## 2022-02-23 RX ADMIN — METRONIDAZOLE 500 MG: 500 TABLET ORAL at 15:42

## 2022-02-23 RX ADMIN — METRONIDAZOLE 500 MG: 500 TABLET ORAL at 23:08

## 2022-02-23 RX ADMIN — SODIUM CHLORIDE, SODIUM LACTATE, POTASSIUM CHLORIDE, AND CALCIUM CHLORIDE 75 ML/HR: .6; .31; .03; .02 INJECTION, SOLUTION INTRAVENOUS at 00:49

## 2022-02-23 RX ADMIN — HEPARIN SODIUM 5000 UNITS: 5000 INJECTION INTRAVENOUS; SUBCUTANEOUS at 05:36

## 2022-02-23 NOTE — CONSULTS
Consultation - General Surgery   Najma Yen 62 y o  female MRN: [id number]  Unit/Bed#: FT 01 Encounter: [id number]    Assessment/Plan     Assessment:  69-year-old female with a past medical history of alcohol abuse presents with a 2 week history of weakness, diarrhea, and jaundice  Patient is currently septic with a hyperbilirubinemia and leukocytosis    WBC-28  Hemoglobin-8 9  Sodium -128  Creatinine-1 29  Total bilirubin -11, AST -107, ALT-26    CTA chest abdomen and pelvis-no evidence of PE  There is pericholecystic fluid and stranding around the gallbladder, however there is no gallstones  No evidence of biliary ductal dilation  Plan:  -patient's clinical exam and history is not consistent of gallbladder pathology  On CT imaging there is no dilation of the extra or intrahepatic ducts  No evidence of cholelithiasis  -would recommend a right upper quadrant ultrasound to further evaluate the liver and gall bladder   -Given hx of DVT need to rule out portal vein/hepatic vein thrombosis  Vessels appear patent on CT  -recommend GI evaluation  -Recommend acute hepatitis panel  -if evidence of cholangitis on right upper quadrant ultrasound, would recommend GI evaluation for ERCP  -continue sepsis protocol  -recommend medical admission  -   Surgery will follow  History of Present Illness     HPI:  Najma Yen is a 62 y o  female who presents with generalized weakness, fatigue, and diarrhea over the past 2 weeks  Patient reports that her daughter also stated that she has appeared yellow over the past week  However patient did not believe that her skin was yelling  During these past 2 weeks patient did not have any abdominal pain  However, she states that she now has some right upper quadrant pain now that people have pushed on her abdomen  Does report a large amount of light colored diarrhea over the past 2 weeks  Reports orange colored urine    Patient has a history of alcohol abuse, however she reports that she does not drink or use any illicit drugs anymore  She states that she does not have enough money  Patient tells me that she has not drank in approximally 10-20 years  However on chart review, patient admitted to drinking 3-4 shots a day less than 1 year ago  Patient reports a family history of hepatitis C and breast cancer  Patient has a personal history of breast cancer, she underwent a mastectomy  She also had chemotherapy at that time  Previous abdominal surgeries include a rectopexy  Denies any anticoagulation use  She does have a previous history DVTs  However she no longer takes anticoagulation  Patient denies Tylenol use  Only medication she takes every day is verapamil  In the emergency department patient was found to be septic  She was found to have a leukocytosis of 28, and a hyperbilirubinemia with a total bilirubin of 11  CT scan in the emergency department demonstrated fluid around the gallbladder, however there was no dilation of the intra or extrahepatic biliary ducts  Consult to surgery general  Consult performed by: Meryle Peacemaker, DO  Consult ordered by: Eleno Townsend MD          Review of Systems   Constitutional: Positive for activity change, appetite change, chills and fatigue  Negative for fever and unexpected weight change  HENT: Negative  Eyes: Negative  Respiratory: Negative for shortness of breath  Cardiovascular: Negative for chest pain  Gastrointestinal: Positive for abdominal pain and diarrhea  Negative for blood in stool, nausea and vomiting  Light colored stool   Endocrine: Negative  Genitourinary:        Orange colored urine   Musculoskeletal: Negative  Skin:        Jaundice   Allergic/Immunologic: Negative  Neurological: Negative  Hematological: Negative  Psychiatric/Behavioral: Negative          Historical Information   Past Medical History:   Diagnosis Date    Acute DVT (deep venous thrombosis) (HCC)     of LLE>  Bladder infection     Congenital prolapsed rectum     History of biliary stent insertion     History of chemotherapy     History of radiation therapy 05/2018    left breast ca    History of transfusion     x2 s/p chemo treatments, no reaction    Hydronephrosis     right kidney    Hypertension     Malignant neoplasm of overlapping sites of left female breast ([facility] [address] ) 05/01/2018    Migraine      Past Surgical History:   Procedure Laterality Date    ABDOMINAL ADHESION SURGERY N/A 4/23/2019    Procedure: LYSIS ADHESIONS;  Surgeon: Wendy Mack MD;  [facility];  Service: Colorectal    BREAST BIOPSY      COLON SURGERY      colon resection    CYSTOSCOPY N/A 3/4/2019    Procedure: CYSTOSCOPY WITH BIOPSIES AND [place] OF RECTUM PROLAPSE;  Surgeon: Hang Luis MD;  [facility];  Service: Urology    ERCP N/A 1/4/2019    Procedure: ENDOSCOPIC RETROGRADE CHOLANGIOPANCREATOGRAPHY (ERCP); Surgeon: Timi Riggins MD;  [facility];  Service: Gastroenterology    INSTILLATION MYTOMYCIN N/A 3/4/2019    Procedure: Lulu Wright;  Surgeon: Hang Luis MD;  [facility];  Service: Urology    MASTECTOMY Left     2018    NV COLONOSCOPY FLX DX W/COLLJ SPEC WHEN PFRMD N/A 4/22/2019    Procedure: COLONOSCOPY;  Surgeon: Wendy Mack MD;  [facility]; Service: Colorectal    NV EDG US EXAM SURGICAL ALTER STOM DUODENUM/JEJUNUM N/A 3/7/2019    Procedure: LINEAR ENDOSCOPIC U/S;  Surgeon: Abbie Newton MD;  [facility]; Service: Gastroenterology    NV ESOPHAGOGASTRODUODENOSCOPY TRANSORAL DIAGNOSTIC N/A 3/7/2019    Procedure: ESOPHAGOGASTRODUODENOSCOPY (EGD); Surgeon: Abbie Newton MD;  [facility];   Service: Gastroenterology    NV INSJ TUNNELED CTR VAD W/SUBQ PORT AGE 5 YR/> N/A 6/26/2018    Procedure: INSERTION VENOUS PORT ( PORT-A-CATH) IR;  Surgeon: Merlene Carpio DO;  [facility];  Service: Interventional Radiology    SC LAP, SURG PROCTOPEXY N/A 4/23/2019    Procedure: LAPAROSCOPIC HAND-ASSIST PELVIC DISSECTION; proctopexy;  Surgeon: Kimberly Ramos MD;  [facility];  Service: Colorectal    SC LAP, SURG PROCTOPEXY N/A 11/18/2020    Procedure: LAPAROSCOPIC LYSIS OF ADHESIONS, MOBILIZATION OF RECTUM AND SUTURE RECTOPEXY;  Surgeon: Yelena Burrell MD;  [facility];  Service: Colorectal    SC MASTECTOMY, MODIFIED RADICAL Left 5/15/2018    Procedure: BREAST MODIFIED RADICAL MASTECTOMY;  Surgeon: Estee Mendez MD;  [facility];  Service: Surgical Oncology    SC RMVL [place] CTR VAD W/SUBQ PORT/ CTR/PRPH INSJ N/A 6/4/2019    Procedure: REMOVAL VENOUS PORT (PORT-A-CATH)IR;  Surgeon: Denisa Johnson DO;  [facility];  Service: Interventional Radiology    TUBAL LIGATION      US GUIDANCE BREAST BIOPSY LEFT EACH ADDITIONAL Left 4/3/2018    US GUIDED BREAST BIOPSY LEFT COMPLETE Left 4/3/2018    US GUIDED BREAST LYMPH NODE BIOPSY LEFT Left 4/3/2018     Social History   Social History     Substance and Sexual Activity   Alcohol Use Not Currently     Social History     Substance and Sexual Activity   Drug Use Not Currently     E-Cigarette/Vaping    E-Cigarette Use Never User      E-Cigarette/Vaping Substances     Social History     Tobacco Use   Smoking Status Never Smoker   Smokeless Tobacco Never Used     Family History:   Family History   Problem Relation Age of Onset    No Known Problems Mother     No Known Problems Father     Breast cancer Maternal Aunt 48    Colon cancer Maternal Aunt     No Known Problems Sister     No Known Problems Daughter     No Known Problems Maternal Grandmother     No Known Problems Maternal Grandfather     No Known Problems Paternal Grandmother     No Known Problems Paternal Grandfather        Meds/Allergies   all current active meds have been reviewed  Allergies   Allergen Reactions    Gluten Meal - Food Allergy GI Intolerance    Lactose - Food Allergy GI Intolerance       Objective   First Vitals:   Blood Pressure: 107/55 (02/22/22 1639)  Pulse: (!) 121 (02/22/22 1639)  Temperature: 98 1 °F (36 7 °C) (02/22/22 1639)  Temp Source: Oral (02/22/22 1639)  Respirations: 22 (02/22/22 1639)  SpO2: 99 % (02/22/22 1639)    Current Vitals:   Blood Pressure: (!) 88/52 (Provider Shirlene aware) (02/22/22 2315)  Pulse: 88 (02/22/22 2315)  Temperature: 98 1 °F (36 7 °C) (02/22/22 1639)  Temp Source: Oral (02/22/22 1639)  Respirations: 20 (02/22/22 2230)  SpO2: 98 % (02/22/22 2315)      Intake/Output Summary (Last 24 hours) at 2/22/2022 2359  Last data filed at 2/22/2022 2326  Gross per 24 hour   Intake 1150 ml   Output --   Net 1150 ml       Invasive Devices  Report    Peripheral Intravenous Line            Peripheral IV 02/22/22 Right Antecubital <1 day          Drain            Closed/Suction Drain Right RLQ Bulb 461 days                Physical Exam   General: NAD  HENT: NCAT dry mucus membranes  Scleral icterus  Neck: supple, no JVD  CV: tachycardic  Lungs: nl wob  No resp distress  ABD: Soft, +RUQ tenderness, nondistended  Palpable liver  Skin: jaundice  Extrem: No CCE  Neuro: AAOx3      Lab Results:   I have personally reviewed pertinent lab results    , CBC:   Lab Results   Component Value Date    WBC 28 35 (H) 02/22/2022    HGB 8 9 (L) 02/22/2022    HCT 26 8 (L) 02/22/2022     (H) 02/22/2022     (H) 02/22/2022    MCH 34 8 (H) 02/22/2022    MCHC 33 2 02/22/2022    RDW 23 1 (H) 02/22/2022    MPV 10 5 02/22/2022    NRBC 0 02/22/2022   , CMP:   Lab Results   Component Value Date    SODIUM 128 (L) 02/22/2022    K 3 8 02/22/2022    CL 96 (L) 02/22/2022    CO2 20 (L) 02/22/2022    BUN 34 (H) 02/22/2022    CREATININE 1 29 02/22/2022    CALCIUM 8 6 02/22/2022     (H) 02/22/2022    ALT 26 02/22/2022    ALKPHOS 202 (H) 02/22/2022    EGFR 45 02/22/2022   , Coagulation:   Lab Results   Component Value Date    INR 1 42 (H) 02/22/2022     Imaging: I have personally reviewed pertinent films in PACS  EKG, Pathology, and Other Studies: I have personally reviewed pertinent films in PACS

## 2022-02-23 NOTE — ED NOTES
Pt placed on bedpan but unable to provide urine sample at this time        Johanna Andres RN  02/22/22 1936

## 2022-02-23 NOTE — H&P
[facility]  H&P- Marcie Naqvi 1960, 62 y o  female MRN: [id number]  Unit/Bed#: ED 05 Encounter: [id number]  Primary Care Provider: Meri Burkitt, DC   Date and time admitted to hospital: 2/22/2022  5:42 PM    * Severe sepsis [facility])  Assessment & Plan  · POA SIRS criteria with WBC 28, tachycardic to 121, RR22  Patient then became hypotensive to 85/55  Source either intraabdominal pathology given portions of the gallbladder wall appear edematous/thickened and there is pericholecystic fluid, or UTI given UA with innumerable bacteria, large leuks  · Afebrile at home and on presentation  Only associated symptoms fatigue, SOB with exertion  · POA lactate 2 2, improved to 2 0 at 2 hrs with IVF  · Surgery was consulted in the ED for evaluation for gallbladder pathology, state "patient's clinical exam and history is not consistent of gallbladder pathology  On CT imaging there is no dilation of the extra or intrahepatic ducts  No evidence of cholelithiasis"  · Critical care consulted in the ED, recommended step down 2  · Received 2L NS bolus in ED    Plan:  - continue on LR at 75 cc/hr   - continue with cefepime and metronidazole abx coverage started by the ED  - will be admitted to step down 2 for monitoring   - f/u blood cultures  - f/u urine cultures   - GI consulted as below for further evaluation     Hyperbilirubinemia  Assessment & Plan  · T bili 11 05 on presentation  · Significantly jaundiced on exam  States daughter first noticed she was yellow 1-2 weeks ago  · Patient denies any abdominal pain, nausea, vomiting, diarrhea, light colored stools  Does endorse significant pruritis and decreased appetite the past few weeks which she attributes to "just sitting around the house not doing much "   · CTA C/A/P (2/22): Pericholecystic fluid/stranding without evidence of calcified gallstones, findings may be seen in the setting of hepatitis    Further evaluation with ultrasound may be obtained if there is clinical concern for noncalcified gallstones/cholecystitis  · RUQ U/S (2/23): Gallbladder wall is abnormally thickened, measuring approximately 6 mm thickness  Portions of the gallbladder wall appear edematous  There is pericholecystic fluid  CBD 7 0 mm  Liver is enlarged, hyperechoic, and demonstrates heterogeneous echotexture  · Of note, patient did have a CBD dilated to 15 mm on MRCP in 12/2018  Subsequently had ERCP with biliary stenting  · Etiology like intrahepatic process, no evidence of biliary obstruction  Need to r/o viral hepatitis  Consider alcoholic hepatitis given AST:ALT ratio >2, though patient insists she has been abstinent of EtOH - can confirm with  in am  No evidence of malignancy on imaging, did have ERCP in 2019 with brushings of CBD without evidence of malignant cells  Plan:  - GI consulted   - started cholestyramine 4g daily for pruritis associated with cholestasis   - NPO for possible GI intervention?   - f/u acute hepatitis panel   - f/u direct bilirubin, am CMP     SOB (shortness of breath)  Assessment & Plan  · SOB with exertion for the past few weeks, on chart review does have a history of SOB on exertion, was previously on diuretics for grade 1 diastolic dysfunction but these were discontinued due to worsening renal function  · No evidence of volume overload, lungs clear bilaterally  · Most recent echo 12/2020 with LVEF 60%, no evidence of grade 1 diastolic dysfunction (as was seen on prior echo 9/2020)  · Saturating well on room air   · Etiology most likely 2/2 deconditioning, poor exercise tolerance, chronic disease     Plan:  - continue to monitor O2 saturations and volume status as patient received sepsis level fluid resuscitation     Hyponatremia  Assessment & Plan  Patient has borderline hyponatremia at baseline with Na around 134 normally     POA with sodium 128    Plan:  - receiving sepsis level fluids   - monitor AM sodium   - encourage PO intake as tolerated    History of DVT (deep vein thrombosis)  Assessment & Plan  · Hx of provoked DVT in 12/2018 in the setting of active malignancy  · Patient has been continued on Eliquis 5mg BID since, though she reports only taking 5mg once daily due to cost restraints  · Unclear if patient requires continued enticoagulation as DVT was provoked        Plan:  - holding Eliquis for now as patient is Child class C so Eliquis is contraindicated  - will maintain on prophylactic heparin dosing for now     Anemia in stage 3a chronic kidney disease [facility])  Assessment & Plan  Lab Results   Component Value Date    EGFR 45 02/22/2022    EGFR 45 06/07/2021    EGFR 49 02/24/2021    CREATININE 1 29 02/22/2022    CREATININE 1 30 06/07/2021    CREATININE 1 22 02/24/2021     Hemoglobin 8 9 on presentation, baseline appears to be around 10  No signs of active bleeding, patient denies any dark stools  Plan:  - monitor daily hemoglobin   - transfuse if Hgb <7    Benign hypertension with CKD (chronic kidney disease) stage III (HCC)  Assessment & Plan  Holding home verapamil 240mg 24 hr capsule, home carvedilol 6 25mg BID given soft pressures  Stage 3a chronic kidney disease [facility])  Assessment & Plan  Lab Results   Component Value Date    EGFR 45 02/22/2022    EGFR 45 06/07/2021    EGFR 49 02/24/2021    CREATININE 1 29 02/22/2022    CREATININE 1 30 06/07/2021    CREATININE 1 22 02/24/2021     Follows with nephrology outpatient   CKD suspected to be in the setting of long-term hypertension causing hypertensive arteriosclerosis    Plan:  - continue to monitor creatinine     Malignant neoplasm of overlapping sites of left breast in female, estrogen receptor positive ([facility] [address] )  Assessment & Plan  · Stage III, dx in 2018  S/P mastectomy 2018, chemotherapy and radiation therapy     · Currently on letrozole therapy for continued management    Plan:  - continue letrozole 2 5 mg daily     Alcohol abuse  Assessment & Plan  History of EtOH abuse, patient states she has not drank in over three months  VTE Pharmacologic Prophylaxis: VTE Score: 8 High Risk (Score >/= 5) - Pharmacological DVT Prophylaxis Ordered: heparin  Sequential Compression Devices Ordered  Code Status: Level 3 - DNAR and DNI   Discussion with family: Patient declined call to   Anticipated Length of Stay: Patient will be admitted on an inpatient basis with an anticipated length of stay of greater than 2 midnights secondary to severe sepsis  Chief Complaint: fatigue, shortness of breath with exertion     History of Present Illness:  Jamie Gamboa is a 62 y o  female with a PMH of CKD stage 3, HTN, breast cancer (s/p mastectomy, radiation, chemotherapy in 2018-19), alcohol use disorder who presents with fatigue, shortness of breath with exertion, and yellow skin  On admission she met SIRS criteria with WBC 28, tachycardic to 121, RR22  Lactate was elevated to 2 2  Patient then became hypotensive to 85/55  She has remained afebrile at home and on presentation  Per the patient, she has SOB with exertion for the past few weeks  On chart review the patient does have a history of SOB on exertion and was previously on diuretics for grade 1 diastolic dysfunction but these were discontinued due to worsening renal function; additionally, most recent echo does not note any diastolic or systolic dysfunction  The patient is significantly jaundiced on exam, t bilirubin 11  States her daughter first noticed she was yellow 1-2 weeks ago  Patient denies any abdominal pain, nausea, vomiting, diarrhea, light colored stools  Reports her last alcoholic drinks were over three months ago   Does endorse significant pruritis and decreased appetite the past few weeks which she attributes to "just sitting around the house not doing much " Surgery was consulted in the ED for evaluation for gallbladder pathology, state "patient's clinical exam and history is not consistent of gallbladder pathology  On CT imaging there is no dilation of the extra or intrahepatic ducts  No evidence of cholelithiasis " Critical care was also consulted in the ED, recommended step down 2  Source of infection either intraabdominal pathology given portions of the gallbladder wall appear edematous/thickened and there is pericholecystic fluid, or UTI given UA with innumerable bacteria, large leuks  Patient denies any urinary symptoms  Will continue with cefepime and metronidazole for now  Review of Systems:  Review of Systems   Constitutional: Positive for appetite change and fatigue  Negative for chills, diaphoresis, fever and unexpected weight change  HENT: Negative for congestion and sore throat  Eyes: Negative for visual disturbance  Respiratory: Positive for shortness of breath (with exertion)  Negative for cough and chest tightness  Cardiovascular: Negative for chest pain, palpitations and leg swelling  Gastrointestinal: Negative for abdominal pain, blood in stool, constipation, diarrhea, nausea and vomiting  Genitourinary: Negative for difficulty urinating, dysuria and frequency  Musculoskeletal: Negative for arthralgias, back pain and myalgias  Skin: Positive for color change (yellow)  Negative for rash and wound  Neurological: Negative for dizziness, syncope, weakness, numbness and headaches  Psychiatric/Behavioral: Negative for confusion  All other systems reviewed and are negative        Past Medical and Surgical History:   Past Medical History:   Diagnosis Date    Acute DVT (deep venous thrombosis) (HCC)     of LLE>     Bladder infection     Congenital prolapsed rectum     History of biliary stent insertion     History of chemotherapy     History of radiation therapy 05/2018    left breast ca    History of transfusion     x2 s/p chemo treatments, no reaction    Hydronephrosis     right kidney    Hypertension     Malignant neoplasm of overlapping sites of left female breast ([facility] [address] ) 05/01/2018    Migraine        Past Surgical History:   Procedure Laterality Date    ABDOMINAL ADHESION SURGERY N/A 4/23/2019    Procedure: LYSIS ADHESIONS;  Surgeon: Rg Ashley MD;  [facility];  Service: Colorectal    BREAST BIOPSY      COLON SURGERY      colon resection    CYSTOSCOPY N/A 3/4/2019    Procedure: CYSTOSCOPY WITH BIOPSIES AND [place] OF RECTUM PROLAPSE;  Surgeon: Sariah Abreu MD;  [facility];  Service: Urology    ERCP N/A 1/4/2019    Procedure: ENDOSCOPIC RETROGRADE CHOLANGIOPANCREATOGRAPHY (ERCP); Surgeon: Jose Herron MD;  [facility];  Service: Gastroenterology    INSTILLATION MYTOMYCIN N/A 3/4/2019    Procedure: Eamon [place];  Surgeon: Sariah Abreu MD;  [facility];  Service: Urology    MASTECTOMY Left     2018    NC COLONOSCOPY FLX DX W/COLLJ SPEC WHEN PFRMD N/A 4/22/2019    Procedure: COLONOSCOPY;  Surgeon: Rg Ashley MD;  Location: [facility]; Service: Colorectal    NC EDG US EXAM SURGICAL ALTER STOM DUODENUM/JEJUNUM N/A 3/7/2019    Procedure: LINEAR ENDOSCOPIC U/S;  Surgeon: Josh Yousif MD;  [facility]; Service: Gastroenterology    NC ESOPHAGOGASTRODUODENOSCOPY TRANSORAL DIAGNOSTIC N/A 3/7/2019    Procedure: ESOPHAGOGASTRODUODENOSCOPY (EGD); Surgeon: Josh Yousif MD;  [facility];   Service: Gastroenterology    NC INSJ TUNNELED CTR VAD W/SUBQ PORT AGE 5 YR/> N/A 6/26/2018    Procedure: INSERTION VENOUS PORT ( PORT-A-CATH) IR;  Surgeon: Donna Ford DO;  [facility];  Service: Interventional Radiology    NC LAP, SURG PROCTOPEXY N/A 4/23/2019    Procedure: LAPAROSCOPIC HAND-ASSIST PELVIC DISSECTION; proctopexy;  Surgeon: Rg Ashley MD;  [facility];  Service: Colorectal    NC LAP, SURG PROCTOPEXY N/A 11/18/2020    Procedure: LAPAROSCOPIC LYSIS OF ADHESIONS, MOBILIZATION OF RECTUM AND SUTURE RECTOPEXY; Surgeon: Karla Boone MD;  Location: [facility];  Service: Colorectal    DC MASTECTOMY, MODIFIED RADICAL Left 5/15/2018    Procedure: BREAST MODIFIED RADICAL MASTECTOMY;  Surgeon: Anand Elizabeth MD;  [facility];  Service: Surgical Oncology    DC RMVL [place] CTR VAD W/SUBQ PORT/ CTR/PRPH INSJ N/A 6/4/2019    Procedure: REMOVAL VENOUS PORT (PORT-A-CATH)IR;  Surgeon: Bulah Kanner, DO;  [facility];  Service: Interventional Radiology    TUBAL LIGATION      US GUIDANCE BREAST BIOPSY LEFT EACH ADDITIONAL Left 4/3/2018    US GUIDED BREAST BIOPSY LEFT COMPLETE Left 4/3/2018    US GUIDED BREAST LYMPH NODE BIOPSY LEFT Left 4/3/2018       Meds/Allergies:  Prior to Admission medications    Medication Sig Start Date End Date Taking?  Authorizing Provider   acetaminophen-codeine (TYLENOL with CODEINE #3) 300-30 MG per tablet Take 1 tablet by mouth every 8 (eight) hours as needed 1/27/21   Historical Provider, MD   apixaban (ELIQUIS) 5 mg Take 2 tablets (10 mg total) by mouth 2 (two) times a day  Patient taking differently: Take 5 mg by mouth daily  12/18/20   Ernesto Shultz MD   carvedilol (COREG) 6 25 mg tablet TAKE ONE TABLET BY MOUTH TWO TIMES A DAY WITH MEALS 7/21/21   Dick Beltran MD   folic acid (FOLVITE) 1 mg tablet Take 1 tablet (1 mg total) by mouth daily  Patient not taking: Reported on 3/25/2021 9/11/20   Amaya Duenas PA-C   gabapentin (NEURONTIN) 300 mg capsule Take 1 capsule (300 mg total) by mouth 3 (three) times a day  Patient taking differently: Take 300 mg by mouth as needed  5/28/19   Miguel Ángel Gutierrez MD   letrozole [facility]) 2 5 mg tablet TAKE 1 TABLET BY MOUTH EVERY DAY 11/26/21   Miguel Ángel Gutierrez MD   potassium chloride (K-DUR,KLOR-CON) 20 mEq tablet Take 1 tablet (20 mEq total) by mouth as needed (take with water pill only) 8/6/21 10/5/21  Dennis Montero MD   sertraline (ZOLOFT) 100 mg tablet Take 100 mg by mouth daily 4/16/19   Historical Provider, MD sertraline (ZOLOFT) 50 mg tablet Take 50 mg by mouth 2 (two) times a day  10/11/18   Historical Provider, MD   SUMAtriptan (IMITREX) 100 mg tablet Take 100 mg by mouth once as needed for migraine    Historical Provider, MD   thiamine 100 MG tablet Take 1 tablet (100 mg total) by mouth daily  Patient not taking: Reported on 1/15/2021 9/11/20   Gabriel Duenas PA-C   tiZANidine (ZANAFLEX) 2 mg tablet Take 2-4 mg by mouth daily at bedtime 1/27/21   Historical Provider, MD   torsemide (DEMADEX) 20 mg tablet Take 1 tablet (20 mg total) by mouth as needed (for leg swelling, or weight gain >5 lbs in 2-3 days) 3/25/21 7/23/21  Velma Taveras MD   verapamil (VERELAN) 240 MG 24 hr capsule Take 240 mg by mouth daily 10/16/18   Historical Provider, MD MOODY have reviewed home medications with patient personally  Allergies:    Allergies   Allergen Reactions    Gluten Meal - Food Allergy GI Intolerance    Lactose - Food Allergy GI Intolerance       Social History:  Marital Status: /Civil Union   Occupation: retired  Patient Pre-hospital Living Situation: Home, With spouse  Patient Pre-hospital Level of Mobility: walks  Patient Pre-hospital Diet Restrictions: none  Substance Use History:   Social History     Substance and Sexual Activity   Alcohol Use Not Currently     Social History     Tobacco Use   Smoking Status Never Smoker   Smokeless Tobacco Never Used     Social History     Substance and Sexual Activity   Drug Use Not Currently       Family History:  Family History   Problem Relation Age of Onset    No Known Problems Mother     No Known Problems Father     Breast cancer Maternal Aunt 48    Colon cancer Maternal Aunt     No Known Problems Sister     No Known Problems Daughter     No Known Problems Maternal Grandmother     No Known Problems Maternal Grandfather     No Known Problems Paternal Grandmother     No Known Problems Paternal Grandfather        Physical Exam:     Vitals:   Blood Pressure: 104/58 (02/23/22 0345)  Pulse: 94 (02/23/22 0345)  Temperature: 98 1 °F (36 7 °C) (02/22/22 1639)  Temp Source: Oral (02/22/22 1639)  Respirations: 20 (02/23/22 0345)  SpO2: 98 % (02/23/22 0345)    Physical Exam  Vitals and nursing note reviewed  Constitutional:       General: She is not in acute distress  Appearance: Normal appearance  She is not ill-appearing or diaphoretic  HENT:      Head: Normocephalic and atraumatic  Mouth/Throat:      Mouth: Mucous membranes are moist       Pharynx: Oropharynx is clear  Eyes:      General: Scleral icterus present  Extraocular Movements: Extraocular movements intact  Pupils: Pupils are equal, round, and reactive to light  Cardiovascular:      Rate and Rhythm: Normal rate and regular rhythm  Pulses: Normal pulses  Heart sounds: Normal heart sounds  No murmur heard  No friction rub  No gallop  Pulmonary:      Effort: Pulmonary effort is normal       Breath sounds: Normal breath sounds  No stridor  No wheezing, rhonchi or rales  Abdominal:      General: Bowel sounds are normal       Palpations: Abdomen is soft  There is no mass  Tenderness: There is abdominal tenderness (mild tenderness in RUQ to deep palpation)  There is no guarding  Musculoskeletal:         General: No tenderness  Cervical back: Normal range of motion and neck supple  Right lower leg: No edema  Left lower leg: No edema  Skin:     General: Skin is warm and dry  Coloration: Skin is jaundiced  Findings: No rash  Neurological:      General: No focal deficit present  Mental Status: She is alert and oriented to person, place, and time  Motor: No weakness     Psychiatric:         Mood and Affect: Mood normal          Behavior: Behavior normal         Additional Data:     Lab Results:  Results from last 7 days   Lab Units 02/22/22  1650   WBC Thousand/uL 28 35*   HEMOGLOBIN g/dL 8 9*   HEMATOCRIT % 26 8*   PLATELETS Thousands/uL 504*   NEUTROS PCT % 86*   LYMPHS PCT % 4*   MONOS PCT % 4   EOS PCT % 3     Results from last 7 days   Lab Units 02/22/22  1650   SODIUM mmol/L 128*   POTASSIUM mmol/L 3 8   CHLORIDE mmol/L 96*   CO2 mmol/L 20*   BUN mg/dL 34*   CREATININE mg/dL 1 29   ANION GAP mmol/L 12   CALCIUM mg/dL 8 6   ALBUMIN g/dL 1 6*   TOTAL BILIRUBIN mg/dL 11 05*   ALK PHOS U/L 202*   ALT U/L 26   AST U/L 107*   GLUCOSE RANDOM mg/dL 107     Results from last 7 days   Lab Units 02/22/22  1806   INR  1 42*             Results from last 7 days   Lab Units 02/22/22  1947 02/22/22  1806   LACTIC ACID mmol/L 2 0 2 2*       Imaging:   US right upper quadrant   Final Result by Luke Ruth MD (02/23 4622)      The gallbladder wall is abnormally thickened, measuring approximately 6 mm thickness  Portions of the gallbladder wall appear edematous  There is pericholecystic fluid  The liver is enlarged, hyperechoic, and demonstrates heterogeneous echotexture  The technologist who performed the examination was unable to definitely demonstrate flow within the portal vein, however, contrast enhancement of the portal vein was seen on    a CT performed approximately 3 hours earlier today  Gastroenterology/Hepatology consultation and Surgical consultation is recommended  Trace ascites  Atrophic right kidney, unchanged  There is an approximately 8 x 7 x 9 mm hypoechoic lesion within the posterior aspect of the left lobe of the liver; this appears similar to April 11, 2018  The study was marked in [facility] for immediate notification  Workstation performed: [id number]         PE Study with CT Abdomen and Pelvis with contrast   Final Result by Juna José Shane MD (02/22 2237)      1  No evidence of pulmonary embolism  2   Pericholecystic fluid/stranding without evidence of calcified gallstones, findings may be seen in the setting of hepatitis    Further evaluation with ultrasound may be obtained if there is clinical concern for noncalcified gallstones/cholecystitis  3   Large hiatal hernia  4   Mild thickening of the urinary bladder wall, correlate with urinalysis for cystitis  Workstation performed: [id number]         XR chest 1 view portable    (Results Pending)       EKG and Other Studies Reviewed on Admission:   · EKG: No EKG obtained  ** Please Note: This note has been constructed using a voice recognition system   **

## 2022-02-23 NOTE — ASSESSMENT & PLAN NOTE
· T bili 11 05 on presentation  · Significantly jaundiced on exam  States daughter first noticed she was yellow 1-2 weeks ago  · Patient denies any abdominal pain, nausea, vomiting, diarrhea, light colored stools  Does endorse significant pruritis and decreased appetite the past few weeks which she attributes to "just sitting around the house not doing much "   · CTA C/A/P (2/22): Pericholecystic fluid/stranding without evidence of calcified gallstones, findings may be seen in the setting of hepatitis  Further evaluation with ultrasound may be obtained if there is clinical concern for noncalcified gallstones/cholecystitis  Trace ascites present  · RUQ U/S (2/23): Gallbladder wall is abnormally thickened, measuring approximately 6 mm thickness  Portions of the gallbladder wall appear edematous  There is pericholecystic fluid  CBD 7 0 mm  Liver is enlarged, hyperechoic, and demonstrates heterogeneous echotexture  · Of note, patient did have a CBD dilated to 15 mm on MRCP in 12/2018  Subsequently had ERCP with biliary stenting  · Etiology like intrahepatic process, no evidence of biliary obstruction  Need to r/o viral hepatitis  Consider alcoholic hepatitis given AST:ALT ratio >2, though patient insists she has been abstinent of EtOH - can confirm with  in am  No evidence of malignancy on imaging, did have ERCP in 2019 with brushings of CBD without evidence of malignant cells       Plan:  - GI consulted   - started cholestyramine 4g daily for pruritis associated with cholestasis   - NPO for possible GI intervention?   - f/u acute hepatitis panel   - f/u direct bilirubin, am CMP

## 2022-02-23 NOTE — ASSESSMENT & PLAN NOTE
· POA SIRS criteria with WBC 28, tachycardic to 121, RR22  Patient then became hypotensive to 85/55  Source either intraabdominal pathology given portions of the gallbladder wall appear edematous/thickened and there is pericholecystic fluid, or UTI given UA with innumerable bacteria, large leuks  · Afebrile at home and on presentation  Only associated symptoms fatigue, SOB with exertion  · POA lactate 2 2, improved to 2 0 at 2 hrs with IVF  · Surgery was consulted in the ED for evaluation for gallbladder pathology, state "patient's clinical exam and history is not consistent of gallbladder pathology  On CT imaging there is no dilation of the extra or intrahepatic ducts    No evidence of cholelithiasis"  · Critical care consulted in the ED, recommended step down 2  · Received 2L NS bolus in ED    Plan:  - continue on LR at 75 cc/hr   - continue with cefepime and metronidazole abx coverage started by the ED  - will be admitted to step down 2 for monitoring   - f/u blood cultures  - f/u urine cultures   - GI consulted as below for further evaluation

## 2022-02-23 NOTE — ASSESSMENT & PLAN NOTE
· Hx of provoked DVT in 12/2018 in the setting of active malignancy  · Patient has been continued on Eliquis 5mg BID since, though she reports only taking 5mg once daily due to cost restraints  · Unclear if patient requires continued enticoagulation as DVT was provoked         Plan:  - holding Eliquis for now as patient is Child class C so Eliquis is contraindicated  - will maintain on prophylactic heparin dosing for now

## 2022-02-23 NOTE — ASSESSMENT & PLAN NOTE
Lab Results   Component Value Date    EGFR 45 02/22/2022    EGFR 45 06/07/2021    EGFR 49 02/24/2021    CREATININE 1 29 02/22/2022    CREATININE 1 30 06/07/2021    CREATININE 1 22 02/24/2021     Follows with nephrology outpatient   CKD suspected to be in the setting of long-term hypertension causing hypertensive arteriosclerosis    Plan:  - continue to monitor creatinine

## 2022-02-23 NOTE — ASSESSMENT & PLAN NOTE
Patient has borderline hyponatremia at baseline with Na around 134 normally     POA with sodium 128    Plan:  - receiving sepsis level fluids   - monitor AM sodium   - encourage PO intake as tolerated

## 2022-02-23 NOTE — ASSESSMENT & PLAN NOTE
· SOB with exertion for the past few weeks, on chart review does have a history of SOB on exertion, was previously on diuretics for grade 1 diastolic dysfunction but these were discontinued due to worsening renal function  · No evidence of volume overload, lungs clear bilaterally     · Most recent echo 12/2020 with LVEF 60%, no evidence of grade 1 diastolic dysfunction (as was seen on prior echo 9/2020)  · Saturating well on room air   · Etiology most likely 2/2 deconditioning, poor exercise tolerance, chronic disease     Plan:  - continue to monitor O2 saturations and volume status as patient received sepsis level fluid resuscitation

## 2022-02-23 NOTE — CONSULTS
Consultation -  [facility]  Madison Braun 62 y o  female MRN: [id number]  Unit/Bed#: ED 05 Encounter: [id number]        Consults    Reason for Consult / Principal Problem:  Elevated LFTs    ASSESSMENT and PLAN:      1  Elevated LFTs, cholestatic pattern  Patient presented with 3 weeks of worsening shortness of breath and fatigue with decreased oral intake  Total bilirubin 11, mainly direct with white count of 28  Afebrile  Platelets elevated  INR 1 45  She denies any abdominal pain, nausea or vomiting but reports some diarrhea a few weeks prior  Reports possible fevers at home  Bowel movements appear yellow with urine normal   Denies any antibiotics, alcohol, Tylenol, new foods or sick contacts  Appears likely viral hepatitis versus less likely obstructive     -obtain EBV, CMV and HSV  Acute hepatitis panel was normal   -liver imaging with portal vein patent     -if no improvement of liver function tests, would recommend proceeding with MRI/MRCP at that time     -continue monitor liver function tests and INR closely  Continue to monitor mental status  Ammonia on arrival normal   -continue to monitor white count with further workup per primary team   Will continue cefepime and Flagyl for now  Follow up blood cultures     -cholestyramine started for itching       -------------------------------------------------------------------------------------------------------------------    HPI:     Madison Braun is a 81-year-old female with past medical history of breast cancer on letrozole, CKD, history of DVT not on anticoagulation who presented to the emergency room for worsening shortness of breath and fatigue x3 weeks  Patient was tachycardic on arrival   Lactate elevated at 2 2  Sodium low at 128 with BUN elevated at 34  LFTs with total bilirubin 11 05, mainly direct with AST of 107 alkaline phosphatase of 202  White count elevated at 28 with hemoglobin 8 9 and platelets 170   CT a with shira cholecystic fluid without evidence of stones, possibly in the setting of hepatitis  Ultrasound was then performed showing gallbladder wall thickening, enlarged liver, trace ascites and stable liver lesion  Patient reports that she has been feeling short of breath and fatigue for the past 3 weeks  She reports prior to this she did have some diarrhea which then resolved  She denies any nausea, vomiting, abdominal pain  She reports her  took her temperature showing occasional fevers  She reports her appetite was significantly poor in she had not eaten in the past several days  Patient reports she does not have a scale at home to weigh herself  She reports recently her bowel movements have looks very yellow with her urine looking normal   She denies any new foods, recent antibiotics, Tylenol, new medications  She also adamantly denies any alcohol use  She reports every 1 at home is doing well  No recent travel  Does report itching  No significant abdominal surgeries  Reports having family member with hepatitis C  Patient was also seen in 2019 for mild elevation of bilirubin and underwent ERCP which showed a dilated CBD of 15 mm status post sphincterotomy with immediate drainage of bile  Biliary brushings were performed from the ampulla which were benign  REVIEW OF SYSTEMS:    CONSTITUTIONAL: + Fatigue, decreased appetite, fevers  HEENT: No earache or tinnitus  Denies hearing loss or visual disturbances  CARDIOVASCULAR: No chest pain or palpitations  RESPIRATORY: Denies any cough, hemoptysis, shortness of breath or dyspnea on exertion  GASTROINTESTINAL: As noted in the History of Present Illness  GENITOURINARY: No problems with urination  Denies any hematuria or dysuria  NEUROLOGIC: No dizziness or vertigo, denies headaches  MUSCULOSKELETAL: Denies any muscle or joint pain  SKIN: +pruritis  ENDOCRINE: Denies excessive thirst  Denies intolerance to heat or cold    PSYCHOSOCIAL: Denies depression or anxiety  Denies any recent memory loss  Historical Information   Past Medical History:   Diagnosis Date    Acute DVT (deep venous thrombosis) (HCC)     of LLE>     Bladder infection     Congenital prolapsed rectum     History of biliary stent insertion     History of chemotherapy     History of radiation therapy 05/2018    left breast ca    History of transfusion     x2 s/p chemo treatments, no reaction    Hydronephrosis     right kidney    Hypertension     Malignant neoplasm of overlapping sites of left female breast ([address] ) 05/01/2018    Migraine      Past Surgical History:   Procedure Laterality Date    ABDOMINAL ADHESION SURGERY N/A 4/23/2019    Procedure: LYSIS ADHESIONS;  Surgeon: Laura Iverson MD;  [facility];  Service: Colorectal    BREAST BIOPSY      COLON SURGERY      colon resection    CYSTOSCOPY N/A 3/4/2019    Procedure: CYSTOSCOPY WITH BIOPSIES AND FULGERATION AND REDCUTION OF RECTUM PROLAPSE;  Surgeon: Kathya Xiao MD;  [facility];  Service: Urology    ERCP N/A 1/4/2019    Procedure: ENDOSCOPIC RETROGRADE CHOLANGIOPANCREATOGRAPHY (ERCP); Surgeon: Makenna Morales MD;  [facility];  Service: Gastroenterology    INSTILLATION MYTOMYCIN N/A 3/4/2019    Procedure: Khanh Hogan;  Surgeon: Kathya Xiao MD;  [facility];  Service: Urology    MASTECTOMY Left     2018    WA COLONOSCOPY FLX DX W/COLLJ SPEC WHEN PFRMD N/A 4/22/2019    Procedure: COLONOSCOPY;  Surgeon: Laura Iverson MD;  [facility]; Service: Colorectal    WA EDG US EXAM SURGICAL ALTER STOM DUODENUM/JEJUNUM N/A 3/7/2019    Procedure: LINEAR ENDOSCOPIC U/S;  Surgeon: Xiomara Bae MD;  [facility]; Service: Gastroenterology    WA ESOPHAGOGASTRODUODENOSCOPY TRANSORAL DIAGNOSTIC N/A 3/7/2019    Procedure: ESOPHAGOGASTRODUODENOSCOPY (EGD); Surgeon: Xiomara Bae MD;  [facility];   Service: Gastroenterology   [facility] SD INSJ TUNNELED CTR VAD W/SUBQ PORT AGE 5 YR/> N/A 6/26/2018    Procedure: INSERTION VENOUS PORT ( PORT-A-CATH) IR;  Surgeon: Merlene Carpio DO;  [facility];  Service: Interventional Radiology    SD LAP, SURG PROCTOPEXY N/A 4/23/2019    Procedure: LAPAROSCOPIC HAND-ASSIST PELVIC DISSECTION; proctopexy;  Surgeon: Wendy Mack MD;  [facility];  Service: Colorectal    SD LAP, SURG PROCTOPEXY N/A 11/18/2020    Procedure: LAPAROSCOPIC LYSIS OF ADHESIONS, MOBILIZATION OF RECTUM AND SUTURE RECTOPEXY;  Surgeon: Ryan Hernadez MD;  [facility];  Service: Colorectal    SD MASTECTOMY, MODIFIED RADICAL Left 5/15/2018    Procedure: BREAST MODIFIED RADICAL MASTECTOMY;  Surgeon: Juan R Rasmussen MD;  [facility];  Service: Surgical Oncology    SD RMVL [place] CTR VAD W/SUBQ PORT/ CTR/PRPH INSJ N/A 6/4/2019    Procedure: REMOVAL VENOUS PORT (PORT-A-CATH)IR;  Surgeon: Merlene Carpio DO;  [facility];  Service: Interventional Radiology    TUBAL LIGATION      US GUIDANCE BREAST BIOPSY LEFT EACH ADDITIONAL Left 4/3/2018    US GUIDED BREAST BIOPSY LEFT COMPLETE Left 4/3/2018    US GUIDED BREAST LYMPH NODE BIOPSY LEFT Left 4/3/2018     Social History   Social History     Substance and Sexual Activity   Alcohol Use Not Currently     Social History     Substance and Sexual Activity   Drug Use Not Currently     Social History     Tobacco Use   Smoking Status Never Smoker   Smokeless Tobacco Never Used     Family History   Problem Relation Age of Onset    No Known Problems Mother     No Known Problems Father     Breast cancer Maternal Aunt 48    Colon cancer Maternal Aunt     No Known Problems Sister     No Known Problems Daughter     No Known Problems Maternal Grandmother     No Known Problems Maternal Grandfather     No Known Problems Paternal Grandmother     No Known Problems Paternal Grandfather        Meds/Allergies     (Not in a hospital admission)    Current Facility-Administered Medications   Medication Dose Route Frequency    acetaminophen (TYLENOL) tablet 650 mg  650 mg Oral Q6H PRN    cefepime (MAXIPIME) 2 g/50 mL dextrose IVPB  2,000 mg Intravenous Q24H    cholestyramine sugar free (QUESTRAN LIGHT) packet 4 g  4 g Oral Daily    heparin (porcine) subcutaneous injection 5,000 Units  5,000 Units Subcutaneous Q8H [address]    lactated ringers infusion  125 mL/hr Intravenous Continuous    letrozole (FEMARA) tablet 2 5 mg  2 5 mg Oral Daily    metroNIDAZOLE (FLAGYL) tablet 500 mg  500 mg Oral Q8H [address]    ondansetron (ZOFRAN) injection 4 mg  4 mg Intravenous Q6H PRN    sodium chloride 0 9 % infusion  1,000 mL/hr Intravenous Continuous       Allergies   Allergen Reactions    Gluten Meal - Food Allergy GI Intolerance    Lactose - Food Allergy GI Intolerance           Objective     Blood pressure 102/58, pulse 94, temperature 98 1 °F (36 7 °C), temperature source Oral, resp  rate 18, SpO2 96 %, not currently breastfeeding  Intake/Output Summary (Last 24 hours) at 2/23/2022 1033  Last data filed at 2/23/2022 0037  Gross per 24 hour   Intake 2150 ml   Output --   Net 2150 ml         PHYSICAL EXAM:      General Appearance:   Alert, cooperative, no distress, appears stated age  Lying comfortably in bed  Scleral icterus and jaundice noted      HEENT:   Normocephalic, atraumatic  Neck:  Supple, symmetrical, trachea midline, no adenopathy;    thyroid: no enlargement/tenderness/nodules; no carotid  bruit or JVD    Lungs:   Clear to auscultation bilaterally; no rales, rhonchi or wheezing; respirations unlabored    Heart[de-identified]   S1 and S2 normal; regular rate and rhythm; no murmur, rub, or gallop  Abdomen:   + abdomen is without distension  Reports some tenderness in the right upper quadrant  Bowel sounds present  No organomegaly able to be appreciated     Genitalia:   Deferred    Rectal:   Deferred    Extremities:  No cyanosis, clubbing or edema    Pulses:  2+ and symmetric all extremities    Skin:  Jaundice  Skin texture, turgor normal, no rashes or lesions    Lymph nodes:  No palpable cervical, axillary or inguinal lymphadenopathy        Lab Results:   Results from last 7 days   Lab Units 02/23/22  0526   WBC Thousand/uL 23 19*   HEMOGLOBIN g/dL 7 4*   HEMATOCRIT % 22 9*   PLATELETS Thousands/uL 389   NEUTROS PCT % 89*   LYMPHS PCT % 3*   MONOS PCT % 5   EOS PCT % 2     Results from last 7 days   Lab Units 02/23/22  0526   POTASSIUM mmol/L 3 8   CHLORIDE mmol/L 103   CO2 mmol/L 17*   BUN mg/dL 30*   CREATININE mg/dL 1 04   CALCIUM mg/dL 7 6*   ALK PHOS U/L 149*   ALT U/L 24   AST U/L 100*     Results from last 7 days   Lab Units 02/23/22  0526   INR  1 45*           Imaging Studies: I have personally reviewed pertinent imaging studies  XR chest 1 view portable    Result Date: 2/23/2022  Impression: No acute cardiopulmonary disease  Large hiatal hernia  Workstation performed: [id number]     PE Study with CT Abdomen and Pelvis with contrast    Result Date: 2/22/2022  Impression: 1  No evidence of pulmonary embolism  2   Pericholecystic fluid/stranding without evidence of calcified gallstones, findings may be seen in the setting of hepatitis  Further evaluation with ultrasound may be obtained if there is clinical concern for noncalcified gallstones/cholecystitis  3   Large hiatal hernia  4   Mild thickening of the urinary bladder wall, correlate with urinalysis for cystitis  Workstation performed: [id number]     US right upper quadrant    Result Date: 2/23/2022  Impression: The gallbladder wall is abnormally thickened, measuring approximately 6 mm thickness  Portions of the gallbladder wall appear edematous  There is pericholecystic fluid  The liver is enlarged, hyperechoic, and demonstrates heterogeneous echotexture    The technologist who performed the examination was unable to definitely demonstrate flow within the portal vein, however, contrast enhancement of the portal vein was seen on  a CT performed approximately 3 hours earlier today  Gastroenterology/Hepatology consultation and Surgical consultation is recommended  Trace ascites  Atrophic right kidney, unchanged  There is an approximately 8 x 7 x 9 mm hypoechoic lesion within the posterior aspect of the left lobe of the liver; this appears similar to April 11, 2018  The study was marked in [facility] for immediate notification  Workstation performed: [id number]           Patient was seen and examined by Dr Cherry Buck  All garduno medical decisions were made by Dr Cherry Buck  Thank you for allowing us to participate in the care of this present patient  We will follow-up with you closely

## 2022-02-23 NOTE — PLAN OF CARE
Problem: PHYSICAL THERAPY ADULT  Goal: Performs mobility at highest level of function for planned discharge setting  See evaluation for individualized goals  Description: Treatment/Interventions: Functional transfer training,LE strengthening/ROM,Therapeutic exercise,Endurance training,Patient/family training,Equipment eval/education,Bed mobility,Gait training  Equipment Recommended: Other (Comment) (pending trials and progress)       See flowsheet documentation for full assessment, interventions and recommendations  Note: Prognosis: Fair  Problem List: Decreased strength,Decreased endurance,Impaired balance,Decreased mobility,Decreased safety awareness  Assessment: Pt is a 62 y o  female seen for PT evaluation s/p admit to [address] [place] [address] on 2/22/2022 w/ Severe sepsis ([facility] [address] )  Order placed for PT  Comorbidities affecting pt's physical performance at time of assessment include: HTN and cancer history  Personal factors affecting pt at time of IE include: steps to enter environment, inability to perform IADLs, inability to perform ADLs, inability to ambulate household distances and limited insight into impairments  Prior to admission, pt was was independent w/ all functional mobility w/ out AD, lived in one floor environment, had 2 MARIJA ? railing and lived with   Upon evaluation: Pt requires min A for bed mobility, min A for sit to stand, and min A for pre-gait standing marches  (Please find full objective findings from PT assessment regarding body systems outlined above)  Impairments and limitations also listed above, especially due to  weakness, impaired balance, decreased endurance, decreased activity tolerance, fall risk and SOB upon exertion  Pt's clinical presentation is currently unstable/unpredictable seen in pt's presentation of continuous monitoring in ED, significant decline in functional mobility compared to baseline, and fall risk    Pt to benefit from continued skilled PT tx while in hospital and upon DC to address deficits as defined above and maximize level of functional mobility  From PT/mobility standpoint, recommendation at time of d/c would be Home PT vs  STR pending progress  Recommend progression of ambulation and initiation of HEP as appropriate  PT Discharge Recommendation: Home with home health rehabilitation (vs  STR pending progress and level of assist at home)          See flowsheet documentation for full assessment

## 2022-02-23 NOTE — ASSESSMENT & PLAN NOTE
Lab Results   Component Value Date    EGFR 45 02/22/2022    EGFR 45 06/07/2021    EGFR 49 02/24/2021    CREATININE 1 29 02/22/2022    CREATININE 1 30 06/07/2021    CREATININE 1 22 02/24/2021     Hemoglobin 8 9 on presentation, baseline appears to be around 10  No signs of active bleeding, patient denies any dark stools        Plan:  - monitor daily hemoglobin   - transfuse if Hgb <7

## 2022-02-23 NOTE — ASSESSMENT & PLAN NOTE
· Stage III, dx in 2018  S/P mastectomy 2018, chemotherapy and radiation therapy     · Currently on letrozole therapy for continued management    Plan:  - continue letrozole 2 5 mg daily

## 2022-02-23 NOTE — CASE MANAGEMENT
Case Management Assessment & Discharge Planning Note    Patient name Dayron Zheng  Location ED 05/ED 05 MRN [id number]  : 1960 Date 2022       Current Admission Date: 2022  Current Admission Diagnosis:Severe sepsis [facility])   Patient Active Problem List    Diagnosis Date Noted    Severe sepsis ([facility] ) 2022    Benign hypertension with CKD (chronic kidney disease) stage III ([facility] ) 2021    Stage 3a chronic kidney disease ([facility] ) 2021    Anemia in stage 3a chronic kidney disease ([facility] ) 2021    Hypokalemia 2021    Localized swelling of lower extremity 2021    Alcohol abuse 2021    Suspected acute pulmonary embolism ([facility] ) 12/15/2020    Recurrent syncope 12/15/2020    Closed left fibular fracture 12/15/2020    History of chemotherapy     Alcoholic ketosis ([facility] )     Hyponatremia 2020    SOB (shortness of breath) 2020    Hypoglycemia 2020    Axillary lump, left 2019    Use of letrozole ([facility]) 2019    Rectal prolapse 2019    Dilated cbd, acquired 2019    Lesion of bladder 2019    Blood loss anemia 2019    Hemorrhagic cystitis 2018    Heme positive stool 2018    Acute deep vein thrombosis (DVT) of left lower extremity ([facility] ) 2018    Hyperbilirubinemia 2018    Acute blood loss anemia 2018    Moderate protein-calorie malnutrition ([facility] ) 2018    Immunocompromised ([facility] ) 2018    Hematuria 2018    Anemia following use of chemotherapeutic drug 2018    History of DVT (deep vein thrombosis) 10/19/2018    Cellulitis 2018    History of radiation therapy 2018    Hydronephrosis 2018    Malignant neoplasm of overlapping sites of left breast in female, estrogen receptor positive ([facility] ) 04/10/2018      LOS (days): 0  Geometric Mean LOS (GMLOS) (days):   Days to GMLOS:     OBJECTIVE:    Risk of Unplanned Readmission Score: 20         Current admission status: Inpatient       Preferred Pharmacy:   [address], [address] [facility]  Lucian Burkitt  [address] [place] [address]  Phone: [phone] Fax: [phone]    [id number] [address] [place], [address]  [place]  [address]  [facility] [address]  Phone: [phone] Fax: [phone]    Primary Care Provider: Laurie Ochoa DC    Primary Insurance: BLUE CROSS  Secondary Insurance:     ASSESSMENT:  [facility], [address] [facility] Representative - Spouse   Primary Phone: [phone] (Mobile)  Home Phone: [phone]                         Readmission Root Cause  30 Day Readmission: No    Patient Information  Admitted from[de-identified] Home  Mental Status: Alert  During Assessment patient was accompanied by: Not accompanied during assessment  Assessment information provided by[de-identified] Patient,Spouse  Primary Caregiver: Self  Support Systems: Spouse/significant other  [place] of Residence: [address] [place] [address] do you live in?: [place]  Home entry access options   Select all that apply : Stairs  Number of steps to enter home : 2  Do the steps have railings?: Yes  Type of Current Residence: [address] [place] [address] home  Upon entering residence, is there a bedroom on the main floor (no further steps)?: Yes  Upon entering residence, is there a bathroom on the main floor (no further steps)?: Yes  In the last 12 months, was there a time when you were not able to pay the mortgage or rent on time?: No  In the last 12 months, how many places have you lived?: 1  In the last 12 months, was there a time when you did not have a steady place to sleep or slept in a shelter (including now)?: No  Homeless/housing insecurity resource given?: N/A  Living Arrangements: Lives w/ Spouse/significant other  Is patient a ?: No    Activities of Daily Living Prior to Admission  Functional Status: Independent  Completes ADLs independently?: Yes  Ambulates independently?: Yes  Does patient use assisted devices?: No  Does patient currently own DME?: No  Does patient have a history of Outpatient Therapy (PT/OT)?: Yes (OPPT following breast cancer treatment)  Does the patient have a history of Short-Term Rehab?: No  Does patient have a history of HHC?: No  Does patient currently have [facility]?: No         Patient Information Continued  Income Source: Pension/penitentiary  Does patient have prescription coverage?: Yes  Within the past 12 months, you worried that your food would run out before you got the money to buy more : Never true  Within the past 12 months, the food you bought just didnt last and you didnt have money to get more : Never true  Food insecurity resource given?: N/A  Does patient receive dialysis treatments?: No  Does patient have a history of substance abuse?: Yes  Historical substance use preference: Alcohol/ETOH ( reports patient drinking 2-3 tumblers of vodka + soda her entire life since age 25,  reports last drink "about a month ago"; patient denies D&A history during CM assessment, chart review indicates last drink over 3 months ago)  History of Withdrawal Symptoms: Denies past symptoms  Is patient currently in treatment for substance abuse?: N/A - sober  Does patient have a history of Mental Health Diagnosis?: No         Means of Transportation  Means of Transport to Appts[de-identified] Drives Self  In the past 12 months, has lack of transportation kept you from medical appointments or from getting medications?: No  In the past 12 months, has lack of transportation kept you from meetings, work, or from getting things needed for daily living?: No  Was application for public transport provided?: N/A        DISCHARGE DETAILS:    Discharge planning discussed with[de-identified] patient and  Jc Bread of Choice: Yes  Comments - Freedom of Choice: CM met with patient at bedside  CM name and role introduced   Patient reports living with her  in a 2 story home with a first floor set-up, is independent with her ADLs with no use of DME  Denies history of STR, denies hx of VNA, reports a hx of OPPT with [facility]  Patient denies D&A, denies mental health history  Patient denies CM needs  CM called patient's  Pollo Heaton at [phone]  CM name and role introduced  CM notified  Pollo Heaton of the PT recommendation for home with home healthcare;  does not wish to make a decision and defers to spouse who he reports "is stubborn as a bull" and had to be convinced to come to the hospital   Polol Heaton reports that patient drinks 2-3 tumblers or 20-30oz of vodka and soda every night but last drink was "maybe a month ago"  Patient was usually independent with ADLs however  reports that patient has not been eating nor drinking over the last week and has become weaker but needed convincing to come to the hospital   CM contacted family/caregiver?: Yes  Were Treatment Team discharge recommendations reviewed with patient/caregiver?: Yes  Did patient/caregiver verbalize understanding of patient care needs?: Yes  Were patient/caregiver advised of the risks associated with not following Treatment Team discharge recommendations?: Yes    Contacts  Patient Contacts:  Shyann Mishra  Relationship to Patient[de-identified] Family  Contact Method: Phone  Phone Number: [phone]  Reason/Outcome: Continuity of [address] [place] [address]         Is the patient interested in [place] [address] at discharge?:  (TBD)         Other Referral/Resources/Interventions Provided:  Referral Comments: Patient declines referrals at this time  CM will continue to follow and re-assess as hospital course progresses

## 2022-02-23 NOTE — PHYSICAL THERAPY NOTE
PHYSICAL THERAPY EVALUATION  NAME: Carley Naylor  AGE:   62 y o  MRN:  [id number]  ADMIT DX: SOB (shortness of breath) [R06 02]    PMH:   Past Medical History:   Diagnosis Date    Acute DVT (deep venous thrombosis) (HCC)     of LLE>     Bladder infection     Congenital prolapsed rectum     History of biliary stent insertion     History of chemotherapy     History of radiation therapy 2018    left breast ca    History of transfusion     x2 s/p chemo treatments, no reaction    Hydronephrosis     right kidney    Hypertension     Malignant neoplasm of overlapping sites of left female breast ([facility] ) 2018    Migraine      LENGTH OF STAY: 0       22 1000   PT Last Visit   PT Visit Date 22   Note Type   Note type Evaluation   Pain Assessment   Pain Assessment Tool 0-10   Pain Score No Pain   Restrictions/Precautions   Weight Bearing Precautions Per Order No   Other Precautions Multiple lines;Telemetry; Fall Risk   Home Living   Type of [address] [place] [address] Two level; Able to live on main level with bedroom/bathroom;Stairs to enter with rails  (2 [address]IJA; pt stays on the first floor)   Additional Comments Ambulates independently without AD at baseline  Pt reports limited ambulation distance at baseline with frequent seated rest breaks required  Prior Function   Level of [place] Independent with ADLs and functional mobility   Lives With Spouse   ADL Assistance Independent   IADLs Needs assistance   Falls in the last 6 months 0   Vocational Other (Comment)  (worked for [employer] as a )   General   Family/Caregiver Present No   Cognition   Overall Cognitive Status WFL   Arousal/Participation Cooperative   Orientation Level Oriented to person;Oriented to place;Oriented to situation   Memory Decreased recall of precautions   Following Commands Follows one step commands without difficulty   Comments Pt identified by name and       Subjective   Subjective Agrees to PT evaluation and is pleasant and cooperative throughout session  RLE Assessment   RLE Assessment X   Strength RLE   RLE Overall Strength 4-/5  (functionally)   LLE Assessment   LLE Assessment X   Strength LLE   LLE Overall Strength 4-/5  (functionally)   Bed Mobility   Supine to Sit 4  Minimal assistance   Additional items Assist x 1;HOB elevated; Increased time required;Verbal cues   Sit to Supine 4  Minimal assistance   Additional items Assist x 1; Increased time required;Verbal cues;LE management;HOB elevated   Transfers   Sit to Stand 4  Minimal assistance   Additional items Assist x 1; Increased time required;Verbal cues   Stand to Sit 4  Minimal assistance   Additional items Assist x 1; Increased time required;Verbal cues   Additional Comments Pt able to perform standing marches ~5-7x before becoming fatigued and requesting to lay down  Pt declined further ambulation trial due to fatigue at this time  Ambulation/Elevation   Gait pattern Not appropriate   Balance   Static Sitting Fair +   Dynamic Sitting Fair   Static Standing Fair -   Dynamic Standing Poor +   Endurance Deficit   Endurance Deficit Yes   Endurance Deficit Description limited standing tolerance   Activity Tolerance   Activity Tolerance Patient limited by fatigue   Nurse Made Aware Per RN, pt appropriate to evaluate   Assessment   Prognosis Fair   Problem List Decreased strength;Decreased endurance; Impaired balance;Decreased mobility; Decreased safety awareness   Goals   Patient Goals to feel better   RUST Expiration Date 03/05/22   Short Term Goal #1 Pt will be able to: (1) perform bed mobility with supervision to promote OOB activity (2) perform sit to stand with supervision to decrease burden of care (3) ambulate at least 200` with supervision and least restrictive AD to increase activity tolerance (4) increase standing balance by 1 grade to decrease risk of falls   PT Treatment Day 0   Plan   Treatment/Interventions Functional transfer training;LE strengthening/ROM; Therapeutic exercise; Endurance training;Patient/family training;Equipment eval/education; Bed mobility;Gait training   PT Frequency 3-5x/wk   Recommendation   PT Discharge Recommendation Home with home health rehabilitation  (vs  STR pending progress and level of assist at home)   Equipment Recommended Other (Comment)  (pending trials and progress)   AM-PAC Basic Mobility Inpatient   Turning in Bed Without Bedrails 4   Lying on Back to Sitting on Edge of Flat Bed 3   Moving Bed to Chair 3   Standing Up From Chair 3   Walk in Room 1   Climb 3-5 Stairs 1   Basic Mobility Inpatient Raw Score 15   Basic Mobility Standardized Score 36 97   Highest Level Of Mobility   -[facility] Goal 4: Move to chair/commode   [facility] Highest Level of Mobility 5: Stand (1 or more minutes)   -[facility] Goal Achieved Yes   End of Consult   Patient Position at End of Consult Supine; All needs within reach   The patient's AM-PAC Basic Mobility Inpatient Short Form Raw Score is 15  A Raw score of less than or equal to 16 suggests the patient may benefit from discharge to post-acute rehabilitation services  Please also refer to the recommendation of the Physical Therapist for safe discharge planning  PT currently recommending HHPT > STR pending progress as pt is expected to progress and is primarily limited due to activity tolerance  Assessment: Pt is a 62 y o  female seen for PT evaluation s/p admit to [address] [place] [address] on 2/22/2022 w/ Severe sepsis ([facility] )  Order placed for PT  Comorbidities affecting pt's physical performance at time of assessment include: HTN and cancer history  Personal factors affecting pt at time of IE include: steps to enter environment, inability to perform IADLs, inability to perform ADLs, inability to ambulate household distances and limited insight into impairments  Prior to admission, pt was was independent w/ all functional mobility w/ out AD, lived in one floor environment, had 2 MARIJA ?  railing and lived with   Upon evaluation: Pt requires min A for bed mobility, min A for sit to stand, and min A for pre-gait standing marches  (Please find full objective findings from PT assessment regarding body systems outlined above)  Impairments and limitations also listed above, especially due to  weakness, impaired balance, decreased endurance, decreased activity tolerance, fall risk and SOB upon exertion  Pt's clinical presentation is currently unstable/unpredictable seen in pt's presentation of continuous monitoring in ED, significant decline in functional mobility compared to baseline, and fall risk  Pt to benefit from continued skilled PT tx while in hospital and upon DC to address deficits as defined above and maximize level of functional mobility  From PT/mobility standpoint, recommendation at time of d/c would be Home PT vs  STR pending progress  Recommend progression of ambulation and initiation of HEP as appropriate        Nargis Sarah, PT,DPT

## 2022-02-23 NOTE — ED PROVIDER NOTES
History  Chief Complaint   Patient presents with    Shortness of Breath     Pt reports shortness of breath, worse with ambulation x 1 week  Pt has not eaten in 1 week per family  Pt is severly jaundice  History provided by:  Patient   used: No    Shortness of Breath  Associated symptoms: no abdominal pain, no chest pain, no cough, no diaphoresis, no fever, no headaches, no rash, no sore throat, no vomiting and no wheezing      Patient is a 61-year-old female presenting to emergency department shortness of breath, weakness, decreased appetite  Difficulty walking around  Has not been eating or drinking  No chest pain  No abdominal pain  No nausea vomiting  No diarrhea  Former drinker  Not recently  History of blood clots  Takes Eliquis  History of breast cancer  No recent chemotherapy or treatment  MDM differential includes recurrence of cancer, meds, heart failure, CAD, gallbladder disease, liver failure, pulmonary embolism    Will do cardiac evaluation, septic workup PE study with CT abdomen pelvis      Prior to Admission Medications   Prescriptions Last Dose Informant Patient Reported? Taking?    SUMAtriptan (IMITREX) 100 mg tablet  Self Yes Yes   Sig: Take 100 mg by mouth once as needed for migraine   acetaminophen-codeine (TYLENOL with CODEINE #3) 300-30 MG per tablet  Self Yes No   Sig: Take 1 tablet by mouth every 8 (eight) hours as needed   apixaban (ELIQUIS) 5 mg  Self No Yes   Sig: Take 2 tablets (10 mg total) by mouth 2 (two) times a day   Patient taking differently: Take 5 mg by mouth daily    carvedilol (COREG) 6 25 mg tablet   No Yes   Sig: TAKE ONE TABLET BY MOUTH TWO TIMES A DAY WITH MEALS   folic acid (FOLVITE) 1 mg tablet  Self No No   Sig: Take 1 tablet (1 mg total) by mouth daily   Patient not taking: Reported on 3/25/2021   gabapentin (NEURONTIN) 300 mg capsule  Self No No   Sig: Take 1 capsule (300 mg total) by mouth 3 (three) times a day   Patient taking differently: Take 300 mg by mouth as needed    letrozole (FEMARA) 2 5 mg tablet   No Yes   Sig: TAKE 1 TABLET BY MOUTH EVERY DAY   potassium chloride (K-DUR,KLOR-CON) 20 mEq tablet   No No   Sig: Take 1 tablet (20 mEq total) by mouth as needed (take with water pill only)   sertraline (ZOLOFT) 100 mg tablet  Self Yes No   Sig: Take 100 mg by mouth daily   sertraline (ZOLOFT) 50 mg tablet  Self Yes No   Sig: Take 50 mg by mouth 2 (two) times a day    thiamine 100 MG tablet  Self No No   Sig: Take 1 tablet (100 mg total) by mouth daily   Patient not taking: Reported on 1/15/2021   tiZANidine (ZANAFLEX) 2 mg tablet  Self Yes No   Sig: Take 2-4 mg by mouth daily at bedtime   torsemide (DEMADEX) 20 mg tablet   No No   Sig: Take 1 tablet (20 mg total) by mouth as needed (for leg swelling, or weight gain >5 lbs in 2-3 days)   verapamil (VERELAN) 240 MG 24 hr capsule  Self Yes Yes   Sig: Take 240 mg by mouth daily      Facility-Administered Medications: None       Past Medical History:   Diagnosis Date    Acute DVT (deep venous thrombosis) (HCC)     of LLE>     Bladder infection     Congenital prolapsed rectum     History of biliary stent insertion     History of chemotherapy     History of radiation therapy 05/2018    left breast ca    History of transfusion     x2 s/p chemo treatments, no reaction    Hydronephrosis     right kidney    Hypertension     Malignant neoplasm of overlapping sites of left female breast ([facility] [address] ) 05/01/2018    Migraine        Past Surgical History:   Procedure Laterality Date    ABDOMINAL ADHESION SURGERY N/A 4/23/2019    Procedure: LYSIS ADHESIONS;  Surgeon: Tone Simmons MD;  [facility];  Service: Colorectal    BREAST BIOPSY      COLON SURGERY      colon resection    CYSTOSCOPY N/A 3/4/2019    Procedure: CYSTOSCOPY WITH BIOPSIES AND FULGERATION AND REDCUTION OF RECTUM PROLAPSE;  Surgeon: Chris Johnson MD;  [facility];  Service: Urology    ERCP N/A 1/4/2019    Procedure: ENDOSCOPIC RETROGRADE CHOLANGIOPANCREATOGRAPHY (ERCP); Surgeon: Vito Carpenter MD;  [facility];  Service: Gastroenterology    INSTILLATION MYTOMYCIN N/A 3/4/2019    Procedure: Sabrina Roll;  Surgeon: Mariela Bowles MD;  [facility];  Service: Urology    MASTECTOMY Left     2018    AZ COLONOSCOPY FLX DX W/COLLJ SPEC WHEN PFRMD N/A 4/22/2019    Procedure: COLONOSCOPY;  Surgeon: Viktor Mccullough MD;  Location: [facility]; Service: Colorectal    AZ EDG US EXAM SURGICAL ALTER STOM DUODENUM/JEJUNUM N/A 3/7/2019    Procedure: LINEAR ENDOSCOPIC U/S;  Surgeon: Seb Torres MD;  [facility]; Service: Gastroenterology    AZ ESOPHAGOGASTRODUODENOSCOPY TRANSORAL DIAGNOSTIC N/A 3/7/2019    Procedure: ESOPHAGOGASTRODUODENOSCOPY (EGD); Surgeon: Seb Torres MD;  [facility];   Service: Gastroenterology    AZ INSJ TUNNELED CTR VAD W/SUBQ PORT AGE 5 YR/> N/A 6/26/2018    Procedure: INSERTION VENOUS PORT ( PORT-A-CATH) IR;  Surgeon: Marco Antonio Ruiz DO;  [facility];  Service: Interventional Radiology    AZ LAP, SURG PROCTOPEXY N/A 4/23/2019    Procedure: LAPAROSCOPIC HAND-ASSIST PELVIC DISSECTION; proctopexy;  Surgeon: Viktor Mccullough MD;  [facility];  Service: Colorectal    AZ LAP, SURG PROCTOPEXY N/A 11/18/2020    Procedure: LAPAROSCOPIC LYSIS OF ADHESIONS, MOBILIZATION OF RECTUM AND SUTURE RECTOPEXY;  Surgeon: Jl Stevenson MD;  [facility];  Service: Colorectal    AZ MASTECTOMY, MODIFIED RADICAL Left 5/15/2018    Procedure: BREAST MODIFIED RADICAL MASTECTOMY;  Surgeon: Mohan Jose MD;  [facility];  Service: Surgical Oncology    AZ RMVL [place] CTR VAD W/SUBQ PORT/ CTR/PRPH INSJ N/A 6/4/2019    Procedure: REMOVAL VENOUS PORT (PORT-A-CATH)IR;  Surgeon: Marco Antonio Ruiz DO;  [facility];  Service: Interventional Radiology    TUBAL LIGATION      US GUIDANCE BREAST BIOPSY LEFT EACH ADDITIONAL Left 4/3/2018    US GUIDED BREAST BIOPSY LEFT COMPLETE Left 4/3/2018    US GUIDED BREAST LYMPH NODE BIOPSY LEFT Left 4/3/2018       Family History   Problem Relation Age of Onset    No Known Problems Mother     No Known Problems Father     Breast cancer Maternal Aunt 48    Colon cancer Maternal Aunt     No Known Problems Sister     No Known Problems Daughter     No Known Problems Maternal Grandmother     No Known Problems Maternal Grandfather     No Known Problems Paternal Grandmother     No Known Problems Paternal Grandfather      I have reviewed and agree with the history as documented  E-Cigarette/Vaping    E-Cigarette Use Never User      E-Cigarette/Vaping Substances     Social History     Tobacco Use    Smoking status: Never Smoker    Smokeless tobacco: Never Used   Vaping Use    Vaping Use: Never used   Substance Use Topics    Alcohol use: Not Currently    Drug use: Not Currently       Review of Systems   Constitutional: Positive for fatigue  Negative for chills, diaphoresis and fever  HENT: Negative for congestion and sore throat  Respiratory: Positive for shortness of breath  Negative for cough, wheezing and stridor  Cardiovascular: Negative for chest pain, palpitations and leg swelling  Gastrointestinal: Negative for abdominal pain, blood in stool, diarrhea, nausea and vomiting  Genitourinary: Negative for dysuria, frequency and urgency  Musculoskeletal: Negative for neck stiffness  Skin: Negative for pallor and rash  Neurological: Negative for dizziness, syncope, weakness, light-headedness and headaches  All other systems reviewed and are negative  Physical Exam  Physical Exam  Vitals reviewed  Constitutional:       Appearance: She is well-developed  HENT:      Head: Normocephalic and atraumatic  Eyes:      Pupils: Pupils are equal, round, and reactive to light  Cardiovascular:      Rate and Rhythm: Normal rate and regular rhythm        Heart sounds: Normal heart sounds  Pulmonary:      Effort: Pulmonary effort is normal  No respiratory distress  Breath sounds: Normal breath sounds  Abdominal:      General: Bowel sounds are normal       Palpations: Abdomen is soft  Tenderness: There is no abdominal tenderness  Musculoskeletal:         General: Normal range of motion  Cervical back: Neck supple  Right lower leg: No tenderness  No edema  Left lower leg: No tenderness  No edema  Skin:     General: Skin is warm and dry  Capillary Refill: Capillary refill takes less than 2 seconds  Comments: Jaundiced   Neurological:      General: No focal deficit present  Mental Status: She is alert and oriented to person, place, and time           Vital Signs  ED Triage Vitals [02/22/22 1639]   Temperature Pulse Respirations Blood Pressure SpO2   98 1 °F (36 7 °C) (!) 121 22 107/55 99 %      Temp Source Heart Rate Source Patient Position - Orthostatic VS BP Location FiO2 (%)   Oral Monitor Sitting Right arm --      Pain Score       No Pain           Vitals:    03/01/22 0715 03/01/22 1440 03/01/22 2220 03/02/22 0721   BP: 115/75 127/91 122/75 114/72   Pulse: 79 80 78 86   Patient Position - Orthostatic VS:             Visual Acuity  Visual Acuity      Most Recent Value   L Pupil Size (mm) 2   R Pupil Size (mm) 2   L Pupil Shape Round   R Pupil Shape Round          ED Medications  Medications   sodium chloride 0 9 % bolus 1,000 mL (0 mL Intravenous Stopped 2/22/22 1951)   cefepime (MAXIPIME) 2 g/50 mL dextrose IVPB (0 mg Intravenous Stopped 2/22/22 2019)   iohexol (OMNIPAQUE) 350 MG/ML injection (SINGLE-DOSE) 100 mL (100 mL Intravenous Given 2/22/22 2117)   sodium chloride 0 9 % bolus 1,000 mL (0 mL Intravenous Stopped 2/22/22 2326)   metroNIDAZOLE (FLAGYL) tablet 500 mg (500 mg Oral Given 2/22/22 2335)   potassium chloride 20 mEq IVPB (premix) (0 mEq Intravenous Stopped 2/25/22 0710)   loperamide (IMODIUM) capsule 4 mg (4 mg Oral Given 2/24/22 1417)   sodium chloride 0 9 % bolus 1,000 mL (1,000 mL Intravenous New Bag 2/25/22 6120)   albumin human (FLEXBUMIN) 25 % injection 25 g (25 g Intravenous New Bag 3/2/22 4714)       Diagnostic Studies  Results Reviewed     Procedure Component Value Units Date/Time    HSV TYPE 1,2 DNA PCR [[id number]] Collected: 02/23/22 1011    Lab Status: Final result Specimen: Blood from Arm, Right Updated: 02/28/22 2005     HSV 1, PCR Negative     HSV 2 , PCR Negative    Narrative:      Performed at:  [address]  [id number]  : Vasquez Chang MD, Phone:  [phone]    Blood culture #1 [[id number]] Collected: 02/22/22 1925    Lab Status: Final result Specimen: Blood from Arm, Right Updated: 02/28/22 0001     Blood Culture No Growth After 5 Days  Blood culture #2 [[id number]] Collected: 02/22/22 1947    Lab Status: Final result Specimen: Blood from Arm, Right Updated: 02/28/22 0001     Blood Culture No Growth After 5 Days      Antimitochondrial antibody [[id number]] Collected: 02/24/22 0901    Lab Status: Final result Specimen: Blood from Arm, Right Updated: 02/25/22 2005     Mitochondrial Ab <20 0 Units     Narrative:      Performed at:  [address] [place] [address]  [id number]  : Zane Wei MD, Phone:  [phone]    Anti-smooth muscle antibody, IgG [[id number]] Collected: 02/24/22 0901    Lab Status: Final result Specimen: Blood from Arm, Right Updated: 02/25/22 2005     Smooth Muscle Ab 13 Units     Narrative:      Performed at:  [address] [place] [address]  [id number]  : Zane Wei MD, Phone:  [phone]    Lopez Mohan [[id number]]  (Normal) Collected: 02/24/22 0901    Lab Status: Final result Specimen: Blood from Arm, Right Updated: 02/24/22 2151      0 mg/dL      IGG 1,070 0 mg/dL      IGM 60 0 mg/dL     Stool Enteric Bacterial Panel by PCR [[id number]]  (Normal) Collected: 02/23/22 1338    Lab Status: Final result Specimen: Stool from Rectum Updated: 02/24/22 1421     Salmonella sp PCR None Detected     Shigella sp/Enteroinvasive E  coli (EIEC) PCR None Detected     Campylobacter sp (jejuni and coli) PCR None Detected     Shiga toxin 1/Shiga toxin 2 genes PCR None Detected    EBV acute panel [[id number]]  (Abnormal) Collected: 02/23/22 1011    Lab Status: Final result Specimen: Blood from Arm, Right Updated: 02/24/22 1405     EBV VCA IgG >600 0 U/mL      EBV VCA IgM <36 0 U/mL      EBV Nuclear Ag Ab <18 0 U/mL      INTERPRETATION Comment    Narrative:      Performed at:  [address] [place] [address]  [id number]  : Tu Orellana MD, Phone:  [phone]    Clostridium difficile toxin by PCR with EIA [[id number]]  (Normal) Collected: 02/23/22 1338    Lab Status: Final result Specimen: Stool from Rectum Updated: 02/24/22 1206     C difficile toxin by PCR Negative    Urine culture [[id number]] Collected: 02/22/22 2208    Lab Status: Final result Specimen: Urine, Clean Catch Updated: 02/24/22 0824     Urine Culture >100,000 cfu/ml     CMV IgG/IgM Antibodies [[id number]] Collected: 02/23/22 1011    Lab Status: Final result Specimen: Blood from Arm, Right Updated: 02/24/22 0807     CMV IGG <0 60 U/mL      CMV IgM <30 0 AU/mL     Narrative:      Performed at:  [address] [place] [address]  [id number]  : Tu Orellana MD, Phone:  [phone]    Comprehensive metabolic panel [[id number]]  (Abnormal) Collected: 02/24/22 0616    Lab Status: Final result Specimen: Blood from Hand, Right Updated: 02/24/22 0747     Sodium 131 mmol/L      Potassium 3 3 mmol/L      Chloride 102 mmol/L      CO2 18 mmol/L      ANION GAP 11 mmol/L      BUN 23 mg/dL      Creatinine 1 13 mg/dL      Glucose 92 mg/dL      Calcium 7 4 mg/dL      Corrected Calcium 9 6 mg/dL      AST 88 U/L      ALT 24 U/L      Alkaline Phosphatase 148 U/L      Total Protein 5 4 g/dL      Albumin 1 3 g/dL      Total Bilirubin 9 67 mg/dL      eGFR 52 ml/min/1 73sq m     Narrative:      [employer] guidelines for Chronic Kidney Disease (CKD):     Stage 1 with normal or high GFR (GFR > 90 mL/min/1 73 square meters)    Stage 2 Mild CKD (GFR = 60-89 mL/min/1 73 square meters)    Stage 3A Moderate CKD (GFR = 45-59 mL/min/1 73 square meters)    Stage 3B Moderate CKD (GFR = 30-44 mL/min/1 73 square meters)    Stage 4 Severe CKD (GFR = 15-29 mL/min/1 73 square meters)    Stage 5 End Stage CKD (GFR <15 mL/min/1 73 square meters)  Note: GFR calculation is accurate only with a steady state creatinine    CBC and differential [[id number]]  (Abnormal) Collected: 02/24/22 0616    Lab Status: Final result Specimen: Blood from Hand, Right Updated: 02/24/22 0641     WBC 22 09 Thousand/uL      RBC 2 15 Million/uL      Hemoglobin 7 7 g/dL      Hematocrit 22 8 %       fL      MCH 35 8 pg      MCHC 33 8 g/dL      RDW 22 3 %      MPV 10 6 fL      Platelets 965 Thousands/uL      nRBC 0 /100 WBCs      Neutrophils Relative 86 %      Immat GRANS % 2 %      Lymphocytes Relative 3 %      Monocytes Relative 4 %      Eosinophils Relative 4 %      Basophils Relative 1 %      Neutrophils Absolute 19 09 Thousands/µL      Immature Grans Absolute 0 35 Thousand/uL      Lymphocytes Absolute 0 68 Thousands/µL      Monocytes Absolute 0 93 Thousand/µL      Eosinophils Absolute 0 91 Thousand/µL      Basophils Absolute 0 13 Thousands/µL     Hepatitis panel, acute [[id number]]  (Normal) Collected: 02/23/22 0046    Lab Status: Final result Specimen: Blood from Arm, Right Updated: 02/23/22 1011     Hepatitis B Surface Ag Non-reactive     Hep A IgM Non-reactive     Hepatitis C Ab Non-reactive     Hep B C IgM Non-reactive    Protime-INR [[id number]]  (Abnormal) Collected: 02/23/22 0526    Lab Status: Final result Specimen: Blood from Arm, Right Updated: 02/23/22 0615     Protime 17 5 seconds INR 1  45    Bilirubin, direct [[id number]]  (Abnormal) Collected: 02/23/22 0526    Lab Status: Final result Specimen: Blood from Arm, Right Updated: 02/23/22 0614     Bilirubin, Direct 7 86 mg/dL     Comprehensive metabolic panel [[id number]]  (Abnormal) Collected: 02/23/22 0526    Lab Status: Final result Specimen: Blood from Arm, Right Updated: 02/23/22 6048     Sodium 133 mmol/L      Potassium 3 8 mmol/L      Chloride 103 mmol/L      CO2 17 mmol/L      ANION GAP 13 mmol/L      BUN 30 mg/dL      Creatinine 1 04 mg/dL      Glucose 76 mg/dL      Calcium 7 6 mg/dL      Corrected Calcium 9 8 mg/dL       U/L      ALT 24 U/L      Alkaline Phosphatase 149 U/L      Total Protein 5 4 g/dL      Albumin 1 3 g/dL      Total Bilirubin 9 63 mg/dL      eGFR 58 ml/min/1 73sq m     Narrative:      [place] guidelines for Chronic Kidney Disease (CKD):     Stage 1 with normal or high GFR (GFR > 90 mL/min/1 73 square meters)    Stage 2 Mild CKD (GFR = 60-89 mL/min/1 73 square meters)    Stage 3A Moderate CKD (GFR = 45-59 mL/min/1 73 square meters)    Stage 3B Moderate CKD (GFR = 30-44 mL/min/1 73 square meters)    Stage 4 Severe CKD (GFR = 15-29 mL/min/1 73 square meters)    Stage 5 End Stage CKD (GFR <15 mL/min/1 73 square meters)  Note: GFR calculation is accurate only with a steady state creatinine    CBC and differential [[id number]]  (Abnormal) Collected: 02/23/22 0526    Lab Status: Final result Specimen: Blood from Arm, Right Updated: 02/23/22 0558     WBC 23 19 Thousand/uL      RBC 2 06 Million/uL      Hemoglobin 7 4 g/dL      Hematocrit 22 9 %       fL      MCH 35 9 pg      MCHC 32 3 g/dL      RDW 23 6 %      MPV 10 7 fL      Platelets 162 Thousands/uL      nRBC 0 /100 WBCs      Neutrophils Relative 89 %      Immat GRANS % 1 %      Lymphocytes Relative 3 %      Monocytes Relative 5 %      Eosinophils Relative 2 %      Basophils Relative 0 %      Neutrophils Absolute 20 47 Thousands/µL      Immature Grans Absolute 0 32 Thousand/uL      Lymphocytes Absolute 0 74 Thousands/µL      Monocytes Absolute 1 19 Thousand/µL      Eosinophils Absolute 0 38 Thousand/µL      Basophils Absolute 0 09 Thousands/µL     Urine Microscopic [[id number]]  (Abnormal) Collected: 02/22/22 2208    Lab Status: Final result Specimen: Urine, Clean Catch Updated: 02/22/22 2239     RBC, UA 0-1 /hpf      WBC, UA 20-30 /hpf      Epithelial Cells Occasional /hpf      Bacteria, UA Innumerable /hpf     Urine Macroscopic, POC [[id number]]  (Abnormal) Collected: 02/22/22 2208    Lab Status: Final result Specimen: Urine Updated: 02/22/22 2209     Color, UA Orange     Clarity, UA Cloudy     pH, UA 6 5     Leukocytes, UA Large     Nitrite, UA Negative     Protein, UA 30 (1+) mg/dl      Glucose,  (1/10%) mg/dl      Ketones, UA Negative mg/dl      Urobilinogen, UA 4 0 E U /dl      Bilirubin, UA Interference- unable to analyze     Blood, UA Moderate     Specific [place], UA 1 015    Narrative:      CLINITEK RESULT    HS Troponin I 4hr [[id number]]  (Normal) Collected: 02/22/22 2051    Lab Status: Final result Specimen: Blood from Arm, Right Updated: 02/22/22 2124     hs TnI 4hr 9 ng/L      Delta 4hr hsTnI 1 ng/L     Lactic acid 2 Hours [[id number]]  (Normal) Collected: 02/22/22 1947    Lab Status: Final result Specimen: Blood from Arm, Right Updated: 02/22/22 2026     LACTIC ACID 2 0 mmol/L     Narrative:      Result may be elevated if tourniquet was used during collection  Lactic acid [[id number]]  (Abnormal) Collected: 02/22/22 1806    Lab Status: Final result Specimen: Blood from Arm, Right Updated: 02/22/22 1907     LACTIC ACID 2 2 mmol/L     Narrative:      Result may be elevated if tourniquet was used during collection      COVID/FLU/RSV - 2 hour TAT [[id number]]  (Normal) Collected: 02/22/22 1755    Lab Status: Final result Specimen: Nares from Nose Updated: 02/22/22 1905     SARS-CoV-2 Negative     INFLUENZA A PCR Negative     INFLUENZA B PCR Negative     RSV PCR Negative    Narrative:      FOR PEDIATRIC PATIENTS - copy/paste COVID Guidelines URL to browser: [email or web address]  [email or web address]    SARS-CoV-2 assay is a Nucleic Acid Amplification assay intended for the  qualitative detection of nucleic acid from SARS-CoV-2 in nasopharyngeal  swabs  Results are for the presumptive identification of SARS-CoV-2 RNA  Positive results are indicative of infection with SARS-CoV-2, the virus  causing COVID-19, but do not rule out bacterial infection or co-infection  with other viruses  Laboratories within the [place] and its  territories are required to report all positive results to the appropriate  public health authorities  Negative results do not preclude SARS-CoV-2  infection and should not be used as the sole basis for treatment or other  patient management decisions  Negative results must be combined with  clinical observations, patient history, and epidemiological information  This test has not been FDA cleared or approved  This test has been authorized by FDA under an Emergency Use Authorization  (EUA)  This test is only authorized for the duration of time the  declaration that circumstances exist justifying the authorization of the  emergency use of an in vitro diagnostic tests for detection of SARS-CoV-2  virus and/or diagnosis of COVID-19 infection under section 564(b)(1) of  the Act, 21 U  S C  760UCM-9(N)(8), unless the authorization is terminated  or revoked sooner  The test has been validated but independent review by FDA  and CLIA is pending  Test performed using [employer] Gene[employer]pert: This RT-PCR assay targets N2,  a region unique to SARS-CoV-2  A conserved region in the E-gene was chosen  for pan-Sarbecovirus detection which includes SARS-CoV-2      HS Troponin I 2hr [[id number]]  (Normal) Collected: 02/22/22 1806    Lab Status: Final result Specimen: Blood from Arm, Right Updated: 02/22/22 1851     hs TnI 2hr 9 ng/L      Delta 2hr hsTnI 1 ng/L     NT-BNP PRO [[id number]]  (Abnormal) Collected: 02/22/22 1806    Lab Status: Final result Specimen: Blood from Arm, Right Updated: 02/22/22 1841     NT-proBNP 2,355 pg/mL     Ammonia [[id number]]  (Abnormal) Collected: 02/22/22 1806    Lab Status: Final result Specimen: Blood from Arm, Right Updated: 02/22/22 1832     Ammonia <10 umol/L     APTT [[id number]]  (Abnormal) Collected: 02/22/22 1806    Lab Status: Final result Specimen: Blood from Arm, Right Updated: 02/22/22 1831     PTT 40 seconds     Protime-INR [[id number]]  (Abnormal) Collected: 02/22/22 1806    Lab Status: Final result Specimen: Blood from Arm, Right Updated: 02/22/22 1831     Protime 17 3 seconds      INR 1 42    HS Troponin 0hr (reflex protocol) [[id number]]  (Normal) Collected: 02/22/22 1650    Lab Status: Final result Specimen: Blood from Arm, Right Updated: 02/22/22 1742     hs TnI 0hr 8 ng/L     Comprehensive metabolic panel [[id number]]  (Abnormal) Collected: 02/22/22 1650    Lab Status: Final result Specimen: Blood from Arm, Right Updated: 02/22/22 1738     Sodium 128 mmol/L      Potassium 3 8 mmol/L      Chloride 96 mmol/L      CO2 20 mmol/L      ANION GAP 12 mmol/L      BUN 34 mg/dL      Creatinine 1 29 mg/dL      Glucose 107 mg/dL      Calcium 8 6 mg/dL      Corrected Calcium 10 5 mg/dL       U/L      ALT 26 U/L      Alkaline Phosphatase 202 U/L      Total Protein 6 5 g/dL      Albumin 1 6 g/dL      Total Bilirubin 11 05 mg/dL      eGFR 45 ml/min/1 73sq m     Narrative:      [place] guidelines for Chronic Kidney Disease (CKD):     Stage 1 with normal or high GFR (GFR > 90 mL/min/1 73 square meters)    Stage 2 Mild CKD (GFR = 60-89 mL/min/1 73 square meters)    Stage 3A Moderate CKD (GFR = 45-59 mL/min/1 73 square meters)    Stage 3B Moderate CKD (GFR = 30-44 mL/min/1 73 square meters)    Stage 4 Severe CKD (GFR = 15-29 mL/min/1 73 square meters)    Stage 5 End Stage CKD (GFR <15 mL/min/1 73 square meters)  Note: GFR calculation is accurate only with a steady state creatinine    CBC and differential [[id number]]  (Abnormal) Collected: 02/22/22 1650    Lab Status: Final result Specimen: Blood from Arm, Right Updated: 02/22/22 1706     WBC 28 35 Thousand/uL      RBC 2 56 Million/uL      Hemoglobin 8 9 g/dL      Hematocrit 26 8 %       fL      MCH 34 8 pg      MCHC 33 2 g/dL      RDW 23 1 %      MPV 10 5 fL      Platelets 733 Thousands/uL      nRBC 0 /100 WBCs      Neutrophils Relative 86 %      Immat GRANS % 2 %      Lymphocytes Relative 4 %      Monocytes Relative 4 %      Eosinophils Relative 3 %      Basophils Relative 1 %      Neutrophils Absolute 24 61 Thousands/µL      Immature Grans Absolute >0 50 Thousand/uL      Lymphocytes Absolute 1 03 Thousands/µL      Monocytes Absolute 1 12 Thousand/µL      Eosinophils Absolute 0 77 Thousand/µL      Basophils Absolute 0 16 Thousands/µL                  US right upper quadrant   Final Result by Tere Carter MD (02/23 4902)      The gallbladder wall is abnormally thickened, measuring approximately 6 mm thickness  Portions of the gallbladder wall appear edematous  There is pericholecystic fluid  The liver is enlarged, hyperechoic, and demonstrates heterogeneous echotexture  The technologist who performed the examination was unable to definitely demonstrate flow within the portal vein, however, contrast enhancement of the portal vein was seen on    a CT performed approximately 3 hours earlier today  Gastroenterology/Hepatology consultation and Surgical consultation is recommended  Trace ascites  Atrophic right kidney, unchanged  There is an approximately 8 x 7 x 9 mm hypoechoic lesion within the posterior aspect of the left lobe of the liver; this appears similar to April 11, 2018  The study was marked in [facility] for immediate notification  Workstation performed: [id number]         PE Study with CT Abdomen and Pelvis with contrast   Final Result by Ansley Burns MD (02/22 2237)      1  No evidence of pulmonary embolism  2   Pericholecystic fluid/stranding without evidence of calcified gallstones, findings may be seen in the setting of hepatitis  Further evaluation with ultrasound may be obtained if there is clinical concern for noncalcified gallstones/cholecystitis  3   Large hiatal hernia  4   Mild thickening of the urinary bladder wall, correlate with urinalysis for cystitis  Workstation performed: [id number]         XR chest 1 view portable   Final Result by Jessi Webb DO (02/23 9219)      No acute cardiopulmonary disease  Large hiatal hernia  Workstation performed: [id number]                    Procedures  Procedures         ED Course  ED Course as of 03/03/22 1631   Tue Feb 22, 2022   1948 IV placed by me in the right forearm   2312 Surgery to evaluate patient   Wed Feb 23, 2022   0101 Pt evaluated by surgery, went over ct scan and US, they do not believe this is a surgical issue, recommend medical admit                                 SBIRT 22yo+      Most Recent Value   SBIRT (23 yo +)    In order to provide better care to our patients, we are screening all of our patients for alcohol and drug use  Would it be okay to ask you these screening questions?  Unable to answer at this time Filed at: 02/22/2022 1756                    MDM    Disposition  Final diagnoses:   Severe sepsis (HCC)   Serum total bilirubin elevated   UTI (urinary tract infection)   Abnormal gallbladder ultrasound     Time reflects when diagnosis was documented in both MDM as applicable and the Disposition within this note     Time User Action Codes Description Comment    2/22/2022 11:42 PM Shirlene Clark Add [A41 9,  R65 20] Severe sepsis ([address] )     2/23/2022  1:39 AM Shirlene Clark Add [R17] Serum total bilirubin elevated     2/23/2022 1:39 AM Tadevosyan, Shirlene Add [N39 0] UTI (urinary tract infection)     2/23/2022  1:40 AM Tadevosyan, Shirlene Add [R93 2] Abnormal gallbladder ultrasound     2/25/2022  6:05 PM PantinoFredyZandra Shawn Add [E87 2] High anion gap metabolic acidosis     4/70/3844  9:50 AM [place] Shawn Add [N17 9] OP[facility] (acute kidney [facility]) [facility] on CKD     3/2/2022  9:00 AM Susan Woods Add [C50 919] Malignant neoplasm of female breast, unspecified estrogen receptor status, unspecified laterality, unspecified site of breast ([facility] )     3/2/2022 11:30 AM PantinoFredyZandra Shawn Add [A36 03] Anemia following use of chemotherapeutic drug     3/2/2022 11:30 AM Pantino Zandra Shawn Add [J51 53] Alcoholic hepatitis     0/8/4783 11:30 AM PantinoFredyZandra Shawn Add [Z79 52] Current use of steroid medication     3/2/2022 11:30 AM Pantino Zandra Shawn Add [R26 2] Ambulatory dysfunction     3/2/2022 11:33 AM Pantino Zandra Shawn Add [D62] Acute blood loss anemia       ED Disposition     ED Disposition Condition Date/Time Comment    Admit Stable Wed Feb 23, 2022  1:39 AM Case was discussed with medicine and the patient's admission status was agreed to be Admission Status: inpatient status to the service of Dr Levar Yin           Follow-up Information     Follow up With Specialties Details Why Contact Info Additional Information    [address] [facility], [place] Chiropractic Medicine Schedule an appointment as soon as possible for a visit in 1 week(s)  [address]  [address] [facility] [place] [facility] Schedule an appointment as soon as possible for a visit in 2 week(s)  Balaji Youssef [address] [facility] [place], [address], 11[address] [facility] [place] [facility] Schedule an appointment as soon as possible for a visit in 2 week(s) please set up an appointment with Dr Trinh Lai [address]  [place] [id number] [phone] [facility] [address] [facility], [address], [place] [address] At [facility]    Cheri  Maryanne [address] Health/Hospice  Follow up Please expect a call within 24-48 hours of DC to set up first visit [place] [phone]  [phone]             Discharge Medication List as of 3/2/2022 12:40 PM      START taking these medications    Details   !! folic acid (FOLVITE) 1 mg tablet Take 1 tablet (1 mg total) by mouth daily, Starting Wed 3/2/2022, Until Fri 4/1/2022, Normal      midodrine (PROAMATINE) 5 mg tablet Take 1 tablet (5 mg total) by mouth 3 (three) times a day before meals for 15 days Please check your blood pressure 3x/day and hold if sysolic blood pressure greater than 130 mg, Starting Wed 3/2/2022, Until Thu 3/17/2022, Normal      pantoprazole (PROTONIX) 40 mg tablet Take 1 tablet (40 mg total) by mouth daily in the early morning, Starting Thu 3/3/2022, Until Mon 5/2/2022, Normal      prednisoLONE (ORAPRED ODT) 10 MG disintegrating tablet Multiple Dosages:Starting Wed 3/2/2022, Until Tue 3/29/2022 at 2359, THEN Starting Wed 3/30/2022, Until Tue 4/5/2022 at 2359, THEN Starting Wed 4/6/2022, Until Tue 4/12/2022 at 2359, THEN Starting Wed 4/13/2022, Until Tue 4/19/2022 at 2359Take 4 tab lets (40 mg total) by mouth daily for 28 days, THEN 3 tablets (30 mg total) daily for 7 days, THEN 2 tablets (20 mg total) daily for 7 days, THEN 1 tablet (10 mg total) daily for 7 days  , Normal       !! - Potential duplicate medications found  Please discuss with provider        CONTINUE these medications which have NOT CHANGED    Details   !! folic acid (FOLVITE) 1 mg tablet Take 1 tablet (1 mg total) by mouth daily, Starting Fri 9/11/2020, No Print      gabapentin (NEURONTIN) 300 mg capsule Take 1 capsule (300 mg total) by mouth 3 (three) times a day, Starting Tue 5/28/2019, Normal      letrozole (FEMARA) 2 5 mg tablet TAKE 1 TABLET BY MOUTH EVERY DAY, Normal      !! sertraline (ZOLOFT) 100 mg tablet Take 100 mg by mouth daily, Starting Tue 4/16/2019, Historical Med      !! sertraline (ZOLOFT) 50 mg tablet Take 50 mg by mouth 2 (two) times a day , Starting Thu 10/11/2018, Historical Med      SUMAtriptan (IMITREX) 100 mg tablet Take 100 mg by mouth once as needed for migraine, Historical Med      torsemide (DEMADEX) 20 mg tablet Take 1 tablet (20 mg total) by mouth as needed (for leg swelling, or weight gain >5 lbs in 2-3 days), Starting Thu 3/25/2021, Until Fri 7/23/2021, Print       !! - Potential duplicate medications found  Please discuss with provider  STOP taking these medications       acetaminophen-codeine (TYLENOL with CODEINE #3) 300-30 MG per tablet Comments:   Reason for Stopping:         apixaban (ELIQUIS) 5 mg Comments:   Reason for Stopping:         carvedilol (COREG) 6 25 mg tablet Comments:   Reason for Stopping:         potassium chloride (K-DUR,KLOR-CON) 20 mEq tablet Comments:   Reason for Stopping:         thiamine 100 MG tablet Comments:   Reason for Stopping:         tiZANidine (ZANAFLEX) 2 mg tablet Comments:   Reason for Stopping:         verapamil (VERELAN) 240 MG 24 hr capsule Comments:   Reason for Stopping:               Outpatient Discharge Orders   Comprehensive metabolic panel   Standing Status: Future Standing Exp  Date: 03/02/23     CBC and differential   Standing Status: Future Standing Exp  Date: 03/02/23     Ambulatory Referral to Home Health   Standing Status: Future Standing Exp  Date: 03/02/23      Ambulatory Referral to Nephrology   Standing Status: Future Standing Exp  Date: 03/02/23      Ambulatory Referral to Gastroenterology   Standing Status: Future Standing Exp  Date: 03/02/23      Ambulatory Referral to Palliative Care   Standing Status: Future Standing Exp   Date: 03/02/23      Discharge Diet     Activity as tolerated PDMP Review     None          ED Provider  Electronically Signed by           Tigre Chery MD  03/03/22 5813

## 2022-02-24 ENCOUNTER — APPOINTMENT (INPATIENT)
Dept: NON INVASIVE DIAGNOSTICS | Facility: HOSPITAL | Age: 62
DRG: 432 | End: 2022-02-24
Payer: COMMERCIAL

## 2022-02-24 PROBLEM — R17 JAUNDICE: Status: ACTIVE | Noted: 2022-02-24

## 2022-02-24 PROBLEM — R74.8 ELEVATED LIVER ENZYMES: Status: ACTIVE | Noted: 2022-02-24

## 2022-02-24 PROBLEM — D64.9 ANEMIA: Status: ACTIVE | Noted: 2021-03-25

## 2022-02-24 PROBLEM — R19.7 DIARRHEA: Status: ACTIVE | Noted: 2022-02-24

## 2022-02-24 PROBLEM — R65.10 SIRS (SYSTEMIC INFLAMMATORY RESPONSE SYNDROME) (HCC): Status: ACTIVE | Noted: 2022-02-23

## 2022-02-24 PROBLEM — R79.1 ELEVATED INR: Status: ACTIVE | Noted: 2022-02-24

## 2022-02-24 LAB
ALBUMIN SERPL BCP-MCNC: 1.2 G/DL (ref 3.5–5)
ALBUMIN SERPL BCP-MCNC: 1.3 G/DL (ref 3.5–5)
ALP SERPL-CCNC: 148 U/L (ref 46–116)
ALP SERPL-CCNC: 155 U/L (ref 46–116)
ALT SERPL W P-5'-P-CCNC: 19 U/L (ref 12–78)
ALT SERPL W P-5'-P-CCNC: 24 U/L (ref 12–78)
ANION GAP SERPL CALCULATED.3IONS-SCNC: 11 MMOL/L (ref 4–13)
AORTIC ROOT: 3.5 CM
APICAL FOUR CHAMBER EJECTION FRACTION: 81 %
ASCENDING AORTA: 3.5 CM (ref 1.9–2.85)
AST SERPL W P-5'-P-CCNC: 87 U/L (ref 5–45)
AST SERPL W P-5'-P-CCNC: 88 U/L (ref 5–45)
ATRIAL RATE: 125 BPM
BACTERIA UR CULT: NORMAL
BASOPHILS # BLD AUTO: 0.13 THOUSANDS/ΜL (ref 0–0.1)
BASOPHILS NFR BLD AUTO: 1 % (ref 0–1)
BILIRUB DIRECT SERPL-MCNC: 7.45 MG/DL (ref 0–0.2)
BILIRUB DIRECT SERPL-MCNC: 7.78 MG/DL (ref 0–0.2)
BILIRUB SERPL-MCNC: 9.63 MG/DL (ref 0.2–1)
BILIRUB SERPL-MCNC: 9.67 MG/DL (ref 0.2–1)
BUN SERPL-MCNC: 23 MG/DL (ref 5–25)
C DIFF TOX GENS STL QL NAA+PROBE: NEGATIVE
CALCIUM ALBUM COR SERPL-MCNC: 9.6 MG/DL (ref 8.3–10.1)
CALCIUM SERPL-MCNC: 7.4 MG/DL (ref 8.3–10.1)
CAMPYLOBACTER DNA SPEC NAA+PROBE: NORMAL
CHLORIDE SERPL-SCNC: 102 MMOL/L (ref 100–108)
CMV IGG SERPL IA-ACNC: <0.6 U/ML (ref 0–0.59)
CMV IGM SERPL IA-ACNC: <30 AU/ML (ref 0–29.9)
CO2 SERPL-SCNC: 18 MMOL/L (ref 21–32)
CREAT SERPL-MCNC: 1.13 MG/DL (ref 0.6–1.3)
EBV NA IGG SER IA-ACNC: <18 U/ML (ref 0–17.9)
EBV VCA IGG SER IA-ACNC: >600 U/ML (ref 0–17.9)
EBV VCA IGM SER IA-ACNC: <36 U/ML (ref 0–35.9)
EOSINOPHIL # BLD AUTO: 0.91 THOUSAND/ΜL (ref 0–0.61)
EOSINOPHIL NFR BLD AUTO: 4 % (ref 0–6)
ERYTHROCYTE [DISTWIDTH] IN BLOOD BY AUTOMATED COUNT: 22.3 % (ref 11.6–15.1)
FOLATE SERPL-MCNC: 1.6 NG/ML (ref 3.1–17.5)
FRACTIONAL SHORTENING: 37 % (ref 28–44)
GFR SERPL CREATININE-BSD FRML MDRD: 52 ML/MIN/1.73SQ M
GLUCOSE SERPL-MCNC: 92 MG/DL (ref 65–140)
HCT VFR BLD AUTO: 22.8 % (ref 34.8–46.1)
HGB BLD-MCNC: 7.7 G/DL (ref 11.5–15.4)
IGA SERPL-MCNC: 283 MG/DL (ref 70–400)
IGG SERPL-MCNC: 1070 MG/DL (ref 700–1600)
IGM SERPL-MCNC: 60 MG/DL (ref 40–230)
IMM GRANULOCYTES # BLD AUTO: 0.35 THOUSAND/UL (ref 0–0.2)
IMM GRANULOCYTES NFR BLD AUTO: 2 % (ref 0–2)
INR PPP: 2.52 (ref 0.84–1.19)
INR PPP: 3.5 (ref 0.84–1.19)
INTERPRETATION: ABNORMAL
INTERVENTRICULAR SEPTUM IN DIASTOLE (PARASTERNAL SHORT AXIS VIEW): 1.1 CM (ref 0.51–0.95)
INTERVENTRICULAR SEPTUM: 1.1 CM (ref 0.6–1.1)
LEFT ATRIUM SIZE: 2.7 CM
LEFT INTERNAL DIMENSION IN SYSTOLE: 2.2 CM (ref 2.45–3.7)
LEFT VENTRICULAR INTERNAL DIMENSION IN DIASTOLE: 3.5 CM (ref 3.99–5.94)
LEFT VENTRICULAR POSTERIOR WALL IN END DIASTOLE: 0.9 CM (ref 0.49–0.94)
LEFT VENTRICULAR STROKE VOLUME: 35 ML
LVSV (TEICH): 35 ML
LYMPHOCYTES # BLD AUTO: 0.68 THOUSANDS/ΜL (ref 0.6–4.47)
LYMPHOCYTES NFR BLD AUTO: 3 % (ref 14–44)
MCH RBC QN AUTO: 35.8 PG (ref 26.8–34.3)
MCHC RBC AUTO-ENTMCNC: 33.8 G/DL (ref 31.4–37.4)
MCV RBC AUTO: 106 FL (ref 82–98)
MONOCYTES # BLD AUTO: 0.93 THOUSAND/ΜL (ref 0.17–1.22)
MONOCYTES NFR BLD AUTO: 4 % (ref 4–12)
MV E'TISSUE VEL-SEP: 6 CM/S
MV PEAK A VEL: 0 M/S
MV PEAK E VEL: 86 CM/S
NEUTROPHILS # BLD AUTO: 19.09 THOUSANDS/ΜL (ref 1.85–7.62)
NEUTS SEG NFR BLD AUTO: 86 % (ref 43–75)
NRBC BLD AUTO-RTO: 0 /100 WBCS
P AXIS: 42 DEGREES
PLATELET # BLD AUTO: 378 THOUSANDS/UL (ref 149–390)
PMV BLD AUTO: 10.6 FL (ref 8.9–12.7)
POTASSIUM SERPL-SCNC: 3.3 MMOL/L (ref 3.5–5.3)
PR INTERVAL: 160 MS
PROT SERPL-MCNC: 5.4 G/DL (ref 6.4–8.2)
PROT SERPL-MCNC: 5.4 G/DL (ref 6.4–8.2)
PROTHROMBIN TIME: 26.7 SECONDS (ref 11.6–14.5)
PROTHROMBIN TIME: 34.4 SECONDS (ref 11.6–14.5)
QRS AXIS: -8 DEGREES
QRSD INTERVAL: 78 MS
QT INTERVAL: 316 MS
QTC INTERVAL: 445 MS
RBC # BLD AUTO: 2.15 MILLION/UL (ref 3.81–5.12)
SALMONELLA DNA SPEC QL NAA+PROBE: NORMAL
SHIGA TOXIN STX GENE SPEC NAA+PROBE: NORMAL
SHIGELLA DNA SPEC QL NAA+PROBE: NORMAL
SL CV LV EF: 65
SL CV PED ECHO LEFT VENTRICLE DIASTOLIC VOLUME (MOD BIPLANE) 2D: 51 ML
SL CV PED ECHO LEFT VENTRICLE SYSTOLIC VOLUME (MOD BIPLANE) 2D: 15 ML
SODIUM SERPL-SCNC: 131 MMOL/L (ref 136–145)
T WAVE AXIS: 41 DEGREES
VENTRICULAR RATE: 119 BPM
VIT B12 SERPL-MCNC: 2473 PG/ML (ref 100–900)
WBC # BLD AUTO: 22.09 THOUSAND/UL (ref 4.31–10.16)
Z-SCORE OF ASCENDING AORTA: 4.71
Z-SCORE OF INTERVENTRICULAR SEPTUM IN END DIASTOLE: 3.3
Z-SCORE OF LEFT VENTRICULAR DIMENSION IN END DIASTOLE: -3.3
Z-SCORE OF LEFT VENTRICULAR DIMENSION IN END SYSTOLE: -2.48
Z-SCORE OF LEFT VENTRICULAR POSTERIOR WALL IN END DIASTOLE: 1.64

## 2022-02-24 PROCEDURE — 80053 COMPREHEN METABOLIC PANEL: CPT | Performed by: INTERNAL MEDICINE

## 2022-02-24 PROCEDURE — 82248 BILIRUBIN DIRECT: CPT | Performed by: INTERNAL MEDICINE

## 2022-02-24 PROCEDURE — 86381 MITOCHONDRIAL ANTIBODY EACH: CPT | Performed by: PHYSICIAN ASSISTANT

## 2022-02-24 PROCEDURE — 80076 HEPATIC FUNCTION PANEL: CPT | Performed by: INTERNAL MEDICINE

## 2022-02-24 PROCEDURE — 99232 SBSQ HOSP IP/OBS MODERATE 35: CPT | Performed by: INTERNAL MEDICINE

## 2022-02-24 PROCEDURE — 82784 ASSAY IGA/IGD/IGG/IGM EACH: CPT | Performed by: PHYSICIAN ASSISTANT

## 2022-02-24 PROCEDURE — 85610 PROTHROMBIN TIME: CPT | Performed by: PHYSICIAN ASSISTANT

## 2022-02-24 PROCEDURE — 93010 ELECTROCARDIOGRAM REPORT: CPT | Performed by: INTERNAL MEDICINE

## 2022-02-24 PROCEDURE — 82746 ASSAY OF FOLIC ACID SERUM: CPT | Performed by: INTERNAL MEDICINE

## 2022-02-24 PROCEDURE — 84145 PROCALCITONIN (PCT): CPT | Performed by: INTERNAL MEDICINE

## 2022-02-24 PROCEDURE — 85025 COMPLETE CBC W/AUTO DIFF WBC: CPT | Performed by: INTERNAL MEDICINE

## 2022-02-24 PROCEDURE — 82607 VITAMIN B-12: CPT | Performed by: INTERNAL MEDICINE

## 2022-02-24 PROCEDURE — 85610 PROTHROMBIN TIME: CPT | Performed by: INTERNAL MEDICINE

## 2022-02-24 PROCEDURE — 93306 TTE W/DOPPLER COMPLETE: CPT | Performed by: INTERNAL MEDICINE

## 2022-02-24 PROCEDURE — 86015 ACTIN ANTIBODY EACH: CPT | Performed by: PHYSICIAN ASSISTANT

## 2022-02-24 PROCEDURE — 93306 TTE W/DOPPLER COMPLETE: CPT

## 2022-02-24 RX ORDER — POTASSIUM CHLORIDE 14.9 MG/ML
20 INJECTION INTRAVENOUS
Status: COMPLETED | OUTPATIENT
Start: 2022-02-24 | End: 2022-02-25

## 2022-02-24 RX ORDER — POTASSIUM CHLORIDE 20 MEQ/1
40 TABLET, EXTENDED RELEASE ORAL ONCE
Status: DISCONTINUED | OUTPATIENT
Start: 2022-02-24 | End: 2022-02-24

## 2022-02-24 RX ORDER — LOPERAMIDE HYDROCHLORIDE 2 MG/1
4 CAPSULE ORAL ONCE
Status: COMPLETED | OUTPATIENT
Start: 2022-02-24 | End: 2022-02-24

## 2022-02-24 RX ORDER — LOPERAMIDE HYDROCHLORIDE 2 MG/1
2 CAPSULE ORAL 3 TIMES DAILY PRN
Status: DISCONTINUED | OUTPATIENT
Start: 2022-02-24 | End: 2022-03-02 | Stop reason: HOSPADM

## 2022-02-24 RX ADMIN — LOPERAMIDE HYDROCHLORIDE 2 MG: 2 CAPSULE ORAL at 23:58

## 2022-02-24 RX ADMIN — LOPERAMIDE HYDROCHLORIDE 4 MG: 2 CAPSULE ORAL at 14:17

## 2022-02-24 RX ADMIN — CHOLESTYRAMINE 4 G: 4 POWDER, FOR SUSPENSION ORAL at 08:49

## 2022-02-24 RX ADMIN — METRONIDAZOLE 500 MG: 500 TABLET ORAL at 08:49

## 2022-02-24 RX ADMIN — CEFEPIME HYDROCHLORIDE 2000 MG: 2 INJECTION, POWDER, FOR SOLUTION INTRAVENOUS at 20:27

## 2022-02-24 RX ADMIN — APIXABAN 5 MG: 5 TABLET, FILM COATED ORAL at 08:49

## 2022-02-24 RX ADMIN — METRONIDAZOLE 500 MG: 500 TABLET ORAL at 14:17

## 2022-02-24 RX ADMIN — POTASSIUM CHLORIDE 20 MEQ: 200 INJECTION, SOLUTION INTRAVENOUS at 14:14

## 2022-02-24 RX ADMIN — LETROZOLE 2.5 MG: 2.5 TABLET, FILM COATED ORAL at 08:45

## 2022-02-24 RX ADMIN — SODIUM CHLORIDE, SODIUM LACTATE, POTASSIUM CHLORIDE, AND CALCIUM CHLORIDE 125 ML/HR: .6; .31; .03; .02 INJECTION, SOLUTION INTRAVENOUS at 16:51

## 2022-02-24 RX ADMIN — ACETAMINOPHEN 650 MG: 325 TABLET, FILM COATED ORAL at 21:47

## 2022-02-24 RX ADMIN — POTASSIUM CHLORIDE 20 MEQ: 200 INJECTION, SOLUTION INTRAVENOUS at 11:40

## 2022-02-24 RX ADMIN — SODIUM CHLORIDE, SODIUM LACTATE, POTASSIUM CHLORIDE, AND CALCIUM CHLORIDE 125 ML/HR: .6; .31; .03; .02 INJECTION, SOLUTION INTRAVENOUS at 08:44

## 2022-02-24 RX ADMIN — METRONIDAZOLE 500 MG: 500 TABLET ORAL at 23:58

## 2022-02-24 NOTE — PLAN OF CARE
Problem: Potential for Falls  Goal: Patient will remain free of falls  Description: INTERVENTIONS:  - Educate patient/family on patient safety including physical limitations  - Instruct patient to call for assistance with activity   - Consult OT/PT to assist with strengthening/mobility   - Keep Call bell within reach  - Keep bed low and locked with side rails adjusted as appropriate  - Keep care items and personal belongings within reach  - Initiate and maintain comfort rounds  - Make Fall Risk Sign visible to staff  - Apply yellow socks and bracelet for high fall risk patients  - Consider moving patient to room near nurses station  Outcome: Progressing     Problem: MOBILITY - ADULT  Goal: Maintain or return to baseline ADL function  Description: INTERVENTIONS:  -  Assess patient's ability to carry out ADLs; assess patient's baseline for ADL function and identify physical deficits which impact ability to perform ADLs (bathing, care of mouth/teeth, toileting, grooming, dressing, etc )  - Assess/evaluate cause of self-care deficits   - Assess range of motion  - Assess patient's mobility; develop plan if impaired  - Assess patient's need for assistive devices and provide as appropriate  - Encourage maximum independence but intervene and supervise when necessary  - Involve family in performance of ADLs  - Assess for home care needs following discharge   - Consider OT consult to assist with ADL evaluation and planning for discharge  - Provide patient education as appropriate  Outcome: Progressing  Goal: Maintains/Returns to pre admission functional level  Description: INTERVENTIONS:  - Perform BMAT or MOVE assessment daily    - Set and communicate daily mobility goal to care team and patient/family/caregiver     - Collaborate with rehabilitation services on mobility goals if consulted  - Out of bed for toileting  - Record patient progress and toleration of activity level   Outcome: Progressing     Problem: Nutrition/Hydration-ADULT  Goal: Nutrient/Hydration intake appropriate for improving, restoring or maintaining nutritional needs  Description: Monitor and assess patient's nutrition/hydration status for malnutrition  Collaborate with interdisciplinary team and initiate plan and interventions as ordered  Monitor patient's weight and dietary intake as ordered or per policy  Utilize nutrition screening tool and intervene as necessary  Determine patient's food preferences and provide high-protein, high-caloric foods as appropriate       INTERVENTIONS:  - Monitor oral intake, urinary output, labs, and treatment plans  - Assess nutrition and hydration status and recommend course of action  - Evaluate amount of meals eaten  - Assist patient with eating if necessary   - Allow adequate time for meals  - Recommend/ encourage appropriate diets, oral nutritional supplements, and vitamin/mineral supplements  - Order, calculate, and assess calorie counts as needed  - Recommend, monitor, and adjust tube feedings and TPN/PPN based on assessed needs  - Assess need for intravenous fluids  - Provide specific nutrition/hydration education as appropriate  - Include patient/family/caregiver in decisions related to nutrition  Outcome: Progressing     Problem: Prexisting or High Potential for Compromised Skin Integrity  Goal: Skin integrity is maintained or improved  Description: INTERVENTIONS:  - Identify patients at risk for skin breakdown  - Assess and monitor skin integrity  - Assess and monitor nutrition and hydration status  - Monitor labs   - Assess for incontinence   - Turn and reposition patient  - Assist with mobility/ambulation  - Relieve pressure over bony prominences  - Avoid friction and shearing  - Provide appropriate hygiene as needed including keeping skin clean and dry  - Evaluate need for skin moisturizer/barrier cream  - Collaborate with interdisciplinary team   - Patient/family teaching  - Consider wound care consult Outcome: Progressing

## 2022-02-24 NOTE — PROGRESS NOTES
Progress Note - Delgado Carrillo 62 y o  female MRN: [id number]    Unit/Bed#: S -01 Encounter: [id number]    Assessment and Plan:     1  Elevated liver enzymes, cholestatic pattern  Patient continues to be without complaints  She is alert and oriented on exam   Liver function tests this morning were all the same with total bilirubin maintaining at 9 6  Platelets 288  Hemoglobin 7 7  White count 22  INR came back at 3 5 from 1 5 yesterday  Patient was started on Eliquis yesterday  BP 96/59  Unclear etiology, appears possibly secondary to viral hepatitis verses daily verses autoimmune     -negative workup so far including CMV, acute hepatitis panel   -follow-up EBV, HSV, AMA, anti smooth muscle, immunoglobulins, ceruloplasmin    -INR significantly increased from 1 5-3 5   -yesterday patient was started on Eliquis  Previously was off of this for 3 years   -reached out to primary team   They will discontinue Eliquis   -if persistent elevation of INR, will trial vitamin K    -monitor hepatic function panel and INR q 8 hours  -continue monitor platelets   -continue to monitor patient's mental status  Ammonia was normal on arrival     -if persistent worsening of liver function tests, would recommend proceeding with liver biopsy    ----------------------------------------------------------------------------------------------------------------    Subjective:     Patient continues to have no complaints  She denies any abdominal pain, nausea or vomiting  She reports she has not had a bowel movement yet today  Tolerating diet without difficulty  I asked patient again if she had been taking any new medications/herbals/alcohol at all which she denies  Objective:     Vitals: Blood pressure 96/59, pulse 98, temperature 98 5 °F (36 9 °C), resp  rate 18, height 5' 5" (1 651 m), weight 67 1 kg (148 lb), SpO2 95 %, not currently breastfeeding  ,Body mass index is 24 63 kg/m²        Intake/Output Summary (Last 24 hours) at 2/24/2022 1157  Last data filed at 2/24/2022 1046  Gross per 24 hour   Intake 3600 ml   Output --   Net 3600 ml       Physical Exam:     General Appearance: Alert, appears stated age and cooperative  Scleral icterus and jaundice noted  Lungs: Clear to auscultation bilaterally, no rales or rhonchi, no labored breathing/accessory muscle use  Heart: Regular rate and rhythm, S1, S2 normal, no murmur, click, rub or gallop  Abdomen: Soft, non-tender, non-distended; bowel sounds normal; no masses or no organomegaly  Extremities: No cyanosis, clubbing, or edema    Invasive Devices  Report    Peripheral Intravenous Line            Peripheral IV 02/23/22 Right Hand 1 day          Drain            Closed/Suction Drain Right RLQ Bulb 462 days                Lab Results:  Results from last 7 days   Lab Units 02/24/22  0616   WBC Thousand/uL 22 09*   HEMOGLOBIN g/dL 7 7*   HEMATOCRIT % 22 8*   PLATELETS Thousands/uL 378   NEUTROS PCT % 86*   LYMPHS PCT % 3*   MONOS PCT % 4   EOS PCT % 4     Results from last 7 days   Lab Units 02/24/22  0616   POTASSIUM mmol/L 3 3*   CHLORIDE mmol/L 102   CO2 mmol/L 18*   BUN mg/dL 23   CREATININE mg/dL 1 13   CALCIUM mg/dL 7 4*   ALK PHOS U/L 148*   ALT U/L 24   AST U/L 88*     Invalid input(s): BILI  Results from last 7 days   Lab Units 02/24/22  0901   INR  3 50*           Imaging Studies: I have personally reviewed pertinent imaging studies  XR chest 1 view portable    Result Date: 2/23/2022  Impression: No acute cardiopulmonary disease  Large hiatal hernia  Workstation performed: [id number]     PE Study with CT Abdomen and Pelvis with contrast    Result Date: 2/22/2022  Impression: 1  No evidence of pulmonary embolism  2   Pericholecystic fluid/stranding without evidence of calcified gallstones, findings may be seen in the setting of hepatitis  Further evaluation with ultrasound may be obtained if there is clinical concern for noncalcified gallstones/cholecystitis   3   Large hiatal hernia  4   Mild thickening of the urinary bladder wall, correlate with urinalysis for cystitis  Workstation performed: [id number]     US right upper quadrant    Result Date: 2/23/2022  Impression: The gallbladder wall is abnormally thickened, measuring approximately 6 mm thickness  Portions of the gallbladder wall appear edematous  There is pericholecystic fluid  The liver is enlarged, hyperechoic, and demonstrates heterogeneous echotexture  The technologist who performed the examination was unable to definitely demonstrate flow within the portal vein, however, contrast enhancement of the portal vein was seen on  a CT performed approximately 3 hours earlier today  Gastroenterology/Hepatology consultation and Surgical consultation is recommended  Trace ascites  Atrophic right kidney, unchanged  There is an approximately 8 x 7 x 9 mm hypoechoic lesion within the posterior aspect of the left lobe of the liver; this appears similar to April 11, 2018  The study was [facility] in [facility] for immediate notification   Workstation performed: [id number]

## 2022-02-24 NOTE — ASSESSMENT & PLAN NOTE
· Hx of DVT in 12/2018 in the setting of  malignancy  · She states that she has not taken Eliquis in 3 years because of not being able to follow-up with her doctor  · INR today at 3 5 after restarting Eliquis yesterday  · Will hold off on Eliquis for now  · Did discuss with the patient regarding restarting Eliquis down the line vs holding off on further anticoagulation -- she said she will think about it first  · On chart review, there were imaging studies done in 2020, venous duplex did not show any DVT however V/Q scan done at that time showed possibility for PE being intermediate

## 2022-02-24 NOTE — PROGRESS NOTES
[facility]  Progress Note - Jessica West 1960, 62 y o  female MRN: [id number]  Unit/Bed#: S -01 Encounter: [id number]  Primary Care Provider: Marc Mcdonough DC   Date and time admitted to hospital: 2/22/2022  5:42 PM    * Jaundice  Assessment & Plan  · Etiology unclear at this time  · Normal ALT, elevated AST and alk-phos  · Total bilirubin elevated at 11, with direct bilirubin at 7 4  · Hepatitis panel unremarkable  · Ultrasound showed: The gallbladder wall is abnormally thickened, measuring approximately 6 mm thickness   Portions of the gallbladder wall appear edematous   There is pericholecystic fluid  There is an approximately 8 x 7 x 9 mm hypoechoic lesion within the posterior aspect of the left lobe of the liver  · Follow-up autoimmune tests  · GI input appreciated  · Surgery was also consulted - no recommendation for any surgery at this time  · Daily CMP, direct bilirubin    SIRS (systemic inflammatory response syndrome) (HCC)  Assessment & Plan  · POA SIRS criteria with WBC 28, tachycardic to 121, RR22  Patient then became hypotensive to 85/55  · Source unclear at this time, blood cultures have been negative at 24 hours  · Urine culture negative  · C diff and stool enteric panel negative    · Follow-up blood cultures  · WBC has been trending down  · Obtain procalcitonin  · Continue current antibiotics, cefepime and metronidazole (day 2) while awaiting further culture results  · Trend temperature curve, monitor WBC    History of DVT (deep vein thrombosis)  Assessment & Plan  · Hx of DVT in 12/2018 in the setting of  malignancy     · She states that she has not taken Eliquis in 3 years because of not being able to follow-up with her doctor  · INR today at 3 5 after restarting Eliquis yesterday  · Will hold off on Eliquis for now  · Did discuss with the patient regarding restarting Eliquis down the line vs holding off on further anticoagulation -- she said she will think about it first  · On chart review, there were imaging studies done in 2020, venous duplex did not show any DVT however V/Q scan done at that time showed possibility for PE being intermediate  Diarrhea  Assessment & Plan  · Had 2 episodes of nonbloody diarrhea today  · C diff and stool enteric panel negative  · Will give Imodium    Anemia  Assessment & Plan  Lab Results   Component Value Date    EGFR 52 02/24/2022    EGFR 58 02/23/2022    EGFR 45 02/22/2022    CREATININE 1 13 02/24/2022    CREATININE 1 04 02/23/2022    CREATININE 1 29 02/22/2022     Hemoglobin 8 9 on presentation, baseline appears to be around 10  No signs of active bleeding, patient denies any dark stools  MCV high  Obtain D62 and folic acid levels    Hemoglobin today at 7 4, continue to monitor CBC daily, transfuse if less than 7    Elevated INR  Assessment & Plan  Plan as outlined above    Stage 3a chronic kidney disease [facility])  Assessment & Plan  Lab Results   Component Value Date    EGFR 52 02/24/2022    EGFR 58 02/23/2022    EGFR 45 02/22/2022    CREATININE 1 13 02/24/2022    CREATININE 1 04 02/23/2022    CREATININE 1 29 02/22/2022     · Baseline creatinine around 1 1 - current creatinine at baseline  Follows with nephrology outpatient   CKD suspected to be in the setting of long-term hypertension causing hypertensive arteriosclerosis      Alcohol abuse  Assessment & Plan  History of EtOH abuse, patient states she has not drank in over three months  VTE Pharmacologic Prophylaxis: VTE Score: 8 High Risk (Score >/= 5) - Pharmacological DVT Prophylaxis Ordered: Currently anticoagulated with INR 3 5  Sequential Compression Devices Ordered  Patient Centered Rounds: I performed bedside rounds with nursing staff today  Discussions with Specialists or Other Care Team Provider:  GI    Education and Discussions with Family / Patient: Updated  () via phone      Current Length of Stay: 1 day(s)  Current Patient Status: Inpatient   Discharge Plan: Anticipate discharge in 48-72 hrs to home  Code Status: Level 3 - DNAR and DNI    Subjective:   Patient had 2 episodes nonbloody diarrhea today, no nausea or vomiting  No fever    Objective:     Vitals:   Temp (24hrs), Av 7 °F (37 1 °C), Min:98 5 °F (36 9 °C), Max:98 9 °F (37 2 °C)    Temp:  [98 5 °F (36 9 °C)-98 9 °F (37 2 °C)] 98 5 °F (36 9 °C)  HR:  [] 98  Resp:  [16-18] 18  BP: ()/(55-66) 96/59  SpO2:  [94 %-97 %] 95 %  Body mass index is 24 63 kg/m²  Input and Output Summary (last 24 hours): Intake/Output Summary (Last 24 hours) at 2022 1458  Last data filed at 2022 1414  Gross per 24 hour   Intake 4627 ml   Output --   Net 4627 ml       Physical Exam:   Physical Exam  Vitals reviewed  Constitutional:       General: She is not in acute distress  Appearance: She is not ill-appearing  Eyes:      General: Scleral icterus present  Cardiovascular:      Rate and Rhythm: Normal rate  Pulmonary:      Effort: Pulmonary effort is normal  No respiratory distress  Breath sounds: No wheezing or rales  Abdominal:      General: Bowel sounds are normal       Palpations: Abdomen is soft  Tenderness: There is no abdominal tenderness  Musculoskeletal:      Left lower leg: No edema  Skin:     General: Skin is warm and dry  Coloration: Skin is jaundiced  Neurological:      General: No focal deficit present  Mental Status: She is alert and oriented to person, place, and time  Mental status is at baseline     Psychiatric:         Mood and Affect: Mood normal             Additional Data:     Labs:  Results from last 7 days   Lab Units 22  0616   WBC Thousand/uL 22 09*   HEMOGLOBIN g/dL 7 7*   HEMATOCRIT % 22 8*   PLATELETS Thousands/uL 378   NEUTROS PCT % 86*   LYMPHS PCT % 3*   MONOS PCT % 4   EOS PCT % 4     Results from last 7 days   Lab Units 22  0616   SODIUM mmol/L 131*   POTASSIUM mmol/L 3 3*   CHLORIDE mmol/L 102   CO2 mmol/L 18*   BUN mg/dL 23   CREATININE mg/dL 1 13   ANION GAP mmol/L 11   CALCIUM mg/dL 7 4*   ALBUMIN g/dL 1 3*   TOTAL BILIRUBIN mg/dL 9 67*   ALK PHOS U/L 148*   ALT U/L 24   AST U/L 88*   GLUCOSE RANDOM mg/dL 92     Results from last 7 days   Lab Units 02/24/22  0901   INR  3 50*             Results from last 7 days   Lab Units 02/22/22 1947 02/22/22  1806   LACTIC ACID mmol/L 2 0 2 2*       Lines/Drains:  Invasive Devices  Report    Peripheral Intravenous Line            Peripheral IV 02/23/22 Right Hand 1 day          Drain            Closed/Suction Drain Right RLQ Bulb 463 days                  Imaging: Reviewed radiology reports from this admission including: ultrasound(s)    Recent Cultures (last 7 days):   Results from last 7 days   Lab Units 02/23/22  1338 02/22/22  2208 02/22/22 1947 02/22/22  1925   BLOOD CULTURE   --   --  No Growth at 24 hrs  No Growth at 24 hrs  URINE CULTURE   --  >100,000 cfu/ml   --   --    C DIFF TOXIN B BY PCR  Negative  --   --   --        Last 24 Hours Medication List:   Current Facility-Administered Medications   Medication Dose Route Frequency Provider Last Rate    acetaminophen  650 mg Oral Q6H PRN Karina Mora MD      cefepime  2,000 mg Intravenous Q24H Karina Mora MD Stopped (02/24/22 7252)    cholestyramine sugar free  4 g Oral Daily Karina Mora MD      lactated ringers  125 mL/hr Intravenous Continuous Karina Mora  mL/hr (02/24/22 6338)    letrozole  2 5 mg Oral Daily Karina Mora MD      loperamide  2 mg Oral TID PRN Issa Urban MD      metroNIDAZOLE  500 mg Oral Q8H [address], [place]      ondansetron  4 mg Intravenous Q6H PRN Karina Mora MD      potassium chloride  20 mEq Intravenous Q2H Issa Urban MD 20 mEq (02/24/22 1414)        Today, Patient Was Seen By: Issa Urban MD    **Please Note: This note may have been constructed using a voice recognition system  **

## 2022-02-24 NOTE — PLAN OF CARE
Problem: Prexisting or High Potential for Compromised Skin Integrity  Goal: Skin integrity is maintained or improved  Description: INTERVENTIONS:  - Identify patients at risk for skin breakdown  - Assess and monitor skin integrity  - Assess and monitor nutrition and hydration status  - Monitor labs   - Assess for incontinence   - Turn and reposition patient  - Assist with mobility/ambulation  - Relieve pressure over bony prominences  - Avoid friction and shearing  - Provide appropriate hygiene as needed including keeping skin clean and dry  - Evaluate need for skin moisturizer/barrier cream  - Collaborate with interdisciplinary team   - Patient/family teaching  - Consider wound care consult   Outcome: Progressing     Problem: Nutrition/Hydration-ADULT  Goal: Nutrient/Hydration intake appropriate for improving, restoring or maintaining nutritional needs  Description: Monitor and assess patient's nutrition/hydration status for malnutrition  Collaborate with interdisciplinary team and initiate plan and interventions as ordered  Monitor patient's weight and dietary intake as ordered or per policy  Utilize nutrition screening tool and intervene as necessary  Determine patient's food preferences and provide high-protein, high-caloric foods as appropriate       INTERVENTIONS:  - Monitor oral intake, urinary output, labs, and treatment plans  - Assess nutrition and hydration status and recommend course of action  - Evaluate amount of meals eaten  - Assist patient with eating if necessary   - Allow adequate time for meals  - Recommend/ encourage appropriate diets, oral nutritional supplements, and vitamin/mineral supplements  - Order, calculate, and assess calorie counts as needed  - Recommend, monitor, and adjust tube feedings and TPN/PPN based on assessed needs  - Assess need for intravenous fluids  - Provide specific nutrition/hydration education as appropriate  - Include patient/family/caregiver in decisions related to nutrition  Outcome: Progressing     Problem: MOBILITY - ADULT  Goal: Maintain or return to baseline ADL function  Description: INTERVENTIONS:  -  Assess patient's ability to carry out ADLs; assess patient's baseline for ADL function and identify physical deficits which impact ability to perform ADLs (bathing, care of mouth/teeth, toileting, grooming, dressing, etc )  - Assess/evaluate cause of self-care deficits   - Assess range of motion  - Assess patient's mobility; develop plan if impaired  - Assess patient's need for assistive devices and provide as appropriate  - Encourage maximum independence but intervene and supervise when necessary  - Involve family in performance of ADLs  - Assess for home care needs following discharge   - Consider OT consult to assist with ADL evaluation and planning for discharge  - Provide patient education as appropriate  Outcome: Progressing

## 2022-02-24 NOTE — ASSESSMENT & PLAN NOTE
· Etiology unclear at this time  · Normal ALT, elevated AST and alk-phos  · Total bilirubin elevated at 11, with direct bilirubin at 7 4  · Hepatitis panel unremarkable  · Ultrasound showed: The gallbladder wall is abnormally thickened, measuring approximately 6 mm thickness   Portions of the gallbladder wall appear edematous   There is pericholecystic fluid   There is an approximately 8 x 7 x 9 mm hypoechoic lesion within the posterior aspect of the left lobe of the liver  · Follow-up autoimmune tests  · GI input appreciated  · Surgery was also consulted - no recommendation for any surgery at this time  · Daily CMP, direct bilirubin

## 2022-02-24 NOTE — ASSESSMENT & PLAN NOTE
· Had 2 episodes of nonbloody diarrhea today  · C diff and stool enteric panel negative  · Will give Imodium

## 2022-02-24 NOTE — ASSESSMENT & PLAN NOTE
· POA SIRS criteria with WBC 28, tachycardic to 121, RR22  Patient then became hypotensive to 85/55    · Source unclear at this time, blood cultures have been negative at 24 hours  · Urine culture negative  · C diff and stool enteric panel negative    · Follow-up blood cultures  · WBC has been trending down  · Obtain procalcitonin  · Continue current antibiotics, cefepime and metronidazole (day 2) while awaiting further culture results  · Trend temperature curve, monitor WBC

## 2022-02-24 NOTE — ASSESSMENT & PLAN NOTE
Lab Results   Component Value Date    EGFR 52 02/24/2022    EGFR 58 02/23/2022    EGFR 45 02/22/2022    CREATININE 1 13 02/24/2022    CREATININE 1 04 02/23/2022    CREATININE 1 29 02/22/2022     Hemoglobin 8 9 on presentation, baseline appears to be around 10  No signs of active bleeding, patient denies any dark stools      MCV high  Obtain Y08 and folic acid levels    Hemoglobin today at 7 4, continue to monitor CBC daily, transfuse if less than 7

## 2022-02-24 NOTE — ASSESSMENT & PLAN NOTE
Lab Results   Component Value Date    EGFR 52 02/24/2022    EGFR 58 02/23/2022    EGFR 45 02/22/2022    CREATININE 1 13 02/24/2022    CREATININE 1 04 02/23/2022    CREATININE 1 29 02/22/2022     · Baseline creatinine around 1 1 - current creatinine at baseline  Follows with nephrology outpatient   CKD suspected to be in the setting of long-term hypertension causing hypertensive arteriosclerosis

## 2022-02-24 NOTE — UTILIZATION REVIEW
Initial Clinical Review    Admission: Date/Time/Statement:   Admission Orders (From admission, onward)     Ordered        02/23/22 0140  Inpatient Admission  Once                      Orders Placed This Encounter   Procedures    Inpatient Admission     Standing Status:   Standing     Number of Occurrences:   1     Order Specific Question:   Level of Care     Answer:   Level 2 Stepdown / HOT [14]     Order Specific Question:   Estimated length of stay     Answer:   More than 2 Midnights     Order Specific Question:   Certification     Answer:   I certify that inpatient services are medically necessary for this patient for a duration of greater than two midnights  See H&P and MD Progress Notes for additional information about the patient's course of treatment  ED Arrival Information     Expected Arrival Acuity    - 2/22/2022 16:30 Emergent         Means of arrival Escorted by Service Admission type    Wheelchair Family Member Hospitalist Emergency         Arrival complaint    SOB/Loss Of Appetite        Chief Complaint   Patient presents with    Shortness of Breath     Pt reports shortness of breath, worse with ambulation x 1 week  Pt has not eaten in 1 week per family  Pt is severly jaundice  Initial Presentation: 62 y o  female from home presented to the ED with SOB with exertion, fatigue and yellowish skin  Kamilla Nanny PMH of breast cancer on letrozole, CKD 3, history of DVT, HTN, alc use  Pt reported PASCALE with exertion  For the past few weeks, was on diuretics for grade 1 diastolic dysfunction but was discontinued d/t worsening renal function  Also noted yellowish skin 1-2 weeks ago  Reports pruritus and decreased appetite  Last alcohol use 3 months ago  In ED,  she was noted to be in severe sepsis with elevated white count, hypotension, tachypnea and tachycardia  Imaging revealed pericholecystic fluid with stranding and gallbladder wall thickening  UA with innumerable bacteria, large leuks   On exam, jaundiced, scleral icterus  Given IVF, started on IV abx  Plan: Inpatient admission for evaluation and treatment of severe sepsis, hyperbilirubinemia: Cont IVF, IV abx, repeat lactate, f/u blood and urine cultures  GI consulted  Pending CMV, EBV, hep panel  02/23   General Surgery Consult: Patient's clinical exam and history is not consistent of gallbladder pathology  On CT imaging there is no dilation of the extra or intrahepatic ducts  No evidence of cholelithiasis  Recommend RUQ US to further evaluate the liver and gall bladder  Recommend acute hepatitis panel  If evidence of cholangitis on right upper quadrant ultrasound, would recommend GI evaluation for ERCP  continue sepsis protocol    GI Consult: Elevated LFTs, cholestatic pattern: Total bilirubin 11, mainly direct with white count of 28  Afebrile  Platelets elevated  INR 1 45  No abdominal pain to palpation  Obtain EBV, CMV and HSV  If no improvement of liver function tests, would recommend proceeding with MRI/MRCP at that time     Continue monitor liver function tests and INR closely  Continue to monitor mental status  continue to monitor WBC  continue cefepime and Flagyl for now  F/u blood cultures   cholestyramine started for itching  Date: 02/24   Day 2:   GI Notes: Pt continues to have no complaints  Denies abdominal pain, n/v  Reports no BM yet today  Tolerating diet without difficulty  Liver function tests this morning were all the same with total bilirubin maintaining at 9 6  Platelets 287  Hemoglobin 7 7  White count 22  INR came back at 3 5 from 1 5 yesterday  Was started on Eliquis yesterday  BP 96/59  Unclear etiology, appears possibly secondary to viral hepatitis verses daily verses autoimmune  Plan: Monitor hepatic function panel and INR q 8 hours  Continue monitor platelets  continue to monitor patient's mental status  If persistent worsening of liver function tests, would recommend proceeding with liver biopsy    PE: Alert, cooperative  Scleral icterus and jaundice noted  Abdomen soft, nt,nd; bowel sounds normal; no mass or organomegally      ED Triage Vitals [02/22/22 1639]   Temperature Pulse Respirations Blood Pressure SpO2   98 1 °F (36 7 °C) (!) 121 22 107/55 99 %      Temp Source Heart Rate Source Patient Position - Orthostatic VS BP Location FiO2 (%)   Oral Monitor Sitting Right arm --      Pain Score       No Pain          Wt Readings from Last 1 Encounters:   02/24/22 67 1 kg (148 lb)     Additional Vital Signs:     Date/Time Temp Pulse Resp BP MAP (mmHg) SpO2 O2 Device Patient Position - Orthostatic VS   02/24/22 0800 -- 98 -- 96/59 -- -- -- --   02/24/22 07:40:48 98 5 °F (36 9 °C) 96 18 96/59 71 95 % -- --   02/24/22 04:12:36 98 9 °F (37 2 °C) 95 16 108/66 80 94 % None (Room air) Lying   02/23/22 2217 -- 97 16 93/55 67 95 % None (Room air) Lying   02/23/22 1947 -- 95 16 96/57 72 95 % None (Room air) Lying   02/23/22 1800 -- 98 -- -- -- 97 % None (Room air) --   02/23/22 1700 -- 92 18 100/56 73 96 % None (Room air) Lying   02/23/22 1645 -- 94 18 99/55 72 95 % None (Room air) Lying   02/23/22 1600 -- 94 18 101/60 76 95 % None (Room air) Lying   02/23/22 1500 -- 100 18 105/56 76 94 % None (Room air) Lying   02/23/22 1445 -- 100 18 129/63 90 92 % None (Room air) Lying   02/23/22 1245 -- 92 18 100/57 72 96 % None (Room air) Lying   02/23/22 1100 98 8 °F (37 1 °C) 96 18 115/55 79 95 % None (Room air) Lying   02/23/22 0915 -- 94 18 102/58 78 96 % None (Room air) Lying   02/23/22 0630 -- 96 20 101/57 74 97 % None (Room air) Lying   02/23/22 0530 -- 100 20 94/55 69 97 % None (Room air) Lying   02/23/22 0345 -- 94 20 104/58 77 98 % None (Room air) Lying   02/23/22 0245 -- 94 20 92/61 72 95 % None (Room air) Lying   02/23/22 0145 -- 96 20 92/55 68 94 % None (Room air) Lying   02/23/22 0130 -- 94 20 97/55 70 97 % None (Room air) Lying   02/23/22 0115 -- 92 20 96/55 72 94 % None (Room air) Lying   02/23/22 0100 -- 94 20 103/55 72 97 % None (Room air) Lying   02/23/22 0000 -- 90 20 96/58 72 98 % None (Room air) Lying   02/22/22 2315 -- 88 -- 88/52 Abnormal   66 98 % None (Room air) Lying   02/22/22 2230 -- 92 20 104/59 77 96 % None (Room air) Lying   02/22/22 2215 -- 94 20 85/53 Abnormal  -- 97 % None (Room air) Lying   02/22/22 2037 -- 99 20 100/65 78 94 % None (Room air) Lying   02/22/22 1927 -- -- -- -- -- 98 % -- --   02/22/22 1926 -- 98 22 116/57 -- 98 % None (Room air) Lying   02/22/22 1815 -- 102 22 97/60 73 96 % None (Room air) Lying   02/22/22 1745 -- 106 Abnormal  22 92/60 71 97 % None (Room air) Lying     Pertinent Labs/Diagnostic Test Results:   US right upper quadrant   Final Result by Tere Caretr MD (02/23 8206)      The gallbladder wall is abnormally thickened, measuring approximately 6 mm thickness  Portions of the gallbladder wall appear edematous  There is pericholecystic fluid  The liver is enlarged, hyperechoic, and demonstrates heterogeneous echotexture  The technologist who performed the examination was unable to definitely demonstrate flow within the portal vein, however, contrast enhancement of the portal vein was seen on    a CT performed approximately 3 hours earlier today  Gastroenterology/Hepatology consultation and Surgical consultation is recommended  Trace ascites  Atrophic right kidney, unchanged  There is an approximately 8 x 7 x 9 mm hypoechoic lesion within the posterior aspect of the left lobe of the liver; this appears similar to April 11, 2018  The study was marked in [facility] for immediate notification  Workstation performed: [id number]         PE Study with CT Abdomen and Pelvis with contrast   Final Result by Florentin Logan MD (02/22 2237)      1  No evidence of pulmonary embolism  2   Pericholecystic fluid/stranding without evidence of calcified gallstones, findings may be seen in the setting of hepatitis    Further evaluation with ultrasound may be obtained if there is clinical concern for noncalcified gallstones/cholecystitis  3   Large hiatal hernia  4   Mild thickening of the urinary bladder wall, correlate with urinalysis for cystitis  Workstation performed: [id number]         XR chest 1 view portable   Final Result by Florentino Aguirre DO (02/23 1437)      No acute cardiopulmonary disease  Large hiatal hernia  Workstation performed: [id number]           02/22 EKG result: Sinus tachycardia  02/24 ECHO:    Left Ventricle: Left ventricular cavity size is normal  Wall thickness is normal  The left ventricular ejection fraction is 65%  Systolic function is normal  Wall motion is normal  Diastolic function is normal for age      Results from last 7 days   Lab Units 02/22/22  1755   SARS-COV-2  Negative     Results from last 7 days   Lab Units 02/24/22  0616 02/23/22  0526 02/22/22  1650   WBC Thousand/uL 22 09* 23 19* 28 35*   HEMOGLOBIN g/dL 7 7* 7 4* 8 9*   HEMATOCRIT % 22 8* 22 9* 26 8*   PLATELETS Thousands/uL 378 389 504*   NEUTROS ABS Thousands/µL 19 09* 20 47* 24 61*         Results from last 7 days   Lab Units 02/24/22  0616 02/23/22  0526 02/22/22  1650   SODIUM mmol/L 131* 133* 128*   POTASSIUM mmol/L 3 3* 3 8 3 8   CHLORIDE mmol/L 102 103 96*   CO2 mmol/L 18* 17* 20*   ANION GAP mmol/L 11 13 12   BUN mg/dL 23 30* 34*   CREATININE mg/dL 1 13 1 04 1 29   EGFR ml/min/1 73sq m 52 58 45   CALCIUM mg/dL 7 4* 7 6* 8 6     Results from last 7 days   Lab Units 02/24/22  0616 02/23/22  0526 02/22/22  1806 02/22/22  1650   AST U/L 88* 100*  --  107*   ALT U/L 24 24  --  26   ALK PHOS U/L 148* 149*  --  202*   TOTAL PROTEIN g/dL 5 4* 5 4*  --  6 5   ALBUMIN g/dL 1 3* 1 3*  --  1 6*   TOTAL BILIRUBIN mg/dL 9 67* 9 63*  --  11 05*   BILIRUBIN DIRECT mg/dL  --  7 86*  --   --    AMMONIA umol/L  --   --  <10*  --          Results from last 7 days   Lab Units 02/24/22  0616 02/23/22  0526 02/22/22  1650   GLUCOSE RANDOM mg/dL 92 76 107 BETA-HYDROXYBUTYRATE   Date Value Ref Range Status   09/08/2020 7 1 (H) <0 6 mmol/L Final                      Results from last 7 days   Lab Units 02/22/22 2051 02/22/22 1806 02/22/22  1650   HS TNI 0HR ng/L  --   --  8   HS TNI 2HR ng/L  --  9  --    HSTNI D2 ng/L  --  1  --    HS TNI 4HR ng/L 9  --   --    HSTNI D4 ng/L 1  --   --          Results from last 7 days   Lab Units 02/24/22  0901 02/23/22  0526 02/22/22  1806   PROTIME seconds 34 4* 17 5* 17 3*   INR  3 50* 1 45* 1 42*   PTT seconds  --   --  40*             Results from last 7 days   Lab Units 02/22/22 1947 02/22/22  1806   LACTIC ACID mmol/L 2 0 2 2*             Results from last 7 days   Lab Units 02/22/22  1806   NT-PRO BNP pg/mL 2,355*         Results from last 7 days   Lab Units 02/23/22  0046   HEP B S AG  Non-reactive   HEP C AB  Non-reactive   HEP B C IGM  Non-reactive                     Results from last 7 days   Lab Units 02/22/22 2208   CLARITY UA  Cloudy   COLOR UA  Orange   SPEC GRAV UA  1 015   PH UA  6 5   GLUCOSE UA mg/dl 100 (1/10%)*   KETONES UA mg/dl Negative   BLOOD UA  Moderate*   PROTEIN UA mg/dl 30 (1+)*   NITRITE UA  Negative   BILIRUBIN UA  Interference- unable to analyze*   UROBILINOGEN UA E U /dl 4 0*   LEUKOCYTES UA  Large*   WBC UA /hpf 20-30*   RBC UA /hpf 0-1   BACTERIA UA /hpf Innumerable*   EPITHELIAL CELLS WET PREP /hpf Occasional     Results from last 7 days   Lab Units 02/22/22  1755   INFLUENZA A PCR  Negative   INFLUENZA B PCR  Negative   RSV PCR  Negative       Results from last 7 days   Lab Units 02/22/22 2208 02/22/22 1947 02/22/22 1925   BLOOD CULTURE   --  No Growth at 24 hrs  No Growth at 24 hrs     URINE CULTURE  >100,000 cfu/ml   --   --                ED Treatment:   Medication Administration from 02/22/2022 1630 to 02/23/2022 1924       Date/Time Order Dose Route Action     02/22/2022 1951 sodium chloride 0 9 % bolus 1,000 mL 0 mL Intravenous Stopped     02/22/2022 1812 sodium chloride 0 9 % bolus 1,000 mL 1,000 mL Intravenous New Bag     02/22/2022 2019 cefepime (MAXIPIME) 2 g/50 mL dextrose IVPB 0 mg Intravenous Stopped     02/22/2022 1949 cefepime (MAXIPIME) 2 g/50 mL dextrose IVPB 2,000 mg Intravenous New Bag     02/22/2022 2117 iohexol (OMNIPAQUE) 350 MG/ML injection (SINGLE-DOSE) 100 mL 100 mL Intravenous Given     02/22/2022 2326 sodium chloride 0 9 % bolus 1,000 mL 0 mL Intravenous Stopped     02/22/2022 2224 sodium chloride 0 9 % bolus 1,000 mL 1,000 mL Intravenous New Bag     02/22/2022 2335 metroNIDAZOLE (FLAGYL) tablet 500 mg 500 mg Oral Given     02/23/2022 1455 lactated ringers infusion 125 mL/hr Intravenous Rate/Dose Change     02/23/2022 0529 lactated ringers infusion 75 mL/hr Intravenous Restarted     02/23/2022 0327 lactated ringers infusion 0 mL/hr Intravenous Hold     02/23/2022 0049 lactated ringers infusion 75 mL/hr Intravenous New Bag     02/23/2022 1002 letrozole [facility]) tablet 2 5 mg 2 5 mg Oral Given     02/23/2022 1542 metroNIDAZOLE (FLAGYL) tablet 500 mg 500 mg Oral Given     02/23/2022 0748 metroNIDAZOLE (FLAGYL) tablet 500 mg 500 mg Oral Given     02/23/2022 0805 cholestyramine sugar free (QUESTRAN LIGHT) packet 4 g 4 g Oral Not Given     02/23/2022 0536 heparin (porcine) subcutaneous injection 5,000 Units 5,000 Units Subcutaneous Given     02/23/2022 0037 sodium chloride 0 9 % infusion 0 mL/hr Intravenous Stopped     02/22/2022 2322 sodium chloride 0 9 % infusion 1,000 mL/hr Intravenous New Bag     02/23/2022 1338 apixaban (ELIQUIS) tablet 5 mg 5 mg Oral Given        Past Medical History:   Diagnosis Date    Acute DVT (deep venous thrombosis) (HCC)     of LLE>     Bladder infection     Congenital prolapsed rectum     History of biliary stent insertion     History of chemotherapy     History of radiation therapy 05/2018    left breast ca    History of transfusion     x2 s/p chemo treatments, no reaction    Hydronephrosis     right kidney    Hypertension     [facility] 05[facility]2018    Migraine      Present on Admission:   Stage 3a chronic kidney disease ([facility] )   Anemia in stage 3a chronic kidney disease (HCC)   Hyponatremia   Hyperbilirubinemia   Benign hypertension with CKD (chronic kidney disease) stage III (HCC)   Alcohol abuse   SOB (shortness of breath)      Admitting Diagnosis: UTI (urinary tract infection) [N39 0]  SOB (shortness of breath) [R06 02]  Serum total bilirubin elevated [R17]  Abnormal gallbladder ultrasound [R93 2]  Severe sepsis (HCC) [A41 9, R65 20]  Age/Sex: 62 y o  female  Admission Orders:  SCD  PT/OT    Scheduled Medications:  apixaban, 5 mg, Oral, BID  cefepime, 2,000 mg, Intravenous, Q24H  cholestyramine sugar free, 4 g, Oral, Daily  letrozole, 2 5 mg, Oral, Daily  metroNIDAZOLE, 500 mg, Oral, Q8H [address]  potassium chloride, 40 mEq, Oral, Once      Continuous IV Infusions:  lactated ringers, 125 mL/hr, Intravenous, Continuous      PRN Meds:  acetaminophen, 650 mg, Oral, Q6H PRN  ondansetron, 4 mg, Intravenous, Q6H PRN        IP CONSULT TO ACUTE CARE SURGERY  IP CONSULT TO GASTROENTEROLOGY  IP CONSULT TO CASE MANAGEMENT    Network Utilization Review Department  ATTENTION: Please call with any questions or concerns to [phone] and carefully listen to the prompts so that you are directed to the right person  All voicemails are confidential   Ty Limb all requests for admission clinical reviews, approved or denied determinations and any other requests to dedicated fax number below belonging to the campus where the patient is receiving treatment   List of dedicated fax numbers for the Facilities:  [address] [place] [address] DENIALS (Administrative/Medical Necessity) [phone]   [address] [place] [address] (Maternity/NICU/Pediatrics) [address] [facility],[address] [place] [id number] [facility] [place] [address] [address] [place] [address] [address] [address] [place] [address] [phone]

## 2022-02-25 PROBLEM — E87.2 HIGH ANION GAP METABOLIC ACIDOSIS: Status: ACTIVE | Noted: 2022-02-25

## 2022-02-25 PROBLEM — Z87.898 HISTORY OF ALCOHOL USE DISORDER: Status: ACTIVE | Noted: 2022-02-25

## 2022-02-25 PROBLEM — E87.29 HIGH ANION GAP METABOLIC ACIDOSIS: Status: ACTIVE | Noted: 2022-02-25

## 2022-02-25 PROBLEM — R19.7 DIARRHEA: Status: RESOLVED | Noted: 2022-02-24 | Resolved: 2022-02-25

## 2022-02-25 LAB
ACTIN IGG SERPL-ACNC: 13 UNITS (ref 0–19)
ALBUMIN SERPL BCP-MCNC: 1.2 G/DL (ref 3.5–5)
ALP SERPL-CCNC: 155 U/L (ref 46–116)
ALT SERPL W P-5'-P-CCNC: 21 U/L (ref 12–78)
ANION GAP SERPL CALCULATED.3IONS-SCNC: 14 MMOL/L (ref 4–13)
AST SERPL W P-5'-P-CCNC: 88 U/L (ref 5–45)
BASOPHILS # BLD AUTO: 0.15 THOUSANDS/ΜL (ref 0–0.1)
BASOPHILS NFR BLD AUTO: 1 % (ref 0–1)
BILIRUB DIRECT SERPL-MCNC: 7.58 MG/DL (ref 0–0.2)
BILIRUB SERPL-MCNC: 10.17 MG/DL (ref 0.2–1)
BUN SERPL-MCNC: 20 MG/DL (ref 5–25)
CALCIUM ALBUM COR SERPL-MCNC: 9.9 MG/DL (ref 8.3–10.1)
CALCIUM SERPL-MCNC: 7.7 MG/DL (ref 8.3–10.1)
CHLORIDE SERPL-SCNC: 104 MMOL/L (ref 100–108)
CO2 SERPL-SCNC: 14 MMOL/L (ref 21–32)
CREAT SERPL-MCNC: 1.02 MG/DL (ref 0.6–1.3)
EOSINOPHIL # BLD AUTO: 0.59 THOUSAND/ΜL (ref 0–0.61)
EOSINOPHIL NFR BLD AUTO: 3 % (ref 0–6)
ERYTHROCYTE [DISTWIDTH] IN BLOOD BY AUTOMATED COUNT: 21.5 % (ref 11.6–15.1)
GFR SERPL CREATININE-BSD FRML MDRD: 59 ML/MIN/1.73SQ M
GLUCOSE SERPL-MCNC: 81 MG/DL (ref 65–140)
HCT VFR BLD AUTO: 21.4 % (ref 34.8–46.1)
HGB BLD-MCNC: 7.1 G/DL (ref 11.5–15.4)
IMM GRANULOCYTES # BLD AUTO: 0.29 THOUSAND/UL (ref 0–0.2)
IMM GRANULOCYTES NFR BLD AUTO: 1 % (ref 0–2)
INR PPP: 2.71 (ref 0.84–1.19)
LACTATE SERPL-SCNC: 3.2 MMOL/L (ref 0.5–2)
LACTATE SERPL-SCNC: 3.8 MMOL/L (ref 0.5–2)
LYMPHOCYTES # BLD AUTO: 0.68 THOUSANDS/ΜL (ref 0.6–4.47)
LYMPHOCYTES NFR BLD AUTO: 3 % (ref 14–44)
MCH RBC QN AUTO: 35.7 PG (ref 26.8–34.3)
MCHC RBC AUTO-ENTMCNC: 33.2 G/DL (ref 31.4–37.4)
MCV RBC AUTO: 108 FL (ref 82–98)
MITOCHONDRIA M2 IGG SER-ACNC: <20 UNITS (ref 0–20)
MONOCYTES # BLD AUTO: 0.9 THOUSAND/ΜL (ref 0.17–1.22)
MONOCYTES NFR BLD AUTO: 4 % (ref 4–12)
NEUTROPHILS # BLD AUTO: 19.63 THOUSANDS/ΜL (ref 1.85–7.62)
NEUTS SEG NFR BLD AUTO: 88 % (ref 43–75)
NRBC BLD AUTO-RTO: 0 /100 WBCS
PLATELET # BLD AUTO: 366 THOUSANDS/UL (ref 149–390)
PMV BLD AUTO: 10.8 FL (ref 8.9–12.7)
POTASSIUM SERPL-SCNC: 4.3 MMOL/L (ref 3.5–5.3)
PROCALCITONIN SERPL-MCNC: 2.56 NG/ML
PROT SERPL-MCNC: 5.4 G/DL (ref 6.4–8.2)
PROTHROMBIN TIME: 28.2 SECONDS (ref 11.6–14.5)
RBC # BLD AUTO: 1.99 MILLION/UL (ref 3.81–5.12)
SODIUM SERPL-SCNC: 132 MMOL/L (ref 136–145)
WBC # BLD AUTO: 22.24 THOUSAND/UL (ref 4.31–10.16)

## 2022-02-25 PROCEDURE — 82390 ASSAY OF CERULOPLASMIN: CPT | Performed by: INTERNAL MEDICINE

## 2022-02-25 PROCEDURE — 86663 EPSTEIN-BARR ANTIBODY: CPT | Performed by: INTERNAL MEDICINE

## 2022-02-25 PROCEDURE — 99232 SBSQ HOSP IP/OBS MODERATE 35: CPT | Performed by: INTERNAL MEDICINE

## 2022-02-25 PROCEDURE — 85610 PROTHROMBIN TIME: CPT | Performed by: INTERNAL MEDICINE

## 2022-02-25 PROCEDURE — 97530 THERAPEUTIC ACTIVITIES: CPT

## 2022-02-25 PROCEDURE — 97116 GAIT TRAINING THERAPY: CPT

## 2022-02-25 PROCEDURE — 82248 BILIRUBIN DIRECT: CPT | Performed by: INTERNAL MEDICINE

## 2022-02-25 PROCEDURE — 80053 COMPREHEN METABOLIC PANEL: CPT | Performed by: INTERNAL MEDICINE

## 2022-02-25 PROCEDURE — 85025 COMPLETE CBC W/AUTO DIFF WBC: CPT | Performed by: INTERNAL MEDICINE

## 2022-02-25 PROCEDURE — 83605 ASSAY OF LACTIC ACID: CPT | Performed by: INTERNAL MEDICINE

## 2022-02-25 RX ORDER — OXYCODONE HYDROCHLORIDE 5 MG/1
5 TABLET ORAL EVERY 6 HOURS PRN
Status: DISCONTINUED | OUTPATIENT
Start: 2022-02-25 | End: 2022-03-02 | Stop reason: HOSPADM

## 2022-02-25 RX ORDER — LANOLIN ALCOHOL/MO/W.PET/CERES
100 CREAM (GRAM) TOPICAL DAILY
Status: DISCONTINUED | OUTPATIENT
Start: 2022-02-25 | End: 2022-02-25

## 2022-02-25 RX ORDER — ACETAMINOPHEN 325 MG/1
650 TABLET ORAL EVERY 4 HOURS PRN
Status: DISCONTINUED | OUTPATIENT
Start: 2022-02-25 | End: 2022-03-01

## 2022-02-25 RX ORDER — SODIUM CHLORIDE 9 MG/ML
100 INJECTION, SOLUTION INTRAVENOUS CONTINUOUS
Status: DISCONTINUED | OUTPATIENT
Start: 2022-02-25 | End: 2022-02-25

## 2022-02-25 RX ORDER — OXYCODONE HYDROCHLORIDE 10 MG/1
10 TABLET ORAL EVERY 6 HOURS PRN
Status: DISCONTINUED | OUTPATIENT
Start: 2022-02-25 | End: 2022-03-02 | Stop reason: HOSPADM

## 2022-02-25 RX ORDER — FOLIC ACID 1 MG/1
1 TABLET ORAL DAILY
Status: DISCONTINUED | OUTPATIENT
Start: 2022-02-25 | End: 2022-02-26

## 2022-02-25 RX ORDER — PREDNISOLONE SODIUM PHOSPHATE 15 MG/5ML
40 SOLUTION ORAL DAILY
Status: DISCONTINUED | OUTPATIENT
Start: 2022-02-25 | End: 2022-03-02 | Stop reason: HOSPADM

## 2022-02-25 RX ADMIN — ACETAMINOPHEN 650 MG: 325 TABLET, FILM COATED ORAL at 07:29

## 2022-02-25 RX ADMIN — THIAMINE HYDROCHLORIDE 100 MG: 100 INJECTION, SOLUTION INTRAMUSCULAR; INTRAVENOUS at 23:40

## 2022-02-25 RX ADMIN — MULTIPLE VITAMINS W/ MINERALS TAB 1 TABLET: TAB ORAL at 10:34

## 2022-02-25 RX ADMIN — METRONIDAZOLE 500 MG: 500 TABLET ORAL at 07:29

## 2022-02-25 RX ADMIN — FOLIC ACID 1 MG: 1 TABLET ORAL at 10:34

## 2022-02-25 RX ADMIN — LETROZOLE 2.5 MG: 2.5 TABLET, FILM COATED ORAL at 10:34

## 2022-02-25 RX ADMIN — ACETAMINOPHEN 650 MG: 325 TABLET, FILM COATED ORAL at 13:56

## 2022-02-25 RX ADMIN — SODIUM CHLORIDE 1000 ML: 0.9 INJECTION, SOLUTION INTRAVENOUS at 23:40

## 2022-02-25 RX ADMIN — LOPERAMIDE HYDROCHLORIDE 2 MG: 2 CAPSULE ORAL at 13:56

## 2022-02-25 RX ADMIN — CHOLESTYRAMINE 4 G: 4 POWDER, FOR SUSPENSION ORAL at 10:34

## 2022-02-25 RX ADMIN — THIAMINE HCL TAB 100 MG 100 MG: 100 TAB at 10:34

## 2022-02-25 NOTE — ASSESSMENT & PLAN NOTE
· Had 2 episodes of nonbloody diarrhea yesterday  · C diff and stool enteric panel negative  · Continue p r n   Imodium

## 2022-02-25 NOTE — ASSESSMENT & PLAN NOTE
History of EtOH abuse - 1 bottle of vodka daily  · Per patient's , last alcohol intake was 3-4 weeks ago  · [facility] protocol

## 2022-02-25 NOTE — PLAN OF CARE
Problem: PHYSICAL THERAPY ADULT  Goal: Performs mobility at highest level of function for planned discharge setting  See evaluation for individualized goals  Description: Treatment/Interventions: Functional transfer training,LE strengthening/ROM,Therapeutic exercise,Endurance training,Patient/family training,Equipment eval/education,Bed mobility,Gait training  Equipment Recommended: Other (Comment) (pending trials and progress)       See flowsheet documentation for full assessment, interventions and recommendations  Outcome: Progressing  Note: Prognosis: Fair  Problem List: Decreased strength,Decreased endurance,Impaired balance,Decreased mobility,Decreased safety awareness  Assessment: Pt agreeable to participate in PT intervention  Functionally, pt demonstrates improvement in mobility compared to evaluation  She requires S for all mobility and demonstrates improved endurance, as she is able to ambulate today  However, pt appears confused or wifty, as she reports she is leaving, no one has been in her room, etc  Discharge recommendation continues to be home w/ HHPT pending family support, especially given ? cognition which could impact safety awareness           PT Discharge Recommendation: Home with home health rehabilitation          See flowsheet documentation for full assessment

## 2022-02-25 NOTE — ASSESSMENT & PLAN NOTE
Patient has borderline hyponatremia at baseline with Na around 134 normally     POA with sodium 128, now improving  Likely hypovolemic hyponatremia  Continue IV fluids at this time

## 2022-02-25 NOTE — ASSESSMENT & PLAN NOTE
· Etiology unclear at this time  · Normal ALT, elevated AST and alk-phos  · Total bilirubin elevated at 11, with direct bilirubin at 7 4  · Hepatitis panel unremarkable  · Ultrasound showed: The gallbladder wall is abnormally thickened, measuring approximately 6 mm thickness   Portions of the gallbladder wall appear edematous   There is pericholecystic fluid   There is an approximately 8 x 7 x 9 mm hypoechoic lesion within the posterior aspect of the left lobe of the liver  · Follow-up autoimmune tests  · GI input appreciated  · Surgery was also consulted - no recommendation for any surgery at this time  · Daily CMP, direct bilirubin  · Possible MRI/MRCP

## 2022-02-25 NOTE — PROGRESS NOTES
[facility]  Progress Note - Najma Yen 1960, 62 y o  female MRN: [id number]  Unit/Bed#: S -01 Encounter: [id number]  Primary Care Provider: Ryan Robles DC   Date and time admitted to hospital: 2/22/2022  5:42 PM    * Jaundice  Assessment & Plan  · Etiology unclear at this time  · Normal ALT, elevated AST and alk-phos  · Total bilirubin elevated at 11, with direct bilirubin at 7 4  · Hepatitis panel unremarkable  · Ultrasound showed: The gallbladder wall is abnormally thickened, measuring approximately 6 mm thickness   Portions of the gallbladder wall appear edematous   There is pericholecystic fluid  There is an approximately 8 x 7 x 9 mm hypoechoic lesion within the posterior aspect of the left lobe of the liver  · Follow-up autoimmune tests  · GI input appreciated  · Surgery was also consulted - no recommendation for any surgery at this time  · Daily CMP, direct bilirubin  · Possible MRI/MRCP    High anion gap metabolic acidosis  Assessment & Plan  · Anion gap of 14 with bicarb 14  · Check lactic acid  · Likely starvation ketoacidosis ( reports that patient has not had any food or drink in the 7 days prior to admission because of loss of appetite)  · Bicarb containing IV fluids, 100 cc/hour    Diarrhea-resolved as of 2/25/2022  Assessment & Plan  · Had 2 episodes of nonbloody diarrhea yesterday  · C diff and stool enteric panel negative  · Continue p r n  Imodium    Anemia  Assessment & Plan  Lab Results   Component Value Date    EGFR 59 02/25/2022    EGFR 52 02/24/2022    EGFR 58 02/23/2022    CREATININE 1 02 02/25/2022    CREATININE 1 13 02/24/2022    CREATININE 1 04 02/23/2022     Hemoglobin 8 9 on presentation, baseline appears to be around 10  No signs of active bleeding, patient denies any dark stools      MCV high  E62 normal, folic acid low, will start folic acid supplementation     continue to monitor CBC daily, transfuse if less than 7    Elevated INR  Assessment & Plan  Plan as outlined above    Benign hypertension with CKD (chronic kidney disease) stage III (HCC)  Assessment & Plan  Holding home verapamil 240mg 24 hr capsule, home carvedilol 6 25mg BID given soft pressures  Alcohol abuse  Assessment & Plan  History of EtOH abuse - 1 bottle of vodka daily  · Per patient's , last alcohol intake was 3-4 weeks ago  · CIWA protocol     Hyponatremia  Assessment & Plan  Patient has borderline hyponatremia at baseline with Na around 134 normally  POA with sodium 128, now improving  Likely hypovolemic hyponatremia  Continue IV fluids at this time          VTE Pharmacologic Prophylaxis: VTE Score: 8 Currently with INR 2 5    Patient Centered Rounds: I performed bedside rounds with nursing staff today  Discussions with Specialists or Other Care Team Provider:  Gastroenterology    Education and Discussions with Family / Patient: Updated  () at bedside  Current Length of Stay: 2 day(s)  Current Patient Status: Inpatient   Discharge Plan: Anticipate discharge in 24-48 hrs to home with home services  Code Status: Level 3 - DNAR and DNI    Subjective:   States that her back hurts, complains of sore on her back (no identifiable open wound on my exam)  Objective:     Vitals:   Temp (24hrs), Av 2 °F (36 8 °C), Min:97 8 °F (36 6 °C), Max:98 7 °F (37 1 °C)    Temp:  [97 8 °F (36 6 °C)-98 7 °F (37 1 °C)] 97 8 °F (36 6 °C)  HR:  [94-97] 97  Resp:  [16-18] 18  BP: ()/(58-64) 96/63  SpO2:  [96 %-98 %] 96 %  Body mass index is 24 63 kg/m²  Input and Output Summary (last 24 hours):   No intake or output data in the 24 hours ending 22 1449    Physical Exam:   Physical Exam  Vitals reviewed  Constitutional:       General: She is not in acute distress  Appearance: She is ill-appearing  Eyes:      General: Scleral icterus present  Cardiovascular:      Rate and Rhythm: Regular rhythm        Heart sounds: No murmur heard       Pulmonary:      Effort: No respiratory distress  Breath sounds: No wheezing or rales  Abdominal:      General: There is no distension  Palpations: Abdomen is soft  Musculoskeletal:      Right lower leg: No edema  Left lower leg: No edema  Skin:     General: Skin is warm  Coloration: Skin is jaundiced  Neurological:      General: No focal deficit present  Mental Status: She is alert and oriented to person, place, and time  Psychiatric:         Mood and Affect: Mood normal          Thought Content: Thought content normal               Additional Data:     Labs:  Results from last 7 days   Lab Units 02/24/22  0616   WBC Thousand/uL 22 09*   HEMOGLOBIN g/dL 7 7*   HEMATOCRIT % 22 8*   PLATELETS Thousands/uL 378   NEUTROS PCT % 86*   LYMPHS PCT % 3*   MONOS PCT % 4   EOS PCT % 4     Results from last 7 days   Lab Units 02/25/22  0448   SODIUM mmol/L 132*   POTASSIUM mmol/L 4 3   CHLORIDE mmol/L 104   CO2 mmol/L 14*   BUN mg/dL 20   CREATININE mg/dL 1 02   ANION GAP mmol/L 14*   CALCIUM mg/dL 7 7*   ALBUMIN g/dL 1 2*   TOTAL BILIRUBIN mg/dL 10 17*   ALK PHOS U/L 155*   ALT U/L 21   AST U/L 88*   GLUCOSE RANDOM mg/dL 81     Results from last 7 days   Lab Units 02/24/22  1525   INR  2 52*             Results from last 7 days   Lab Units 02/24/22  0901 02/22/22  1947 02/22/22  1806   LACTIC ACID mmol/L  --  2 0 2 2*   PROCALCITONIN ng/ml 2 56*  --   --        Lines/Drains:  Invasive Devices  Report    Drain            Closed/Suction Drain Right RLQ Bulb 464 days                      Imaging: Reviewed radiology reports from this admission including: abdominal/pelvic CT    Recent Cultures (last 7 days):   Results from last 7 days   Lab Units 02/23/22  1338 02/22/22  2208 02/22/22  1947 02/22/22  1925   BLOOD CULTURE   --   --  No Growth at 48 hrs  No Growth at 48 hrs     URINE CULTURE   --  >100,000 cfu/ml   --   --    C DIFF TOXIN B BY PCR  Negative  --   --   --        Last 24 Hours Medication List:   Current Facility-Administered Medications   Medication Dose Route Frequency Provider Last Rate    acetaminophen  650 mg Oral Q6H PRN Anayeli Alexander MD      cholestyramine sugar free  4 g Oral Daily Anayeli Alexander MD      folic acid  1 mg Oral Daily Corinna Melissa MD      letrozole  2 5 mg Oral Daily Anayeli Alexander MD      loperamide  2 mg Oral TID PRN Corinna Melissa MD      multivitamin-minerals  1 tablet Oral Daily Corinna Melissa MD      ondansetron  4 mg Intravenous Q6H PRN Anayeli Alexander MD      sodium bicarbonate infusion  100 mL/hr Intravenous Continuous Corinna Melissa MD      thiamine  100 mg Oral Daily Corinna Melissa MD          Today, Patient Was Seen By: Corinna Melissa MD    **Please Note: This note may have been constructed using a voice recognition system  **

## 2022-02-25 NOTE — CASE MANAGEMENT
Case Management Discharge Planning Note    Patient name Dayron Zheng  Location S /S -01 MRN [id number]  : 1960 Date 2022       Current Admission Date: 2022  Current Admission Diagnosis:Jaundice   Patient Active Problem List    Diagnosis Date Noted    Jaundice 2022    Elevated liver enzymes 2022    Diarrhea 2022    Elevated INR 2022    SIRS (systemic inflammatory response syndrome) ([facility] ) 2022    Benign hypertension with CKD (chronic kidney disease) stage III ([facility] ) 2021    Stage 3a chronic kidney disease ([facility] ) 2021    Anemia 2021    Hypokalemia 2021    Localized swelling of lower extremity 2021    Alcohol abuse 2021    Suspected acute pulmonary embolism ([facility] ) 12/15/2020    Recurrent syncope 12/15/2020    Closed left fibular fracture 12/15/2020    History of chemotherapy     [facility]     Hyponatremia 2020    SOB (shortness of breath) 2020    Hypoglycemia 2020    Axillary lump, left 2019    Use of letrozole ([facility]) 2019    Rectal prolapse 2019    Dilated cbd, acquired 2019    Lesion of bladder 2019    Blood loss anemia 2019    Hemorrhagic cystitis 2018    Heme positive stool 2018    Acute deep vein thrombosis (DVT) of left lower extremity ([address] ) 2018    Hyperbilirubinemia 2018    Acute blood loss anemia 2018    Moderate protein-calorie malnutrition ([address] ) 2018    Immunocompromised ([facility] ) 2018    Hematuria 2018    Anemia following use of chemotherapeutic drug 2018    History of DVT (deep vein thrombosis) 10/19/2018    Cellulitis 2018    History of radiation therapy 2018    Hydronephrosis 2018    Malignant neoplasm of overlapping sites of left breast in female, estrogen receptor positive ([facility] ) 04/10/2018      LOS (days): 2  Geometric Mean LOS (GMLOS) (days): 4 70  Days to GMLOS:2 4     OBJECTIVE:  Risk of Unplanned Readmission Score: 18         Current admission status: Inpatient   Preferred Pharmacy:   [address] [place], [place] - [address]  [address] [place] [id number]  Phone: [phone] Fax: [phone]    [id number] [place] [address] [facility]  [address]  [facility] [address]  Phone: [phone] Fax: [phone]    Primary Care Provider: Mindy Dow DC    Primary Insurance: BLUE CROSS  Secondary Insurance:     DISCHARGE DETAILS:    Other Referral/Resources/Interventions Provided:  Referral Comments: CM met with pt due to pt's concern with insurance coverage, was stating that she only has $7,000  CM met with pt at bedside and confirmed that she does have insurance and [facility] has been sending necessary clinicals for approval for the hospital stay  Pt remains confused despite CM

## 2022-02-25 NOTE — ASSESSMENT & PLAN NOTE
· Anion gap of 14 with bicarb 14  · Check lactic acid  · Likely starvation ketoacidosis ( reports that patient has not had any food or drink in the 7 days prior to admission because of loss of appetite)  · Bicarb containing IV fluids, 100 cc/hour

## 2022-02-25 NOTE — PROGRESS NOTES
Progress Note - Jefferson Robertson 62 y o  female MRN: [id number]    Unit/Bed#: S -01 Encounter: [id number]    Assessment and Plan:     1  Elevated liver enzymes, cholestatic pattern  Patient admitted to me this morning that she has been drinking 3 glasses containing vodka and gingerale daily for the past year  Per family, however she has not drank in 1 month  Denies any medications  Has no complaints at this time  LFTs today with mild increase of bilirubin from 9-10  Hemoglobin, platelets and INR still need to be collected  Alert and oriented to time place and self  Some persistent tenderness in the right upper quadrant  Due to information today, suspecting patient's elevated LFTs likely in the setting of alcoholic hepatitis, versus viral or autoimmune etiology     -discriminant function score over 32  Due to suspicion for alcoholic hepatitis with other etiology so far negative, will start on prednisolone 40 mg daily and calculate Lille score at that time   -will hold off on MRI/MRCP at this time     -continue to monitor liver function tests including INR and CBC daily   -continue monitor mental status  Patient is alert and oriented to self time and place  Ammonia was normal on arrival     -ceruloplasmin, HSV, AMA, anti smooth muscle pending   -spoke with Infectious Disease yesterday who confirmed EBV panel without concern for active infection  CMV and acute hepatitis panel also negative     ----------------------------------------------------------------------------------------------------------------    Subjective:     Patient reports she is leaving [place] at noon  I asked her why she would leave a patient reported because she does not have money to pay for hospitalization  I discussed with case management who reports that she does have insurance who is aware of her stay  I informed patient of this    Case management then spoke with the patient and made her aware however she still appeared confused regarding this  Thankfully, she is willing to stay  She denies any abdominal pain, nausea, vomiting  She is alert to time place and self  After further discussion with the patient, she does admit to drinking 3 glasses of vodka daily since March of 2021  Denies any increased binge drinking prior to arrival   Continues to deny any further drugs, medications  Objective:     Vitals: Blood pressure 96/63, pulse 97, temperature 97 8 °F (36 6 °C), resp  rate 18, height 5' 5" (1 651 m), weight 67 1 kg (148 lb), SpO2 96 %, not currently breastfeeding  ,Body mass index is 24 63 kg/m²  No intake or output data in the 24 hours ending 02/25/22 1432    Physical Exam:     General Appearance:  Alert and oriented x3  Cooperative  Scleral icterus and jaundice noted  Lungs: Clear to auscultation bilaterally, no rales or rhonchi, no labored breathing/accessory muscle use  Heart: Regular rate and rhythm, S1, S2 normal, no murmur, click, rub or gallop  Abdomen: + some tenderness to palpation in the right upper quadrant  Soft, non-distended; bowel sounds normal; no masses or no organomegaly  Extremities: No cyanosis, clubbing, or edema    Invasive Devices  Report    Drain            Closed/Suction Drain Right RLQ Bulb 464 days                Lab Results:  Results from last 7 days   Lab Units 02/24/22  0616   WBC Thousand/uL 22 09*   HEMOGLOBIN g/dL 7 7*   HEMATOCRIT % 22 8*   PLATELETS Thousands/uL 378   NEUTROS PCT % 86*   LYMPHS PCT % 3*   MONOS PCT % 4   EOS PCT % 4     Results from last 7 days   Lab Units 02/25/22  0448   POTASSIUM mmol/L 4 3   CHLORIDE mmol/L 104   CO2 mmol/L 14*   BUN mg/dL 20   CREATININE mg/dL 1 02   CALCIUM mg/dL 7 7*   ALK PHOS U/L 155*   ALT U/L 21   AST U/L 88*     Invalid input(s): BILI  Results from last 7 days   Lab Units 02/24/22  1525   INR  2 52*           Imaging Studies: I have personally reviewed pertinent imaging studies      XR chest 1 view portable    Result Date: 2/23/2022  Impression: No acute cardiopulmonary disease  Large hiatal hernia  Workstation performed: [id number]     PE Study with CT Abdomen and Pelvis with contrast    Result Date: 2/22/2022  Impression: 1  No evidence of pulmonary embolism  2   Pericholecystic fluid/stranding without evidence of calcified gallstones, findings may be seen in the setting of hepatitis  Further evaluation with ultrasound may be obtained if there is clinical concern for noncalcified gallstones/cholecystitis  3   Large hiatal hernia  4   Mild thickening of the urinary bladder wall, correlate with urinalysis for cystitis  Workstation performed: [id number]     US right upper quadrant    Result Date: 2/23/2022  Impression: The gallbladder wall is abnormally thickened, measuring approximately 6 mm thickness  Portions of the gallbladder wall appear edematous  There is pericholecystic fluid  The liver is enlarged, hyperechoic, and demonstrates heterogeneous echotexture  The technologist who performed the examination was unable to definitely demonstrate flow within the portal vein, however, contrast enhancement of the portal vein was seen on  a CT performed approximately 3 hours earlier today  Gastroenterology/Hepatology consultation and Surgical consultation is recommended  Trace ascites  Atrophic right kidney, unchanged  There is an approximately 8 x 7 x 9 mm hypoechoic lesion within the posterior aspect of the left lobe of the liver; this appears similar to April 11, 2018  The study was marked in [facility] for immediate notification   Workstation performed: [id number]

## 2022-02-25 NOTE — PHYSICAL THERAPY NOTE
PHYSICAL THERAPY TREATMENT NOTE    Patient Name: Lilia Rosas  MHSST[facility] Date: 22 1156   PT Last Visit   PT Visit Date 22   Note Type   Note Type Treatment   Pain Assessment   Pain Assessment Tool FLACC   Pain Location/Orientation Location: [facility]   Hospital Pain Intervention(s) Repositioned; Ambulation/increased activity; Emotional support   Pain Rating: FLACC (Rest) - Face 1   Pain Rating: FLACC (Rest) - Legs 0   Pain Rating: FLACC (Rest) - Activity 0   Pain Rating: FLACC (Rest) - Cry 0   Pain Rating: FLACC (Rest) - Consolability 0   Score: FLACC (Rest) 1   Pain Rating: FLACC (Activity) - Face 0   Pain Rating: FLACC (Activity) - Legs 0   Pain Rating: FLACC (Activity) - Activity 0   Pain Rating: FLACC (Activity) - Cry 0   Pain Rating: FLACC (Activity) - Consolability 0   Score: FLACC (Activity) 0   Restrictions/Precautions   Weight Bearing Precautions Per Order No   Other Precautions Chair Alarm;Multiple lines; Fall Risk;Pain   General   Chart Reviewed Yes   Family/Caregiver Present No   Cognition   Overall Cognitive Status Impaired   Arousal/Participation Alert   Orientation Level Oriented to person;Oriented to place   Memory Decreased recall of precautions   Following Commands Follows multistep commands with increased time or repetition   Comments Pt identified by full name and , able to hold a conversation but ? confused (states she's leaving, her  is coming to pick her up at 15 despite no DC order signed or stated  Pt reports no one has been in to help her since being here and is short and dismissive of attending when she enters)   Subjective   Subjective "Thank goodness you're here  I pooped myself and my butt hurts"   Bed Mobility   Supine to Sit Unable to assess   Additional Comments Pt sitting EOB upon arrival, incontinent of watery BM in bed      Transfers   Sit to Stand 5  Supervision Additional items Assist x 1   Stand to Sit 5  Supervision   Additional items Assist x 1   Additional Comments Pt then performed STS from recliner chair and stood x 3 min w/ S while therapist assisted w/ pericare   Ambulation/Elevation   Gait pattern Decreased foot clearance; Short stride   Gait Assistance 5  Supervision   Additional items Assist x 1   Assistive Device None   Distance 30 ft  (pt deferred further)   Balance   Static Sitting Fair +   Dynamic Sitting Fair +   Static Standing Fair -   Dynamic Standing Fair -   Ambulatory Fair -   Activity Tolerance   Activity Tolerance Patient limited by fatigue  (? confusion)   Assessment   Prognosis Fair   Problem List Decreased strength;Decreased endurance; Impaired balance;Decreased mobility; Decreased safety awareness   Assessment Pt agreeable to participate in PT intervention  Functionally, pt demonstrates improvement in mobility compared to evaluation  She requires S for all mobility and demonstrates improved endurance, as she is able to ambulate today  However, pt appears confused or wifty, as she reports she is leaving, no one has been in her room, etc  Discharge recommendation continues to be home w/ HHPT pending family support, especially given ? cognition which could impact safety awareness   Goals   Patient Goals leave at noon   STG Expiration Date 03/05/22   Short Term Goal #1 Pt will be able to: (1) perform bed mobility with mod I to promote OOB activity (2) perform sit to stand with mod I to decrease burden of care (3) ambulate at least 200` with supervision and least restrictive AD to increase activity tolerance (4) increase standing balance by 1 grade to decrease risk of falls   PT Treatment Day 1   Plan   Treatment/Interventions Functional transfer training;LE strengthening/ROM; Therapeutic exercise; Endurance training;Cognitive reorientation;Patient/family training;Equipment eval/education; Bed mobility;Gait training   Progress Slow progress, decreased activity tolerance   PT Frequency 3-5x/wk   Recommendation   PT Discharge Recommendation Home with home health rehabilitation   Equipment Recommended   (may benefit from trial of RW)   AM-PAC Basic Mobility Inpatient   Turning in Bed Without Bedrails 4   Lying on Back to Sitting on Edge of Flat Bed 3   Moving Bed to Chair 3   Standing Up From Chair 3   Walk in Room 3   Climb 3-5 Stairs 3   Basic Mobility Inpatient Raw Score 19   Basic Mobility Standardized Score 42 48   Highest Level Of Mobility   JH-HLM Goal 6: Walk 10 steps or more   JH-HLM Highest Level of Mobility 7: Walk 25 feet or more   JH-HLM Goal Achieved Yes     The patient's AM-PAC Basic Mobility Inpatient Short Form Raw Score is 19  A Raw score of greater than 16 suggests the patient may benefit from discharge to home  Please also refer to the recommendation of the Physical Therapist for safe discharge planning  Pt would continue to benefit from skilled PT during this admission in order to progress patient towards goals to decrease risk of falls and maximize independence       Shreyas Casey, PT, DPT

## 2022-02-25 NOTE — ASSESSMENT & PLAN NOTE
Lab Results   Component Value Date    EGFR 59 02/25/2022    EGFR 52 02/24/2022    EGFR 58 02/23/2022    CREATININE 1 02 02/25/2022    CREATININE 1 13 02/24/2022    CREATININE 1 04 02/23/2022     Hemoglobin 8 9 on presentation, baseline appears to be around 10  No signs of active bleeding, patient denies any dark stools      MCV high  L16 normal, folic acid low, will start folic acid supplementation     continue to monitor CBC daily, transfuse if less than 7

## 2022-02-26 PROBLEM — R80.9 PROTEINURIA: Status: ACTIVE | Noted: 2022-02-26

## 2022-02-26 PROBLEM — G93.40 ENCEPHALOPATHY: Status: ACTIVE | Noted: 2022-02-26

## 2022-02-26 PROBLEM — R82.90 ABNORMAL URINALYSIS: Status: ACTIVE | Noted: 2022-02-26

## 2022-02-26 PROBLEM — E87.1 HYPONATREMIA: Status: RESOLVED | Noted: 2020-09-08 | Resolved: 2022-02-26

## 2022-02-26 PROBLEM — R79.89 ELEVATED PROCALCITONIN: Status: ACTIVE | Noted: 2022-02-26

## 2022-02-26 LAB
ALBUMIN SERPL BCP-MCNC: 1.1 G/DL (ref 3.5–5)
ALP SERPL-CCNC: 139 U/L (ref 46–116)
ALT SERPL W P-5'-P-CCNC: 18 U/L (ref 12–78)
ANION GAP SERPL CALCULATED.3IONS-SCNC: 14 MMOL/L (ref 4–13)
ANION GAP SERPL CALCULATED.3IONS-SCNC: 16 MMOL/L (ref 4–13)
AST SERPL W P-5'-P-CCNC: 75 U/L (ref 5–45)
BASOPHILS # BLD AUTO: 0.14 THOUSANDS/ΜL (ref 0–0.1)
BASOPHILS NFR BLD AUTO: 1 % (ref 0–1)
BILIRUB DIRECT SERPL-MCNC: 6.62 MG/DL (ref 0–0.2)
BILIRUB SERPL-MCNC: 8.06 MG/DL (ref 0.2–1)
BUN SERPL-MCNC: 25 MG/DL (ref 5–25)
BUN SERPL-MCNC: 26 MG/DL (ref 5–25)
CALCIUM ALBUM COR SERPL-MCNC: 9.6 MG/DL (ref 8.3–10.1)
CALCIUM SERPL-MCNC: 7.2 MG/DL (ref 8.3–10.1)
CALCIUM SERPL-MCNC: 7.3 MG/DL (ref 8.3–10.1)
CHLORIDE SERPL-SCNC: 104 MMOL/L (ref 100–108)
CHLORIDE SERPL-SCNC: 106 MMOL/L (ref 100–108)
CO2 SERPL-SCNC: 15 MMOL/L (ref 21–32)
CO2 SERPL-SCNC: 16 MMOL/L (ref 21–32)
CREAT SERPL-MCNC: 1.51 MG/DL (ref 0.6–1.3)
CREAT SERPL-MCNC: 1.56 MG/DL (ref 0.6–1.3)
EOSINOPHIL # BLD AUTO: 0.66 THOUSAND/ΜL (ref 0–0.61)
EOSINOPHIL NFR BLD AUTO: 3 % (ref 0–6)
ERYTHROCYTE [DISTWIDTH] IN BLOOD BY AUTOMATED COUNT: 21.4 % (ref 11.6–15.1)
GFR SERPL CREATININE-BSD FRML MDRD: 35 ML/MIN/1.73SQ M
GFR SERPL CREATININE-BSD FRML MDRD: 37 ML/MIN/1.73SQ M
GLUCOSE SERPL-MCNC: 120 MG/DL (ref 65–140)
GLUCOSE SERPL-MCNC: 99 MG/DL (ref 65–140)
HCT VFR BLD AUTO: 21.5 % (ref 34.8–46.1)
HGB BLD-MCNC: 7.2 G/DL (ref 11.5–15.4)
IMM GRANULOCYTES # BLD AUTO: 0.21 THOUSAND/UL (ref 0–0.2)
IMM GRANULOCYTES NFR BLD AUTO: 1 % (ref 0–2)
INR PPP: 2.55 (ref 0.84–1.19)
LACTATE SERPL-SCNC: 2.2 MMOL/L (ref 0.5–2)
LYMPHOCYTES # BLD AUTO: 0.67 THOUSANDS/ΜL (ref 0.6–4.47)
LYMPHOCYTES NFR BLD AUTO: 4 % (ref 14–44)
MCH RBC QN AUTO: 36.4 PG (ref 26.8–34.3)
MCHC RBC AUTO-ENTMCNC: 33.5 G/DL (ref 31.4–37.4)
MCV RBC AUTO: 109 FL (ref 82–98)
MONOCYTES # BLD AUTO: 0.75 THOUSAND/ΜL (ref 0.17–1.22)
MONOCYTES NFR BLD AUTO: 4 % (ref 4–12)
NEUTROPHILS # BLD AUTO: 16.76 THOUSANDS/ΜL (ref 1.85–7.62)
NEUTS SEG NFR BLD AUTO: 87 % (ref 43–75)
NRBC BLD AUTO-RTO: 0 /100 WBCS
PLATELET # BLD AUTO: 356 THOUSANDS/UL (ref 149–390)
PMV BLD AUTO: 10.6 FL (ref 8.9–12.7)
POTASSIUM SERPL-SCNC: 3.5 MMOL/L (ref 3.5–5.3)
POTASSIUM SERPL-SCNC: 3.6 MMOL/L (ref 3.5–5.3)
PROCALCITONIN SERPL-MCNC: 5.17 NG/ML
PROT SERPL-MCNC: 4.7 G/DL (ref 6.4–8.2)
PROTHROMBIN TIME: 26.9 SECONDS (ref 11.6–14.5)
RBC # BLD AUTO: 1.98 MILLION/UL (ref 3.81–5.12)
SODIUM SERPL-SCNC: 135 MMOL/L (ref 136–145)
SODIUM SERPL-SCNC: 136 MMOL/L (ref 136–145)
WBC # BLD AUTO: 19.19 THOUSAND/UL (ref 4.31–10.16)

## 2022-02-26 PROCEDURE — 85025 COMPLETE CBC W/AUTO DIFF WBC: CPT | Performed by: INTERNAL MEDICINE

## 2022-02-26 PROCEDURE — 82248 BILIRUBIN DIRECT: CPT | Performed by: INTERNAL MEDICINE

## 2022-02-26 PROCEDURE — 84145 PROCALCITONIN (PCT): CPT | Performed by: INTERNAL MEDICINE

## 2022-02-26 PROCEDURE — 80053 COMPREHEN METABOLIC PANEL: CPT | Performed by: INTERNAL MEDICINE

## 2022-02-26 PROCEDURE — 99232 SBSQ HOSP IP/OBS MODERATE 35: CPT | Performed by: INTERNAL MEDICINE

## 2022-02-26 PROCEDURE — 83605 ASSAY OF LACTIC ACID: CPT | Performed by: INTERNAL MEDICINE

## 2022-02-26 PROCEDURE — 85610 PROTHROMBIN TIME: CPT | Performed by: INTERNAL MEDICINE

## 2022-02-26 PROCEDURE — 80048 BASIC METABOLIC PNL TOTAL CA: CPT | Performed by: INTERNAL MEDICINE

## 2022-02-26 RX ORDER — PANTOPRAZOLE SODIUM 40 MG/1
40 TABLET, DELAYED RELEASE ORAL
Status: DISCONTINUED | OUTPATIENT
Start: 2022-02-27 | End: 2022-03-02 | Stop reason: HOSPADM

## 2022-02-26 RX ORDER — FOLIC ACID 1 MG/1
1 TABLET ORAL DAILY
Status: DISCONTINUED | OUTPATIENT
Start: 2022-02-26 | End: 2022-03-02 | Stop reason: HOSPADM

## 2022-02-26 RX ADMIN — SODIUM BICARBONATE 100 ML/HR: 84 INJECTION, SOLUTION INTRAVENOUS at 18:51

## 2022-02-26 RX ADMIN — CHOLESTYRAMINE 4 G: 4 POWDER, FOR SUSPENSION ORAL at 10:03

## 2022-02-26 RX ADMIN — MULTIPLE VITAMINS W/ MINERALS TAB 1 TABLET: TAB ORAL at 10:02

## 2022-02-26 RX ADMIN — OXYCODONE HYDROCHLORIDE 5 MG: 5 TABLET ORAL at 22:02

## 2022-02-26 RX ADMIN — FOLIC ACID 1 MG: 1 TABLET ORAL at 10:02

## 2022-02-26 RX ADMIN — LETROZOLE 2.5 MG: 2.5 TABLET, FILM COATED ORAL at 10:03

## 2022-02-26 RX ADMIN — OXYCODONE HYDROCHLORIDE 5 MG: 5 TABLET ORAL at 03:49

## 2022-02-26 RX ADMIN — THIAMINE HYDROCHLORIDE 100 MG: 100 INJECTION, SOLUTION INTRAMUSCULAR; INTRAVENOUS at 10:10

## 2022-02-26 NOTE — ASSESSMENT & PLAN NOTE
· Has CKD stage IIIA;  Baseline creatinine around 1  · Yesterday was at baseline, today at 1 5  · Likely prerenal in the setting of poor oral intake  · Continue IV fluids  · Avoid nephrotoxins, avoid hypotension  · CMP daily

## 2022-02-26 NOTE — ASSESSMENT & PLAN NOTE
· Occasional confusion  ·  claims that she is currently at her baseline mental status  · Continue IV thiamine daily for possible Wernicke the encephalopathy given history of chronic alcohol abuse  · Continue folic acid supplementation

## 2022-02-26 NOTE — ASSESSMENT & PLAN NOTE
· Hx of DVT in 12/2018 in the setting of  malignancy  · She states that she has not taken Eliquis in 3 years because of not being able to follow-up with her doctor  · INR increased to 3 5 after restarting Eliquis on 2/24 -- Eliquis discontinued thereafter  · Did discuss with the patient regarding restarting Eliquis down the line vs holding off on further anticoagulation -- she said she will think about it first  · On chart review, there were imaging studies done in 2020, venous duplex did not show any DVT however V/Q scan done at that time showed possibility for PE being intermediate

## 2022-02-26 NOTE — ASSESSMENT & PLAN NOTE
· Etiology unclear at this time; probably alcoholic hepatitis  · Normal ALT, elevated AST and alk-phos  · Total bilirubin elevated at 11, with direct bilirubin at 7 4 -- currently slightly trending down  · Hepatitis panel unremarkable  · Ultrasound showed: The gallbladder wall is abnormally thickened, measuring approximately 6 mm thickness   Portions of the gallbladder wall appear edematous   There is pericholecystic fluid  There is an approximately 8 x 7 x 9 mm hypoechoic lesion within the posterior aspect of the left lobe of the liver  · Follow-up autoimmune tests; ceruloplasmin, HSV, AMA, anti smooth muscle pending    · GI input appreciated  · Surgery was also consulted - no recommendation for any surgery at this time  · Daily CMP, direct bilirubin  · Possible MRI/MRCP  · GI input appreciated - patient started on prednisone for alcoholic hepatitis

## 2022-02-26 NOTE — ASSESSMENT & PLAN NOTE
· UA showed 20-30 WBC with innumerable bacteria but with occasional epithelial cells indicating likely contaminated sample and furthermore urine culture was indicative of contamination showing greater than 100,000 CFU per mL mixed contaminants  · No urinary symptoms, no febrile episodes  · Will monitor off antibiotics for now

## 2022-02-26 NOTE — ASSESSMENT & PLAN NOTE
History of EtOH abuse - 1/5 bottle of vodka daily  · Per patient's , last alcohol intake was 3-4 weeks ago  · [facility] protocol

## 2022-02-26 NOTE — PROGRESS NOTES
Progress Note - Maura Isaacs 62 y o  female MRN: [id number]    Unit/Bed#: S -01 Encounter: [id number]        Assessment/Plan:  1  Elevated liver enzymes, cholestatic pattern  Patient admitted to me this morning that she has been drinking 3 glasses containing vodka and gingerale daily for the past year  Per family, however she has not drank in 1 month  Denies any medications  Alert and oriented to time place and self  Some persistent tenderness in the right upper quadrant  Due to information today, suspecting patient's elevated LFTs likely in the setting of alcoholic hepatitis, versus viral or autoimmune etiology  LFTs as well as INR are improving today      -discriminant function score over 32  Due to suspicion for alcoholic hepatitis with other etiology so far negative, was started on prednisolone 40 mg daily and calculate Lille score after 7 days of prednisolone   -will hold off on MRI/MRCP at this time      -continue to monitor liver function tests including INR and CBC daily   -continue monitor mental status  Patient is alert and oriented to self time and place  Ammonia was normal on arrival      -ceruloplasmin, HSV pending  -AMA, anti smooth muscle were negative  -spoke with Infectious Disease yesterday who confirmed EBV panel without concern for active infection  CMV and acute hepatitis panel also negative   -consider EGD and colonoscopy if patient did not have workup in past for report of dark stool as well as diarrhea, only report of EUS in system which was from 2019  -we will start Protonix daily, monitor hemoglobin      Subjective:   Patient is lying in bed  She offers no pertinent complaints  States that her stool is becoming more formed  Reports that stool is dark in color but not black    Patient is not sure if she had GI workup in the past     Objective:     Vitals: /65 (BP Location: Right arm)   Pulse 99   Temp 98 °F (36 7 °C) (Oral)   Resp 18   Ht 5' 5" (1 651 m)   Wt 67 1 kg (148 lb)   LMP  (LMP Unknown)   SpO2 95%   BMI 24 63 kg/m²       Physical Exam:  Gen-  Abd-       Lab, Imaging and other studies:   Recent Results (from the past 72 hour(s))   EBV acute panel    Collection Time: 02/23/22 10:11 AM   Result Value Ref Range    EBV VCA IgG >600 0 (H) 0 0 - 17 9 U/mL    EBV VCA IgM <36 0 0 0 - 35 9 U/mL    EBV Nuclear Ag Ab <18 0 0 0 - 17 9 U/mL    INTERPRETATION Comment    CMV IgG/IgM Antibodies    Collection Time: 02/23/22 10:11 AM   Result Value Ref Range    CMV IGG <0 60 0 00 - 0 59 U/mL    CMV IgM <30 0 0 0 - 29 9 AU/mL   Stool Enteric Bacterial Panel by PCR    Collection Time: 02/23/22  1:38 PM    Specimen: Rectum; Stool   Result Value Ref Range    Salmonella sp PCR None Detected None Detected    Shigella sp/Enteroinvasive E  coli (EIEC) PCR None Detected None Detected    Campylobacter sp (jejuni and coli) PCR None Detected None Detected    Shiga toxin 1/Shiga toxin 2 genes PCR None Detected None Detected   Clostridium difficile toxin by PCR with EIA    Collection Time: 02/23/22  1:38 PM    Specimen: Rectum; Stool   Result Value Ref Range    C difficile toxin by PCR Negative Negative   CBC and differential    Collection Time: 02/24/22  6:16 AM   Result Value Ref Range    WBC 22 09 (H) 4 31 - 10 16 Thousand/uL    RBC 2 15 (L) 3 81 - 5 12 Million/uL    Hemoglobin 7 7 (L) 11 5 - 15 4 g/dL    Hematocrit 22 8 (L) 34 8 - 46 1 %     (H) 82 - 98 fL    MCH 35 8 (H) 26 8 - 34 3 pg    MCHC 33 8 31 4 - 37 4 g/dL    RDW 22 3 (H) 11 6 - 15 1 %    MPV 10 6 8 9 - 12 7 fL    Platelets 171 579 - 801 Thousands/uL    nRBC 0 /100 WBCs    Neutrophils Relative 86 (H) 43 - 75 %    Immat GRANS % 2 0 - 2 %    Lymphocytes Relative 3 (L) 14 - 44 %    Monocytes Relative 4 4 - 12 %    Eosinophils Relative 4 0 - 6 %    Basophils Relative 1 0 - 1 %    Neutrophils Absolute 19 09 (H) 1 85 - 7 62 Thousands/µL    Immature Grans Absolute 0 35 (H) 0 00 - 0 20 Thousand/uL    Lymphocytes Absolute 0 68 0 60 - 4 47 Thousands/µL    Monocytes Absolute 0 93 0 17 - 1 22 Thousand/µL    Eosinophils Absolute 0 91 (H) 0 00 - 0 61 Thousand/µL    Basophils Absolute 0 13 (H) 0 00 - 0 10 Thousands/µL   Comprehensive metabolic panel    Collection Time: 02/24/22  6:16 AM   Result Value Ref Range    Sodium 131 (L) 136 - 145 mmol/L    Potassium 3 3 (L) 3 5 - 5 3 mmol/L    Chloride 102 100 - 108 mmol/L    CO2 18 (L) 21 - 32 mmol/L    ANION GAP 11 4 - 13 mmol/L    BUN 23 5 - 25 mg/dL    Creatinine 1 13 0 60 - 1 30 mg/dL    Glucose 92 65 - 140 mg/dL    Calcium 7 4 (L) 8 3 - 10 1 mg/dL    Corrected Calcium 9 6 8 3 - 10 1 mg/dL    AST 88 (H) 5 - 45 U/L    ALT 24 12 - 78 U/L    Alkaline Phosphatase 148 (H) 46 - 116 U/L    Total Protein 5 4 (L) 6 4 - 8 2 g/dL    Albumin 1 3 (L) 3 5 - 5 0 g/dL    Total Bilirubin 9 67 (H) 0 20 - 1 00 mg/dL    eGFR 52 ml/min/1 73sq m   Bilirubin, direct    Collection Time: 02/24/22  6:16 AM   Result Value Ref Range    Bilirubin, Direct 7 45 (H) 0 00 - 0 20 mg/dL   Echo complete w/ contrast if indicated    Collection Time: 02/24/22  8:37 AM   Result Value Ref Range    LA size 2 7 cm    LVPWd 0 90 0 49 - 0 94 cm    MV E' Tissue Velocity Septal 6 cm/s    IVSd 1 10 0 51 - 9 15 cm    LV DIASTOLIC VOLUME (MOD BIPLANE) 2D 51 mL    LEFT VENTRICLE SYSTOLIC VOLUME (MOD BIPLANE) 2D 15 mL    Left ventricular stroke volume (2D) 35 00 mL    A4C EF 81 %    LVIDd 3 50 3 99 - 5 94 cm    IVS 1 1 0 6 - 1 1 cm    LVIDS 2 20 2 45 - 3 70 cm    FS 37 28 - 44 %    Asc Ao 3 5 1 90 - 2 85 cm    Ao root 3 50 cm    MV Peak E Joel 86 cm/s    MV Peak A Joel 0 m/s    Left Ventricular Stroke Volume by Teichholz Method 35 mL    Ao asc z-score 4 71     ZLVPWD 1 64     ZLVIDS -2 48     ZLVIDD -3 30     ZIVSD 3 30     LV EF 65    Antimitochondrial antibody    Collection Time: 02/24/22  9:01 AM   Result Value Ref Range    Mitochondrial Ab <20 0 0 0 - 20 0 Units   Anti-smooth muscle antibody, IgG    Collection Time: 02/24/22  9:01 AM   Result Value Ref Range    Smooth Muscle Ab 13 0 - 19 Units   IgG, IgA, IgM    Collection Time: 02/24/22  9:01 AM   Result Value Ref Range     0 70 0 - 400 0 mg/dL    IGG 1,070 0 700 0-1,600 0 mg/dL    IGM 60 0 40 0 - 230 0 mg/dL   Protime-INR    Collection Time: 02/24/22  9:01 AM   Result Value Ref Range    Protime 34 4 (H) 11 6 - 14 5 seconds    INR 3 50 (H) 0 84 - 1 19   Vitamin B12    Collection Time: 02/24/22  9:01 AM   Result Value Ref Range    Vitamin B-12 2,473 (H) 100 - 900 pg/mL   Folate    Collection Time: 02/24/22  9:01 AM   Result Value Ref Range    Folate 1 6 (L) 3 1 - 17 5 ng/mL   Procalcitonin with AM Reflex    Collection Time: 02/24/22  9:01 AM   Result Value Ref Range    Procalcitonin 2 56 (H) <=0 25 ng/ml   Protime-INR    Collection Time: 02/24/22  3:25 PM   Result Value Ref Range    Protime 26 7 (H) 11 6 - 14 5 seconds    INR 2 52 (H) 0 84 - 1 19   Hepatic function panel    Collection Time: 02/24/22  3:25 PM   Result Value Ref Range    Total Bilirubin 9 63 (H) 0 20 - 1 00 mg/dL    Bilirubin, Direct 7 78 (H) 0 00 - 0 20 mg/dL    Alkaline Phosphatase 155 (H) 46 - 116 U/L    AST 87 (H) 5 - 45 U/L    ALT 19 12 - 78 U/L    Total Protein 5 4 (L) 6 4 - 8 2 g/dL    Albumin 1 2 (L) 3 5 - 5 0 g/dL   Comprehensive metabolic panel    Collection Time: 02/25/22  4:48 AM   Result Value Ref Range    Sodium 132 (L) 136 - 145 mmol/L    Potassium 4 3 3 5 - 5 3 mmol/L    Chloride 104 100 - 108 mmol/L    CO2 14 (L) 21 - 32 mmol/L    ANION GAP 14 (H) 4 - 13 mmol/L    BUN 20 5 - 25 mg/dL    Creatinine 1 02 0 60 - 1 30 mg/dL    Glucose 81 65 - 140 mg/dL    Calcium 7 7 (L) 8 3 - 10 1 mg/dL    Corrected Calcium 9 9 8 3 - 10 1 mg/dL    AST 88 (H) 5 - 45 U/L    ALT 21 12 - 78 U/L    Alkaline Phosphatase 155 (H) 46 - 116 U/L    Total Protein 5 4 (L) 6 4 - 8 2 g/dL    Albumin 1 2 (L) 3 5 - 5 0 g/dL    Total Bilirubin 10 17 (H) 0 20 - 1 00 mg/dL    eGFR 59 ml/min/1 73sq m   Bilirubin, direct    Collection Time: 02/25/22  4:48 AM   Result Value Ref Range    Bilirubin, Direct 7 58 (H) 0 00 - 0 20 mg/dL   Lactic acid, plasma    Collection Time: 02/25/22  3:07 PM   Result Value Ref Range    LACTIC ACID 3 8 (HH) 0 5 - 2 0 mmol/L   Protime-INR    Collection Time: 02/25/22  8:26 PM   Result Value Ref Range    Protime 28 2 (H) 11 6 - 14 5 seconds    INR 2 71 (H) 0 84 - 1 19   CBC and differential    Collection Time: 02/25/22  8:27 PM   Result Value Ref Range    WBC 22 24 (H) 4 31 - 10 16 Thousand/uL    RBC 1 99 (L) 3 81 - 5 12 Million/uL    Hemoglobin 7 1 (L) 11 5 - 15 4 g/dL    Hematocrit 21 4 (L) 34 8 - 46 1 %     (H) 82 - 98 fL    MCH 35 7 (H) 26 8 - 34 3 pg    MCHC 33 2 31 4 - 37 4 g/dL    RDW 21 5 (H) 11 6 - 15 1 %    MPV 10 8 8 9 - 12 7 fL    Platelets 741 280 - 512 Thousands/uL    nRBC 0 /100 WBCs    Neutrophils Relative 88 (H) 43 - 75 %    Immat GRANS % 1 0 - 2 %    Lymphocytes Relative 3 (L) 14 - 44 %    Monocytes Relative 4 4 - 12 %    Eosinophils Relative 3 0 - 6 %    Basophils Relative 1 0 - 1 %    Neutrophils Absolute 19 63 (H) 1 85 - 7 62 Thousands/µL    Immature Grans Absolute 0 29 (H) 0 00 - 0 20 Thousand/uL    Lymphocytes Absolute 0 68 0 60 - 4 47 Thousands/µL    Monocytes Absolute 0 90 0 17 - 1 22 Thousand/µL    Eosinophils Absolute 0 59 0 00 - 0 61 Thousand/µL    Basophils Absolute 0 15 (H) 0 00 - 0 10 Thousands/µL   Lactic acid 2 Hours    Collection Time: 02/25/22  8:27 PM   Result Value Ref Range    LACTIC ACID 3 2 (HH) 0 5 - 2 0 mmol/L   CBC and differential    Collection Time: 02/26/22  5:09 AM   Result Value Ref Range    WBC 19 19 (H) 4 31 - 10 16 Thousand/uL    RBC 1 98 (L) 3 81 - 5 12 Million/uL    Hemoglobin 7 2 (L) 11 5 - 15 4 g/dL    Hematocrit 21 5 (L) 34 8 - 46 1 %     (H) 82 - 98 fL    MCH 36 4 (H) 26 8 - 34 3 pg    MCHC 33 5 31 4 - 37 4 g/dL    RDW 21 4 (H) 11 6 - 15 1 %    MPV 10 6 8 9 - 12 7 fL    Platelets 502 550 - 064 Thousands/uL    nRBC 0 /100 WBCs    Neutrophils Relative 87 (H) 43 - 75 %    Immat GRANS % 1 0 - 2 %    Lymphocytes Relative 4 (L) 14 - 44 %    Monocytes Relative 4 4 - 12 %    Eosinophils Relative 3 0 - 6 %    Basophils Relative 1 0 - 1 %    Neutrophils Absolute 16 76 (H) 1 85 - 7 62 Thousands/µL    Immature Grans Absolute 0 21 (H) 0 00 - 0 20 Thousand/uL    Lymphocytes Absolute 0 67 0 60 - 4 47 Thousands/µL    Monocytes Absolute 0 75 0 17 - 1 22 Thousand/µL    Eosinophils Absolute 0 66 (H) 0 00 - 0 61 Thousand/µL    Basophils Absolute 0 14 (H) 0 00 - 0 10 Thousands/µL   Comprehensive metabolic panel    Collection Time: 02/26/22  5:09 AM   Result Value Ref Range    Sodium 135 (L) 136 - 145 mmol/L    Potassium 3 6 3 5 - 5 3 mmol/L    Chloride 104 100 - 108 mmol/L    CO2 15 (L) 21 - 32 mmol/L    ANION GAP 16 (H) 4 - 13 mmol/L    BUN 26 (H) 5 - 25 mg/dL    Creatinine 1 56 (H) 0 60 - 1 30 mg/dL    Glucose 99 65 - 140 mg/dL    Calcium 7 3 (L) 8 3 - 10 1 mg/dL    Corrected Calcium 9 6 8 3 - 10 1 mg/dL    AST 75 (H) 5 - 45 U/L    ALT 18 12 - 78 U/L    Alkaline Phosphatase 139 (H) 46 - 116 U/L    Total Protein 4 7 (L) 6 4 - 8 2 g/dL    Albumin 1 1 (L) 3 5 - 5 0 g/dL    Total Bilirubin 8 06 (H) 0 20 - 1 00 mg/dL    eGFR 35 ml/min/1 73sq m   Protime-INR    Collection Time: 02/26/22  5:09 AM   Result Value Ref Range    Protime 26 9 (H) 11 6 - 14 5 seconds    INR 2 55 (H) 0 84 - 1 19   Bilirubin, direct    Collection Time: 02/26/22  5:09 AM   Result Value Ref Range    Bilirubin, Direct 6 62 (H) 0 00 - 0 20 mg/dL   Procalcitonin with AM Reflex    Collection Time: 02/26/22  5:09 AM   Result Value Ref Range    Procalcitonin 5 17 (H) <=0 25 ng/ml

## 2022-02-26 NOTE — ASSESSMENT & PLAN NOTE
· Etiology unclear at this time; probably alcoholic hepatitis  · Normal ALT, elevated AST and alk-phos  · Total bilirubin elevated at 11, with direct bilirubin at 7 4 -- currently slightly trending down  · Hepatitis panel unremarkable  · Ultrasound showed: The gallbladder wall is abnormally thickened, measuring approximately 6 mm thickness   Portions of the gallbladder wall appear edematous   There is pericholecystic fluid  There is an approximately 8 x 7 x 9 mm hypoechoic lesion within the posterior aspect of the left lobe of the liver  · Follow-up autoimmune tests; ceruloplasmin, HSV, AMA, anti smooth muscle pending    · GI input appreciated  · Surgery was also consulted - no recommendation for any surgery at this time  · Daily CMP, direct bilirubin  · Possible MRI/MRCP  · GI input appreciated - patient started on prednisolone for alcoholic hepatitis

## 2022-02-26 NOTE — ASSESSMENT & PLAN NOTE
· Anion gap of 16 with bicarb 15  · Lactic acid is elevated however could be due to primary decrease in lactic utilization in the setting of chronic alcoholism and possible liver disease    · Likely starvation ketoacidosis ( reports that patient has not had any food or drink in the 7 days prior to admission because of loss of appetite)  · Bicarb containing IV fluids, 100 cc/hour

## 2022-02-26 NOTE — ASSESSMENT & PLAN NOTE
· Baseline creatinine around 1  · Yesterday was at baseline, today at 1 5  · Likely prerenal in the setting of poor oral intake  · Continue IV fluids  · Avoid nephrotoxins, avoid hypotension  · CMP daily

## 2022-02-26 NOTE — PROGRESS NOTES
[facility]  Progress Note - Delgado Carrillo 1960, 62 y o  female MRN: [id number]  Unit/Bed#: S -01 Encounter: [id number]  Primary Care Provider: Ramez Barboza DC   Date and time admitted to hospital: 2/22/2022  5:42 PM    * Jaundice  Assessment & Plan  · Etiology unclear at this time; probably alcoholic hepatitis  · Normal ALT, elevated AST and alk-phos  · Total bilirubin elevated at 11, with direct bilirubin at 7 4 -- currently slightly trending down  · Hepatitis panel unremarkable  · Ultrasound showed: The gallbladder wall is abnormally thickened, measuring approximately 6 mm thickness   Portions of the gallbladder wall appear edematous   There is pericholecystic fluid  There is an approximately 8 x 7 x 9 mm hypoechoic lesion within the posterior aspect of the left lobe of the liver  · Follow-up autoimmune tests; ceruloplasmin, HSV, AMA, anti smooth muscle pending  · GI input appreciated  · Surgery was also consulted - no recommendation for any surgery at this time  · Daily CMP, direct bilirubin  · Possible MRI/MRCP  · GI input appreciated - patient started on prednisone for alcoholic hepatitis    Encephalopathy  Assessment & Plan  · Occasional confusion  ·  claims that she is currently at her baseline mental status  · Continue IV thiamine daily for possible Wern[place]ke the encephalopathy given history of chronic alcohol abuse  · Continue folic acid supplementation    High anion gap metabolic acidosis  Assessment & Plan  · Anion gap of 16 with bicarb 15  · Lactic acid is elevated however could be due to primary decrease in lactic utilization in the setting of chronic alcoholism and possible liver disease    · Likely starvation ketoacidosis ( reports that patient has not had any food or drink in the 7 days prior to admission because of loss of appetite)  · Bicarb containing IV fluids, 100 cc/hour    OPAL (acute kidney injury) ([address] ) on CKD  Assessment & Plan  · Has CKD stage IIIA; Baseline creatinine around 1  · Yesterday was at baseline, today at 1 5  · Likely prerenal in the setting of poor oral intake  · Continue IV fluids  · Avoid nephrotoxins, avoid hypotension  · CMP daily    History of DVT (deep vein thrombosis)  Assessment & Plan  · Hx of DVT in 12/2018 in the setting of  malignancy  · She states that she has not taken Eliquis in 3 years because of not being able to follow-up with her doctor  · INR increased to 3 5 after restarting Eliquis on 2/24 -- Eliquis discontinued thereafter  · Did discuss with the patient regarding restarting Eliquis down the line vs holding off on further anticoagulation -- she said she will think about it first  · On chart review, there were imaging studies done in 2020, venous duplex did not show any DVT however V/Q scan done at that time showed possibility for PE being intermediate  Anemia  Assessment & Plan  Lab Results   Component Value Date    EGFR 35 02/26/2022    EGFR 59 02/25/2022    EGFR 52 02/24/2022    CREATININE 1 56 (H) 02/26/2022    CREATININE 1 02 02/25/2022    CREATININE 1 13 02/24/2022     Hemoglobin 8 9 on presentation, baseline appears to be around 10  No signs of active bleeding, patient denies any dark stools      MCV high  R33 normal, folic acid low, will start folic acid supplementation     continue to monitor CBC daily, transfuse if less than 7    Abnormal urinalysis  Assessment & Plan  · UA showed 20-30 WBC with innumerable bacteria but with occasional epithelial cells indicating likely contaminated sample and furthermore urine culture was indicative of contamination showing greater than 100,000 CFU per mL mixed contaminants  · No urinary symptoms, no febrile episodes  · Will monitor off antibiotics for now    Proteinuria  Assessment & Plan  · Likely in the setting of possible alcoholic cirrhosis    Elevated procalcitonin  Assessment & Plan  · Patient was briefly on antibiotics but this was continued as there is no obvious source of infection and has been afebrile, leukocytosis also trending down off antibiotics  · Elevated procalcitonin could be elevated in the setting of alcoholic hepatitis and a possible cirrhotic background? Elevated INR  Assessment & Plan  Plan as outlined above    Benign hypertension with CKD (chronic kidney disease) stage III (HCC)  Assessment & Plan  Holding home verapamil 240mg 24 hr capsule, home carvedilol 6 25mg BID given soft pressures  Alcohol abuse  Assessment & Plan  History of EtOH abuse - 1/5 bottle of vodka daily  · Per patient's , last alcohol intake was 3-4 weeks ago  · CIWA protocol     Malignant neoplasm of overlapping sites of left breast in female, estrogen receptor positive ([facility] [address] )  Assessment & Plan  · Stage III, dx in 2018  S/P mastectomy 2018, chemotherapy and radiation therapy  · Currently on letrozole therapy for continued management    Plan:  - continue letrozole 2 5 mg daily         VTE Pharmacologic Prophylaxis: VTE Score: 8 High Risk (Score >/= 5) - Pharmacological DVT Prophylaxis Ordered: INR 2 5   Sequential Compression Devices Ordered  Patient Centered Rounds: I performed bedside rounds with nursing staff today  Discussions with Specialists or Other Care Team Provider:  Gastroenterology    Education and Discussions with Family / Patient: Updated  () via phone  Current Length of Stay: 3 day(s)  Current Patient Status: Inpatient   Discharge Plan: Anticipate discharge in 48-72 hrs to home  Code Status: Level 3 - DNAR and DNI    Subjective:   Had no complaints this morning and stated that he she was able to sleep well last night  Objective:     Vitals:   Temp (24hrs), Av 8 °F (36 6 °C), Min:97 5 °F (36 4 °C), Max:98 °F (36 7 °C)    Temp:  [97 5 °F (36 4 °C)-98 °F (36 7 °C)] 98 °F (36 7 °C)  HR:  [95-99] 99  Resp:  [18] 18  BP: (101-108)/(62-65) 101/65  SpO2:  [95 %-96 %] 95 %  Body mass index is 24 63 kg/m²       Input and Output Summary (last 24 hours):   No intake or output data in the 24 hours ending 02/26/22 1337    Physical Exam:   Physical Exam  Vitals reviewed  Constitutional:       General: She is not in acute distress  Appearance: She is ill-appearing  Eyes:      General: Scleral icterus present  Cardiovascular:      Rate and Rhythm: Normal rate  Heart sounds: No murmur heard  Pulmonary:      Effort: Pulmonary effort is normal  No respiratory distress  Breath sounds: No wheezing or rales  Abdominal:      General: Bowel sounds are normal       Palpations: Abdomen is soft  Tenderness: There is no abdominal tenderness  Musculoskeletal:      Right lower leg: No edema  Left lower leg: No edema  Skin:     General: Skin is warm and dry  Coloration: Skin is jaundiced  Neurological:      General: No focal deficit present  Mental Status: She is alert  Mental status is at baseline        Comments: Patient is occasionally confused            Additional Data:     Labs:  Results from last 7 days   Lab Units 02/26/22  0509   WBC Thousand/uL 19 19*   HEMOGLOBIN g/dL 7 2*   HEMATOCRIT % 21 5*   PLATELETS Thousands/uL 356   NEUTROS PCT % 87*   LYMPHS PCT % 4*   MONOS PCT % 4   EOS PCT % 3     Results from last 7 days   Lab Units 02/26/22  0509   SODIUM mmol/L 135*   POTASSIUM mmol/L 3 6   CHLORIDE mmol/L 104   CO2 mmol/L 15*   BUN mg/dL 26*   CREATININE mg/dL 1 56*   ANION GAP mmol/L 16*   CALCIUM mg/dL 7 3*   ALBUMIN g/dL 1 1*   TOTAL BILIRUBIN mg/dL 8 06*   ALK PHOS U/L 139*   ALT U/L 18   AST U/L 75*   GLUCOSE RANDOM mg/dL 99     Results from last 7 days   Lab Units 02/26/22  0509   INR  2 55*             Results from last 7 days   Lab Units 02/26/22  0509 02/25/22  2027 02/25/22  1507 02/24/22  0901 02/22/22  1947 02/22/22  1806   LACTIC ACID mmol/L  --  3 2* 3 8*  --  2 0 2 2*   PROCALCITONIN ng/ml 5 17*  --   --  2 56*  --   --        Lines/Drains:  Invasive Devices  Report    Drain Closed/Suction Drain Right RLQ Bulb 465 days                      Imaging: Reviewed radiology reports from this admission including: Right upper quadrant ultrasound    Recent Cultures (last 7 days):   Results from last 7 days   Lab Units 02/23/22  1338 02/22/22 2208 02/22/22 1947 02/22/22  1925   BLOOD CULTURE   --   --  No Growth at 72 hrs  No Growth at 72 hrs  URINE CULTURE   --  >100,000 cfu/ml   --   --    C DIFF TOXIN B BY PCR  Negative  --   --   --        Last 24 Hours Medication List:   Current Facility-Administered Medications   Medication Dose Route Frequency Provider Last Rate    acetaminophen  650 mg Oral Q4H PRN Crystal Guaman MD      cholestyramine sugar free  4 g Oral Daily Mohit Gupta MD      folic acid  1 mg Oral Daily Crystal Guaman MD      letrozole  2 5 mg Oral Daily Mohit Gupta MD      loperamide  2 mg Oral TID PRN Crystal Guaman MD      multivitamin-minerals  1 tablet Oral Daily Crystal Guaman MD      ondansetron  4 mg Intravenous Q6H PRN Mohit Gupta MD      oxyCODONE  10 mg Oral Q6H PRN Crystal Guaman MD      oxyCODONE  5 mg Oral Q6H PRN Crystal Guaman MD      prednisoLONE  40 mg Oral Daily Jason Mosher PA-C      sodium bicarbonate infusion  100 mL/hr Intravenous Continuous Crystal Guaman MD      thiamine  100 mg Intravenous Daily Crystal Guaman  mg (02/26/22 1010)        Today, Patient Was Seen By: Crystal Guaman MD    **Please Note: This note may have been constructed using a voice recognition system  **

## 2022-02-26 NOTE — ASSESSMENT & PLAN NOTE
Lab Results   Component Value Date    EGFR 35 02/26/2022    EGFR 59 02/25/2022    EGFR 52 02/24/2022    CREATININE 1 56 (H) 02/26/2022    CREATININE 1 02 02/25/2022    CREATININE 1 13 02/24/2022     Hemoglobin 8 9 on presentation, baseline appears to be around 10  No signs of active bleeding, patient denies any dark stools      MCV high  X21 normal, folic acid low, will start folic acid supplementation  continue to monitor CBC daily, transfuse if less than 7

## 2022-02-26 NOTE — ASSESSMENT & PLAN NOTE
· Patient was briefly on antibiotics but this was continued as there is no obvious source of infection and has been afebrile, leukocytosis also trending down off antibiotics  · Elevated procalcitonin could be elevated in the setting of alcoholic hepatitis and a possible cirrhotic background?

## 2022-02-27 LAB
CERULOPLASMIN SERPL-MCNC: 22 MG/DL (ref 19–39)
GLUCOSE SERPL-MCNC: 90 MG/DL (ref 65–140)

## 2022-02-27 PROCEDURE — 82948 REAGENT STRIP/BLOOD GLUCOSE: CPT

## 2022-02-27 PROCEDURE — 99232 SBSQ HOSP IP/OBS MODERATE 35: CPT | Performed by: INTERNAL MEDICINE

## 2022-02-27 RX ADMIN — OXYCODONE HYDROCHLORIDE 5 MG: 5 TABLET ORAL at 05:43

## 2022-02-27 RX ADMIN — THIAMINE HYDROCHLORIDE 100 MG: 100 INJECTION, SOLUTION INTRAMUSCULAR; INTRAVENOUS at 09:10

## 2022-02-27 RX ADMIN — PANTOPRAZOLE SODIUM 40 MG: 40 TABLET, DELAYED RELEASE ORAL at 05:43

## 2022-02-27 RX ADMIN — FOLIC ACID 1 MG: 1 TABLET ORAL at 08:25

## 2022-02-27 RX ADMIN — MULTIPLE VITAMINS W/ MINERALS TAB 1 TABLET: TAB ORAL at 08:25

## 2022-02-27 RX ADMIN — ACETAMINOPHEN 650 MG: 325 TABLET, FILM COATED ORAL at 00:12

## 2022-02-27 RX ADMIN — Medication 1 PACKET: at 08:44

## 2022-02-27 RX ADMIN — SODIUM BICARBONATE 100 ML/HR: 84 INJECTION, SOLUTION INTRAVENOUS at 06:31

## 2022-02-27 RX ADMIN — PREDNISOLONE SODIUM PHOSPHATE 40 MG: 15 SOLUTION ORAL at 08:51

## 2022-02-27 RX ADMIN — LETROZOLE 2.5 MG: 2.5 TABLET, FILM COATED ORAL at 08:28

## 2022-02-27 RX ADMIN — CHOLESTYRAMINE 4 G: 4 POWDER, FOR SUSPENSION ORAL at 08:44

## 2022-02-27 RX ADMIN — OXYCODONE HYDROCHLORIDE 10 MG: 10 TABLET ORAL at 12:46

## 2022-02-27 NOTE — PROGRESS NOTES
[facility]  Progress Note - Carley Less 1960, 62 y o  female MRN: [id number]  Unit/Bed#: S -01 Encounter: [id number]  Primary Care Provider: Van Laura DC   Date and time admitted to hospital: 2/22/2022  5:42 PM    Abnormal urinalysis  Assessment & Plan  · UA showed 20-30 WBC with innumerable bacteria but with occasional epithelial cells indicating likely contaminated sample and furthermore urine culture was indicative of contamination showing greater than 100,000 CFU per mL mixed contaminants  · No urinary symptoms, no febrile episodes  · Will monitor off antibiotics for now    Proteinuria  Assessment & Plan  · Likely in the setting of possible alcoholic cirrhosis    Elevated procalcitonin  Assessment & Plan  · Patient was briefly on antibiotics but this was continued as there is no obvious source of infection and has been afebrile, leukocytosis also trending down off antibiotics  · Elevated procalcitonin could be elevated in the setting of alcoholic hepatitis and a possible cirrhotic background? Encephalopathy  Assessment & Plan  · Occasional confusion  ·  claims that she is currently at her baseline mental status  · Continue IV thiamine daily for possible Wernicke the encephalopathy given history of chronic alcohol abuse  · Continue folic acid supplementation    High anion gap metabolic acidosis  Assessment & Plan  · Anion gap of 16 with bicarb 15  · Lactic acid is elevated however could be due to primary decrease in lactic utilization in the setting of chronic alcoholism and possible liver disease    · Likely starvation ketoacidosis ( reports that patient has not had any food or drink in the 7 days prior to admission because of loss of appetite)  · Bicarb containing IV fluids, 100 cc/hour    Elevated INR  Assessment & Plan  Plan as outlined above    Anemia  Assessment & Plan  Lab Results   Component Value Date    EGFR 35 02/26/2022    EGFR 59 02/25/2022    EGFR 52 02/24/2022    CREATININE 1 56 (H) 02/26/2022    CREATININE 1 02 02/25/2022    CREATININE 1 13 02/24/2022     Hemoglobin 8 9 on presentation, baseline appears to be around 10  No signs of active bleeding, patient denies any dark stools  MCV high  D95 normal, folic acid low, will start folic acid supplementation  continue to monitor CBC daily, transfuse if less than 7    Benign hypertension with CKD (chronic kidney disease) stage III (HCC)  Assessment & Plan  Holding home verapamil 240mg 24 hr capsule, home carvedilol 6 25mg BID given soft pressures  Alcohol abuse  Assessment & Plan  History of EtOH abuse - 1/5 bottle of vodka daily  · Per patient's , last alcohol intake was 3-4 weeks ago  · [facility] protocol     [facility]acute kidney [facility]) [facility] on CKD  Assessment & Plan  · Has CKD stage IIIA; Baseline creatinine around 1  · Yesterday was at baseline, today at 1 5  · Likely prerenal in the setting of poor oral intake  · Continue IV fluids  · Avoid nephrotoxins, avoid hypotension  · CMP daily    History of DVT (deep vein thrombosis)  Assessment & Plan  · Hx of DVT in 12/2018 in the setting of  malignancy  · She states that she has not taken Eliquis in 3 years because of not being able to follow-up with her doctor  · INR increased to 3 5 after restarting Eliquis on 2/24 -- Eliquis discontinued thereafter  · Did discuss with the patient regarding restarting Eliquis down the line vs holding off on further anticoagulation -- she said she will think about it first  · On chart review, there were imaging studies done in 2020, venous duplex did not show any DVT however V/Q scan done at that time showed possibility for PE being intermediate  Malignant neoplasm of overlapping sites of left breast in female, estrogen receptor positive ([facility] )  Assessment & Plan  · Stage III, dx in 2018  S/P mastectomy 2018, chemotherapy and radiation therapy     · Currently on letrozole therapy for continued management    Plan:  - continue letrozole 2 5 mg daily     * Jaundice  Assessment & Plan  · Etiology unclear at this time; probably alcoholic hepatitis  · Normal ALT, elevated AST and alk-phos  · Total bilirubin elevated at 11, with direct bilirubin at 7 4 -- currently slightly trending down  · Hepatitis panel unremarkable  · Ultrasound showed: The gallbladder wall is abnormally thickened, measuring approximately 6 mm thickness   Portions of the gallbladder wall appear edematous   There is pericholecystic fluid  There is an approximately 8 x 7 x 9 mm hypoechoic lesion within the posterior aspect of the left lobe of the liver  · Follow-up autoimmune tests; ceruloplasmin, HSV, AMA, anti smooth muscle pending  · GI input appreciated  · Surgery was also consulted - no recommendation for any surgery at this time  · Daily CMP, direct bilirubin  · Possible MRI/MRCP  · GI input appreciated - patient started on prednisolone for alcoholic hepatitis      VTE Pharmacologic Prophylaxis: VTE Score: 8 High Risk (Score >/= 5) - Pharmacological DVT Prophylaxis Contraindicated  Sequential Compression Devices Ordered  Patient Centered Rounds: I performed bedside rounds with nursing staff today  Discussions with Specialists or Other Care Team Provider: none     Education and Discussions with Family / Patient: Updated  (daughter) at bedside  Time Spent for Care: 30 minutes  More than 50% of total time spent on counseling and coordination of care as described above  Current Length of Stay: 4 day(s)  Current Patient Status: Inpatient   Certification Statement: The patient will continue to require additional inpatient hospital stay due to continued workup  Discharge Plan: Anticipate discharge in 48-72 hrs to home  Code Status: Level 3 - DNAR and DNI    Subjective:   Patient has no complaints this morning  She states she is eating well    She denies any abdominal pain nausea vomiting, chest pain, shortness of breath  She states her diarrhea is improving  Objective:     Vitals:   Temp (24hrs), Av °F (36 7 °C), Min:97 8 °F (36 6 °C), Max:98 2 °F (36 8 °C)    Temp:  [97 8 °F (36 6 °C)-98 2 °F (36 8 °C)] 97 8 °F (36 6 °C)  HR:  [] 94  Resp:  [16-18] 16  BP: ()/(51-71) 107/70  SpO2:  [90 %-95 %] 90 %  Body mass index is 24 63 kg/m²  Input and Output Summary (last 24 hours):   No intake or output data in the 24 hours ending 22 1642    Physical Exam:   Physical Exam  Vitals and nursing note reviewed  Constitutional:       Appearance: She is normal weight  She is ill-appearing  HENT:      Head: Normocephalic and atraumatic  Right Ear: External ear normal       Left Ear: External ear normal       Nose: Nose normal       Mouth/Throat:      Mouth: Mucous membranes are moist       Pharynx: Oropharynx is clear  Eyes:      General: Scleral icterus present  Conjunctiva/sclera: Conjunctivae normal       Pupils: Pupils are equal, round, and reactive to light  Cardiovascular:      Rate and Rhythm: Normal rate and regular rhythm  Pulses: Normal pulses  Heart sounds: Normal heart sounds  Pulmonary:      Effort: Pulmonary effort is normal       Breath sounds: Normal breath sounds  Abdominal:      General: Abdomen is flat  Bowel sounds are normal       Palpations: Abdomen is soft  Musculoskeletal:         General: No swelling or tenderness  Cervical back: Neck supple  No muscular tenderness  Skin:     General: Skin is warm and dry  Capillary Refill: Capillary refill takes less than 2 seconds  Coloration: Skin is jaundiced  Neurological:      General: No focal deficit present  Mental Status: She is alert and oriented to person, place, and time  Mental status is at baseline  Psychiatric:         Mood and Affect: Mood normal          Behavior: Behavior normal          Thought Content:  Thought content normal          Judgment: Judgment normal  Additional Data:     Labs:  Results from last 7 days   Lab Units 02/26/22  0509   WBC Thousand/uL 19 19*   HEMOGLOBIN g/dL 7 2*   HEMATOCRIT % 21 5*   PLATELETS Thousands/uL 356   NEUTROS PCT % 87*   LYMPHS PCT % 4*   MONOS PCT % 4   EOS PCT % 3     Results from last 7 days   Lab Units 02/26/22  1401 02/26/22  0509 02/26/22  0509   SODIUM mmol/L 136   < > 135*   POTASSIUM mmol/L 3 5   < > 3 6   CHLORIDE mmol/L 106   < > 104   CO2 mmol/L 16*   < > 15*   BUN mg/dL 25   < > 26*   CREATININE mg/dL 1 51*   < > 1 56*   ANION GAP mmol/L 14*   < > 16*   CALCIUM mg/dL 7 2*   < > 7 3*   ALBUMIN g/dL  --   --  1 1*   TOTAL BILIRUBIN mg/dL  --   --  8 06*   ALK PHOS U/L  --   --  139*   ALT U/L  --   --  18   AST U/L  --   --  75*   GLUCOSE RANDOM mg/dL 120   < > 99    < > = values in this interval not displayed  Results from last 7 days   Lab Units 02/26/22  0509   INR  2 55*     Results from last 7 days   Lab Units 02/27/22  0526   POC GLUCOSE mg/dl 90         Results from last 7 days   Lab Units 02/26/22  1401 02/26/22  0509 02/25/22 2027 02/25/22  1507 02/24/22  0901 02/22/22  1947   LACTIC ACID mmol/L 2 2*  --  3 2* 3 8*  --  2 0   PROCALCITONIN ng/ml  --  5 17*  --   --  2 56*  --        Lines/Drains:  Invasive Devices  Report    Peripheral Intravenous Line            Peripheral IV 02/26/22 Right Antecubital 1 day    Long-Dwell Peripheral IV (Midline) 45/90/88 Basilic <1 day          Drain            Closed/Suction Drain Right RLQ Bulb 466 days                      Imaging: No pertinent imaging reviewed  Recent Cultures (last 7 days):   Results from last 7 days   Lab Units 02/23/22  1338 02/22/22  2208 02/22/22  1947 02/22/22  1925   BLOOD CULTURE   --   --  No Growth After 4 Days  No Growth After 4 Days     URINE CULTURE   --  >100,000 cfu/ml   --   --    C DIFF TOXIN B BY PCR  Negative  --   --   --        Last 24 Hours Medication List:   Current Facility-Administered Medications   Medication Dose Route Frequency Provider Last Rate    acetaminophen  650 mg Oral Q4H PRN Yolis Villarreal MD      cholestyramine sugar free  4 g Oral Daily Mickel Nyhan, MD      folic acid  1 mg Oral Daily Yolis Villarreal MD      letrozole  2 5 mg Oral Daily Mickel Nyhan, MD      loperamide  2 mg Oral TID PRN Yolis Villarreal MD      multivitamin-minerals  1 tablet Oral Daily Yolis Villarreal MD      ondansetron  4 mg Intravenous Q6H PRN Mickel Nyhan, MD      oxyCODONE  10 mg Oral Q6H PRN Yolis Villarreal MD      oxyCODONE  5 mg Oral Q6H PRN Yolis Villarreal MD      pantoprazole  40 mg Oral Early Morning Amber Brennan PA-C      prednisoLONE  40 mg Oral Daily Rosamaria Cano PA-C      psyllium  1 packet Oral BID Gerson Hicks MD      sodium bicarbonate infusion  100 mL/hr Intravenous Continuous Yolis Villarreal  mL/hr (02/27/22 0631)    thiamine  100 mg Intravenous Daily Yolis Villarreal  mg (02/27/22 0910)        Today, Patient Was Seen By: Gerson Hicks MD    **Please Note: This note may have been constructed using a voice recognition system  **

## 2022-02-28 PROBLEM — E87.2 HIGH ANION GAP METABOLIC ACIDOSIS: Status: RESOLVED | Noted: 2022-02-25 | Resolved: 2022-02-28

## 2022-02-28 PROBLEM — E87.29 HIGH ANION GAP METABOLIC ACIDOSIS: Status: RESOLVED | Noted: 2022-02-25 | Resolved: 2022-02-28

## 2022-02-28 LAB
ABO GROUP BLD BPU: NORMAL
ABO GROUP BLD: NORMAL
ALBUMIN SERPL BCP-MCNC: 1.1 G/DL (ref 3.5–5)
ALP SERPL-CCNC: 149 U/L (ref 46–116)
ALT SERPL W P-5'-P-CCNC: 19 U/L (ref 12–78)
AMMONIA PLAS-SCNC: <10 UMOL/L (ref 11–35)
ANION GAP SERPL CALCULATED.3IONS-SCNC: 9 MMOL/L (ref 4–13)
AST SERPL W P-5'-P-CCNC: 60 U/L (ref 5–45)
BACTERIA BLD CULT: NORMAL
BACTERIA BLD CULT: NORMAL
BILIRUB DIRECT SERPL-MCNC: 4.62 MG/DL (ref 0–0.2)
BILIRUB SERPL-MCNC: 5.48 MG/DL (ref 0.2–1)
BLD GP AB SCN SERPL QL: NEGATIVE
BPU ID: NORMAL
BUN SERPL-MCNC: 36 MG/DL (ref 5–25)
CALCIUM ALBUM COR SERPL-MCNC: 8.9 MG/DL (ref 8.3–10.1)
CALCIUM SERPL-MCNC: 6.6 MG/DL (ref 8.3–10.1)
CHLORIDE SERPL-SCNC: 98 MMOL/L (ref 100–108)
CO2 SERPL-SCNC: 25 MMOL/L (ref 21–32)
CREAT SERPL-MCNC: 2.3 MG/DL (ref 0.6–1.3)
CROSSMATCH: NORMAL
EBV EA IGG SER-ACNC: >150 U/ML (ref 0–8.9)
ERYTHROCYTE [DISTWIDTH] IN BLOOD BY AUTOMATED COUNT: 21.2 % (ref 11.6–15.1)
GFR SERPL CREATININE-BSD FRML MDRD: 22 ML/MIN/1.73SQ M
GLUCOSE SERPL-MCNC: 362 MG/DL (ref 65–140)
HCT VFR BLD AUTO: 20.1 % (ref 34.8–46.1)
HCT VFR BLD AUTO: 30.5 % (ref 34.8–46.1)
HGB BLD-MCNC: 10.6 G/DL (ref 11.5–15.4)
HGB BLD-MCNC: 6.5 G/DL (ref 11.5–15.4)
HGB BLD-MCNC: 6.9 G/DL (ref 11.5–15.4)
HSV1 DNA SPEC QL NAA+PROBE: NEGATIVE
HSV2 DNA SPEC QL NAA+PROBE: NEGATIVE
INR PPP: 2.18 (ref 0.84–1.19)
MCH RBC QN AUTO: 36.7 PG (ref 26.8–34.3)
MCHC RBC AUTO-ENTMCNC: 34.3 G/DL (ref 31.4–37.4)
MCV RBC AUTO: 107 FL (ref 82–98)
PLATELET # BLD AUTO: 338 THOUSANDS/UL (ref 149–390)
PMV BLD AUTO: 10.8 FL (ref 8.9–12.7)
POTASSIUM SERPL-SCNC: 3.5 MMOL/L (ref 3.5–5.3)
PROT SERPL-MCNC: 4.5 G/DL (ref 6.4–8.2)
PROTHROMBIN TIME: 23.9 SECONDS (ref 11.6–14.5)
RBC # BLD AUTO: 1.88 MILLION/UL (ref 3.81–5.12)
RH BLD: NEGATIVE
SODIUM SERPL-SCNC: 132 MMOL/L (ref 136–145)
SPECIMEN EXPIRATION DATE: NORMAL
UNIT DISPENSE STATUS: NORMAL
UNIT PRODUCT CODE: NORMAL
UNIT PRODUCT VOLUME: 250 ML
UNIT RH: NORMAL
WBC # BLD AUTO: 20.78 THOUSAND/UL (ref 4.31–10.16)

## 2022-02-28 PROCEDURE — 82140 ASSAY OF AMMONIA: CPT | Performed by: INTERNAL MEDICINE

## 2022-02-28 PROCEDURE — 85014 HEMATOCRIT: CPT | Performed by: INTERNAL MEDICINE

## 2022-02-28 PROCEDURE — P9040 RBC LEUKOREDUCED IRRADIATED: HCPCS

## 2022-02-28 PROCEDURE — 86901 BLOOD TYPING SEROLOGIC RH(D): CPT | Performed by: INTERNAL MEDICINE

## 2022-02-28 PROCEDURE — 80053 COMPREHEN METABOLIC PANEL: CPT | Performed by: INTERNAL MEDICINE

## 2022-02-28 PROCEDURE — 86923 COMPATIBILITY TEST ELECTRIC: CPT

## 2022-02-28 PROCEDURE — 82248 BILIRUBIN DIRECT: CPT | Performed by: INTERNAL MEDICINE

## 2022-02-28 PROCEDURE — 85018 HEMOGLOBIN: CPT | Performed by: INTERNAL MEDICINE

## 2022-02-28 PROCEDURE — 99232 SBSQ HOSP IP/OBS MODERATE 35: CPT | Performed by: INTERNAL MEDICINE

## 2022-02-28 PROCEDURE — 85027 COMPLETE CBC AUTOMATED: CPT | Performed by: INTERNAL MEDICINE

## 2022-02-28 PROCEDURE — 85610 PROTHROMBIN TIME: CPT | Performed by: INTERNAL MEDICINE

## 2022-02-28 PROCEDURE — 86850 RBC ANTIBODY SCREEN: CPT | Performed by: INTERNAL MEDICINE

## 2022-02-28 PROCEDURE — 30233N1 TRANSFUSION OF NONAUTOLOGOUS RED BLOOD CELLS INTO PERIPHERAL VEIN, PERCUTANEOUS APPROACH: ICD-10-PCS | Performed by: INTERNAL MEDICINE

## 2022-02-28 PROCEDURE — 86900 BLOOD TYPING SEROLOGIC ABO: CPT | Performed by: INTERNAL MEDICINE

## 2022-02-28 PROCEDURE — 99223 1ST HOSP IP/OBS HIGH 75: CPT | Performed by: INTERNAL MEDICINE

## 2022-02-28 RX ORDER — MIDODRINE HYDROCHLORIDE 5 MG/1
5 TABLET ORAL
Status: DISCONTINUED | OUTPATIENT
Start: 2022-02-28 | End: 2022-03-02 | Stop reason: HOSPADM

## 2022-02-28 RX ORDER — SODIUM CHLORIDE 9 MG/ML
75 INJECTION, SOLUTION INTRAVENOUS CONTINUOUS
Status: DISCONTINUED | OUTPATIENT
Start: 2022-02-28 | End: 2022-02-28

## 2022-02-28 RX ORDER — ALBUMIN (HUMAN) 12.5 G/50ML
25 SOLUTION INTRAVENOUS
Status: COMPLETED | OUTPATIENT
Start: 2022-02-28 | End: 2022-03-02

## 2022-02-28 RX ADMIN — Medication 1 PACKET: at 17:27

## 2022-02-28 RX ADMIN — FOLIC ACID 1 MG: 1 TABLET ORAL at 08:05

## 2022-02-28 RX ADMIN — SODIUM CHLORIDE 75 ML/HR: 9 INJECTION, SOLUTION INTRAVENOUS at 07:57

## 2022-02-28 RX ADMIN — Medication 1 PACKET: at 08:05

## 2022-02-28 RX ADMIN — MULTIPLE VITAMINS W/ MINERALS TAB 1 TABLET: TAB ORAL at 08:05

## 2022-02-28 RX ADMIN — CHOLESTYRAMINE 4 G: 4 POWDER, FOR SUSPENSION ORAL at 08:05

## 2022-02-28 RX ADMIN — OXYCODONE HYDROCHLORIDE 5 MG: 5 TABLET ORAL at 05:11

## 2022-02-28 RX ADMIN — MIDODRINE HYDROCHLORIDE 5 MG: 5 TABLET ORAL at 13:32

## 2022-02-28 RX ADMIN — ALBUMIN (HUMAN) 25 G: 0.25 INJECTION, SOLUTION INTRAVENOUS at 17:27

## 2022-02-28 RX ADMIN — THIAMINE HYDROCHLORIDE 100 MG: 100 INJECTION, SOLUTION INTRAMUSCULAR; INTRAVENOUS at 08:00

## 2022-02-28 RX ADMIN — ACETAMINOPHEN 650 MG: 325 TABLET, FILM COATED ORAL at 20:11

## 2022-02-28 RX ADMIN — LETROZOLE 2.5 MG: 2.5 TABLET, FILM COATED ORAL at 08:06

## 2022-02-28 RX ADMIN — PREDNISOLONE SODIUM PHOSPHATE 40 MG: 15 SOLUTION ORAL at 09:07

## 2022-02-28 RX ADMIN — PANTOPRAZOLE SODIUM 40 MG: 40 TABLET, DELAYED RELEASE ORAL at 05:12

## 2022-02-28 RX ADMIN — MIDODRINE HYDROCHLORIDE 5 MG: 5 TABLET ORAL at 17:27

## 2022-02-28 RX ADMIN — LOPERAMIDE HYDROCHLORIDE 2 MG: 2 CAPSULE ORAL at 00:01

## 2022-02-28 NOTE — ASSESSMENT & PLAN NOTE
· Has CKD stage IIIA; Baseline creatinine around 1  · Continues to trend up; today at 2 3  · Likely prerenal in the setting of poor oral intake  · Continue IV fluids  · Avoid nephrotoxins, avoid hypotension  · CMP daily  · Nephrology input appreciated  · OPAL likely in the setting of low hemoglobin with administration of contrast on admission as well

## 2022-02-28 NOTE — ASSESSMENT & PLAN NOTE
· Patient was briefly on antibiotics but this was continued as there is no obvious source of infection and has been afebrile, leukocytosis also trending down off antibiotics  · Elevated procalcitonin could be elevated in the setting of alcoholic hepatitis and a possible cirrhotic background?   · Leukocytosis likely in the setting of starting prednisone

## 2022-02-28 NOTE — OCCUPATIONAL THERAPY NOTE
Occupational Therapy Cancellation     Patient Name: Arlen Kayser OIIDU'W Date: 2/28/2022  Problem List  Principal Problem:    Jaundice  Active Problems:    Malignant neoplasm of overlapping sites of left breast in female, [facility] receptor positive ([facility] )    History of DVT (deep vein thrombosis)    Hyponatremia    OPAL (acute kidney injury) ([facility] ) on CKD    Alcohol abuse    Benign hypertension with CKD (chronic kidney disease) stage III (HCC)    Anemia    Elevated INR    Encephalopathy    Elevated procalcitonin    Proteinuria    Abnormal urinalysis    Past Medical History  Past Medical History:   Diagnosis Date    Acute DVT (deep venous thrombosis) (HCC)     of LLE>     Bladder infection     Congenital prolapsed rectum     History of biliary stent insertion     History of chemotherapy     History of radiation therapy 05/2018    left breast ca    History of transfusion     x2 s/p chemo treatments, no reaction    Hydronephrosis     right kidney    Hypertension     Malignant neoplasm of overlapping sites of left female breast ([facility] ) 05/01/2018    Migraine      Past Surgical History  Past Surgical History:   Procedure Laterality Date    ABDOMINAL ADHESION SURGERY N/A 4/23/2019    Procedure: LYSIS ADHESIONS;  Surgeon: Renay Mattson MD;  [facility];  Service: Colorectal    BREAST BIOPSY      COLON SURGERY      colon resection    CYSTOSCOPY N/A 3/4/2019    Procedure: CYSTOSCOPY WITH BIOPSIES AND [place] OF RECTUM PROLAPSE;  Surgeon: Erick Hernández MD;  [facility];  Service: Urology    ERCP N/A 1/4/2019    Procedure: ENDOSCOPIC RETROGRADE CHOLANGIOPANCREATOGRAPHY (ERCP);   Surgeon: Marshall Alas MD;  [facility];  Service: Gastroenterology    INSTILLATION MYTOMYCIN N/A 3/4/2019    Procedure: Remus Said;  Surgeon: Erick Hernández MD;  [facility];  Service: Urology    MASTECTOMY Left     2018    ME COLONOSCOPY FLX DX W/COLLJ SPEC WHEN PFRMD N/A 4/22/2019    Procedure: COLONOSCOPY;  Surgeon: Matteo Wan MD;  Location: [facility]; Service: Colorectal    MT EDG US EXAM SURGICAL ALTER STOM DUODENUM/JEJUNUM N/A 3/7/2019    Procedure: LINEAR ENDOSCOPIC U/S;  Surgeon: Marina Apple MD;  [facility]: [facility]; Service: Gastroenterology    MT ESOPHAGOGASTRODUODENOSCOPY TRANSORAL DIAGNOSTIC N/A 3/7/2019    Procedure: ESOPHAGOGASTRODUODENOSCOPY (EGD); Surgeon: Marina Apple MD;  [facility]: [facility]; Service: Gastroenterology    MT INSJ TUNNELED CTR VAD W/SUBQ PORT AGE 5 YR/> N/A 6/26/2018    Procedure: INSERTION VENOUS PORT ( PORT-A-CATH) IR;  Surgeon: Ba Almonte DO;  [facility];  Service: Interventional Radiology    MT LAP, SURG PROCTOPEXY N/A 4/23/2019    Procedure: LAPAROSCOPIC HAND-ASSIST PELVIC DISSECTION; proctopexy;  Surgeon: Matteo Wan MD;  [facility];  Service: Colorectal    MT LAP, SURG PROCTOPEXY N/A 11/18/2020    Procedure: LAPAROSCOPIC LYSIS OF ADHESIONS, MOBILIZATION OF RECTUM AND SUTURE RECTOPEXY;  Surgeon: Shreya Castro MD;  [facility]: [facility];  Service: Colorectal    MT MASTECTOMY, MODIFIED RADICAL Left 5/15/2018    Procedure: BREAST MODIFIED RADICAL MASTECTOMY;  Surgeon: Hugo Tellez MD;  [facility];  Service: Surgical Oncology    MT RMVL [place] CTR VAD W/SUBQ PORT/ CTR/PRPH INSJ N/A 6/4/2019    Procedure: REMOVAL VENOUS PORT (PORT-A-CATH)IR;  Surgeon: Ba Almonte DO;  [facility];  Service: Interventional Radiology    TUBAL LIGATION      US GUIDANCE BREAST BIOPSY LEFT EACH ADDITIONAL Left 4/3/2018    US GUIDED BREAST BIOPSY LEFT COMPLETE Left 4/3/2018    US GUIDED BREAST LYMPH NODE BIOPSY LEFT Left 4/3/2018        02/28/22 1130   OT Last Visit   OT Visit Date 02/28/22  (Monday)   Note Type   Note type Evaluation   Cancel Reasons Other  (blood transfusion)   Additional Comments OT orders received and chart review completed   Attempted to see pt for OT eval  Pt presently receiving blood  Will cancel and continue to follow as appropriate and schedule allows      Milan Chirinos, OTR/L

## 2022-02-28 NOTE — ASSESSMENT & PLAN NOTE
· Etiology unclear at this time; probably alcoholic hepatitis  · Normal ALT, elevated AST and alk-phos  · Total bilirubin elevated at 11, with direct bilirubin at 7 4 -- currently  trending down  · Hepatitis panel unremarkable  · Ultrasound showed: The gallbladder wall is abnormally thickened, measuring approximately 6 mm thickness   Portions of the gallbladder wall appear edematous   There is pericholecystic fluid  There is an approximately 8 x 7 x 9 mm hypoechoic lesion within the posterior aspect of the left lobe of the liver  · AMA and anti smooth muscle antibody negative; follow-up ceruloplasmin,EBV  · GI input appreciated  · Surgery was also consulted - no recommendation for any surgery at this time  · Daily CMP, direct bilirubin  · Possible EGD/colonoscopy?   · GI input appreciated - patient started on prednisolone for alcoholic hepatitis; continue PPI

## 2022-02-28 NOTE — ASSESSMENT & PLAN NOTE
· Has CKD stage IIIA;  Baseline creatinine around 1  · Continues to trend up; today at 2 3  · Likely prerenal in the setting of poor oral intake  · Continue IV fluids  · Avoid nephrotoxins, avoid hypotension  · CMP daily

## 2022-02-28 NOTE — ASSESSMENT & PLAN NOTE
History of EtOH abuse - 1/5 bottle of vodka daily  · Per patient's , last alcohol intake was 3-4 weeks ago  · [facility] protocol

## 2022-02-28 NOTE — ASSESSMENT & PLAN NOTE
· Anion gap of 16 with bicarb 15  · Resolved,    Bicarb containing IV fluids and switch to normal saline

## 2022-02-28 NOTE — ASSESSMENT & PLAN NOTE
· Etiology unclear at this time; probably alcoholic hepatitis  · Normal ALT, elevated AST and alk-phos  · Total bilirubin elevated at 11, with direct bilirubin at 7 4 on admission -- currently  trending down  · Hepatitis panel unremarkable  · Ultrasound showed: The gallbladder wall is abnormally thickened, measuring approximately 6 mm thickness   Portions of the gallbladder wall appear edematous   There is pericholecystic fluid   There is an approximately 8 x 7 x 9 mm hypoechoic lesion within the posterior aspect of the left lobe of the liver  · AMA and anti smooth muscle antibody negative; ceruloplasmin negative; EBV unremarkable for acute infection  · GI input appreciated  · Surgery was also consulted - no recommendation for any surgery at this time  · Daily CMP, direct bilirubin  · GI input appreciated - patient started on prednisolone for alcoholic hepatitis; continue PPI; continue on current dose of prednisone 40 mg once a day for 28 days and then taper by 10 mg every week  · Would need to follow-up with GI in outpatient setting

## 2022-02-28 NOTE — ASSESSMENT & PLAN NOTE
Lab Results   Component Value Date    EGFR 22 02/28/2022    EGFR 37 02/26/2022    EGFR 35 02/26/2022    CREATININE 2 30 (H) 02/28/2022    CREATININE 1 51 (H) 02/26/2022    CREATININE 1 56 (H) 02/26/2022     Hemoglobin 8 9 on presentation, baseline appears to be around 10  ; Hgb today yesterday at 6 5-- no active bleeding and was given 1 unit packed RBC    Hemoglobin this morning at 10  MCV high  D88 normal, folic acid low, was started on folic acid supplementation  Continue to monitor CBC daily

## 2022-02-28 NOTE — PROGRESS NOTES
Progress Note - Emily Beard 62 y o  female MRN: [id number]    Unit/Bed#: S -01 Encounter: [id number]        Subjective:   Nursing reports patient has been incontinent of brown colored stool, patient tells me she has had about 2 bowel movements today so far, appears alert and responsive to questions  Denies any abdominal pain or nausea, reports to be tolerating diet  Objective:     Vitals: Blood pressure 103/51, pulse 87, temperature 98 °F (36 7 °C), temperature source Oral, resp  rate 16, height 5' 5" (1 651 m), weight 67 1 kg (148 lb), SpO2 95 %, not currently breastfeeding  ,Body mass index is 24 63 kg/m²  No intake or output data in the 24 hours ending 02/28/22 1133    Physical Exam:   General appearance: alert, appears stated age and cooperative  Lungs: clear to auscultation bilaterally, no labored breathing/accessory muscle use  Heart: regular rate and rhythm, S1, S2 normal, no murmur, click, rub or gallop  Abdomen: soft, non-tender; bowel sounds normal; no masses,  no organomegaly  Extremities: no edema  No asterixis    Invasive Devices  Report    Peripheral Intravenous Line            Peripheral IV 02/26/22 Right Antecubital 1 day    Long-Dwell Peripheral IV (Midline) 69/71/87 Basilic <1 day          Drain            Closed/Suction Drain Right RLQ Bulb 466 days                Lab, Imaging and other studies: I have personally reviewed pertinent reports  No results displayed because visit has over 200 results  Orders Only on 02/28/2022   Component Date Value    Unit Product Code 02/28/2022 [id number]     Unit Number 02/28/2022 [id number]     Unit ABO 02/28/2022 O     Unit RH 02/28/2022 NEG     Crossmatch 02/28/2022 Compatible     Unit Dispense Status 02/28/2022 Crossmatched     Unit Product Volume 02/28/2022 250      Results for Diane Arambula (MRN [id number]) as of 2/28/2022 11:33   Ref   Range 2/28/2022 08:16 2/28/2022 09:14 2/28/2022 10:11   ABO Grouping Unknown B     Rh Factor Unknown Negative     Antibody Screen Unknown Negative     Specimen Expiration Date Unknown [id number]     Crossmatch Unknown  Compatible Compatible   Unit ABO Unknown  Deborah Clark   Unit [facility] Unknown  NEG NEG   Unit Number Unknown  [id number]   Unit Dispense Status Unknown  Crossmatched Issued         Assessment/Plan:    1   Elevated liver enzymes suspected to be secondary to acute alcoholic hepatitis with discriminant score on presentation greater than 32; patient is on oral prednisolone and bilirubin/PT INR appear to be downtrending fortunately    -continue to monitor LFTs and PT INR daily    -continue prednisolone, check Denisa score at day 7 to determine if continuation of therapy for full course and slow taper is warranted    - follow-up on her remaining serologic workup including CMV serologies, autoimmune serologies; workup for other causes of elevated liver enzymes thus far is returning negative    - monitor mental status closely, monitor physical exam for asterixis; if signs of hepatic encephalopathy manifest than check ammonia level and consider starting lactulose and Xifaxan    - patient does report some issues with chronic diarrhea, not changed recently, can have colonoscopy as outpatient for further workup

## 2022-02-28 NOTE — ASSESSMENT & PLAN NOTE
History of EtOH abuse - 1/5 bottle of vodka daily  · Per patient's , last alcohol intake was 3-4 weeks ago  · [facility] protocol

## 2022-02-28 NOTE — PROGRESS NOTES
[facility]  Progress Note - Arlen Kayser 1960, 62 y o  female MRN: [id number]  Unit/Bed#: S -01 Encounter: [id number]  Primary Care Provider: Camryn Jurado DC   Date and time admitted to hospital: 2/22/2022  5:42 PM    * Jaundice  Assessment & Plan  · Etiology unclear at this time; probably alcoholic hepatitis  · Normal ALT, elevated AST and alk-phos  · Total bilirubin elevated at 11, with direct bilirubin at 7 4 -- currently  trending down  · Hepatitis panel unremarkable  · Ultrasound showed: The gallbladder wall is abnormally thickened, measuring approximately 6 mm thickness   Portions of the gallbladder wall appear edematous   There is pericholecystic fluid  There is an approximately 8 x 7 x 9 mm hypoechoic lesion within the posterior aspect of the left lobe of the liver  · AMA and anti smooth muscle antibody negative; ceruloplasmin negative; EBV unremarkable for acute infection  · GI input appreciated  · Surgery was also consulted - no recommendation for any surgery at this time  · Daily CMP, direct bilirubin  · Possible EGD/colonoscopy? · GI input appreciated - patient started on prednisolone for alcoholic hepatitis; continue PPI    Anemia  Assessment & Plan  Lab Results   Component Value Date    EGFR 22 02/28/2022    EGFR 37 02/26/2022    EGFR 35 02/26/2022    CREATININE 2 30 (H) 02/28/2022    CREATININE 1 51 (H) 02/26/2022    CREATININE 1 56 (H) 02/26/2022     Hemoglobin 8 9 on presentation, baseline appears to be around 10  ; Hgb today at 6 5 -- no active bleeding  MCV high  D96 normal, folic acid low, will start folic acid supplementation  continue to monitor CBC daily, will transfuse 1 unit PRBC today    OPAL (acute kidney injury) ([facility] [address] ) on CKD  Assessment & Plan  · Has CKD stage IIIA;  Baseline creatinine around 1  · Continues to trend up; today at 2 3  · Likely prerenal in the setting of poor oral intake  · Continue IV fluids  · Avoid nephrotoxins, avoid hypotension  · CMP daily  · Nephrology consult -- concern for hepatorenal syndrome? High anion gap metabolic acidosis-resolved as of 2/28/2022  Assessment & Plan  · Anion gap of 16 with bicarb 15  · Resolved,  Bicarb containing IV fluids and switch to normal saline    Encephalopathy  Assessment & Plan  · Occasional confusion  ·  claims that she is currently at her baseline mental status  · Continue IV thiamine daily for possible Wern[place]ke the encephalopathy given history of chronic alcohol abuse  · Continue folic acid supplementation    History of DVT (deep vein thrombosis)  Assessment & Plan  · Hx of DVT in 12/2018 in the setting of  malignancy  · She states that she has not taken Eliquis in 3 years because of not being able to follow-up with her doctor  · INR increased to 3 5 after restarting Eliquis on 2/24 -- Eliquis discontinued thereafter  · Did discuss with the patient regarding restarting Eliquis down the line vs holding off on further anticoagulation -- she said she will think about it first  · On chart review, there were imaging studies done in 2020, venous duplex did not show any DVT however V/Q scan done at that time showed possibility for PE being intermediate  Diarrhea-resolved as of 2/25/2022  Assessment & Plan  · Had 2 episodes of nonbloody diarrhea yesterday  · C diff and stool enteric panel negative  · Continue p r n   Imodium    Abnormal urinalysis  Assessment & Plan  · UA showed 20-30 WBC with innumerable bacteria but with occasional epithelial cells indicating likely contaminated sample and furthermore urine culture was indicative of contamination showing greater than 100,000 CFU per mL mixed contaminants  · No urinary symptoms, no febrile episodes  · Will monitor off antibiotics for now    Proteinuria  Assessment & Plan  · Likely in the setting of possible alcoholic cirrhosis    Elevated procalcitonin  Assessment & Plan  · Patient was briefly on antibiotics but this was continued as there is no obvious source of infection and has been afebrile, leukocytosis also trending down off antibiotics  · Elevated procalcitonin could be elevated in the setting of alcoholic hepatitis and a possible cirrhotic background? · Leukocytosis likely in the setting of starting prednisone    Elevated INR  Assessment & Plan  Plan as outlined above    Benign hypertension with CKD (chronic kidney disease) stage III (HCC)  Assessment & Plan  Holding home verapamil 240mg 24 hr capsule, home carvedilol 6 25mg BID given soft pressures  Alcohol abuse  Assessment & Plan  History of EtOH abuse - 1/5 bottle of vodka daily  · Per patient's , last alcohol intake was 3-4 weeks ago  · [facility] protocol     Hyponatremia  Assessment & Plan  Patient has borderline hyponatremia at baseline with Na around 134 normally  POA with sodium 128, now improving  Likely hypovolemic hyponatremia  Continue IV fluids at this time      Malignant neoplasm of overlapping sites of left breast in female, estrogen receptor positive ([facility] [address] )  Assessment & Plan  · Stage III, dx in 2018  S/P mastectomy 2018, chemotherapy and radiation therapy  · Currently on letrozole therapy for continued management    Plan:  - continue letrozole 2 5 mg daily       VTE Pharmacologic Prophylaxis: VTE Score: 8 High Risk (Score >/= 5) - Pharmacological DVT Prophylaxis Ordered: INR elevated at 3 on restarting eliquis few days ago; INR 2 1 today; will likely restart dvt prophylaxis tomorrow  Sequential Compression Devices Ordered  Patient Centered Rounds: I performed bedside rounds with nursing staff today  Discussions with Specialists or Other Care Team Provider:  Will talk to Gastroenterology team today    Education and Discussions with Family / Patient: Updated  () via phone  Current Length of Stay: 5 day(s)  Current Patient Status: Inpatient   Discharge Plan: Anticipate discharge in 24-48 hrs to home with home services      Code Status: Level 3 - DNAR and DNI    Subjective:   Patient had no complaints this morning, denies any hematuria or blood in stools  No abdominal pain  Objective:     Vitals:   Temp (24hrs), Av 7 °F (36 5 °C), Min:97 4 °F (36 3 °C), Max:98 °F (36 7 °C)    Temp:  [97 4 °F (36 3 °C)-98 °F (36 7 °C)] 98 °F (36 7 °C)  HR:  [89-94] 90  Resp:  [14-16] 14  BP: ()/(59-71) 101/63  SpO2:  [90 %-95 %] 94 %  Body mass index is 24 63 kg/m²  Input and Output Summary (last 24 hours):   No intake or output data in the 24 hours ending 22 1025    Physical Exam:   Physical Exam  Vitals reviewed  Constitutional:       General: She is not in acute distress  Appearance: She is ill-appearing  Eyes:      General: Scleral icterus present  Cardiovascular:      Rate and Rhythm: Regular rhythm  Heart sounds: No murmur heard  Pulmonary:      Effort: Pulmonary effort is normal  No respiratory distress  Breath sounds: No wheezing or rales  Abdominal:      General: Bowel sounds are normal       Palpations: Abdomen is soft  Tenderness: There is no abdominal tenderness  Skin:     General: Skin is warm and dry  Capillary Refill: Capillary refill takes less than 2 seconds  Coloration: Skin is jaundiced and pale  Neurological:      General: No focal deficit present  Mental Status: She is alert  Mental status is at baseline     Psychiatric:         Mood and Affect: Mood normal               Additional Data:     Labs:  Results from last 7 days   Lab Units 22  0711 22  0518 22  0518 22  0509 22  0509   WBC Thousand/uL  --   --  20 78*   < > 19 19*   HEMOGLOBIN g/dL 6 5*   < > 6 9*   < > 7 2*   HEMATOCRIT %  --   --  20 1*   < > 21 5*   PLATELETS Thousands/uL  --   --  338   < > 356   NEUTROS PCT %  --   --   --   --  87*   LYMPHS PCT %  --   --   --   --  4*   MONOS PCT %  --   --   --   --  4   EOS PCT %  --   --   --   --  3    < > = values in this interval not displayed  Results from last 7 days   Lab Units 02/28/22  0518   SODIUM mmol/L 132*   POTASSIUM mmol/L 3 5   CHLORIDE mmol/L 98*   CO2 mmol/L 25   BUN mg/dL 36*   CREATININE mg/dL 2 30*   ANION GAP mmol/L 9   CALCIUM mg/dL 6 6*   ALBUMIN g/dL 1 1*   TOTAL BILIRUBIN mg/dL 5 48*   ALK PHOS U/L 149*   ALT U/L 19   AST U/L 60*   GLUCOSE RANDOM mg/dL 362*     Results from last 7 days   Lab Units 02/28/22  0518   INR  2 18*     Results from last 7 days   Lab Units 02/27/22  0526   POC GLUCOSE mg/dl 90         Results from last 7 days   Lab Units 02/26/22  1401 02/26/22  0509 02/25/22 2027 02/25/22  1507 02/24/22  0901 02/22/22  1947   LACTIC ACID mmol/L 2 2*  --  3 2* 3 8*  --  2 0   PROCALCITONIN ng/ml  --  5 17*  --   --  2 56*  --        Lines/Drains:  Invasive Devices  Report    Peripheral Intravenous Line            Peripheral IV 02/26/22 Right Antecubital 1 day    Long-Dwell Peripheral IV (Midline) 67/23/90 Basilic <1 day          Drain            Closed/Suction Drain Right RLQ Bulb 466 days                        Recent Cultures (last 7 days):   Results from last 7 days   Lab Units 02/23/22  1338 02/22/22  2208 02/22/22  1947 02/22/22  1925   BLOOD CULTURE   --   --  No Growth After 5 Days  No Growth After 5 Days     URINE CULTURE   --  >100,000 cfu/ml   --   --    C DIFF TOXIN B BY PCR  Negative  --   --   --        Last 24 Hours Medication List:   Current Facility-Administered Medications   Medication Dose Route Frequency Provider Last Rate    acetaminophen  650 mg Oral Q4H PRN Anaid Mares MD      cholestyramine sugar free  4 g Oral Daily Dilcia Caruso MD      folic acid  1 mg Oral Daily Anaid Mares MD      letrozole  2 5 mg Oral Daily Dilcia Caruso MD      loperamide  2 mg Oral TID PRN Anaid Mares MD      multivitamin-minerals  1 tablet Oral Daily Anaid Mares MD      ondansetron  4 mg Intravenous Q6H PRN Dilcia Caruso MD      oxyCODONE  10 mg Oral Q6H PRN Norman Sandhoff, MD      oxyCODONE  5 mg Oral Q6H PRN Norman Sandhoff, MD      pantoprazole  40 mg Oral Early Morning [address]uf      prednisoLONE  40 mg Oral Daily Holly Earl PA-C      psyllium  1 packet Oral BID Simi Winslow MD      sodium chloride  75 mL/hr Intravenous Continuous Norman Sandhoff, MD 75 mL/hr (02/28/22 0757)   [facility] thiamine  100 mg Intravenous Daily Norman Sandhoff,  mg (02/28/22 0800)        Today, Patient Was Seen By: Norman Sandhoff, MD    **Please Note: This note may have been constructed using a voice recognition system  **

## 2022-02-28 NOTE — ASSESSMENT & PLAN NOTE
Lab Results   Component Value Date    EGFR 22 02/28/2022    EGFR 37 02/26/2022    EGFR 35 02/26/2022    CREATININE 2 30 (H) 02/28/2022    CREATININE 1 51 (H) 02/26/2022    CREATININE 1 56 (H) 02/26/2022     Hemoglobin 8 9 on presentation, baseline appears to be around 10  ; Hgb today at 6 5 -- no active bleeding  MCV high  V06 normal, folic acid low, will start folic acid supplementation  continue to monitor CBC daily, will transfuse 1 unit PRBC today

## 2022-02-28 NOTE — ASSESSMENT & PLAN NOTE
Holding home verapamil 240mg 24 hr capsule, home carvedilol 6 25mg BID given soft pressures     Patient started by Nephrology on midodrine 5 mg t i d

## 2022-02-28 NOTE — CONSULTS
[address] 62 y o  female MRN: [id number]  Unit/Bed#: S -01 Encounter: [id number]    ASSESSMENT and PLAN:  Acute kidney injury:  · Creatinine 1 29 on admission  Renal function stable, at baseline on admission, increasing during hospitalization  · Contrast exposure on 02/22  · Creatinine remained at baseline increasing on 2/26 to 1 56  Creatinine was not checked on 02/27 and today on 02/28 creatinine 2 3  · Urinalysis 2/22:  1+ protein, 0-1 RBCs, 20-30 WBCs, innumerable bacteria, large leukocytes  · Urine culture:  > 100,000 mixed contaminants    · CT the abdomen and pelvis with contrast:  Atrophic right kidney with chronic pelvic tace is similar to prior exam   Unremarkable left kidney  · Right upper quadrant ultrasound:  Right kidney atrophic with severe cortical thinning  · Peak total bilirubin level 11 05 on admission, currently down to 5 48  · Blood pressure on the low side  · Hemoglobin down to 6 5 today  · Etiology of OPAL:  Likely multifactorial with decreased renal perfusion due to hypotension/relative hypotension, acute anemia, contrast exposure all in the setting of solitary kidney  Less likely hepatorenal although in the differential   · Plan/recommendations:  · Completed transfusion which will help with renal perfusion  · No indication for diuretic  · Add albumin 25 g    Will give 1 dose later today since K received a unit of packed cells  · Midodrine 5 mg t i d   · I&O    Chronic kidney disease, stage III:  · She was seen by our service 1 time for hospital follow-up on 03/25/2021 by Dr Vickey Richmond  · Baseline creatinine 1 1-1 3 mg/dL as of June 2021   · Etiology:  Hypertensive nephrosclerosis, atrophic right kidney, use of NSAIDs    Blood pressure/volume status:  · History of essential hypertension  · When she was seen in the nephrology clinic approximately 1 year ago blood pressure 152/78  · Current blood pressure readings near 95 systolic up to 049 systolic  · Home medications:  Carvedilol 6 25 mg b i d , for optimal 240 mg daily, torsemide 20 mg as needed for leg swelling  · Carvedilol and verapamil on hold  · Hypoalbuminemia:  Start midodrine 5 mg t i d  Abnormal liver function studies/jaundice on admission:  · GI following  · Meadow to be secondary to acute alcoholic hepatitis  · Workup:  Serologies pending  · Hepatitis panel nonreactive  · HSV in progress, Ebstein Barr virus antigen in progress  · CMV serologies pending  · IgG, IgA, IgM normal  · Imaging:  Gallbladder wall abnormally thickened and portions of the gallbladder wall appeared edematous with pericholecystic fluid  Also noted hypoechoic lesion posterior aspect of the left lobe of the liver which is similar to prior imaging in 2018  · On prednisone    Anemia:  · Hemoglobin down to 6 5  · Status post 1 unit of packed cells  · Incontinent of brown stools    Electrolytes/acid-base:  · Borderline low potassium  · Mild hyponatremia:  Corrects to 138 due to hyperglycemia  · Bicarbonate previously low, today up to 25    Diastolic CHF:  · Home medications: Torsemide 20 mg as needed for leg swelling or weight gain  · Diuretics on hold    Chronic diarrhea:  · C diff negative      HISTORY OF PRESENT ILLNESS:  Requesting Physician: Marques Juarez MD  Reason for Consult:  Acute kidney injury    Madison Braun is a 62 y o  female with a past medical history of chronic kidney disease, hypertension, breast cancer status post mastectomy and chemo radiation 2019, alcohol dependence, DVT who was admitted to [facility] after presenting with jaundice, fatigue and shortness of breath on 02/22/22  Initial creatinine at baseline  During the course of hospitalization creatinine increased up to 2 3 as of 2/28 prompting nephrology consult  Patient reports mild chronic diarrhea  No change in bowel habits  She does not endorse shortness of breath but tells me she is resting and not performing any activities of daily living  She takes Advil for migraine headaches but has not had a migraine headache in quite a while  She reports good urine output particularly at night  No lower extremity edema noted  Mentating appropriately  When questioned about atrophic kidney she reports that she was told about it when she was going through her workup for breast cancer  PAST MEDICAL HISTORY:  Past Medical History:   Diagnosis Date    Acute DVT (deep venous thrombosis) (HCC)     of LLE>     Bladder infection     Congenital prolapsed rectum     History of biliary stent insertion     History of chemotherapy     History of radiation therapy 05/2018    left breast ca    History of transfusion     x2 s/p chemo treatments, no reaction    Hydronephrosis     right kidney    Hypertension     Malignant neoplasm of overlapping sites of left female breast ([facility] [address] ) 05/01/2018    Migraine        PAST SURGICAL HISTORY:  Past Surgical History:   Procedure Laterality Date    ABDOMINAL ADHESION SURGERY N/A 4/23/2019    Procedure: LYSIS ADHESIONS;  Surgeon: Stevie Nagel MD;  [facility];  Service: Colorectal    BREAST BIOPSY      COLON SURGERY      colon resection    CYSTOSCOPY N/A 3/4/2019    Procedure: CYSTOSCOPY WITH BIOPSIES AND FULGERATION AND REDCUTION OF RECTUM PROLAPSE;  Surgeon: Tye Mcneil MD;  [facility];  Service: Urology    ERCP N/A 1/4/2019    Procedure: ENDOSCOPIC RETROGRADE CHOLANGIOPANCREATOGRAPHY (ERCP); Surgeon: Daisy Mendoza MD;  [facility];  Service: Gastroenterology    INSTILLATION MYTOMYCIN N/A 3/4/2019    Procedure: Carry Maha;  Surgeon: Tye Mcneil MD;  [facility];  Service: Urology    MASTECTOMY Left     2018    UT COLONOSCOPY FLX DX W/COLLJ SPEC WHEN PFRMD N/A 4/22/2019    Procedure: COLONOSCOPY;  Surgeon: Stevie Nagel MD;  [facility];   Service: Colorectal    UT EDG US EXAM SURGICAL ALTER STOM DUODENUM/JEJUNUM N/A 3/7/2019 Procedure: LINEAR ENDOSCOPIC U/S;  Surgeon: Pepe Johnson MD;  [facility]; Service: Gastroenterology    OR ESOPHAGOGASTRODUODENOSCOPY TRANSORAL DIAGNOSTIC N/A 3/7/2019    Procedure: ESOPHAGOGASTRODUODENOSCOPY (EGD); Surgeon: Pepe Johnson MD;  [facility];   Service: Gastroenterology    OR INSJ TUNNELED CTR VAD W/SUBQ PORT AGE 5 YR/> N/A 6/26/2018    Procedure: INSERTION VENOUS PORT ( PORT-A-CATH) IR;  Surgeon: Isabella Wilder DO;  [facility];  Service: Interventional Radiology    OR LAP, SURG PROCTOPEXY N/A 4/23/2019    Procedure: LAPAROSCOPIC HAND-ASSIST PELVIC DISSECTION; proctopexy;  Surgeon: Landrum Kayser, MD;  [facility];  Service: Colorectal    OR LAP, SURG PROCTOPEXY N/A 11/18/2020    Procedure: LAPAROSCOPIC LYSIS OF ADHESIONS, MOBILIZATION OF RECTUM AND SUTURE RECTOPEXY;  Surgeon: Padmini Upton MD;  [facility];  Service: Colorectal    OR MASTECTOMY, MODIFIED RADICAL Left 5/15/2018    Procedure: BREAST MODIFIED RADICAL MASTECTOMY;  Surgeon: Casimiro Magaña MD;  [facility];  Service: Surgical Oncology    OR RMVL [place] CTR VAD W/SUBQ PORT/ CTR/PRPH INSJ N/A 6/4/2019    Procedure: REMOVAL VENOUS PORT (PORT-A-CATH)IR;  Surgeon: Isabella Wilder DO;  [facility];  Service: Interventional Radiology    TUBAL LIGATION      US GUIDANCE BREAST BIOPSY LEFT EACH ADDITIONAL Left 4/3/2018    US GUIDED BREAST BIOPSY LEFT COMPLETE Left 4/3/2018    US GUIDED BREAST LYMPH NODE BIOPSY LEFT Left 4/3/2018       ALLERGIES:  Allergies   Allergen Reactions    Gluten Meal - Food Allergy GI Intolerance    Lactose - Food Allergy GI Intolerance       SOCIAL HISTORY:  Social History     Substance and Sexual Activity   Alcohol Use Not Currently     Social History     Substance and Sexual Activity   Drug Use Not Currently     Social History     Tobacco Use   Smoking Status Never Smoker   Smokeless Tobacco Never Used       FAMILY HISTORY:  Family History Problem Relation Age of Onset    No Known Problems Mother     No Known Problems Father     Breast cancer Maternal Aunt 48    Colon cancer Maternal Aunt     No Known Problems Sister     No Known Problems Daughter     No Known Problems Maternal Grandmother     No Known Problems Maternal Grandfather     No Known Problems Paternal Grandmother     No Known Problems Paternal Grandfather        MEDICATIONS:    Current Facility-Administered Medications:     acetaminophen (TYLENOL) tablet 650 mg, 650 mg, Oral, Q4H PRN, Mandie Tom MD, 650 mg at 02/27/22 0012    cholestyramine sugar free (QUESTRAN LIGHT) packet 4 g, 4 g, Oral, Daily, Snow Sarah MD, 4 g at 70/36/81 4247    folic acid (FOLVITE) tablet 1 mg, 1 mg, Oral, Daily, Mandie Tom MD, 1 mg at 02/28/22 0805    letrozole [facility]) tablet 2 5 mg, 2 5 mg, Oral, Daily, Snow Sarah MD, 2 5 mg at 02/28/22 0806    loperamide (IMODIUM) capsule 2 mg, 2 mg, Oral, TID PRN, Mandie Tom MD, 2 mg at 02/28/22 0001    multivitamin-minerals (CENTRUM) tablet 1 tablet, 1 tablet, Oral, Daily, Mandie Tom MD, 1 tablet at 02/28/22 0805    ondansetron (ZOFRAN) injection 4 mg, 4 mg, Intravenous, Q6H PRN, Snow Sarah MD    oxyCODONE (ROXICODONE) immediate release tablet 10 mg, 10 mg, Oral, Q6H PRN, Mandie Tom MD, 10 mg at 02/27/22 1246    oxyCODONE (ROXICODONE) IR tablet 5 mg, 5 mg, Oral, Q6H PRN, Mandie Tom MD, 5 mg at 02/28/22 0511    pantoprazole (PROTONIX) EC tablet 40 mg, 40 mg, Oral, Early Morning, Pepper Maddox PA-C, 40 mg at 02/28/22 2100    prednisoLONE (ORAPRED) oral solution 40 mg, 40 mg, Oral, Daily, Sachin Belcher PA-C, 40 mg at 02/28/22 0907    psyllium (METAMUCIL) 1 packet, 1 packet, Oral, BID, Anibal Cole MD, 1 packet at 02/28/22 0805    sodium chloride 0 9 % infusion, 75 mL/hr, Intravenous, Continuous, Mandie Tom MD, Last Rate: 75 mL/hr at 02/28/22 0757, 75 mL/hr at 02/28/22 0757    thiamine (VITAMIN B1) 100 mg in sodium chloride 0 9 % 50 mL IVPB, 100 mg, Intravenous, Daily, Rob Cobos MD, Last Rate: 100 mL/hr at 02/28/22 0800, 100 mg at 02/28/22 0800    REVIEW OF SYSTEMS:  Constitutional:  Positive for fatigue, poor appetite  HENT: Negative for postnasal drip  Eyes: Negative for visual disturbance  Respiratory: Negative for cough  Positive for shortness of breath with exertion  Cardiovascular: Negative for chest pain, palpitations and leg swelling  Gastrointestinal:  No abdominal pain  Chronic mild diarrhea  Genitourinary: No dysuria, hematuria   Musculoskeletal: Negative for unusual arthralgias or myalgias  Skin: Negative for rash  Neurological: Negative for focal weakness  Reports history of migraine headaches none recently  Hematological: Negative for bleeding  Psychiatric/Behavioral: Negative for confusion  All the systems were reviewed and were negative except as documented on the HPI  PHYSICAL EXAM:  Current Weight: Weight - Scale: 67 1 kg (148 lb)  First Weight: Weight - Scale: 67 4 kg (148 lb 9 4 oz)  Vitals:    02/28/22 0342 02/28/22 0737 02/28/22 1000 02/28/22 1040   BP: 99/67 108/59 101/63 103/51   BP Location:  Right arm Right arm    Pulse:  90 90 87   Resp:  16 14 16   Temp:  97 5 °F (36 4 °C) 98 °F (36 7 °C) 98 °F (36 7 °C)   TempSrc:  Oral Oral Oral   SpO2:  94% 94% 95%   Weight:       Height:         No intake or output data in the 24 hours ending 02/28/22 1230  Physical Exam  Vitals reviewed  Constitutional:       General: She is not in acute distress  Appearance: She is well-developed  She is ill-appearing  She is not toxic-appearing or diaphoretic  HENT:      Head: Normocephalic and atraumatic  Nose: Nose normal  No congestion  Mouth/Throat:      Mouth: Mucous membranes are moist       Pharynx: No oropharyngeal exudate  Eyes:      General: Scleral icterus present  Right eye: No discharge           Left eye: No discharge  Extraocular Movements: Extraocular movements intact  Neck:      Thyroid: No thyromegaly  Vascular: No carotid bruit or JVD  Trachea: No tracheal deviation  Cardiovascular:      Rate and Rhythm: Normal rate and regular rhythm  Heart sounds: No murmur heard  No friction rub  No gallop  Pulmonary:      Effort: Pulmonary effort is normal  No respiratory distress  Breath sounds: No stridor  Rales (Few scattered crackles anteriorly over right upper lobe  Posterior no rales) present  No wheezing or rhonchi  Abdominal:      General: Bowel sounds are normal  There is no distension  Palpations: Abdomen is soft  There is no mass  Tenderness: There is no abdominal tenderness  There is no guarding or rebound  Musculoskeletal:         General: No swelling, tenderness, deformity or signs of injury  Normal range of motion  Cervical back: Normal range of motion and neck supple  No rigidity or tenderness  Right lower leg: No edema  Left lower leg: No edema  Lymphadenopathy:      Cervical: No cervical adenopathy  Skin:     General: Skin is warm and dry  Coloration: Skin is pale  Skin is not jaundiced  Findings: No erythema or rash  Neurological:      General: No focal deficit present  Mental Status: She is alert and oriented to person, place, and time  Psychiatric:         Mood and Affect: Mood normal          Behavior: Behavior normal          Thought Content:  Thought content normal          Judgment: Judgment normal          Invasive Devices:      Lab Results:   Results from last 7 days   Lab Units 02/28/22  0711 02/28/22  0518 02/26/22  1401 02/26/22  0509 02/25/22  2027 02/25/22  0448 02/25/22  0448 02/24/22  0616   WBC Thousand/uL  --  20 78*  --  19 19* 22 24*  --   --    < >   HEMOGLOBIN g/dL 6 5* 6 9*  --  7 2* 7 1*   < >  --    < >   HEMATOCRIT %  --  20 1*  --  21 5* 21 4*  --   --    < >   PLATELETS Thousands/uL  --  338 --  356 366  --   --    < >   POTASSIUM mmol/L  --  3 5 3 5 3 6  --    < > 4 3  --    CHLORIDE mmol/L  --  98* 106 104  --    < > 104  --    CO2 mmol/L  --  25 16* 15*  --    < > 14*  --    BUN mg/dL  --  36* 25 26*  --    < > 20  --    CREATININE mg/dL  --  2 30* 1 51* 1 56*  --    < > 1 02  --    CALCIUM mg/dL  --  6 6* 7 2* 7 3*  --    < > 7 7*  --    ALK PHOS U/L  --  149*  --  139*  --   --  155*  --    ALT U/L  --  19  --  18  --   --  21  --    AST U/L  --  60*  --  75*  --   --  88*  --     < > = values in this interval not displayed       Other Studies:

## 2022-03-01 LAB
ABO GROUP BLD BPU: NORMAL
ALBUMIN SERPL BCP-MCNC: 1.7 G/DL (ref 3.5–5)
ALP SERPL-CCNC: 166 U/L (ref 46–116)
ALT SERPL W P-5'-P-CCNC: 20 U/L (ref 12–78)
ANION GAP SERPL CALCULATED.3IONS-SCNC: 13 MMOL/L (ref 4–13)
AST SERPL W P-5'-P-CCNC: 55 U/L (ref 5–45)
BASOPHILS # BLD AUTO: 0.05 THOUSANDS/ΜL (ref 0–0.1)
BASOPHILS NFR BLD AUTO: 0 % (ref 0–1)
BILIRUB DIRECT SERPL-MCNC: 4.33 MG/DL (ref 0–0.2)
BILIRUB SERPL-MCNC: 5.75 MG/DL (ref 0.2–1)
BPU ID: NORMAL
BUN SERPL-MCNC: 48 MG/DL (ref 5–25)
CALCIUM ALBUM COR SERPL-MCNC: 9.3 MG/DL (ref 8.3–10.1)
CALCIUM SERPL-MCNC: 7.5 MG/DL (ref 8.3–10.1)
CHLORIDE SERPL-SCNC: 104 MMOL/L (ref 100–108)
CO2 SERPL-SCNC: 18 MMOL/L (ref 21–32)
CREAT SERPL-MCNC: 2.18 MG/DL (ref 0.6–1.3)
CROSSMATCH: NORMAL
EOSINOPHIL # BLD AUTO: 0.01 THOUSAND/ΜL (ref 0–0.61)
EOSINOPHIL NFR BLD AUTO: 0 % (ref 0–6)
FERRITIN SERPL-MCNC: 625 NG/ML (ref 8–388)
FERRITIN SERPL-MCNC: 665 NG/ML (ref 8–388)
GFR SERPL CREATININE-BSD FRML MDRD: 23 ML/MIN/1.73SQ M
GLUCOSE SERPL-MCNC: 130 MG/DL (ref 65–140)
HCT VFR BLD AUTO: 29.2 % (ref 34.8–46.1)
HGB BLD-MCNC: 10.2 G/DL (ref 11.5–15.4)
IMM GRANULOCYTES # BLD AUTO: 0.19 THOUSAND/UL (ref 0–0.2)
IMM GRANULOCYTES NFR BLD AUTO: 1 % (ref 0–2)
INR PPP: 1.77 (ref 0.84–1.19)
IRON SATN MFR SERPL: 38 % (ref 15–50)
IRON SERPL-MCNC: 74 UG/DL (ref 50–170)
LYMPHOCYTES # BLD AUTO: 0.96 THOUSANDS/ΜL (ref 0.6–4.47)
LYMPHOCYTES NFR BLD AUTO: 4 % (ref 14–44)
MCH RBC QN AUTO: 34.1 PG (ref 26.8–34.3)
MCHC RBC AUTO-ENTMCNC: 34.9 G/DL (ref 31.4–37.4)
MCV RBC AUTO: 98 FL (ref 82–98)
MONOCYTES # BLD AUTO: 0.74 THOUSAND/ΜL (ref 0.17–1.22)
MONOCYTES NFR BLD AUTO: 3 % (ref 4–12)
NEUTROPHILS # BLD AUTO: 20.49 THOUSANDS/ΜL (ref 1.85–7.62)
NEUTS SEG NFR BLD AUTO: 92 % (ref 43–75)
NRBC BLD AUTO-RTO: 0 /100 WBCS
PLATELET # BLD AUTO: 364 THOUSANDS/UL (ref 149–390)
PMV BLD AUTO: 10.4 FL (ref 8.9–12.7)
POTASSIUM SERPL-SCNC: 4 MMOL/L (ref 3.5–5.3)
PROCALCITONIN SERPL-MCNC: 1.98 NG/ML
PROT SERPL-MCNC: 5.3 G/DL (ref 6.4–8.2)
PROTHROMBIN TIME: 20.4 SECONDS (ref 11.6–14.5)
RBC # BLD AUTO: 2.99 MILLION/UL (ref 3.81–5.12)
SODIUM SERPL-SCNC: 135 MMOL/L (ref 136–145)
TIBC SERPL-MCNC: 194 UG/DL (ref 250–450)
UNIT DISPENSE STATUS: NORMAL
UNIT PRODUCT CODE: NORMAL
UNIT PRODUCT VOLUME: 250 ML
UNIT RH: NORMAL
WBC # BLD AUTO: 22.44 THOUSAND/UL (ref 4.31–10.16)

## 2022-03-01 PROCEDURE — 80053 COMPREHEN METABOLIC PANEL: CPT | Performed by: INTERNAL MEDICINE

## 2022-03-01 PROCEDURE — 83540 ASSAY OF IRON: CPT | Performed by: INTERNAL MEDICINE

## 2022-03-01 PROCEDURE — 85025 COMPLETE CBC W/AUTO DIFF WBC: CPT | Performed by: INTERNAL MEDICINE

## 2022-03-01 PROCEDURE — 97530 THERAPEUTIC ACTIVITIES: CPT

## 2022-03-01 PROCEDURE — 97167 OT EVAL HIGH COMPLEX 60 MIN: CPT

## 2022-03-01 PROCEDURE — 99232 SBSQ HOSP IP/OBS MODERATE 35: CPT | Performed by: INTERNAL MEDICINE

## 2022-03-01 PROCEDURE — 82248 BILIRUBIN DIRECT: CPT | Performed by: INTERNAL MEDICINE

## 2022-03-01 PROCEDURE — 83550 IRON BINDING TEST: CPT | Performed by: INTERNAL MEDICINE

## 2022-03-01 PROCEDURE — 84145 PROCALCITONIN (PCT): CPT | Performed by: INTERNAL MEDICINE

## 2022-03-01 PROCEDURE — 85610 PROTHROMBIN TIME: CPT | Performed by: INTERNAL MEDICINE

## 2022-03-01 PROCEDURE — 82728 ASSAY OF FERRITIN: CPT | Performed by: INTERNAL MEDICINE

## 2022-03-01 RX ORDER — ACETAMINOPHEN 325 MG/1
975 TABLET ORAL EVERY 8 HOURS SCHEDULED
Status: DISCONTINUED | OUTPATIENT
Start: 2022-03-01 | End: 2022-03-02 | Stop reason: HOSPADM

## 2022-03-01 RX ORDER — HEPARIN SODIUM 5000 [USP'U]/ML
5000 INJECTION, SOLUTION INTRAVENOUS; SUBCUTANEOUS EVERY 8 HOURS SCHEDULED
Status: DISCONTINUED | OUTPATIENT
Start: 2022-03-01 | End: 2022-03-02 | Stop reason: HOSPADM

## 2022-03-01 RX ADMIN — MIDODRINE HYDROCHLORIDE 5 MG: 5 TABLET ORAL at 15:23

## 2022-03-01 RX ADMIN — ACETAMINOPHEN 975 MG: 325 TABLET, FILM COATED ORAL at 21:25

## 2022-03-01 RX ADMIN — HEPARIN SODIUM 5000 UNITS: 5000 INJECTION INTRAVENOUS; SUBCUTANEOUS at 21:25

## 2022-03-01 RX ADMIN — PANTOPRAZOLE SODIUM 40 MG: 40 TABLET, DELAYED RELEASE ORAL at 05:24

## 2022-03-01 RX ADMIN — ALBUMIN (HUMAN) 25 G: 0.25 INJECTION, SOLUTION INTRAVENOUS at 15:23

## 2022-03-01 RX ADMIN — PREDNISOLONE SODIUM PHOSPHATE 40 MG: 15 SOLUTION ORAL at 08:53

## 2022-03-01 RX ADMIN — LETROZOLE 2.5 MG: 2.5 TABLET, FILM COATED ORAL at 08:54

## 2022-03-01 RX ADMIN — ALBUMIN (HUMAN) 25 G: 0.25 INJECTION, SOLUTION INTRAVENOUS at 08:53

## 2022-03-01 RX ADMIN — ACETAMINOPHEN 650 MG: 325 TABLET, FILM COATED ORAL at 05:24

## 2022-03-01 RX ADMIN — HEPARIN SODIUM 5000 UNITS: 5000 INJECTION INTRAVENOUS; SUBCUTANEOUS at 15:23

## 2022-03-01 RX ADMIN — MULTIPLE VITAMINS W/ MINERALS TAB 1 TABLET: TAB ORAL at 08:53

## 2022-03-01 RX ADMIN — MIDODRINE HYDROCHLORIDE 5 MG: 5 TABLET ORAL at 07:39

## 2022-03-01 RX ADMIN — THIAMINE HYDROCHLORIDE 100 MG: 100 INJECTION, SOLUTION INTRAMUSCULAR; INTRAVENOUS at 09:14

## 2022-03-01 RX ADMIN — MIDODRINE HYDROCHLORIDE 5 MG: 5 TABLET ORAL at 11:34

## 2022-03-01 RX ADMIN — CHOLESTYRAMINE 4 G: 4 POWDER, FOR SUSPENSION ORAL at 08:53

## 2022-03-01 RX ADMIN — FOLIC ACID 1 MG: 1 TABLET ORAL at 08:53

## 2022-03-01 RX ADMIN — Medication 1 PACKET: at 08:53

## 2022-03-01 RX ADMIN — ACETAMINOPHEN 975 MG: 325 TABLET, FILM COATED ORAL at 13:52

## 2022-03-01 NOTE — OCCUPATIONAL THERAPY NOTE
Occupational Therapy Evaluation     Patient Name: Sigifredo Stewart  OJJRZ'K Date: 3/1/2022  Problem List  Principal Problem:    Jaundice  Active Problems:    Malignant neoplasm of overlapping sites of left breast in female, estrogen receptor positive ([facility] )    History of DVT (deep vein thrombosis)    Hyponatremia    OP[facility] (acute kidney injury) ([facility] on CKD    Alcohol abuse    Benign hypertension with CKD (chronic kidney disease) stage III (HCC)    Anemia    Elevated INR    Encephalopathy    Elevated procalcitonin    Proteinuria    Abnormal urinalysis    Past Medical History  Past Medical History:   Diagnosis Date    Acute DVT (deep venous thrombosis) (HCC)     of LLE>     Bladder infection     Congenital prolapsed rectum     History of biliary stent insertion     History of chemotherapy     History of radiation therapy 05/2018    left breast ca    History of transfusion     x2 s/p chemo treatments, no reaction    Hydronephrosis     right kidney    Hypertension     Malignant neoplasm of overlapping sites of left female breast ([facility] ) 05/01/2018    Migraine      Past Surgical History  Past Surgical History:   Procedure Laterality Date    ABDOMINAL ADHESION SURGERY N/A 4/23/2019    Procedure: LYSIS ADHESIONS;  Surgeon: Edwin Garcia MD;  Location: [facility];  Service: Colorectal    BREAST BIOPSY      COLON SURGERY      colon resection    CYSTOSCOPY N/A 3/4/2019    Procedure: CYSTOSCOPY WITH BIOPSIES AND [place] OF RECTUM PROLAPSE;  Surgeon: Maxine Doll MD;  [facility];  Service: Urology    ERCP N/A 1/4/2019    Procedure: ENDOSCOPIC RETROGRADE CHOLANGIOPANCREATOGRAPHY (ERCP);   Surgeon: Kj Silva MD;  Location[facility];  Service: Gastroenterology    INSTILLATION MYTOMYCIN N/A 3/4/2019    Procedure: Paty Webb;  Surgeon: Maxine Doll MD;  [facility];  Service: Urology    MASTECTOMY Left     2018    MS COLONOSCOPY FLX DX W/COLLJ [address] WHEN PFRMD N/A 4/22/2019    Procedure: COLONOSCOPY;  Surgeon: Naida Aguila MD;  Location: [facility] GI LAB; Service: Colorectal    NE EDG US EXAM SURGICAL ALTER STOM DUODENUM/JEJUNUM N/A 3/7/2019    Procedure: LINEAR ENDOSCOPIC U/S;  Surgeon: Cesar Shah MD;  Location: [facility]; Service: Gastroenterology    NE ESOPHAGOGASTRODUODENOSCOPY TRANSORAL DIAGNOSTIC N/A 3/7/2019    Procedure: ESOPHAGOGASTRODUODENOSCOPY (EGD); Surgeon: Cesar Shah MD;  Location: [facility]; Service: Gastroenterology    NE INSJ TUNNELED CTR VAD W/SUBQ PORT AGE 5 YR/> N/A 6/26/2018    Procedure: INSERTION VENOUS PORT ( PORT-A-CATH) IR;  Surgeon: Ponce Rey DO;  [facility];  Service: Interventional Radiology    NE LAP, SURG PROCTOPEXY N/A 4/23/2019    Procedure: LAPAROSCOPIC HAND-ASSIST PELVIC DISSECTION; proctopexy;  Surgeon: Naida Aguila MD;  [facility]: [facility];  Service: Colorectal    NE LAP, SURG PROCTOPEXY N/A 11/18/2020    Procedure: LAPAROSCOPIC LYSIS OF ADHESIONS, MOBILIZATION OF RECTUM AND SUTURE RECTOPEXY;  Surgeon: Erin Foy MD;  Location: [facility];  Service: Colorectal    NE MASTECTOMY, MODIFIED RADICAL Left 5/15/2018    Procedure: BREAST MODIFIED RADICAL MASTECTOMY;  Surgeon: Toi Barcenas MD;  [facility]: [facility];  Service: Surgical Oncology    NE RMVL [place] CTR VAD W/SUBQ PORT/ CTR/PRPH INSJ N/A 6/4/2019    Procedure: REMOVAL VENOUS PORT (PORT-A-CATH)IR;  Surgeon: Ponce Rey DO;  [facility];  Service: Interventional Radiology    TUBAL LIGATION      US GUIDANCE BREAST BIOPSY LEFT EACH ADDITIONAL Left 4/3/2018    US GUIDED BREAST BIOPSY LEFT COMPLETE Left 4/3/2018    US GUIDED BREAST LYMPH NODE BIOPSY LEFT Left 4/3/2018           03/01/22 1349   OT Last Visit   OT Visit Date 03/01/22  (Tuesday)   Note Type   Note type Evaluation   Restrictions/Precautions   Weight Bearing Precautions Per Order No   Other Precautions Cognitive; Chair Alarm; Bed Alarm; Fall Risk;Pain;Limb alert  (Daly Izabelarhonda on room air)   Pain Assessment   Pain Assessment Tool 0-10   Pain Score 10 - Worst Possible Pain   Pain Location/Orientation Location: Buttocks   Hospital Pain Intervention(s) Repositioned; Ambulation/increased activity; Emotional support  (RN, Dk aware)   Lizz[address]; Other (Comment)  (2 MARIJA)   Home Layout Two level; Able to live on main level with bedroom/bathroom; Performs ADLs on one level   Bathroom Shower/Tub Walk-in shower   Bathroom Toilet Standard   Bathroom Equipment   (no DME)   [address]   (no AD or DME PTA)   Additional Comments Pt reports living w/ spouse in 2 [facility] w/ 2 MARIJA and first floor set- up   Prior Function   Level of [place] Independent with ADLs and functional mobility   Lives With Spouse   Receives Help From Family   ADL Assistance Independent   IADLs Needs assistance   Falls in the last 6 months 0   Vocational Retired   Comments Pt not accurate, consistent historian upon eval  Pt reports I w/ ADL at baseline w/ out use of AD  Pt's famly (spouse, mother, and daughter) present reports doing well at home until 1 week ago  Lifestyle   Autonomy Pt not accurate, consistent historian upon eval  At baseline, pt completed ADL w/ out assistance or use of AD for functional mobility   Reciprocal Relationships Pt reports living w/  in 2 [facility] w/ 1st floor set- up   Service to Others 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 [employer] reports enjoying their pit bull puppy and watching tv Britney Chick)   Subjective   Subjective "Maybe later" (stated when asked if agreeable to participate)   Family present encouraged pt to participate at this time, and added they will be here all afternoon   ADL   Where Assessed Edge of bed  (vs bathroom)   Eating Assistance 6  Modified independent   Eating Deficit Setup   Grooming Assistance 5  Supervision/Setup   Grooming Deficit Setup;Supervision/safety;Verbal cueing; Increased time to complete  (seated at EOB w/ + time due to decreased standing zackery)   UB Bathing Assistance Unable to assess  (due to decreased activity tolerance)   LB Bathing Assistance Unable to assess   UB Dressing Assistance 4  Minimal Assistance   UB Dressing Deficit Setup;Supervision/safety; Increased time to complete; Fasteners;Pull around back  (seated to don / doff gown)   LB Dressing Assistance 3  Moderate Assistance  (seated at EOB)   LB Dressing Deficit Setup;Steadying; Requires assistive device for steadying; Increased time to complete;Pull up over hips; Thread RLE into pants; Thread LLE into pants   Toileting Assistance  3  Moderate Assistance   Toileting Deficit Clothing management up;Clothing management down;Grab bar use;Perineal hygiene;Setup;Supervison/safety; Increased time to complete;Verbal cueing  (voided seated on standard toilet in bathroom)   Bed Mobility   Supine to Sit 5  Supervision  (to pt's L)   Additional items Assist x 1; Increased time required;HOB elevated   Sit to Supine Unable to assess   Additional Comments Pt seated OOB in chair post eval w/ needs met, call bell in reach and chair alarm activated  Transfers   Sit to Stand 5  Supervision   Additional items Assist x 1;Verbal cues; Increased time required  (from EOB initially, cues for hand placement)   Stand to Sit 4  Minimal assistance   Additional items Assist x 1; Increased time required;Verbal cues  (hand placement, controlled decent / steadying A)   Toilet transfer 4  Minimal assistance   Additional items Assist x 1; Increased time required;Verbal cues;Standard toilet  (R grab bar use and L UE on toilet seat / frame)   Additional Comments Pt required S to stand from EOB and min A from standard toilet w/ + time and encouragement  Pt expressed fearful that she will not be able to get up and fatigue      Functional Mobility   Functional Mobility   (S <> min A)   Additional Comments (close) S using RW from EOB to bathroom and min A using RW after toileting from toilet in bathroom to recliner chair placed outdie bathroom door   Additional items Rolling walker   Balance   Static Sitting Good   Dynamic Sitting Fair   Static Standing Fair -   Ambulatory   (fair - <> poor+)   Activity Tolerance   Activity Tolerance Patient limited by fatigue;Patient limited by pain  (limited insight into deficits)   Medical Staff Made Aware spoke to Vahe Mendoza per RN [facility] appropriate to see pt   RUE Strength   RUE Overall Strength Within Functional Limits - able to perform ADL tasks with strength   Edema   RUE Edema Non-pitting   LUE Assessment   LUE Assessment X  (s/p L mastectomy)   LUE Strength   LUE Overall Strength Within Functional Limits - able to perform ADL tasks with strength   Edema   LUE Edema Non-pitting   Hand Function   Gross Motor Coordination Functional   Sensation   Light Touch Not tested   Cognition   Overall Cognitive Status Impaired   Arousal/Participation Alert   Attention Attends with cues to redirect   Orientation Level Oriented to person;Oriented to place  (able to introduce family present)   Memory Decreased recall of recent events;Decreased short term memory   Following Commands Follows one step commands with increased time or repetition  (encouragement)   Comments Identified pt by full name and birthdate  Alert and able to introduce family present w/ 1 erros  Benefits from cues from family  Pt able to follow directions and communicate wants / needs w/ + time and encouragement  Disoriented to time and demonstrated limited insight into deficits  Recommend ongoing eval of functional cognitive skills to assist in DC planning  Pt's  present reports pt is putting on a front for this therapist and will do things for RN staff but unable at home  Assessment   Limitation Decreased ADL status; Decreased UE strength;Decreased Safe judgement during ADL;Decreased cognition;Decreased endurance;Decreased self-care trans;Decreased high-level ADLs   Assessment Pt is a 66yo female admitted to [facility] on 2/22/22  Pt presented w/ fatigue and SOB  Significant PMH Impacting her occupational performance includes HTN, breast cancer s/p mastectomy and chemo / radiation (2019), alcohol dependence, DVT, L mastectomy, migraine  Pt w/ active OT Orders and activity orders  Diagnosed w/ severe sepsis, jaundice  Pt received 1 unit of blood  Personal factors impacting performance includes difficulty completing IADLs, difficulty managing steps, limited insight into deficits, and increased pain  Pt reports living w/ her  in 2 [facility] w/ 2 MARIJA and first floor set- up PTA  Pt reports I w/ ADL at baseline w/ out use of AD  Upon eval, pt alert and oriented to person, place  Disoriented to time and demonstrated limited recall recent events  Pt agreeable and cooperative w/ encouragement, cues  Pt completed bed mobility supine to sit w/ S  Pt required S <> min A to complete sit <>stand and functional mobility using RW  Pt required mod A to complete toileting and LBD  Pt required min A to complete UBD  Pt presents w/ decreased cognition, limited insight into deficits, decreased activity tolerance, decreased endurance, generalized weakness / deconditioning, decreased standing tolerance, decreased balance, and increased pain impacting her I w/ dressing, bathing, oral hygiene, functional mobility, functional transfers, activity engagement, clothing mgmt, pet care  Pt completing ADL below baseline level of I and would benefit from OT while in acute care to address deficits  Recommend ongoing eval of functional cognitive skills to assist in [address] [place] [address] and return home w/ family support / assist vs rehab pend consistent S w/ ADLs, functional transfers / mobility using AD and support at [facility]   Will continue to follow   Goals   Patient Goals Pt stated that she would like to go home   Plan   Treatment Interventions ADL retraining;Functional transfer training; Endurance training;Patient/family training;Equipment evaluation/education; Compensatory technique education;Continued evaluation; Energy conservation; Activityengagement;Cognitive reorientation   Goal Expiration Date 03/11/22   OT Frequency 2-3x/wk   Recommendation   OT Discharge Recommendation Home with home health rehabilitation  (Home OT vs Rehab pend consistency w/ fxal transfer/mob/ADL)   Equipment Recommended Bedside commode; Shower/Tub chair with back ($)  (use of RW at thsis time)   Commode Type Standard   AM-PAC Daily Activity Inpatient   Lower Body Dressing 2   Bathing 2   Toileting 2   Upper Body Dressing 3   Grooming 4   Eating 4   Daily Activity Raw Score 17   Daily Activity Standardized Score (Calc for Raw Score >=11) 37 26   AM-[facility] Applied Cognition Inpatient   Following a Speech/Presentation 3   Understanding Ordinary Conversation 3   Taking Medications 3   Remembering Where Things Are Placed or Put Away 3   Remembering List of 4-5 Errands 2   Taking Care of Complicated Tasks 2   Applied Cognition Raw Score 16   Applied Cognition Standardized Score 35 03   Barthel Index   Feeding 10   Bathing 0   Grooming Score 5   Dressing Score 5   Bladder Score 10   Bowels Score 5   Toilet Use Score 5   Transfers (Bed/Chair) Score 10   Mobility (Level Surface) Score 0   Stairs Score 0   Barthel Index Score 50   Modified Celine Scale   Modified Celine Scale 4     The patient's raw score on the AM-PAC Daily Activity inpatient short form is 17, standardized score is 37 26, less than 39 4  Patients at this level are likely to benefit from discharge to post-acute rehabilitation services  Please refer to the recommendation of the Occupational Therapist for safe discharge planning  Recommend DC home w/ family support / assist and Home OT using RW vs rehab pend improved consistency w/ functional mobility / transfers and A at PLOF        Pt goals to be met by 3/11/22:  -Pt will demonstrate good attention and participation in continued evaluation to assess functional cognitive skills to assist in DC Planning    -Pt will consistently follow 2 step directions during ADLs w/ good recall and no more than 1 cue / prompt to improve engagement and max I w/ ADLs    -Pt will complete UBD w/ mod I after set- up w/ out rest break to max I and return home    -Pt will complete LBD w/ S using LHAE as needed to max I w/ ADLs     -Pt will consistently engage in functional mobility using AD as needed to / from bathroom w/ S to max I w/ ADLs to return home    -Pt will demonstrate improved functional standing tolerance for at least 10 minutes using AD as needed w/ at least fair balance while engaged in ADLs standing at sink to max I w/ pet care to return home    -Pt will complete bed mobility supine <> sit w/ mod I to max I w/ ADLs to return home    -Pt will demonstrate good attention and understanding EC tech to max I and improve engagement to return home    [facility], OTR/L

## 2022-03-01 NOTE — ASSESSMENT & PLAN NOTE
History of EtOH abuse - 1/5 bottle of vodka daily  · Per patient's , last alcohol intake was 3-4 weeks ago  · No signs of withdrawal  · Rest of plan above as outlined under alcoholic hepatitis

## 2022-03-01 NOTE — PLAN OF CARE
Problem: OCCUPATIONAL THERAPY ADULT  Goal: Performs self-care activities at highest level of function for planned discharge setting  See evaluation for individualized goals  Description: Treatment Interventions: ADL retraining,Functional transfer training,Endurance training,Patient/family training,Equipment evaluation/education,Compensatory technique education,Continued evaluation,Energy conservation,Activityengagement,Cognitive reorientation  Equipment Recommended: Bedside commode,Shower/Tub chair with back ($) (use of RW at thsis time)       See flowsheet documentation for full assessment, interventions and recommendations  Note: Limitation: Decreased ADL status,Decreased UE strength,Decreased Safe judgement during ADL,Decreased cognition,Decreased endurance,Decreased self-care trans,Decreased high-level ADLs     Assessment: Pt is a 64yo female admitted to [facility] on 2/22/22  Pt presented w/ fatigue and SOB  Significant PMH Impacting her occupational performance includes HTN, breast cancer s/p mastectomy and chemo / radiation (2019), alcohol dependence, DVT, L mastectomy, migraine  Pt w/ active OT Orders and activity orders  Diagnosed w/ severe sepsis, jaundice  Pt received 1 unit of blood  Personal factors impacting performance includes difficulty completing IADLs, difficulty managing steps, limited insight into deficits, and increased pain  Pt reports living w/ her  in 2 [address] [facility] w/ 2 MARIJA and first floor set- up PTA  Pt reports I w/ ADL at baseline w/ out use of AD  Upon eval, pt alert and oriented to person, place  Disoriented to time and demonstrated limited recall recent events  Pt agreeable and cooperative w/ encouragement, cues  Pt completed bed mobility supine to sit w/ S  Pt required S <> min A to complete sit <>stand and functional mobility using RW  Pt required mod A to complete toileting and LBD  Pt required min A to complete UBD   Pt presents w/ decreased cognition, limited insight into deficits, decreased activity tolerance, decreased endurance, generalized weakness / deconditioning, decreased standing tolerance, decreased balance, and increased pain impacting her I w/ dressing, bathing, oral hygiene, functional mobility, functional transfers, activity engagement, clothing mgmt, pet care  Pt completing ADL below baseline level of I and would benefit from OT while in acute care to address deficits  Recommend ongoing eval of functional cognitive skills to assist in [address] [place] [address] and return home w/ family support / assist vs rehab pend consistent S w/ ADLs, functional transfers / mobility using AD and support at [facility]   Will continue to follow     OT Discharge Recommendation: Home with home health rehabilitation (Home OT vs Rehab pend consistency w/ fxal transfer/mob/ADL)

## 2022-03-01 NOTE — ASSESSMENT & PLAN NOTE
Lab Results   Component Value Date    EGFR 23 03/01/2022    EGFR 22 02/28/2022    EGFR 37 02/26/2022    CREATININE 2 18 (H) 03/01/2022    CREATININE 2 30 (H) 02/28/2022    CREATININE 1 51 (H) 02/26/2022     Hemoglobin 8 9 on presentation, baseline appears to be around 10  ; hemoglobin this admission trended down to as low as 6 5 requiring blood transfusion  MCV high  C31 normal, folic acid low, was started on folic acid supplementation  Repeat CBC in 1 week and follow-up with results with PCP

## 2022-03-01 NOTE — PHYSICAL THERAPY NOTE
PHYSICAL THERAPY NOTE          Patient Name: Bridgette Metz  [id number] Date: 3/1/2022        03/01/22 5276   PT Last Visit   PT Visit Date 22   Note Type   Note Type Treatment   Pain Assessment   Pain Assessment Tool 0-10   Pain Score 10 - Worst Possible Pain   Pain Location/Orientation Location: Buttocks   Pain Onset/Description Onset: Ongoing   Hospital Pain Intervention(s) Repositioned; Ambulation/increased activity; Rest   Multiple Pain Sites No   Pain Rating: FLACC (Rest) - Face 1   Pain Rating: FLACC (Rest) - Legs 0   Pain Rating: FLACC (Rest) - Activity 0   Pain Rating: FLACC (Rest) - Cry 0   Pain Rating: FLACC (Rest) - Consolability 0   Score: FLACC (Rest) 1   Pain Rating: FLACC (Activity) - Face 0   Pain Rating: FLACC (Activity) - Legs 0   Pain Rating: FLACC (Activity) - Activity 0   Pain Rating: FLACC (Activity) - Cry 0   Pain Rating: FLACC (Activity) - Consolability 0   Score: FLACC (Activity) 0   Restrictions/Precautions   Weight Bearing Precautions Per Order No   Other Precautions Chair Alarm; Bed Alarm; Fall Risk;Pain   General   Chart Reviewed Yes   Response to Previous Treatment Patient with no complaints from previous session  Family/Caregiver Present No   Cognition   Overall Cognitive Status Impaired   Arousal/Participation Alert; Responsive; Uncooperative   Attention Attends with cues to redirect   Orientation Level Oriented to person;Oriented to place;Oriented to situation   Memory Decreased recall of precautions   Following Commands Follows one step commands with increased time or repetition   Comments pt identified by name and   pt was uncooperative in todays tx session as pt complained of 10/10 buttocks pain not being managed correctly   Subjective   Subjective pt stated that she wants to go home  pt stated that she will not walk in todays tx session   pt staed that standing does relieve her buttocks pain   Bed Mobility   Rolling R 5  Supervision   Additional items Assist x 1;HOB elevated; Bedrails; Increased time required;Verbal cues   Supine to Sit 5  Supervision   Additional items Assist x 1;HOB elevated; Bedrails; Increased time required   Sit to Supine 5  Supervision   Additional items Assist x 1;HOB elevated; Bedrails; Increased time required;Verbal cues   Additional Comments pt was not able to sit EOB for more then 1 minute due to increase buttocks pain    Transfers   Sit to Stand 5  Supervision   Additional items Assist x 1; Increased time required;Verbal cues   Stand to Sit 5  Supervision   Additional items Assist x 1; Increased time required;Verbal cues   Additional Comments pt was able to stand 2 minutes w/o LOB with RW    Ambulation/Elevation   Ambulation/Elevation Additional Comments pt refused any ambulation in todays tx session    Balance   Static Sitting Fair +   Dynamic Sitting Fair +   Static Standing Fair -   Dynamic Standing Fair -   Ambulatory Fair -   Endurance Deficit   Endurance Deficit Yes   Endurance Deficit Description limited sitting EOB and standing tolerance    Activity Tolerance   Activity Tolerance Patient limited by fatigue;Patient limited by pain   Nurse Made Aware Spoke to RN [facility]    Assessment   Prognosis Fair   Problem List Decreased strength;Decreased endurance; Impaired balance;Decreased mobility; Decreased safety awareness   Assessment pt began tx session lying supine in the bed and was agreeable to participate in PT intervention initially  pt was educated on all bed mobility strategies such as rolling and repoditioning in the bed in order to alleviate pressure from buttock and how to complete a supine<>sit EOB transfer with use of bed rails to complete transfers  pt continues to remain consistant with /s for all bed mobility including performing a supine<>sit EOB transfer  pt was limited with sitting EOB secondary to increase buttocks pain less then 1 minute   pt was able to perform a STS from EOB to RW with continued /s and VC's for hand placement  pt was able to stand 1 minute with RW w/o LOB then asked pt to ambulate and pt refused and sat herself back into bed and stated she is done  post tx session pt in bed with call bell and all pt needs met    Goals   Patient Goals to go home    STG Expiration Date 03/05/22   PT Treatment Day 2   Plan   Treatment/Interventions Functional transfer training;LE strengthening/ROM; Therapeutic exercise; Endurance training;Cognitive reorientation;Patient/family training;Equipment eval/education; Bed mobility;Gait training;Spoke to nursing;Spoke to advanced practitioner   Progress Slow progress, decreased activity tolerance  (pt refused to fully participate in PT intervention )   PT Frequency 3-5x/wk   Recommendation   PT Discharge Recommendation Home with home health rehabilitation   Sonny 8 in Bed Without Bedrails 3   Lying on Back to Sitting on Edge of Flat Bed 3   Moving Bed to Chair 3   Standing Up From Chair 3   Walk in Room 3   Climb 3-5 Stairs 3   Basic Mobility Inpatient Raw Score 18   Basic Mobility Standardized Score 41 05   Highest Level Of Mobility   -HL Goal 6: Walk 10 steps or more   -[facility] Highest Level of Mobility 5: Stand (1 or more minutes)   -HL Goal Achieved No   Education   Education Provided Mobility training;Assistive device; Other  (bed mobility and fucntional transfers )   Patient Reinforcement needed   End of Consult   Patient Position at End of Consult Supine;Bed/Chair alarm activated; All needs within reach   The patient's AM-PAC Basic Mobility Inpatient Short Form Raw Score is 18  A Raw score of greater than 16 suggests the patient may benefit from discharge to home  Please also refer to the recommendation of the Physical Therapist for safe discharge planning        Cecille Polo, PTA

## 2022-03-01 NOTE — UTILIZATION REVIEW
Continued Stay Review    Date: 03/01/22                          Current Patient Class: IP  Current Level of Care: Med/Surg    HPI:61 y o  female initially admitted on 02/23/22 for severe sepsis, hyperbilirubinemia      02/28/22 Nephrology Consult: Pt w/ OPAL on CKD S3 w/ Crt 2 3 today  Likely prerenal s/t drop in Hgb and hemodynamic changes w/ BP in 90s in setting of solitary kidney  Plan: s/p pRBCs today, IV albumin, midodrine 5 mg TID, avoid hypotension, check PVR, avoid nephrotoxins and adjust meds per eGFR  Assessment/Plan: Pt w/ back pain but no n/v  On exam, scleral icterus, jaundice, ill-appearing  Crt 2 18  Plan: trend labs, continue prednisolone w/ extended taper over 2 months, PPI, folic acid/thiamine supplements, albumin BID      Vital Signs:   Date/Time Temp Pulse Resp BP MAP (mmHg) SpO2 O2 Device   03/01/22 14:40:40 97 6 °F (36 4 °C) 80 18 127/91 103 100 % --   03/01/22 07:15:46 98 1 °F (36 7 °C) 79 16 115/75 88 97 % --   02/28/22 22:24:28 97 5 °F (36 4 °C) 86 18 103/67 79 96 % --   02/28/22 1500 98 1 °F (36 7 °C) 81 18 103/61 78 95 % --   02/28/22 1319 97 5 °F (36 4 °C) 88 16 107/34 Abnormal  -- 95 % --   02/28/22 1040 98 °F (36 7 °C) 87 16 103/51 -- 95 % None (Room air)   02/28/22 1000 98 °F (36 7 °C) 90 14 101/63 -- 94 % --   02/28/22 0737 97 5 °F (36 4 °C) 90 16 108/59 77 94 % None (Room air)   02/28/22 03:42:57 -- -- -- 99/67 78 -- --   02/27/22 22:16:24 97 4 °F (36 3 °C) Abnormal  90 16 95/63 74 94 % --   02/27/22 18:57:25 97 5 °F (36 4 °C) 89 15 103/69 98 92 % --   02/27/22 15:07:07 97 8 °F (36 6 °C) 94 16 107/70 86 90 % --   02/27/22 11:20:02 98 °F (36 7 °C) 89 16 121/71 88 95 % --   02/27/22 0930 -- -- -- -- -- -- None (Room air)   02/27/22 07:03:48 -- -- 16 107/65 79 -- --       Pertinent Labs/Diagnostic Results:   Results from last 7 days   Lab Units 02/22/22  1755   SARS-COV-2  Negative     Results from last 7 days   Lab Units 03/01/22  0530 02/28/22  1335 02/28/22  0711 02/28/22  0518 02/26/22  0509 02/25/22 2027 02/25/22 2027   WBC Thousand/uL 22 44*  --   --  20 78* 19 19*   < > 22 24*   HEMOGLOBIN g/dL 10 2* 10 6* 6 5* 6 9* 7 2*   < > 7 1*   HEMATOCRIT % 29 2* 30 5*  --  20 1* 21 5*  --  21 4*   PLATELETS Thousands/uL 364  --   --  338 356   < > 366   NEUTROS ABS Thousands/µL 20 49*  --   --   --  16 76*  --  19 63*    < > = values in this interval not displayed  Results from last 7 days   Lab Units 03/01/22  0530 02/28/22  0518 02/26/22  1401 02/26/22  0509 02/25/22  0448   SODIUM mmol/L 135* 132* 136 135* 132*   POTASSIUM mmol/L 4 0 3 5 3 5 3 6 4 3   CHLORIDE mmol/L 104 98* 106 104 104   CO2 mmol/L 18* 25 16* 15* 14*   ANION GAP mmol/L 13 9 14* 16* 14*   BUN mg/dL 48* 36* 25 26* 20   CREATININE mg/dL 2 18* 2 30* 1 51* 1 56* 1 02   EGFR ml/min/1 73sq m 23 22 37 35 59   CALCIUM mg/dL 7 5* 6 6* 7 2* 7 3* 7 7*     Results from last 7 days   Lab Units 03/01/22  0530 02/28/22  1335 02/28/22  0518 02/26/22  0509 02/25/22  0448 02/24/22  1525 02/23/22  0526 02/22/22  1806   AST U/L 55*  --  60* 75* 88* 87*   < >  --    ALT U/L 20  --  19 18 21 19   < >  --    ALK PHOS U/L 166*  --  149* 139* 155* 155*   < >  --    TOTAL PROTEIN g/dL 5 3*  --  4 5* 4 7* 5 4* 5 4*   < >  --    ALBUMIN g/dL 1 7*  --  1 1* 1 1* 1 2* 1 2*   < >  --    TOTAL BILIRUBIN mg/dL 5 75*  --  5 48* 8 06* 10 17* 9 63*   < >  --    BILIRUBIN DIRECT mg/dL 4 33*  --  4 62* 6 62* 7 58* 7 78*   < >  --    AMMONIA umol/L  --  <10*  --   --   --   --   --  <10*    < > = values in this interval not displayed       Results from last 7 days   Lab Units 02/27/22  0526   POC GLUCOSE mg/dl 90     Results from last 7 days   Lab Units 03/01/22  0530 02/28/22  0518 02/26/22  1401 02/26/22  0509 02/25/22  0448 02/24/22  0616 02/23/22  0526 02/22/22  1650   GLUCOSE RANDOM mg/dL 130 362* 120 99 81 92 76 107     Results from last 7 days   Lab Units 02/22/22  2051 02/22/22  1806 02/22/22  1650   HS TNI 0HR ng/L  --   --  8   HS TNI 2HR ng/L  -- 9  --    HSTNI D2 ng/L  --  1  --    HS TNI 4HR ng/L 9  --   --    HSTNI D4 ng/L 1  --   --      Results from last 7 days   Lab Units 03/01/22  0530 02/28/22  0518 02/26/22  0509 02/23/22  0526 02/22/22  1806   PROTIME seconds 20 4* 23 9* 26 9*   < > 17 3*   INR  1 77* 2 18* 2 55*   < > 1 42*   PTT seconds  --   --   --   --  40*    < > = values in this interval not displayed       Results from last 7 days   Lab Units 03/01/22  0530 02/26/22  0509 02/24/22  0901   PROCALCITONIN ng/ml 1 98* 5 17* 2 56*     Results from last 7 days   Lab Units 02/26/22  1401 02/25/22  2027 02/25/22  1507 02/22/22  1947 02/22/22  1806   LACTIC ACID mmol/L 2 2* 3 2* 3 8* 2 0 2 2*     Results from last 7 days   Lab Units 02/22/22  1806   NT-PRO BNP pg/mL 2,355*     Results from last 7 days   Lab Units 03/01/22  0530   FERRITIN ng/mL 625*  665*     Results from last 7 days   Lab Units 02/23/22  0046   HEP B S AG  Non-reactive   HEP C AB  Non-reactive   HEP B C IGM  Non-reactive     Results from last 7 days   Lab Units 02/22/22  2208   CLARITY UA  Cloudy   COLOR UA  Orange   SPEC GRAV UA  1 015   PH UA  6 5   GLUCOSE UA mg/dl 100 (1/10%)*   KETONES UA mg/dl Negative   BLOOD UA  Moderate*   PROTEIN UA mg/dl 30 (1+)*   NITRITE UA  Negative   BILIRUBIN UA  Interference- unable to analyze*   UROBILINOGEN UA E U /dl 4 0*   LEUKOCYTES UA  Large*   WBC UA /hpf 20-30*   RBC UA /hpf 0-1   BACTERIA UA /hpf Innumerable*   EPITHELIAL CELLS WET PREP /hpf Occasional     Results from last 7 days   Lab Units 02/22/22  1755   INFLUENZA A PCR  Negative   INFLUENZA B PCR  Negative   RSV PCR  Negative     Results from last 7 days   Lab Units 02/23/22  1338   C DIFF TOXIN B BY PCR  Negative     Results from last 7 days   Lab Units 02/23/22  1338   SALMONELLA SP PCR  None Detected   SHIGELLA SP/ENTEROINVASIVE E  COLI (EIEC)  None Detected   CAMPYLOBACTER SP (JEJUNI AND COLI)  None Detected   SHIGA TOXIN 1/SHIGA TOXIN 2  None Detected     Results from last 7 days   Lab Units 02/22/22 2208 02/22/22 1947 02/22/22  1925   BLOOD CULTURE   --  No Growth After 5 Days  No Growth After 5 Days  URINE CULTURE  >100,000 cfu/ml   --   --        Medications:   Scheduled Medications:  acetaminophen, 975 mg, Oral, Q8H [address]  albumin human, 25 g, Intravenous, BID (diuretic)  cholestyramine sugar free, 4 g, Oral, Daily  folic acid, 1 mg, Oral, Daily  heparin (porcine), 5,000 Units, Subcutaneous, Q8H DAVON  letrozole, 2 5 mg, Oral, Daily  midodrine, 5 mg, Oral, TID AC  multivitamin-minerals, 1 tablet, Oral, Daily  pantoprazole, 40 mg, Oral, Early Morning  prednisoLONE, 40 mg, Oral, Daily  psyllium, 1 packet, Oral, BID  thiamine, 100 mg, Intravenous, Daily    Continuous IV Infusions: none    PRN Meds:  loperamide, 2 mg, Oral, TID PRN; 2/28 x1  ondansetron, 4 mg, Intravenous, Q6H PRN  oxyCODONE, 10 mg, Oral, Q6H PRN  oxyCODONE, 5 mg, Oral, Q6H PRN; 2/28 x1        Discharge Plan: TBD    Network Utilization Review Department  ATTENTION: Please call with any questions or concerns to [phone] and carefully listen to the prompts so that you are directed to the right person  All voicemails are confidential   Leigh Ann Kay all requests for admission clinical reviews, approved or denied determinations and any other requests to dedicated fax number below belonging to the campus where the patient is receiving treatment   List of dedicated fax numbers for the Facilities:  [address] [place] [address] DENIALS (Administrative/Medical Necessity) [phone]   [address] [place] [address] (Maternity/NICU/Pediatrics) 261 [facility],[address]  [address] [facility] [place] [address] [facility] [address] [place] [address] [address] 1481 [address] [facility] [phone]

## 2022-03-01 NOTE — ASSESSMENT & PLAN NOTE
· Stage III, dx in 2018  S/P mastectomy 2018, chemotherapy and radiation therapy     · Currently on letrozole therapy for continued management    Plan:  - continue letrozole 2 5 mg daily   Family had also requested a referral to palliative care in outpatient setting

## 2022-03-01 NOTE — PROGRESS NOTES
NEPHROLOGY HOSPITAL PROGRESS NOTE   Alex Watkins 62 y o  female MRN: [id number]  Unit/Bed#: S -01 Encounter: [id number]  Reason for Consult:  Acute kidney injury on CKD    ASSESSMENT and PLAN:  Alex Watkins is a 62 y o  female with a past medical history of chronic kidney disease, hypertension, breast cancer status post mastectomy and chemo radiation 2019, alcohol dependence, DVT who was admitted to [facility] after presenting with jaundice, fatigue and shortness of breath on 02/22/22  Initial creatinine at baseline  During the course of hospitalization creatinine increased up to 2 3 as of 2/28 prompting nephrology consult  Acute kidney injury:  · Etiology:  Felt to be likely related to decreased renal perfusion due to hypotension/relative hypotension, acute anemia, contrast exposure all in the setting of solitary functioning kidney  Hepatorenal less likely  · Admitted 02/22:  Creatinine 1 29, at baseline  · 2/22:  Received contrast  · Creatinine remained at baseline increasing as of 2/26 and peaking at 2 3 on 02/28  · 2/28:  Started on albumin, midodrine  · 3/1:  Creatinine down to 2 18, slowly improving  · Plan:  · Check labs in the a m  · No changes at this time    Chronic kidney disease, stage III  · Seen by our service 1 time during hospitalization then for follow-up on 03/25/2021 by Dr Janiya Singletary  · Baseline creatinine 1 1-1 3 mg/dL as of June 2021  · Etiology:  Likely hypertensive nephrosclerosis, atrophic right kidney, use of NSAIDs    Blood pressure/volume status:  · History of essential hypertension  · From review of outpatient records blood pressure generally runs on the high side  At last office visit 1 year ago blood pressure 152/78    Or outpatient follow-up  · Home medications:  Carvedilol 6 25 mg b i d , for appy meld 240 mg daily, torsemide 20 mg daily as needed for leg swelling  · Antihypertensives on hold  · Hypoalbuminemia:  Initiated albumin infusions on 02/28    Abnormal liver function studies/jaundice on admission:  · Etiology Likely acute alcoholic hepatitis  · Serologic Workup:  Per GI  · Imaging: Gallbladder wall abnormally thickened and portions of the gallbladder wall appeared edematous with pericholecystic fluid  Also noted hypoechoic lesion posterior aspect of the left lobe of the liver which is similar to prior imaging in 2018  · On prednisone  · T bili down to 5 75  · Ammonia level less than 10    Anemia:  · Required 1 unit of packed cells since admission  · Hemoglobin improved post transfusion, currently 10 2  Fairly stable    Electrolytes/acid-base:  · Low bicarbonate:  Possibly related to GI losses with diarrhea  · Mild hyponatremia:  Monitor  If sodium level drops any further will order fluid restriction  · Potassium acceptable    Diastolic CHF:  · No evidence of decompensation  · Diuretics on hold    Chronic diarrhea:  · C diff negative  · Reports that she had several watery stools overnight but overall diarrhea has decreased    DISPOSITION:  No change this time  Continue albumin and midodrine today  Check labs in the a m  SUBJECTIVE / 24H INTERVAL HISTORY:  No complaints at this time  No pain  No shortness of breath  Reports decreasing diarrhea  Reports that she is eating    OBJECTIVE:  Current Weight: Weight - Scale: 67 1 kg (148 lb)  Vitals:    02/28/22 1319 02/28/22 1500 02/28/22 2224 03/01/22 0715   BP: (!) 107/34 103/61 103/67 115/75   BP Location: Left arm Left arm     Pulse: 88 81 86 79   Resp: 16 18 18 16   Temp: 97 5 °F (36 4 °C) 98 1 °F (36 7 °C) 97 5 °F (36 4 °C) 98 1 °F (36 7 °C)   TempSrc: Oral Oral     SpO2: 95% 95% 96% 97%   Weight:       Height:           Intake/Output Summary (Last 24 hours) at 3/1/2022 1117  Last data filed at 3/1/2022 0301  Gross per 24 hour   Intake 641 ml   Output 250 ml   Net 391 ml     General:  No acute distress    Chronically ill-appearing lying comfortably in bed  Skin: no rash, jaundiced  Eyes:  Sclera icteric  ENT:  Oropharynx appears moist, clear  Neck:  Neck supple, normal range of motion  No JVD  Chest:  Chest clear bilaterally  No wheezes rhonchi or rales noted  Normal effort  On room air  CVS:    Normal rate, regular rhythm  No murmur rub or gallop appreciated  Abdomen: soft, nontender, nl sounds, nondistended  Extremities:  No lower extremity edema    No mottling or cyanosis  :  No Salcedo catheter  Neuro:  Alert and oriented  Psych:  Pleasant affect  Medications:    Current Facility-Administered Medications:     acetaminophen (TYLENOL) tablet 975 mg, 975 mg, Oral, Q8H [facility], Ileana Marcial MD    albumin human (FLEXBUMIN) 25 % injection 25 g, 25 g, Intravenous, BID (diuretic), JANN Rojas, 25 g at 03/01/22 0982    cholestyramine sugar free (QUESTRAN LIGHT) packet 4 g, 4 g, Oral, Daily, Timmy Washington MD, 4 g at 29/73/79 2822    folic acid (FOLVITE) tablet 1 mg, 1 mg, Oral, Daily, Ileana Marcial MD, 1 mg at 03/01/22 1211    letrozole [facility]) tablet 2 5 mg, 2 5 mg, Oral, Daily, Timmy Washington MD, 2 5 mg at 03/01/22 0854    loperamide (IMODIUM) capsule 2 mg, 2 mg, Oral, TID PRN, Ileana Marcial MD, 2 mg at 02/28/22 0001    midodrine (PROAMATINE) tablet 5 mg, 5 mg, Oral, TID AC, JANN Cervantes, 5 mg at 03/01/22 7565    multivitamin-minerals (CENTRUM) tablet 1 tablet, 1 tablet, Oral, Daily, Ileana Marcial MD, 1 tablet at 03/01/22 0853    ondansetron (ZOFRAN) injection 4 mg, 4 mg, Intravenous, Q6H PRN, Timmy Washington MD    oxyCODONE (ROXICODONE) immediate release tablet 10 mg, 10 mg, Oral, Q6H PRN, Ileana Marcial MD, 10 mg at 02/27/22 1246    oxyCODONE (ROXICODONE) IR tablet 5 mg, 5 mg, Oral, Q6H PRN, Ileana Marcial MD, 5 mg at 02/28/22 0511    pantoprazole (PROTONIX) EC tablet 40 mg, 40 mg, Oral, Early Morning, Pepper Maddox PA-C, 40 mg at 03/01/22 0524    prednisoLONE (ORAPRED) oral solution 40 mg, 40 mg, Oral, Daily, Myrtle Sky PA-C, 40 mg at 03/01/22 4245    psyllium (METAMUCIL) 1 packet, 1 packet, Oral, BID, Shadi Sifuentes MD, 1 packet at 03/01/22 0853    thiamine (VITAMIN B1) 100 mg in sodium chloride 0 9 % 50 mL IVPB, 100 mg, Intravenous, Daily, Rob Cobos MD, Last Rate: 100 mL/hr at 03/01/22 0914, 100 mg at 03/01/22 0914    Laboratory Results:  Results from last 7 days   Lab Units 03/01/22  0530 02/28/22  1335 02/28/22  0711 02/28/22  0518 02/26/22  1401 02/26/22  0509 02/25/22  2027 02/25/22  0448 02/24/22  0616 02/23/22  0526 02/23/22  0526 02/22/22  1650 02/22/22  1650   WBC Thousand/uL 22 44*  --   --  20 78*  --  19 19* 22 24*  --  22 09*  --  23 19*  --  28 35*   HEMOGLOBIN g/dL 10 2* 10 6* 6 5* 6 9*  --  7 2* 7 1*  --  7 7*   < > 7 4*   < > 8 9*   HEMATOCRIT % 29 2* 30 5*  --  20 1*  --  21 5* 21 4*  --  22 8*  --  22 9*   < > 26 8*   PLATELETS Thousands/uL 364  --   --  338  --  356 366  --  378  --  389  --  504*   POTASSIUM mmol/L 4 0  --   --  3 5 3 5 3 6  --  4 3 3 3*  --  3 8   < > 3 8   CHLORIDE mmol/L 104  --   --  98* 106 104  --  104 102  --  103   < > 96*   CO2 mmol/L 18*  --   --  25 16* 15*  --  14* 18*  --  17*   < > 20*   BUN mg/dL 48*  --   --  36* 25 26*  --  20 23  --  30*   < > 34*   CREATININE mg/dL 2 18*  --   --  2 30* 1 51* 1 56*  --  1 02 1 13  --  1 04   < > 1 29   CALCIUM mg/dL 7 5*  --   --  6 6* 7 2* 7 3*  --  7 7* 7 4*  --  7 6*   < > 8 6    < > = values in this interval not displayed

## 2022-03-01 NOTE — ASSESSMENT & PLAN NOTE
· Hx of DVT in 12/2018 in the setting of  malignancy  · She states that she has not taken Eliquis in 3 years because of not being able to follow-up with her doctor  · Did discuss with the patient regarding restarting Eliquis down the line vs holding off on further anticoagulation  · On chart review, there were imaging studies done in 2020, venous duplex did not show any DVT however V/Q scan done at that time showed possibility for PE being intermediate

## 2022-03-01 NOTE — ASSESSMENT & PLAN NOTE
· UA showed 20-30 WBC with innumerable bacteria but with occasional epithelial cells indicating likely contaminated sample and furthermore urine culture was indicative of contamination showing greater than 100,000 CFU per mL mixed contaminants  · No urinary symptoms, no febrile episodes  · Will monitor off antibiotics

## 2022-03-01 NOTE — ASSESSMENT & PLAN NOTE
· Generalized jaundice on admission and per family onset was 2 weeks prior to admission  · Normal ALT, elevated AST and alk-phos  · Total bilirubin elevated at 11, with direct bilirubin at 7 4 on admission -- currently  trending down today at 3 5  · Hepatitis panel unremarkable  · Ultrasound showed: The gallbladder wall is abnormally thickened, measuring approximately 6 mm thickness   Portions of the gallbladder wall appear edematous   There is pericholecystic fluid   There is an approximately 8 x 7 x 9 mm hypoechoic lesion within the posterior aspect of the left lobe of the liver  · GI input appreciated, workup done, currently treated for alcoholic hepatitis with prednisolone 40 mg daily and plan to continue on this dosing for 4 weeks and then taper by 10 mg every week thereafter  · Follow-up with gastroenterology in outpatient setting

## 2022-03-01 NOTE — ASSESSMENT & PLAN NOTE
· Has CKD stage IIIA; Baseline creatinine around 1, trended up to as high as 2 3 and now trending back down, today at 1 7  · Likely prerenal in the setting of poor oral intake, low hemoglobin requiring blood transfusion; patient also received IV contrast on admission  · Nephrology input appreciated  · OPAL likely in the setting of low hemoglobin with administration of contrast on admission as well  · Creatinine now trending down  · Patient will continue on midodrine 5 mg t i d   And was instructed to check blood pressures twice a day and to hold this medication if SBP is greater than 130  · Nephrology will follow-up in outpatient setting as well and I have ordered for repeat blood work in 1 week

## 2022-03-01 NOTE — PLAN OF CARE
Problem: Nutrition/Hydration-ADULT  Goal: Nutrient/Hydration intake appropriate for improving, restoring or maintaining nutritional needs  Description: Monitor and assess patient's nutrition/hydration status for malnutrition  Collaborate with interdisciplinary team and initiate plan and interventions as ordered  Monitor patient's weight and dietary intake as ordered or per policy  Utilize nutrition screening tool and intervene as necessary  Determine patient's food preferences and provide high-protein, high-caloric foods as appropriate       INTERVENTIONS:  - Monitor oral intake, urinary output, labs, and treatment plans  - Assess nutrition and hydration status and recommend course of action  - Evaluate amount of meals eaten  - Assist patient with eating if necessary   - Allow adequate time for meals  - Recommend/ encourage appropriate diets, oral nutritional supplements, and vitamin/mineral supplements  - Order, calculate, and assess calorie counts as needed  - Recommend, monitor, and adjust tube feedings and TPN/PPN based on assessed needs  - Assess need for intravenous fluids  - Provide specific nutrition/hydration education as appropriate  - Include patient/family/caregiver in decisions related to nutrition  Outcome: Progressing     Problem: Prexisting or High Potential for Compromised Skin Integrity  Goal: Skin integrity is maintained or improved  Description: INTERVENTIONS:  - Identify patients at risk for skin breakdown  - Assess and monitor skin integrity  - Assess and monitor nutrition and hydration status  - Monitor labs   - Assess for incontinence   - Turn and reposition patient  - Assist with mobility/ambulation  - Relieve pressure over bony prominences  - Avoid friction and shearing  - Provide appropriate hygiene as needed including keeping skin clean and dry  - Evaluate need for skin moisturizer/barrier cream  - Collaborate with interdisciplinary team   - Patient/family teaching  - Consider wound care consult   Outcome: Progressing     Problem: Potential for Falls  Goal: Patient will remain free of falls  Description: INTERVENTIONS:  - Educate patient/family on patient safety including physical limitations  - Instruct patient to call for assistance with activity   - Consult OT/PT to assist with strengthening/mobility   - Keep Call bell within reach  - Keep bed low and locked with side rails adjusted as appropriate  - Keep care items and personal belongings within reach  - Initiate and maintain comfort rounds  - Make Fall Risk Sign visible to staff  - Initiate/Maintain bed alarm  - Obtain necessary fall risk management equipment:  - Apply yellow socks and bracelet for high fall risk patients  - Consider moving patient to room near nurses station  Outcome: Progressing

## 2022-03-01 NOTE — PROGRESS NOTES
[facility]  Progress Note - [facility] 1960, 62 y o  female MRN: [id number]  Unit/Bed#: S -01 Encounter: [id number]  Primary Care Provider: Raffi Mcdonald DC   Date and time admitted to hospital: 2/22/2022  5:42 PM    * Jaundice  Assessment & Plan  · Etiology unclear at this time; probably alcoholic hepatitis  · Normal ALT, elevated AST and alk-phos  · Total bilirubin elevated at 11, with direct bilirubin at 7 4 on admission -- currently  trending down  · Hepatitis panel unremarkable  · Ultrasound showed: The gallbladder wall is abnormally thickened, measuring approximately 6 mm thickness   Portions of the gallbladder wall appear edematous   There is pericholecystic fluid  There is an approximately 8 x 7 x 9 mm hypoechoic lesion within the posterior aspect of the left lobe of the liver  · AMA and anti smooth muscle antibody negative; ceruloplasmin negative; EBV unremarkable for acute infection  · GI input appreciated  · Surgery was also consulted - no recommendation for any surgery at this time  · Daily CMP, direct bilirubin  · GI input appreciated - patient started on prednisolone for alcoholic hepatitis; continue PPI; continue on current dose of prednisone 40 mg once a day for 28 days and then taper by 10 mg every week  · Would need to follow-up with GI in outpatient setting    Anemia  Assessment & Plan  Lab Results   Component Value Date    EGFR 23 03/01/2022    EGFR 22 02/28/2022    EGFR 37 02/26/2022    CREATININE 2 18 (H) 03/01/2022    CREATININE 2 30 (H) 02/28/2022    CREATININE 1 51 (H) 02/26/2022     Hemoglobin 8 9 on presentation, baseline appears to be around 10  ; Hgb today yesterday at 6 5-- no active bleeding and was given 1 unit packed RBC    Hemoglobin this morning at 10  MCV high  A37 normal, folic acid low, was started on folic acid supplementation  Continue to monitor CBC daily    OPAL (acute kidney injury) ([facility] [address] ) on CKD  Assessment & Plan  · Has CKD stage IIIA; Baseline creatinine around 1  · Continues to trend up; today at 2 3  · Likely prerenal in the setting of poor oral intake  · Continue IV fluids  · Avoid nephrotoxins, avoid hypotension  · CMP daily  · Nephrology input appreciated  · OPAL likely in the setting of low hemoglobin with administration of contrast on admission as well  Encephalopathy  Assessment & Plan  · Occasional confusion  ·  claims that she is currently at her baseline mental status  · Continue IV thiamine daily for possible [place] the encephalopathy given history of chronic alcohol abuse  · Continue folic acid supplementation    History of DVT (deep vein thrombosis)  Assessment & Plan  · Hx of DVT in 12/2018 in the setting of  malignancy  · She states that she has not taken Eliquis in 3 years because of not being able to follow-up with her doctor  · INR increased to 3 5 after restarting Eliquis on 2/24 -- Eliquis discontinued thereafter  · Did discuss with the patient regarding restarting Eliquis down the line vs holding off on further anticoagulation -- she said she will think about it first  · On chart review, there were imaging studies done in 2020, venous duplex did not show any DVT however V/Q scan done at that time showed possibility for PE being intermediate  Abnormal urinalysis  Assessment & Plan  · UA showed 20-30 WBC with innumerable bacteria but with occasional epithelial cells indicating likely contaminated sample and furthermore urine culture was indicative of contamination showing greater than 100,000 CFU per mL mixed contaminants  · No urinary symptoms, no febrile episodes  · Will monitor off antibiotics for now    Benign hypertension with CKD (chronic kidney disease) stage III (HCC)  Assessment & Plan  Holding home verapamil 240mg 24 hr capsule, home carvedilol 6 25mg BID given soft pressures  Patient started by Nephrology on midodrine 5 mg t i d      Alcohol abuse  Assessment & Plan  History of EtOH abuse - 1/5 bottle of vodka daily  · Per patient's , last alcohol intake was 3-4 weeks ago  · No signs of withdrawal, today is hospital day 7 -- has been off ciwa protocol     Hyponatremia  Assessment & Plan  Patient has borderline hyponatremia at baseline with Na around 134 normally  POA with sodium 128, now improving  Likely hypovolemic hyponatremia  Continue IV fluids at this time      Malignant neoplasm of overlapping sites of left breast in female, estrogen receptor positive ([facility] [address] )  Assessment & Plan  · Stage III, dx in 2018  S/P mastectomy 2018, chemotherapy and radiation therapy  · Currently on letrozole therapy for continued management    Plan:  - continue letrozole 2 5 mg daily         VTE Pharmacologic Prophylaxis: VTE Score: 8 High Risk (Score >/= 5) - Pharmacological DVT Prophylaxis Ordered: heparin  Sequential Compression Devices Ordered  Patient Centered Rounds: I performed bedside rounds with nursing staff today  Discussions with Specialists or Other Care Team Provider:  Gastroenterology    Education and Discussions with Family / Patient: Updated  (son) via phone  Current Length of Stay: 6 day(s)  Current Patient Status: Inpatient   Discharge Plan: Anticipate discharge tomorrow to home with home services  Code Status: Level 3 - DNAR and DNI    Subjective: This morning stated that she had pain in her back, no nausea vomiting  Objective:     Vitals:   Temp (24hrs), Av 7 °F (36 5 °C), Min:97 5 °F (36 4 °C), Max:98 1 °F (36 7 °C)    Temp:  [97 5 °F (36 4 °C)-98 1 °F (36 7 °C)] 97 6 °F (36 4 °C)  HR:  [79-86] 80  Resp:  [16-18] 18  BP: (103-127)/(67-91) 127/91  SpO2:  [96 %-100 %] 100 %  Body mass index is 24 63 kg/m²  Input and Output Summary (last 24 hours): Intake/Output Summary (Last 24 hours) at 3/1/2022 1550  Last data filed at 3/1/2022 0301  Gross per 24 hour   Intake --   Output 250 ml   Net -250 ml       Physical Exam:   Physical Exam  Vitals reviewed  Constitutional:       Appearance: She is ill-appearing  Eyes:      General: Scleral icterus present  Cardiovascular:      Rate and Rhythm: Normal rate and regular rhythm  Heart sounds: No murmur heard  Pulmonary:      Effort: No respiratory distress  Breath sounds: No wheezing  Abdominal:      General: Bowel sounds are normal       Tenderness: There is no abdominal tenderness  Musculoskeletal:      Right lower leg: No edema  Left lower leg: No edema  Skin:     General: Skin is warm and dry  Capillary Refill: Capillary refill takes less than 2 seconds  Coloration: Skin is jaundiced  Neurological:      General: No focal deficit present  Mental Status: She is alert and oriented to person, place, and time  Mental status is at baseline  Psychiatric:         Mood and Affect: Mood normal          Thought Content:  Thought content normal               Additional Data:     Labs:  Results from last 7 days   Lab Units 03/01/22  0530   WBC Thousand/uL 22 44*   HEMOGLOBIN g/dL 10 2*   HEMATOCRIT % 29 2*   PLATELETS Thousands/uL 364   NEUTROS PCT % 92*   LYMPHS PCT % 4*   MONOS PCT % 3*   EOS PCT % 0     Results from last 7 days   Lab Units 03/01/22  0530   SODIUM mmol/L 135*   POTASSIUM mmol/L 4 0   CHLORIDE mmol/L 104   CO2 mmol/L 18*   BUN mg/dL 48*   CREATININE mg/dL 2 18*   ANION GAP mmol/L 13   CALCIUM mg/dL 7 5*   ALBUMIN g/dL 1 7*   TOTAL BILIRUBIN mg/dL 5 75*   ALK PHOS U/L 166*   ALT U/L 20   AST U/L 55*   GLUCOSE RANDOM mg/dL 130     Results from last 7 days   Lab Units 03/01/22  0530   INR  1 77*     Results from last 7 days   Lab Units 02/27/22  0526   POC GLUCOSE mg/dl 90         Results from last 7 days   Lab Units 03/01/22  0530 02/26/22  1401 02/26/22  0509 02/25/22  2027 02/25/22  1507 02/24/22  0901 02/22/22  1947   LACTIC ACID mmol/L  --  2 2*  --  3 2* 3 8*  --  2 0   PROCALCITONIN ng/ml 1 98*  --  5 17*  --   --  2 56*  --        Lines/Drains:  Invasive Devices  Report    Peripheral Intravenous Line            Long-Dwell Peripheral IV (Midline) 34/82/68 Basilic 2 days          Drain            Closed/Suction Drain Right RLQ Bulb 468 days                      Recent Cultures (last 7 days):   Results from last 7 days   Lab Units 02/23/22  1338 02/22/22  2208 02/22/22  1947 02/22/22  1925   BLOOD CULTURE   --   --  No Growth After 5 Days  No Growth After 5 Days  URINE CULTURE   --  >100,000 cfu/ml   --   --    C DIFF TOXIN B BY PCR  Negative  --   --   --        Last 24 Hours Medication List:   Current Facility-Administered Medications   Medication Dose Route Frequency Provider Last Rate    acetaminophen  975 mg Oral [facility] Meliton Simon MD      albumin human  25 g Intravenous BID (diuretic) JANN Cunningham      cholestyramine sugar free  4 g Oral Daily Jaren Bonilla MD      folic acid  1 mg Oral Daily Meliton Simon MD      heparin (porcine)  5,000 Units Subcutaneous [facility] Meliton Simon MD      letrozole  2 5 mg Oral Daily Jaren Bonilla MD      loperamide  2 mg Oral TID PRN Meliton Simon MD      midodrine  5 mg Oral TID [facility] JANN Cunningham      multivitamin-minerals  1 tablet Oral Daily Meliton Simon MD      ondansetron  4 mg Intravenous Q6H PRN Jaren Bonilla MD      oxyCODONE  10 mg Oral Q6H PRN Meliton Simon MD      oxyCODONE  5 mg Oral Q6H PRN Meliton Simon MD      pantoprazole  40 mg Oral Early Morning Sheryl Villanueva PA-C      prednisoLONE  40 mg Oral Daily Meredith Buenrostro PA-C      psyllium  1 packet Oral BID Cassandra Donahue MD      thiamine  100 mg Intravenous Daily Meliton Simon  mg (03/01/22 0914)        Today, Patient Was Seen By: Meliton Simon MD    **Please Note: This note may have been constructed using a voice recognition system  **

## 2022-03-01 NOTE — PLAN OF CARE
Problem: PHYSICAL THERAPY ADULT  Goal: Performs mobility at highest level of function for planned discharge setting  See evaluation for individualized goals  Description: Treatment/Interventions: Functional transfer training,LE strengthening/ROM,Therapeutic exercise,Endurance training,Patient/family training,Equipment eval/education,Bed mobility,Gait training  Equipment Recommended: Other (Comment) (pending trials and progress)       See flowsheet documentation for full assessment, interventions and recommendations  Outcome: Not Progressing  Note: Prognosis: Fair  Problem List: Decreased strength,Decreased endurance,Impaired balance,Decreased mobility,Decreased safety awareness  Assessment: pt began tx session lying supine in the bed and was agreeable to participate in PT intervention initially  pt was educated on all bed mobility strategies such as rolling and repoditioning in the bed in order to alleviate pressure from buttock and how to complete a supine<>sit EOB transfer with use of bed rails to complete transfers  pt continues to remain consistant with /s for all bed mobility including performing a supine<>sit EOB transfer  pt was limited with sitting EOB secondary to increase buttocks pain less then 1 minute  pt was able to perform a STS from EOB to RW with continued /s and VC's for hand placement  pt was able to stand 1 minute with RW w/o LOB then asked pt to ambulate and pt refused and sat herself back into bed and stated she is done  post tx session pt in bed with call bell and all pt needs met            PT Discharge Recommendation: Home with home health rehabilitation          See flowsheet documentation for full assessment

## 2022-03-02 VITALS
HEART RATE: 86 BPM | OXYGEN SATURATION: 97 % | WEIGHT: 148 LBS | HEIGHT: 65 IN | BODY MASS INDEX: 24.66 KG/M2 | DIASTOLIC BLOOD PRESSURE: 72 MMHG | RESPIRATION RATE: 16 BRPM | TEMPERATURE: 97.5 F | SYSTOLIC BLOOD PRESSURE: 114 MMHG

## 2022-03-02 PROBLEM — G93.40 ENCEPHALOPATHY: Status: RESOLVED | Noted: 2022-02-26 | Resolved: 2022-03-02

## 2022-03-02 PROBLEM — R79.89 ELEVATED PROCALCITONIN: Status: RESOLVED | Noted: 2022-02-26 | Resolved: 2022-03-02

## 2022-03-02 PROBLEM — R79.1 ELEVATED INR: Status: RESOLVED | Noted: 2022-02-24 | Resolved: 2022-03-02

## 2022-03-02 PROBLEM — I10 HYPERTENSION: Status: ACTIVE | Noted: 2022-03-02

## 2022-03-02 PROBLEM — K70.10 ALCOHOLIC HEPATITIS: Status: ACTIVE | Noted: 2022-02-24

## 2022-03-02 PROBLEM — E87.1 HYPONATREMIA: Status: RESOLVED | Noted: 2020-09-08 | Resolved: 2022-03-02

## 2022-03-02 LAB
ALBUMIN SERPL BCP-MCNC: 2.1 G/DL (ref 3.5–5)
ALP SERPL-CCNC: 130 U/L (ref 46–116)
ALT SERPL W P-5'-P-CCNC: 16 U/L (ref 12–78)
ANION GAP SERPL CALCULATED.3IONS-SCNC: 14 MMOL/L (ref 4–13)
ANISOCYTOSIS BLD QL SMEAR: PRESENT
AST SERPL W P-5'-P-CCNC: 51 U/L (ref 5–45)
BASOPHILS # BLD MANUAL: 0 THOUSAND/UL (ref 0–0.1)
BASOPHILS NFR MAR MANUAL: 0 % (ref 0–1)
BILIRUB DIRECT SERPL-MCNC: 3.51 MG/DL (ref 0–0.2)
BILIRUB SERPL-MCNC: 4.73 MG/DL (ref 0.2–1)
BUN SERPL-MCNC: 54 MG/DL (ref 5–25)
CALCIUM ALBUM COR SERPL-MCNC: 8.9 MG/DL (ref 8.3–10.1)
CALCIUM SERPL-MCNC: 7.4 MG/DL (ref 8.3–10.1)
CHLORIDE SERPL-SCNC: 107 MMOL/L (ref 100–108)
CO2 SERPL-SCNC: 17 MMOL/L (ref 21–32)
CREAT SERPL-MCNC: 1.74 MG/DL (ref 0.6–1.3)
EOSINOPHIL # BLD MANUAL: 0 THOUSAND/UL (ref 0–0.4)
EOSINOPHIL NFR BLD MANUAL: 0 % (ref 0–6)
GFR SERPL CREATININE-BSD FRML MDRD: 31 ML/MIN/1.73SQ M
GLUCOSE SERPL-MCNC: 142 MG/DL (ref 65–140)
HCT VFR BLD AUTO: 26.8 % (ref 34.8–46.1)
HGB BLD-MCNC: 9.3 G/DL (ref 11.5–15.4)
HYPERCHROMIA BLD QL SMEAR: PRESENT
LYMPHOCYTES # BLD AUTO: 0.41 THOUSAND/UL (ref 0.6–4.47)
LYMPHOCYTES # BLD AUTO: 2 % (ref 14–44)
MCH RBC QN AUTO: 34.4 PG (ref 26.8–34.3)
MCHC RBC AUTO-ENTMCNC: 34.7 G/DL (ref 31.4–37.4)
MCV RBC AUTO: 99 FL (ref 82–98)
MONOCYTES # BLD AUTO: 0.21 THOUSAND/UL (ref 0–1.22)
MONOCYTES NFR BLD: 1 % (ref 4–12)
NEUTROPHILS # BLD MANUAL: 20.11 THOUSAND/UL (ref 1.85–7.62)
NEUTS SEG NFR BLD AUTO: 97 % (ref 43–75)
PLATELET # BLD AUTO: 252 THOUSANDS/UL (ref 149–390)
PLATELET BLD QL SMEAR: ADEQUATE
PMV BLD AUTO: 10 FL (ref 8.9–12.7)
POIKILOCYTOSIS BLD QL SMEAR: PRESENT
POTASSIUM SERPL-SCNC: 3.5 MMOL/L (ref 3.5–5.3)
PROCALCITONIN SERPL-MCNC: 1.27 NG/ML
PROT SERPL-MCNC: 5 G/DL (ref 6.4–8.2)
RBC # BLD AUTO: 2.7 MILLION/UL (ref 3.81–5.12)
RBC MORPH BLD: PRESENT
SODIUM SERPL-SCNC: 138 MMOL/L (ref 136–145)
TARGETS BLD QL SMEAR: PRESENT
WBC # BLD AUTO: 20.73 THOUSAND/UL (ref 4.31–10.16)

## 2022-03-02 PROCEDURE — 85027 COMPLETE CBC AUTOMATED: CPT | Performed by: INTERNAL MEDICINE

## 2022-03-02 PROCEDURE — 80053 COMPREHEN METABOLIC PANEL: CPT | Performed by: INTERNAL MEDICINE

## 2022-03-02 PROCEDURE — 82248 BILIRUBIN DIRECT: CPT | Performed by: INTERNAL MEDICINE

## 2022-03-02 PROCEDURE — 99239 HOSP IP/OBS DSCHRG MGMT >30: CPT | Performed by: INTERNAL MEDICINE

## 2022-03-02 PROCEDURE — 84145 PROCALCITONIN (PCT): CPT | Performed by: INTERNAL MEDICINE

## 2022-03-02 PROCEDURE — 99232 SBSQ HOSP IP/OBS MODERATE 35: CPT | Performed by: INTERNAL MEDICINE

## 2022-03-02 PROCEDURE — 85007 BL SMEAR W/DIFF WBC COUNT: CPT | Performed by: INTERNAL MEDICINE

## 2022-03-02 RX ORDER — PANTOPRAZOLE SODIUM 40 MG/1
40 TABLET, DELAYED RELEASE ORAL
Qty: 60 TABLET | Refills: 0 | Status: SHIPPED | OUTPATIENT
Start: 2022-03-03 | End: 2022-05-25

## 2022-03-02 RX ORDER — MIDODRINE HYDROCHLORIDE 5 MG/1
5 TABLET ORAL
Qty: 45 TABLET | Refills: 0 | Status: SHIPPED | OUTPATIENT
Start: 2022-03-02 | End: 2022-05-25

## 2022-03-02 RX ORDER — PREDNISOLONE SODIUM PHOSPHATE 10 MG/1
TABLET, ORALLY DISINTEGRATING ORAL
Qty: 154 TABLET | Refills: 0 | Status: ON HOLD | OUTPATIENT
Start: 2022-03-02 | End: 2022-03-23 | Stop reason: SDUPTHER

## 2022-03-02 RX ORDER — FOLIC ACID 1 MG/1
1 TABLET ORAL DAILY
Qty: 30 TABLET | Refills: 0 | Status: SHIPPED | OUTPATIENT
Start: 2022-03-02 | End: 2022-04-01

## 2022-03-02 RX ORDER — PANTOPRAZOLE SODIUM 40 MG/1
40 TABLET, DELAYED RELEASE ORAL
Qty: 60 TABLET | Refills: 0 | Status: CANCELLED | OUTPATIENT
Start: 2022-03-02 | End: 2022-05-01

## 2022-03-02 RX ADMIN — CHOLESTYRAMINE 4 G: 4 POWDER, FOR SUSPENSION ORAL at 08:15

## 2022-03-02 RX ADMIN — Medication 1 PACKET: at 08:15

## 2022-03-02 RX ADMIN — ALBUMIN (HUMAN) 25 G: 0.25 INJECTION, SOLUTION INTRAVENOUS at 08:14

## 2022-03-02 RX ADMIN — OXYCODONE HYDROCHLORIDE 10 MG: 10 TABLET ORAL at 11:30

## 2022-03-02 RX ADMIN — PANTOPRAZOLE SODIUM 40 MG: 40 TABLET, DELAYED RELEASE ORAL at 05:43

## 2022-03-02 RX ADMIN — LETROZOLE 2.5 MG: 2.5 TABLET, FILM COATED ORAL at 08:21

## 2022-03-02 RX ADMIN — MIDODRINE HYDROCHLORIDE 5 MG: 5 TABLET ORAL at 05:43

## 2022-03-02 RX ADMIN — MULTIPLE VITAMINS W/ MINERALS TAB 1 TABLET: TAB ORAL at 08:15

## 2022-03-02 RX ADMIN — HEPARIN SODIUM 5000 UNITS: 5000 INJECTION INTRAVENOUS; SUBCUTANEOUS at 05:43

## 2022-03-02 RX ADMIN — PREDNISOLONE SODIUM PHOSPHATE 40 MG: 15 SOLUTION ORAL at 08:15

## 2022-03-02 RX ADMIN — MIDODRINE HYDROCHLORIDE 5 MG: 5 TABLET ORAL at 11:30

## 2022-03-02 RX ADMIN — FOLIC ACID 1 MG: 1 TABLET ORAL at 08:15

## 2022-03-02 RX ADMIN — ACETAMINOPHEN 975 MG: 325 TABLET, FILM COATED ORAL at 05:43

## 2022-03-02 RX ADMIN — THIAMINE HYDROCHLORIDE 100 MG: 100 INJECTION, SOLUTION INTRAMUSCULAR; INTRAVENOUS at 08:15

## 2022-03-02 NOTE — DISCHARGE INSTR - AVS FIRST PAGE
Dear Arlen Kayser,     It was our pleasure to care for you here at [facility], [facility]  It is our hope that we were always able to exceed the expected standards for your care during your stay  You were hospitalized due to jaundice  You were cared for on the 2nd floor by Dae Chery MD under the service of Mata Reddy MD with the [facility] who covers for your primary care physician (PCP), Camryn Jurado DC, while you were hospitalized  If you have any questions or concerns related to this hospitalization, you may contact us at [phone]  For follow up as well as any medication refills, we recommend that you follow up with your primary care physician  A registered nurse will reach out to you by phone within a few days after your discharge to answer any additional questions that you may have after going home  However, at this time we provide for you here, the most important instructions / recommendations at discharge:     Notable Medication Adjustments -   Please stop taking carvedilol and verapamil  For torsemide ----Take 1 tablet (20 mg total) by mouth as needed only (for leg swelling, or weight gain >5 lbs in 2-3 days)  Testing Required after Discharge -   Please have lab work done CBC and CMP in 1 week and follow-up results with your primary care doctor and Nephrology  Important follow up information -   Please set up an appointment with your primary care doctor as soon as possible within 1 week of discharge  Please set up an appointment with gastroenterology in 2 weeks  Please set up an appointment with Nephrology as soon as possible within 1-2 weeks  Other Instructions -   Please take midodrine 5 mg 3 times a day --- Please check you blood pressure 3 times a day and if top number is greater than 130, do not take this medication    Please take prednisolone 40 mg once a day for 1 month and after that, take 30 mg once a day for 1 week and then 20 mg once a day for 1 week and then 10 mg once a day for 1 week and then stop  Please review this entire after visit summary as additional general instructions including medication list, appointments, activity, diet, any pertinent wound care, and other additional recommendations from your care team that may be provided for you        Sincerely,     Candiss Lesches, MD

## 2022-03-02 NOTE — PROGRESS NOTES
NEPHROLOGY HOSPITAL PROGRESS NOTE   Alex Watkins 62 y o  female MRN: [id number]  Unit/Bed#: S -01 Encounter: [id number]  Reason for Consult:  Acute kidney injury on CKD    ASSESSMENT and PLAN:  Alex Watkins is a 62 y o  female with a past medical history of chronic kidney disease, hypertension, breast cancer status post mastectomy and chemo radiation 2019, alcohol dependence, DVT who was admitted to [facility] after presenting with jaundice, fatigue and shortness of breath on 02/22/22  Initial creatinine at baseline  During the course of hospitalization creatinine increased up to 2 3 as of 2/28 prompting nephrology consult  Acute kidney injury:  · Etiology:  Felt to be likely related to decreased renal perfusion due to hypotension/relative hypotension, acute anemia, contrast exposure all in the setting of solitary functioning kidney  Hepatorenal less likely  · Admitted 02/22:  Creatinine 1 29, at baseline  · 2/22:  Received contrast  · Creatinine remained at baseline increasing as of 2/26 and peaking at 2 3 on 02/28  · 2/28:  Started on albumin, midodrine  · 3/1:  Creatinine down to 2 18, slowly improving  · 3/2:  Acute kidney injury resolved  Creatinine down to 1 7  · Plan:  · Stable for discharge  · Outpatient follow-up  Message sent office  · Check BMP in 2-3 days    Chronic kidney disease, stage III  · Seen by our service 1 time during hospitalization then for follow-up on 03/25/2021 by Dr Janiya Singletary  · Baseline creatinine 1 1-1 3 mg/dL as of June 2021  · Etiology:  Likely hypertensive nephrosclerosis, atrophic right kidney, use of NSAIDs    Blood pressure/volume status:  · History of essential hypertension  Blood pressure low on admission  Antihypertensives on hold    · Home medications:  Carvedilol 6 25 mg b i d , for appy meld 240 mg daily, torsemide 20 mg daily as needed for leg swelling  · Blood pressure has improved and stabilized on midodrine, albumin  · Will plan on holding antihypertensives at discharge  Patient instructed to monitor home blood pressure readings a call the office if blood pressure remains greater than 525/81 systolic  · Continue to use as needed diuretic  Discussed with patient  · Regarding midodrine  Blood pressure remains on the low side  Will provide outpatient prescription for midodrine 5 mg 3 times a day  Patient instructed to hold medication if blood pressure is greater than 130  Abnormal liver function studies/jaundice on admission:  · Etiology Likely acute alcoholic hepatitis  · Serologic Workup:  Per GI  · Imaging: Gallbladder wall abnormally thickened and portions of the gallbladder wall appeared edematous with pericholecystic fluid  Also noted hypoechoic lesion posterior aspect of the left lobe of the liver which is similar to prior imaging in 2018  · Treated with prednisone    Anemia:  · Required 1 unit of packed cells since admission  · Hemoglobin 9 3, fairly stable  · Normal platelet count    Electrolytes/acid-base:  · Low bicarbonate:  Check BMP in approximately 2-3 days    Diastolic CHF:  · No evidence of decompensation  · Diuretics on hold    Chronic diarrhea:  · C diff negative  · Diarrhea improved    DISPOSITION:  Stable for discharge  Will hold verapamil and Coreg at discharge  Blood pressures remain soft  Continue midodrine  BMP in 2-3 days  Office follow-up  Instructions written out for patient  Unclear whether she is capable of following a somewhat complicated medication regime  Discussed with primary service    SUBJECTIVE / 24H INTERVAL HISTORY:  No complaints  Ready to go home  States that her blood pressure always goes up at home because of her   Not quite sure if she is joking  Asymptomatic      OBJECTIVE:  Current Weight: Weight - Scale: 67 1 kg (148 lb)  Vitals:    03/01/22 0715 03/01/22 1440 03/01/22 2220 03/02/22 0721   BP: 115/75 127/91 122/75 114/72   Pulse: 79 80 78 86   Resp: 16 18  16   Temp: 98 1 °F (36 7 °C) 97 6 °F (36 4 °C) 97 7 °F (36 5 °C) 97 5 °F (36 4 °C)   TempSrc:       SpO2: 97% 100% 97% 97%   Weight:       Height:           Intake/Output Summary (Last 24 hours) at 3/2/2022 1047  Last data filed at 3/1/2022 1901  Gross per 24 hour   Intake --   Output 250 ml   Net -250 ml     General:  No acute distress  Comfortably lying in bed  Skin:  Skin jaundiced, no rash  Eyes: Icteric sclera  ENT:  Oropharynx moist  Neck:  No JVD  Neck supple, normal range of motion  Chest:  Lungs clear bilaterally  On room air  Normal effort  CVS:    Normal heart rate, regular rhythm  No murmur rub or gallop  Abdomen:  Soft, nondistended, nontender, normal bowel sounds  Extremities:  No edema, no mottling or cyanosis  :  No Salcedo catheter  Neuro:  Alert and oriented  Psych:  Pleasant  Cooperative    Medications:    Current Facility-Administered Medications:     acetaminophen (TYLENOL) tablet 975 mg, 975 mg, Oral, Q8H [facility], Crystal Guaman MD, 975 mg at 03/02/22 0543    cholestyramine sugar free (QUESTRAN LIGHT) packet 4 g, 4 g, Oral, Daily, Mohit Gupta MD, 4 g at 46/88/01 6996    folic acid (FOLVITE) tablet 1 mg, 1 mg, Oral, Daily, Crystal Guaman MD, 1 mg at 03/02/22 0815    heparin (porcine) subcutaneous injection 5,000 Units, 5,000 Units, Subcutaneous, Q8H [facility], Crystal Guaman MD, 5,000 Units at 03/02/22 0543    letrozole [facility]) tablet 2 5 mg, 2 5 mg, Oral, Daily, Mohit Gupta MD, 2 5 mg at 03/02/22 4108    loperamide (IMODIUM) capsule 2 mg, 2 mg, Oral, TID PRN, Crystal Guaamn MD, 2 mg at 02/28/22 0001    midodrine (PROAMATINE) tablet 5 mg, 5 mg, Oral, TID AC, JANN Cervantes, 5 mg at 03/02/22 0543    multivitamin-minerals (CENTRUM) tablet 1 tablet, 1 tablet, Oral, Daily, Crystal Guaman MD, 1 tablet at 03/02/22 0815    ondansetron (ZOFRAN) injection 4 mg, 4 mg, Intravenous, Q6H PRN, Mohit Gupta MD    oxyCODONE (ROXICODONE) immediate release tablet 10 mg, 10 mg, Oral, Q6H PRN, Karla Burt Joshua James MD, 10 mg at 02/27/22 1246    oxyCODONE (ROXICODONE) IR tablet 5 mg, 5 mg, Oral, Q6H PRN, Meliton Simon MD, 5 mg at 02/28/22 0511    pantoprazole (PROTONIX) EC tablet 40 mg, 40 mg, Oral, [place], Pepper Maddox PA-C, 40 mg at 03/02/22 0543    prednisoLONE (ORAPRED) oral solution 40 mg, 40 mg, Oral, Daily, Meredith Buenrostro PA-C, 40 mg at 03/02/22 0815    psyllium (METAMUCIL) 1 packet, 1 packet, Oral, BID, Cassandra Donahue MD, 1 packet at 03/02/22 0815    thiamine (VITAMIN B1) 100 mg in sodium chloride 0 9 % 50 mL IVPB, 100 mg, Intravenous, Daily, Meliton Simon MD, Last Rate: 100 mL/hr at 03/02/22 0815, 100 mg at 03/02/22 0815    Laboratory Results:  Results from last 7 days   Lab Units 03/02/22  0549 03/01/22  0530 02/28/22  1335 02/28/22  0711 02/28/22  0518 02/26/22  1401 02/26/22  0509 02/25/22  2027 02/25/22  0448 02/24/22  0616 02/24/22  0616   WBC Thousand/uL 20 73* 22 44*  --   --  20 78*  --  19 19* 22 24*  --   --  22 09*   HEMOGLOBIN g/dL 9 3* 10 2* 10 6* 6 5* 6 9*  --  7 2* 7 1*  --    < > 7 7*   HEMATOCRIT % 26 8* 29 2* 30 5*  --  20 1*  --  21 5* 21 4*  --   --  22 8*   PLATELETS Thousands/uL 252 364  --   --  338  --  356 366  --   --  378   POTASSIUM mmol/L 3 5 4 0  --   --  3 5 3 5 3 6  --  4 3  --  3 3*   CHLORIDE mmol/L 107 104  --   --  98* 106 104  --  104  --  102   CO2 mmol/L 17* 18*  --   --  25 16* 15*  --  14*  --  18*   BUN mg/dL 54* 48*  --   --  36* 25 26*  --  20  --  23   CREATININE mg/dL 1 74* 2 18*  --   --  2 30* 1 51* 1 56*  --  1 02  --  1 13   CALCIUM mg/dL 7 4* 7 5*  --   --  6 6* 7 2* 7 3*  --  7 7*  --  7 4*    < > = values in this interval not displayed

## 2022-03-02 NOTE — ASSESSMENT & PLAN NOTE
· Was on carvedilol, verapamil and torsemide as home medications  · This admission, blood pressures have been low and patient was started on midodrine by Nephrology  · I spoke with Nephrology and their recommendation is for patient to discontinue carvedilol and verapamil, take torsemide as needed only and continue on midodrine 5 mg t i d   And hold midodrine if SBP greater than 130

## 2022-03-02 NOTE — CASE MANAGEMENT
Case Management Discharge Planning Note    Patient name Israel Barriga  Location S /S -01 MRN [id number]  : 1960 Date 3/2/2022       Current Admission Date: 2022  Current Admission Diagnosis:Jaundice   Patient Active Problem List    Diagnosis Date Noted    Encephalopathy 2022    Elevated procalcitonin 2022    Proteinuria 2022    Abnormal urinalysis 2022    History of alcohol use disorder 2022    Jaundice 2022    Elevated liver enzymes 2022    Elevated INR 2022    SIRS (systemic inflammatory response syndrome) ([facility] ) 2022    Benign hypertension with CKD (chronic kidney disease) stage III ([facility] ) 2021    Stage 3a chronic kidney disease ([facility] ) 2021    Anemia 2021    Hypokalemia 2021    Localized swelling of lower extremity 2021    Alcohol abuse 2021    Suspected acute pulmonary embolism ([address] ) 12/15/2020    Recurrent syncope 12/15/2020    OPAL (acute kidney injury) ([facility] [facility] ) on CKD 12/15/2020    Closed left fibular fracture 12/15/2020    History of chemotherapy     Alcoholic ketosis ([address] )     Hyponatremia 2020    SOB (shortness of breath) 2020    Hypoglycemia 2020    Axillary lump, left 2019    Use of letrozole ([facility]) 2019    Rectal prolapse 2019    Dilated cbd, acquired 2019    Lesion of bladder 2019    Blood loss anemia 2019    Hemorrhagic cystitis 2018    Heme positive stool 2018    Acute deep vein thrombosis (DVT) of left lower extremity ([address] ) 2018    Hyperbilirubinemia 2018    Acute blood loss anemia 2018    Moderate protein-calorie malnutrition ([address] ) 2018    Immunocompromised ([address] ) 2018    Hematuria 2018    Anemia following use of chemotherapeutic drug 2018    History of DVT (deep vein thrombosis) 10/19/2018    Cellulitis 2018    History of radiation therapy 05/2018    Hydronephrosis 04/29/2018    Malignant neoplasm of overlapping sites of left breast in female, estrogen receptor positive ([address] ) 04/10/2018      LOS (days): 7  Geometric Mean LOS (GMLOS) (days): 4 70  Days to GMLOS:-2 7     OBJECTIVE:  Risk of Unplanned Readmission Score: 27         Current admission status: Inpatient   Preferred Pharmacy:   [address] [place], [place] - [address] [facility]  [address] [place] [address]  Phone: [phone] Fax: [phone]    [id number] [place], [address]  [place]  [address] [place] [address]  Phone: [phone] Fax: [phone]    Primary Care Provider: Abida Presley DC    Primary Insurance: BLUE CROSS  Secondary Insurance:     DISCHARGE DETAILS:                                [address]         Is the patient interested in [address] at discharge?: Yes  [address] requested[de-identified] [address] Name[de-identified] [facility] Provider[de-identified] PCP  Home Health Services Needed[de-identified] Evaluate Functional Status and Safety,Gait/ADL Training,Strengthening/Theraputic Exercises to Improve Function  Homebound Criteria Met[de-identified] Uses an Assist Device (i e  cane, walker, etc),Requires the Assistance of Another Person for Safe Ambulation or to Leave the Home  Supporting Clincal Findings[de-identified] Limited Endurance,Fatigues Easliy in Short Distances    DME Referral Provided  Referral made for DME?: No    Other Referral/Resources/Interventions Provided:  Interventions: [facility]  Referral Comments: Patient's  is agreeable to the [address] referral being made at [place] despite patient initially declining the service  A referral was made to [facility] and patient was accepted    Contact information was placed on AVS          Treatment Team Recommendation: Home with [address] [place] [address]  Discharge Destination Plan[de-identified] Home with [place]ice at Discharge : Family                                         CM reviewed the availability of treatment team to discuss questions or concerns patient and/or family may have regarding  understanding medications and recognizing signs and symptoms once discharged  CM also encouraged patient to follow up with all recommended appointments after discharge  Patient advised of importance for patient and family to participate in managing patient's medical well being

## 2022-03-02 NOTE — DISCHARGE SUMMARY
[facility]  Discharge- Aberdeen Lacks 1960, 62 y o  female MRN: [id number]  Unit/Bed#: S -01 Encounter: [id number]  Primary Care Provider: Tad Barker DC   Date and time admitted to hospital: 2/22/2022  3:46 PM    * Alcoholic hepatitis  Assessment & Plan  · Generalized jaundice on admission and per family onset was 2 weeks prior to admission  · Normal ALT, elevated AST and alk-phos  · Total bilirubin elevated at 11, with direct bilirubin at 7 4 on admission -- currently  trending down today at 3 5  · Hepatitis panel unremarkable  · Ultrasound showed: The gallbladder wall is abnormally thickened, measuring approximately 6 mm thickness   Portions of the gallbladder wall appear edematous   There is pericholecystic fluid  There is an approximately 8 x 7 x 9 mm hypoechoic lesion within the posterior aspect of the left lobe of the liver  · GI input appreciated, workup done, currently treated for alcoholic hepatitis with prednisolone 40 mg daily and plan to continue on this dosing for 4 weeks and then taper by 10 mg every week thereafter  · Follow-up with gastroenterology in outpatient setting    Hypertension  Assessment & Plan  · Was on carvedilol, verapamil and torsemide as home medications  · This admission, blood pressures have been low and patient was started on midodrine by Nephrology  · I spoke with Nephrology and their recommendation is for patient to discontinue carvedilol and verapamil, take torsemide as needed only and continue on midodrine 5 mg t i d   And hold midodrine if SBP greater than 130    Anemia  Assessment & Plan  Lab Results   Component Value Date    EGFR 23 03/01/2022    EGFR 22 02/28/2022    EGFR 37 02/26/2022    CREATININE 2 18 (H) 03/01/2022    CREATININE 2 30 (H) 02/28/2022    CREATININE 1 51 (H) 02/26/2022     Hemoglobin 8 9 on presentation, baseline appears to be around 10  ; hemoglobin this admission trended down to as low as 6 5 requiring blood transfusion  MCV high  R97 normal, folic acid low, was started on folic acid supplementation  Repeat CBC in 1 week and follow-up with results with PCP    OPAL (acute kidney injury) ([address] ) on CKD  Assessment & Plan  · Has CKD stage IIIA; Baseline creatinine around 1, trended up to as high as 2 3 and now trending back down, today at 1 7  · Likely prerenal in the setting of poor oral intake, low hemoglobin requiring blood transfusion; patient also received IV contrast on admission  · Nephrology input appreciated  · OPAL likely in the setting of low hemoglobin with administration of contrast on admission as well  · Creatinine now trending down  · Patient will continue on midodrine 5 mg t i d  And was instructed to check blood pressures twice a day and to hold this medication if SBP is greater than 130  · Nephrology will follow-up in outpatient setting as well and I have ordered for repeat blood work in 1 week    History of DVT (deep vein thrombosis)  Assessment & Plan  · Hx of DVT in 12/2018 in the setting of  malignancy  · She states that she has not taken Eliquis in 3 years because of not being able to follow-up with her doctor  · Did discuss with the patient regarding restarting Eliquis down the line vs holding off on further anticoagulation  · On chart review, there were imaging studies done in 2020, venous duplex did not show any DVT however V/Q scan done at that time showed possibility for PE being intermediate      Encephalopathy-resolved as of 3/2/2022  Assessment & Plan  · Occasional confusion  ·  claims that she is currently at her baseline mental status  · Continue IV thiamine daily for possible Wern[place]ke the encephalopathy given history of chronic alcohol abuse  · Continue folic acid supplementation    Abnormal urinalysis  Assessment & Plan  · UA showed 20-30 WBC with innumerable bacteria but with occasional epithelial cells indicating likely contaminated sample and furthermore urine culture was indicative of contamination showing greater than 100,000 CFU per mL mixed contaminants  · No urinary symptoms, no febrile episodes  · Will monitor off antibiotics     Benign hypertension with CKD (chronic kidney disease) stage III (HCC)  Assessment & Plan  Holding home verapamil 240mg 24 hr capsule, home carvedilol 6 25mg BID given soft pressures  Patient started by Nephrology on midodrine 5 mg t i d  Alcohol abuse  Assessment & Plan  History of EtOH abuse - 1/5 bottle of vodka daily  · Per patient's , last alcohol intake was 3-4 weeks ago  · No signs of withdrawal  · Rest of plan above as outlined under alcoholic hepatitis    Malignant neoplasm of overlapping sites of left breast in female, estrogen receptor positive ([facility] [address] )  Assessment & Plan  · Stage III, dx in 2018  S/P mastectomy 2018, chemotherapy and radiation therapy  · Currently on letrozole therapy for continued management    Plan:  - continue letrozole 2 5 mg daily   Family had also requested a referral to palliative care in outpatient setting    Hyponatremia-resolved as of 3/2/2022  Assessment & Plan  Patient has borderline hyponatremia at baseline with Na around 134 normally     POA with sodium 128, now improving  Likely hypovolemic hyponatremia  Continue IV fluids at this time          Medical Problems             Resolved Problems  Date Reviewed: 3/2/2022          Resolved    Hyponatremia 3/2/2022     Resolved by  Ileana Marcial MD    Diarrhea 2/25/2022     Resolved by  Ileana Marcial MD    Elevated INR 3/2/2022     Resolved by  Ileana Marcial MD    High anion gap metabolic acidosis 4/82/4119     Resolved by  Ileana Marcial MD    Encephalopathy 3/2/2022     Resolved by  Ileana Marcial MD    Elevated procalcitonin 3/2/2022     Resolved by  Ileana Marcial MD              Discharging Resident: Ileana Marcial MD  Discharging Attending: Lisa Brandon MD  PCP: Conni Hodgkin, DC  Admission Date:   Admission Orders (From admission, onward)     Ordered        02/23/22 0140  Inpatient Admission  Once                      Discharge Date: 03/02/22    Consultations During Hospital Stay:  · Gastroenterology  · Nephrology  · General surgery    Procedures Performed:   · None    Significant Findings / Test Results:   US right upper quadrant   Final Result by Jarvis Kemp MD (02/23 3890)      The gallbladder wall is abnormally thickened, measuring approximately 6 mm thickness  Portions of the gallbladder wall appear edematous  There is pericholecystic fluid  The liver is enlarged, hyperechoic, and demonstrates heterogeneous echotexture  The technologist who performed the examination was unable to definitely demonstrate flow within the portal vein, however, contrast enhancement of the portal vein was seen on    a CT performed approximately 3 hours earlier today  Gastroenterology/Hepatology consultation and Surgical consultation is recommended  Trace ascites  Atrophic right kidney, unchanged  There is an approximately 8 x 7 x 9 mm hypoechoic lesion within the posterior aspect of the left lobe of the liver; this appears similar to April 11, 2018  The study was marked in [facility] for immediate notification  Workstation performed: [id number]         PE Study with CT Abdomen and Pelvis with contrast   Final Result by Korey Wolfe MD (02/22 3006)      1  No evidence of pulmonary embolism  2   Pericholecystic fluid/stranding without evidence of calcified gallstones, findings may be seen in the setting of hepatitis  Further evaluation with ultrasound may be obtained if there is clinical concern for noncalcified gallstones/cholecystitis  3   Large hiatal hernia  4   Mild thickening of the urinary bladder wall, correlate with urinalysis for cystitis        Workstation performed: [id number]         XR chest 1 view portable   Final Result by Fortino Marin DO (02/23 2921) No acute cardiopulmonary disease  Large hiatal hernia  Workstation performed: [id number]             Incidental Findings:   · none    Test Results Pending at Discharge (will require follow up): · None     Outpatient Tests Requested:  · Repeat CMP and CBC in 1 week and follow-up results with primary care doctor    Complications:  None    Reason for Admission:  Jaundice    Hospital Course:   Madison Braun is a 62 y o  female patient who originally presented to the hospital on 2/22/2022 due to jaundice  Workup done, imaging as outlined above  General surgery was consulted with no recommendations for any surgery  Gastroenterology was also consulted and treated for alcoholic hepatitis with prednisolone  Thereafter, direct bilirubin started to trend down  Nephrology was also consulted due to increasing creatinine  Believed to likely be prerenal OPAL from anemia with hemoglobin of less than 7 needing 1 unit packed RBC transfusion as well as from contrast which patient received on 02/22/2022  She was also started on midodrine 5 mg t i d  Patient will continue on prednisolone for 40 mg for 4 weeks and then taper by 10 mg every week thereafter  She will continue on pantoprazole daily while on steroid therapy  She will follow-up with gastroenterology as well as Nephrology in outpatient setting  Please see above list of diagnoses and related plan for additional information  Condition at Discharge: stable    Discharge Day Visit / Exam:   Subjective:  No complaints today and states that she is eager to go home  Vitals: Blood Pressure: 114/72 (03/02/22 0721)  Pulse: 86 (03/02/22 0721)  Temperature: 97 5 °F (36 4 °C) (03/02/22 0721)  Temp Source: Oral (02/28/22 1500)  Respirations: 16 (03/02/22 0721)  Height: 5' 5" (165 1 cm) (02/24/22 0800)  Weight - Scale: 67 1 kg (148 lb) (02/24/22 0800)  SpO2: 97 % (03/02/22 0721)     Exam:   Physical Exam  Vitals reviewed     Constitutional: General: She is not in acute distress  Appearance: She is ill-appearing  HENT:      Nose: No congestion  Cardiovascular:      Rate and Rhythm: Normal rate  Heart sounds: No murmur heard  Pulmonary:      Effort: Pulmonary effort is normal  No respiratory distress  Breath sounds: No wheezing  Abdominal:      General: Bowel sounds are normal       Palpations: Abdomen is soft  Tenderness: There is no abdominal tenderness  Musculoskeletal:      Right lower leg: No edema  Left lower leg: No edema  Skin:     Coloration: Skin is jaundiced  Comments: Less jaundiced today compared to prior days   Neurological:      Mental Status: She is alert  Psychiatric:         Mood and Affect: Mood normal          Thought Content: Thought content normal             Discussion with Family: Updated  () via phone  Discharge instructions/Information to patient and family:   See after visit summary for information provided to patient and family  Provisions for Follow-Up Care:  See after visit summary for information related to follow-up care and any pertinent home health orders  Disposition:   Home with [facility] (Reminder: Complete face to face encounter)    Planned Readmission: none    Discharge Medications:  See after visit summary for reconciled discharge medications provided to patient and/or family        **Please Note: This note may have been constructed using a voice recognition system**

## 2022-03-02 NOTE — PLAN OF CARE
Problem: Potential for Falls  Goal: Patient will remain free of falls  Description: INTERVENTIONS:  - Educate patient/family on patient safety including physical limitations  - Instruct patient to call for assistance with activity   - Consult OT/PT to assist with strengthening/mobility   - Keep Call bell within reach  - Keep bed low and locked with side rails adjusted as appropriate  - Keep care items and personal belongings within reach  - Initiate and maintain comfort rounds  - Make Fall Risk Sign visible to staff  - Offer Toileting every 2 Hours, in advance of need  - Initiate/Maintain bed alarm  - Obtain necessary fall risk management equipment: bracelet, socks  - Apply yellow socks and bracelet for high fall risk patients  - Consider moving patient to room near nurses station  Outcome: Progressing     Problem: MOBILITY - ADULT  Goal: Maintain or return to baseline ADL function  Description: INTERVENTIONS:  -  Assess patient's ability to carry out ADLs; assess patient's baseline for ADL function and identify physical deficits which impact ability to perform ADLs (bathing, care of mouth/teeth, toileting, grooming, dressing, etc )  - Assess/evaluate cause of self-care deficits   - Assess range of motion  - Assess patient's mobility; develop plan if impaired  - Assess patient's need for assistive devices and provide as appropriate  - Encourage maximum independence but intervene and supervise when necessary  - Involve family in performance of ADLs  - Assess for home care needs following discharge   - Consider OT consult to assist with ADL evaluation and planning for discharge  - Provide patient education as appropriate  Outcome: Progressing  Goal: Maintains/Returns to pre admission functional level  Description: INTERVENTIONS:  - Perform BMAT or MOVE assessment daily    - Set and communicate daily mobility goal to care team and patient/family/caregiver     - Collaborate with rehabilitation services on mobility goals if consulted  - Perform Range of Motion 4 times a day  - Reposition patient every 2 hours  - Dangle patient 4 times a day  - Stand patient 4 times a day  - Ambulate patient 4 times a day  - Out of bed to chair 3 times a day   - Out of bed for meals 3 times a day  - Out of bed for toileting  - Record patient progress and toleration of activity level   Outcome: Progressing     Problem: Nutrition/Hydration-ADULT  Goal: Nutrient/Hydration intake appropriate for improving, restoring or maintaining nutritional needs  Description: Monitor and assess patient's nutrition/hydration status for malnutrition  Collaborate with interdisciplinary team and initiate plan and interventions as ordered  Monitor patient's weight and dietary intake as ordered or per policy  Utilize nutrition screening tool and intervene as necessary  Determine patient's food preferences and provide high-protein, high-caloric foods as appropriate       INTERVENTIONS:  - Monitor oral intake, urinary output, labs, and treatment plans  - Assess nutrition and hydration status and recommend course of action  - Evaluate amount of meals eaten  - Assist patient with eating if necessary   - Allow adequate time for meals  - Recommend/ encourage appropriate diets, oral nutritional supplements, and vitamin/mineral supplements  - Order, calculate, and assess calorie counts as needed  - Recommend, monitor, and adjust tube feedings and TPN/PPN based on assessed needs  - Assess need for intravenous fluids  - Provide specific nutrition/hydration education as appropriate  - Include patient/family/caregiver in decisions related to nutrition  Outcome: Progressing     Problem: Prexisting or High Potential for Compromised Skin Integrity  Goal: Skin integrity is maintained or improved  Description: INTERVENTIONS:  - Identify patients at risk for skin breakdown  - Assess and monitor skin integrity  - Assess and monitor nutrition and hydration status  - Monitor labs   - Assess for incontinence   - Turn and reposition patient  - Assist with mobility/ambulation  - Relieve pressure over bony prominences  - Avoid friction and shearing  - Provide appropriate hygiene as needed including keeping skin clean and dry  - Evaluate need for skin moisturizer/barrier cream  - Collaborate with interdisciplinary team   - Patient/family teaching  - Consider wound care consult   Outcome: Progressing

## 2022-03-03 ENCOUNTER — TELEPHONE (OUTPATIENT)
Dept: NEPHROLOGY | Facility: CLINIC | Age: 62
End: 2022-03-03

## 2022-03-03 DIAGNOSIS — D64.81 ANEMIA FOLLOWING USE OF CHEMOTHERAPEUTIC DRUG: ICD-10-CM

## 2022-03-03 LAB
ABO GROUP BLD BPU: NORMAL
BPU ID: NORMAL
UNIT DISPENSE STATUS: NORMAL
UNIT PRODUCT CODE: NORMAL
UNIT PRODUCT VOLUME: 250 ML
UNIT RH: NORMAL

## 2022-03-03 RX ORDER — FOLIC ACID 1 MG/1
TABLET ORAL
Qty: 90 TABLET | Refills: 1 | OUTPATIENT
Start: 2022-03-03

## 2022-03-03 NOTE — TELEPHONE ENCOUNTER
Tavares Kajal is not a patient of the [facility] office  No longer under the care of Dr Zuleima Lema  Pt to follow up with PCP in a week  Will refuse refill  1 Principal Discharge DX:	Abdominal pain

## 2022-03-03 NOTE — TELEPHONE ENCOUNTER
LM for patient to schedule over due follow up with Dr Lady Alexander  Patient was due to be seen 7/2021

## 2022-03-03 NOTE — TELEPHONE ENCOUNTER
Ruslan Bateman called back on behalf of patient stating he received a call from us about something to do on March 16th (?) I had called patient earlier to schedule long overdue f/u with Dr Sarah Bateman states the call he received was not in regards to scheduling but claims it came from our number

## 2022-03-04 ENCOUNTER — TELEPHONE (OUTPATIENT)
Dept: NEPHROLOGY | Facility: CLINIC | Age: 62
End: 2022-03-04

## 2022-03-04 DIAGNOSIS — N18.31 STAGE 3A CHRONIC KIDNEY DISEASE (HCC): Primary | ICD-10-CM

## 2022-03-04 NOTE — TELEPHONE ENCOUNTER
----- Message from Yael sent at 3/2/2022 11:21 AM EST -----  Please arrange for follow-up  Being discharged from [facility] on 03/02  Will need to be seen for evaluation of blood pressure and renal function  Please have her go for BMP in 2-3 days

## 2022-03-08 DIAGNOSIS — E87.70 FLUID OVERLOAD: ICD-10-CM

## 2022-03-08 NOTE — TELEPHONE ENCOUNTER
Called to schedule a HFU and he informed me that they have their hands full right now with everyone coming in for her care and did not want to do a virtual appointment  He said he would call us when they need to

## 2022-03-08 NOTE — TELEPHONE ENCOUNTER
Spoke to  and he didn't want to schedule a HFU at this time as they have their hands full  He will call us

## 2022-03-09 RX ORDER — TORSEMIDE 20 MG/1
20 TABLET ORAL AS NEEDED
Qty: 30 TABLET | Refills: 3 | OUTPATIENT
Start: 2022-03-09 | End: 2022-07-07

## 2022-03-09 NOTE — TELEPHONE ENCOUNTER
Recently during hospitalization was recommended to continue to hold diuretics  If she has worsening dyspnea or worsening leg edema or significant weight gain greater than 5 lb in 2 to 3 days, she should call us back and we can restart as needed diuretics  For now I have discontinued this

## 2022-03-10 NOTE — TELEPHONE ENCOUNTER
I spoke with patient's  Kwame Morel  and relayed message, Kwame Morel verbalized understanding and has many concerns:  Patient has visiting nurses right now:  BP low (no readings on hand to give)  Feet and ankles are swollen  Unable to get daily weights as she can not get out of bed  Refuses to be seen by PCP,  made appt  With PCP for the 14th and they will try to get patient there, once she is seen by PCP Kwame Morel will follow up with us

## 2022-03-21 ENCOUNTER — APPOINTMENT (EMERGENCY)
Dept: CT IMAGING | Facility: HOSPITAL | Age: 62
DRG: 432 | End: 2022-03-21
Payer: COMMERCIAL

## 2022-03-21 ENCOUNTER — APPOINTMENT (EMERGENCY)
Dept: RADIOLOGY | Facility: HOSPITAL | Age: 62
DRG: 432 | End: 2022-03-21
Payer: COMMERCIAL

## 2022-03-21 ENCOUNTER — APPOINTMENT (INPATIENT)
Dept: NON INVASIVE DIAGNOSTICS | Facility: HOSPITAL | Age: 62
DRG: 432 | End: 2022-03-21
Attending: EMERGENCY MEDICINE
Payer: COMMERCIAL

## 2022-03-21 ENCOUNTER — HOSPITAL ENCOUNTER (INPATIENT)
Facility: HOSPITAL | Age: 62
LOS: 2 days | Discharge: HOME HEALTH | DRG: 432 | End: 2022-03-23
Attending: EMERGENCY MEDICINE | Admitting: INTERNAL MEDICINE
Payer: COMMERCIAL

## 2022-03-21 DIAGNOSIS — K74.60 DECOMPENSATED HEPATIC CIRRHOSIS (HCC): ICD-10-CM

## 2022-03-21 DIAGNOSIS — T82.898A EXTRAVASATION INJURY OF INTRAVENOUS CATHETER SITE WITH OTHER COMPLICATION, INITIAL ENCOUNTER (HCC): ICD-10-CM

## 2022-03-21 DIAGNOSIS — K70.10 ALCOHOLIC HEPATITIS: ICD-10-CM

## 2022-03-21 DIAGNOSIS — A41.9 SEPSIS, DUE TO UNSPECIFIED ORGANISM, UNSPECIFIED WHETHER ACUTE ORGAN DYSFUNCTION PRESENT (HCC): ICD-10-CM

## 2022-03-21 DIAGNOSIS — E87.70 FLUID OVERLOAD: ICD-10-CM

## 2022-03-21 DIAGNOSIS — K70.11 ALCOHOLIC HEPATITIS WITH ASCITES: ICD-10-CM

## 2022-03-21 DIAGNOSIS — K70.11 ASCITES DUE TO ALCOHOLIC HEPATITIS: ICD-10-CM

## 2022-03-21 DIAGNOSIS — K72.90 DECOMPENSATED HEPATIC CIRRHOSIS (HCC): ICD-10-CM

## 2022-03-21 DIAGNOSIS — N18.31 STAGE 3A CHRONIC KIDNEY DISEASE (HCC): ICD-10-CM

## 2022-03-21 DIAGNOSIS — K65.2 SBP (SPONTANEOUS BACTERIAL PERITONITIS) (HCC): Primary | ICD-10-CM

## 2022-03-21 PROBLEM — R18.8 ASCITES: Status: ACTIVE | Noted: 2022-03-21

## 2022-03-21 LAB
2HR DELTA HS TROPONIN: 0 NG/L
ALBUMIN SERPL BCP-MCNC: 2.3 G/DL (ref 3.5–5)
ALP SERPL-CCNC: 196 U/L (ref 46–116)
ALT SERPL W P-5'-P-CCNC: 50 U/L (ref 12–78)
ANION GAP SERPL CALCULATED.3IONS-SCNC: 7 MMOL/L (ref 4–13)
APPEARANCE FLD: NORMAL
APTT PPP: 33 SECONDS (ref 23–37)
AST SERPL W P-5'-P-CCNC: 51 U/L (ref 5–45)
ATRIAL RATE: 85 BPM
BACTERIA UR QL AUTO: ABNORMAL /HPF
BASE EX.OXY STD BLDV CALC-SCNC: 38.4 % (ref 60–80)
BASE EXCESS BLDV CALC-SCNC: -3.6 MMOL/L
BASOPHILS # BLD AUTO: 0.04 THOUSANDS/ΜL (ref 0–0.1)
BASOPHILS NFR BLD AUTO: 0 % (ref 0–1)
BILIRUB SERPL-MCNC: 2.9 MG/DL (ref 0.2–1)
BILIRUB UR QL STRIP: ABNORMAL
BUN SERPL-MCNC: 22 MG/DL (ref 5–25)
CALCIUM ALBUM COR SERPL-MCNC: 9.8 MG/DL (ref 8.3–10.1)
CALCIUM SERPL-MCNC: 8.4 MG/DL (ref 8.3–10.1)
CARDIAC TROPONIN I PNL SERPL HS: 9 NG/L
CARDIAC TROPONIN I PNL SERPL HS: 9 NG/L
CHLORIDE SERPL-SCNC: 111 MMOL/L (ref 100–108)
CLARITY UR: ABNORMAL
CO2 SERPL-SCNC: 24 MMOL/L (ref 21–32)
COLOR FLD: NORMAL
COLOR UR: YELLOW
CREAT SERPL-MCNC: 0.98 MG/DL (ref 0.6–1.3)
EOSINOPHIL # BLD AUTO: 0.82 THOUSAND/ΜL (ref 0–0.61)
EOSINOPHIL NFR BLD AUTO: 3 % (ref 0–6)
ERYTHROCYTE [DISTWIDTH] IN BLOOD BY AUTOMATED COUNT: 18.1 % (ref 11.6–15.1)
FLUAV RNA RESP QL NAA+PROBE: NEGATIVE
FLUBV RNA RESP QL NAA+PROBE: NEGATIVE
GFR SERPL CREATININE-BSD FRML MDRD: 62 ML/MIN/1.73SQ M
GLUCOSE SERPL-MCNC: 101 MG/DL (ref 65–140)
GLUCOSE UR STRIP-MCNC: NEGATIVE MG/DL
HCO3 BLDV-SCNC: 21.3 MMOL/L (ref 24–30)
HCT VFR BLD AUTO: 38.4 % (ref 34.8–46.1)
HGB BLD-MCNC: 12.3 G/DL (ref 11.5–15.4)
HGB UR QL STRIP.AUTO: ABNORMAL
IMM GRANULOCYTES # BLD AUTO: 0.35 THOUSAND/UL (ref 0–0.2)
IMM GRANULOCYTES NFR BLD AUTO: 1 % (ref 0–2)
INR PPP: 1.47 (ref 0.84–1.19)
KETONES UR STRIP-MCNC: NEGATIVE MG/DL
LACTATE SERPL-SCNC: 1.3 MMOL/L (ref 0.5–2)
LEUKOCYTE ESTERASE UR QL STRIP: ABNORMAL
LIPASE SERPL-CCNC: 710 U/L (ref 73–393)
LYMPHOCYTES # BLD AUTO: 1.35 THOUSANDS/ΜL (ref 0.6–4.47)
LYMPHOCYTES NFR BLD AUTO: 6 % (ref 14–44)
LYMPHOCYTES NFR BLD AUTO: 69 %
MAGNESIUM SERPL-MCNC: 1.7 MG/DL (ref 1.6–2.6)
MCH RBC QN AUTO: 35 PG (ref 26.8–34.3)
MCHC RBC AUTO-ENTMCNC: 32 G/DL (ref 31.4–37.4)
MCV RBC AUTO: 109 FL (ref 82–98)
MONO+MESO NFR FLD MANUAL: 4 %
MONOCYTES # BLD AUTO: 0.88 THOUSAND/ΜL (ref 0.17–1.22)
MONOCYTES NFR BLD AUTO: 23 %
MONOCYTES NFR BLD AUTO: 4 % (ref 4–12)
NEUTROPHILS # BLD AUTO: 21.19 THOUSANDS/ΜL (ref 1.85–7.62)
NEUTS SEG NFR BLD AUTO: 4 %
NEUTS SEG NFR BLD AUTO: 86 % (ref 43–75)
NITRITE UR QL STRIP: NEGATIVE
NON-SQ EPI CELLS URNS QL MICRO: ABNORMAL /HPF
NRBC BLD AUTO-RTO: 0 /100 WBCS
NT-PROBNP SERPL-MCNC: 2135 PG/ML
O2 CT BLDV-SCNC: 7.4 ML/DL
P AXIS: 44 DEGREES
PCO2 BLDV: 38.3 MM HG (ref 42–50)
PH BLDV: 7.36 [PH] (ref 7.3–7.4)
PH UR STRIP.AUTO: 7 [PH]
PLATELET # BLD AUTO: 221 THOUSANDS/UL (ref 149–390)
PMV BLD AUTO: 11.6 FL (ref 8.9–12.7)
PO2 BLDV: 25.3 MM HG (ref 35–45)
POTASSIUM SERPL-SCNC: 3.9 MMOL/L (ref 3.5–5.3)
PR INTERVAL: 150 MS
PROCALCITONIN SERPL-MCNC: 0.88 NG/ML
PROT SERPL-MCNC: 6 G/DL (ref 6.4–8.2)
PROT UR STRIP-MCNC: ABNORMAL MG/DL
PROTHROMBIN TIME: 17.2 SECONDS (ref 11.6–14.5)
QRS AXIS: 16 DEGREES
QRSD INTERVAL: 78 MS
QT INTERVAL: 380 MS
QTC INTERVAL: 452 MS
RBC # BLD AUTO: 3.51 MILLION/UL (ref 3.81–5.12)
RBC #/AREA URNS AUTO: ABNORMAL /HPF
RSV RNA RESP QL NAA+PROBE: NEGATIVE
SARS-COV-2 RNA RESP QL NAA+PROBE: NEGATIVE
SITE: NORMAL
SODIUM SERPL-SCNC: 142 MMOL/L (ref 136–145)
SP GR UR STRIP.AUTO: <=1.005 (ref 1–1.03)
T WAVE AXIS: 45 DEGREES
TOTAL CELLS COUNTED SPEC: 100
UROBILINOGEN UR QL STRIP.AUTO: 1 E.U./DL
VENTRICULAR RATE: 85 BPM
WBC # BLD AUTO: 24.63 THOUSAND/UL (ref 4.31–10.16)
WBC # FLD MANUAL: 18 /UL
WBC #/AREA URNS AUTO: ABNORMAL /HPF

## 2022-03-21 PROCEDURE — 88342 IMHCHEM/IMCYTCHM 1ST ANTB: CPT | Performed by: PATHOLOGY

## 2022-03-21 PROCEDURE — 83605 ASSAY OF LACTIC ACID: CPT | Performed by: EMERGENCY MEDICINE

## 2022-03-21 PROCEDURE — 87077 CULTURE AEROBIC IDENTIFY: CPT | Performed by: EMERGENCY MEDICINE

## 2022-03-21 PROCEDURE — 93005 ELECTROCARDIOGRAM TRACING: CPT

## 2022-03-21 PROCEDURE — 87040 BLOOD CULTURE FOR BACTERIA: CPT | Performed by: EMERGENCY MEDICINE

## 2022-03-21 PROCEDURE — 80053 COMPREHEN METABOLIC PANEL: CPT | Performed by: EMERGENCY MEDICINE

## 2022-03-21 PROCEDURE — 99285 EMERGENCY DEPT VISIT HI MDM: CPT

## 2022-03-21 PROCEDURE — 87086 URINE CULTURE/COLONY COUNT: CPT | Performed by: EMERGENCY MEDICINE

## 2022-03-21 PROCEDURE — 88305 TISSUE EXAM BY PATHOLOGIST: CPT | Performed by: PATHOLOGY

## 2022-03-21 PROCEDURE — 87070 CULTURE OTHR SPECIMN AEROBIC: CPT | Performed by: EMERGENCY MEDICINE

## 2022-03-21 PROCEDURE — 99222 1ST HOSP IP/OBS MODERATE 55: CPT | Performed by: INTERNAL MEDICINE

## 2022-03-21 PROCEDURE — 88112 CYTOPATH CELL ENHANCE TECH: CPT | Performed by: PATHOLOGY

## 2022-03-21 PROCEDURE — 87206 SMEAR FLUORESCENT/ACID STAI: CPT | Performed by: EMERGENCY MEDICINE

## 2022-03-21 PROCEDURE — 93010 ELECTROCARDIOGRAM REPORT: CPT | Performed by: INTERNAL MEDICINE

## 2022-03-21 PROCEDURE — 87205 SMEAR GRAM STAIN: CPT | Performed by: EMERGENCY MEDICINE

## 2022-03-21 PROCEDURE — 88341 IMHCHEM/IMCYTCHM EA ADD ANTB: CPT | Performed by: PATHOLOGY

## 2022-03-21 PROCEDURE — 89051 BODY FLUID CELL COUNT: CPT | Performed by: EMERGENCY MEDICINE

## 2022-03-21 PROCEDURE — 99285 EMERGENCY DEPT VISIT HI MDM: CPT | Performed by: EMERGENCY MEDICINE

## 2022-03-21 PROCEDURE — 83880 ASSAY OF NATRIURETIC PEPTIDE: CPT | Performed by: EMERGENCY MEDICINE

## 2022-03-21 PROCEDURE — 85610 PROTHROMBIN TIME: CPT | Performed by: EMERGENCY MEDICINE

## 2022-03-21 PROCEDURE — 84145 PROCALCITONIN (PCT): CPT | Performed by: EMERGENCY MEDICINE

## 2022-03-21 PROCEDURE — 82805 BLOOD GASES W/O2 SATURATION: CPT | Performed by: EMERGENCY MEDICINE

## 2022-03-21 PROCEDURE — 84484 ASSAY OF TROPONIN QUANT: CPT | Performed by: EMERGENCY MEDICINE

## 2022-03-21 PROCEDURE — NC001 PR NO CHARGE: Performed by: PHYSICIAN ASSISTANT

## 2022-03-21 PROCEDURE — 81001 URINALYSIS AUTO W/SCOPE: CPT | Performed by: EMERGENCY MEDICINE

## 2022-03-21 PROCEDURE — 49083 ABD PARACENTESIS W/IMAGING: CPT

## 2022-03-21 PROCEDURE — 71046 X-RAY EXAM CHEST 2 VIEWS: CPT

## 2022-03-21 PROCEDURE — 85025 COMPLETE CBC W/AUTO DIFF WBC: CPT | Performed by: EMERGENCY MEDICINE

## 2022-03-21 PROCEDURE — 36415 COLL VENOUS BLD VENIPUNCTURE: CPT | Performed by: EMERGENCY MEDICINE

## 2022-03-21 PROCEDURE — 74177 CT ABD & PELVIS W/CONTRAST: CPT

## 2022-03-21 PROCEDURE — 0241U HB NFCT DS VIR RESP RNA 4 TRGT: CPT | Performed by: EMERGENCY MEDICINE

## 2022-03-21 PROCEDURE — 87186 SC STD MICRODIL/AGAR DIL: CPT | Performed by: EMERGENCY MEDICINE

## 2022-03-21 PROCEDURE — 99223 1ST HOSP IP/OBS HIGH 75: CPT | Performed by: INTERNAL MEDICINE

## 2022-03-21 PROCEDURE — 0W9G3ZZ DRAINAGE OF PERITONEAL CAVITY, PERCUTANEOUS APPROACH: ICD-10-PCS | Performed by: RADIOLOGY

## 2022-03-21 PROCEDURE — 83735 ASSAY OF MAGNESIUM: CPT | Performed by: EMERGENCY MEDICINE

## 2022-03-21 PROCEDURE — 83690 ASSAY OF LIPASE: CPT | Performed by: EMERGENCY MEDICINE

## 2022-03-21 PROCEDURE — 85730 THROMBOPLASTIN TIME PARTIAL: CPT | Performed by: EMERGENCY MEDICINE

## 2022-03-21 PROCEDURE — 87075 CULTR BACTERIA EXCEPT BLOOD: CPT | Performed by: EMERGENCY MEDICINE

## 2022-03-21 PROCEDURE — 87116 MYCOBACTERIA CULTURE: CPT | Performed by: EMERGENCY MEDICINE

## 2022-03-21 RX ORDER — PREDNISOLONE SODIUM PHOSPHATE 15 MG/5ML
30 SOLUTION ORAL DAILY
Status: DISCONTINUED | OUTPATIENT
Start: 2022-03-21 | End: 2022-03-21

## 2022-03-21 RX ORDER — ONDANSETRON 2 MG/ML
4 INJECTION INTRAMUSCULAR; INTRAVENOUS EVERY 6 HOURS PRN
Status: DISCONTINUED | OUTPATIENT
Start: 2022-03-21 | End: 2022-03-23 | Stop reason: HOSPADM

## 2022-03-21 RX ORDER — PANTOPRAZOLE SODIUM 40 MG/1
40 TABLET, DELAYED RELEASE ORAL
Status: DISCONTINUED | OUTPATIENT
Start: 2022-03-21 | End: 2022-03-23 | Stop reason: HOSPADM

## 2022-03-21 RX ORDER — VANCOMYCIN HYDROCHLORIDE 1 G/200ML
15 INJECTION, SOLUTION INTRAVENOUS ONCE
Status: COMPLETED | OUTPATIENT
Start: 2022-03-21 | End: 2022-03-21

## 2022-03-21 RX ORDER — SUMATRIPTAN 25 MG/1
100 TABLET, FILM COATED ORAL ONCE AS NEEDED
Status: DISCONTINUED | OUTPATIENT
Start: 2022-03-21 | End: 2022-03-23 | Stop reason: HOSPADM

## 2022-03-21 RX ORDER — FOLIC ACID 1 MG/1
1 TABLET ORAL DAILY
Status: DISCONTINUED | OUTPATIENT
Start: 2022-03-21 | End: 2022-03-23 | Stop reason: HOSPADM

## 2022-03-21 RX ORDER — LIDOCAINE WITH 8.4% SOD BICARB 0.9%(10ML)
SYRINGE (ML) INJECTION CODE/TRAUMA/SEDATION MEDICATION
Status: COMPLETED | OUTPATIENT
Start: 2022-03-21 | End: 2022-03-21

## 2022-03-21 RX ORDER — LETROZOLE 2.5 MG/1
2.5 TABLET, FILM COATED ORAL DAILY
Status: DISCONTINUED | OUTPATIENT
Start: 2022-03-21 | End: 2022-03-23 | Stop reason: HOSPADM

## 2022-03-21 RX ADMIN — ENOXAPARIN SODIUM 40 MG: 40 INJECTION SUBCUTANEOUS at 12:27

## 2022-03-21 RX ADMIN — PANTOPRAZOLE SODIUM 40 MG: 40 TABLET, DELAYED RELEASE ORAL at 12:27

## 2022-03-21 RX ADMIN — Medication 30 MG: at 12:27

## 2022-03-21 RX ADMIN — Medication 10 ML: at 11:16

## 2022-03-21 RX ADMIN — IOHEXOL 100 ML: 350 INJECTION, SOLUTION INTRAVENOUS at 09:02

## 2022-03-21 RX ADMIN — CEFTRIAXONE SODIUM 1000 MG: 10 INJECTION, POWDER, FOR SOLUTION INTRAVENOUS at 10:45

## 2022-03-21 RX ADMIN — LETROZOLE 2.5 MG: 2.5 TABLET, FILM COATED ORAL at 12:27

## 2022-03-21 RX ADMIN — SERTRALINE HYDROCHLORIDE 100 MG: 50 TABLET ORAL at 12:27

## 2022-03-21 RX ADMIN — FOLIC ACID 1 MG: 1 TABLET ORAL at 12:27

## 2022-03-21 RX ADMIN — VANCOMYCIN HYDROCHLORIDE 1000 MG: 1 INJECTION, SOLUTION INTRAVENOUS at 11:48

## 2022-03-21 NOTE — CONSULTS
Consultation -  [facility]  Dionte Gonzalez 62 y o  female MRN: [id number]  Unit/Bed#: ED 13 Encounter: [id number]      Consults    Reason for Consult / Principal Problem: Ascites, abdominal pain    HPI: Dionte Gonzalez is a 62y o  year old female with a PMH of breast cancer, CKD, history of DVT and recent hospitalization last month for severe ETOH hepatitis who presented to the ED with complaints the development of abdominal pain and increased abdominal distension which she reports she noticed overnight  She is currently on a prednisolone 40mg 28 day course which was started on February 25th for severe alcohol hepatitis with a discriminant function greater than 32  She denies any fevers or chills  She denies any nausea or vomiting  She denies any rectal bleeding or melena  Labs reveal a significant leukocytosis with a white blood cell count of 24 63  Total bilirubin is down to 2 9 from 10 17 on 02/25  INR is down to 1 47 from 3 5 on 2/24  She reports she has not consumed any further alcohol since her last admission in February  She had just underwent a paracentesis and 1500 mL of fluid was removed  Fluid analysis is currently pending  She reports improvement in her abdominal discomfort since the paracentesis  REVIEW OF SYSTEMS:    CONSTITUTIONAL: Denies any fever, chills, or rigors  HEENT: No earache or tinnitus  Denies hearing loss or visual disturbances  CARDIOVASCULAR: No chest pain or palpitations  RESPIRATORY: Denies any cough, hemoptysis, shortness of breath or dyspnea on exertion  GASTROINTESTINAL: As noted in the History of Present Illness  GENITOURINARY: No problems with urination  Denies any hematuria or dysuria  NEUROLOGIC: No dizziness or vertigo, denies headaches  MUSCULOSKELETAL: Denies any muscle or joint pain  SKIN: Denies skin rashes or itching  ENDOCRINE: Denies excessive thirst  Denies intolerance to heat or cold    PSYCHOSOCIAL: Denies depression or anxiety  Denies any recent memory loss  Historical Information   Past Medical History:   Diagnosis Date    Acute DVT (deep venous thrombosis) (HCC)     of LLE>     Bladder infection     Congenital prolapsed rectum     History of biliary stent insertion     History of chemotherapy     History of radiation therapy 05/2018    left breast ca    History of transfusion     x2 s/p chemo treatments, no reaction    Hydronephrosis     right kidney    Hypertension     Malignant neoplasm of overlapping sites of left female breast ([address] ) 05/01/2018    Migraine      Past Surgical History:   Procedure Laterality Date    ABDOMINAL ADHESION SURGERY N/A 4/23/2019    Procedure: LYSIS ADHESIONS;  Surgeon: Tone Simmons MD;  [facility];  Service: Colorectal    BREAST BIOPSY      COLON SURGERY      colon resection    CYSTOSCOPY N/A 3/4/2019    Procedure: CYSTOSCOPY WITH BIOPSIES AND FULGERATION AND REDCUTION OF RECTUM PROLAPSE;  Surgeon: Chris Johnson MD;  [facility];  Service: Urology    ERCP N/A 1/4/2019    Procedure: ENDOSCOPIC RETROGRADE CHOLANGIOPANCREATOGRAPHY (ERCP); Surgeon: Ky Mustafa MD;  [facility];  Service: Gastroenterology    INSTILLATION MYTOMYCIN N/A 3/4/2019    Procedure: Royanne Oksana;  Surgeon: Chris Johnson MD;  [facility];  Service: Urology    MASTECTOMY Left     2018    NJ COLONOSCOPY FLX DX W/COLLJ SPEC WHEN PFRMD N/A 4/22/2019    Procedure: COLONOSCOPY;  Surgeon: Tone Simmons MD;  [facility]; Service: Colorectal    NJ EDG US EXAM SURGICAL ALTER STOM DUODENUM/JEJUNUM N/A 3/7/2019    Procedure: LINEAR ENDOSCOPIC U/S;  Surgeon: Eleanor Ag MD;  [facility]; Service: Gastroenterology    NJ ESOPHAGOGASTRODUODENOSCOPY TRANSORAL DIAGNOSTIC N/A 3/7/2019    Procedure: ESOPHAGOGASTRODUODENOSCOPY (EGD); Surgeon: Eleanor Ag MD;  [facility];   Service: Gastroenterology    NJ INSJ TUNNELED CTR VAD W/SUBQ PORT AGE 5 YR/> N/A 6/26/2018    Procedure: INSERTION VENOUS PORT ( PORT-A-CATH) IR;  Surgeon: Ambrosio Lay DO;  [facility];  Service: Interventional Radiology    SC LAP, SURG PROCTOPEXY N/A 4/23/2019    Procedure: LAPAROSCOPIC HAND-ASSIST PELVIC DISSECTION; proctopexy;  Surgeon: Meredith Wright MD;  [facility];  Service: Colorectal    SC LAP, SURG PROCTOPEXY N/A 11/18/2020    Procedure: LAPAROSCOPIC LYSIS OF ADHESIONS, MOBILIZATION OF RECTUM AND SUTURE RECTOPEXY;  Surgeon: Deon Robledo MD;  [facility];  Service: Colorectal    SC MASTECTOMY, MODIFIED RADICAL Left 5/15/2018    Procedure: BREAST MODIFIED RADICAL MASTECTOMY;  Surgeon: Honey Davila MD;  [facility];  Service: Surgical Oncology    SC RMVL [place] CTR VAD W/SUBQ PORT/ CTR/PRPH INSJ N/A 6/4/2019    Procedure: REMOVAL VENOUS PORT (PORT-A-CATH)IR;  Surgeon: Ambrosio Lay DO;  [facility];  Service: Interventional Radiology    TUBAL LIGATION      US GUIDANCE BREAST BIOPSY LEFT EACH ADDITIONAL Left 4/3/2018    US GUIDED BREAST BIOPSY LEFT COMPLETE Left 4/3/2018    US GUIDED BREAST LYMPH NODE BIOPSY LEFT Left 4/3/2018     Social History   Social History     Substance and Sexual Activity   Alcohol Use Not Currently    Comment: Drinks daily until three weeks ago     Social History     Substance and Sexual Activity   Drug Use Not Currently     Social History     Tobacco Use   Smoking Status Never Smoker   Smokeless Tobacco Never Used     Family History   Problem Relation Age of Onset    No Known Problems Mother     No Known Problems Father     Breast cancer Maternal Aunt 48    Colon cancer Maternal Aunt     No Known Problems Sister     No Known Problems Daughter     No Known Problems Maternal Grandmother     No Known Problems Maternal Grandfather     No Known Problems Paternal Grandmother     No Known Problems Paternal Grandfather        Meds/Allergies     (Not in a hospital admission)    Current Facility-Administered Medications   Medication Dose Route Frequency    enoxaparin (LOVENOX) subcutaneous injection 40 mg  40 mg Subcutaneous Daily    folic acid (FOLVITE) tablet 1 mg  1 mg Oral Daily    letrozole (FEMARA) tablet 2 5 mg  2 5 mg Oral Daily    ondansetron (ZOFRAN) injection 4 mg  4 mg Intravenous Q6H PRN    pantoprazole (PROTONIX) EC tablet 40 mg  40 mg Oral Early Morning    prednisoLONE (ORAPRED) oral solution 30 mg  30 mg Oral Daily    sertraline (ZOLOFT) tablet 100 mg  100 mg Oral Daily    SUMAtriptan (IMITREX) tablet 100 mg  100 mg Oral Once PRN    vancomycin (VANCOCIN) IVPB (premix in dextrose) 1,000 mg 200 mL  15 mg/kg Intravenous Once       Allergies   Allergen Reactions    Gluten Meal - Food Allergy GI Intolerance    Lactose - Food Allergy GI Intolerance       Objective   Blood pressure 117/77, pulse 86, temperature 97 5 °F (36 4 °C), temperature source Oral, resp  rate 19, weight 73 2 kg (161 lb 6 oz), SpO2 99 %, not currently breastfeeding  Intake/Output Summary (Last 24 hours) at 3/21/2022 1210  Last data filed at 3/21/2022 1101  Gross per 24 hour   Intake --   Output 1500 ml   Net -1500 ml         PHYSICAL EXAM:      General Appearance:   Alert, cooperative, no distress, appears stated age    HEENT:   Normocephalic, atraumatic, anicteric   Neck:  Supple, symmetrical, trachea midline, no adenopathy;    thyroid: no enlargement/tenderness/nodules; no carotid  bruit or JVD    Lungs:   Clear to auscultation bilaterally; no rales, rhonchi or wheezing; respirations unlabored    Heart[de-identified]   S1 and S2 normal; regular rate and rhythm; no murmur, rub, or gallop     Abdomen:   Soft, non-tender, distended; normal bowel sounds; no masses, no organomegaly    Genitalia:   Deferred    Rectal:   Deferred    Extremities:  No cyanosis, clubbing; +edema    Pulses:  2+ and symmetric all extremities    Skin:  Skin color, texture, turgor normal, no rashes or lesions  Lymph nodes:  No palpable cervical, axillary or inguinal lymphadenopathy        Lab Results:   Admission on 03/21/2022   Component Date Value    WBC 03/21/2022 24 63*    RBC 03/21/2022 3 51*    Hemoglobin 03/21/2022 12 3     Hematocrit 03/21/2022 38 4     MCV 03/21/2022 109*    MCH 03/21/2022 35 0*    MCHC 03/21/2022 32 0     RDW 03/21/2022 18 1*    MPV 03/21/2022 11 6     Platelets 08/74/8572 221     nRBC 03/21/2022 0     Neutrophils Relative 03/21/2022 86*    Immat GRANS % 03/21/2022 1     Lymphocytes Relative 03/21/2022 6*    Monocytes Relative 03/21/2022 4     Eosinophils Relative 03/21/2022 3     Basophils Relative 03/21/2022 0     Neutrophils Absolute 03/21/2022 21 19*    Immature Grans Absolute 03/21/2022 0 35*    Lymphocytes Absolute 03/21/2022 1 35     Monocytes Absolute 03/21/2022 0 88     Eosinophils Absolute 03/21/2022 0 82*    Basophils Absolute 03/21/2022 0 04     NT-proBNP 03/21/2022 2,135*    Sodium 03/21/2022 142     Potassium 03/21/2022 3 9     Chloride 03/21/2022 111*    CO2 03/21/2022 24     ANION GAP 03/21/2022 7     BUN 03/21/2022 22     Creatinine 03/21/2022 0 98     Glucose 03/21/2022 101     Calcium 03/21/2022 8 4     Corrected Calcium 03/21/2022 9 8     AST 03/21/2022 51*    ALT 03/21/2022 50     Alkaline Phosphatase 03/21/2022 196*    Total Protein 03/21/2022 6 0*    Albumin 03/21/2022 2 3*    Total Bilirubin 03/21/2022 2 90*    eGFR 03/21/2022 62     LACTIC ACID 03/21/2022 1 3     Lipase 03/21/2022 710*    Magnesium 03/21/2022 1 7     pH, Luis Carlos 03/21/2022 7 363     pCO2, Luis Carlos 03/21/2022 38 3*    pO2, Luis Carlos 03/21/2022 25 3*    HCO3, Luis Carlos 03/21/2022 21 3*    Base Excess, Luis Carlos 03/21/2022 -3 6     O2 Content, Luis Carlos 03/21/2022 7 4     O2 HGB, VENOUS 03/21/2022 38 4*    Protime 03/21/2022 17 2*    INR 03/21/2022 1 47*    PTT 03/21/2022 33     Procalcitonin 03/21/2022 0 88*    SARS-CoV-2 03/21/2022 Negative     INFLUENZA A PCR 03/21/2022 Negative  INFLUENZA B PCR 03/21/2022 Negative     RSV PCR 03/21/2022 Negative     Color, UA 03/21/2022 Yellow     Clarity, UA 03/21/2022 Cloudy     Specific Gravity, UA 03/21/2022 <=1 005     pH, UA 03/21/2022 7 0     Leukocytes, UA 03/21/2022 Small*    Nitrite, UA 03/21/2022 Negative     Protein, UA 03/21/2022 Trace*    Glucose, UA 03/21/2022 Negative     Ketones, UA 03/21/2022 Negative     Urobilinogen, UA 03/21/2022 1 0     Bilirubin, UA 03/21/2022 Small*    Blood, UA 03/21/2022 Moderate*    hs TnI 0hr 03/21/2022 9     hs TnI 2hr 03/21/2022 9     Delta 2hr hsTnI 03/21/2022 0     Ventricular Rate 03/21/2022 85     Atrial Rate 03/21/2022 85     UT Interval 03/21/2022 150     QRSD Interval 03/21/2022 78     QT Interval 03/21/2022 380     QTC Interval 03/21/2022 452     P Axis 03/21/2022 44     QRS Axis 03/21/2022 16     T Wave Axis 03/21/2022 45     RBC, UA 03/21/2022 1-2     WBC, UA 03/21/2022 10-20*    Epithelial Cells 03/21/2022 Occasional     Bacteria, UA 03/21/2022 Innumerable*       Imaging Studies: I have personally reviewed pertinent imaging studies  ASSESSMENT & PLAN:    Ascites  Abdominal pain  Leukocytosis  ETOH hepatitis    - Patient presented with the development of abdominal pain and increased distention  - CT A/P showed a moderate amount of ascites  White blood cell count is elevated at 24 45   - She had a recent hospitalization for severe ETOH hepatitis with a DF of >32 and was started on Prednisolone 40mg po daily x 28 days with subsequent taper on 2/25  Total bilirubin is down to 2 9 from 10 17 on 02/25  INR is down to 1 47 from 3 5 on 2/24    - IR performed paracentesis today with removal of 1500 ml of ascitic fluid  Fluid analysis is pending for SBP  If PMN count is >250 consistent with SBP, she would require treatment with Ceftriaxone 2grams IV daily x 5 days as well as 25% albumin 1 5g/kg on day one and 1g/kg on day 3    She did receive a 1x dose of Ceftriaxone in the ED while awaiting results  - Will hold Prednisolone at this time given the concern for possible SBP/infection   - She is currently AOx3 and there is no asterixis  - Monitor patient and labs, serial abdominal exams and monitoring of vital signs, supportive care  - Low sodium diet  - Continued complete ETOH cessation  Thank you for this consultation  The patient will be seen and evaluated by Dr Genet Asif PA-C  3/21/2022,12:10 PM

## 2022-03-21 NOTE — ED PROVIDER NOTES
History  Chief Complaint   Patient presents with    Abdominal Problem     Pt reports increased abd pain and distention  Hx of liver disease  62year old female patient presents emergency department for evaluation of abdominal discomfort  Patient has known ascites, secondary to presumptive cirrhosis from heavy drinking previously  Patient states abdominal discomfort with tenderness in all four quadrants and physical exam   The patient is slightly distended but not tight  My concern currently is primarily for spontaneous bacterial peritonitis  Patient have a full septic evaluation done, after labs are resulted and CT scan is seen plans will be made for a diagnostic paracentesis  History provided by:  Patient   used: No    Abdominal Pain  Pain location:  Generalized  Pain quality: aching    Pain radiates to:  Does not radiate  Onset quality:  Gradual  Timing:  Constant  Progression:  Worsening  Context: previous surgery    Relieved by:  Nothing  Associated symptoms: no nausea and no vomiting        Prior to Admission Medications   Prescriptions Last Dose Informant Patient Reported? Taking?    SUMAtriptan (IMITREX) 100 mg tablet More than a month at Unknown time Self Yes No   Sig: Take 100 mg by mouth once as needed for migraine   folic acid (FOLVITE) 1 mg tablet  Self No No   Sig: Take 1 tablet (1 mg total) by mouth daily   Patient not taking: Reported on 8/82/5876   folic acid (FOLVITE) 1 mg tablet More than a month at Unknown time  No No   Sig: Take 1 tablet (1 mg total) by mouth daily   Patient not taking: Reported on 3/21/2022    gabapentin (NEURONTIN) 300 mg capsule  Self No No   Sig: Take 1 capsule (300 mg total) by mouth 3 (three) times a day   Patient taking differently: Take 300 mg by mouth as needed    letrozole [facility]) 2 5 mg tablet 3/20/2022 at Unknown time  No Yes   Sig: TAKE 1 TABLET BY MOUTH EVERY DAY   midodrine (PROAMATINE) 5 mg tablet   No No   Sig: Take 1 tablet (5 mg total) by mouth 3 (three) times a day before meals for 15 days Please check your blood pressure 3x/day and hold if sysolic blood pressure greater than 130 mg   pantoprazole (PROTONIX) 40 mg tablet 3/20/2022 at Unknown time  No Yes   Sig: Take 1 tablet (40 mg total) by mouth daily in the early morning   prednisoLONE (ORAPRED ODT) 10 MG disintegrating tablet 3/20/2022 at Unknown time  No Yes   Sig: Take 4 tablets (40 mg total) by mouth daily for 28 days, THEN 3 tablets (30 mg total) daily for 7 days, THEN 2 tablets (20 mg total) daily for 7 days, THEN 1 tablet (10 mg total) daily for 7 days     sertraline (ZOLOFT) 100 mg tablet Past Week at Unknown time Self Yes Yes   Sig: Take 100 mg by mouth daily   sertraline (ZOLOFT) 50 mg tablet Past Week at Unknown time Self Yes Yes   Sig: Take 50 mg by mouth 2 (two) times a day    torsemide (DEMADEX) 20 mg tablet   No No   Sig: Take 1 tablet (20 mg total) by mouth as needed (for leg swelling, or weight gain >5 lbs in 2-3 days)      Facility-Administered Medications: None       Past Medical History:   Diagnosis Date    Acute DVT (deep venous thrombosis) (HCC)     of LLE>     Bladder infection     Congenital prolapsed rectum     History of biliary stent insertion     History of chemotherapy     History of radiation therapy 05/2018    left breast ca    History of transfusion     x2 s/p chemo treatments, no reaction    Hydronephrosis     right kidney    Hypertension     Malignant neoplasm of overlapping sites of left female breast ([facility] [address] ) 05/01/2018    Migraine        Past Surgical History:   Procedure Laterality Date    ABDOMINAL ADHESION SURGERY N/A 4/23/2019    Procedure: LYSIS ADHESIONS;  Surgeon: Hitesh Diaz MD;  [facility];  Service: Colorectal    BREAST BIOPSY      COLON SURGERY      colon resection    CYSTOSCOPY N/A 3/4/2019    Procedure: CYSTOSCOPY WITH BIOPSIES AND FULGERATION AND REDCUTION OF RECTUM PROLAPSE;  Surgeon: Dalia Bates MD;  [facility];  Service: Urology    ERCP N/A 1/4/2019    Procedure: ENDOSCOPIC RETROGRADE CHOLANGIOPANCREATOGRAPHY (ERCP); Surgeon: Jose Herron MD;  [facility];  Service: Gastroenterology    INSTILLATION MYTOMYCIN N/A 3/4/2019    Procedure: Eamon Denton;  Surgeon: Sariah Abreu MD;  [facility];  Service: Urology    IR PARACENTESIS  3/21/2022    MASTECTOMY Left     2018    NV COLONOSCOPY FLX DX W/COLLJ SPEC WHEN PFRMD N/A 4/22/2019    Procedure: COLONOSCOPY;  Surgeon: Rg Ashley MD;  Location: [facility]; Service: Colorectal    NV EDG US EXAM SURGICAL ALTER STOM DUODENUM/JEJUNUM N/A 3/7/2019    Procedure: LINEAR ENDOSCOPIC U/S;  Surgeon: Josh Yousif MD;  Location: [facility]; Service: Gastroenterology    NV ESOPHAGOGASTRODUODENOSCOPY TRANSORAL DIAGNOSTIC N/A 3/7/2019    Procedure: ESOPHAGOGASTRODUODENOSCOPY (EGD); Surgeon: Josh Yousif MD;  [facility]: [facility];   Service: Gastroenterology    NV INSJ TUNNELED CTR VAD W/SUBQ PORT AGE 5 YR/> N/A 6/26/2018    Procedure: INSERTION VENOUS PORT ( PORT-A-CATH) IR;  Surgeon: Donna Ford DO;  [facility];  Service: Interventional Radiology    NV LAP, SURG PROCTOPEXY N/A 4/23/2019    Procedure: LAPAROSCOPIC HAND-ASSIST PELVIC DISSECTION; proctopexy;  Surgeon: Rg Ashley MD;  [facility];  Service: Colorectal    NV LAP, SURG PROCTOPEXY N/A 11/18/2020    Procedure: LAPAROSCOPIC LYSIS OF ADHESIONS, MOBILIZATION OF RECTUM AND SUTURE RECTOPEXY;  Surgeon: Mirza Maldonado MD;  [facility];  Service: Colorectal    NV MASTECTOMY, MODIFIED RADICAL Left 5/15/2018    Procedure: BREAST MODIFIED RADICAL MASTECTOMY;  Surgeon: Astrid Sandoval MD;  [facility];  Service: Surgical Oncology    NV RMVL [place] CTR VAD W/SUBQ PORT/ CTR/PRPH INSJ N/A 6/4/2019    Procedure: REMOVAL VENOUS PORT (PORT-A-CATH)IR;  Surgeon: Donna Ford DO;  [facility];  Service: Interventional Radiology    TUBAL LIGATION      US GUIDANCE BREAST BIOPSY LEFT EACH ADDITIONAL Left 4/3/2018    US GUIDED BREAST BIOPSY LEFT COMPLETE Left 4/3/2018    US GUIDED BREAST LYMPH NODE BIOPSY LEFT Left 4/3/2018       Family History   Problem Relation Age of Onset    No Known Problems Mother     No Known Problems Father     Breast cancer Maternal Aunt 48    Colon cancer Maternal Aunt     No Known Problems Sister     No Known Problems Daughter     No Known Problems Maternal Grandmother     No Known Problems Maternal Grandfather     No Known Problems Paternal Grandmother     No Known Problems Paternal Grandfather      I have reviewed and agree with the history as documented  E-Cigarette/Vaping    E-Cigarette Use Never User      E-Cigarette/Vaping Substances     Social History     Tobacco Use    Smoking status: Never Smoker    Smokeless tobacco: Never Used   Vaping Use    Vaping Use: Never used   Substance Use Topics    Alcohol use: Not Currently     Comment: Drinks daily until three weeks ago    Drug use: Not Currently       Review of Systems   Gastrointestinal: Positive for abdominal pain  Negative for nausea and vomiting  All other systems reviewed and are negative  Physical Exam  Physical Exam  Vitals and nursing note reviewed  Constitutional:       Appearance: She is well-developed  HENT:      Head: Normocephalic and atraumatic  Right Ear: External ear normal       Left Ear: External ear normal    Eyes:      Conjunctiva/sclera: Conjunctivae normal    Neck:      Thyroid: No thyromegaly  Vascular: No JVD  Trachea: No tracheal deviation  Cardiovascular:      Rate and Rhythm: Normal rate  Pulmonary:      Effort: Pulmonary effort is normal       Breath sounds: Normal breath sounds  No stridor  Abdominal:      General: There is no distension  Palpations: Abdomen is soft  There is no mass  Tenderness: There is no abdominal tenderness   There is no guarding  Hernia: No hernia is present  Musculoskeletal:         General: No tenderness or deformity  Normal range of motion  Lymphadenopathy:      Cervical: No cervical adenopathy  Skin:     General: Skin is warm  Coloration: Skin is not pale  Findings: No erythema or rash  Neurological:      Mental Status: She is alert and oriented to person, place, and time     Psychiatric:         Behavior: Behavior normal          Vital Signs  ED Triage Vitals [03/21/22 0718]   Temperature Pulse Respirations Blood Pressure SpO2   97 5 °F (36 4 °C) 85 19 153/77 98 %      Temp Source Heart Rate Source Patient Position - Orthostatic VS BP Location FiO2 (%)   Oral Monitor Lying Right arm --      Pain Score       2           Vitals:    03/21/22 1509 03/21/22 1555 03/21/22 2328 03/22/22 0734   BP: 115/75 133/64 147/59 163/79   Pulse: 89 78 89 86   Patient Position - Orthostatic VS: Lying Lying           Visual Acuity      ED Medications  Medications   SUMAtriptan (IMITREX) tablet 100 mg (has no administration in time range)   sertraline (ZOLOFT) tablet 100 mg (100 mg Oral Given 3/22/22 0944)   pantoprazole (PROTONIX) EC tablet 40 mg (40 mg Oral Given 5/28/20 0482)   folic acid (FOLVITE) tablet 1 mg (1 mg Oral Given 3/22/22 0942)   letrozole [facility]) tablet 2 5 mg (2 5 mg Oral Given 3/22/22 1003)   ondansetron (ZOFRAN) injection 4 mg (has no administration in time range)   enoxaparin (LOVENOX) subcutaneous injection 40 mg (40 mg Subcutaneous Given 3/22/22 0944)   ceftriaxone (ROCEPHIN) 1 g/50 mL in dextrose IVPB (1,000 mg Intravenous New Bag 3/22/22 1003)   iohexol (OMNIPAQUE) 350 MG/ML injection (SINGLE-DOSE) 100 mL (100 mL Intravenous Given 3/21/22 0902)   ceftriaxone (ROCEPHIN) 1 g/50 mL in dextrose IVPB (0 mg Intravenous Stopped 3/21/22 1115)   vancomycin (VANCOCIN) IVPB (premix in dextrose) 1,000 mg 200 mL (0 mg/kg × 73 2 kg Intravenous Stopped 3/21/22 1300)   lidocaine 1% buffered (10 mL Infiltration Given 3/21/22 1116)       Diagnostic Studies  Results Reviewed     Procedure Component Value Units Date/Time    AFB Culture [[id number]] Collected: 03/21/22 1125    Lab Status: Preliminary result Specimen: Body Fluid from Paracentesis Updated: 03/22/22 1404     AFB Stain No acid fast bacilli seen    Blood culture [[id number]] Collected: 03/21/22 0736    Lab Status: Preliminary result Specimen: Blood from Arm, Right Updated: 03/22/22 1202     Blood Culture No Growth at 24 hrs  Blood culture [[id number]] Collected: 03/21/22 0736    Lab Status: Preliminary result Specimen: Blood from Arm, Left Updated: 03/22/22 1201     Blood Culture No Growth at 24 hrs  Body fluid culture and Gram stain [[id number]] Collected: 03/21/22 1125    Lab Status: Preliminary result Specimen:  Body Fluid from Paracentesis Updated: 03/22/22 1130     Body Fluid Culture, Sterile No growth     Gram Stain Result Rare Polys      No bacteria seen    Protime-INR [[id number]]  (Abnormal) Collected: 03/22/22 0936    Lab Status: Final result Specimen: Blood from Arm, Right Updated: 03/22/22 1018     Protime 18 1 seconds      INR 1 57    CBC and differential [[id number]]  (Abnormal) Collected: 03/22/22 0936    Lab Status: Final result Specimen: Blood from Arm, Right Updated: 03/22/22 0946     WBC 24 94 Thousand/uL      RBC 3 45 Million/uL      Hemoglobin 12 3 g/dL      Hematocrit 37 8 %       fL      MCH 35 7 pg      MCHC 32 5 g/dL      RDW 17 6 %      MPV 11 3 fL      Platelets 717 Thousands/uL      nRBC 0 /100 WBCs      Neutrophils Relative 89 %      Immat GRANS % 1 %      Lymphocytes Relative 5 %      Monocytes Relative 3 %      Eosinophils Relative 2 %      Basophils Relative 0 %      Neutrophils Absolute 22 32 Thousands/µL      Immature Grans Absolute 0 22 Thousand/uL      Lymphocytes Absolute 1 22 Thousands/µL      Monocytes Absolute 0 73 Thousand/µL      Eosinophils Absolute 0 41 Thousand/µL      Basophils Absolute 0 04 Thousands/µL     Anaerobic culture, body fluid [[id number]] Collected: 03/21/22 1125    Lab Status: Preliminary result Specimen: Body Fluid from Paracentesis Updated: 03/22/22 0805     Anaerobic Culture Culture results to follow  Comprehensive metabolic panel [[id number]]  (Abnormal) Collected: 03/22/22 0612    Lab Status: Final result Specimen: Blood from Hand, Right Updated: 03/22/22 0707     Sodium 141 mmol/L      Potassium 3 7 mmol/L      Chloride 112 mmol/L      CO2 19 mmol/L      ANION GAP 10 mmol/L      BUN 19 mg/dL      Creatinine 0 83 mg/dL      Glucose 105 mg/dL      Calcium 7 8 mg/dL      Corrected Calcium 9 5 mg/dL      AST 50 U/L      ALT 42 U/L      Alkaline Phosphatase 181 U/L      Total Protein 5 4 g/dL      Albumin 1 9 g/dL      Total Bilirubin 2 21 mg/dL      eGFR 76 ml/min/1 73sq m     Narrative:      [place] guidelines for Chronic Kidney Disease (CKD):     Stage 1 with normal or high GFR (GFR > 90 mL/min/1 73 square meters)    Stage 2 Mild CKD (GFR = 60-89 mL/min/1 73 square meters)    Stage 3A Moderate CKD (GFR = 45-59 mL/min/1 73 square meters)    Stage 3B Moderate CKD (GFR = 30-44 mL/min/1 73 square meters)    Stage 4 Severe CKD (GFR = 15-29 mL/min/1 73 square meters)    Stage 5 End Stage CKD (GFR <15 mL/min/1 73 square meters)  Note: GFR calculation is accurate only with a steady state creatinine    Magnesium [[id number]]  (Normal) Collected: 03/22/22 0612    Lab Status: Final result Specimen: Blood from Hand, Right Updated: 03/22/22 0707     Magnesium 1 7 mg/dL     Phosphorus [[id number]]  (Normal) Collected: 03/22/22 0612    Lab Status: Final result Specimen: Blood from Hand, Right Updated: 03/22/22 0707     Phosphorus 3 5 mg/dL     Body Fluid Diff [[id number]] Collected: 03/21/22 1125    Lab Status: Final result Specimen:  Body Fluid from Paracentesis Updated: 03/21/22 1346     Total Counted 100     Neutrophils % (Fluid) 4 %      Lymphs % (Fluid) 69 %      Mesothelial % (Fluid) 4 % Monocytes % (Fluid) 23 %     Body fluid white cell count with differential [[id number]] Collected: 03/21/22 1125    Lab Status: Final result Specimen: Body Fluid from Paracentesis Updated: 03/21/22 1214     Site Paracentesis     Color, Fluid Light Yellow     Clarity, Fluid Slightly Cloudy     WBC, Fluid 18 /ul     Urine Microscopic [[id number]]  (Abnormal) Collected: 03/21/22 1010    Lab Status: Final result Specimen: Urine, Clean Catch Updated: 03/21/22 1055     RBC, UA 1-2 /hpf      WBC, UA 10-20 /hpf      Epithelial Cells Occasional /hpf      Bacteria, UA Innumerable /hpf     Urine culture [[id number]] Collected: 03/21/22 1010    Lab Status: In process Specimen: Urine, Clean Catch Updated: 03/21/22 1055    HS Troponin I 2hr [[id number]]  (Normal) Collected: 03/21/22 1010    Lab Status: Final result Specimen: Blood from Arm, Right Updated: 03/21/22 1043     hs TnI 2hr 9 ng/L      Delta 2hr hsTnI 0 ng/L     Urinalysis with culture and sensitivity reflex [[id number]]  (Abnormal) Collected: 03/21/22 1010    Lab Status: Final result Specimen: Urine, Clean Catch Updated: 03/21/22 1023     Color, UA Yellow     Clarity, UA Cloudy     Specific Gravity, UA <=1 005     pH, UA 7 0     Leukocytes, UA Small     Nitrite, UA Negative     Protein, UA Trace mg/dl      Glucose, UA Negative mg/dl      Ketones, UA Negative mg/dl      Urobilinogen, UA 1 0 E U /dl      Bilirubin, UA Small     Blood, UA Moderate    COVID19, Influenza A/B, RSV PCR, [facility] - 2 hour STAT [[id number]]  (Normal) Collected: 03/21/22 0736    Lab Status: Final result Specimen: Nares from Nose Updated: 03/21/22 0845     SARS-CoV-2 Negative     INFLUENZA A PCR Negative     INFLUENZA B PCR Negative     RSV PCR Negative    Narrative:      FOR PEDIATRIC PATIENTS - copy/paste COVID Guidelines URL to browser: [email or web address]  [email or web address]    SARS-CoV-2 assay is a Nucleic Acid Amplification assay intended for the  qualitative detection of nucleic acid from SARS-CoV-2 in nasopharyngeal  swabs  Results are for the presumptive identification of SARS-CoV-2 RNA  Positive results are indicative of infection with SARS-CoV-2, the virus  causing COVID-19, but do not rule out bacterial infection or co-infection  with other viruses  Laboratories within the [place] and its  territories are required to report all positive results to the appropriate  public health authorities  Negative results do not preclude SARS-CoV-2  infection and should not be used as the sole basis for treatment or other  patient management decisions  Negative results must be combined with  clinical observations, patient history, and epidemiological information  This test has not been FDA cleared or approved  This test has been authorized by FDA under an Emergency Use Authorization  (EUA)  This test is only authorized for the duration of time the  declaration that circumstances exist justifying the authorization of the  emergency use of an in vitro diagnostic tests for detection of SARS-CoV-2  virus and/or diagnosis of COVID-19 infection under section 564(b)(1) of  the Act, 21 U  S C  181TLY-7(A)(6), unless the authorization is terminated  or revoked sooner  The test has been validated but independent review by FDA  and CLIA is pending  Test performed using [employer] GeneXpert: This RT-PCR assay targets N2,  a region unique to SARS-CoV-2  A conserved region in the E-gene was chosen  for pan-Sarbecovirus detection which includes SARS-CoV-2      Procalcitonin [[id number]]  (Abnormal) Collected: 03/21/22 0736    Lab Status: Final result Specimen: Blood from Arm, Right Updated: 03/21/22 0814     Procalcitonin 0 88 ng/ml     HS Troponin 0hr (reflex protocol) [[id number]]  (Normal) Collected: 03/21/22 0736    Lab Status: Final result Specimen: Blood from Arm, Right Updated: 03/21/22 0811     hs TnI 0hr 9 ng/L     B-type natriuretic peptide [[id number]]  (Abnormal) Collected: 03/21/22 0736    Lab Status: Final result Specimen: Blood from Arm, Right Updated: 03/21/22 0810     NT-proBNP 2,135 pg/mL     Lipase [[id number]]  (Abnormal) Collected: 03/21/22 0736    Lab Status: Final result Specimen: Blood from Arm, Right Updated: 03/21/22 0810     Lipase 710 u/L     Magnesium [[id number]]  (Normal) Collected: 03/21/22 0736    Lab Status: Final result Specimen: Blood from Arm, Right Updated: 03/21/22 0810     Magnesium 1 7 mg/dL     Protime-INR [[id number]]  (Abnormal) Collected: 03/21/22 0736    Lab Status: Final result Specimen: Blood from Arm, Right Updated: 03/21/22 0807     Protime 17 2 seconds      INR 1 47    APTT [[id number]]  (Normal) Collected: 03/21/22 0736    Lab Status: Final result Specimen: Blood from Arm, Right Updated: 03/21/22 0807     PTT 33 seconds     Lactate Blood [[id number]]  (Normal) Collected: 03/21/22 0736    Lab Status: Final result Specimen: Blood from Arm, Right Updated: 03/21/22 0805     LACTIC ACID 1 3 mmol/L     Narrative:      Result may be elevated if tourniquet was used during collection      Comprehensive metabolic panel [[id number]]  (Abnormal) Collected: 03/21/22 0736    Lab Status: Final result Specimen: Blood from Arm, Right Updated: 03/21/22 0804     Sodium 142 mmol/L      Potassium 3 9 mmol/L      Chloride 111 mmol/L      CO2 24 mmol/L      ANION GAP 7 mmol/L      BUN 22 mg/dL      Creatinine 0 98 mg/dL      Glucose 101 mg/dL      Calcium 8 4 mg/dL      Corrected Calcium 9 8 mg/dL      AST 51 U/L      ALT 50 U/L      Alkaline Phosphatase 196 U/L      Total Protein 6 0 g/dL      Albumin 2 3 g/dL      Total Bilirubin 2 90 mg/dL      eGFR 62 ml/min/1 73sq m     Narrative:      [place] guidelines for Chronic Kidney Disease (CKD):     Stage 1 with normal or high GFR (GFR > 90 mL/min/1 73 square meters)    Stage 2 Mild CKD (GFR = 60-89 mL/min/1 73 square meters)    Stage 3A Moderate CKD (GFR = 45-59 mL/min/1 73 square meters)    Stage 3B Moderate CKD (GFR = 30-44 mL/min/1 73 square meters)    Stage 4 Severe CKD (GFR = 15-29 mL/min/1 73 square meters)    Stage 5 End Stage CKD (GFR <15 mL/min/1 73 square meters)  Note: GFR calculation is accurate only with a steady state creatinine    Blood gas, venous [[id number]]  (Abnormal) Collected: 03/21/22 0736    Lab Status: Final result Specimen: Blood from Arm, Right Updated: 03/21/22 0749     pH, Luis Carlos 7 363     pCO2, Luis Carlos 38 3 mm Hg      pO2, Luis Carlos 25 3 mm Hg      HCO3, Luis Carlos 21 3 mmol/L      Base Excess, Luis Carlos -3 6 mmol/L      O2 Content, Luis Carlos 7 4 ml/dL      O2 HGB, VENOUS 38 4 %     CBC and differential [[id number]]  (Abnormal) Collected: 03/21/22 0736    Lab Status: Final result Specimen: Blood from Arm, Right Updated: 03/21/22 0745     WBC 24 63 Thousand/uL      RBC 3 51 Million/uL      Hemoglobin 12 3 g/dL      Hematocrit 38 4 %       fL      MCH 35 0 pg      MCHC 32 0 g/dL      RDW 18 1 %      MPV 11 6 fL      Platelets 122 Thousands/uL      nRBC 0 /100 WBCs      Neutrophils Relative 86 %      Immat GRANS % 1 %      Lymphocytes Relative 6 %      Monocytes Relative 4 %      Eosinophils Relative 3 %      Basophils Relative 0 %      Neutrophils Absolute 21 19 Thousands/µL      Immature Grans Absolute 0 35 Thousand/uL      Lymphocytes Absolute 1 35 Thousands/µL      Monocytes Absolute 0 88 Thousand/µL      Eosinophils Absolute 0 82 Thousand/µL      Basophils Absolute 0 04 Thousands/µL                  IR INPATIENT Paracentesis   Final Result by Lorraine Arias MD (03/21 2990)   Impression:      Successful diagnostic/therapeutic Ultrasound-guided paracentesis  Signed, performed, and dictated by Gilberto Cook under the direct supervision of Dr Saúl Gutiérrez performed: [id number]         CT abdomen pelvis with contrast   Final Result by Kg Mobley MD (03/21 8947)      Moderate sized paraesophageal hiatal hernia  Moderate ascites        Gallbladder wall thickening likely on the basis of ascites  Severe right renal atrophy  Mild bladder wall thickening with minimal enhancement concerning for cystitis  Workstation performed: [id number]         XR chest 2 views   Final Result by Neysa Cushing, MD (03/21 0900)      Large hiatal hernia      Increasing Left basilar opacity, raises concern for effusion, alternatively this could be due to atelectasis   No pulmonary congestion      Hypoinflated lungs               Workstation performed: [id number]                    Procedures  Procedures         ED Course  ED Course as of 03/22/22 1405   Mon Mar 21, 2022   0936 Concern for SBP, abnormal layering of fluid in the abdomen  Calling radiology to read CT and then will arrange for diagnostic tap   1023 Bedside ultrasound shows minimal ascites without a clear place for a diagnostic paracentesis  Plan will be to consult Interventional Radiology for paracentesis and start IV antibiotics                               SBIRT 22yo+      Most Recent Value   SBIRT (24 yo +)    In order to provide better care to our patients, we are screening all of our patients for alcohol and drug use  Would it be okay to ask you these screening questions?  Unable to answer at this time Filed at: 03/21/2022 0721                    MDM  Number of Diagnoses or Management Options  SBP (spontaneous bacterial peritonitis) [facility]): new and requires workup     Amount and/or Complexity of Data Reviewed  Clinical lab tests: ordered and reviewed  Decide to obtain previous medical records or to obtain history from someone other than the patient: yes  Review and summarize past medical records: yes    Patient Progress  Patient progress: stable      Disposition  Final diagnoses:   SBP (spontaneous bacterial peritonitis) ([address] )     Time reflects when diagnosis was documented in both MDM as applicable and the Disposition within this note     Time User Action Codes Description Comment    3/21/2022 10:32 AM Donnella Essex [K65 2] SBP (spontaneous bacterial peritonitis) [facility])       ED Disposition     ED Disposition Condition Date/Time Comment    Admit Stable Mon Mar 21, 2022 10:32 AM Case was discussed with Dr Mayela Delcid and the patient's admission status was agreed to be Admission Status: inpatient status to the service of Dr Immanuel Cisneros   Follow-up Information    None         Current Discharge Medication List      CONTINUE these medications which have NOT CHANGED    Details   letrozole (FEMARA) 2 5 mg tablet TAKE 1 TABLET BY MOUTH EVERY DAY  Qty: 90 tablet, Refills: 3    Associated Diagnoses: Malignant neoplasm of overlapping sites of left breast in female, estrogen receptor positive (HCC)      pantoprazole (PROTONIX) 40 mg tablet Take 1 tablet (40 mg total) by mouth daily in the early morning  Qty: 60 tablet, Refills: 0    Associated Diagnoses: Current use of steroid medication      prednisoLONE (ORAPRED ODT) 10 MG disintegrating tablet Take 4 tablets (40 mg total) by mouth daily for 28 days, THEN 3 tablets (30 mg total) daily for 7 days, THEN 2 tablets (20 mg total) daily for 7 days, THEN 1 tablet (10 mg total) daily for 7 days  Qty: 154 tablet, Refills: 0    Associated Diagnoses: Alcoholic hepatitis      !! sertraline (ZOLOFT) 100 mg tablet Take 100 mg by mouth daily  Refills: 0      !! sertraline (ZOLOFT) 50 mg tablet Take 50 mg by mouth 2 (two) times a day   Refills: 0      !! folic acid (FOLVITE) 1 mg tablet Take 1 tablet (1 mg total) by mouth daily  Refills: 0    Associated Diagnoses: Alcoholic ketosis ([address] )      ! ! folic acid (FOLVITE) 1 mg tablet Take 1 tablet (1 mg total) by mouth daily  Qty: 30 tablet, Refills: 0    Associated Diagnoses: Anemia following use of chemotherapeutic drug      gabapentin (NEURONTIN) 300 mg capsule Take 1 capsule (300 mg total) by mouth 3 (three) times a day  Qty: 270 capsule, Refills: 1    Associated Diagnoses: Malignant neoplasm of female breast, unspecified estrogen receptor status, unspecified laterality, unspecified site of breast (HCC)      midodrine (PROAMATINE) 5 mg tablet Take 1 tablet (5 mg total) by mouth 3 (three) times a day before meals for 15 days Please check your blood pressure 3x/day and hold if sysolic blood pressure greater than 130 mg  Qty: 45 tablet, Refills: 0    Associated Diagnoses: OPAL (acute kidney injury) ([facility] [address] )      SUMAtriptan (IMITREX) 100 mg tablet Take 100 mg by mouth once as needed for migraine      torsemide (DEMADEX) 20 mg tablet Take 1 tablet (20 mg total) by mouth as needed (for leg swelling, or weight gain >5 lbs in 2-3 days)  Qty: 30 tablet, Refills: 3    Associated Diagnoses: Fluid overload       !! - Potential duplicate medications found  Please discuss with provider  No discharge procedures on file      PDMP Review     None          ED Provider  Electronically Signed by           Anusha Pablo DO  03/22/22 2740

## 2022-03-21 NOTE — ASSESSMENT & PLAN NOTE
Noted with WBC of 09[address], tachypnea chills at home but no fever here  Urinalysis could suggest UTI although patient not very symptomatic in that regard  Ascitic fluid not suggestive of SBP with only 18 white cells  The leukocytosis could be due to steroids, patient is on prednisolone for recent alcoholic hepatitis    Given the history of liver disease and concern with bacterial translocation would favor treating a sepsis for now, will continue ceftriaxone, follow blood cultures, follow urine culture  Defer IV fluids given volume overload status

## 2022-03-21 NOTE — H&P
[address]  H&P- [facility] 1960, 62 y o  female MRN: [id number]  Unit/Bed#: ED 13 Encounter: [id number]  Primary Care Provider: Didi Figueroa DC   Date and time admitted to hospital: 3/21/2022  7:16 AM    Ascites  Assessment & Plan  Patient presented with worsening abdominal pain and distention  Hx of previous heavy alcohol abuse and recent alcoholic hepatitis currently undergoing treatment with prednisolone  Despite compliance now developed worsening ascites  No documented history of cirrhosis but patient noted to have significant synthetic liver dysfunction, most likely there is an element of cirrhosis which is worsened by ongoing  alcoholic hepatitis    Patient is status post paracentesis in the ED  Cell count is low not suggestive of SBP  Monitor cultures  Continue prednisolone for alcoholic hepatitis  GI input will be appreciated  May need furosemide/aldactone combination  * Sepsis [facility])  Assessment & Plan  Noted with [place]BC of 07603, tachypnea chills at home but no fever here  Urinalysis could suggest UTI although patient not very symptomatic in that regard  Ascitic fluid not suggestive of SBP with only 18 white cells  The leukocytosis could be due to steroids, patient is on prednisolone for recent alcoholic hepatitis  Given the history of liver disease and concern with bacterial translocation would favor treating a sepsis for now, will continue ceftriaxone, follow blood cultures, follow urine culture  Defer IV fluids given volume overload status      Alcohol abuse  Assessment & Plan  Hx of heavy alcohol use previously  Now mentions that last drink was over 20 days ago  Oriented to remain abstinent  No signs of withdrawal     History of DVT (deep vein thrombosis)  Assessment & Plan  Hx of DVT in 12/2018 in the setting of malignancy     Currently off anticoagulation  Monitor      Malignant neoplasm of overlapping sites of left breast in female, estrogen receptor positive [facility])  Assessment & Plan  Patient currently on Femara    VTE Prophylaxis: Enoxaparin (Lovenox)  / sequential compression device   Code Status: FC  POLST: POLST form is not discussed and not completed at this time  Discussion with family: Patient and family at the bedside    Anticipated Length of Stay:  Patient will be admitted on an Inpatient basis with an anticipated length of stay of  At least 2 midnights  Justification for Hospital Stay: Decompensated liver disease, sepsis    Total Time for Visit, including Counseling / Coordination of Care: 45 minutes  Greater than 50% of this total time spent on direct patient counseling and coordination of care  Chief Complaint:   Abdominal pain and distension    History of Present Illness:    Marielos Mahoney is a 62 y o  female who presents with abdominal pain and distention  Patient does have a history of recent alcoholic hepatitis, she was undergoing treatment with prednisolone course  She does not have a clear-cut diagnosis of cirrhosis however she is noted to have significant liver synthetic dysfunction her blood work recently which I am not sure could be all attributed only to alcoholic hepatitis  She reports that for the past 3 days she has been seeing worsening distension her belly  She also mentions some abdominal distension fatigue  She mentions itching in her back  Patient never had paracentesis before  Denies any cough, shortness of breath, urinary pain  At the emergency department patient was noted to be hemodynamically stable  Blood work revealed leukocytosis of 24,000  Urinalysis did reveal innumerable bacteria, small amount of leukocytes  CT of the abdomen did reveal mild bladder wall thickening concerning for cystitis      Review of Systems:    Review of Systems   Constitutional: Positive for fatigue  Gastrointestinal: Positive for abdominal distention and abdominal pain  All other systems reviewed and are negative        Past Medical and Surgical History:     Past Medical History:   Diagnosis Date    Acute DVT (deep venous thrombosis) (HCC)     of LLE>     Bladder infection     Congenital prolapsed rectum     History of biliary stent insertion     History of chemotherapy     History of radiation therapy 05/2018    left breast ca    History of transfusion     x2 s/p chemo treatments, no reaction    Hydronephrosis     right kidney    Hypertension     Malignant neoplasm of overlapping sites of left female breast ([address] ) 05/01/2018    Migraine        Past Surgical History:   Procedure Laterality Date    ABDOMINAL ADHESION SURGERY N/A 4/23/2019    Procedure: LYSIS ADHESIONS;  Surgeon: Annie Méndez MD;  [facility];  Service: Colorectal    BREAST BIOPSY      COLON SURGERY      colon resection    CYSTOSCOPY N/A 3/4/2019    Procedure: CYSTOSCOPY WITH BIOPSIES AND [place] OF RECTUM PROLAPSE;  Surgeon: Liz Melgar MD;  [facility];  Service: Urology    ERCP N/A 1/4/2019    Procedure: ENDOSCOPIC RETROGRADE CHOLANGIOPANCREATOGRAPHY (ERCP); Surgeon: Lucretia Cassidy MD;  [facility];  Service: Gastroenterology    INSTILLATION MYTOMYCIN N/A 3/4/2019    Procedure: Daryle Moan;  Surgeon: Liz Melgar MD;  [facility];  Service: Urology    MASTECTOMY Left     2018    OK COLONOSCOPY FLX DX W/COLLJ SPEC WHEN PFRMD N/A 4/22/2019    Procedure: COLONOSCOPY;  Surgeon: Annie Méndez MD;  Location: [facility]; Service: Colorectal    OK EDG US EXAM SURGICAL ALTER STOM DUODENUM/JEJUNUM N/A 3/7/2019    Procedure: LINEAR ENDOSCOPIC U/S;  Surgeon: Jess Gooden MD;  Location: [facility]; Service: Gastroenterology    OK ESOPHAGOGASTRODUODENOSCOPY TRANSORAL DIAGNOSTIC N/A 3/7/2019    Procedure: ESOPHAGOGASTRODUODENOSCOPY (EGD); Surgeon: Jess Gooden MD;  [facility];   Service: Gastroenterology    OK INSJ TUNNELED CTR VAD W/SUBQ PORT AGE 5 YR/> N/A 6/26/2018 Procedure: INSERTION VENOUS PORT ( PORT-A-CATH) IR;  Surgeon: Issa Edwards DO;  [facility];  Service: Interventional Radiology    W[place] LAP, SURG PROCTOPEXY N/A 4/23/2019    Procedure: LAPAROSCOPIC HAND-ASSIST PELVIC DISSECTION; proctopexy;  Surgeon: Christine Ferrari MD;  [facility];  Service: Colorectal    W[place] LAP, SURG PROCTOPEXY N/A 11/18/2020    Procedure: LAPAROSCOPIC LYSIS OF ADHESIONS, MOBILIZATION OF RECTUM AND SUTURE RECTOPEXY;  Surgeon: Deandre Savage MD;  [facility];  Service: Colorectal    WV MASTECTOMY, MODIFIED RADICAL Left 5/15/2018    Procedure: BREAST MODIFIED RADICAL MASTECTOMY;  Surgeon: Leonel Iraheta MD;  [facility];  Service: Surgical Oncology    [place] RMVL [place] CTR VAD W/SUBQ PORT/ CTR/PRPH INSJ N/A 6/4/2019    Procedure: REMOVAL VENOUS PORT (PORT-A-CATH)IR;  Surgeon: Issa Edwards DO;  [facility];  Service: Interventional Radiology    TUBAL LIGATION      US GUIDANCE BREAST BIOPSY LEFT EACH ADDITIONAL Left 4/3/2018    US GUIDED BREAST BIOPSY LEFT COMPLETE Left 4/3/2018    US GUIDED BREAST LYMPH NODE BIOPSY LEFT Left 4/3/2018       Meds/Allergies:    Prior to Admission medications    Medication Sig Start Date End Date Taking?  Authorizing Provider   folic acid (FOLVITE) 1 mg tablet Take 1 tablet (1 mg total) by mouth daily  Patient not taking: Reported on 3/25/2021 9/11/20   Amaya Duenas PA-C   folic acid (FOLVITE) 1 mg tablet Take 1 tablet (1 mg total) by mouth daily 3/2/22 4/1/22  Viet Knapp MD   gabapentin (NEURONTIN) 300 mg capsule Take 1 capsule (300 mg total) by mouth 3 (three) times a day  Patient taking differently: Take 300 mg by mouth as needed  5/28/19   Rafael Dhaliwal MD   letrozole [facility]) 2 5 mg tablet TAKE 1 TABLET BY MOUTH EVERY DAY 11/26/21   Rafael Dhaliwal MD   midodrine (PROAMATINE) 5 mg tablet Take 1 tablet (5 mg total) by mouth 3 (three) times a day before meals for 15 days Please check your blood pressure 3x/day and hold if sysolic blood pressure greater than 130 mg 3/2/22 3/17/22  Danni Patel MD   pantoprazole (PROTONIX) 40 mg tablet Take 1 tablet (40 mg total) by mouth daily in the early morning 3/3/22 5/2/22  Danni Patel MD   prednisoLONE (ORAPRED ODT) 10 MG disintegrating tablet Take 4 tablets (40 mg total) by mouth daily for 28 days, THEN 3 tablets (30 mg total) daily for 7 days, THEN 2 tablets (20 mg total) daily for 7 days, THEN 1 tablet (10 mg total) daily for 7 days  3/2/22 4/20/22  Danni Patel MD   sertraline (ZOLOFT) 100 mg tablet Take 100 mg by mouth daily 4/16/19   Historical Provider, MD   sertraline (ZOLOFT) 50 mg tablet Take 50 mg by mouth 2 (two) times a day  10/11/18   Historical Provider, MD   SUMAtriptan (IMITREX) 100 mg tablet Take 100 mg by mouth once as needed for migraine    Historical Provider, MD   torsemide (DEMADEX) 20 mg tablet Take 1 tablet (20 mg total) by mouth as needed (for leg swelling, or weight gain >5 lbs in 2-3 days) 3/25/21 7/23/21  Nba Urbano MD     I have reviewed home medications with patient personally  Allergies:    Allergies   Allergen Reactions    Gluten Meal - Food Allergy GI Intolerance    Lactose - Food Allergy GI Intolerance       Social History:     Marital Status: /Civil Union   Substance Use History:   Social History     Substance and Sexual Activity   Alcohol Use Not Currently    Comment: Drinks daily until three weeks ago     Social History     Tobacco Use   Smoking Status Never Smoker   Smokeless Tobacco Never Used     Social History     Substance and Sexual Activity   Drug Use Not Currently       Family History:    Family History   Problem Relation Age of Onset    No Known Problems Mother     No Known Problems Father     Breast cancer Maternal Aunt 48    Colon cancer Maternal Aunt     No Known Problems Sister     No Known Problems Daughter     No Known Problems Maternal Grandmother     No Known Problems Maternal Grandfather     No Known Problems Paternal Grandmother     No Known Problems Paternal Grandfather        Physical Exam:     Vitals:   Blood Pressure: 117/77 (03/21/22 1138)  Pulse: 86 (03/21/22 1138)  Temperature: 97 5 °F (36 4 °C) (03/21/22 0718)  Temp Source: Oral (03/21/22 0718)  Respirations: 19 (03/21/22 1138)  Weight - Scale: 73 2 kg (161 lb 6 oz) (03/21/22 0718)  SpO2: 99 % (03/21/22 1138)    Physical Exam  Vitals and nursing note reviewed  Constitutional:       Appearance: Normal appearance  She is normal weight  Comments: Female patient in bed, awake   HENT:      Head: Normocephalic and atraumatic  Right Ear: External ear normal       Left Ear: External ear normal       Nose: Nose normal  No congestion  Mouth/Throat:      Mouth: Mucous membranes are moist       Pharynx: Oropharynx is clear  No oropharyngeal exudate or posterior oropharyngeal erythema  Eyes:      General: No scleral icterus  Right eye: No discharge  Left eye: No discharge  Extraocular Movements: Extraocular movements intact  Conjunctiva/sclera: Conjunctivae normal       Pupils: Pupils are equal, round, and reactive to light  Cardiovascular:      Rate and Rhythm: Normal rate and regular rhythm  Pulses: Normal pulses  Heart sounds: Normal heart sounds  No murmur heard  No friction rub  No gallop  Pulmonary:      Effort: Pulmonary effort is normal  No respiratory distress  Breath sounds: Normal breath sounds  No stridor  No wheezing, rhonchi or rales  Chest:      Chest wall: No tenderness  Abdominal:      General: Abdomen is flat  Bowel sounds are normal  There is distension  Palpations: Abdomen is soft  There is no mass  Tenderness: There is no abdominal tenderness  There is no guarding or rebound  Musculoskeletal:         General: No swelling, tenderness, deformity or signs of injury  Normal range of motion  Cervical back: Normal range of motion and neck supple  No rigidity  No muscular tenderness  Right lower leg: Edema present  Left lower leg: Edema present  Comments: Edema 2+/4+ bilateral lower extremities   Skin:     General: Skin is warm and dry  Capillary Refill: Capillary refill takes less than 2 seconds  Coloration: Skin is not jaundiced or pale  Findings: No bruising, erythema, lesion or rash  Neurological:      General: No focal deficit present  Mental Status: She is alert and oriented to person, place, and time  Mental status is at baseline  Cranial Nerves: No cranial nerve deficit  Sensory: No sensory deficit  Motor: No weakness  Coordination: Coordination normal    Psychiatric:         Mood and Affect: Mood normal          Behavior: Behavior normal          Thought Content: Thought content normal          Judgment: Judgment normal            Additional Data:     Lab Results: I have personally reviewed pertinent reports  Results from last 7 days   Lab Units 03/21/22  0736   WBC Thousand/uL 24 63*   HEMOGLOBIN g/dL 12 3   HEMATOCRIT % 38 4   PLATELETS Thousands/uL 221   NEUTROS PCT % 86*   LYMPHS PCT % 6*   MONOS PCT % 4   EOS PCT % 3     Results from last 7 days   Lab Units 03/21/22  0736   SODIUM mmol/L 142   POTASSIUM mmol/L 3 9   CHLORIDE mmol/L 111*   CO2 mmol/L 24   BUN mg/dL 22   CREATININE mg/dL 0 98   ANION GAP mmol/L 7   CALCIUM mg/dL 8 4   ALBUMIN g/dL 2 3*   TOTAL BILIRUBIN mg/dL 2 90*   ALK PHOS U/L 196*   ALT U/L 50   AST U/L 51*   GLUCOSE RANDOM mg/dL 101     Results from last 7 days   Lab Units 03/21/22  0736   INR  1 47*             Results from last 7 days   Lab Units 03/21/22  0736   LACTIC ACID mmol/L 1 3   PROCALCITONIN ng/ml 0 88*       Imaging: I have personally reviewed pertinent reports  CT abdomen pelvis with contrast   Final Result by Letitia Hernandez MD (03/21 9788)      Moderate sized paraesophageal hiatal hernia  Moderate ascites  Gallbladder wall thickening likely on the basis of ascites  Severe right renal atrophy  Mild bladder wall thickening with minimal enhancement concerning for cystitis  Workstation performed: [id number]         XR chest 2 views   Final Result by Fabio Raphael MD (03/21 0900)      Large hiatal hernia      Increasing Left basilar opacity, raises concern for effusion, alternatively this could be due to atelectasis   No pulmonary congestion      Hypoinflated lungs               Workstation performed: [id number]         IR INPATIENT Paracentesis    (Results Pending)         Allscripts / Epic Records Reviewed: Yes     ** Please Note: This note has been constructed using a voice recognition system   **

## 2022-03-21 NOTE — ASSESSMENT & PLAN NOTE
Patient presented with worsening abdominal pain and distention  Hx of previous heavy alcohol abuse and recent alcoholic hepatitis currently undergoing treatment with prednisolone  Despite compliance now developed worsening ascites  No documented history of cirrhosis but patient noted to have significant synthetic liver dysfunction, most likely there is an element of cirrhosis which is worsened by ongoing  alcoholic hepatitis    Patient is status post paracentesis in the ED  Cell count is low not suggestive of SBP  Monitor cultures  Continue prednisolone for alcoholic hepatitis  GI input will be appreciated  May need furosemide/aldactone combination

## 2022-03-21 NOTE — ASSESSMENT & PLAN NOTE
Hx of heavy alcohol use previously  Now mentions that last drink was over 20 days ago    Oriented to remain abstinent  No signs of withdrawal

## 2022-03-21 NOTE — CASE MANAGEMENT
Case Management Assessment & Discharge Planning Note    Patient name Isadora Blake  Location ED 13/ED 13 MRN [id number]  : 1960 Date 3/21/2022       Current Admission Date: 3/21/2022  Current Admission Diagnosis:Sepsis [facility])   Patient Active Problem List    Diagnosis Date Noted    Sepsis ([facility] ) 2022    Decompensated hepatic cirrhosis ([facility] ) 2022    Ascites 2022    Hypertension 2022    Proteinuria 2022    Abnormal urinalysis 2022    History of alcohol use disorder     Alcoholic hepatitis     Elevated liver enzymes 2022    SIRS (systemic inflammatory response syndrome) ([facility] ) 2022    Benign hypertension with CKD (chronic kidney disease) stage III ([facility] ) 2021    Stage 3a chronic kidney disease ([facility] ) 2021    Anemia 2021    Hypokalemia 2021    Localized swelling of lower extremity 2021    Alcohol abuse 2021    Suspected acute pulmonary embolism ([facility] ) 12/15/2020    Recurrent syncope 12/15/2020    OPAL (acute kidney injury) ([facility] ) on CKD 12/15/2020    Closed left fibular fracture 12/15/2020    History of chemotherapy     Alcoholic ketosis ([facility] )     SOB (shortness of breath) 2020    Hypoglycemia 2020    Axillary lump, left 2019    Use of letrozole ([facility]) 2019    Rectal prolapse 2019    Dilated cbd, acquired 2019    Lesion of bladder 2019    Blood loss anemia 2019    Hemorrhagic cystitis 2018    Heme positive stool 2018    Acute deep vein thrombosis (DVT) of left lower extremity ([address] ) 2018    Hyperbilirubinemia 2018    Acute blood loss anemia 2018    Moderate protein-calorie malnutrition ([address] ) 2018    Immunocompromised ([facility] ) 2018    Hematuria 2018    Anemia following use of chemotherapeutic drug 2018    History of DVT (deep vein thrombosis) 10/19/2018    Cellulitis 06/11/2018    History of radiation therapy 05/2018    Hydronephrosis 04/29/2018    Malignant neoplasm of overlapping sites of left breast in female, estrogen receptor positive ([facility] ) 04/10/2018      LOS (days): 0  Geometric Mean LOS (GMLOS) (days):   Days to GMLOS:     OBJECTIVE:  PATIENT READMITTED TO HOSPITAL  Risk of Unplanned Readmission Score: 18         Current admission status: Inpatient       Preferred Pharmacy:   [address] [place] , [place] - [address] [place] [address]  [address] [place] [address]  Phone: [phone] Fax: [phone]    [id number] [place] - [address]  [place]  [address]   [facility] [address]  Phone: [phone] Fax: [phone]    Primary Care Provider: Yodit Shields DC    Primary Insurance: BLUE CROSS  Secondary Insurance:     ASSESSMENT:  [place], [address] - Spouse   Primary Phone: [phone] (Mobile)  Home Phone: [phone]               Advance Directives  Does patient have a [address]?: Yes  Does patient have Advance Directives?: Yes  Advance Directives: Living will,Power of  for health care,Power of  for finance,POLST  Primary Contact: Patient's     Readmission Root Cause  30 Day Readmission: Yes  Who directed you to return to the hospital?: Self  Did you understand whom to contact if you had questions or problems?: Yes  Did you get your prescriptions before you left the hospital?: No  Reason[de-identified] Preference for own pharmacy  Were you able to get your prescriptions filled when you left the hospital?: Yes  Did you take your medications as prescribed?: Yes  Were you able to get to your follow-up appointments?: Yes  During previous admission, was a post-acute recommendation made?: Yes  What post-acute resources were offered?: [facility] (Patient was [facility] with [facility] )  Patient was readmitted due to: distention of belly and abdominal pain  Action Plan: return home with Jerica Cortes vs  STR depending on PT/OT recommendations    Patient Information  Admitted from[de-identified] Home  Mental Status: Alert  During Assessment patient was accompanied by: Spouse  Assessment information provided by[de-identified] Patient,Spouse  Primary Caregiver: Self  Support Systems: Family members,Spouse/significant other  [place] of Residence: [address] [place] [address] do you live in?: [place]  Home entry access options   Select all that apply : Stairs  Number of steps to enter home : 2  Do the steps have railings?: Yes  Type of Current Residence: 2 story home  Upon entering residence, is there a bedroom on the main floor (no further steps)?: Yes  Upon entering residence, is there a bathroom on the main floor (no further steps)?: Yes  In the last 12 months, was there a time when you were not able to pay the mortgage or rent on time?: No  In the last 12 months, how many places have you lived?: 1  In the last 12 months, was there a time when you did not have a steady place to sleep or slept in a shelter (including now)?: No  Homeless/housing insecurity resource given?: N/A  Living Arrangements: Lives w/ Spouse/significant other  Is patient a ?: No    Activities of Daily Living Prior to Admission  Functional Status: Independent  Completes ADLs independently?: Yes  Ambulates independently?: No  Level of ambulatory dependence: Assistance  Does patient use assisted devices?: Yes  Assisted Devices (DME) used: [employer]  Does patient currently own DME?: Yes  What DME does the patient currently own?: [employer]  Does patient have a history of Outpatient Therapy (PT/OT)?: Yes  Does the patient have a history of Short-Term Rehab?: No  Does patient have a history of HHC?: Yes  Does patient currently have [address]?: Yes    Current Home Health Care  Type of Current Home Care Services: Nurse visit,Home PT,Home OT  Current Home Health Agency[de-identified] [facility] Provider[de-identified] PCP    Patient Information Continued  Income Source: Pension/shelter  Does patient have prescription coverage?: Yes (Patient fills her prescriptions at [facility] in [place], and she denied any barriers to obtaining or affording prescriptions )  Within the past 12 months, you worried that your food would run out before you got the money to buy more : Never true  Within the past 12 months, the food you bought just didnt last and you didnt have money to get more : Never true  Food insecurity resource given?: N/A  Does patient receive dialysis treatments?: No  Does patient have a history of substance abuse?: Yes  Historical substance use preference: Alcohol/ETOH  History of Withdrawal Symptoms: Denies past symptoms  Is patient currently in treatment for substance abuse?: N/A - sober  Does patient have a history of Mental Health Diagnosis?: No    Means of Transportation  Means of Transport to Appts[de-identified] Family transport  In the past 12 months, has lack of transportation kept you from medical appointments or from getting medications?: No  In the past 12 months, has lack of transportation kept you from meetings, work, or from getting things needed for daily living?: No  Was application for public transport provided?: N/A    DISCHARGE DETAILS:    Discharge planning discussed with[de-identified] Patient and Patient's   Freedom of Choice: Yes  Comments - Freedom of Choice: Patient and  agreed that patient has been very weak at home, and her  has been carrying her when she is too weak  Patient feels as though she would benefit from STR and prefers a facility within a 20 mile radius  Patient reported that she is vaccinated for Covid, but she does not have the booster shot  If HHC is recommended, patient would like to resume services with [facility]    CM contacted family/caregiver?: Yes  Were Treatment Team discharge recommendations reviewed with patient/caregiver?: Yes  Did patient/caregiver verbalize understanding of patient care needs?: Yes  Were patient/caregiver advised of the risks associated with not following Treatment Team discharge recommendations?: Yes    Contacts  Patient Contacts: Baylee Held  Relationship to Patient[de-identified] Family  Contact Method: In Person  Reason/Outcome: Continuity of [address]         Is the patient interested in [facility] at discharge?: Yes  [address] requested[de-identified] Physical [facility] Name[de-identified] [facility] Provider[de-identified] PCP  Home Health Services Needed[de-identified] Strengthening/Theraputic Exercises to Improve Function,Evaluate Functional Status and Safety,Gait/ADL Training  Homebound Criteria Met[de-identified] Requires the Assistance of Another Person for Safe Ambulation or to Leave the Home,Uses an Assist Device (i e  cane, walker, etc)  Supporting Clincal Findings[de-identified] Fatigues Easliy in Short Distances,Limited Endurance    DME Referral Provided  Referral made for DME?: No    Other Referral/Resources/Interventions Provided:  Interventions: [facility]  Referral Comments: Cm sent a referral to [facility] for DAVID      Would you like to participate in our [address] service program?  : No - Declined    Treatment Team Recommendation: [facility]  Discharge Destination Plan[de-identified] Home with Shanice at Discharge : Family

## 2022-03-21 NOTE — BRIEF OP NOTE (RAD/CATH)
Procedure Note    PATIENT NAME: Griselda Flow  : 1960  MRN: [id number]    Pre-op Diagnosis:   1  SBP (spontaneous bacterial peritonitis) (HCC)      Post-op Diagnosis:   1   SBP (spontaneous bacterial peritonitis) [facility])        Provider:   Pattie Sanchez  Assistants:     No qualified resident was available, Resident is only observing    Estimated Blood Loss: none  Findings: 1500 ml cloudy yellow ascites fluid    Specimens: sent to lab as requested    Complications:  None immediate    Anesthesia: local    Don Zachary     Date: 3/21/2022  Time: 12:09 PM

## 2022-03-22 ENCOUNTER — TELEPHONE (OUTPATIENT)
Dept: INTERVENTIONAL RADIOLOGY/VASCULAR | Facility: HOSPITAL | Age: 62
End: 2022-03-22

## 2022-03-22 ENCOUNTER — APPOINTMENT (INPATIENT)
Dept: VASCULAR ULTRASOUND | Facility: HOSPITAL | Age: 62
DRG: 432 | End: 2022-03-22
Payer: COMMERCIAL

## 2022-03-22 LAB
ALBUMIN SERPL BCP-MCNC: 1.9 G/DL (ref 3.5–5)
ALP SERPL-CCNC: 181 U/L (ref 46–116)
ALT SERPL W P-5'-P-CCNC: 42 U/L (ref 12–78)
ANION GAP SERPL CALCULATED.3IONS-SCNC: 10 MMOL/L (ref 4–13)
AST SERPL W P-5'-P-CCNC: 50 U/L (ref 5–45)
BASOPHILS # BLD AUTO: 0.04 THOUSANDS/ΜL (ref 0–0.1)
BASOPHILS NFR BLD AUTO: 0 % (ref 0–1)
BILIRUB SERPL-MCNC: 2.21 MG/DL (ref 0.2–1)
BUN SERPL-MCNC: 19 MG/DL (ref 5–25)
C DIFF TOX GENS STL QL NAA+PROBE: NEGATIVE
CALCIUM ALBUM COR SERPL-MCNC: 9.5 MG/DL (ref 8.3–10.1)
CALCIUM SERPL-MCNC: 7.8 MG/DL (ref 8.3–10.1)
CHLORIDE SERPL-SCNC: 112 MMOL/L (ref 100–108)
CO2 SERPL-SCNC: 19 MMOL/L (ref 21–32)
CREAT SERPL-MCNC: 0.83 MG/DL (ref 0.6–1.3)
EOSINOPHIL # BLD AUTO: 0.41 THOUSAND/ΜL (ref 0–0.61)
EOSINOPHIL NFR BLD AUTO: 2 % (ref 0–6)
ERYTHROCYTE [DISTWIDTH] IN BLOOD BY AUTOMATED COUNT: 17.6 % (ref 11.6–15.1)
GFR SERPL CREATININE-BSD FRML MDRD: 76 ML/MIN/1.73SQ M
GLUCOSE SERPL-MCNC: 105 MG/DL (ref 65–140)
HCT VFR BLD AUTO: 37.8 % (ref 34.8–46.1)
HGB BLD-MCNC: 12.3 G/DL (ref 11.5–15.4)
IMM GRANULOCYTES # BLD AUTO: 0.22 THOUSAND/UL (ref 0–0.2)
IMM GRANULOCYTES NFR BLD AUTO: 1 % (ref 0–2)
INR PPP: 1.57 (ref 0.84–1.19)
LYMPHOCYTES # BLD AUTO: 1.22 THOUSANDS/ΜL (ref 0.6–4.47)
LYMPHOCYTES NFR BLD AUTO: 5 % (ref 14–44)
MAGNESIUM SERPL-MCNC: 1.7 MG/DL (ref 1.6–2.6)
MCH RBC QN AUTO: 35.7 PG (ref 26.8–34.3)
MCHC RBC AUTO-ENTMCNC: 32.5 G/DL (ref 31.4–37.4)
MCV RBC AUTO: 110 FL (ref 82–98)
MONOCYTES # BLD AUTO: 0.73 THOUSAND/ΜL (ref 0.17–1.22)
MONOCYTES NFR BLD AUTO: 3 % (ref 4–12)
NEUTROPHILS # BLD AUTO: 22.32 THOUSANDS/ΜL (ref 1.85–7.62)
NEUTS SEG NFR BLD AUTO: 89 % (ref 43–75)
NRBC BLD AUTO-RTO: 0 /100 WBCS
PHOSPHATE SERPL-MCNC: 3.5 MG/DL (ref 2.3–4.1)
PLATELET # BLD AUTO: 194 THOUSANDS/UL (ref 149–390)
PMV BLD AUTO: 11.3 FL (ref 8.9–12.7)
POTASSIUM SERPL-SCNC: 3.7 MMOL/L (ref 3.5–5.3)
PROT SERPL-MCNC: 5.4 G/DL (ref 6.4–8.2)
PROTHROMBIN TIME: 18.1 SECONDS (ref 11.6–14.5)
RBC # BLD AUTO: 3.45 MILLION/UL (ref 3.81–5.12)
SODIUM SERPL-SCNC: 141 MMOL/L (ref 136–145)
WBC # BLD AUTO: 24.94 THOUSAND/UL (ref 4.31–10.16)

## 2022-03-22 PROCEDURE — 93971 EXTREMITY STUDY: CPT

## 2022-03-22 PROCEDURE — 83735 ASSAY OF MAGNESIUM: CPT | Performed by: INTERNAL MEDICINE

## 2022-03-22 PROCEDURE — 97162 PT EVAL MOD COMPLEX 30 MIN: CPT

## 2022-03-22 PROCEDURE — 99232 SBSQ HOSP IP/OBS MODERATE 35: CPT | Performed by: INTERNAL MEDICINE

## 2022-03-22 PROCEDURE — 85610 PROTHROMBIN TIME: CPT | Performed by: INTERNAL MEDICINE

## 2022-03-22 PROCEDURE — 84100 ASSAY OF PHOSPHORUS: CPT | Performed by: INTERNAL MEDICINE

## 2022-03-22 PROCEDURE — 93971 EXTREMITY STUDY: CPT | Performed by: SURGERY

## 2022-03-22 PROCEDURE — 97166 OT EVAL MOD COMPLEX 45 MIN: CPT

## 2022-03-22 PROCEDURE — 99232 SBSQ HOSP IP/OBS MODERATE 35: CPT | Performed by: PHYSICIAN ASSISTANT

## 2022-03-22 PROCEDURE — 87493 C DIFF AMPLIFIED PROBE: CPT | Performed by: INTERNAL MEDICINE

## 2022-03-22 PROCEDURE — NC001 PR NO CHARGE: Performed by: INTERNAL MEDICINE

## 2022-03-22 PROCEDURE — 85025 COMPLETE CBC W/AUTO DIFF WBC: CPT | Performed by: INTERNAL MEDICINE

## 2022-03-22 PROCEDURE — 80053 COMPREHEN METABOLIC PANEL: CPT | Performed by: INTERNAL MEDICINE

## 2022-03-22 RX ORDER — OXYCODONE HYDROCHLORIDE 5 MG/1
5 TABLET ORAL EVERY 8 HOURS PRN
Status: DISCONTINUED | OUTPATIENT
Start: 2022-03-22 | End: 2022-03-23 | Stop reason: HOSPADM

## 2022-03-22 RX ADMIN — PANTOPRAZOLE SODIUM 40 MG: 40 TABLET, DELAYED RELEASE ORAL at 06:21

## 2022-03-22 RX ADMIN — FOLIC ACID 1 MG: 1 TABLET ORAL at 09:42

## 2022-03-22 RX ADMIN — CEFTRIAXONE SODIUM 1000 MG: 10 INJECTION, POWDER, FOR SOLUTION INTRAVENOUS at 10:03

## 2022-03-22 RX ADMIN — OXYCODONE HYDROCHLORIDE 5 MG: 5 TABLET ORAL at 16:53

## 2022-03-22 RX ADMIN — ENOXAPARIN SODIUM 40 MG: 40 INJECTION SUBCUTANEOUS at 09:44

## 2022-03-22 RX ADMIN — LETROZOLE 2.5 MG: 2.5 TABLET, FILM COATED ORAL at 10:03

## 2022-03-22 RX ADMIN — SERTRALINE HYDROCHLORIDE 100 MG: 50 TABLET ORAL at 09:44

## 2022-03-22 NOTE — PROGRESS NOTES
Progress Note  Maxine Husbands 62 y o  female MRN: [id number]  Unit/Bed#: [facility]01 Encounter: [id number]    Subjective:  Patient reports she is feeling much better today after the paracentesis  No abdominal pain  No nausea or vomiting  She reports she had diarrhea yesterday but it is improved today  Objective:     Vitals:   Vitals:    03/22/22 0734   BP: 163/79   Pulse: 86   Resp:    Temp: 98 °F (36 7 °C)   SpO2: 98%   ,Body mass index is 26 63 kg/m²        Intake/Output Summary (Last 24 hours) at 3/22/2022 1109  Last data filed at 3/21/2022 1800  Gross per 24 hour   Intake 540 ml   Output --   Net 540 ml       Physical Exam:     General Appearance: Alert, appears stated age and cooperative  Lungs: Clear to auscultation bilaterally, no rales or rhonchi, no labored breathing/accessory muscle use  Heart: Regular rate and rhythm, S1, S2 normal, no murmur, click, rub or gallop  Abdomen: Soft, non-tender, non-distended; bowel sounds normal; no masses or no organomegaly  Extremities: No cyanosis, edema    Invasive Devices  Report    Peripheral Intravenous Line            Peripheral IV 03/22/22 Proximal;Right;Ventral (anterior) Forearm <1 day          Drain            Closed/Suction Drain Right RLQ Bulb 488 days                Lab Results:  Admission on 03/21/2022   Component Date Value    WBC 03/21/2022 24 63*    RBC 03/21/2022 3 51*    Hemoglobin 03/21/2022 12 3     Hematocrit 03/21/2022 38 4     MCV 03/21/2022 109*    MCH 03/21/2022 35 0*    MCHC 03/21/2022 32 0     RDW 03/21/2022 18 1*    MPV 03/21/2022 11 6     Platelets 75/68/2667 221     nRBC 03/21/2022 0     Neutrophils Relative 03/21/2022 86*    Immat GRANS % 03/21/2022 1     Lymphocytes Relative 03/21/2022 6*    Monocytes Relative 03/21/2022 4     Eosinophils Relative 03/21/2022 3     Basophils Relative 03/21/2022 0     Neutrophils Absolute 03/21/2022 21 19*    Immature Grans Absolute 03/21/2022 0 35*    Lymphocytes Absolute 03/21/2022 1 35  Monocytes Absolute 03/21/2022 0 88     Eosinophils Absolute 03/21/2022 0 82*    Basophils Absolute 03/21/2022 0 04     NT-proBNP 03/21/2022 2,135*    Sodium 03/21/2022 142     Potassium 03/21/2022 3 9     Chloride 03/21/2022 111*    CO2 03/21/2022 24     ANION GAP 03/21/2022 7     BUN 03/21/2022 22     Creatinine 03/21/2022 0 98     Glucose 03/21/2022 101     Calcium 03/21/2022 8 4     Corrected Calcium 03/21/2022 9 8     AST 03/21/2022 51*    ALT 03/21/2022 50     Alkaline Phosphatase 03/21/2022 196*    Total Protein 03/21/2022 6 0*    Albumin 03/21/2022 2 3*    Total Bilirubin 03/21/2022 2 90*    eGFR 03/21/2022 62     LACTIC ACID 03/21/2022 1 3     Lipase 03/21/2022 710*    Magnesium 03/21/2022 1 7     pH, Luis Carlos 03/21/2022 7 363     pCO2, Luis Carlos 03/21/2022 38 3*    pO2, Luis Carlos 03/21/2022 25 3*    HCO3, Luis Carlos 03/21/2022 21 3*    Base Excess, Luis Carlos 03/21/2022 -3 6     O2 Content, Luis Carlos 03/21/2022 7 4     O2 HGB, VENOUS 03/21/2022 38 4*    Protime 03/21/2022 17 2*    INR 03/21/2022 1 47*    PTT 03/21/2022 33     Procalcitonin 03/21/2022 0 88*    SARS-CoV-2 03/21/2022 Negative     INFLUENZA A PCR 03/21/2022 Negative     INFLUENZA B PCR 03/21/2022 Negative     RSV PCR 03/21/2022 Negative     Color, UA 03/21/2022 Yellow     Clarity, UA 03/21/2022 Cloudy     Specific Gravity, UA 03/21/2022 <=1 005     pH, UA 03/21/2022 7 0     Leukocytes, UA 03/21/2022 Small*    Nitrite, UA 03/21/2022 Negative     Protein, UA 03/21/2022 Trace*    Glucose, UA 03/21/2022 Negative     Ketones, UA 03/21/2022 Negative     Urobilinogen, UA 03/21/2022 1 0     Bilirubin, UA 03/21/2022 Small*    Blood, UA 03/21/2022 Moderate*    hs TnI 0hr 03/21/2022 9     Blood Culture 03/21/2022 Received in Microbiology Lab  Culture in Progress   Blood Culture 03/21/2022 Received in Microbiology Lab  Culture in Progress       hs TnI 2hr 03/21/2022 9     Delta 2hr hsTnI 03/21/2022 0     Ventricular Rate 03/21/2022 85     Atrial Rate 03/21/2022 85     HI Interval 03/21/2022 150     QRSD Interval 03/21/2022 78     QT Interval 03/21/2022 380     QTC Interval 03/21/2022 452     P Axis 03/21/2022 44     QRS Axis 03/21/2022 16     T Wave Axis 03/21/2022 45     RBC, UA 03/21/2022 1-2     WBC, UA 03/21/2022 10-20*    Epithelial Cells 03/21/2022 Occasional     Bacteria, UA 03/21/2022 Innumerable*    Site 03/21/2022 Paracentesis     Color, Fluid 03/21/2022 Light Yellow     Clarity, Fluid 03/21/2022 Slightly Cloudy     WBC, Fluid 03/21/2022 18     Gram Stain Result 03/21/2022 Rare Polys     Gram Stain Result 03/21/2022 No bacteria seen     Anaerobic Culture 03/21/2022 Culture results to follow       Total Counted 03/21/2022 100     Neutrophils % (Fluid) 03/21/2022 4     Lymphs % (Fluid) 03/21/2022 69     Mesothelial % (Fluid) 03/21/2022 4     Monocytes % (Fluid) 03/21/2022 23     WBC 03/22/2022 24 94*    RBC 03/22/2022 3 45*    Hemoglobin 03/22/2022 12 3     Hematocrit 03/22/2022 37 8     MCV 03/22/2022 110*    MCH 03/22/2022 35 7*    MCHC 03/22/2022 32 5     RDW 03/22/2022 17 6*    MPV 03/22/2022 11 3     Platelets 62/00/1604 194     nRBC 03/22/2022 0     Neutrophils Relative 03/22/2022 89*    Immat GRANS % 03/22/2022 1     Lymphocytes Relative 03/22/2022 5*    Monocytes Relative 03/22/2022 3*    Eosinophils Relative 03/22/2022 2     Basophils Relative 03/22/2022 0     Neutrophils Absolute 03/22/2022 22 32*    Immature Grans Absolute 03/22/2022 0 22*    Lymphocytes Absolute 03/22/2022 1 22     Monocytes Absolute 03/22/2022 0 73     Eosinophils Absolute 03/22/2022 0 41     Basophils Absolute 03/22/2022 0 04     Sodium 03/22/2022 141     Potassium 03/22/2022 3 7     Chloride 03/22/2022 112*    CO2 03/22/2022 19*    ANION GAP 03/22/2022 10     BUN 03/22/2022 19     Creatinine 03/22/2022 0 83     Glucose 03/22/2022 105     Calcium 03/22/2022 7 8*    Corrected Calcium 03/22/2022 9 5  AST 03/22/2022 50*    ALT 03/22/2022 42     Alkaline Phosphatase 03/22/2022 181*    Total Protein 03/22/2022 5 4*    Albumin 03/22/2022 1 9*    Total Bilirubin 03/22/2022 2 21*    eGFR 03/22/2022 76     Protime 03/22/2022 18 1*    INR 03/22/2022 1 57*    Magnesium 03/22/2022 1 7     Phosphorus 03/22/2022 3 5        Imaging Studies:   I have personally reviewed pertinent imaging studies  XR chest 1 view portable    Result Date: 2/23/2022  Narrative: CHEST INDICATION:  Shortness of breath  COMPARISON:  2/11/2021, CT chest, abdomen and pelvis 4/11/2018 EXAM PERFORMED/VIEWS:  XR CHEST PORTABLE FINDINGS: Cardiomediastinal silhouette appears unremarkable  The lungs are clear  Left apical scarring  Left retrocardiac opacity in keeping with large hiatal hernia  No pneumothorax or pleural effusion  Status post left mastectomy and axillary daya dissection  Old left-sided rib fractures  Impression: No acute cardiopulmonary disease  Large hiatal hernia  Workstation performed: [id number]     XR chest 2 views    Result Date: 3/21/2022  Narrative: CHEST INDICATION:   r/o SBP  COMPARISON:  February 22, 2022 EXAM PERFORMED/VIEWS:  XR CHEST PA & LATERAL Images: 2 FINDINGS: Cardiomediastinal silhouette appears unremarkable  Large hiatal hernia seen  Increasing left basilar opacity Multiple old left rib fracture seen Osseous structures appear within normal limits for patient age  Impression: Large hiatal hernia Increasing Left basilar opacity, raises concern for effusion, alternatively this could be due to atelectasis No pulmonary congestion Hypoinflated lungs Workstation performed: [id number]     IR INPATIENT Paracentesis    Result Date: 3/21/2022  Narrative: Ultrasound-guided paracentesis Clinical History: Alcoholic Hepatitis, Symptomatic Ascites, Leukocytosis Procedure: After explaining the risks and benefits of the procedure to the patient, informed consent was obtained   Ultrasound used to localize the right lower quadrant ascites  The overlying skin was prepped and draped in usual sterile fashion and local anesthesia was obtained with the 1% lidocaine solution  A 5 [employer] Yueh needle was advanced until fluid was aspirated under live ultrasound guidance  Approximately 1500 cc' s of cloudy, yellow fluid was aspirated  Specimen was sent to lab as requested  The catheter was then removed intact  Sterile band-aid applied The patient tolerated the procedure well and left the department in stable condition  Impression: Impression: Successful diagnostic/therapeutic Ultrasound-guided paracentesis  Signed, performed, and dictated by Trey Hodges under the direct supervision of Dr Kodi Willard performed: [id number]     PE Study with CT Abdomen and Pelvis with contrast    Result Date: 2/22/2022  Narrative: CT PULMONARY ANGIOGRAM OF THE CHEST AND CT ABDOMEN AND PELVIS WITH INTRAVENOUS CONTRAST INDICATION:   History of clot, shortness of breath  Tachycardia  jaundice - new  COMPARISON:  CT of abdomen pelvis on December 17, 2018  MRI with MRCP on December 13, 2018  TECHNIQUE:  CT examination of the chest, abdomen and pelvis was performed  Thin section CT angiographic technique was used in the chest in order to evaluate for pulmonary embolus and coronal 3D MIP postprocessing was performed on the acquisition scanner  Axial, sagittal, and coronal 2D reformatted images were created from the source data and submitted for interpretation  Radiation dose length product (DLP) for this visit:  746 mGy-cm   This examination, like all CT scans performed in the [facility], was performed utilizing techniques to minimize radiation dose exposure, including the use of iterative reconstruction and automated exposure control  IV Contrast:  100 mL of iohexol (OMNIPAQUE) Enteric Contrast:  Enteric contrast was not administered  FINDINGS: CHEST PULMONARY ARTERIAL TREE:  No pulmonary embolus is seen  LUNGS:  Atelectasis/scarring in the left upper lobe  Atelectasis in the medial left lower lobe  Otherwise no focal consolidation  There is no tracheal or endobronchial lesion  PLEURA:  Unremarkable  HEART/AORTA:  Heart is unremarkable for patient's age  No thoracic aortic aneurysm  MEDIASTINUM AND KENDRA:  Unremarkable  CHEST WALL AND LOWER NECK:   Left mastectomy  ABDOMEN LIVER/BILIARY TREE:  Hepatic steatosis  The common bile duct is normal in caliber  GALLBLADDER:  No calcified gallstones  There is pericholecystic fluid/stranding  SPLEEN:  Unremarkable  PANCREAS:  Unremarkable  ADRENAL GLANDS:  Unremarkable  KIDNEYS/URETERS:  Atrophic right kidney with chronic pelviectasis similar to prior exam   Unremarkable left kidney  STOMACH AND BOWEL:  Large hiatal hernia with partially intrathoracic stomach  No bowel obstruction  APPENDIX:  A normal appendix was visualized  ABDOMINOPELVIC CAVITY:  No ascites  No pneumoperitoneum  No lymphadenopathy  VESSELS:  Unremarkable for patient's age  PELVIS REPRODUCTIVE ORGANS:  Unremarkable for patient's age  URINARY BLADDER:  Mild thickening of the urinary bladder wall  ABDOMINAL WALL/INGUINAL REGIONS:  Unremarkable  OSSEOUS STRUCTURES:  No acute fracture or destructive osseous lesion  Impression: 1  No evidence of pulmonary embolism  2   Pericholecystic fluid/stranding without evidence of calcified gallstones, findings may be seen in the setting of hepatitis  Further evaluation with ultrasound may be obtained if there is clinical concern for noncalcified gallstones/cholecystitis  3   Large hiatal hernia  4   Mild thickening of the urinary bladder wall, correlate with urinalysis for cystitis  Workstation performed: [id number]     US right upper quadrant    Result Date: 2/23/2022  Narrative: RIGHT UPPER QUADRANT ULTRASOUND INDICATION:  Abnormal ct scan, elevated t bili  Total bilirubin 11 05   Jaundice  History of breast carcinoma    The patient tested negative for COVID-19 on February 22, 2022  COMPARISON:  CT dated February 22, 2022 and CT dated April 11, 2018  TECHNIQUE:   Real-time ultrasound of the right upper quadrant was performed with a curvilinear transducer with both volumetric sweeps and still imaging techniques  FINDINGS:  The present examination is rather technically limited due to a large amount of bowel gas and the patient's inability to cooperate for the examination  PANCREAS:  Portions of the pancreas are obscured by bowel gas  Visualized portions of the pancreas are unremarkable  AORTA AND IVC:  Visualized portions are normal for patient age  LIVER: Size:  The liver is enlarged  The liver measures 20 1 cm in the midclavicular line  Contour:  Surface contour is smooth  Parenchyma:  Hyperechoic  Heterogeneous echotexture  There is an 8 x 7 x 9 mm hypoechoic lesion within the posterior aspect of the left lobe of the liver  This appears unchanged when compared with the February 22, 2022 CT as well as the April 11, 2018 CT  The portal vein is very poorly visualized  The technologist who performed the examination was unable to definitely demonstrate flow within the portal vein, however, contrast enhancement of the portal vein was seen on the CT performed approximately 3 hours earlier today  BILIARY: No definite shadowing gallstones are demonstrated, however, the gallbladder wall is abnormally thickened, measuring approximately 6 mm thickness  Portions of the gallbladder wall appear edematous and there is pericholecystic fluid  The technologist who  performed the examination reports a negative sonographic Cross's sign  No intrahepatic biliary dilatation  CBD measures 7 0 mm  No evidence of choledocholithiasis  KIDNEY: Right kidney measures 8 4 x 4 6 x 5 2 cm  Volume 104 5 mL Atrophic right kidney with severe cortical thinning and marked pelviectasis, unchanged  ASCITES:   Trace ascites       Impression: The gallbladder wall is abnormally thickened, measuring approximately 6 mm thickness  Portions of the gallbladder wall appear edematous  There is pericholecystic fluid  The liver is enlarged, hyperechoic, and demonstrates heterogeneous echotexture  The technologist who performed the examination was unable to definitely demonstrate flow within the portal vein, however, contrast enhancement of the portal vein was seen on  a CT performed approximately 3 hours earlier today  Gastroenterology/Hepatology consultation and Surgical consultation is recommended  Trace ascites  Atrophic right kidney, unchanged  There is an approximately 8 x 7 x 9 mm hypoechoic lesion within the posterior aspect of the left lobe of the liver; this appears similar to April 11, 2018  The study was marked in [facility] for immediate notification  Workstation performed: [id number]     CT abdomen pelvis with contrast    Result Date: 3/21/2022  Narrative: CT ABDOMEN AND PELVIS WITH IV CONTRAST INDICATION:   known ascites, hx of SBP  COMPARISON:  February 22, 2022 TECHNIQUE:  CT examination of the abdomen and pelvis was performed  Axial, sagittal, and coronal 2D reformatted images were created from the source data and submitted for interpretation  Radiation dose length product (DLP) for this visit:  862 mGy-cm   This examination, like all CT scans performed in the [facility], was performed utilizing techniques to minimize radiation dose exposure, including the use of iterative reconstruction and automated exposure control  IV Contrast:  100 mL of iohexol (OMNIPAQUE) Enteric Contrast:  Enteric contrast was not administered  FINDINGS: ABDOMEN LOWER CHEST:  Small bilateral pleural effusions with bibasilar subsegmental atelectasis  Moderate sized paraesophageal hernia  LIVER/BILIARY TREE:  Evaluation of the liver is difficult due to beam hardening artifact from contrast infiltration in the right forearm    Stable subcentimeter sharply circumscribed low-density hepatic lesion(s) are noted, too small to accurately characterize, but statistically most likely to represent subcentimeter hepatic cysts  GALLBLADDER:  No calcified gallstones  The gallbladder wall is thickened with adjacent fluid likely on the basis of ascites  SPLEEN:  Unremarkable  PANCREAS:  Unremarkable  ADRENAL GLANDS:  Unremarkable  KIDNEYS/URETERS:  Stable severe atrophy of the right kidney  The left kidney is unremarkable  STOMACH AND BOWEL:  There is colonic diverticulosis without evidence of acute diverticulitis  APPENDIX:  No findings to suggest appendicitis  ABDOMINOPELVIC CAVITY:  Moderate abdominal and pelvic ascites  No pneumoperitoneum  No lymphadenopathy  VESSELS:  Unremarkable for patient's age  PELVIS REPRODUCTIVE ORGANS:  Uterus appears unremarkable  There is no evidence of adnexal mass  URINARY BLADDER:  As on the previous study, there is mild thickening of the urinary bladder wall with mild enhancement  Recommend clinical correlation for cystitis  ABDOMINAL WALL/INGUINAL REGIONS:  Unremarkable  OSSEOUS STRUCTURES:  No acute fracture or destructive osseous lesion  Impression: Moderate sized paraesophageal hiatal hernia  Moderate ascites  Gallbladder wall thickening likely on the basis of ascites  Severe right renal atrophy  Mild bladder wall thickening with minimal enhancement concerning for cystitis  Workstation performed: [id number]     Echo complete w/ contrast if indicated    Result Date: 2/24/2022  Narrative: Damion Figueroa  Left Ventricle: Left ventricular cavity size is normal  Wall thickness is normal  The left ventricular ejection fraction is 65%  Systolic function is normal  Wall motion is normal  Diastolic function is normal for age  Assessment & Plan    ETOH hepatitis  Ascites  Abdominal pain  Leukocytosis     - Patient presented with the development of abdominal pain and increased distention  - CT A/P showed a moderate amount of ascites    White blood cell count is elevated at 24 94   - She had a recent hospitalization for severe ETOH hepatitis with a DF of >32 and was started on Prednisolone 40mg po daily x 28 days with subsequent taper on 2/25  Total bilirubin is down to 2 21 from 10 17 on 02/25  INR is down to 1 57 from 3 5 on 2/24    - IR performed paracentesis today with removal of 1500 ml of ascitic fluid yesterday  Fluid analysis was negative for SBP  She reports resolution of her abdominal pain  -  Prednisolone on hold at this time given the concern for possible infection   - She is currently AOx3 and there is no asterixis  - Monitor patient and labs, serial abdominal exams and monitoring of vital signs, supportive care  - Low sodium diet  - Continued complete ETOH cessation   - Infectious work up as per [facility]- blood cultures, urine culture pending  The patient will be seen by Dr Juventino Grayson PA-C  3/22/2022,11:09 AM

## 2022-03-22 NOTE — PLAN OF CARE
Problem: PAIN - ADULT  Goal: Verbalizes/displays adequate comfort level or baseline comfort level  Description: Interventions:  - Encourage patient to monitor pain and request assistance  - Assess pain using appropriate pain scale  - Administer analgesics based on type and severity of pain and evaluate response  - Implement non-pharmacological measures as appropriate and evaluate response  - Consider cultural and social influences on pain and pain management  - Notify physician/advanced practitioner if interventions unsuccessful or patient reports new pain  Outcome: Progressing     Problem: INFECTION - ADULT  Goal: Absence or prevention of progression during hospitalization  Description: INTERVENTIONS:  - Assess and monitor for signs and symptoms of infection  - Monitor lab/diagnostic results  - Monitor all insertion sites, i e  indwelling lines, tubes, and drains  - Monitor endotracheal if appropriate and nasal secretions for changes in amount and color  - [place] appropriate cooling/warming therapies per order  - Administer medications as ordered  - Instruct and encourage patient and family to use good hand hygiene technique  - Identify and instruct in appropriate isolation precautions for identified infection/condition  Outcome: Progressing  Goal: Absence of fever/infection during neutropenic period  Description: INTERVENTIONS:  - Monitor WBC    Outcome: Progressing     Problem: SAFETY ADULT  Goal: Patient will remain free of falls  Description: INTERVENTIONS:  - Educate patient/family on patient safety including physical limitations  - Instruct patient to call for assistance with activity   - Consult OT/PT to assist with strengthening/mobility   - Keep Call bell within reach  - Keep bed low and locked with side rails adjusted as appropriate  - Keep care items and personal belongings within reach  - Initiate and maintain comfort rounds  - Make Fall Risk Sign visible to staff  - Offer Toileting every  Hours, in advance of need  - Initiate/Maintain alarm  - Obtain necessary fall risk management equipment:   - Apply yellow socks and bracelet for high fall risk patients  - Consider moving patient to room near nurses station  Outcome: Progressing  Goal: Maintain or return to baseline ADL function  Description: INTERVENTIONS:  -  Assess patient's ability to carry out ADLs; assess patient's baseline for ADL function and identify physical deficits which impact ability to perform ADLs (bathing, care of mouth/teeth, toileting, grooming, dressing, etc )  - Assess/evaluate cause of self-care deficits   - Assess range of motion  - Assess patient's mobility; develop plan if impaired  - Assess patient's need for assistive devices and provide as appropriate  - Encourage maximum independence but intervene and supervise when necessary  - Involve family in performance of ADLs  - Assess for home care needs following discharge   - Consider OT consult to assist with ADL evaluation and planning for discharge  - Provide patient education as appropriate  Outcome: Progressing  Goal: Maintains/Returns to pre admission functional level  Description: INTERVENTIONS:  - Perform BMAT or MOVE assessment daily    - Set and communicate daily mobility goal to care team and patient/family/caregiver  - Collaborate with rehabilitation services on mobility goals if consulted  - Perform Range of Motion  times a day  - Reposition patient every  hours    - Dangle patient  times a day  - Stand patient  times a day  - Ambulate patient  times a day  - Out of bed to chair  times a day   - Out of bed for meals  times a day  - Out of bed for toileting  - Record patient progress and toleration of activity level   Outcome: Progressing     Problem: DISCHARGE PLANNING  Goal: Discharge to home or other facility with appropriate resources  Description: INTERVENTIONS:  - Identify barriers to discharge w/patient and caregiver  - Arrange for needed discharge resources and transportation as appropriate  - Identify discharge learning needs (meds, wound care, etc )  - Arrange for interpretive services to assist at discharge as needed  - Refer to Case Management Department for coordinating discharge planning if the patient needs post-hospital services based on physician/advanced practitioner order or complex needs related to functional status, cognitive ability, or social support system  Outcome: Progressing     Problem: Knowledge Deficit  Goal: Patient/family/caregiver demonstrates understanding of disease process, treatment plan, medications, and discharge instructions  Description: Complete learning assessment and assess knowledge base    Interventions:  - Provide teaching at level of understanding  - Provide teaching via preferred learning methods  Outcome: Progressing     Problem: Prexisting or High Potential for Compromised Skin Integrity  Goal: Skin integrity is maintained or improved  Description: INTERVENTIONS:  - Identify patients at risk for skin breakdown  - Assess and monitor skin integrity  - Assess and monitor nutrition and hydration status  - Monitor labs   - Assess for incontinence   - Turn and reposition patient  - Assist with mobility/ambulation  - Relieve pressure over bony prominences  - Avoid friction and shearing  - Provide appropriate hygiene as needed including keeping skin clean and dry  - Evaluate need for skin moisturizer/barrier cream  - Collaborate with interdisciplinary team   - Patient/family teaching  - Consider wound care consult   Outcome: Progressing     Problem: MOBILITY - ADULT  Goal: Maintain or return to baseline ADL function  Description: INTERVENTIONS:  -  Assess patient's ability to carry out ADLs; assess patient's baseline for ADL function and identify physical deficits which impact ability to perform ADLs (bathing, care of mouth/teeth, toileting, grooming, dressing, etc )  - Assess/evaluate cause of self-care deficits   - Assess range of motion  - Assess patient's mobility; develop plan if impaired  - Assess patient's need for assistive devices and provide as appropriate  - Encourage maximum independence but intervene and supervise when necessary  - Involve family in performance of ADLs  - Assess for home care needs following discharge   - Consider OT consult to assist with ADL evaluation and planning for discharge  - Provide patient education as appropriate  Outcome: Progressing  Goal: Maintains/Returns to pre admission functional level  Description: INTERVENTIONS:  - Perform BMAT or MOVE assessment daily    - Set and communicate daily mobility goal to care team and patient/family/caregiver  - Collaborate with rehabilitation services on mobility goals if consulted  - Perform Range of Motion  times a day  - Reposition patient every  hours    - Dangle patient  times a day  - Stand patient  times a day  - Ambulate patient times a day  - Out of bed to chair  times a day   - Out of bed for meals  times a day  - Out of bed for toileting  - Record patient progress and toleration of activity level   Outcome: Progressing

## 2022-03-22 NOTE — UTILIZATION REVIEW
Initial Clinical Review    Admission: Date/Time/Statement:   Admission Orders (From admission, onward)     Ordered        03/21/22 1033  Inpatient Admission  Once                      Orders Placed This Encounter   Procedures    Inpatient Admission     Standing Status:   Standing     Number of Occurrences:   1     Order Specific Question:   Level of Care     Answer:   Med Surg [16]     Order Specific Question:   Estimated length of stay     Answer:   More than 2 Midnights     Order Specific Question:   Certification     Answer:   I certify that inpatient services are medically necessary for this patient for a duration of greater than two midnights  See H&P and MD Progress Notes for additional information about the patient's course of treatment  ED Arrival Information     Expected Arrival Acuity    - 3/21/2022 07:15 Urgent         Means of arrival Escorted by Service Admission type    [facility] Urgent         Arrival complaint    ABD DISTENTION        Chief Complaint   Patient presents with    Abdominal Problem     Pt reports increased abd pain and distention  Hx of liver disease  Initial Presentation: 62year old female to the ED from home via EMS with complaints of abdominal discomfort, tenderness in all quadrants  Admitted to inpatient for sepsis, ascites, alcohol abuse  H/O heavy alcohol use, recent hepatitis  Arrives with Elevated WBCs, tachypneic  Currently taking prednisolone  Blood and urine cultures pending  IV abx started  GI consult  GI consult:Abdominal paracentesis was performed and the total WBC count in the ascitic fluid was 18  A total of 1500 mL of ascitic fluid was removed  No evidence of bacterial peritonitis  Total bilirubin improved from 2/25  INR improved  Has not had any alcohol since February  Improvement in abdominal pain s/p paracentesis    BRIEF OP NOTE  3/21/22  Abdominal paracentesis was performed and the total WBC count in the ascitic fluid was 18  A total of 1500 mL of ascitic fluid was removed  Date: 3/22   Day 2: Given the history of liver disease and concern with bacterial translocation would favor treating a sepsis for now  Continue with IV abx  Follow blood cultures, urine cultures  Feeling better  Tolerating diet  Swelling lower extremities  GI consult: Abdomen is soft, nontender, + bs  GCS 15  Blood and urine cultures pending        ED Triage Vitals [03/21/22 0718]   Temperature Pulse Respirations Blood Pressure SpO2   97 5 °F (36 4 °C) 85 19 153/77 98 %      Temp Source Heart Rate Source Patient Position - Orthostatic VS BP Location FiO2 (%)   Oral Monitor Lying Right arm --      Pain Score       2          Wt Readings from Last 1 Encounters:   03/21/22 72 6 kg (160 lb)     Additional Vital Signs:   Date/Time Temp Pulse Resp BP MAP (mmHg) SpO2 O2 Device Patient Position - Orthostatic VS   03/22/22 07:34:24 98 °F (36 7 °C) 86 -- 163/79 107 98 % -- --   03/21/22 23:28:14 -- 89 -- 147/59 88 97 % -- --   03/21/22 2041 -- -- -- -- -- 97 % None (Room air) --   03/21/22 1600 -- -- -- -- -- -- None (Room air) --   03/21/22 15:55:53 97 8 °F (36 6 °C) 78 18 133/64 87 96 % None (Room air) Lying   03/21/22 1509 -- 89 17 115/75 -- 96 % None (Room air) Lying   03/21/22 11:38:17 -- 86 19 117/77 -- 99 % -- --   03/21/22 11:08:21 -- 92 24 Abnormal  116/80 -- 99 % None (Room air) Lying   03/21/22 1059 -- 93 18 122/85 -- 98 % None (Room air) Lying   03/21/22 0930 -- 89 20 121/74 92 98 % None (Room air) Lying   03/21/22 0900 -- 79 23 Abnormal  111/66 81 99 % None (Room air) Lying   03/21/22 0830 -- -- -- -- -- -- None (Room air) --   03/21/22 0718 97 5 °F (36 4 °C) 85 19 153/77 -- 98 % None (Room air) Lying       Pertinent Labs/Diagnostic Test Results:   3/22 ekg: Sinus rhythm with Premature supraventricular complexes  Cannot rule out Anterior infarct (cited on or before 21-MAR-2022)  Abnormal ECG  When compared with ECG of 22-FEB-2022 16:45,  Premature supraventricular complexes are now Present  Nonspecific T wave abnormality now evident in Anterior leads  IR INPATIENT Paracentesis   Final Result by Rg Rosenbaum MD (03/21 1507)   Impression:      Successful diagnostic/therapeutic Ultrasound-guided paracentesis  Signed, performed, and dictated by Larissa Snider under the direct supervision of Dr Chase Horne performed: [id number]         CT abdomen pelvis with contrast   Final Result by Leighann Eastman MD (03/21 4400)      Moderate sized paraesophageal hiatal hernia  Moderate ascites  Gallbladder wall thickening likely on the basis of ascites  Severe right renal atrophy  Mild bladder wall thickening with minimal enhancement concerning for cystitis              Workstation performed: [id number]         XR chest 2 views   Final Result by Elenore Hashimoto, MD (03/21 0900)      Large hiatal hernia      Increasing Left basilar opacity, raises concern for effusion, alternatively this could be due to atelectasis   No pulmonary congestion      Hypoinflated lungs               Workstation performed: [id number]           Results from last 7 days   Lab Units 03/21/22  0736   SARS-COV-2  Negative     Results from last 7 days   Lab Units 03/22/22  0936 03/21/22  0736   WBC Thousand/uL 24 94* 24 63*   HEMOGLOBIN g/dL 12 3 12 3   HEMATOCRIT % 37 8 38 4   PLATELETS Thousands/uL 194 221   NEUTROS ABS Thousands/µL 22 32* 21 19*         Results from last 7 days   Lab Units 03/22/22  0612 03/21/22  0736   SODIUM mmol/L 141 142   POTASSIUM mmol/L 3 7 3 9   CHLORIDE mmol/L 112* 111*   CO2 mmol/L 19* 24   ANION GAP mmol/L 10 7   BUN mg/dL 19 22   CREATININE mg/dL 0 83 0 98   EGFR ml/min/1 73sq m 76 62   CALCIUM mg/dL 7 8* 8 4   MAGNESIUM mg/dL 1 7 1 7   PHOSPHORUS mg/dL 3 5  --      Results from last 7 days   Lab Units 03/22/22  0612 03/21/22  0736   AST U/L 50* 51*   ALT U/L 42 50   ALK PHOS U/L 181* 196*   TOTAL PROTEIN g/dL 5 4* 6 0*   ALBUMIN g/dL 1 9* 2 3*   TOTAL BILIRUBIN mg/dL 2 21* 2 90*         Results from last 7 days   Lab Units 03/22/22  0612 03/21/22  0736   GLUCOSE RANDOM mg/dL 105 101             BETA-HYDROXYBUTYRATE   Date Value Ref Range Status   09/08/2020 7 1 (H) <0 6 mmol/L Final          Results from last 7 days   Lab Units 03/21/22  0736   PH CARLOS  7 363   PCO2 CARLOS mm Hg 38 3*   PO2 CARLOS mm Hg 25 3*   HCO3 CARLOS mmol/L 21 3*   BASE EXC CARLOS mmol/L -3 6   O2 CONTENT CARLOS ml/dL 7 4   O2 HGB, VENOUS % 38 4*             Results from last 7 days   Lab Units 03/21/22  1010 03/21/22  0736   HS TNI 0HR ng/L  --  9   HS TNI 2HR ng/L 9  --    HSTNI D2 ng/L 0  --          Results from last 7 days   Lab Units 03/21/22  0736   PROTIME seconds 17 2*   INR  1 47*   PTT seconds 33         Results from last 7 days   Lab Units 03/21/22  0736   PROCALCITONIN ng/ml 0 88*     Results from last 7 days   Lab Units 03/21/22  0736   LACTIC ACID mmol/L 1 3             Results from last 7 days   Lab Units 03/21/22  0736   NT-PRO BNP pg/mL 2,135*             Results from last 7 days   Lab Units 03/21/22  0736   LIPASE u/L 710*                 Results from last 7 days   Lab Units 03/21/22  1010   CLARITY UA  Cloudy   COLOR UA  Yellow   SPEC GRAV UA  <=1 005   PH UA  7 0   GLUCOSE UA mg/dl Negative   KETONES UA mg/dl Negative   BLOOD UA  Moderate*   PROTEIN UA mg/dl Trace*   NITRITE UA  Negative   BILIRUBIN UA  Small*   UROBILINOGEN UA E U /dl 1 0   LEUKOCYTES UA  Small*   WBC UA /hpf 10-20*   RBC UA /hpf 1-2   BACTERIA UA /hpf Innumerable*   EPITHELIAL CELLS WET PREP /hpf Occasional     Results from last 7 days   Lab Units 03/21/22  0736   INFLUENZA A PCR  Negative   INFLUENZA B PCR  Negative   RSV PCR  Negative       Results from last 7 days   Lab Units 03/21/22  1125 03/21/22  0736   BLOOD CULTURE   --  Received in Microbiology Lab  Culture in Progress  Received in Microbiology Lab  Culture in Progress     GRAM STAIN RESULT  Rare Polys  No bacteria seen  --      Results from last 7 days   Lab Units 03/21/22  1125   TOTAL COUNTED  100   WBC FLUID /ul 18     ED Treatment:   Medication Administration from 03/21/2022 0715 to 03/21/2022 1547       Date/Time Order Dose Route Action     03/21/2022 1045 ceftriaxone (ROCEPHIN) 1 g/50 mL in dextrose IVPB 1,000 mg Intravenous New Bag     03/21/2022 1148 vancomycin (VANCOCIN) IVPB (premix in dextrose) 1,000 mg 200 mL 1,000 mg Intravenous New Bag     03/21/2022 1227 sertraline (ZOLOFT) tablet 100 mg 100 mg Oral Given     03/21/2022 1227 prednisoLONE (ORAPRED) oral solution 30 mg 30 mg Oral Given     03/21/2022 1227 pantoprazole (PROTONIX) EC tablet 40 mg 40 mg Oral Given     44/91/7689 5738 folic acid (FOLVITE) tablet 1 mg 1 mg Oral Given     03/21/2022 1227 letrozole [facility]) tablet 2 5 mg 2 5 mg Oral Given     03/21/2022 1227 enoxaparin (LOVENOX) subcutaneous injection 40 mg 40 mg Subcutaneous Given     03/21/2022 1116 lidocaine 1% buffered 10 mL Infiltration Given        Past Medical History:   Diagnosis Date    Acute DVT (deep venous thrombosis) (HCC)     of LLE>     Bladder infection     Congenital prolapsed rectum     History of biliary stent insertion     History of chemotherapy     History of radiation therapy 05/2018    left breast ca    History of transfusion     x2 s/p chemo treatments, no reaction    Hydronephrosis     right kidney    Hypertension     Malignant neoplasm of overlapping sites of left female breast ([place] [address] ) 05/01/2018    Migraine        Admitting Diagnosis: Abdominal pain [R10 9]  SBP (spontaneous bacterial peritonitis) ([place] [address] ) [K65 2]  Age/Sex: 62 y o  female  Admission Orders:  Up and OOB  SCDS  C-diff, urine culture  Scheduled Medications:  cefTRIAXone, 1,000 mg, Intravenous, Q24H  enoxaparin, 40 mg, Subcutaneous, Daily  folic acid, 1 mg, Oral, Daily  letrozole, 2 5 mg, Oral, Daily  pantoprazole, 40 mg, Oral, Early Morning  sertraline, 100 mg, Oral, Daily      Continuous IV Infusions:     PRN Meds:  ondansetron, 4 mg, Intravenous, Q6H PRN  SUMAtriptan, 100 mg, Oral, Once PRN        IP CONSULT TO GASTROENTEROLOGY    Network Utilization Review Department  ATTENTION: Please call with any questions or concerns to [phone] and carefully listen to the prompts so that you are directed to the right person  All voicemails are confidential   Sudie Crigler all requests for admission clinical reviews, approved or denied determinations and any other requests to dedicated fax number below belonging to the campus where the patient is receiving treatment   List of dedicated fax numbers for the Facilities:  [address] DENIALS (Administrative/Medical Necessity) [phone]   [address] (Maternity/NICU/Pediatrics) [phone]   [address] [place] [address]  [address] [facility] [place] [address] [address] [place] [address] [address] [place] [address] [phone]

## 2022-03-22 NOTE — ASSESSMENT & PLAN NOTE
Background:  Patient presented back to the hospital 03/21/22 with abdominal pain and distention  Recently discharged 03/02/22 after being diagnosed with acute alcoholic hepatitis  At the time, discharged on prednisolone for DF > 32   · Service met on presentation as evidenced by leukocytosis 24k, tachypnea, mild tachycardia  Possible sepsis based on abnormal urinalysis  · Status post paracentesis 3/21, not suggestive of SBP with WBC only 18  Follow-up cultures  · UA grossly abnormal, follow-up on culture    · CT abdomen/pelvis 03/21/22: Moderate sized paraesophageal hiatal hernia  Moderate ascites  Gallbladder wall thickening likely on the basis of ascites  Severe right renal atrophy  Mild bladder wall thickening with minimal enhancement concerning for cystitis    · Follow blood cultures NGTD at discharge, follow urine culture which shows >100k at time of discharge  · Ceftriaxone day #3, finish course with omnicef x4 more days

## 2022-03-22 NOTE — PHYSICAL THERAPY NOTE
Physical Therapy Evaluation   Time in: 809  Time out: 824  Total evaluation time: 15 minutes    Patient's Name: Griselda Cruz    Admitting Diagnosis  Abdominal pain [R10 9]  SBP (spontaneous bacterial peritonitis) ([facility] [address] ) [K65 2]    Problem List  Patient Active Problem List   Diagnosis    Malignant neoplasm of overlapping sites of left breast in female, estrogen receptor positive ([facility] [address] )    Hydronephrosis    Cellulitis    History of DVT (deep vein thrombosis)    Anemia following use of chemotherapeutic drug    Hematuria    Hemorrhagic cystitis    Heme positive stool    Acute deep vein thrombosis (DVT) of left lower extremity (HCC)    Hyperbilirubinemia    Acute blood loss anemia    Moderate protein-calorie malnutrition (HCC)    Immunocompromised (HCC)    Lesion of bladder    Blood loss anemia    Dilated cbd, acquired    Rectal prolapse    Use of letrozole (Femara)    Axillary lump, left    Alcoholic ketosis (HCC)    SOB (shortness of breath)    Hypoglycemia    History of chemotherapy    History of radiation therapy    Suspected acute pulmonary embolism (HCC)    Recurrent syncope    OPAL (acute kidney injury) ([facility] [address] ) on CKD    Closed left fibular fracture    Localized swelling of lower extremity    Alcohol abuse    Hypokalemia    Benign hypertension with CKD (chronic kidney disease) stage III (HCC)    Stage 3a chronic kidney disease (HCC)    Anemia    SIRS (systemic inflammatory response syndrome) (HCC)    Alcoholic hepatitis    Elevated liver enzymes    History of alcohol use disorder    Proteinuria    Abnormal urinalysis    Hypertension    Sepsis ([facility] [address] )    Decompensated hepatic cirrhosis ([facility] [address] )    Ascites       Past Medical History  Past Medical History:   Diagnosis Date    Acute DVT (deep venous thrombosis) (HCC)     of LLE>     Bladder infection     Congenital prolapsed rectum     History of biliary stent insertion     History of chemotherapy     History of radiation therapy 05/2018    left breast ca    History of transfusion     x2 s/p chemo treatments, no reaction    Hydronephrosis     right kidney    Hypertension     Malignant neoplasm of overlapping sites of left female breast ([address] ) 05/01/2018    Migraine        Past Surgical History  Past Surgical History:   Procedure Laterality Date    ABDOMINAL ADHESION SURGERY N/A 4/23/2019    Procedure: LYSIS ADHESIONS;  Surgeon: Meredith Wright MD;  [facility];  Service: Colorectal    BREAST BIOPSY      COLON SURGERY      colon resection    CYSTOSCOPY N/A 3/4/2019    Procedure: CYSTOSCOPY WITH BIOPSIES AND [place] OF RECTUM PROLAPSE;  Surgeon: Juan Miles MD;  [facility];  Service: Urology    ERCP N/A 1/4/2019    Procedure: ENDOSCOPIC RETROGRADE CHOLANGIOPANCREATOGRAPHY (ERCP); Surgeon: Pearl Montoya MD;  [facility];  Service: Gastroenterology    INSTILLATION MYTOMYCIN N/A 3/4/2019    Procedure: Chente Lawson;  Surgeon: Juan Miles MD;  [facility];  Service: Urology    IR PARACENTESIS  3/21/2022    MASTECTOMY Left     2018    MD COLONOSCOPY FLX DX W/COLLJ SPEC WHEN PFRMD N/A 4/22/2019    Procedure: COLONOSCOPY;  Surgeon: Meredith Wright MD;  Location: [facility]; Service: Colorectal    MD EDG US EXAM SURGICAL ALTER STOM DUODENUM/JEJUNUM N/A 3/7/2019    Procedure: LINEAR ENDOSCOPIC U/S;  Surgeon: Dom Owens MD;  [facility]: [facility]; Service: Gastroenterology    MD ESOPHAGOGASTRODUODENOSCOPY TRANSORAL DIAGNOSTIC N/A 3/7/2019    Procedure: ESOPHAGOGASTRODUODENOSCOPY (EGD); Surgeon: Dom Owens MD;  [facility]: [facility];   Service: Gastroenterology    MD INSJ TUNNELED CTR VAD W/SUBQ PORT AGE 5 YR/> N/A 6/26/2018    Procedure: INSERTION VENOUS PORT ( PORT-A-CATH) IR;  Surgeon: Ambrosio Lay DO;  [facility];  Service: Interventional Radiology    MD LAP, SURG PROCTOPEXY N/A 4/23/2019    Procedure: Vipin Leiva HAND-ASSIST PELVIC DISSECTION; proctopexy;  Surgeon: Juan F Lr MD;  [facility];  Service: Colorectal    TN LAP, SURG PROCTOPEXY N/A 11/18/2020    Procedure: LAPAROSCOPIC LYSIS OF ADHESIONS, MOBILIZATION OF RECTUM AND SUTURE RECTOPEXY;  Surgeon: Benjamín Lawrence MD;  [facility];  Service: Colorectal    TN MASTECTOMY, MODIFIED RADICAL Left 5/15/2018    Procedure: BREAST MODIFIED RADICAL MASTECTOMY;  Surgeon: Netta Kaba MD;  [facility];  Service: Surgical Oncology    TN RMVL [place] CTR VAD W/SUBQ PORT/ CTR/PRPH INSJ N/A 6/4/2019    Procedure: REMOVAL VENOUS PORT (PORT-A-CATH)IR;  Surgeon: Charo Yates DO;  [facility];  Service: Interventional Radiology    TUBAL LIGATION      US GUIDANCE BREAST BIOPSY LEFT EACH ADDITIONAL Left 4/3/2018    US GUIDED BREAST BIOPSY LEFT COMPLETE Left 4/3/2018    US GUIDED BREAST LYMPH NODE BIOPSY LEFT Left 4/3/2018       PT performed at least 2 patient identifiers during session: Name and wristband  03/22/22 0809   PT Last Visit   PT Visit Date 03/22/22   Note Type   Note type Evaluation   Pain Assessment   Pain Assessment Tool 0-10   Pain Score No Pain   Restrictions/Precautions   Weight Bearing Precautions Per Order No   Braces or Orthoses Other (Comment)  (none per pt)   Other Precautions Contact/isolation; Chair Alarm; Bed Alarm;Limb alert; Fall Risk  (r/o cdiff)   Home Living   Type of [facility] Two level;Performs ADLs on one level; Able to live on main level with bedroom/bathroom; Other (Comment); Stairs to enter without rails  (Full bathroom on first-floor; 2 MARIJA)   Bathroom Shower/Tub Tub/shower unit   Bathroom Toilet Raised   Bathroom Equipment Shower chair   [facility]; Wheelchair-manual  (Patient reports primarily ambulatory with walker at baseline)   Prior Function   Level of [place] Independent with ADLs and functional mobility  (Requires assist with toilet txs at baseline)   Lives With Spouse  (Mom currently staying w/ pt until "the 19th")   Receives Help From Family;Home health  (Home OT and PT 2x/wk)   ADL Assistance Independent   IADLs Needs assistance   Falls in the last 6 months 1 to 4  (1 fall 2 weeks ago)   Vocational Retired   General   Family/Caregiver Present No   Cognition   Overall Cognitive Status WFL   Arousal/Participation Alert   Orientation Level Oriented to person;Oriented to place;Oriented to situation  (Oriented to month and year)   Memory Within functional limits   Following Commands Follows all commands and directions without difficulty   Comments pt agreeable to PT eval   RUE Assessment   RUE Assessment X  (defer to OT Eval for comments)   LUE Assessment   LUE Assessment X  (defer to OT Eval for comments)   RLE Assessment   RLE Assessment   (grossly 3+/5 observed c functional mobility)   LLE Assessment   LLE Assessment   (grossly 3+/5 observed c functional mobility)   Vision-Basic Assessment   Current Vision Wears glasses only for reading   Coordination   Movements are Fluid and Coordinated 1   Sensation WFL   Bed Mobility   Supine to Sit 4  Minimal assistance   Additional items Assist x 1;HOB elevated; Increased time required;Verbal cues   Transfers   Sit to Stand 4  Minimal assistance  (CGA)   Additional items Assist x 1; Increased time required;Verbal cues   Stand to Sit 4  Minimal assistance  (CGA)   Additional items Assist x 1; Increased time required;Verbal cues   Ambulation/Elevation   Gait pattern Decreased foot clearance; Short stride; Step to   Gait Assistance 4  Minimal assist  (CGA)   Additional items Assist x 1;Verbal cues   Assistive Device Rolling walker   Distance 10'; pt declining further distance at this time   Balance   Static Sitting Good   Dynamic Sitting Fair +   Static Standing Fair   Dynamic Standing Fair -   Ambulatory Fair -   Endurance Deficit   Endurance Deficit Yes   Activity Tolerance   Activity Tolerance Patient limited by fatigue   Medical Staff Made Aware ROMAN Wilson   Nurse Made Aware RN Junie confirmed pt appropriate for therapy and made aware of session outcomes   Assessment   Prognosis Good   Problem List Decreased strength;Decreased endurance; Impaired balance;Decreased mobility   Assessment Pt is 62 y o  female seen for moderate-complexity PT evaluation on 3/22/2022 s/p admit to [place] on 3/21/2022 w/ Sepsis ([address] )  PT was consulted to assess pt's functional mobility and d/c needs  Order placed for PT eval and tx, w/ up and OOB as tolerated order  PTA, pt resides with  in [facility] with 2 MARIJA, ambulates with walker at baseline, 1st floor set up  At time of eval, pt requiring min A for bed mobility, CGA for transfers and level surface gait trial  Upon evaluation, pt presenting with impaired functional mobility d/t decreased strength, decreased endurance, impaired balance, decreased mobility and activity intolerance  Pertinent PMHx and current co-morbidities affecting pt's physical performance at time of assessment include: sepsis, malignant neoplasm, h/o DVT, alcohol abuse, ascites, immunocompromised, recurrent syncope, alcoholic hepatitis  Personal factors affecting pt at time of eval include: ambulating w/ assistive device, stairs to enter home, inability to navigate community distances and positive fall history  The following objective measures performed on IE also reveal limitations: Barthel Index: 55/100, Modified Celine: 3 (moderate disability) and AM-PAC 6-Clicks: 18/73  Pt's clinical presentation is currently unstable/unpredictable seen in pt's presentation of abnormal lab value(s), need for input for task focus and mobility technique and ongoing medical assessment   Overall, pt's rehab potential and prognosis to return to PLOF is good as impacted by objective findings, warranting pt to receive further skilled PT interventions to address identified impairments, activity limitation(s), and participation restriction(s)  Pt to benefit from continued PT tx to address deficits as defined above and maximize level of functional independent mobility and consistency  From PT/mobility standpoint, recommendation at time of d/c would be home with home health rehabilitation pending progress in order to facilitate return to PLOF  Barriers to Discharge Inaccessible home environment   Goals   Patient Goals "to walk without the walker"   [facility] Expiration Date 04/01/22   Short Term Goal #1 In 7-10 days: Increase bilateral LE strength 1/2 grade to facilitate independent mobility, Perform all bed mobility tasks modified independent to decrease caregiver burden, Perform all transfers modified independent to improve independence, Ambulate > 50 ft  with least restrictive assistive device with close S w/o LOB and w/ normalized gait pattern 100% of the time, Navigate 2 stairs with SBA with unilateral handrail to facilitate return to previous living environment, Increase all balance 1/2 grade to decrease risk for falls, Tolerate 4 hr OOB to faciliate upright tolerance, Improve Barthel Index score to 70 or greater to facilitate independence and PT provider will perform functional balance assessment to determine fall risk   PT Treatment Day 0   Plan   Treatment/Interventions Functional transfer training;LE strengthening/ROM; Therapeutic exercise; Endurance training;Patient/family training;Equipment eval/education; Bed mobility;Gait training;Elevations; Spoke to nursing   PT Frequency 3-5x/wk   Recommendation   PT Discharge Recommendation Home with home health rehabilitation   Equipment Recommended Walker  (RW)   [address]   Turning in Bed Without Bedrails 3   Lying on Back to Sitting on Edge of Flat Bed 3   Moving Bed to Chair 3   Standing Up From Chair 3   Walk in Room 3   Climb 3-5 Stairs 2   Basic Mobility Inpatient Raw Score 17   Basic Mobility Standardized Score 39 67   Highest Level Of Mobility   [facility] Goal 5: Stand one or more mins   -[facility] Highest Level of Mobility 6: Walk 10 steps or more   -[facility] Goal Achieved Yes   Modified [place] Scale   Modified [place] Scale 3   Barthel Index   Feeding 10   Bathing 0   Grooming Score 5   Dressing Score 5   Bladder Score 10   Bowels Score 10   Toilet Use Score 5   Transfers (Bed/Chair) Score 10   Mobility (Level Surface) Score 0   Stairs Score 0   Barthel Index Score 55       Zoe Cronin, PT, DPT

## 2022-03-22 NOTE — DISCHARGE SUMMARY
[facility] Discharge- Bridgette Metz 1960, 62 y o  female MRN: [id number]  Unit/Bed#: -01 Encounter: [id number]  Primary Care Provider: Neal Evans DC   Date and time admitted to hospital: 3/21/2022  7:16 AM    * Sepsis [facility])  Assessment & Plan  Background:  Patient presented back to the hospital 03/21/22 with abdominal pain and distention  Recently discharged 03/02/22 after being diagnosed with acute alcoholic hepatitis  At the time, discharged on prednisolone for DF > 32   · Service met on presentation as evidenced by leukocytosis 24k, tachypnea, mild tachycardia  Possible sepsis based on abnormal urinalysis  · Status post paracentesis 3/21, not suggestive of SBP with WBC only 18  Follow-up cultures  · UA grossly abnormal, follow-up on culture    · CT abdomen/pelvis 03/21/22: Moderate sized paraesophageal hiatal hernia  Moderate ascites  Gallbladder wall thickening likely on the basis of ascites  Severe right renal atrophy  Mild bladder wall thickening with minimal enhancement concerning for cystitis  · Follow blood cultures NGTD at discharge, follow urine culture which shows >100k at time of discharge  · Ceftriaxone day #3, finish course with omnicef x4 more days    Ascites  Assessment & Plan  · Patient presented with worsening abdominal pain and distention    · Hx of previous heavy alcohol abuse and recent alcoholic hepatitis, currently undergoing treatment with prednisolone prior to hospitalization  · No documented history of cirrhosis but patient noted to have significant synthetic liver dysfunction, most likely there is an element of cirrhosis which is worsened by ongoing  alcoholic hepatitis  · GI input appreciated  · Hold on prednisolone until acute infection ruled out per GI  · May require diuretics  · Nutritional supplement with ensure  Results from last 7 days   Lab Units 03/23/22  0506 03/22/22  0936 03/22/22  0612 03/21/22  0736   ALT U/L 35  --    < > 50   AST U/L 51*  --    < > 51*   ALK PHOS U/L 151*  --    < > 196*   TOTAL BILIRUBIN mg/dL 2 06*  --    < > 2 90*   ALBUMIN g/dL 1 7*  --    < > 2 3*   INR  1 66* 1 57*  --  1 47*    < > = values in this interval not displayed  Alcohol abuse  Assessment & Plan  · Hx of heavy alcohol use previously  Reports last drink was over 20 days ago at time of admit  · Oriented to remain abstinent  No signs of withdrawal     Malignant neoplasm of overlapping sites of left breast in female, estrogen receptor positive ([facility] [address] )  Assessment & Plan  · Patient currently on Femara    History of DVT (deep vein thrombosis)  Assessment & Plan  · Hx of DVT in 12/2018 in the setting of malignancy  · Currently off full anticoagulation  · Monitor RUE closely due to IV infiltration, elevate and heat as needed  · Venous duplex 3/22/22:  negative      Medical Problems             Resolved Problems  Date Reviewed: 3/22/2022    None              Discharging Physician / Practitioner: Avtar Fletcher PA-C  PCP: Teresa Porter DC  Admission Date:   Admission Orders (From admission, onward)     Ordered        03/21/22 1033  Inpatient Admission  Once                      Discharge Date: 03/23/22    Consultations During Hospital Stay:  · IP CONSULT TO GASTROENTEROLOGY     Procedures Performed:   · Paracentesis 3/21/22     Significant Findings / Test Results:   VAS upper limb venous duplex scan, unilateral/limited   Final Result by Bryan Castro MD (03/22 6299)      IR INPATIENT Paracentesis   Final Result by Christos Mistry MD (03/21 1974)   Impression:      Successful diagnostic/therapeutic Ultrasound-guided paracentesis  CT abdomen pelvis with contrast   Final Result by Kermit Spencer MD (03/21 3168)      Moderate sized paraesophageal hiatal hernia  Moderate ascites  Gallbladder wall thickening likely on the basis of ascites  Severe right renal atrophy        Mild bladder wall thickening with minimal enhancement concerning for cystitis  XR chest 2 views   Final Result by Nely Apple MD (03/21 0900)      Large hiatal hernia      Increasing Left basilar opacity, raises concern for effusion, alternatively this could be due to atelectasis   No pulmonary congestion      Hypoinflated lungs        Incidental Findings:   · None      Test Results Pending at Discharge (will require follow up): · Final urine culture  · Final blood culture  · Final paracentesis cultures      Outpatient Tests Requested:  · GI   · PCP     Complications:  None     Reason for Admission: ascites, sepsis     Hospital Course:   Marcie Naqvi is a 62 y o  female patient who originally presented to the hospital on 3/21/2022 due to increased abdominal distension  Recently treated for alcoholic hepatitis and discharged on oral prednisolone course  Reports compliance along with alcohol abstinence  Patient was found to have significant abdominal ascites and underwent paracentesis  Treated for sepsis/UTI as well, noting that leukocytosis is not acutely new and likely exacerbated by steroid use  Patient hemodynamically stable, afebrile, and much more comfortable  She is stable for discharge home with outpatient follow up in the GI and primary offices  Strict EtOH cessation continued to be encouraged   and family member at the bedside  Please see above list of diagnoses and related plan for additional information  Condition at Discharge: fair    Discharge Day Visit / Exam:   Subjective:  Patient seen eating breakfast, feels great and is hopeful to go home  No chest pain, shortness of breath, abdominal pain/distension, or urinary discomfort  No fevers overnight      Vitals: Blood Pressure: 166/82 (03/23/22 0732)  Pulse: 95 (03/23/22 0732)  Temperature: 98 2 °F (36 8 °C) (03/23/22 0732)  Temp Source: Oral (03/22/22 2220)  Respirations: 18 (03/23/22 0732)  Height: 5' 5" (165 1 cm) (03/21/22 1555)  Weight - Scale: 72 6 kg (160 lb) (03/21/22 1555)  SpO2: 96 % (03/23/22 0732)  Exam:   Physical Exam  Vitals and nursing note reviewed  Constitutional:       General: She is not in acute distress  Appearance: She is obese  She is not toxic-appearing  Cardiovascular:      Rate and Rhythm: Normal rate and regular rhythm  Heart sounds: No murmur heard  Pulmonary:      Effort: Pulmonary effort is normal  No respiratory distress  Breath sounds: Normal breath sounds  Abdominal:      General: Bowel sounds are normal  There is no distension  Palpations: Abdomen is soft  Tenderness: There is no abdominal tenderness  Skin:     Coloration: Skin is not pale  Neurological:      Mental Status: She is alert and oriented to person, place, and time  Psychiatric:         Mood and Affect: Mood normal          Behavior: Behavior normal           Discussion with Family: Updated  () at bedside  Discharge instructions/Information to patient and family:   See after visit summary for information provided to patient and family  Provisions for Follow-Up Care:  See after visit summary for information related to follow-up care and any pertinent home health orders  Disposition:   Home with VNA Services (Reminder: Complete face to face encounter)    Planned Readmission: none     Discharge Statement:  I spent 35 minutes discharging the patient  This time was spent on the day of discharge  I had direct contact with the patient on the day of discharge  Greater than 50% of the total time was spent examining patient, answering all patient questions, arranging and discussing plan of care with patient as well as directly providing post-discharge instructions  Additional time then spent on discharge activities  Discharge Medications:  See after visit summary for reconciled discharge medications provided to patient and/or family        **Please Note: This note may have been constructed using a voice recognition system**

## 2022-03-22 NOTE — ASSESSMENT & PLAN NOTE
Background:  Patient presented back to the hospital 03/21/22 with abdominal pain and distention  Recently discharged 03/02/22 after being diagnosed with acute alcoholic hepatitis  At the time, discharged on prednisolone for DF > 32   · Service met on presentation as evidenced by leukocytosis 24,000, tachypnea, mild tachycardia  Possible sepsis based on abnormal urinalysis  · Status post paracentesis 3/21, not suggestive of SBP with WBC only 18  Follow-up cultures  · UA grossly abnormal, follow-up on culture    · CT abdomen/pelvis 03/21/22: Moderate sized paraesophageal hiatal hernia  Moderate ascites  Gallbladder wall thickening likely on the basis of ascites  Severe right renal atrophy  Mild bladder wall thickening with minimal enhancement concerning for cystitis    · Given the history of liver disease and concern with bacterial translocation would favor treating a sepsis for now, will continue ceftriaxone, follow blood cultures, follow urine culture  · Ceftriaxone day #2

## 2022-03-22 NOTE — PROGRESS NOTES
[address] [facility] Progress Note - Najma Yen 1960, 62 y o  female MRN: [id number]  Unit/Bed#: -01 Encounter: [id number]  Primary Care Provider: Ryan Robles DC   Date and time admitted to hospital: 3/21/2022  7:16 AM    * Sepsis [facility])  Assessment & Plan  Background:  Patient presented back to the hospital 03/21/22 with abdominal pain and distention  Recently discharged 03/02/22 after being diagnosed with acute alcoholic hepatitis  At the time, discharged on prednisolone for DF > 32   · Service met on presentation as evidenced by leukocytosis 24,000, tachypnea, mild tachycardia  Possible sepsis based on abnormal urinalysis  · Status post paracentesis 3/21, not suggestive of SBP with WBC only 18  Follow-up cultures  · UA grossly abnormal, follow-up on culture    · CT abdomen/pelvis 03/21/22: Moderate sized paraesophageal hiatal hernia  Moderate ascites  Gallbladder wall thickening likely on the basis of ascites  Severe right renal atrophy  Mild bladder wall thickening with minimal enhancement concerning for cystitis  · Given the history of liver disease and concern with bacterial translocation would favor treating a sepsis for now, will continue ceftriaxone, follow blood cultures, follow urine culture  · Ceftriaxone day #2      Ascites  Assessment & Plan  · Patient presented with worsening abdominal pain and distention    · Hx of previous heavy alcohol abuse and recent alcoholic hepatitis, currently undergoing treatment with prednisolone prior to hospitalization  · No documented history of cirrhosis but patient noted to have significant synthetic liver dysfunction, most likely there is an element of cirrhosis which is worsened by ongoing  alcoholic hepatitis  · GI input appreciated  · Hold on prednisolone until acute infection ruled out per GI  · May require diuretics  · Nutritional supplement with ensure  Results from last 7 days   Lab Units 03/22/22  0936 03/22/22  6366 03/21/22  0736 03/21/22  0736   ALT U/L  --  42   < > 50   AST U/L  --  50*   < > 51*   ALK PHOS U/L  --  181*   < > 196*   TOTAL BILIRUBIN mg/dL  --  2 21*   < > 2 90*   ALBUMIN g/dL  --  1 9*   < > 2 3*   INR  1 57*  --   --  1 47*    < > = values in this interval not displayed  Alcohol abuse  Assessment & Plan  · Hx of heavy alcohol use previously  Reports last drink was over 20 days ago  · Oriented to remain abstinent  No signs of withdrawal     Malignant neoplasm of overlapping sites of left breast in female, estrogen receptor positive ([facility] [address] )  Assessment & Plan  · Patient currently on Femara    History of DVT (deep vein thrombosis)  Assessment & Plan  · Hx of DVT in 12/2018 in the setting of malignancy  · Currently off full anticoagulation  · Monitor RUE closely due to IV infiltration, elevate and heat as needed  · If any worsening, consider venous duplex      VTE Pharmacologic Prophylaxis: VTE Score: 5 High Risk (Score >/= 5) - Pharmacological DVT Prophylaxis Ordered: enoxaparin (Lovenox)  Sequential Compression Devices Ordered  Patient Centered Rounds: I evaluated the patient without nursing staff present due to d/w nurse independent of exam  Discussions with Specialists or Other Care Team Provider: Gi note reviewed     Education and Discussions with Family / Patient: Updated  () at bedside  Time Spent for Care: 20 minutes  More than 50% of total time spent on counseling and coordination of care as described above  Current Length of Stay: 1 day(s)  Current Patient Status: Inpatient   Certification Statement: The patient will continue to require additional inpatient hospital stay due to pending cultures asnd stability of labs  Discharge Plan: Anticipate discharge in 24-48 hrs to home with home services  Code Status: Level 1 - Full Code    Subjective:   Patient seen sitting in bed with her family at the bedside  She is feeling "so much better"    Abdominal distension and pain is gone  Denies dysuria or urgency  Reports + BMs that are non-bloody  Denies nausea or vomiting  Tolerating diet, but she reports her diet at baseline is poor  Objective:     Vitals:   Temp (24hrs), Av 9 °F (36 6 °C), Min:97 8 °F (36 6 °C), Max:98 °F (36 7 °C)    Temp:  [97 8 °F (36 6 °C)-98 °F (36 7 °C)] 98 °F (36 7 °C)  HR:  [78-89] 86  Resp:  [17-18] 18  BP: (115-163)/(59-79) 163/79  SpO2:  [96 %-98 %] 98 %  Body mass index is 26 63 kg/m²  Input and Output Summary (last 24 hours): Intake/Output Summary (Last 24 hours) at 3/22/2022 1415  Last data filed at 3/21/2022 1800  Gross per 24 hour   Intake 540 ml   Output --   Net 540 ml       Physical Exam:   Physical Exam  Vitals and nursing note reviewed  Constitutional:       General: She is not in acute distress  Appearance: She is ill-appearing (chronically)  She is not toxic-appearing  Cardiovascular:      Rate and Rhythm: Normal rate and regular rhythm  Pulmonary:      Effort: Pulmonary effort is normal  No respiratory distress  Breath sounds: Normal breath sounds  No rales  Abdominal:      General: Bowel sounds are normal  There is no distension  Palpations: Abdomen is soft  Tenderness: There is no abdominal tenderness  Musculoskeletal:         General: Swelling (RUE > LUE) present  Right lower leg: No edema  Left lower leg: No edema  Skin:     Comments: RUE prior IV site infiltrate noted - no significant warm, but tender, erythematous    Neurological:      Mental Status: She is alert and oriented to person, place, and time     Psychiatric:         Mood and Affect: Mood normal          Behavior: Behavior normal            Additional Data:     Labs:  Results from last 7 days   Lab Units 22  0936   WBC Thousand/uL 24 94*   HEMOGLOBIN g/dL 12 3   HEMATOCRIT % 37 8   PLATELETS Thousands/uL 194   NEUTROS PCT % 89*   LYMPHS PCT % 5*   MONOS PCT % 3*   EOS PCT % 2     Results from last 7 days Lab Units 03/22/22  0612   SODIUM mmol/L 141   POTASSIUM mmol/L 3 7   CHLORIDE mmol/L 112*   CO2 mmol/L 19*   BUN mg/dL 19   CREATININE mg/dL 0 83   ANION GAP mmol/L 10   CALCIUM mg/dL 7 8*   ALBUMIN g/dL 1 9*   TOTAL BILIRUBIN mg/dL 2 21*   ALK PHOS U/L 181*   ALT U/L 42   AST U/L 50*   GLUCOSE RANDOM mg/dL 105     Results from last 7 days   Lab Units 03/22/22  0936   INR  1 57*             Results from last 7 days   Lab Units 03/21/22  0736   LACTIC ACID mmol/L 1 3   PROCALCITONIN ng/ml 0 88*       Lines/Drains:  Invasive Devices  Report    Peripheral Intravenous Line            Peripheral IV 03/22/22 Proximal;Right;Ventral (anterior) Forearm <1 day          Drain            Closed/Suction Drain Right RLQ Bulb 489 days                      Imaging: Reviewed radiology reports from this admission including: abdominal/pelvic CT    Recent Cultures (last 7 days):   Results from last 7 days   Lab Units 03/21/22  1125 03/21/22  0736   BLOOD CULTURE   --  No Growth at 24 hrs  No Growth at 24 hrs  GRAM STAIN RESULT  Rare Polys  No bacteria seen  --    BODY FLUID CULTURE, STERILE  No growth  --        Last 24 Hours Medication List:   Current Facility-Administered Medications   Medication Dose Route Frequency Provider Last Rate    cefTRIAXone  1,000 mg Intravenous Q24H Payal Reyes MD 1,000 mg (03/22/22 1003)    enoxaparin  40 mg Subcutaneous Daily Payal Reyes MD      folic acid  1 mg Oral Daily Payal Reyes MD      letrozole  2 5 mg Oral Daily Payal Reyes MD      ondansetron  4 mg Intravenous Q6H PRN Payal Reyes MD      pantoprazole  40 mg Oral Early Morning Payal Reyes MD      sertraline  100 mg Oral Daily Payal Reyes MD      SUMAtriptan  100 mg Oral Once PRN Payal Reyes MD          Today, Patient Was Seen By: Bear Bullard PA-C    **Please Note: This note may have been constructed using a voice recognition system  **

## 2022-03-22 NOTE — PLAN OF CARE
Problem: PAIN - ADULT  Goal: Verbalizes/displays adequate comfort level or baseline comfort level  Description: Interventions:  - Encourage patient to monitor pain and request assistance  - Assess pain using appropriate pain scale  - Administer analgesics based on type and severity of pain and evaluate response  - Implement non-pharmacological measures as appropriate and evaluate response  - Consider cultural and social influences on pain and pain management  - Notify physician/advanced practitioner if interventions unsuccessful or patient reports new pain  Outcome: Progressing     Problem: INFECTION - ADULT  Goal: Absence or prevention of progression during hospitalization  Description: INTERVENTIONS:  - Assess and monitor for signs and symptoms of infection  - Monitor lab/diagnostic results  - Monitor all insertion sites, i e  indwelling lines, tubes, and drains  - Monitor endotracheal if appropriate and nasal secretions for changes in amount and color  - [place] appropriate cooling/warming therapies per order  - Administer medications as ordered  - Instruct and encourage patient and family to use good hand hygiene technique  - Identify and instruct in appropriate isolation precautions for identified infection/condition  Outcome: Progressing  Goal: Absence of fever/infection during neutropenic period  Description: INTERVENTIONS:  - Monitor WBC    Outcome: Progressing     Problem: SAFETY ADULT  Goal: Patient will remain free of falls  Description: INTERVENTIONS:  - Educate patient/family on patient safety including physical limitations  - Instruct patient to call for assistance with activity   - Consult OT/PT to assist with strengthening/mobility   - Keep Call bell within reach  - Keep bed low and locked with side rails adjusted as appropriate  - Keep care items and personal belongings within reach  - Initiate and maintain comfort rounds  - Make Fall Risk Sign visible to staff  - Offer Toileting every 2 Hours, in advance of need  - Initiate/Maintain alarm  - Obtain necessary fall risk management equipment:   - Apply yellow socks and bracelet for high fall risk patients  - Consider moving patient to room near nurses station  Outcome: Progressing  Goal: Maintain or return to baseline ADL function  Description: INTERVENTIONS:  -  Assess patient's ability to carry out ADLs; assess patient's baseline for ADL function and identify physical deficits which impact ability to perform ADLs (bathing, care of mouth/teeth, toileting, grooming, dressing, etc )  - Assess/evaluate cause of self-care deficits   - Assess range of motion  - Assess patient's mobility; develop plan if impaired  - Assess patient's need for assistive devices and provide as appropriate  - Encourage maximum independence but intervene and supervise when necessary  - Involve family in performance of ADLs  - Assess for home care needs following discharge   - Consider OT consult to assist with ADL evaluation and planning for discharge  - Provide patient education as appropriate  Outcome: Progressing  Goal: Maintains/Returns to pre admission functional level  Description: INTERVENTIONS:  - Perform BMAT or MOVE assessment daily    - Set and communicate daily mobility goal to care team and patient/family/caregiver  - Collaborate with rehabilitation services on mobility goals if consulted  - Perform Range of Motion 2 times a day  - Reposition patient every 2 hours    - Dangle patient 2 times a day  - Stand patient 2 times a day  - Ambulate patient 2 times a day  - Out of bed to chair 2 times a day   - Out of bed for meals 2 times a day  - Out of bed for toileting  - Record patient progress and toleration of activity level   Outcome: Progressing     Problem: DISCHARGE PLANNING  Goal: Discharge to home or other facility with appropriate resources  Description: INTERVENTIONS:  - Identify barriers to discharge w/patient and caregiver  - Arrange for needed discharge resources and transportation as appropriate  - Identify discharge learning needs (meds, wound care, etc )  - Arrange for interpretive services to assist at discharge as needed  - Refer to Case Management Department for coordinating discharge planning if the patient needs post-hospital services based on physician/advanced practitioner order or complex needs related to functional status, cognitive ability, or social support system  Outcome: Progressing     Problem: Knowledge Deficit  Goal: Patient/family/caregiver demonstrates understanding of disease process, treatment plan, medications, and discharge instructions  Description: Complete learning assessment and assess knowledge base    Interventions:  - Provide teaching at level of understanding  - Provide teaching via preferred learning methods  Outcome: Progressing     Problem: Prexisting or High Potential for Compromised Skin Integrity  Goal: Skin integrity is maintained or improved  Description: INTERVENTIONS:  - Identify patients at risk for skin breakdown  - Assess and monitor skin integrity  - Assess and monitor nutrition and hydration status  - Monitor labs   - Assess for incontinence   - Turn and reposition patient  - Assist with mobility/ambulation  - Relieve pressure over bony prominences  - Avoid friction and shearing  - Provide appropriate hygiene as needed including keeping skin clean and dry  - Evaluate need for skin moisturizer/barrier cream  - Collaborate with interdisciplinary team   - Patient/family teaching  - Consider wound care consult   Outcome: Progressing     Problem: MOBILITY - ADULT  Goal: Maintain or return to baseline ADL function  Description: INTERVENTIONS:  -  Assess patient's ability to carry out ADLs; assess patient's baseline for ADL function and identify physical deficits which impact ability to perform ADLs (bathing, care of mouth/teeth, toileting, grooming, dressing, etc )  - Assess/evaluate cause of self-care deficits   - Assess range of motion  - Assess patient's mobility; develop plan if impaired  - Assess patient's need for assistive devices and provide as appropriate  - Encourage maximum independence but intervene and supervise when necessary  - Involve family in performance of ADLs  - Assess for home care needs following discharge   - Consider OT consult to assist with ADL evaluation and planning for discharge  - Provide patient education as appropriate  Outcome: Progressing  Goal: Maintains/Returns to pre admission functional level  Description: INTERVENTIONS:  - Perform BMAT or MOVE assessment daily    - Set and communicate daily mobility goal to care team and patient/family/caregiver  - Collaborate with rehabilitation services on mobility goals if consulted  - Perform Range of Motion 2 times a day  - Reposition patient every 2 hours    - Dangle patient 2 times a day  - Stand patient 2 times a day  - Ambulate patient 2 times a day  - Out of bed to chair 2 times a day   - Out of bed for meals 2 times a day  - Out of bed for toileting  - Record patient progress and toleration of activity level   Outcome: Progressing     Problem: Potential for Falls  Goal: Patient will remain free of falls  Description: INTERVENTIONS:  - Educate patient/family on patient safety including physical limitations  - Instruct patient to call for assistance with activity   - Consult OT/PT to assist with strengthening/mobility   - Keep Call bell within reach  - Keep bed low and locked with side rails adjusted as appropriate  - Keep care items and personal belongings within reach  - Initiate and maintain comfort rounds  - Make Fall Risk Sign visible to staff  - Offer Toileting every 2 Hours, in advance of need  - Initiate/Maintain alarm  - Obtain necessary fall risk management equipment:   - Apply yellow socks and bracelet for high fall risk patients  - Consider moving patient to room near nurses station  Outcome: Progressing

## 2022-03-22 NOTE — UTILIZATION REVIEW
Inpatient Admission Authorization Request   NOTIFICATION OF INPATIENT ADMISSION/INPATIENT AUTHORIZATION REQUEST   SERVICING FACILITY:   [address] [place] [address]  Tax ID: [id number]  NPI: [id number]  Place of Service: Inpatient [address] [facility]  [address]  Place of Service Code: 24     ATTENDING PROVIDER:  Attending Name and NPI#: Eva Gomes [[id number]]  Address: [address] [place] [address]  Phone: [phone]     UTILIZATION REVIEW CONTACT:  Verona Storey, Utilization   Network Utilization Review Department  Phone: [phone]  Fax [phone]  Email: [email or web address]     PHYSICIAN ADVISORY SERVICES:  FOR [id number] REVIEW - MEDICAL NECESSITY DENIAL  Phone: [phone]  Fax: [phone]  Email: [email or web address]     TYPE OF REQUEST:  Inpatient Status     ADMISSION INFORMATION:  ADMISSION DATE/TIME: 3/21/22 10:33 AM  PATIENT DIAGNOSIS CODE/DESCRIPTION:  Abdominal pain [R10 9]  SBP (spontaneous bacterial peritonitis) ([facility] [address] ) [K65 2]  DISCHARGE DATE/TIME: No discharge date for patient encounter  IMPORTANT INFORMATION:  Please contact the Verona Storey directly with any questions or concerns regarding this request  Department voicemails are confidential     Send requests for admission clinical reviews, concurrent reviews, approvals, and administrative denials due to lack of clinical to fax [phone]

## 2022-03-22 NOTE — OCCUPATIONAL THERAPY NOTE
Occupational Therapy Evaluation Note        Patient Name: Carley Naylor  CSKKN'N Date: 3/22/2022        03/22/22 2580   OT Last Visit   OT Visit Date 03/22/22   Note Type   Note type Evaluation   Restrictions/Precautions   Weight Bearing Precautions Per Order No   Braces or Orthoses Other (Comment)  (none per pt)   Other Precautions Contact/isolation; Chair Alarm; Bed Alarm;Limb alert; Fall Risk  (r/o cdiff)   Pain Assessment   Pain Assessment Tool 0-10   Pain Score No Pain   Home Living   Type of Home House   Home Layout Two level;Performs ADLs on one level; Able to live on main level with bedroom/bathroom; Other (Comment); Stairs to enter without rails  (Full bathroom on first-floor; 2 MARIJA)   Bathroom Shower/Tub Tub/shower unit   Bathroom Toilet Raised   Bathroom Equipment Shower chair   [facility]; Wheelchair-manual  (Patient reports primarily ambulatory with walker at baseline)   Prior Function   Level of [place] Independent with ADLs and functional mobility  (Requires assist with toilet txs at baseline)   Lives With Spouse  (Mom currently staying w/ pt until "the 19th")   Receives Help From Family;Home health  (Home OT and PT 2x/wk)   ADL Assistance Independent   IADLs Needs assistance   Falls in the last 6 months 1 to 4  (1 fall 2 weeks ago)   Vocational Retired   Lifestyle   Autonomy Per patient report, she lives with her spouse in a two-story home with 2 MARIJA and a first-floor setup  At baseline, patient reports that she is independent in ADLs and receives assistance for IADLs  Patient is primarily ambulatory with RW at baseline     Reciprocal Relationships Supportive spouse, works out of house until 9 AM   Service to Others 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[employer] ([address]) [address] [place]  6  Modified independent   [address] [place] [address] 5  [address] [address] Minimal Assistance   UB Dressing Assistance 5  Supervision/Setup   LB Dressing Assistance 5  Supervision/Setup   LB Dressing Deficit   (Pt doffed/donned socks while seated EOB)   Toileting Assistance  4  Minimal Assistance   Functional Assistance 4  Minimal Assistance   Additional Comments ADL assist levels based on pt's functional performance during OT evaluation   Bed Mobility   Supine to Sit 4  Minimal assistance   Additional items Assist x 1;HOB elevated; Increased time required;Verbal cues   Additional Comments Patient recevied lying supine in bed upon OT arrival; at end of session: pt seated OOB to recliner chair w/ all needs within reach, chair alarm activated, and pt set up for breakfast    Transfers   Sit to Stand 4  Minimal assistance  (CGA)   Additional items Assist x 1; Increased time required;Verbal cues   Stand to Sit 4  Minimal assistance  (CGA)   Additional items Assist x 1; Armrests; Increased time required;Verbal cues   Additional Comments Performed functional transfers using RW     Functional Mobility   Functional Mobility 4  Minimal assistance   Additional Comments CGA x1; 3-4 steps from EOB to recliner chair w/ no overt LOB or SOB   Additional items Rolling walker   Balance   Static Sitting Good   Dynamic Sitting Fair +   Static Standing Fair   Dynamic Standing Fair -   Ambulatory Fair -   Activity Tolerance   Activity Tolerance Patient limited by fatigue   Medical Staff Made Aware PT Jessica Yanez   Nurse Made Aware KIMBER Zaman confirmed pt appropriate for therapy and made aware of session outcomes   RUE Assessment   RUE Assessment X  (Decreased overhead movements, strength 4-/5 distally)   RUE Overall AROM   R Shoulder Flexion About 90 degrees shoulder flexion noted   LUE Assessment   LUE Assessment X  (Decreased overhead movements, strength 4-/5 distally)   LUE Overall AROM   L Shoulder Flexion About 90 degrees shoulder flexion noted   Hand Function   Gross Motor Coordination Impaired  (Impaired finger to nose test)   Fine Motor Coordination Functional   Sensation   Light Touch Partial deficits in the RUE;Partial deficits in the LUE   Additional Comments Patient reports neuropathy in bilateral hands and feet, "fingertips"   Vision-Basic Assessment   Current Vision Wears glasses only for reading   Cognition   Overall Cognitive Status [facility]   Arousal/Participation Alert; Responsive; Cooperative   Attention Within functional limits   Orientation Level Oriented to person;Oriented to place;Oriented to situation  (Oriented to month and year)   Memory Within functional limits   Following Commands Follows all commands and directions without difficulty   Comments Patient agreeable to OT evaluation   Assessment   Limitation Decreased ADL status; Decreased UE strength;Decreased endurance;Decreased sensation;Decreased self-care trans;Decreased high-level ADLs   Prognosis Good   Assessment Patient is a 62 y o  female seen for OT evaluation s/p admit to [address] [facility] on 3/21/2022 w/Sepsis [facility])  Commorbidities affecting patient's functional performance at time of assessment include: ascites, alcohol abuse, history of DVT, and malignant neoplasm of overlapping sites of left breast in female  Patient  has a past medical history of Acute DVT (deep venous thrombosis) ([facility] [address] ), Bladder infection, Congenital prolapsed rectum, History of biliary stent insertion, History of chemotherapy, History of radiation therapy (05/2018), History of transfusion, Hydronephrosis, Hypertension, Malignant neoplasm of overlapping sites of left female breast ([address] ) (05/01/2018), and Migraine  Orders placed for OT evaluation and treatment  Performed at least two patient identifiers during session including name and wristband  Prior to admission, patient was living with her spouse in a two-story home with 2 [facility] and a first-floor setup  At baseline, patient reports that she is independent in ADLs and receives assistance for IADLs   Personal factors affecting patient at time of initial evaluation include: steps to enter, difficulty performing ADLs and difficulty performing IADLs  Upon evaluation, patient requires supervision, set up and minimal assist for UB ADLs, supervision, set up and minimal assist for LB ADLs, transfers and functional ambulation in room with contact guard assist, with the use of [address]  Patient is alert, oriented to name,, oriented to place, and oriented to situation, oriented to month and year  Occupational performance is affected by the following deficits: decreased muscle strength, impaired gross motor coordination, dynamic sit/ stand balance deficit with poor standing tolerance time for self care and functional mobility, decreased activity tolerance and impaired sensation  Patient to benefit from continued Occupational Therapy treatment while in the hospital to address deficits as defined above and maximize level of functional independence with ADLs and functional mobility  Occupational Performance areas to address include: bathing/ shower, dressing, toilet hygiene, transfer to all surfaces, functional mobility, health maintenance, IADLs: safety procedures and Leisure Participation  From OT standpoint, recommendation at time of d/c would be Home with family support and Home OT  Goals   Patient Goals "to walk without the walker"   Plan   Treatment Interventions ADL retraining;Functional transfer training;UE strengthening/ROM; Endurance training;Patient/family training; Compensatory technique education;Continued evaluation; Energy conservation; Activityengagement   Goal Expiration Date 04/01/22   OT Treatment Day 0   OT Frequency 2-3x/wk   Recommendation   OT Discharge Recommendation Home with home health rehabilitation   Equipment Recommended Bedside commode   Commode Type Standard   Additional Comments  The patient's raw score on the AM-PAC Daily Activity inpatient short form is 19, standardized score is 40 22, greater than 39 4  Patients at this level are likely to benefit from discharge to home  Please refer to the recommendation of the Occupational Therapist for safe discharge planning  AM-PAC Daily Activity Inpatient   Lower Body Dressing 3   Bathing 3   Toileting 3   Upper Body Dressing 3   Grooming 3   Eating 4   Daily Activity Raw Score 19   Daily Activity Standardized Score (Calc for Raw Score >=11) 40 22   AM-PAC Applied Cognition Inpatient   Following a Speech/Presentation 4   Understanding Ordinary Conversation 4   Taking Medications 3   Remembering Where Things Are Placed or Put Away 4   Remembering List of 4-5 Errands 4   Taking Care of Complicated Tasks 3   Applied Cognition Raw Score 22   Applied Cognition Standardized Score 47 83   Barthel Index   Feeding 10   Bathing 0   Grooming Score 5   Dressing Score 5   Bladder Score 10   Bowels Score 10   Toilet Use Score 5   Transfers (Bed/Chair) Score 10   Mobility (Level Surface) Score 0   Stairs Score 0   Barthel Index Score 55   Modified [place] Scale   Modified [place] Scale 3     Occupational Therapy Goals to be completed in 7-10 Days:    1 - Patient will verbalize and demonstrate use of energy conservation/ deep breathing technique and work simplification skills during functional activity with no verbal cues  2 - Patient will verbalize and demonstrate good body mechanics and joint protection techniques during  ADLs/ IADLs with no verbal cues  3 - Patient will increase OOB/ sitting tolerance to 2-4 hours per day for increased participation in self care and leisure tasks with no s/s of exertion  4 - Patient will increase standing tolerance time to 10 minutes with unilateral UE support to complete sink level ADLs@ mod I level  5 - Patient will increase sitting tolerance at edge of bed to 30 minutes to complete UB ADLs @ set up assist level  6 - Patient will transfer bed to Chair / toilet at Set up assist level with AD as indicated       7 - Patient will complete UB ADLs with Mod I assist      8 - Patient will complete LB ADLs with supervision/setup assist while seated  9 - Patient will complete toileting hygiene with set up assist/ supervision for thoroughness  8 - Patient/ Family will demonstrate competency with UE Home Exercise Program      11- Pt will attend to continued cognitive assessment 100% of the time in order to provide most appropriate recommendations for d/c plans      Danielle Oropeza, OTR/L

## 2022-03-22 NOTE — ASSESSMENT & PLAN NOTE
· Patient presented with worsening abdominal pain and distention  · Hx of previous heavy alcohol abuse and recent alcoholic hepatitis, currently undergoing treatment with prednisolone prior to hospitalization  · No documented history of cirrhosis but patient noted to have significant synthetic liver dysfunction, most likely there is an element of cirrhosis which is worsened by ongoing  alcoholic hepatitis  · GI input appreciated  · Hold on prednisolone until acute infection ruled out per GI  · May require diuretics  · Nutritional supplement with ensure  Results from last 7 days   Lab Units 03/23/22  0506 03/22/22  0936 03/22/22  0612 03/21/22  0736   ALT U/L 35  --    < > 50   AST U/L 51*  --    < > 51*   ALK PHOS U/L 151*  --    < > 196*   TOTAL BILIRUBIN mg/dL 2 06*  --    < > 2 90*   ALBUMIN g/dL 1 7*  --    < > 2 3*   INR  1 66* 1 57*  --  1 47*    < > = values in this interval not displayed

## 2022-03-22 NOTE — PLAN OF CARE
Problem: PHYSICAL THERAPY ADULT  Goal: Performs mobility at highest level of function for planned discharge setting  See evaluation for individualized goals  Description: Treatment/Interventions: Functional transfer training,LE strengthening/ROM,Therapeutic exercise,Endurance training,Patient/family training,Equipment eval/education,Bed mobility,Gait training,Elevations,Spoke to nursing  Equipment Recommended: Obie Castleman (RW)       See flowsheet documentation for full assessment, interventions and recommendations  3/22/2022 1207 by Nedra Marino PT  Note: Prognosis: Good  Problem List: Decreased strength,Decreased endurance,Impaired balance,Decreased mobility  Assessment: Pt is 62 y o  female seen for moderate-complexity PT evaluation on 3/22/2022 s/p admit to [facility] on 3/21/2022 w/ Sepsis ([facility] [address] )  PT was consulted to assess pt's functional mobility and d/c needs  Order placed for PT eval and tx, w/ up and OOB as tolerated order  PTA, pt resides with  in [facility] with 2 MARIJA, ambulates with walker at baseline, 1st floor set up  At time of eval, pt requiring min A for bed mobility, CGA for transfers and level surface gait trial  Upon evaluation, pt presenting with impaired functional mobility d/t decreased strength, decreased endurance, impaired balance, decreased mobility and activity intolerance  Pertinent PMHx and current co-morbidities affecting pt's physical performance at time of assessment include: sepsis, malignant neoplasm, h/o DVT, alcohol abuse, ascites, immunocompromised, recurrent syncope, alcoholic hepatitis  Personal factors affecting pt at time of eval include: ambulating w/ assistive device, stairs to enter home, inability to navigate community distances and positive fall history  The following objective measures performed on IE also reveal limitations: Barthel Index: 55/100, Modified [place]: 3 (moderate disability) and AM-PAC 6-Clicks: 31/27   Pt's clinical presentation is currently unstable/unpredictable seen in pt's presentation of abnormal lab value(s), need for input for task focus and mobility technique and ongoing medical assessment  Overall, pt's rehab potential and prognosis to return to PLOF is good as impacted by objective findings, warranting pt to receive further skilled PT interventions to address identified impairments, activity limitation(s), and participation restriction(s)  Pt to benefit from continued PT tx to address deficits as defined above and maximize level of functional independent mobility and consistency  From PT/mobility standpoint, recommendation at time of d/c would be home with home health rehabilitation pending progress in order to facilitate return to PLOF  Barriers to Discharge: Inaccessible home environment        PT Discharge Recommendation: Home with home health rehabilitation          See flowsheet documentation for full assessment  3/22/2022 1207 by Ruthie Degroot PT  Note: Prognosis: Good  Problem List: Decreased strength,Decreased endurance,Impaired balance,Decreased mobility  Assessment: Pt is 62 y o  female seen for moderate-complexity PT evaluation on 3/22/2022 s/p admit to [facility] on 3/21/2022 w/ Sepsis ([address] )  PT was consulted to assess pt's functional mobility and d/c needs  Order placed for PT eval and tx, w/ up and OOB as tolerated order  PTA, pt resides with  in [facility] with 2 MARIJA, ambulates with walker at baseline, 1st floor set up  At time of eval, pt requiring min A for bed mobility, CGA for transfers and level surface gait trial  Upon evaluation, pt presenting with impaired functional mobility d/t decreased strength, decreased endurance, impaired balance, decreased mobility and activity intolerance  Pertinent PMHx and current co-morbidities affecting pt's physical performance at time of assessment include: sepsis, malignant neoplasm, h/o DVT, alcohol abuse, ascites, immunocompromised, recurrent syncope, alcoholic hepatitis  Personal factors affecting pt at time of eval include: ambulating w/ assistive device, stairs to enter home, inability to navigate community distances and positive fall history  The following objective measures performed on IE also reveal limitations: Barthel Index: 55/100, Modified [place]: 3 (moderate disability) and AM-PAC 6-Clicks: 70/31  Pt's clinical presentation is currently unstable/unpredictable seen in pt's presentation of abnormal lab value(s), need for input for task focus and mobility technique and ongoing medical assessment  Overall, pt's rehab potential and prognosis to return to PLOF is good as impacted by objective findings, warranting pt to receive further skilled PT interventions to address identified impairments, activity limitation(s), and participation restriction(s)  Pt to benefit from continued PT tx to address deficits as defined above and maximize level of functional independent mobility and consistency  From PT/mobility standpoint, recommendation at time of d/c would be home with home health rehabilitation pending progress in order to facilitate return to PLOF  Barriers to Discharge: Inaccessible home environment        PT Discharge Recommendation: Home with home health rehabilitation          See flowsheet documentation for full assessment

## 2022-03-22 NOTE — ASSESSMENT & PLAN NOTE
· Hx of heavy alcohol use previously  Reports last drink was over 20 days ago  · Oriented to remain abstinent    No signs of withdrawal

## 2022-03-22 NOTE — ASSESSMENT & PLAN NOTE
· Hx of DVT in 12/2018 in the setting of malignancy     · Currently off full anticoagulation  · Monitor RUE closely due to IV infiltration, elevate and heat as needed  · Venous duplex 3/22/22:  negative

## 2022-03-22 NOTE — ASSESSMENT & PLAN NOTE
· Hx of DVT in 12/2018 in the setting of malignancy     · Currently off full anticoagulation  · Monitor RUE closely due to IV infiltration, elevate and heat as needed  · If any worsening, consider venous duplex

## 2022-03-22 NOTE — ASSESSMENT & PLAN NOTE
· Hx of heavy alcohol use previously  Reports last drink was over 20 days ago at time of admit  · Oriented to remain abstinent    No signs of withdrawal

## 2022-03-22 NOTE — ASSESSMENT & PLAN NOTE
· Patient presented with worsening abdominal pain and distention  · Hx of previous heavy alcohol abuse and recent alcoholic hepatitis, currently undergoing treatment with prednisolone prior to hospitalization  · No documented history of cirrhosis but patient noted to have significant synthetic liver dysfunction, most likely there is an element of cirrhosis which is worsened by ongoing  alcoholic hepatitis  · GI input appreciated  · Hold on prednisolone until acute infection ruled out per GI  · May require diuretics  · Nutritional supplement with ensure  Results from last 7 days   Lab Units 03/22/22  0936 03/22/22  0612 03/21/22  0736 03/21/22  0736   ALT U/L  --  42   < > 50   AST U/L  --  50*   < > 51*   ALK PHOS U/L  --  181*   < > 196*   TOTAL BILIRUBIN mg/dL  --  2 21*   < > 2 90*   ALBUMIN g/dL  --  1 9*   < > 2 3*   INR  1 57*  --   --  1 47*    < > = values in this interval not displayed

## 2022-03-22 NOTE — PLAN OF CARE
Problem: OCCUPATIONAL THERAPY ADULT  Goal: Performs self-care activities at highest level of function for planned discharge setting  See evaluation for individualized goals  Description: Treatment Interventions: ADL retraining,Functional transfer training,UE strengthening/ROM,Endurance training,Patient/family training,Compensatory technique education,Continued evaluation,Energy conservation,Activityengagement  Equipment Recommended: Bedside commode       See flowsheet documentation for full assessment, interventions and recommendations  Note: Limitation: Decreased ADL status,Decreased UE strength,Decreased endurance,Decreased sensation,Decreased self-care trans,Decreased high-level ADLs  Prognosis: Good  Assessment: Patient is a 62 y o  female seen for OT evaluation s/p admit to [address] on 3/21/2022 w/Sepsis [facility])  Commorbidities affecting patient's functional performance at time of assessment include: ascites, alcohol abuse, history of DVT, and malignant neoplasm of overlapping sites of left breast in female  Patient  has a past medical history of Acute DVT (deep venous thrombosis) ([facility] [address] ), Bladder infection, Congenital prolapsed rectum, History of biliary stent insertion, History of chemotherapy, History of radiation therapy (05/2018), History of transfusion, Hydronephrosis, Hypertension, Malignant neoplasm of overlapping sites of left female breast ([facility] [address] ) (05/01/2018), and Migraine  Orders placed for OT evaluation and treatment  Performed at least two patient identifiers during session including name and wristband  Prior to admission, patient was living with her spouse in a two-story home with 2 MARIJA and a first-floor setup  At baseline, patient reports that she is independent in ADLs and receives assistance for IADLs  Personal factors affecting patient at time of initial evaluation include: steps to enter, difficulty performing ADLs and difficulty performing IADLs   Upon evaluation, patient requires supervision, set up and minimal assist for UB ADLs, supervision, set up and minimal assist for LB ADLs, transfers and functional ambulation in room with contact guard assist, with the use of [address] [facility]  Patient is alert, oriented to name,, oriented to place, and oriented to situation, oriented to month and year  Occupational performance is affected by the following deficits: decreased muscle strength, impaired gross motor coordination, dynamic sit/ stand balance deficit with poor standing tolerance time for self care and functional mobility, decreased activity tolerance and impaired sensation  Patient to benefit from continued Occupational Therapy treatment while in the hospital to address deficits as defined above and maximize level of functional independence with ADLs and functional mobility  Occupational Performance areas to address include: bathing/ shower, dressing, toilet hygiene, transfer to all surfaces, functional mobility, health maintenance, IADLs: safety procedures and Leisure Participation  From OT standpoint, recommendation at time of d/c would be Home with family support and Home OT         OT Discharge Recommendation: Home with home health rehabilitation

## 2022-03-23 VITALS
SYSTOLIC BLOOD PRESSURE: 128 MMHG | RESPIRATION RATE: 20 BRPM | TEMPERATURE: 97.9 F | DIASTOLIC BLOOD PRESSURE: 82 MMHG | HEIGHT: 65 IN | WEIGHT: 160 LBS | HEART RATE: 118 BPM | OXYGEN SATURATION: 97 % | BODY MASS INDEX: 26.66 KG/M2

## 2022-03-23 LAB
ALBUMIN SERPL BCP-MCNC: 1.7 G/DL (ref 3.5–5)
ALP SERPL-CCNC: 151 U/L (ref 46–116)
ALT SERPL W P-5'-P-CCNC: 35 U/L (ref 12–78)
ANION GAP SERPL CALCULATED.3IONS-SCNC: 9 MMOL/L (ref 4–13)
AST SERPL W P-5'-P-CCNC: 51 U/L (ref 5–45)
BACTERIA UR CULT: ABNORMAL
BACTERIA UR CULT: ABNORMAL
BASOPHILS # BLD AUTO: 0.03 THOUSANDS/ΜL (ref 0–0.1)
BASOPHILS NFR BLD AUTO: 0 % (ref 0–1)
BILIRUB SERPL-MCNC: 2.06 MG/DL (ref 0.2–1)
BUN SERPL-MCNC: 19 MG/DL (ref 5–25)
CALCIUM ALBUM COR SERPL-MCNC: 9.4 MG/DL (ref 8.3–10.1)
CALCIUM SERPL-MCNC: 7.6 MG/DL (ref 8.3–10.1)
CHLORIDE SERPL-SCNC: 113 MMOL/L (ref 100–108)
CO2 SERPL-SCNC: 21 MMOL/L (ref 21–32)
CREAT SERPL-MCNC: 0.84 MG/DL (ref 0.6–1.3)
EOSINOPHIL # BLD AUTO: 0.68 THOUSAND/ΜL (ref 0–0.61)
EOSINOPHIL NFR BLD AUTO: 3 % (ref 0–6)
ERYTHROCYTE [DISTWIDTH] IN BLOOD BY AUTOMATED COUNT: 17.5 % (ref 11.6–15.1)
GFR SERPL CREATININE-BSD FRML MDRD: 75 ML/MIN/1.73SQ M
GLUCOSE SERPL-MCNC: 88 MG/DL (ref 65–140)
HCT VFR BLD AUTO: 30.6 % (ref 34.8–46.1)
HGB BLD-MCNC: 10.3 G/DL (ref 11.5–15.4)
IMM GRANULOCYTES # BLD AUTO: 0.2 THOUSAND/UL (ref 0–0.2)
IMM GRANULOCYTES NFR BLD AUTO: 1 % (ref 0–2)
INR PPP: 1.66 (ref 0.84–1.19)
LYMPHOCYTES # BLD AUTO: 1.2 THOUSANDS/ΜL (ref 0.6–4.47)
LYMPHOCYTES NFR BLD AUTO: 6 % (ref 14–44)
MCH RBC QN AUTO: 34.7 PG (ref 26.8–34.3)
MCHC RBC AUTO-ENTMCNC: 33.7 G/DL (ref 31.4–37.4)
MCV RBC AUTO: 103 FL (ref 82–98)
MONOCYTES # BLD AUTO: 0.86 THOUSAND/ΜL (ref 0.17–1.22)
MONOCYTES NFR BLD AUTO: 4 % (ref 4–12)
NEUTROPHILS # BLD AUTO: 17.14 THOUSANDS/ΜL (ref 1.85–7.62)
NEUTS SEG NFR BLD AUTO: 86 % (ref 43–75)
NRBC BLD AUTO-RTO: 0 /100 WBCS
PLATELET # BLD AUTO: 149 THOUSANDS/UL (ref 149–390)
PMV BLD AUTO: 11 FL (ref 8.9–12.7)
POTASSIUM SERPL-SCNC: 3.4 MMOL/L (ref 3.5–5.3)
PROT SERPL-MCNC: 4.8 G/DL (ref 6.4–8.2)
PROTHROMBIN TIME: 18.8 SECONDS (ref 11.6–14.5)
RBC # BLD AUTO: 2.97 MILLION/UL (ref 3.81–5.12)
SODIUM SERPL-SCNC: 143 MMOL/L (ref 136–145)
WBC # BLD AUTO: 20.11 THOUSAND/UL (ref 4.31–10.16)

## 2022-03-23 PROCEDURE — 80053 COMPREHEN METABOLIC PANEL: CPT | Performed by: PHYSICIAN ASSISTANT

## 2022-03-23 PROCEDURE — 99239 HOSP IP/OBS DSCHRG MGMT >30: CPT | Performed by: PHYSICIAN ASSISTANT

## 2022-03-23 PROCEDURE — 97116 GAIT TRAINING THERAPY: CPT

## 2022-03-23 PROCEDURE — 97110 THERAPEUTIC EXERCISES: CPT

## 2022-03-23 PROCEDURE — 85610 PROTHROMBIN TIME: CPT | Performed by: PHYSICIAN ASSISTANT

## 2022-03-23 PROCEDURE — 97530 THERAPEUTIC ACTIVITIES: CPT

## 2022-03-23 PROCEDURE — 85025 COMPLETE CBC W/AUTO DIFF WBC: CPT | Performed by: PHYSICIAN ASSISTANT

## 2022-03-23 PROCEDURE — 99232 SBSQ HOSP IP/OBS MODERATE 35: CPT | Performed by: PHYSICIAN ASSISTANT

## 2022-03-23 RX ORDER — CEFDINIR 300 MG/1
300 CAPSULE ORAL EVERY 12 HOURS SCHEDULED
Status: DISCONTINUED | OUTPATIENT
Start: 2022-03-23 | End: 2022-03-23 | Stop reason: HOSPADM

## 2022-03-23 RX ORDER — PREDNISOLONE SODIUM PHOSPHATE 10 MG/1
TABLET, ORALLY DISINTEGRATING ORAL
Qty: 28 TABLET | Refills: 0 | Status: SHIPPED | OUTPATIENT
Start: 2022-03-23 | End: 2022-04-05

## 2022-03-23 RX ORDER — TORSEMIDE 20 MG/1
20 TABLET ORAL DAILY
Qty: 30 TABLET | Refills: 0 | Status: SHIPPED | OUTPATIENT
Start: 2022-03-23

## 2022-03-23 RX ORDER — PREDNISONE 20 MG/1
40 TABLET ORAL DAILY
Status: DISCONTINUED | OUTPATIENT
Start: 2022-03-23 | End: 2022-03-23 | Stop reason: HOSPADM

## 2022-03-23 RX ORDER — POTASSIUM CHLORIDE 750 MG/1
20 CAPSULE, EXTENDED RELEASE ORAL DAILY
Qty: 60 CAPSULE | Refills: 0 | Status: SHIPPED | OUTPATIENT
Start: 2022-03-23

## 2022-03-23 RX ORDER — CEFDINIR 300 MG/1
300 CAPSULE ORAL EVERY 12 HOURS SCHEDULED
Qty: 8 CAPSULE | Refills: 0 | Status: SHIPPED | OUTPATIENT
Start: 2022-03-23 | End: 2022-03-27

## 2022-03-23 RX ADMIN — LETROZOLE 2.5 MG: 2.5 TABLET, FILM COATED ORAL at 10:12

## 2022-03-23 RX ADMIN — ENOXAPARIN SODIUM 40 MG: 40 INJECTION SUBCUTANEOUS at 10:13

## 2022-03-23 RX ADMIN — PANTOPRAZOLE SODIUM 40 MG: 40 TABLET, DELAYED RELEASE ORAL at 05:06

## 2022-03-23 RX ADMIN — PREDNISONE 40 MG: 20 TABLET ORAL at 13:47

## 2022-03-23 RX ADMIN — OXYCODONE HYDROCHLORIDE 5 MG: 5 TABLET ORAL at 14:53

## 2022-03-23 RX ADMIN — FOLIC ACID 1 MG: 1 TABLET ORAL at 10:13

## 2022-03-23 RX ADMIN — CEFDINIR 300 MG: 300 CAPSULE ORAL at 12:05

## 2022-03-23 RX ADMIN — SERTRALINE HYDROCHLORIDE 100 MG: 50 TABLET ORAL at 10:13

## 2022-03-23 NOTE — PHYSICAL THERAPY NOTE
PHYSICAL THERAPY TREATMENT NOTE  NAME:  Maxine Nichole  DATE: 03/23/22    Length Of Stay: 2  Performed at least 2 patient identifiers during session: Name and ID bracelet    TREATMENT FLOWSHEET:    03/23/22 1119   PT Last Visit   PT Visit Date 03/23/22   Note Type   Note Type Treatment   Pain Assessment   Pain Assessment Tool 0-10   Pain Score 5   Pain Location/Orientation Orientation: Right;Location: Arm   Pain Onset/Description Onset: Ongoing   Patient's Stated Pain Goal No pain   Hospital Pain Intervention(s) Repositioned  (Reported to wound care nurse)   Restrictions/Precautions   Weight Bearing Precautions Per Order No   Other Precautions Contact/isolation;Limb alert; Fall Risk; Chair Alarm; Bed Alarm   General   Chart Reviewed Yes   Response to Previous Treatment Patient with no complaints from previous session  Family/Caregiver Present No   Cognition   Overall Cognitive Status WFL   Arousal/Participation Alert; Responsive   Attention Within functional limits   Orientation Level Oriented X4   Memory Within functional limits   Following Commands Follows all commands and directions without difficulty   Subjective   Subjective "I am going home today "   Bed Mobility   Rolling R 4  Minimal assistance   Additional items Assist x 1;HOB elevated; Bedrails; Increased time required;Verbal cues   Rolling L 4  Minimal assistance   Additional items Assist x 1;HOB elevated; Bedrails; Increased time required;Verbal cues   Supine to Sit 4  Minimal assistance   Additional items Assist x 1;HOB elevated; Bedrails; Increased time required;Verbal cues   Sit to Supine 5  Supervision   Additional items Assist x 1;HOB elevated; Bedrails; Increased time required;Verbal cues   Additional Comments Pt  was able to maintain sitting balance at EOB supported by LUE on mattress x2 mins without LOB  Transfers   Sit to Stand 4  Minimal assistance   Additional items Assist x 1;Bedrails; Increased time required;Verbal cues  (bed elevated, pt  was ablet to transfer from recliner w/ CGA)   Stand to Sit 4  Minimal assistance   Additional items Assist x 1; Increased time required; Impulsive;Verbal cues  (Cued to reach back kemar arms of chair but did not)   Stand pivot 5  Supervision   Additional items Assist x 1; Increased time required;Armrests; Impulsive  (after tx alarm sounded and tp  moving self back into bed)   Additional Comments Pt  educated to use call bell for assitance with mobility in room  Ambulation/Elevation   Gait pattern Improper Weight shift;Decreased foot clearance; Forward Flexion; Excessively slow; Short stride;Decreased heel strike;Decreased toe off   Gait Assistance   (CG)   Additional items Assist x 1;Verbal cues   Assistive Device Rolling walker   Distance 10 ft x2 and 40 ft   Balance   Static Sitting Fair +   Dynamic Sitting Fair +   Static Standing Fair   Dynamic Standing Fair -   Ambulatory Fair -   Endurance Deficit   Endurance Deficit Yes   Activity Tolerance   Activity Tolerance Patient limited by fatigue   Nurse Made Aware Wound care RN present and made aware   Exercises   Quad Sets Sitting;15 reps;AROM; Bilateral   Heelslides Sitting;15 reps;AROM; Bilateral   Glute Sets Sitting;15 reps;AROM; Bilateral   Hip Flexion Sitting;15 reps;AROM; Bilateral   Hip Abduction Sitting;15 reps;AROM; Bilateral   Hip Adduction Sitting;15 reps;AROM; Bilateral   Knee AROM Long Arc Quad Sitting;15 reps;AROM; Bilateral   Ankle Pumps Sitting;15 reps;AROM; Bilateral   Marching Sitting;15 reps;AROM; Bilateral   Assessment   Prognosis Good   Problem List Decreased strength;Decreased endurance; Impaired balance;Decreased mobility; Decreased safety awareness   Goals   Patient Goals to go home   PT Treatment Day 1   Plan   Treatment/Interventions Functional transfer training;LE strengthening/ROM; Therapeutic exercise; Endurance training;Patient/family training;Equipment eval/education; Bed mobility;Gait training;Spoke to nursing   Progress Progressing toward goals   PT Frequency 3-5x/wk   Recommendation   PT Discharge Recommendation Home with home health rehabilitation   [address] Mobility Inpatient   Turning in Bed Without Bedrails 3   Lying on Back to Sitting on Edge of Flat Bed 3   Moving Bed to Chair 3   Standing Up From Chair 3   Walk in Room 3   Climb 3-5 Stairs 2   Basic Mobility Inpatient Raw Score 17   Basic Mobility Standardized Score 39 67   Highest Level Of Mobility   [facility] Goal 5: Stand one or more mins       The patient's AM-PAC Basic Mobility Inpatient Short Form Raw Score is 17, Standardized Score is 39 67  A standardized score less than 42 9 suggests the patient may benefit from discharge to post-acute rehabilitation services  Please also refer to the recommendation of the Physical Therapist for safe discharge planning  Pt seen for PT treatment session this date with interventions consisting of bed mobility tasks, transfer training, seated TE, gait training, and education provided as needed for safety and direction to help improve functional mobility and activity tolerance  Pt agreeable to PT treatment session upon arrival, pt found resting in bed  At end of session, pt left sitting OOB in recliner with chair alarm activated and all needs in reach, but upon exiting room patients chair alarm began sounding and patient was found transferring self back into bed  Pt  Was assisted safely and positioned for comfort with bed alarm activated and all needs in reach with education provided for use of call bell for assitance   In comparison to previous session, pt with improvements in ambulation distance  Continue to recommend Home PT with family support at time of d/c in order to maximize pt's functional independence and safety w/ mobility  Pt continues to be functioning below baseline level  PT will continue to see pt while here in order to address the deficits listed above and provide interventions consistent w/ POC in effort to achieve STGs      Riccardo Lei PTA

## 2022-03-23 NOTE — WOUND OSTOMY CARE
Progress Note - Wound   Zuleyka Mccurdy 62 y o  female MRN: [id number]  Unit/Bed#: -01 Encounter: [id number]      Assessment:  Wound care consulted for assessment of skin tears to RUE  Patient admitted with sepsis  History of - alcohol abuse, DVT, breast cancer, alcoholic hepatitis, and HTN  Patient seen Marleen Flores in recliner chair with therapy  EHOB waffle cushion noted to the recliner chair  Patient is alert and oriented x 4, cooperative and agreeable for the assessment  Continent of bowel and bladder  Minimal assist of one with standing for the assessment using a rolling walker  1  L buttock is intact with area of healed blanchable scarring  Skin to the sacrum/buttocks is otherwise intact with blanchable pink skin  No maceration in the sacral intergluteal crease  2  R posterior distal forearm - skin tears  2 irregular shaped partial thickness skin tears measured together  Patient reports bumping her arm  No skin flaps to approximate  Wound beds are 100% moist pink/red tissue  Edges fragile and attached, no maceration, dusky skin noted to the skin flaps  Tracy-wounds are intact with ecchymosis  3  R anterior upper arm brachial area - skin tear  Per nursing and SLIM this is the site of infiltrate from CT scan dye  The skin tear that present is irregular shaped, partial thickness, 100% moist pale pink colored tissue  No skin flap to approximate  Edges fragile and attached, no maceration, small amount of dusky skin flap tissue  Tracy-wound is intact -- the skin distal to the skin tear is intact with discoloration that is not painful or indurated  No warmth    -will need to monitor the discolored skin area closely to determine if any other intervention is needed  -will recommend xeroform gauze and dry dressings to the skin tears    Patient to be d/c home with [facility]NA per slim  Also recommend wound care center follow-up as well         Educated patient on the importance of frequent offloading of pressure via turning, repositioning and weight-shifting  Verbalized understanding of plan of care  No induration, fluctuance, odor, warmth/temperature differences, redness, or purulence noted to the above noted wounds and skin areas assessed  New dressings applied  Patient tolerated well- denies pain and no s/s of non-verbal pain or discomfort observed during the encounter  Primary nurse aware of plan of care  See flow sheets for more detailed assessment findings  Will follow along  Discussed assessment findings, and plan of care/recommendations with SLIM AP  Plan:   1  Apply hydraguard to b/l heels, buttocks and sacrum BID and PRN for prevention and protection  2  Apply skin nourishing cream the entire skin daily for moisture  3  Turn and reposition patient every  2 hours   4  Elevate heels off of bed with pillows to offload pressure   5  Apply EHOB waffle cushion to chair when OOB, if able  6  Cleanse R anterior upper arm and R posterior lower arm skin tears with NSS, pat dry, and apply xeroform gauze to the wounds  Cover with ABD pads  Secure the dressings with margoth wrap  Change every other day and as needed for soilage/dislodgement  7  Follow-up with the [address] [place] [address] wound care center as an outpatient - please call [phone] for an appointment  8  Wound care will follow along with patient weekly, please call or tiger text with questions and concerns    Objective:    Vitals: Blood pressure 166/82, pulse 95, temperature 98 2 °F (36 8 °C), resp  rate 18, height 5' 5" (1 651 m), weight 72 6 kg (160 lb), SpO2 96 %, not currently breastfeeding  ,Body mass index is 26 63 kg/m²  Wound 03/21/22 Skin Tear Arm Upper; Anterior (Active)   Wound Image   03/23/22 1048   Wound Description Pale;Pink 03/23/22 1048   Tracy-wound Assessment Dry; Intact;Fragile 03/23/22 1048   Wound Length (cm) 0 6 cm 03/23/22 1048   Wound Width (cm) 3 cm 03/23/22 1048   Wound Depth (cm) 0 1 cm 03/23/22 1048   Wound Surface Area (cm^2) 1 8 cm^2 03/23/22 1048   Wound Volume (cm^3) 0 18 cm^3 03/23/22 1048   Calculated Wound Volume (cm^3) 0 18 cm^3 03/23/22 1048   Tunneling 0 cm 03/23/22 1048   Tunneling in depth located at 0 03/23/22 1048   Undermining 0 03/23/22 1048   Undermining is depth extending from 0 03/23/22 1048   Drainage Amount Small 03/23/22 1048   Drainage Description Serosanguineous 03/23/22 1048   Non-staged Wound Description Partial thickness 03/23/22 1048   Treatments Cleansed;Site care 03/23/22 1048   Dressing ABD;Xeroform 03/23/22 1048   Wound packed? No 03/23/22 1048   Packing- # removed 0 03/23/22 1048   Packing- # inserted 0 03/23/22 1048   Dressing Changed New 03/23/22 1048   Patient Tolerance Tolerated well 03/23/22 1048   Dressing Status Dry;Clean; Intact 03/23/22 1048       Wound 03/21/22 Skin Tear Arm Right; Lower; Posterior (Active)   Wound Image   03/23/22 1048   Wound Description Pink; Beefy red 03/23/22 1048   Tracy-wound Assessment Dry; Intact;Fragile 03/23/22 1048   Wound Length (cm) 3 cm 03/23/22 1048   Wound Width (cm) 0 7 cm 03/23/22 1048   Wound Depth (cm) 0 1 cm 03/23/22 1048   Wound Surface Area (cm^2) 2 1 cm^2 03/23/22 1048   Wound Volume (cm^3) 0 21 cm^3 03/23/22 1048   Calculated Wound Volume (cm^3) 0 21 cm^3 03/23/22 1048   Tunneling 0 cm 03/23/22 1048   Tunneling in depth located at 0 03/23/22 1048   Undermining 0 03/23/22 1048   Undermining is depth extending from 0 03/23/22 1048   Drainage Amount Small 03/23/22 1048   Drainage Description Serosanguineous 03/23/22 1048   Non-staged Wound Description Partial thickness 03/23/22 1048   Treatments Cleansed;Site care 03/23/22 1048   Dressing ABD;Xeroform 03/23/22 1048   Wound packed? No 03/23/22 1048   Packing- # removed 0 03/23/22 1048   Packing- # inserted 0 03/23/22 1048   Dressing Changed New 03/23/22 1048   Patient Tolerance Tolerated well 03/23/22 1048   Dressing Status Clean;Dry; Intact 03/23/22 1048       Recommendations written as orders  AVS updated    Yane Livingston Natividad QUIROGA BSN Rena Benítez

## 2022-03-23 NOTE — PLAN OF CARE
Problem: PHYSICAL THERAPY ADULT  Goal: Performs mobility at highest level of function for planned discharge setting  See evaluation for individualized goals  Description: Treatment/Interventions: Functional transfer training,LE strengthening/ROM,Therapeutic exercise,Endurance training,Patient/family training,Equipment eval/education,Bed mobility,Gait training,Elevations,Spoke to nursing  Equipment Recommended: Shani Brown (DAVID)       See flowsheet documentation for full assessment, interventions and recommendations  Outcome: Progressing  Note: Prognosis: Good  Problem List: Decreased strength,Decreased endurance,Impaired balance,Decreased mobility,Decreased safety awareness  Assessment: Pt seen for PT treatment session this date with interventions consisting of bed mobility tasks, transfer training, seated TE, gait training, and education provided as needed for safety and direction to help improve functional mobility and activity tolerance  Pt agreeable to PT treatment session upon arrival, pt found resting in bed  At end of session, pt left sitting OOB in recliner with chair alarm activated and all needs in reach, but upon exiting room patients chair alarm began sounding and patient was found transferring self back into bed  Pt  Was assisted safely and positioned for comfort with bed alarm activated and all needs in reach with education provided for use of call bell for assitance   In comparison to previous session, pt with improvements in ambulation distance  Continue to recommend Home PT with family support at time of d/c in order to maximize pt's functional independence and safety w/ mobility  Pt continues to be functioning below baseline level  PT will continue to see pt while here in order to address the deficits listed above and provide interventions consistent w/ POC in effort to achieve STGs    Barriers to Discharge: Inaccessible home environment        PT Discharge Recommendation: Home with home health rehabilitation          See flowsheet documentation for full assessment

## 2022-03-23 NOTE — PLAN OF CARE
Problem: PAIN - ADULT  Goal: Verbalizes/displays adequate comfort level or baseline comfort level  Description: Interventions:  - Encourage patient to monitor pain and request assistance  - Assess pain using appropriate pain scale  - Administer analgesics based on type and severity of pain and evaluate response  - Implement non-pharmacological measures as appropriate and evaluate response  - Consider cultural and social influences on pain and pain management  - Notify physician/advanced practitioner if interventions unsuccessful or patient reports new pain  Outcome: Not Progressing     Problem: INFECTION - ADULT  Goal: Absence or prevention of progression during hospitalization  Description: INTERVENTIONS:  - Assess and monitor for signs and symptoms of infection  - Monitor lab/diagnostic results  - Monitor all insertion sites, i e  indwelling lines, tubes, and drains  - Monitor endotracheal if appropriate and nasal secretions for changes in amount and color  - [place] appropriate cooling/warming therapies per order  - Administer medications as ordered  - Instruct and encourage patient and family to use good hand hygiene technique  - Identify and instruct in appropriate isolation precautions for identified infection/condition  Outcome: Not Progressing  Goal: Absence of fever/infection during neutropenic period  Description: INTERVENTIONS:  - Monitor WBC    Outcome: Not Progressing     Problem: SAFETY ADULT  Goal: Patient will remain free of falls  Description: INTERVENTIONS:  - Educate patient/family on patient safety including physical limitations  - Instruct patient to call for assistance with activity   - Consult OT/PT to assist with strengthening/mobility   - Keep Call bell within reach  - Keep bed low and locked with side rails adjusted as appropriate  - Keep care items and personal belongings within reach  - Initiate and maintain comfort rounds  - Make Fall Risk Sign visible to staff  - Offer Toileting every  Hours, in advance of need  - Initiate/Maintain alarm  - Obtain necessary fall risk management equipment:   - Apply yellow socks and bracelet for high fall risk patients  - Consider moving patient to room near nurses station  Outcome: Not Progressing  Goal: Maintain or return to baseline ADL function  Description: INTERVENTIONS:  -  Assess patient's ability to carry out ADLs; assess patient's baseline for ADL function and identify physical deficits which impact ability to perform ADLs (bathing, care of mouth/teeth, toileting, grooming, dressing, etc )  - Assess/evaluate cause of self-care deficits   - Assess range of motion  - Assess patient's mobility; develop plan if impaired  - Assess patient's need for assistive devices and provide as appropriate  - Encourage maximum independence but intervene and supervise when necessary  - Involve family in performance of ADLs  - Assess for home care needs following discharge   - Consider OT consult to assist with ADL evaluation and planning for discharge  - Provide patient education as appropriate  Outcome: Not Progressing  Goal: Maintains/Returns to pre admission functional level  Description: INTERVENTIONS:  - Perform BMAT or MOVE assessment daily    - Set and communicate daily mobility goal to care team and patient/family/caregiver  - Collaborate with rehabilitation services on mobility goals if consulted  - Perform Range of Motion  times a day  - Reposition patient every  hours    - Dangle patient  times a day  - Stand patient  times a day  - Ambulate patient  times a day  - Out of bed to chair  times a day   - Out of bed for meals  times a day  - Out of bed for toileting  - Record patient progress and toleration of activity level   Outcome: Not Progressing     Problem: DISCHARGE PLANNING  Goal: Discharge to home or other facility with appropriate resources  Description: INTERVENTIONS:  - Identify barriers to discharge w/patient and caregiver  - Arrange for needed discharge resources and transportation as appropriate  - Identify discharge learning needs (meds, wound care, etc )  - Arrange for interpretive services to assist at discharge as needed  - Refer to Case Management Department for coordinating discharge planning if the patient needs post-hospital services based on physician/advanced practitioner order or complex needs related to functional status, cognitive ability, or social support system  Outcome: Not Progressing     Problem: Knowledge Deficit  Goal: Patient/family/caregiver demonstrates understanding of disease process, treatment plan, medications, and discharge instructions  Description: Complete learning assessment and assess knowledge base    Interventions:  - Provide teaching at level of understanding  - Provide teaching via preferred learning methods  Outcome: Not Progressing     Problem: Prexisting or High Potential for Compromised Skin Integrity  Goal: Skin integrity is maintained or improved  Description: INTERVENTIONS:  - Identify patients at risk for skin breakdown  - Assess and monitor skin integrity  - Assess and monitor nutrition and hydration status  - Monitor labs   - Assess for incontinence   - Turn and reposition patient  - Assist with mobility/ambulation  - Relieve pressure over bony prominences  - Avoid friction and shearing  - Provide appropriate hygiene as needed including keeping skin clean and dry  - Evaluate need for skin moisturizer/barrier cream  - Collaborate with interdisciplinary team   - Patient/family teaching  - Consider wound care consult   Outcome: Not Progressing     Problem: MOBILITY - ADULT  Goal: Maintain or return to baseline ADL function  Description: INTERVENTIONS:  -  Assess patient's ability to carry out ADLs; assess patient's baseline for ADL function and identify physical deficits which impact ability to perform ADLs (bathing, care of mouth/teeth, toileting, grooming, dressing, etc )  - Assess/evaluate cause of self-care deficits   - Assess range of motion  - Assess patient's mobility; develop plan if impaired  - Assess patient's need for assistive devices and provide as appropriate  - Encourage maximum independence but intervene and supervise when necessary  - Involve family in performance of ADLs  - Assess for home care needs following discharge   - Consider OT consult to assist with ADL evaluation and planning for discharge  - Provide patient education as appropriate  Outcome: Not Progressing  Goal: Maintains/Returns to pre admission functional level  Description: INTERVENTIONS:  - Perform BMAT or MOVE assessment daily    - Set and communicate daily mobility goal to care team and patient/family/caregiver  - Collaborate with rehabilitation services on mobility goals if consulted  - Perform Range of Motion  times a day  - Reposition patient every  hours    - Dangle patient  times a day  - Stand patient  times a day  - Ambulate patient  times a day  - Out of bed to chair  times a day   - Out of bed for meals  times a day  - Out of bed for toileting  - Record patient progress and toleration of activity level   Outcome: Not Progressing     Problem: Potential for Falls  Goal: Patient will remain free of falls  Description: INTERVENTIONS:  - Educate patient/family on patient safety including physical limitations  - Instruct patient to call for assistance with activity   - Consult OT/PT to assist with strengthening/mobility   - Keep Call bell within reach  - Keep bed low and locked with side rails adjusted as appropriate  - Keep care items and personal belongings within reach  - Initiate and maintain comfort rounds  - Make Fall Risk Sign visible to staff  - Offer Toileting every  Hours, in advance of need  - Initiate/Maintain alarm  - Obtain necessary fall risk management equipment:   - Apply yellow socks and bracelet for high fall risk patients  - Consider moving patient to room near nurses station  Outcome: Not Progressing

## 2022-03-23 NOTE — DISCHARGE INSTRUCTIONS
Restart your water pill, take daily  Please get follow up with GI and your PCP  Continue taper for the prednisolone as directed:  40 mg x1 more day, then decrease by 10 mg every 4 days until done  Abdominal Paracentesis     WHAT YOU NEED TO KNOW:   Abdominal paracentesis is a procedure to remove abnormal fluid buildup in your abdomen  Fluid builds up because of liver problems, such as swelling and scarring  Heart failure, kidney disease, a mass, or problems with your pancreas may also cause fluid buildup  DISCHARGE INSTRUCTIONS:     Follow up with your healthcare provider as directed: Write down your questions so you remember to ask them during your visits  Wound care: Remove dressing after 24 hours  Leave glue in place  Return to your normal activities    Contact Interventional Radiology at [phone] Luis PATIENTS: Contact Interventional Radiology at [phone]) Breonna Chavez PATIENTS: Contact Interventional Radiology at [phone]) if:  · You have a fever and your wound is red and swollen  · You have yellow, green, or bad-smelling discharge coming from your wound  · You have pain or swelling in your abdomen  · You have an upset stomach or you vomit  · You have sudden, sharp pain in your abdomen  · You urinate very little or not at all  · You feel confused and more tired than usual    · Your arm or leg feels warm, tender, and painful  It may look swollen and red  · You suddenly feel lightheaded and have trouble breathing  Urinary Tract Infection in Women   WHAT YOU NEED TO KNOW:   A urinary tract infection (UTI) is caused by bacteria that get inside your urinary tract  Most bacteria that enter your urinary tract come out when you urinate  If the bacteria stay in your urinary tract, you may get an infection  Your urinary tract includes your kidneys, ureters, bladder, and urethra  Urine is made in your kidneys, and it flows from the ureters to the bladder   Urine leaves the bladder through the urethra  A UTI is more common in your lower urinary tract, which includes your bladder and urethra  DISCHARGE INSTRUCTIONS:   Seek care immediately if:   · You are urinating very little or not at all  · You have a high fever with shaking chills  · You have side or back pain that gets worse  Call your doctor if:   · You have a fever  · You do not feel better after 2 days of taking antibiotics  · You are vomiting  · You have questions or concerns about your condition or care  Medicines:   · Antibiotics  help fight a bacterial infection  If you have UTIs often (called recurrent UTIs), you may be given antibiotics to take regularly  You will be given directions for when and how to use antibiotics  The goal is to prevent UTIs but not cause antibiotic resistance by using antibiotics too often  · Medicines  may be given to decrease pain and burning when you urinate  They will also help decrease the feeling that you need to urinate often  These medicines will make your urine orange or red  · Take your medicine as directed  Contact your healthcare provider if you think your medicine is not helping or if you have side effects  Tell him or her if you are allergic to any medicine  Keep a list of the medicines, vitamins, and herbs you take  Include the amounts, and when and why you take them  Bring the list or the pill bottles to follow-up visits  Carry your medicine list with you in case of an emergency  Follow up with your doctor as directed:  Write down your questions so you remember to ask them during your visits  Prevent another UTI:   · Empty your bladder often  Urinate and empty your bladder as soon as you feel the need  Do not hold your urine for long periods of time  · Wipe from front to back after you urinate or have a bowel movement  This will help prevent germs from getting into your urinary tract through your urethra  · Drink liquids as directed  Ask how much liquid to drink each day and which liquids are best for you  You may need to drink more liquids than usual to help flush out the bacteria  Do not drink alcohol, caffeine, or citrus juices  These can irritate your bladder and increase your symptoms  Your healthcare provider may recommend cranberry juice to help prevent a UTI  · Urinate after you have sex  This can help flush out bacteria passed during sex  · Do not douche or use feminine deodorants  These can change the chemical balance in your vagina  · Change sanitary pads or tampons often  This will help prevent germs from getting into your urinary tract  · Talk to your healthcare provider about your birth control method  You may need to change your method if it is increasing your risk for UTIs  · Wear cotton underwear and clothes that are loose  Tight pants and nylon underwear can trap moisture and cause bacteria to grow  · Vaginal estrogen may be recommended  This medicine helps prevent UTIs in women who have gone through menopause or are in shira-menopause  · Do pelvic muscle exercises often  Pelvic muscle exercises may help you start and stop urinating  Strong pelvic muscles may help you empty your bladder easier  Squeeze these muscles tightly for 5 seconds like you are trying to hold back urine  Then relax for 5 seconds  Gradually work up to squeezing for 10 seconds  Do 3 sets of 15 repetitions a day, or as directed  © Copyright [employer] 2022 Information is for End User's use only and may not be sold, redistributed or otherwise used for commercial purposes  All illustrations and images included in CareNotes® are the copyrighted property of A D A M , Inc  or [facility]   The above information is an  only  It is not intended as medical advice for individual conditions or treatments   Talk to your doctor, nurse or pharmacist before following any medical regimen to see if it is safe and effective for you  Cefdinir (Omnicef) - (By mouth)     Why this medicine is used:   Treats bacterial infections  Contact a nurse or doctor right away if you have:  · Blistering, peeling, red skin rash  · Severe or bloody diarrhea     Common side effects:  · Mild diarrhea, nausea    © Copyright [employer] 2022 Information is for End User's use only and may not be sold, redistributed or otherwise used for commercial purposes

## 2022-03-23 NOTE — PROGRESS NOTES
Evaluated patient bedside for CT contrast extravasation  She has no signs of vascular compromise, but does have swelling and signs of possible skin compromise  Site redressed and elevated  Provider and nurse made aware and will follow up with wound care

## 2022-03-23 NOTE — PROGRESS NOTES
GI Progress Note - Zuleyka Mccurdy 62 y o  female MRN: [id number]    Unit/Bed#: -01 Encounter: [id number]    Subjective: Juliana Roberto is hoping to go home today  She feels well, tolerating PO, no further abdominal pain  Objective:     Vitals: Blood pressure 166/82, pulse 95, temperature 98 2 °F (36 8 °C), resp  rate 18, height 5' 5" (1 651 m), weight 72 6 kg (160 lb), SpO2 96 %, not currently breastfeeding  ,Body mass index is 26 63 kg/m²  Intake/Output Summary (Last 24 hours) at 3/23/2022 1240  Last data filed at 3/23/2022 2726  Gross per 24 hour   Intake --   Output 550 ml   Net -550 ml       Physical Exam:     General Appearance: Alert, appears stated age and cooperative  Lungs: Clear to auscultation bilaterally, no rales or rhonchi, no labored breathing/accessory muscle use  Heart: Regular rate and rhythm, S1, S2 normal, no murmur, click, rub or gallop  Abdomen: Soft, non-tender, non-distended; bowel sounds normal; no masses or no organomegaly  Extremities: No cyanosis, edema    Invasive Devices  Report    Peripheral Intravenous Line            Peripheral IV 03/22/22 Proximal;Right;Ventral (anterior) Forearm 1 day          Drain            Closed/Suction Drain Right RLQ Bulb 489 days                Lab Results:  Results from last 7 days   Lab Units 03/23/22  0506   WBC Thousand/uL 20 11*   HEMOGLOBIN g/dL 10 3*   HEMATOCRIT % 30 6*   PLATELETS Thousands/uL 149   NEUTROS PCT % 86*   LYMPHS PCT % 6*   MONOS PCT % 4   EOS PCT % 3     Results from last 7 days   Lab Units 03/23/22  0506   POTASSIUM mmol/L 3 4*   CHLORIDE mmol/L 113*   CO2 mmol/L 21   BUN mg/dL 19   CREATININE mg/dL 0 84   CALCIUM mg/dL 7 6*   ALK PHOS U/L 151*   ALT U/L 35   AST U/L 51*     Results from last 7 days   Lab Units 03/23/22  0506   INR  1 66*     Results from last 7 days   Lab Units 03/21/22  0736   LIPASE u/L 710*       Imaging Studies: I have personally reviewed pertinent imaging studies      XR chest 2 views    Result Date: 3/21/2022  Impression: Large hiatal hernia Increasing Left basilar opacity, raises concern for effusion, alternatively this could be due to atelectasis No pulmonary congestion Hypoinflated lungs Workstation performed: [id number]     IR INPATIENT Paracentesis    Result Date: 3/21/2022  Impression: Impression: Successful diagnostic/therapeutic Ultrasound-guided paracentesis  Signed, performed, and dictated by Carol Khan under the direct supervision of Dr Mark Reyes performed: [id number]     CT abdomen pelvis with contrast    Result Date: 3/21/2022  Impression: Moderate sized paraesophageal hiatal hernia  Moderate ascites  Gallbladder wall thickening likely on the basis of ascites  Severe right renal atrophy  Mild bladder wall thickening with minimal enhancement concerning for cystitis  Workstation performed: [id number]       Assessment and Plan:     Ascites  Abdominal Pain  Chronic Leukocytosis  - She presented with abdominal pain, found to have ascites and cystitis on imaging  - Paracentesis negative for SBP  - UA/Urine culture positive for UTI, treatment per SLIM  - Symptoms have resolved      Alcoholic Hepatitis  - Diagnosed in February, started on prednisolone on 2/25 and held while admitted due to leukocytosis (of note, this is chronic and present since 2/22)  - Resume prednisone 40 mg today, continue 40 mg through tomorrow then taper by 10 mg every 4 days  - Will arrange outpatient follow up in 4-6 weeks  - Continued abstinence from alcohol      The patient is stable for discharge from a GI standpoint, GI will sign off

## 2022-03-23 NOTE — DISCHARGE INSTR - OTHER ORDERS
1  Apply hydraguard to b/l heels, buttocks and sacrum BID and PRN for prevention and protection  2  Apply skin nourishing cream the entire skin daily for moisture  3  Turn and reposition patient every  2 hours   4  Elevate heels off of bed with pillows to offload pressure   5  Apply EHOB waffle cushion to chair when OOB, if able  6  Cleanse R anterior upper arm and R posterior lower arm skin tears with NSS, pat dry, and apply xeroform gauze to the wounds  Cover with ABD pads  Secure the dressings with margoth wrap  Change every other day and as needed for soilage/dislodgement  7  Follow[facility]up with the [facility] wound care center as an outpatient - please call [phone] for an appointment

## 2022-03-23 NOTE — PLAN OF CARE
Problem: PAIN - ADULT  Goal: Verbalizes/displays adequate comfort level or baseline comfort level  Description: Interventions:  - Encourage patient to monitor pain and request assistance  - Assess pain using appropriate pain scale  - Administer analgesics based on type and severity of pain and evaluate response  - Implement non-pharmacological measures as appropriate and evaluate response  - Consider cultural and social influences on pain and pain management  - Notify physician/advanced practitioner if interventions unsuccessful or patient reports new pain  Outcome: Progressing     Problem: INFECTION - ADULT  Goal: Absence or prevention of progression during hospitalization  Description: INTERVENTIONS:  - Assess and monitor for signs and symptoms of infection  - Monitor lab/diagnostic results  - Monitor all insertion sites, i e  indwelling lines, tubes, and drains  - Monitor endotracheal if appropriate and nasal secretions for changes in amount and color  - [place] appropriate cooling/warming therapies per order  - Administer medications as ordered  - Instruct and encourage patient and family to use good hand hygiene technique  - Identify and instruct in appropriate isolation precautions for identified infection/condition  Outcome: Progressing  Goal: Absence of fever/infection during neutropenic period  Description: INTERVENTIONS:  - Monitor WBC    Outcome: Progressing     Problem: SAFETY ADULT  Goal: Patient will remain free of falls  Description: INTERVENTIONS:  - Educate patient/family on patient safety including physical limitations  - Instruct patient to call for assistance with activity   - Consult OT/PT to assist with strengthening/mobility   - Keep Call bell within reach  - Keep bed low and locked with side rails adjusted as appropriate  - Keep care items and personal belongings within reach  - Initiate and maintain comfort rounds  - Make Fall Risk Sign visible to staff  - Offer Toileting every 2 Hours, in advance of need  - Initiate/Maintain alarm  - Obtain necessary fall risk management equipment:   - Apply yellow socks and bracelet for high fall risk patients  - Consider moving patient to room near nurses station  Outcome: Progressing  Goal: Maintain or return to baseline ADL function  Description: INTERVENTIONS:  -  Assess patient's ability to carry out ADLs; assess patient's baseline for ADL function and identify physical deficits which impact ability to perform ADLs (bathing, care of mouth/teeth, toileting, grooming, dressing, etc )  - Assess/evaluate cause of self-care deficits   - Assess range of motion  - Assess patient's mobility; develop plan if impaired  - Assess patient's need for assistive devices and provide as appropriate  - Encourage maximum independence but intervene and supervise when necessary  - Involve family in performance of ADLs  - Assess for home care needs following discharge   - Consider OT consult to assist with ADL evaluation and planning for discharge  - Provide patient education as appropriate  Outcome: Progressing  Goal: Maintains/Returns to pre admission functional level  Description: INTERVENTIONS:  - Perform BMAT or MOVE assessment daily    - Set and communicate daily mobility goal to care team and patient/family/caregiver  - Collaborate with rehabilitation services on mobility goals if consulted  - Perform Range of Motion 2 times a day  - Reposition patient every 2 hours    - Dangle patient 2 times a day  - Stand patient 2 times a day  - Ambulate patient 2 times a day  - Out of bed to chair 2 times a day   - Out of bed for meals 2 times a day  - Out of bed for toileting  - Record patient progress and toleration of activity level   Outcome: Progressing     Problem: DISCHARGE PLANNING  Goal: Discharge to home or other facility with appropriate resources  Description: INTERVENTIONS:  - Identify barriers to discharge w/patient and caregiver  - Arrange for needed discharge resources and transportation as appropriate  - Identify discharge learning needs (meds, wound care, etc )  - Arrange for interpretive services to assist at discharge as needed  - Refer to Case Management Department for coordinating discharge planning if the patient needs post-hospital services based on physician/advanced practitioner order or complex needs related to functional status, cognitive ability, or social support system  Outcome: Progressing     Problem: Knowledge Deficit  Goal: Patient/family/caregiver demonstrates understanding of disease process, treatment plan, medications, and discharge instructions  Description: Complete learning assessment and assess knowledge base    Interventions:  - Provide teaching at level of understanding  - Provide teaching via preferred learning methods  Outcome: Progressing     Problem: Prexisting or High Potential for Compromised Skin Integrity  Goal: Skin integrity is maintained or improved  Description: INTERVENTIONS:  - Identify patients at risk for skin breakdown  - Assess and monitor skin integrity  - Assess and monitor nutrition and hydration status  - Monitor labs   - Assess for incontinence   - Turn and reposition patient  - Assist with mobility/ambulation  - Relieve pressure over bony prominences  - Avoid friction and shearing  - Provide appropriate hygiene as needed including keeping skin clean and dry  - Evaluate need for skin moisturizer/barrier cream  - Collaborate with interdisciplinary team   - Patient/family teaching  - Consider wound care consult   Outcome: Progressing     Problem: MOBILITY - ADULT  Goal: Maintain or return to baseline ADL function  Description: INTERVENTIONS:  -  Assess patient's ability to carry out ADLs; assess patient's baseline for ADL function and identify physical deficits which impact ability to perform ADLs (bathing, care of mouth/teeth, toileting, grooming, dressing, etc )  - Assess/evaluate cause of self-care deficits   - Assess range of motion  - Assess patient's mobility; develop plan if impaired  - Assess patient's need for assistive devices and provide as appropriate  - Encourage maximum independence but intervene and supervise when necessary  - Involve family in performance of ADLs  - Assess for home care needs following discharge   - Consider OT consult to assist with ADL evaluation and planning for discharge  - Provide patient education as appropriate  Outcome: Progressing  Goal: Maintains/Returns to pre admission functional level  Description: INTERVENTIONS:  - Perform BMAT or MOVE assessment daily    - Set and communicate daily mobility goal to care team and patient/family/caregiver  - Collaborate with rehabilitation services on mobility goals if consulted  - Perform Range of Motion 2 times a day  - Reposition patient every 2 hours    - Dangle patient 2 times a day  - Stand patient 2 times a day  - Ambulate patient 2 times a day  - Out of bed to chair 2 times a day   - Out of bed for meals 2 times a day  - Out of bed for toileting  - Record patient progress and toleration of activity level   Outcome: Progressing     Problem: Potential for Falls  Goal: Patient will remain free of falls  Description: INTERVENTIONS:  - Educate patient/family on patient safety including physical limitations  - Instruct patient to call for assistance with activity   - Consult OT/PT to assist with strengthening/mobility   - Keep Call bell within reach  - Keep bed low and locked with side rails adjusted as appropriate  - Keep care items and personal belongings within reach  - Initiate and maintain comfort rounds  - Make Fall Risk Sign visible to staff  - Offer Toileting every 2 Hours, in advance of need  - Initiate/Maintain alarm  - Obtain necessary fall risk management equipment:   - Apply yellow socks and bracelet for high fall risk patients  - Consider moving patient to room near nurses station  Outcome: Progressing

## 2022-03-24 LAB
BACTERIA SPEC ANAEROBE CULT: NO GROWTH
BACTERIA SPEC BFLD CULT: NO GROWTH
GRAM STN SPEC: NORMAL
GRAM STN SPEC: NORMAL

## 2022-03-24 NOTE — UTILIZATION REVIEW
Notification of Discharge   This is a Notification of Discharge from our facility [address]  Please be advised that this patient has been discharge from our facility  Below you will find the admission and discharge date and time including the patients disposition  UTILIZATION REVIEW CONTACT:  Monique Jacob  Utilization   Network Utilization Review Department  Phone: [phone] x carefully listen to the prompts  All voicemails are confidential   Email: [email or web address]     PHYSICIAN ADVISORY SERVICES:  FOR [id number] REVIEW - MEDICAL NECESSITY DENIAL  Phone: [phone]  Fax: [phone]  Email: [email or web address]     PRESENTATION DATE: 3/21/2022  7:16 AM  OBERVATION ADMISSION DATE:   INPATIENT ADMISSION DATE: 3/21/22 10:33 AM   DISCHARGE DATE: 3/23/2022  6:27 PM  DISPOSITION: Home with [place] with [address] INFORMATION:  Send all requests for admission clinical reviews, approved or denied determinations and any other requests to dedicated fax number below belonging to the campus where the patient is receiving treatment   List of dedicated fax numbers:  [address] [place] [address] DENIALS (Administrative/Medical Necessity) [phone]   [address] (Maternity/NICU/Pediatrics) [phone]   [facility] [phone]   [address] [facility] [phone]   [address] [place] [address] [phone]   [address] [place] [address] [address] [place] [address],[address] [place] [address] [phone]   [facility]  [phone]   [address] [place] [address], [address]  [address] [facility] [address] [facility] [phone]

## 2022-03-26 LAB
BACTERIA BLD CULT: NORMAL
BACTERIA BLD CULT: NORMAL

## 2022-04-07 NOTE — CONSULTS
Consultation - Medical Oncology   Thu Bryson 63 y o  female MRN: [id number]  Unit/Bed#: -01 Encounter: [id number]        Assessment and Plan:  1  Stage IIIA left-sided breast cancer, grade 2, invasive lobular histology, ER was 75%, DE was 15%, her two was negative  The patient has undergone chemotherapy with AC followed by weekly Paclitaxel  She just finished her last dose of treatment on the 6th of December  Dr Mook Parker edema did have to dose reduce her chemotherapy secondary to anemia requiring blood transfusion  She will follow up with radiation oncology as an outpatient as well for postmastectomy radiation  The above treatment has affected her bone marrow  This could very well be contributing to her anemia  2  Anemia requiring blood transfusions in the past   I believe her anemia is mainly secondary to myelosuppression of her bone marrow from chemotherapy  She does have macrocytic indices  Her hemoglobin has been stable in the 9 gram/deciliter range  Her direct bilirubin was slightly elevated  Her LDH level was normal at 217  A hemolysis smear and haptoglobin level are pending however this is unlikely to be hemolysis related and more related to myelosuppression of her bone marrow  It is going to take time for her counts to recover  She also has responded to blood products in the past which would also not coincide with her having hemolysis  3  Acute DVT was on apixaban  4  Hematuria probably worsened by the apixaban  I would hold the apixaban for now and we could think about restarting it once the hematuria has stopped  She could possibly have a urinary tract infection urine culture is currently pending    History of Present Illness   Physician Requesting Consult: Jaime Cho DO  Reason for Consult:  Breast cancer with anemia  HPI: Thu Bryson is a 63y o  year old female who presents with weakness and fatigue which has been progressive over the past month or so    The patient is known to Dr Ulysses Abdi with a stage IIIA left-sided breast cancer that is ERPR positive and her 2-  She just finished chemotherapy with Paclitaxel on December 6th  Even during her treatment she did have anemia that required blood transfusion  Overall the patient still continues to feel fairly fatigued  She has an ECOG performance status of two  She reports that her appetite has been decreased and she has lost weight during her treatment  She does report hematuria  She also has had diarrhea since being on chemotherapy  She does take Imodium which has been effective  She denies fevers, chills, chest pain, shortness of breath, nausea, vomiting, all other review of systems are unremarkable  PFSH was reviewed  Consults      General ROS:  Fatigue and decreased appetite  Ophthalmic ROS: negative  ENT ROS: negative  Hematological and Lymphatic ROS: negative  Respiratory ROS: no cough, shortness of breath, or wheezing  Cardiovascular ROS: no chest pain or dyspnea on exertion  Gastrointestinal ROS: no abdominal pain, change in bowel habits, or black or bloody stools  Diarrhea  Genito-Urinary ROS: no dysuria, trouble voiding    Positive hematuria  Dermatological ROS: negative    Historical Information   Past Medical History:   Diagnosis Date    Acute DVT (deep venous thrombosis) (HCC)     of LLE>     Congenital prolapsed rectum     Hydronephrosis     right kidney    Hypertension     Malignant neoplasm of overlapping sites of left female breast ([address] )     Migraine      Past Surgical History:   Procedure Laterality Date    BREAST BIOPSY      COLON SURGERY      colon resection    GA INSJ TUNNELED CTR VAD W/SUBQ PORT AGE 5 YR/> N/A 6/26/2018    Procedure: INSERTION VENOUS PORT ( PORT-A-CATH) IR;  Surgeon: Lexy Greco DO;  Location: [facility];  Service: Interventional Radiology    GA MASTECTOMY, MODIFIED RADICAL Left 5/15/2018    Procedure: BREAST MODIFIED RADICAL MASTECTOMY;  Surgeon: Alden Allen MD;  Location[facility];  Service: Surgical Oncology    US GUIDANCE BREAST BIOPSY LEFT EACH ADDITIONAL Left 4/3/2018    US GUIDED BREAST BIOPSY LEFT COMPLETE Left 4/3/2018    US GUIDED BREAST LYMPH NODE BIOPSY LEFT Left 4/3/2018     Social History   History   Alcohol Use No     History   Drug Use No     History   Smoking Status    Never Smoker   Smokeless Tobacco    Never Used     Family History:  Family History   Problem Relation Age of Onset    No Known Problems Mother     No Known Problems Father        Meds/Allergies   current meds:   Current Facility-Administered Medications   Medication Dose Route Frequency    acetaminophen (TYLENOL) tablet 650 mg  650 mg Oral Q6H PRN    cefepime (MAXIPIME) 2,000 mg in dextrose 5 % 50 mL IVPB  2,000 mg Intravenous Q24H    escitalopram (LEXAPRO) tablet 10 mg  10 mg Oral Daily    ferrous sulfate tablet 325 mg  325 mg Oral BID With Meals    heparin (porcine) 25,000 units in 250 mL infusion (premix)  3-30 Units/kg/hr (Order-Specific) Intravenous Titrated    heparin (porcine) injection 2,400 Units  2,400 Units Intravenous PRN    heparin (porcine) injection 4,800 Units  4,800 Units Intravenous PRN    LORazepam (ATIVAN) tablet 1 mg  1 mg Oral BID PRN    metoclopramide (REGLAN) tablet 10 mg  10 mg Oral 4x Daily PRN    ondansetron (ZOFRAN) injection 4 mg  4 mg Intravenous Q6H PRN    sertraline (ZOLOFT) tablet 50 mg  50 mg Oral HS    sodium chloride 0 9 % infusion  100 mL/hr Intravenous Continuous    SUMAtriptan (IMITREX) tablet 100 mg  100 mg Oral Once PRN    verapamil (CALAN-SR) CR tablet 240 mg  240 mg Oral Daily     Allergies   Allergen Reactions    Gluten Meal     Lactose        Objective   Vitals: Blood pressure 104/70, pulse 68, temperature 98 4 °F (36 9 °C), temperature source Oral, resp  rate 18, weight 61 kg (134 lb 7 7 oz), SpO2 98 %, not currently breastfeeding      Intake/Output Summary (Last 24 hours) at 12/31/18 1313  Last data filed at 12/31/18 0707   Gross per 24 hour   Intake          2053 33 ml   Output                0 ml   Net          2053 33 ml     Invasive Devices     Central Venous Catheter Line            Port A Cath 12/30/18 Right Chest less than 1 day                    General appearance: alert, appears stated age and cooperative  Thin female  Head: Normocephalic, without obvious abnormality, atraumatic  Eyes: conjunctivae/corneas clear  PERRL, EOM's intact  Fundi benign  Ears: normal TM's and external ear canals both ears  Nose: Nares normal  Septum midline  Mucosa normal  No drainage or sinus tenderness    Throat: lips, mucosa, and tongue normal; teeth and gums normal  Neck: no adenopathy, no JVD and supple, symmetrical, trachea midline  Lungs: clear to auscultation bilaterally  Breasts:  Left-sided mastectomy  Heart: regular rate and rhythm, S1, S2 normal, no murmur, click, rub or gallop  Abdomen: soft, non-tender; bowel sounds normal; no masses,  no organomegaly  Extremities: extremities normal, atraumatic, no cyanosis or edema  Skin: Skin color, texture, turgor normal  No rashes or lesions  Lymph nodes: Cervical, supraclavicular, and axillary nodes normal     Lab Results:   Admission on 12/30/2018   Component Date Value    WBC 12/30/2018 12 79*    RBC 12/30/2018 2 95*    Hemoglobin 12/30/2018 10 4*    Hematocrit 12/30/2018 31 6*    MCV 12/30/2018 107*    MCH 12/30/2018 35 3*    MCHC 12/30/2018 32 9     RDW 12/30/2018 20 4*    MPV 12/30/2018 10 7     Platelets 56/72/5276 388     nRBC 12/30/2018 0     Neutrophils Relative 12/30/2018 84*    Immat GRANS % 12/30/2018 2     Lymphocytes Relative 12/30/2018 4*    Monocytes Relative 12/30/2018 8     Eosinophils Relative 12/30/2018 1     Basophils Relative 12/30/2018 1     Neutrophils Absolute 12/30/2018 10 93*    Immature Grans Absolute 12/30/2018 0 22*    Lymphocytes Absolute 12/30/2018 0 48*    Monocytes Absolute 12/30/2018 1 02     Eosinophils Absolute 12/30/2018 0 07     Basophils Absolute 12/30/2018 0 07     Sodium 12/30/2018 138     Potassium 12/30/2018 3 7     Chloride 12/30/2018 102     CO2 12/30/2018 26     ANION GAP 12/30/2018 10     BUN 12/30/2018 17     Creatinine 12/30/2018 0 98     Glucose 12/30/2018 81     Calcium 12/30/2018 8 8     AST 12/30/2018 49*    ALT 12/30/2018 30     Alkaline Phosphatase 12/30/2018 190*    Total Protein 12/30/2018 5 9*    Albumin 12/30/2018 1 8*    Total Bilirubin 12/30/2018 1 30*    eGFR 12/30/2018 64     LACTIC ACID 12/30/2018 1 2     Procalcitonin 12/30/2018 0 52*    Protime 12/30/2018 24 5*    INR 12/30/2018 2 29*    PTT 12/30/2018 55*    Color, UA 12/30/2018 Red     Clarity, UA 12/30/2018 Cloudy     Specific Gravity, UA 12/30/2018 1 025     pH, UA 12/30/2018 6 0     Leukocytes, UA 12/30/2018 Trace*    Nitrite, UA 12/30/2018 Positive*    Protein, UA 12/30/2018 100 (2+)*    Glucose, UA 12/30/2018 Negative     Ketones, UA 12/30/2018 Trace*    Urobilinogen, UA 12/30/2018 4 0*    Bilirubin, UA 12/30/2018 Moderate*    Blood, UA 12/30/2018 Large*    INFLU A PCR 12/30/2018 None Detected     INFLU B PCR 12/30/2018 None Detected     RSV PCR 12/30/2018 None Detected     Troponin I 12/30/2018 <0 02     RBC, UA 12/30/2018 Innumerable*    WBC, UA 12/30/2018 Field obscured, unable to enumerate*    Epithelial Cells 12/30/2018 Field obscured, unable to enumerate*    Bacteria, UA 12/30/2018 Field obscured, unable to enumerate*    Urine Culture 12/30/2018 >100,000 cfu/ml Escherichia coli*    Ventricular Rate 12/30/2018 84     Atrial Rate 12/30/2018 84     NE Interval 12/30/2018 184     QRSD Interval 12/30/2018 86     QT Interval 12/30/2018 340     QTC Interval 12/30/2018 401     P Axis 12/30/2018 44     QRS Axis 12/30/2018 25     T Wave Axis 12/30/2018 60     Bilirubin, Direct 12/30/2018 0 72*    PTT 12/30/2018 59*    WBC 12/30/2018 13 13*    RBC 12/30/2018 2 71*    Hemoglobin 12/30/2018 9 4*    Hematocrit 12/30/2018 29 3*    MCV 12/30/2018 108*    MCH 12/30/2018 34 7*    MCHC 12/30/2018 32 1     RDW 12/30/2018 20 1*    Platelets 34/85/0666 344     MPV 12/30/2018 10 3     Protime 12/30/2018 24 8*    INR 12/30/2018 2 32*    PTT 12/31/2018 142*    WBC 12/31/2018 12 46*    RBC 12/31/2018 2 69*    Hemoglobin 12/31/2018 9 3*    Hematocrit 12/31/2018 29 5*    MCV 12/31/2018 110*    MCH 12/31/2018 34 6*    MCHC 12/31/2018 31 5     RDW 12/31/2018 20 4*    MPV 12/31/2018 10 3     Platelets 11/51/2302 360     nRBC 12/31/2018 0     Neutrophils Relative 12/31/2018 86*    Immat GRANS % 12/31/2018 2     Lymphocytes Relative 12/31/2018 4*    Monocytes Relative 12/31/2018 6     Eosinophils Relative 12/31/2018 1     Basophils Relative 12/31/2018 1     Neutrophils Absolute 12/31/2018 10 84*    Immature Grans Absolute 12/31/2018 0 22*    Lymphocytes Absolute 12/31/2018 0 44*    Monocytes Absolute 12/31/2018 0 80     Eosinophils Absolute 12/31/2018 0 08     Basophils Absolute 12/31/2018 0 08     Sodium 12/31/2018 136     Potassium 12/31/2018 3 6     Chloride 12/31/2018 104     CO2 12/31/2018 17*    ANION GAP 12/31/2018 15*    BUN 12/31/2018 14     Creatinine 12/31/2018 0 79     Glucose 12/31/2018 73     Calcium 12/31/2018 7 9*    AST 12/31/2018 46*    ALT 12/31/2018 22     Alkaline Phosphatase 12/31/2018 152*    Total Protein 12/31/2018 5 3*    Albumin 12/31/2018 1 5*    Total Bilirubin 12/31/2018 1 00     eGFR 12/31/2018 83     Magnesium 12/31/2018 1 7     LD 12/31/2018 217     Hemolysis Smear 12/31/2018 No Schistocytes or Helmet Cells noted     ABO Grouping 12/31/2018 B     Rh Factor 12/31/2018 Negative     Antibody Screen 12/31/2018 Negative     Specimen Expiration Date 12/31/2018 [id number]     PTT 12/31/2018 94*     Imaging Studies:  Pertinent imaging reviewed  EKG, Pathology, and Other Studies: I have personally reviewed pertinent reports          Code Status: Level 3 - DNAR and DNI  Advance Directive and Living Will: Yes    Power of : Yes  POLST:      Counseling / Coordination of Care  Total floor / unit time spent today 30 minutes  Greater than 50% of total time was spent with the patient and / or family counseling and / or coordination of care  Purse String (Simple) Text: Given the location of the defect and the characteristics of the surrounding skin a purse string closure was deemed most appropriate.  Undermining was performed circumfirentially around the surgical defect.  A purse string suture was then placed and tightened.

## 2022-04-11 NOTE — TELEPHONE ENCOUNTER
Appointment Cancellation Or Reschedule     Person calling in Patient or Spouse    Provider Dr Jw Ramon   Office Visit Date and Time 06/29/2021   Office Visit Location [facility]   Did patient want to reschedule their office appointment? If so, when was it scheduled to? Patient will call back to reschedule    Is this patient calling to reschedule an infusion appointment? no   Is this patient a Chemo patient? no   When is their next infusion visit? n/a   Reason for Cancellation or Reschedule appt conflict      If the patient is a treatment patient, please route this to the office nurse  If the patient is not on treatment, please route to the office MA  V-Y Plasty Text: The defect edges were debeveled with a #15 scalpel blade.  Given the location of the defect, shape of the defect and the proximity to free margins an V-Y advancement flap was deemed most appropriate.  Using a sterile surgical marker, an appropriate advancement flap was drawn incorporating the defect and placing the expected incisions within the relaxed skin tension lines where possible.    The area thus outlined was incised deep to adipose tissue with a #15 scalpel blade.  The skin margins were undermined to an appropriate distance in all directions utilizing iris scissors.

## 2022-04-29 NOTE — TELEPHONE ENCOUNTER
Called patient and left a message  Tentatively scheduled a regular follow up for hematuria with Jose De Jesus Banks at [facility] for 1/15/19 at 8:30am  Asked for a call back to confirm or r/s if necessary  no

## 2022-05-10 LAB
MYCOBACTERIUM SPEC CULT: NORMAL
RHODAMINE-AURAMINE STN SPEC: NORMAL

## 2022-05-18 ENCOUNTER — APPOINTMENT (OUTPATIENT)
Dept: RADIOLOGY | Facility: MEDICAL CENTER | Age: 62
End: 2022-05-18
Payer: COMMERCIAL

## 2022-05-18 DIAGNOSIS — M25.572 PAIN OF JOINT OF LEFT ANKLE AND FOOT: ICD-10-CM

## 2022-05-18 DIAGNOSIS — M79.672 LEFT FOOT PAIN: ICD-10-CM

## 2022-05-18 PROCEDURE — 73610 X-RAY EXAM OF ANKLE: CPT

## 2022-05-18 PROCEDURE — 73630 X-RAY EXAM OF FOOT: CPT

## 2022-05-25 ENCOUNTER — OFFICE VISIT (OUTPATIENT)
Dept: GASTROENTEROLOGY | Facility: CLINIC | Age: 62
End: 2022-05-25
Payer: COMMERCIAL

## 2022-05-25 VITALS
OXYGEN SATURATION: 98 % | DIASTOLIC BLOOD PRESSURE: 80 MMHG | HEART RATE: 115 BPM | SYSTOLIC BLOOD PRESSURE: 126 MMHG | TEMPERATURE: 98 F | RESPIRATION RATE: 16 BRPM

## 2022-05-25 DIAGNOSIS — R63.0 ANOREXIA: Primary | ICD-10-CM

## 2022-05-25 DIAGNOSIS — K70.31 ASCITES DUE TO ALCOHOLIC CIRRHOSIS (HCC): ICD-10-CM

## 2022-05-25 DIAGNOSIS — K70.11 ALCOHOLIC HEPATITIS WITH ASCITES: ICD-10-CM

## 2022-05-25 PROCEDURE — 99214 OFFICE O/P EST MOD 30 MIN: CPT | Performed by: INTERNAL MEDICINE

## 2022-05-25 RX ORDER — MEGESTROL ACETATE 40 MG/1
40 TABLET ORAL DAILY
Qty: 93 TABLET | Refills: 3 | Status: SHIPPED | OUTPATIENT
Start: 2022-05-25

## 2022-05-25 NOTE — PROGRESS NOTES
ACTION NEEDED:  Please notify patient of refill below and schedule her for labs (listed below), mammogram and appointment with me in the office (not a telemedicine virtual visit) a few days later to review results. All this need to be scheduled before the end of November. Schedule override approved. Thanks.    Orders Placed This Encounter   Procedures    Mammo Digital Screening Bilat w/ Donal    CBC Without Differential    Lipid Panel    Comprehensive metabolic panel    Hepatitis C Antibody       Medications Ordered This Encounter   Medications    amlodipine-valsartan (EXFORGE)  mg per tablet     Sig: Take 1 tablet by mouth once daily.     Dispense:  90 tablet     Refill:  0     - LABS AND APPOINTMENT REQUIRED FOR MORE REFILLS    #LMRF      [facility] - Outpatient Follow-up Note  Candace Means 62 y o  female MRN: [id number]  Encounter: [id number]          ASSESSMENT AND PLAN:      1  Anorexia  - megestrol (MEGACE) 40 mg tablet; Take 1 tablet (40 mg total) by mouth daily  Dispense: 93 tablet; Refill: 3    2  Ascites due to alcoholic cirrhosis (HCC)  - IR paracentesis; Future    3  Alcoholic hepatitis with ascites        ______________________________________________________________________    María Michael returns the office today with the complaint that she has lost her appetite over the past week  She has need hardly anything at all the past week  She is receiving little fluids as well  Her hematologist/oncologist has been giving her IV fluids in the last week 3 different times  She does admit to diarrhea 2 times daily but she is also taking lactulose at the present time  She denies constipation or rectal bleeding  There is no nausea vomiting  She states she is generally feeling were since her hospitalization  The last time she had abdominal paracentesis was in March of 2022  1 5 liters of fluid was removed at that time she states that it has been a couple of months now since her last alcoholic beverage  Her last comprehensive metabolic panel performed in March showed an AST of 51, ALT 35, alkaline phosphatase 151, and total bilirubin of 2 06  Her albumin level then was 1 7      REVIEW OF SYSTEMS IS OTHERWISE NEGATIVE        Historical Information   Past Medical History:   Diagnosis Date    Acute DVT (deep venous thrombosis) (HCC)     of LLE>     Bladder infection     Congenital prolapsed rectum     History of biliary stent insertion     History of chemotherapy     History of radiation therapy 05/2018    left breast ca    History of transfusion     x2 s/p chemo treatments, no reaction    Hydronephrosis     right kidney    Hypertension     Malignant neoplasm of overlapping sites of left female breast ([facility] [address] ) 05/01/2018    Migraine      Past Surgical History:   Procedure Laterality Date    ABDOMINAL ADHESION SURGERY N/A 4/23/2019    Procedure: LYSIS ADHESIONS;  Surgeon: Jim Zazueta MD;  [facility];  Service: Colorectal    BREAST BIOPSY      COLON SURGERY      colon resection    CYSTOSCOPY N/A 3/4/2019    Procedure: CYSTOSCOPY WITH BIOPSIES AND [place] OF RECTUM PROLAPSE;  Surgeon: Aj Medina MD;  [facility];  Service: Urology    ERCP N/A 1/4/2019    Procedure: ENDOSCOPIC RETROGRADE CHOLANGIOPANCREATOGRAPHY (ERCP); Surgeon: Fatuma Baumann MD;  [facility];  Service: Gastroenterology    INSTILLATION MYTOMYCIN N/A 3/4/2019    Procedure: Dinorah Conner;  Surgeon: Aj Medina MD;  [facility];  Service: Urology    IR PARACENTESIS  3/21/2022    MASTECTOMY Left     2018    ND COLONOSCOPY FLX DX W/COLLJ SPEC WHEN PFRMD N/A 4/22/2019    Procedure: COLONOSCOPY;  Surgeon: Jim Zazueta MD;  [facility]; Service: Colorectal    ND EDG US EXAM SURGICAL ALTER STOM DUODENUM/JEJUNUM N/A 3/7/2019    Procedure: LINEAR ENDOSCOPIC U/S;  Surgeon: Sophia Elise MD;  [facility]; Service: Gastroenterology    ND ESOPHAGOGASTRODUODENOSCOPY TRANSORAL DIAGNOSTIC N/A 3/7/2019    Procedure: ESOPHAGOGASTRODUODENOSCOPY (EGD); Surgeon: Sophia Elise MD;  [facility];   Service: Gastroenterology    ND INSJ TUNNELED CTR VAD W/SUBQ PORT AGE 5 YR/> N/A 6/26/2018    Procedure: INSERTION VENOUS PORT ( PORT-A-CATH) IR;  Surgeon: Victoriano Jordan DO;  [facility];  Service: Interventional Radiology    ND LAP, SURG PROCTOPEXY N/A 4/23/2019    Procedure: LAPAROSCOPIC HAND-ASSIST PELVIC DISSECTION; proctopexy;  Surgeon: Jim Zazueta MD;  [facility];  Service: Colorectal    ND LAP, SURG PROCTOPEXY N/A 11/18/2020    Procedure: LAPAROSCOPIC LYSIS OF ADHESIONS, MOBILIZATION OF RECTUM AND SUTURE RECTOPEXY;  Surgeon: Teddy Paulino MD;  [facility];  Service: Colorectal    [place] MASTECTOMY, MODIFIED RADICAL Left 5/15/2018    Procedure: BREAST MODIFIED RADICAL MASTECTOMY;  Surgeon: Noel Beltran MD;  [facility];  Service: Surgical Oncology    [place] RMVL [place] CTR VAD W/SUBQ PORT/ CTR/PRPH INSJ N/A 6/4/2019    Procedure: REMOVAL VENOUS PORT (PORT-A-CATH)IR;  Surgeon: Sera Baker DO;  [facility];  Service: Interventional Radiology    TUBAL LIGATION      US GUIDANCE BREAST BIOPSY LEFT EACH ADDITIONAL Left 4/3/2018    US GUIDED BREAST BIOPSY LEFT COMPLETE Left 4/3/2018    US GUIDED BREAST LYMPH NODE BIOPSY LEFT Left 4/3/2018     Social History   Social History     Substance and Sexual Activity   Alcohol Use Not Currently    Comment: Drinks daily until three weeks ago     Social History     Substance and Sexual Activity   Drug Use Not Currently     Social History     Tobacco Use   Smoking Status Never Smoker   Smokeless Tobacco Never Used     Family History   Problem Relation Age of Onset    No Known Problems Mother     No Known Problems Father     Breast cancer Maternal Aunt 48    Colon cancer Maternal Aunt     No Known Problems Sister     No Known Problems Daughter     No Known Problems Maternal Grandmother     No Known Problems Maternal Grandfather     No Known Problems Paternal Grandmother     No Known Problems Paternal Grandfather        Meds/Allergies       Current Outpatient Medications:     letrozole (FEMARA) 2 5 mg tablet    megestrol (MEGACE) 40 mg tablet    midodrine (PROAMATINE) 5 mg tablet    pantoprazole (PROTONIX) 40 mg tablet    potassium chloride (MICRO-K) 10 MEQ CR capsule    Santyl ointment    sertraline (ZOLOFT) 100 mg tablet    sertraline (ZOLOFT) 50 mg tablet    SUMAtriptan (IMITREX) 100 mg tablet    torsemide (DEMADEX) 20 mg tablet    folic acid (FOLVITE) 1 mg tablet    folic acid (FOLVITE) 1 mg tablet    gabapentin (NEURONTIN) 300 mg capsule    torsemide (DEMADEX) 20 mg tablet    Allergies   Allergen Reactions    Gluten Meal - Food Allergy GI Intolerance    Lactose - Food Allergy GI Intolerance           Objective     Blood pressure 126/80, pulse (!) 115, temperature 98 °F (36 7 °C), temperature source Temporal, resp  rate 16, SpO2 98 %, not currently breastfeeding  There is no height or weight on file to calculate BMI  PHYSICAL EXAM:      General Appearance:   Alert, cooperative, no distress   HEENT:   Normocephalic, atraumatic, anicteric      Neck:  Supple, symmetrical, trachea midline   Lungs:   Clear to auscultation bilaterally; no rales, rhonchi or wheezing; respirations unlabored    Heart[de-identified]   Regular rate and rhythm; no murmur, rub, or gallop  Abdomen:   Soft, non-tender, distended; normal bowel sounds; no masses, no organomegaly, positive ascites    Genitalia:   Deferred    Rectal:   Deferred    Extremities:  No cyanosis, clubbing or edema    Pulses:  2+ and symmetric    Skin:  No jaundice, rashes, or lesions    Lymph nodes:  No palpable cervical lymphadenopathy        Lab Results:   No visits with results within 1 Day(s) from this visit     Latest known visit with results is:   Admission on 03/21/2022, Discharged on 03/23/2022   Component Date Value    WBC 03/21/2022 24 63 (A)    RBC 03/21/2022 3 51 (A)    Hemoglobin 03/21/2022 12 3     Hematocrit 03/21/2022 38 4     MCV 03/21/2022 109 (A)    MCH 03/21/2022 35 0 (A)    MCHC 03/21/2022 32 0     RDW 03/21/2022 18 1 (A)    MPV 03/21/2022 11 6     Platelets 80/66/0183 221     nRBC 03/21/2022 0     Neutrophils Relative 03/21/2022 86 (A)    Immat GRANS % 03/21/2022 1     Lymphocytes Relative 03/21/2022 6 (A)    Monocytes Relative 03/21/2022 4     Eosinophils Relative 03/21/2022 3     Basophils Relative 03/21/2022 0     Neutrophils Absolute 03/21/2022 21 19 (A)    Immature Grans Absolute 03/21/2022 0 35 (A)    Lymphocytes Absolute 03/21/2022 1 35     Monocytes Absolute 03/21/2022 0 88     Eosinophils Absolute 03/21/2022 0 82 (A)    Basophils Absolute 03/21/2022 0 04     NT-proBNP 03/21/2022 2,135 (A)    Sodium 03/21/2022 142     Potassium 03/21/2022 3 9     Chloride 03/21/2022 111 (A)    CO2 03/21/2022 24     ANION GAP 03/21/2022 7     BUN 03/21/2022 22     Creatinine 03/21/2022 0 98     Glucose 03/21/2022 101     Calcium 03/21/2022 8 4     Corrected Calcium 03/21/2022 9 8     AST 03/21/2022 51 (A)    ALT 03/21/2022 50     Alkaline Phosphatase 03/21/2022 196 (A)    Total Protein 03/21/2022 6 0 (A)    Albumin 03/21/2022 2 3 (A)    Total Bilirubin 03/21/2022 2 90 (A)    eGFR 03/21/2022 62     LACTIC ACID 03/21/2022 1 3     Lipase 03/21/2022 710 (A)    Magnesium 03/21/2022 1 7     pH, Luis Carlos 03/21/2022 7 363     pCO2, Luis Carlos 03/21/2022 38 3 (A)    pO2, Luis Carlos 03/21/2022 25 3 (A)    HCO3, Luis Carlos 03/21/2022 21 3 (A)    Base Excess, Luis Carlos 03/21/2022 -3 6     O2 Content, Luis Carlos 03/21/2022 7 4     O2 HGB, VENOUS 03/21/2022 38 4 (A)    Protime 03/21/2022 17 2 (A)    INR 03/21/2022 1 47 (A)    PTT 03/21/2022 33     Procalcitonin 03/21/2022 0 88 (A)    SARS-CoV-2 03/21/2022 Negative     INFLUENZA A PCR 03/21/2022 Negative     INFLUENZA B PCR 03/21/2022 Negative     RSV PCR 03/21/2022 Negative     Color, UA 03/21/2022 Yellow     Clarity, UA 03/21/2022 Cloudy     Specific Gravity, UA 03/21/2022 <=1 005     pH, UA 03/21/2022 7 0     Leukocytes, UA 03/21/2022 Small (A)    Nitrite, UA 03/21/2022 Negative     Protein, UA 03/21/2022 Trace (A)    Glucose, UA 03/21/2022 Negative     Ketones, UA 03/21/2022 Negative     Urobilinogen, UA 03/21/2022 1 0     Bilirubin, UA 03/21/2022 Small (A)    Blood, UA 03/21/2022 Moderate (A)    hs TnI 0hr 03/21/2022 9     Blood Culture 03/21/2022 No Growth After 5 Days   Blood Culture 03/21/2022 No Growth After 5 Days       hs TnI 2hr 03/21/2022 9     Delta 2hr hsTnI 03/21/2022 0     Ventricular Rate 03/21/2022 85     Atrial Rate 03/21/2022 85     VA Interval 03/21/2022 150     QRSD Interval 03/21/2022 78     QT Interval 03/21/2022 380     QTC Interval 03/21/2022 452     P Axis 03/21/2022 44     QRS Axis 03/21/2022 16     T Wave Axis 03/21/2022 45     RBC, UA 03/21/2022 1-2     WBC, UA 03/21/2022 10-20 (A)    Epithelial Cells 03/21/2022 Occasional     Bacteria, UA 03/21/2022 Innumerable (A)    Site 03/21/2022 Paracentesis     Color, Fluid 03/21/2022 Light Yellow     Clarity, Fluid 03/21/2022 Slightly Cloudy     WBC, Fluid 03/21/2022 18     Body Fluid Culture, Ster* 03/21/2022 No growth     Gram Stain Result 03/21/2022 Rare Polys     Gram Stain Result 03/21/2022 No bacteria seen     Anaerobic Culture 03/21/2022 No growth     AFB Culture 03/21/2022 No AFB Isolated at 6 Weeks     AFB Stain 03/21/2022 No acid fast bacilli seen     Case Report 03/21/2022                      Value:Non-gynecologic Cytology                          Case: [id number]                                  Authorizing Provider:  Annita Giron DO         Collected:           03/21/2022 1125              Ordering Location:     [facility] Received:            03/21/2022 1 [facility]                                     Emergency Department                                                         Pathologist:           Venancio Salazar MD                                                    Specimens:   A) - Paracentesis                                                                                   B) - Paracentesis, cell block                                                              Final Diagnosis 03/21/2022                      Value: This result contains rich text formatting which cannot be displayed here  Kerriandreina Almendarez Description 03/21/2022                      Value: This result contains rich text formatting which cannot be displayed here      Additional Information 03/21/2022 Value:This result contains rich text formatting which cannot be displayed here      Urine Culture 03/21/2022 >100,000 cfu/ml Escherichia coli (A)    Urine Culture 03/21/2022 70,000-79,000 cfu/ml      Total Counted 03/21/2022 100     Neutrophils % (Fluid) 03/21/2022 4     Lymphs % (Fluid) 03/21/2022 69     Mesothelial % (Fluid) 03/21/2022 4     Monocytes % (Fluid) 03/21/2022 23     C difficile toxin by PCR 03/22/2022 Negative     WBC 03/22/2022 24 94 (A)    RBC 03/22/2022 3 45 (A)    Hemoglobin 03/22/2022 12 3     Hematocrit 03/22/2022 37 8     MCV 03/22/2022 110 (A)    MCH 03/22/2022 35 7 (A)    MCHC 03/22/2022 32 5     RDW 03/22/2022 17 6 (A)    MPV 03/22/2022 11 3     Platelets 03/33/4380 194     nRBC 03/22/2022 0     Neutrophils Relative 03/22/2022 89 (A)    Immat GRANS % 03/22/2022 1     Lymphocytes Relative 03/22/2022 5 (A)    Monocytes Relative 03/22/2022 3 (A)    Eosinophils Relative 03/22/2022 2     Basophils Relative 03/22/2022 0     Neutrophils Absolute 03/22/2022 22 32 (A)    Immature Grans Absolute 03/22/2022 0 22 (A)    Lymphocytes Absolute 03/22/2022 1 22     Monocytes Absolute 03/22/2022 0 73     Eosinophils Absolute 03/22/2022 0 41     Basophils Absolute 03/22/2022 0 04     Sodium 03/22/2022 141     Potassium 03/22/2022 3 7     Chloride 03/22/2022 112 (A)    CO2 03/22/2022 19 (A)    ANION GAP 03/22/2022 10     BUN 03/22/2022 19     Creatinine 03/22/2022 0 83     Glucose 03/22/2022 105     Calcium 03/22/2022 7 8 (A)    Corrected Calcium 03/22/2022 9 5     AST 03/22/2022 50 (A)    ALT 03/22/2022 42     Alkaline Phosphatase 03/22/2022 181 (A)    Total Protein 03/22/2022 5 4 (A)    Albumin 03/22/2022 1 9 (A)    Total Bilirubin 03/22/2022 2 21 (A)    eGFR 03/22/2022 76     Protime 03/22/2022 18 1 (A)    INR 03/22/2022 1 57 (A)    Magnesium 03/22/2022 1 7     Phosphorus 03/22/2022 3 5     Sodium 03/23/2022 143     Potassium 03/23/2022 3 4 (A)    Chloride 03/23/2022 113 (A)    CO2 03/23/2022 21     ANION GAP 03/23/2022 9     BUN 03/23/2022 19     Creatinine 03/23/2022 0 84     Glucose 03/23/2022 88     Calcium 03/23/2022 7 6 (A)    Corrected Calcium 03/23/2022 9 4     AST 03/23/2022 51 (A)    ALT 03/23/2022 35     Alkaline Phosphatase 03/23/2022 151 (A)    Total Protein 03/23/2022 4 8 (A)    Albumin 03/23/2022 1 7 (A)    Total Bilirubin 03/23/2022 2 06 (A)    eGFR 03/23/2022 75     WBC 03/23/2022 20 11 (A)    RBC 03/23/2022 2 97 (A)    Hemoglobin 03/23/2022 10 3 (A)    Hematocrit 03/23/2022 30 6 (A)    MCV 03/23/2022 103 (A)    MCH 03/23/2022 34 7 (A)    MCHC 03/23/2022 33 7     RDW 03/23/2022 17 5 (A)    MPV 03/23/2022 11 0     Platelets 74/89/3838 149     nRBC 03/23/2022 0     Neutrophils Relative 03/23/2022 86 (A)    Immat GRANS % 03/23/2022 1     Lymphocytes Relative 03/23/2022 6 (A)    Monocytes Relative 03/23/2022 4     Eosinophils Relative 03/23/2022 3     Basophils Relative 03/23/2022 0     Neutrophils Absolute 03/23/2022 17 14 (A)    Immature Grans Absolute 03/23/2022 0 20     Lymphocytes Absolute 03/23/2022 1 20     Monocytes Absolute 03/23/2022 0 86     Eosinophils Absolute 03/23/2022 0 68 (A)    Basophils Absolute 03/23/2022 0 03     Protime 03/23/2022 18 8 (A)    INR 03/23/2022 1 66 (A)         Radiology Results:   XR ankle 3+ vw left    Result Date: 5/18/2022  Narrative: LEFT ANKLE AND FOOT INDICATION:   M25 572: Pain in left ankle and joints of left foot M79 672: Pain in left foot  COMPARISON:  Radiographs of the left ankle January 14, 2021 VIEWS:  XR ANKLE 3+ VW LEFT, XR FOOT 3+ VW LEFT FINDINGS: There is no acute fracture or dislocation  Healed distal fibular fracture  No significant degenerative changes  No lytic or blastic osseous lesion  Circumferential subcutaneous edema along the distal aspect of the leg  Impression: No acute osseous abnormality   Workstation performed: [id number]     XR foot 3+ vw left    Result Date: 5/18/2022  Narrative: LEFT ANKLE AND FOOT INDICATION:   M25 572: Pain in left ankle and joints of left foot M79 672: Pain in left foot  COMPARISON:  Radiographs of the left ankle January 14, 2021 VIEWS:  XR ANKLE 3+ VW LEFT, XR FOOT 3+ VW LEFT FINDINGS: There is no acute fracture or dislocation  Healed distal fibular fracture  No significant degenerative changes  No lytic or blastic osseous lesion  Circumferential subcutaneous edema along the distal aspect of the leg  Impression: No acute osseous abnormality   Workstation performed: [id number]

## 2022-06-01 ENCOUNTER — HOSPITAL ENCOUNTER (OUTPATIENT)
Dept: NON INVASIVE DIAGNOSTICS | Facility: HOSPITAL | Age: 62
Discharge: HOME | End: 2022-06-01
Attending: INTERNAL MEDICINE | Admitting: RADIOLOGY
Payer: COMMERCIAL

## 2022-06-01 VITALS
RESPIRATION RATE: 20 BRPM | HEART RATE: 85 BPM | OXYGEN SATURATION: 99 % | DIASTOLIC BLOOD PRESSURE: 55 MMHG | SYSTOLIC BLOOD PRESSURE: 103 MMHG

## 2022-06-01 DIAGNOSIS — K70.31 ASCITES DUE TO ALCOHOLIC CIRRHOSIS (HCC): ICD-10-CM

## 2022-06-01 PROCEDURE — 49083 ABD PARACENTESIS W/IMAGING: CPT

## 2022-06-01 PROCEDURE — 49083 ABD PARACENTESIS W/IMAGING: CPT | Performed by: RADIOLOGY

## 2022-06-01 RX ORDER — ALBUMIN (HUMAN) 12.5 G/50ML
SOLUTION INTRAVENOUS
Status: COMPLETED | OUTPATIENT
Start: 2022-06-01 | End: 2022-06-01

## 2022-06-01 RX ORDER — LIDOCAINE WITH 8.4% SOD BICARB 0.9%(10ML)
SYRINGE (ML) INJECTION CODE/TRAUMA/SEDATION MEDICATION
Status: COMPLETED | OUTPATIENT
Start: 2022-06-01 | End: 2022-06-01

## 2022-06-01 RX ADMIN — Medication 8 ML: at 08:35

## 2022-06-01 RX ADMIN — ALBUMIN (HUMAN) 50 G: 12.5 SOLUTION INTRAVENOUS at 09:15

## 2022-06-08 NOTE — ASSESSMENT & PLAN NOTE
Kay Alejandre  06/08/2022  [id number]    Marcella Daily MD  Patient Care Team:  Marcella Daily MD as PCP - General (Internal Medicine)    Has the patient seen any provider outside of the network since the last visit ? (no). If yes, HIPPA forms completed and records requested.      Visit Type:a scheduled routine follow-up visit    Chief Complaint:  Chief Complaint   Patient presents with    Annual Exam       History of Present Illness:  HPI Ms. Alejandre presents today for her annual exam.   No change in her medical, surgical or family history     HM reviewed   Colon cancer screen ordered     Review of Systems   Constitutional: Negative for chills and fever.   HENT: Negative for congestion and tinnitus.    Eyes: Negative for blurred vision, pain and discharge.   Respiratory: Negative for cough and wheezing.    Cardiovascular: Negative for chest pain, palpitations, orthopnea and leg swelling.   Gastrointestinal: Negative for abdominal pain, blood in stool, constipation, diarrhea, heartburn, nausea and vomiting.   Genitourinary: Negative for dysuria, flank pain, frequency, hematuria and urgency.   Skin: Negative for itching and rash.   Neurological: Negative for dizziness, tingling and headaches.   Psychiatric/Behavioral: Negative for depression.         Screening Questionnaires:    In the last two weeks how often have you felt down, depressed, or hopeless ( no )    In the last two weeks how often have you had little interest or pleasure in doing  (no )    In the last two weeks how often have you been bothered by the following problems:  1. Feeling nervous, anxious, or on edge ( no )    2. Not being able to stop or control worrying ( no)    3. Worrying too much about different things ( no)    4. Trouble relaxing ( no )    5. Being so restless that it is hard to sit still  (no )    6. Becoming easily annoyed or irritable (no)    7. Feeling afraid as if something awful might happen (no )    How often do you have a drink  Plan as outlined above containing Alcohol? occasional, social use     Do you exercise  (no ) not active    Do you take a baby Aspirin daily ( no)    Do you have an advance directive ( no ) The patient was given information regarding Living Will/Durable Power-of- if requested.     The following were reviewed: Active problem list, medication list, allergies, family history, social history, and Health Maintenance.     History:  Past Medical History:   Diagnosis Date    Asthma     Gastric ulcer     Hypertension     Sciatica, left side      Past Surgical History:   Procedure Laterality Date    ANKLE SURGERY       SECTION, CLASSIC      x 2    TUBAL LIGATION       Family History   Problem Relation Age of Onset    Hypertension Mother     Diabetes Sister     Breast cancer Sister      Social History     Socioeconomic History    Marital status: Single   Tobacco Use    Smoking status: Passive Smoke Exposure - Never Smoker    Smokeless tobacco: Never Used   Substance and Sexual Activity    Alcohol use: Yes     Comment: occassional    Drug use: No    Sexual activity: Yes     Partners: Male     Birth control/protection: See Surgical Hx, I.U.D.     Comment: has BTL, MIrena for period control 6/3/19 [id number]     Patient Active Problem List   Diagnosis    Right leg weakness     Review of patient's allergies indicates:  No Known Allergies    Health Maintenance  Health Maintenance Topics with due status: Not Due       Topic Last Completion Date    TETANUS VACCINE 2019    Cervical Cancer Screening 2019    Lipid Panel 2021    Mammogram 2022     Health Maintenance Due   Topic Date Due    Colorectal Cancer Screening  Never done       Medications:  Current Outpatient Medications on File Prior to Visit   Medication Sig Dispense Refill    albuterol (ACCUNEB) 0.63 mg/3 mL Nebu albuterol sulfate Take No date recorded No form recorded No frequency recorded No route recorded No set duration recorded No set  duration amount recorded active No dosage strength recorded No dosage strength units of measure recorded      levonorgestrel (MIRENA) 20 mcg/24 hours (5 yrs) 52 mg IUD 1 Intra Uterine Device by Intrauterine route once. for 1 dose 1 Intra Uterine Device 0    naproxen (NAPROSYN) 500 MG tablet Take 1 tablet (500 mg total) by mouth 2 (two) times daily with meals. 90 tablet 0    [DISCONTINUED] albuterol (PROVENTIL/VENTOLIN HFA) 90 mcg/actuation inhaler Inhale 1-2 puffs into the lungs every 6 (six) hours as needed for Wheezing. 18 g 1    erythromycin (ROMYCIN) ophthalmic ointment Place a 1/2 inch ribbon of ointment into the right lower eyelid TID x 7 days (Patient not taking: No sig reported) 1 Tube 0    hydrocortisone 2.5 % cream Apply topically 2 (two) times daily. (Patient not taking: Reported on 6/8/2022) 28 g 1    pramoxine-hydrocortisone cream Apply topically 3 (three) times daily. (Patient not taking: Reported on 6/8/2022) 28.4 g 1    traMADoL (ULTRAM) 50 mg tablet Take 1 tablet (50 mg total) by mouth every 6 (six) hours as needed. (Patient not taking: No sig reported) 12 tablet 0     No current facility-administered medications on file prior to visit.       Medications have been reviewed and reconciled with patient at visit today.    Barriers to medications present (no )    Adverse reactions to current medications (no)    Over the counter medications reviewed (Yes) and if needed added to active Medication list.    Exam:  Vitals:    06/08/22 1412   BP: 126/84   Pulse: 95   Resp: 18   Temp: 98.1 °F (36.7 °C)     Weight: 106.6 kg (235 lb 0.2 oz)   Body mass index is 40.34 kg/m².      Physical Exam  Vitals reviewed.   Constitutional:       General: She is not in acute distress.     Appearance: She is obese.   HENT:      Head: Normocephalic and atraumatic.      Right Ear: External ear normal.      Left Ear: External ear normal.      Nose: Nose normal.   Eyes:      General:         Right eye: No discharge.          Left eye: No discharge.      Pupils: Pupils are equal, round, and reactive to light.   Neck:      Thyroid: No thyromegaly.   Cardiovascular:      Rate and Rhythm: Normal rate and regular rhythm.      Heart sounds: Normal heart sounds. No murmur heard.  Pulmonary:      Effort: Pulmonary effort is normal. No respiratory distress.      Breath sounds: Normal breath sounds. No wheezing.   Abdominal:      General: Bowel sounds are normal. There is no distension.      Palpations: Abdomen is soft.      Tenderness: There is no abdominal tenderness.   Musculoskeletal:         General: Normal range of motion.      Cervical back: Normal range of motion and neck supple.   Skin:     General: Skin is warm and dry.      Findings: No rash.   Neurological:      Mental Status: She is alert and oriented to person, place, and time.      Coordination: Coordination normal.   Psychiatric:         Behavior: Behavior normal.         Laboratory Reviewed: (Yes)  Lab Results   Component Value Date    WBC 8.56 05/12/2021    HGB 14.4 05/12/2021    HCT 42.2 05/12/2021     05/12/2021    CHOL 201 (H) 05/12/2021    TRIG 71 05/12/2021    HDL 53 05/12/2021    ALT 18 05/12/2021    AST 21 05/12/2021     05/12/2021    K 3.7 05/12/2021     05/12/2021    CREATININE 0.9 05/12/2021    BUN 6 05/12/2021    CO2 24 05/12/2021    TSH 0.581 05/12/2021       Assessment:  The primary encounter diagnosis was Colon cancer screening. Diagnoses of Routine physical examination, Hypertension, unspecified type, and Mild intermittent asthma, unspecified whether complicated were also pertinent to this visit.    Plan:  Colon cancer screening  -     Ambulatory referral/consult to Endo Procedure ; Future; Expected date: 06/09/2022    Routine physical examination  -     Lipid Panel; Future; Expected date: 06/08/2022  -     Comprehensive Metabolic Panel; Future; Expected date: 06/08/2022  -     CBC Auto Differential; Future; Expected date:  06/08/2022  -     TSH; Future; Expected date: 06/08/2022    Hypertension, unspecified type  -     lisinopriL-hydrochlorothiazide (PRINZIDE,ZESTORETIC) 20-25 mg Tab; Take 1 tablet by mouth once daily.  Dispense: 90 tablet; Refill: 3    Mild intermittent asthma, unspecified whether complicated  -     albuterol (PROVENTIL/VENTOLIN HFA) 90 mcg/actuation inhaler; Inhale 1-2 puffs into the lungs every 6 (six) hours as needed for Wheezing.  Dispense: 18 g; Refill: 1      -Patient's lab results were reviewed and discussed with patient  -Treatment options and alternatives were discussed with the patient. Patient expressed understanding. Patient was given the opportunity to ask questions and be an active participant in their medical care. Patient had no further questions or concerns at this time.   -Documentation of patient's health and condition was obtained from family member who was present during visit.  -Patient is an overall moderate risk for health complications from their medical conditions.     Follow up: follow up as needed      Care Plan/Goals: Reviewed N/A   Goals    None             After visit summary printed and given to patient upon discharge.  Patient goals and care plan are included in After visit summary.

## 2022-07-13 ENCOUNTER — TELEPHONE (OUTPATIENT)
Dept: GASTROENTEROLOGY | Facility: CLINIC | Age: 62
End: 2022-07-13

## 2022-07-13 DIAGNOSIS — K70.31 ASCITES DUE TO ALCOHOLIC CIRRHOSIS (HCC): Primary | ICD-10-CM

## 2022-07-13 NOTE — TELEPHONE ENCOUNTER
Called and spoke to patients     He stated that some one already called and scheduled patient for paracentesis for fri  7/15 @ 1pm  He had no further questions or concerns

## 2022-07-13 NOTE — TELEPHONE ENCOUNTER
Dr Carrero An pt  Received message from pt's  that Adam Rubio may need a paracentesis, should she come in or can an order be put in so they can schedule  Please call

## 2022-07-15 ENCOUNTER — HOSPITAL ENCOUNTER (OUTPATIENT)
Dept: NON INVASIVE DIAGNOSTICS | Facility: HOSPITAL | Age: 62
Discharge: HOME | End: 2022-07-15
Admitting: RADIOLOGY
Payer: COMMERCIAL

## 2022-07-15 VITALS
HEART RATE: 76 BPM | DIASTOLIC BLOOD PRESSURE: 62 MMHG | OXYGEN SATURATION: 98 % | SYSTOLIC BLOOD PRESSURE: 102 MMHG | RESPIRATION RATE: 13 BRPM

## 2022-07-15 DIAGNOSIS — K70.31 ASCITES DUE TO ALCOHOLIC CIRRHOSIS (HCC): ICD-10-CM

## 2022-07-15 PROCEDURE — 49083 ABD PARACENTESIS W/IMAGING: CPT

## 2022-07-15 RX ORDER — LIDOCAINE WITH 8.4% SOD BICARB 0.9%(10ML)
SYRINGE (ML) INJECTION CODE/TRAUMA/SEDATION MEDICATION
Status: COMPLETED | OUTPATIENT
Start: 2022-07-15 | End: 2022-07-15

## 2022-07-15 RX ORDER — ALBUMIN (HUMAN) 12.5 G/50ML
SOLUTION INTRAVENOUS
Status: COMPLETED | OUTPATIENT
Start: 2022-07-15 | End: 2022-07-15

## 2022-07-15 RX ADMIN — Medication 8 ML: at 13:16

## 2022-07-15 RX ADMIN — ALBUMIN (HUMAN) 50 G: 12.5 SOLUTION INTRAVENOUS at 14:05

## 2022-07-15 NOTE — BRIEF OP NOTE (RAD/CATH)
IR PARACENTESIS Procedure Note    PATIENT NAME: Jamie Gamboa  : 1960  MRN: [id number]    Pre-op Diagnosis:   1  Ascites due to alcoholic cirrhosis (HCC)      Post-op Diagnosis:   1   Ascites due to alcoholic cirrhosis [facility])        Provider:   Crystal Manuel, [facility]  Assistants:     No qualified resident was available, Resident is only observing    Estimated Blood Loss: none  Findings: 6350 ml clear yellow ascites, RLQ    Specimens: none    Complications:  None immediate    Anesthesia: local    Irina Ward     Date: 7/15/2022  Time: 2:08 PM

## 2022-08-26 ENCOUNTER — TELEPHONE (OUTPATIENT)
Dept: GASTROENTEROLOGY | Facility: CLINIC | Age: 62
End: 2022-08-26

## 2022-08-26 DIAGNOSIS — K70.31 ASCITES DUE TO ALCOHOLIC CIRRHOSIS (HCC): Primary | ICD-10-CM

## 2022-08-26 NOTE — TELEPHONE ENCOUNTER
Patients GI provider:  Dr Cj Arango    Number to return call: [phone]    Reason for call: Pts  called and stated patient needs to have another IR paracentesis test please reach out to patients  [employer] thank you    Scheduled procedure/appointment date if applicable: n/a

## 2022-08-26 NOTE — TELEPHONE ENCOUNTER
Spoke with patients   He will schedule a paracentesis, and follow-up with a PA  They are unavailable for appointments 9/19/2022-9/23/2022

## 2022-08-30 ENCOUNTER — HOSPITAL ENCOUNTER (OUTPATIENT)
Dept: NON INVASIVE DIAGNOSTICS | Facility: HOSPITAL | Age: 62
Discharge: HOME | End: 2022-08-30
Payer: COMMERCIAL

## 2022-08-30 VITALS
DIASTOLIC BLOOD PRESSURE: 50 MMHG | SYSTOLIC BLOOD PRESSURE: 98 MMHG | OXYGEN SATURATION: 100 % | HEART RATE: 75 BPM | RESPIRATION RATE: 20 BRPM

## 2022-08-30 DIAGNOSIS — K70.31 ASCITES DUE TO ALCOHOLIC CIRRHOSIS (HCC): ICD-10-CM

## 2022-08-30 PROCEDURE — 49083 ABD PARACENTESIS W/IMAGING: CPT | Performed by: RADIOLOGY

## 2022-08-30 PROCEDURE — 49083 ABD PARACENTESIS W/IMAGING: CPT

## 2022-08-30 RX ORDER — LIDOCAINE HYDROCHLORIDE 10 MG/ML
INJECTION, SOLUTION EPIDURAL; INFILTRATION; INTRACAUDAL; PERINEURAL CODE/TRAUMA/SEDATION MEDICATION
Status: COMPLETED | OUTPATIENT
Start: 2022-08-30 | End: 2022-08-30

## 2022-08-30 RX ADMIN — LIDOCAINE HYDROCHLORIDE 8 ML: 10 INJECTION, SOLUTION EPIDURAL; INFILTRATION; INTRACAUDAL; PERINEURAL at 12:33

## 2022-09-24 ENCOUNTER — APPOINTMENT (OUTPATIENT)
Dept: RADIOLOGY | Facility: HOSPITAL | Age: 62
End: 2022-09-24
Payer: COMMERCIAL

## 2022-09-24 ENCOUNTER — APPOINTMENT (EMERGENCY)
Dept: VASCULAR ULTRASOUND | Facility: HOSPITAL | Age: 62
End: 2022-09-24
Payer: COMMERCIAL

## 2022-09-24 ENCOUNTER — HOSPITAL ENCOUNTER (EMERGENCY)
Facility: HOSPITAL | Age: 62
Discharge: HOME | End: 2022-09-24
Attending: EMERGENCY MEDICINE
Payer: COMMERCIAL

## 2022-09-24 VITALS
TEMPERATURE: 98.9 F | WEIGHT: 160 LBS | OXYGEN SATURATION: 98 % | HEART RATE: 97 BPM | RESPIRATION RATE: 18 BRPM | BODY MASS INDEX: 26.66 KG/M2 | SYSTOLIC BLOOD PRESSURE: 122 MMHG | DIASTOLIC BLOOD PRESSURE: 64 MMHG | HEIGHT: 65 IN

## 2022-09-24 DIAGNOSIS — M25.512 LEFT SHOULDER PAIN: ICD-10-CM

## 2022-09-24 DIAGNOSIS — M79.602 LEFT ARM PAIN: Primary | ICD-10-CM

## 2022-09-24 DIAGNOSIS — N39.0 UTI (URINARY TRACT INFECTION): ICD-10-CM

## 2022-09-24 LAB
BACTERIA UR QL AUTO: ABNORMAL /HPF
BACTERIA UR QL AUTO: ABNORMAL /HPF
BILIRUB UR QL STRIP: ABNORMAL
BILIRUB UR QL STRIP: ABNORMAL
CLARITY UR: ABNORMAL
CLARITY UR: ABNORMAL
COLOR UR: ABNORMAL
COLOR UR: ABNORMAL
GLUCOSE UR STRIP-MCNC: ABNORMAL MG/DL
GLUCOSE UR STRIP-MCNC: NEGATIVE MG/DL
HGB UR QL STRIP.AUTO: ABNORMAL
HGB UR QL STRIP.AUTO: ABNORMAL
KETONES UR STRIP-MCNC: ABNORMAL MG/DL
KETONES UR STRIP-MCNC: NEGATIVE MG/DL
LEUKOCYTE ESTERASE UR QL STRIP: ABNORMAL
LEUKOCYTE ESTERASE UR QL STRIP: NEGATIVE
NITRITE UR QL STRIP: POSITIVE
NITRITE UR QL STRIP: POSITIVE
NON-SQ EPI CELLS URNS QL MICRO: ABNORMAL /HPF
NON-SQ EPI CELLS URNS QL MICRO: ABNORMAL /HPF
PH UR STRIP.AUTO: 5 [PH]
PH UR STRIP.AUTO: 5.5 [PH]
PROT UR STRIP-MCNC: >=300 MG/DL
PROT UR STRIP-MCNC: >=300 MG/DL
RBC #/AREA URNS AUTO: ABNORMAL /HPF
RBC #/AREA URNS AUTO: ABNORMAL /HPF
SP GR UR STRIP.AUTO: >=1.03 (ref 1–1.03)
SP GR UR STRIP.AUTO: >=1.03 (ref 1–1.03)
UROBILINOGEN UR QL STRIP.AUTO: 1 E.U./DL
UROBILINOGEN UR QL STRIP.AUTO: 1 E.U./DL
WBC #/AREA URNS AUTO: ABNORMAL /HPF
WBC #/AREA URNS AUTO: ABNORMAL /HPF

## 2022-09-24 PROCEDURE — 87077 CULTURE AEROBIC IDENTIFY: CPT | Performed by: EMERGENCY MEDICINE

## 2022-09-24 PROCEDURE — 99284 EMERGENCY DEPT VISIT MOD MDM: CPT

## 2022-09-24 PROCEDURE — 93971 EXTREMITY STUDY: CPT

## 2022-09-24 PROCEDURE — 87086 URINE CULTURE/COLONY COUNT: CPT | Performed by: EMERGENCY MEDICINE

## 2022-09-24 PROCEDURE — 73030 X-RAY EXAM OF SHOULDER: CPT

## 2022-09-24 PROCEDURE — 87186 SC STD MICRODIL/AGAR DIL: CPT | Performed by: EMERGENCY MEDICINE

## 2022-09-24 PROCEDURE — 81001 URINALYSIS AUTO W/SCOPE: CPT | Performed by: EMERGENCY MEDICINE

## 2022-09-24 PROCEDURE — 99284 EMERGENCY DEPT VISIT MOD MDM: CPT | Performed by: EMERGENCY MEDICINE

## 2022-09-24 RX ORDER — NAPROXEN 250 MG/1
500 TABLET ORAL ONCE
Status: COMPLETED | OUTPATIENT
Start: 2022-09-24 | End: 2022-09-24

## 2022-09-24 RX ORDER — DIAZEPAM 2 MG/1
2 TABLET ORAL ONCE
Status: COMPLETED | OUTPATIENT
Start: 2022-09-24 | End: 2022-09-24

## 2022-09-24 RX ORDER — NAPROXEN 500 MG/1
500 TABLET ORAL 2 TIMES DAILY WITH MEALS
Qty: 30 TABLET | Refills: 0 | Status: ON HOLD | OUTPATIENT
Start: 2022-09-24

## 2022-09-24 RX ORDER — LIDOCAINE 50 MG/G
1 PATCH TOPICAL ONCE
Status: DISCONTINUED | OUTPATIENT
Start: 2022-09-24 | End: 2022-09-24 | Stop reason: HOSPADM

## 2022-09-24 RX ORDER — OXYCODONE HYDROCHLORIDE AND ACETAMINOPHEN 5; 325 MG/1; MG/1
1 TABLET ORAL ONCE
Status: COMPLETED | OUTPATIENT
Start: 2022-09-24 | End: 2022-09-24

## 2022-09-24 RX ORDER — METHOCARBAMOL 500 MG/1
500 TABLET, FILM COATED ORAL ONCE
Status: COMPLETED | OUTPATIENT
Start: 2022-09-24 | End: 2022-09-24

## 2022-09-24 RX ORDER — CEPHALEXIN 500 MG/1
500 CAPSULE ORAL EVERY 12 HOURS SCHEDULED
Qty: 14 CAPSULE | Refills: 0 | Status: SHIPPED | OUTPATIENT
Start: 2022-09-24 | End: 2022-09-28

## 2022-09-24 RX ORDER — DIAZEPAM 2 MG/1
2 TABLET ORAL EVERY 8 HOURS PRN
Qty: 20 TABLET | Refills: 0 | Status: ON HOLD | OUTPATIENT
Start: 2022-09-24 | End: 2022-10-01

## 2022-09-24 RX ADMIN — NAPROXEN 500 MG: 250 TABLET ORAL at 18:23

## 2022-09-24 RX ADMIN — METHOCARBAMOL 500 MG: 500 TABLET ORAL at 16:10

## 2022-09-24 RX ADMIN — OXYCODONE HYDROCHLORIDE AND ACETAMINOPHEN 1 TABLET: 5; 325 TABLET ORAL at 13:57

## 2022-09-24 RX ADMIN — DIAZEPAM 2 MG: 2 TABLET ORAL at 18:24

## 2022-09-24 NOTE — ED NOTES
Vascular contacted via [employer] about study that was ordered       Rosa Elena Bates RN  09/24/22 [id number]

## 2022-09-24 NOTE — DISCHARGE INSTRUCTIONS
Continue to use sling at home for pain control  Follow up w ortho for definitive care  Purchase over the counter Voltaren for topical pain control as well

## 2022-09-24 NOTE — ED PROVIDER NOTES
History  Chief Complaint   Patient presents with    Arm Pain     Left arm and shoulder pain x 2 weeks  Was evaluated by a hospital in [facility] and given pain medication and medication ran out 2 days ago  Also c/o blood in urine  65 y o  F presents w L arm/shoulder pain x 2 weeks  Atraumatic  Swelling to the arm noted as well - hx of breast cancer and had lymph node remval from the affected extremity  No associated symptoms  Denies associated chest pain, no shortness of breath, no diaphoresis, no nausea or vomiting  Seen at hospital in [facility] - given pain control, helped pain but ran out of meds 2 days ago, represents w pain  Pain w ROM, abduction, external rotation  Also c/o hematuria, painless  Prior to Admission Medications   Prescriptions Last Dose Informant Patient Reported? Taking?    SUMAtriptan (IMITREX) 100 mg tablet  Self Yes Yes   Sig: Take 100 mg by mouth once as needed for migraine   Santyl ointment  Self Yes Yes   Sig: APPLY TO AFFECTED AREA EVERY DAY   folic acid (FOLVITE) 1 mg tablet  Self No Yes   Sig: Take 1 tablet (1 mg total) by mouth daily   folic acid (FOLVITE) 1 mg tablet   No Yes   Sig: Take 1 tablet (1 mg total) by mouth daily   gabapentin (NEURONTIN) 300 mg capsule   No Yes   Sig: Take 1 capsule (300 mg total) by mouth 3 (three) times a day   Patient taking differently: Take 300 mg by mouth as needed   letrozole (FEMARA) 2 5 mg tablet  Self No Yes   Sig: TAKE 1 TABLET BY MOUTH EVERY DAY   megestrol (MEGACE) 40 mg tablet   No Yes   Sig: Take 1 tablet (40 mg total) by mouth daily   midodrine (PROAMATINE) 5 mg tablet  Self No Yes   Sig: Take 1 tablet (5 mg total) by mouth 3 (three) times a day before meals for 15 days Please check your blood pressure 3x/day and hold if sysolic blood pressure greater than 130 mg   pantoprazole (PROTONIX) 40 mg tablet  Self No Yes   Sig: Take 1 tablet (40 mg total) by mouth daily in the early morning   potassium chloride (MICRO-K) 10 MEQ CR capsule  Self No Yes   Sig: Take 2 capsules (20 mEq total) by mouth daily Taken with torsemide   sertraline (ZOLOFT) 100 mg tablet  Self Yes Yes   Sig: Take 100 mg by mouth daily   sertraline (ZOLOFT) 50 mg tablet  Self Yes Yes   Sig: Take 50 mg by mouth 2 (two) times a day    torsemide (DEMADEX) 20 mg tablet  Self No Yes   Sig: Take 1 tablet (20 mg total) by mouth daily      Facility-Administered Medications: None       Past Medical History:   Diagnosis Date    Acute DVT (deep venous thrombosis) (HCC)     of LLE>     Bladder infection     Congenital prolapsed rectum     History of biliary stent insertion     History of chemotherapy     History of radiation therapy 05/2018    left breast ca    History of transfusion     x2 s/p chemo treatments, no reaction    Hydronephrosis     right kidney    Hypertension     Malignant neoplasm of overlapping sites of left female breast ([facility] [address] ) 05/01/2018    Migraine        Past Surgical History:   Procedure Laterality Date    ABDOMINAL ADHESION SURGERY N/A 4/23/2019    Procedure: LYSIS ADHESIONS;  Surgeon: Jany Oliver MD;  [facility];  Service: Colorectal    BREAST BIOPSY      COLON SURGERY      colon resection    CYSTOSCOPY N/A 3/4/2019    Procedure: CYSTOSCOPY WITH BIOPSIES AND FULGERATION AND REDCUTION OF RECTUM PROLAPSE;  Surgeon: Gracie Quesada MD;  [facility];  Service: Urology    ERCP N/A 1/4/2019    Procedure: ENDOSCOPIC RETROGRADE CHOLANGIOPANCREATOGRAPHY (ERCP);   Surgeon: Keyla Carroll MD;  [facility];  Service: Gastroenterology    INSTILLATION MYTOMYCIN N/A 3/4/2019    Procedure: Luz Lynn;  Surgeon: Gracie Quesada MD;  [facility];  Service: Urology    IR PARACENTESIS  3/21/2022    IR PARACENTESIS  6/1/2022    IR PARACENTESIS  7/15/2022    IR PARACENTESIS  8/30/2022    MASTECTOMY Left     2018    CT COLONOSCOPY FLX DX W/COLLJ SPEC WHEN PFRMD N/A 4/22/2019    Procedure: COLONOSCOPY;  Surgeon: Dahlia Scott MD;  Location: [facility]; Service: Colorectal    UT EDG US EXAM SURGICAL ALTER STOM DUODENUM/JEJUNUM N/A 3/7/2019    Procedure: LINEAR ENDOSCOPIC U/S;  Surgeon: Esthela Snider MD;  [facility]; Service: Gastroenterology    UT ESOPHAGOGASTRODUODENOSCOPY TRANSORAL DIAGNOSTIC N/A 3/7/2019    Procedure: ESOPHAGOGASTRODUODENOSCOPY (EGD); Surgeon: Esthela Snider MD;  [facility];   Service: Gastroenterology    UT INSJ TUNNELED CTR VAD W/SUBQ PORT AGE 5 YR/> N/A 6/26/2018    Procedure: INSERTION VENOUS PORT ( PORT-A-CATH) IR;  Surgeon: Edwar Turner DO;  [facility];  Service: Interventional Radiology    UT LAP, SURG PROCTOPEXY N/A 4/23/2019    Procedure: LAPAROSCOPIC HAND-ASSIST PELVIC DISSECTION; proctopexy;  Surgeon: Dahlia Scott MD;  [facility];  Service: Colorectal    UT LAP, SURG PROCTOPEXY N/A 11/18/2020    Procedure: LAPAROSCOPIC LYSIS OF ADHESIONS, MOBILIZATION OF RECTUM AND SUTURE RECTOPEXY;  Surgeon: Miryam Salinas MD;  [facility];  Service: Colorectal    UT MASTECTOMY, MODIFIED RADICAL Left 5/15/2018    Procedure: BREAST MODIFIED RADICAL MASTECTOMY;  Surgeon: Piper Tripp MD;  [facility];  Service: Surgical Oncology    UT RMVL [place] CTR VAD W/SUBQ PORT/ CTR/PRPH INSJ N/A 6/4/2019    Procedure: REMOVAL VENOUS PORT (PORT-A-CATH)IR;  Surgeon: Edwar Turner DO;  [facility];  Service: Interventional Radiology    TUBAL LIGATION      US GUIDANCE BREAST BIOPSY LEFT EACH ADDITIONAL Left 4/3/2018    US GUIDED BREAST BIOPSY LEFT COMPLETE Left 4/3/2018    US GUIDED BREAST LYMPH NODE BIOPSY LEFT Left 4/3/2018       Family History   Problem Relation Age of Onset    No Known Problems Mother     No Known Problems Father     Breast cancer Maternal Aunt 48    Colon cancer Maternal Aunt     No Known Problems Sister     No Known Problems Daughter     No Known Problems Maternal Grandmother  No Known Problems Maternal Grandfather     No Known Problems Paternal Grandmother     No Known Problems Paternal Grandfather      I have reviewed and agree with the history as documented  E-Cigarette/Vaping    E-Cigarette Use Never User      E-Cigarette/Vaping Substances     Social History     Tobacco Use    Smoking status: Never Smoker    Smokeless tobacco: Never Used   Vaping Use    Vaping Use: Never used   Substance Use Topics    Alcohol use: Not Currently     Comment: Drinks daily until three weeks ago    Drug use: Not Currently       Review of Systems   Constitutional: Negative for chills and fever  HENT: Negative for congestion  Respiratory: Negative for chest tightness and shortness of breath  Cardiovascular: Negative for chest pain and leg swelling  Gastrointestinal: Negative for abdominal pain, nausea and vomiting  Genitourinary: Positive for hematuria  Negative for dysuria and flank pain  Musculoskeletal: Negative for back pain and neck pain  L arm swelling and pain to L shoulder  Skin: Positive for color change (erythema L arm)  Negative for wound  Neurological: Negative for dizziness, weakness, numbness and headaches  Physical Exam  Physical Exam  Vitals reviewed  Constitutional:       General: She is not in acute distress  Appearance: She is well-developed  She is not diaphoretic  HENT:      Head: Normocephalic and atraumatic  Eyes:      General: No scleral icterus  Right eye: No discharge  Left eye: No discharge  Conjunctiva/sclera: Conjunctivae normal       Pupils: Pupils are equal, round, and reactive to light  Neck:      Vascular: No JVD  Cardiovascular:      Rate and Rhythm: Normal rate and regular rhythm  Heart sounds: Normal heart sounds  No murmur heard  No friction rub  No gallop  Pulmonary:      Effort: Pulmonary effort is normal  No respiratory distress  Breath sounds: Normal breath sounds   No wheezing or rales  Chest:      Chest wall: No tenderness  Abdominal:      General: Bowel sounds are normal  There is no distension  Palpations: Abdomen is soft  Tenderness: There is no abdominal tenderness  There is no guarding or rebound  Musculoskeletal:         General: Swelling (LUE) and tenderness (L shoulder  decreased ROM 2/2 pain  pain w external rotation, abduction) present  No deformity  Cervical back: Normal range of motion and neck supple  Skin:     General: Skin is warm and dry  Coloration: Skin is not pale  Findings: Erythema (LUE) present  No rash  Neurological:      General: No focal deficit present  Mental Status: She is alert and oriented to person, place, and time  Cranial Nerves: No cranial nerve deficit     Psychiatric:         Behavior: Behavior normal          Vital Signs  ED Triage Vitals   Temperature Pulse Respirations Blood Pressure SpO2   09/24/22 1254 09/24/22 1254 09/24/22 1254 09/24/22 1254 09/24/22 1254   98 9 °F (37 2 °C) 99 17 118/79 99 %      Temp Source Heart Rate Source Patient Position - Orthostatic VS BP Location FiO2 (%)   09/24/22 1254 09/24/22 1530 09/24/22 1254 09/24/22 1254 --   Tympanic Monitor Sitting Left arm       Pain Score       09/24/22 1254       5           Vitals:    09/24/22 1545 09/24/22 1615 09/24/22 1653 09/24/22 1826   BP:  117/66 110/79 122/64   Pulse: 89 96 95 97   Patient Position - Orthostatic VS:  Sitting Lying Sitting         Visual Acuity      ED Medications  Medications   oxyCODONE-acetaminophen (PERCOCET) 5-325 mg per tablet 1 tablet (1 tablet Oral Given 9/24/22 1357)   methocarbamol (ROBAXIN) tablet 500 mg (500 mg Oral Given 9/24/22 1610)   diazepam (VALIUM) tablet 2 mg (2 mg Oral Given 9/24/22 1824)   naproxen (NAPROSYN) tablet 500 mg (500 mg Oral Given 9/24/22 1823)       Diagnostic Studies  Results Reviewed     Procedure Component Value Units Date/Time    Urine Microscopic [[id number]]  (Abnormal) Collected: 09/24/22 1757    Lab Status: Final result Specimen: Urine, Clean Catch Updated: 09/24/22 1822     RBC, UA       Field obscured, unable to enumerate     /hpf     WBC, UA       Field obscured, unable to enumerate     /hpf     Epithelial Cells       Field obscured, unable to enumerate     /hpf     Bacteria, UA       Field obscured, unable to enumerate     /hpf    Urine culture [[id number]] Collected: 09/24/22 1757    Lab Status: In process Specimen: Urine, Clean Catch Updated: 09/24/22 1822    UA w Reflex to Microscopic w Reflex to Culture [[id number]]  (Abnormal) Collected: 09/24/22 1757    Lab Status: Final result Specimen: Urine, Clean Catch Updated: 09/24/22 1820     Color, UA Red     Clarity, UA Slightly Cloudy     Specific Gravity, UA >=1 030     pH, UA 5 0     Leukocytes, UA Small     Nitrite, UA Positive     Protein, UA >=300 mg/dl      Glucose,  (1/4%) mg/dl      Ketones, UA Trace mg/dl      Urobilinogen, UA 1 0 E U /dl      Bilirubin, UA Moderate     Occult Blood, UA Large    Urine Microscopic [[id number]]  (Abnormal) Collected: 09/24/22 1528    Lab Status: Final result Specimen: Urine, Clean Catch Updated: 09/24/22 1554     RBC, UA       Field obscured, unable to enumerate     /hpf     WBC, UA       Field obscured, unable to enumerate     /hpf     Epithelial Cells       Field obscured, unable to enumerate     /hpf     Bacteria, UA       Field obscured, unable to enumerate     /hpf    Narrative:      Sample was short and very bloody    Urine culture [[id number]] Collected: 09/24/22 1528    Lab Status:  In process Specimen: Urine, Clean Catch Updated: 09/24/22 1554    UA w Reflex to Microscopic w Reflex to Culture [[id number]]  (Abnormal) Collected: 09/24/22 1528    Lab Status: Final result Specimen: Urine, Clean Catch Updated: 09/24/22 1545     Color, UA Red     Clarity, UA Cloudy     Specific Gravity, UA >=1 030     pH, UA 5 5     Leukocytes, UA Negative     Nitrite, UA Positive     Protein, UA >=300 mg/dl      Glucose, UA Negative mg/dl      Ketones, UA Negative mg/dl      Urobilinogen, UA 1 0 E U /dl      Bilirubin, UA Small     Occult Blood, UA Large                 XR shoulder 2+ views LEFT    (Results Pending)   VAS upper limb venous duplex scan, unilateral/limited    (Results Pending)              Procedures  Procedures   Patient has sling at home  ED Course  ED Course as of 09/25/22 0816   Sat Sep 24, 2022   1536 Told the nurse she had improvement with Percocet but tells me that she did not have improvement with Percocet  Patient with will have Robaxin, lidocaine patch, awaiting dvt study     1713 DVT study preliminarily negative  Will treat for rotator cuff injury  Will send to ortho for further imaging and treatment  Will give sling and pain control  Likely UTI w nitrates  Will treat for UTI  MDM  Number of Diagnoses or Management Options  Left arm pain  Left shoulder pain  UTI (urinary tract infection)  Diagnosis management comments: Pain to L shoulder and LUE w swelling  DVT study  Xray  Pain control  L shoulder arthritis  Ongoing pain x 2 weeks  DVT study normal  Normal xray, pain w rotator cuff movement  Will refer to ortho for further shoulder eval  Treated for UTI  Discussed with patient and they understood the risks and benefits of discharge  Patient had opportunity to ask questions regarding care and discharge instructions and had no further questions  Advised follow up with PCP, advised returning if worsening, and discussed disease specific return precautions  Patient understood discharge instructions            Amount and/or Complexity of Data Reviewed  Tests in the radiology section of CPT®: ordered and reviewed        Disposition  Final diagnoses:   Left arm pain   Left shoulder pain   UTI (urinary tract infection)     Time reflects when diagnosis was documented in both MDM as applicable and the Disposition within this note     Time User Action Codes Description Comment    9/24/2022  4:12 PM Ibrahima Kline Add [B29 336] Left arm pain     9/24/2022  4:12 PM Christian Nelson Morocho Add [B91 [id number]] Left shoulder pain     9/24/2022  5:26 PM Nelson Gonzales Add [N39 0] UTI (urinary tract infection)       ED Disposition     ED Disposition   Discharge    Condition   Stable    Date/Time   Sat Sep 24, 2022  5:26 PM    Comment   Maura Isaacs discharge to home/self care  Follow-up Information     Follow up With Specialties Details Why Contact Info Additional Information    [address] [facility], [place] Chiropractic Medicine Schedule an appointment as soon as possible for a visit  For follow up to ensure improvement, and for further testing and treatment as needed [address] [place] [address] Emergency Department Emergency Medicine  If symptoms worsen 34 [facility] Emergency Department, [address] [place] [address], [address] [facility] Orthopedic Surgery Schedule an appointment as soon as possible for a visit  for care of shoulder pain, and for further testing and treatment as needed  [address] [place] [address] [place] [phone] [address] [facility], [address], [place], [place], [address]    [facility] Schedule an appointment as soon as possible for a visit  For follow-up regarding blood in urine and to ensure improvement   [address] [phone] 577  [facility] [place], [address] [facility], [address] [place], [place] [address], [place], [address]          Discharge Medication List as of 9/24/2022  5:30 PM      START taking these medications    Details   cephalexin (KEFLEX) 500 mg capsule Take 1 capsule (500 mg total) by mouth every 12 (twelve) hours for 7 days, Starting Sat 9/24/2022, Until Sat 10/1/2022, Normal      diazepam (VALIUM) 2 mg tablet Take 1 tablet (2 mg total) by mouth every 8 (eight) hours as needed (shoulder pain) for up to 7 days, Starting Sat 9/24/2022, Until Sat 10/1/2022 at 2359, Normal      naproxen (Naprosyn) 500 mg tablet Take 1 tablet (500 mg total) by mouth 2 (two) times a day with meals As needed for mild-moderate pain control , Starting Sat 9/24/2022, Normal         CONTINUE these medications which have NOT CHANGED    Details   !! folic acid (FOLVITE) 1 mg tablet Take 1 tablet (1 mg total) by mouth daily, Starting Fri 9/11/2020, No Print      !! folic acid (FOLVITE) 1 mg tablet Take 1 tablet (1 mg total) by mouth daily, Starting Wed 3/2/2022, Until Sat 9/24/2022, Normal      gabapentin (NEURONTIN) 300 mg capsule Take 1 capsule (300 mg total) by mouth 3 (three) times a day, Starting Tue 5/28/2019, Normal      letrozole (FEMARA) 2 5 mg tablet TAKE 1 TABLET BY MOUTH EVERY DAY, Normal      megestrol (MEGACE) 40 mg tablet Take 1 tablet (40 mg total) by mouth daily, Starting Wed 5/25/2022, Normal      midodrine (PROAMATINE) 5 mg tablet Take 1 tablet (5 mg total) by mouth 3 (three) times a day before meals for 15 days Please check your blood pressure 3x/day and hold if sysolic blood pressure greater than 130 mg, Starting Wed 3/2/2022, Until Sat 9/24/2022, Normal      pantoprazole (PROTONIX) 40 mg tablet Take 1 tablet (40 mg total) by mouth daily in the early morning, Starting Thu 3/3/2022, Until Sat 9/24/2022, Normal      potassium chloride (MICRO-K) 10 MEQ CR capsule Take 2 capsules (20 mEq total) by mouth daily Taken with torsemide, Starting Wed 3/23/2022, Normal      Santyl ointment APPLY TO AFFECTED AREA EVERY DAY, Historical Med      !! sertraline (ZOLOFT) 100 mg tablet Take 100 mg by mouth daily, Starting Tue 4/16/2019, Historical Med      !! sertraline (ZOLOFT) 50 mg tablet Take 50 mg by mouth 2 (two) times a day , Starting Thu 10/11/2018, Historical Med      SUMAtriptan (IMITREX) 100 mg tablet Take 100 mg by mouth once as needed for migraine, Historical Med      torsemide (DEMADEX) 20 mg tablet Take 1 tablet (20 mg total) by mouth daily, Starting Wed 3/23/2022, Print       !! - Potential duplicate medications found  Please discuss with provider  No discharge procedures on file      PDMP Review     None          ED Provider  Electronically Signed by           Bharath Llamas DO  09/25/22 8081

## 2022-09-25 PROCEDURE — 93971 EXTREMITY STUDY: CPT | Performed by: SURGERY

## 2022-09-28 ENCOUNTER — OFFICE VISIT (OUTPATIENT)
Dept: GASTROENTEROLOGY | Facility: CLINIC | Age: 62
End: 2022-09-28
Payer: COMMERCIAL

## 2022-09-28 VITALS
BODY MASS INDEX: 20.66 KG/M2 | HEART RATE: 103 BPM | OXYGEN SATURATION: 96 % | WEIGHT: 124 LBS | HEIGHT: 65 IN | DIASTOLIC BLOOD PRESSURE: 78 MMHG | SYSTOLIC BLOOD PRESSURE: 112 MMHG

## 2022-09-28 DIAGNOSIS — K70.31 ALCOHOLIC CIRRHOSIS OF LIVER WITH ASCITES (HCC): Primary | ICD-10-CM

## 2022-09-28 LAB — BACTERIA UR CULT: ABNORMAL

## 2022-09-28 PROCEDURE — 99213 OFFICE O/P EST LOW 20 MIN: CPT | Performed by: PHYSICIAN ASSISTANT

## 2022-09-28 RX ORDER — LIDOCAINE 50 MG/G
PATCH TOPICAL
Status: ON HOLD | COMMUNITY
Start: 2022-09-11

## 2022-09-28 RX ORDER — NAPROXEN SODIUM 500 MG/1
TABLET, FILM COATED, EXTENDED RELEASE ORAL
COMMUNITY
Start: 2022-09-11 | End: 2022-09-28

## 2022-09-28 RX ORDER — QUETIAPINE FUMARATE 200 MG/1
200 TABLET, FILM COATED ORAL AS NEEDED
Status: ON HOLD | COMMUNITY

## 2022-09-28 RX ORDER — METHOCARBAMOL 750 MG/1
TABLET, FILM COATED ORAL
Status: ON HOLD | COMMUNITY
Start: 2022-09-11

## 2022-09-28 NOTE — PATIENT INSTRUCTIONS
Scheduled date of EGD(as of today):11/22/22  Physician performing EGD:Rocío  Location of EGD:[place]  Instructions reviewed with patient by:Elder douglas  Clearances:  none

## 2022-09-28 NOTE — PROGRESS NOTES
[facility] - Outpatient Follow-up Note  Cesar Ruiz 65 y o  female MRN: [id number]  Encounter: [id number]          ASSESSMENT AND PLAN:      1  Alcoholic cirrhosis of liver with ascites ([address] )  S/P hospitalization with ETOH hepatitis DF >32 in February of 2022 treated with prednisolone course  She has had no alcohol since prior to this admission  She has had no labs since discharge  She is still requiring paracentesis about once per month with 6300-6500mL of fluid removed    1  Update labs and calculate MELD  2  Update imaging - US ordered provided  3  Plan EGD for varices screening as she has never had one  4  If labs are ok consider use of diuretics  5  Low Na diet  6  Continue to abstain from alcohol  7  F/U here routinely in 3 months or sooner if necessary    ______________________________________________________________________    SUBJECTIVE:  72-year-old female with alcohol cirrhosis complicated by ascites who presents for follow-up  She was last seen in May of this year  At that time her complete with anorexia  She was hospitalized initially in February of this year with abdominal pain and ascites  She was diagnosed with alcoholic hepatitis at that time her discriminant function was noted to be greater 32 and he was started on prednisolone course  She was readmitted to [facility] in March with abdominal pain  She underwent a paracentesis with cell count culture negative for SBP  She was discharged and follow up in the office in May with complaints of anorexia  Since that time she has undergone 3 more paracenteses all with between 6988-1098 mL of fluid removed  She has not had any blood work since her discharge in March  She reports that she was advised to get blood work but did not follow up for this as it is difficult to get a vein  She denies any current abdominal pain  She reports her bowel movements are normal   She denies any confusion    She denies any hematemesis or melena  She has never had an EGD  She reports that she has not had any alcohol since her initial admission in February  REVIEW OF SYSTEMS IS OTHERWISE NEGATIVE  Historical Information   Past Medical History:   Diagnosis Date    Acute DVT (deep venous thrombosis) (HCC)     of LLE>     Bladder infection     Congenital prolapsed rectum     History of biliary stent insertion     History of chemotherapy     History of radiation therapy 05/2018    left breast ca    History of transfusion     x2 s/p chemo treatments, no reaction    Hydronephrosis     right kidney    Hypertension     Malignant neoplasm of overlapping sites of left female breast ([facility] [address] ) 05/01/2018    Migraine      Past Surgical History:   Procedure Laterality Date    ABDOMINAL ADHESION SURGERY N/A 4/23/2019    Procedure: LYSIS ADHESIONS;  Surgeon: Kaushik Dozier MD;  [facility];  Service: Colorectal    BREAST BIOPSY      COLON SURGERY      colon resection    CYSTOSCOPY N/A 3/4/2019    Procedure: CYSTOSCOPY WITH BIOPSIES AND FULGERATION AND REDCUTION OF RECTUM PROLAPSE;  Surgeon: Anny Gibson MD;  [facility];  Service: Urology    ERCP N/A 1/4/2019    Procedure: ENDOSCOPIC RETROGRADE CHOLANGIOPANCREATOGRAPHY (ERCP); Surgeon: Rima Xiao MD;  [facility];  Service: Gastroenterology    INSTILLATION MYTOMYCIN N/A 3/4/2019    Procedure: Renée Pour;  Surgeon: Anny Gibson MD;  [facility];  Service: Urology    IR PARACENTESIS  3/21/2022    IR PARACENTESIS  6/1/2022    IR PARACENTESIS  7/15/2022    IR PARACENTESIS  8/30/2022    MASTECTOMY Left     2018    WA COLONOSCOPY FLX DX W/COLLJ SPEC WHEN PFRMD N/A 4/22/2019    Procedure: COLONOSCOPY;  Surgeon: Kaushik Dozier MD;  [facility];   Service: Colorectal    WA EDG US EXAM SURGICAL ALTER STOM DUODENUM/JEJUNUM N/A 3/7/2019    Procedure: LINEAR ENDOSCOPIC U/S;  Surgeon: Mauro Lewis MD; [facility]: [facility]; Service: Gastroenterology    MD ESOPHAGOGASTRODUODENOSCOPY TRANSORAL DIAGNOSTIC N/A 3/7/2019    Procedure: ESOPHAGOGASTRODUODENOSCOPY (EGD); Surgeon: Nandini Torres MD;  [facility];   Service: Gastroenterology    MD INSJ TUNNELED CTR VAD W/SUBQ PORT AGE 5 YR/> N/A 6/26/2018    Procedure: INSERTION VENOUS PORT ( PORT-A-CATH) IR;  Surgeon: Jose Feldman DO;  [facility];  Service: Interventional Radiology    MD LAP, SURG PROCTOPEXY N/A 4/23/2019    Procedure: LAPAROSCOPIC HAND-ASSIST PELVIC DISSECTION; proctopexy;  Surgeon: Jany Oliver MD;  [facility];  Service: Colorectal    MD LAP, SURG PROCTOPEXY N/A 11/18/2020    Procedure: LAPAROSCOPIC LYSIS OF ADHESIONS, MOBILIZATION OF RECTUM AND SUTURE RECTOPEXY;  Surgeon: Vlad Almendarez MD;  [facility];  Service: Colorectal    MD MASTECTOMY, MODIFIED RADICAL Left 5/15/2018    Procedure: BREAST MODIFIED RADICAL MASTECTOMY;  Surgeon: Martha Nascimento MD;  [facility];  Service: Surgical Oncology    MD RMVL [place] CTR VAD W/SUBQ PORT/ CTR/PRPH INSJ N/A 6/4/2019    Procedure: REMOVAL VENOUS PORT (PORT-A-CATH)IR;  Surgeon: Jose Feldman DO;  [facility];  Service: Interventional Radiology    TUBAL LIGATION      US GUIDANCE BREAST BIOPSY LEFT EACH ADDITIONAL Left 4/3/2018    US GUIDED BREAST BIOPSY LEFT COMPLETE Left 4/3/2018    US GUIDED BREAST LYMPH NODE BIOPSY LEFT Left 4/3/2018     Social History   Social History     Substance and Sexual Activity   Alcohol Use Not Currently    Comment: Drinks daily until three weeks ago     Social History     Substance and Sexual Activity   Drug Use Not Currently     Social History     Tobacco Use   Smoking Status Never Smoker   Smokeless Tobacco Never Used     Family History   Problem Relation Age of Onset    No Known Problems Mother     No Known Problems Father     Breast cancer Maternal Aunt 48    Colon cancer Maternal Aunt     No Known Problems Sister     No Known Problems Daughter     No Known Problems Maternal Grandmother     No Known Problems Maternal Grandfather     No Known Problems Paternal Grandmother     No Known Problems Paternal Grandfather        Meds/Allergies       Current Outpatient Medications:     diazepam (VALIUM) 2 mg tablet    gabapentin (NEURONTIN) 300 mg capsule    letrozole (FEMARA) 2 5 mg tablet    lidocaine (LIDODERM) 5 %    methocarbamol (ROBAXIN) 750 mg tablet    naproxen (Naprosyn) 500 mg tablet    QUEtiapine (SEROquel) 200 mg tablet    SUMAtriptan (IMITREX) 100 mg tablet    Allergies   Allergen Reactions    Gluten Meal - Food Allergy GI Intolerance    Lactose - Food Allergy GI Intolerance           Objective     Blood pressure 112/78, pulse 103, height 5' 5" (1 651 m), weight 56 2 kg (124 lb), SpO2 96 %, not currently breastfeeding  Body mass index is 20 63 kg/m²  PHYSICAL EXAM:      General Appearance:   Alert, cooperative, no distress   HEENT:   Normocephalic, atraumatic, anicteric      Neck:  Supple, symmetrical, trachea midline   Lungs:   Clear to auscultation bilaterally; no rales, rhonchi or wheezing; respirations unlabored    Heart[de-identified]   Regular rate and rhythm; no murmur, rub, or gallop  Abdomen:   Soft, non-tender, non-distended; normal bowel sounds; no masses, no organomegaly    Genitalia:   Deferred    Rectal:   Deferred    Extremities:  No cyanosis, clubbing or edema    Pulses:  2+ and symmetric    Skin:  No jaundice, rashes, or lesions    Lymph nodes:  No palpable cervical lymphadenopathy        Lab Results:   No visits with results within 1 Day(s) from this visit     Latest known visit with results is:   Admission on 09/24/2022, Discharged on 09/24/2022   Component Date Value    Color, UA 09/24/2022 Red     Clarity, UA 09/24/2022 Cloudy     Specific Gravity, UA 09/24/2022 >=1 030     pH, UA 09/24/2022 5 5     Leukocytes, UA 09/24/2022 Negative     Nitrite, UA 09/24/2022 Positive (A)    Protein, UA 09/24/2022 >=300 (A)    Glucose, UA 09/24/2022 Negative     Ketones, UA 09/24/2022 Negative     Urobilinogen, UA 09/24/2022 1 0     Bilirubin, UA 09/24/2022 Small (A)    Occult Blood, UA 09/24/2022 Large (A)    RBC, UA 09/24/2022 Field obscured, unable to enumerate (A)    WBC, UA 09/24/2022 Field obscured, unable to enumerate (A)    Epithelial Cells 09/24/2022 Field obscured, unable to enumerate (A)    Bacteria, UA 09/24/2022 Field obscured, unable to enumerate (A)    Urine Culture 09/24/2022 >100,000 cfu/ml Escherichia coli (A)    Color, UA 09/24/2022 Red     Clarity, UA 09/24/2022 Slightly Cloudy     Specific Gravity, UA 09/24/2022 >=1 030     pH, UA 09/24/2022 5 0     Leukocytes, UA 09/24/2022 Small (A)    Nitrite, UA 09/24/2022 Positive (A)    Protein, UA 09/24/2022 >=300 (A)    Glucose, UA 09/24/2022 250 (1/4%) (A)    Ketones, UA 09/24/2022 Trace (A)    Urobilinogen, UA 09/24/2022 1 0     Bilirubin, UA 09/24/2022 Moderate (A)    Occult Blood, UA 09/24/2022 Large (A)    RBC, UA 09/24/2022 Field obscured, unable to enumerate (A)    WBC, UA 09/24/2022 Field obscured, unable to enumerate (A)    Epithelial Cells 09/24/2022 Field obscured, unable to enumerate (A)    Bacteria, UA 09/24/2022 Field obscured, unable to enumerate (A)    Urine Culture 09/24/2022 >100,000 cfu/ml Escherichia coli (A)         Radiology Results:   XR shoulder 2+ views LEFT    Result Date: 9/25/2022  Narrative: LEFT SHOULDER INDICATION:   pain  COMPARISON:  Chest x-ray performed March 21, 2022 VIEWS:  XR SHOULDER 2+ VW LEFT FINDINGS: There is no acute fracture or dislocation  Mild acromioclavicular and clinical humeral osteoarthritic degenerative change  Pulmonary vascular congestive changes are noted  Consolidative airspace opacity noted in the left perihilar region and left lower chest  No lytic or blastic osseous lesion  Surgical clips noted in the left axilla       Impression: No acute osseous abnormality  Mild acromioclavicular and clinical humeral osteoarthritic degenerative change  Pulmonary vascular congestive changes are noted  Consolidative airspace opacity noted in the left perihilar region and left lower chest  Workstation performed: [id number]     IR paracentesis    Result Date: 8/30/2022  Narrative: Ultrasound-guided paracentesis Clinical History: Recurrent ascites   Technique: Patient was brought to the interventional radiology area and placed supine on the stretcher  After a brief ultrasound examination was performed to localize a pocket of fluid,an area on the skin of the right lower quadrant was prepped, and draped in the usual sterile fashion  Lidocaine was administered to the skin and a small skin incision was made  A 5 [place] multisidehole catheter  was advanced into the fluid and 6600 mL clear yellow ascites was aspirated  After the procedure, the catheter was removed and a bandage applied to the site  The patient tolerated the procedure well and suffered no complications  Impression: Impression: 1  Successful ultrasound-guided paracentesis yielding 6600 mL clear yellow ascites  Workstation performed: [id number]     VAS upper limb venous duplex scan, unilateral/limited    Result Date: 9/25/2022  Narrative:  [facility] REPORT CLINICAL: Indications: Patient presents with left upper extremity pain, shoulder, x few days  Hx of breast cancer  Operative History: 2019-06-04 Venous port removal 2018-06-26 Venous port insertion 2018-01-01 Left Mastectomy Risk Factors The patient has history of HTN and CKD  She has no history of Obesity  CONCLUSION:  Impression  RIGHT UPPER LIMB LIMITED: Evaluation shows no evidence of thrombus in the internal jugular vein, subclavian vein, and the brachiocephalic vein  LEFT UPPER LIMB: No evidence of acute or chronic deep vein thrombosis  No evidence of superficial thrombophlebitis noted   Doppler evaluation shows a normal response to augmentation maneuvers  Technical findings were given to Dr Garrison Guzman    SIGNATURE: Electronically Signed by: Lavern Hashimoto, MD, [address] on 2022-09-25 10:38:10 PM

## 2022-09-29 LAB
BACTERIA UR CULT: ABNORMAL
BACTERIA UR CULT: ABNORMAL

## 2022-10-04 ENCOUNTER — HOSPITAL ENCOUNTER (OUTPATIENT)
Dept: NON INVASIVE DIAGNOSTICS | Facility: HOSPITAL | Age: 62
Discharge: HOME | End: 2022-10-04
Payer: COMMERCIAL

## 2022-10-04 ENCOUNTER — HOSPITAL ENCOUNTER (OUTPATIENT)
Dept: ULTRASOUND IMAGING | Facility: HOSPITAL | Age: 62
Discharge: HOME | End: 2022-10-04
Payer: COMMERCIAL

## 2022-10-04 VITALS — DIASTOLIC BLOOD PRESSURE: 69 MMHG | OXYGEN SATURATION: 99 % | SYSTOLIC BLOOD PRESSURE: 128 MMHG

## 2022-10-04 DIAGNOSIS — K70.31 ALCOHOLIC CIRRHOSIS OF LIVER WITH ASCITES (HCC): ICD-10-CM

## 2022-10-04 PROCEDURE — 49083 ABD PARACENTESIS W/IMAGING: CPT | Performed by: STUDENT IN AN ORGANIZED HEALTH CARE EDUCATION/TRAINING PROGRAM

## 2022-10-04 PROCEDURE — 49083 ABD PARACENTESIS W/IMAGING: CPT

## 2022-10-04 PROCEDURE — 76700 US EXAM ABDOM COMPLETE: CPT

## 2022-10-04 RX ORDER — ALBUMIN (HUMAN) 12.5 G/50ML
SOLUTION INTRAVENOUS
Status: COMPLETED | OUTPATIENT
Start: 2022-10-04 | End: 2022-10-04

## 2022-10-04 RX ORDER — LIDOCAINE HYDROCHLORIDE 10 MG/ML
INJECTION, SOLUTION EPIDURAL; INFILTRATION; INTRACAUDAL; PERINEURAL CODE/TRAUMA/SEDATION MEDICATION
Status: COMPLETED | OUTPATIENT
Start: 2022-10-04 | End: 2022-10-04

## 2022-10-04 RX ADMIN — LIDOCAINE HYDROCHLORIDE 10 ML: 10 INJECTION, SOLUTION EPIDURAL; INFILTRATION; INTRACAUDAL; PERINEURAL at 09:34

## 2022-10-04 RX ADMIN — ALBUMIN (HUMAN) 50 G: 12.5 SOLUTION INTRAVENOUS at 10:09

## 2022-10-04 RX ADMIN — ALBUMIN (HUMAN) 25 G: 12.5 SOLUTION INTRAVENOUS at 10:42

## 2022-10-04 NOTE — BRIEF OP NOTE (RAD/CATH)
INTERVENTIONAL RADIOLOGY PROCEDURE NOTE    Date: 10/4/2022    Procedure: IR PARACENTESIS    Preoperative diagnosis:   1  Alcoholic cirrhosis of liver with ascites (HCC)         Postoperative diagnosis: Same  Surgeon: Manda Marie     Assistant: None  No qualified resident was available  Blood loss: 0 ml    Specimens: none  Findings: RLQ paracentesis  Complications: None immediate      Anesthesia: local

## 2022-10-11 PROBLEM — A41.9 SEPSIS (HCC): Status: RESOLVED | Noted: 2022-03-21 | Resolved: 2022-10-11

## 2022-10-24 ENCOUNTER — APPOINTMENT (EMERGENCY)
Dept: CT IMAGING | Facility: HOSPITAL | Age: 62
End: 2022-10-24
Payer: COMMERCIAL

## 2022-10-24 ENCOUNTER — APPOINTMENT (EMERGENCY)
Dept: RADIOLOGY | Facility: HOSPITAL | Age: 62
End: 2022-10-24
Payer: COMMERCIAL

## 2022-10-24 ENCOUNTER — HOSPITAL ENCOUNTER (EMERGENCY)
Facility: HOSPITAL | Age: 62
Discharge: HOME | End: 2022-10-24
Attending: EMERGENCY MEDICINE
Payer: COMMERCIAL

## 2022-10-24 VITALS
TEMPERATURE: 98.3 F | OXYGEN SATURATION: 100 % | HEART RATE: 91 BPM | RESPIRATION RATE: 18 BRPM | SYSTOLIC BLOOD PRESSURE: 100 MMHG | DIASTOLIC BLOOD PRESSURE: 60 MMHG

## 2022-10-24 DIAGNOSIS — J18.9 PNEUMONIA: ICD-10-CM

## 2022-10-24 DIAGNOSIS — R05.1 ACUTE COUGH: ICD-10-CM

## 2022-10-24 DIAGNOSIS — U07.1 COVID: Primary | ICD-10-CM

## 2022-10-24 DIAGNOSIS — R10.31 RIGHT LOWER QUADRANT ABDOMINAL PAIN: ICD-10-CM

## 2022-10-24 LAB
ALBUMIN SERPL BCP-MCNC: 2.4 G/DL (ref 3.5–5)
ALP SERPL-CCNC: 68 U/L (ref 46–116)
ALT SERPL W P-5'-P-CCNC: 11 U/L (ref 12–78)
ANION GAP SERPL CALCULATED.3IONS-SCNC: 11 MMOL/L (ref 4–13)
AST SERPL W P-5'-P-CCNC: 28 U/L (ref 5–45)
BASOPHILS # BLD AUTO: 0.03 THOUSANDS/ÂΜL (ref 0–0.1)
BASOPHILS NFR BLD AUTO: 1 % (ref 0–1)
BILIRUB SERPL-MCNC: 0.87 MG/DL (ref 0.2–1)
BUN SERPL-MCNC: 18 MG/DL (ref 5–25)
CALCIUM ALBUM COR SERPL-MCNC: 10.8 MG/DL (ref 8.3–10.1)
CALCIUM SERPL-MCNC: 9.5 MG/DL (ref 8.3–10.1)
CHLORIDE SERPL-SCNC: 110 MMOL/L (ref 96–108)
CO2 SERPL-SCNC: 19 MMOL/L (ref 21–32)
CREAT SERPL-MCNC: 0.96 MG/DL (ref 0.6–1.3)
EOSINOPHIL # BLD AUTO: 0.38 THOUSAND/ÂΜL (ref 0–0.61)
EOSINOPHIL NFR BLD AUTO: 7 % (ref 0–6)
ERYTHROCYTE [DISTWIDTH] IN BLOOD BY AUTOMATED COUNT: 16.9 % (ref 11.6–15.1)
FLUAV RNA RESP QL NAA+PROBE: NEGATIVE
FLUBV RNA RESP QL NAA+PROBE: NEGATIVE
GFR SERPL CREATININE-BSD FRML MDRD: 64 ML/MIN/1.73SQ M
GLUCOSE SERPL-MCNC: 100 MG/DL (ref 65–140)
HCT VFR BLD AUTO: 23.1 % (ref 34.8–46.1)
HGB BLD-MCNC: 7.3 G/DL (ref 11.5–15.4)
IMM GRANULOCYTES # BLD AUTO: 0.02 THOUSAND/UL (ref 0–0.2)
IMM GRANULOCYTES NFR BLD AUTO: 0 % (ref 0–2)
LIPASE SERPL-CCNC: 212 U/L (ref 73–393)
LYMPHOCYTES # BLD AUTO: 1.06 THOUSANDS/ÂΜL (ref 0.6–4.47)
LYMPHOCYTES NFR BLD AUTO: 20 % (ref 14–44)
MCH RBC QN AUTO: 32.6 PG (ref 26.8–34.3)
MCHC RBC AUTO-ENTMCNC: 31.6 G/DL (ref 31.4–37.4)
MCV RBC AUTO: 103 FL (ref 82–98)
MONOCYTES # BLD AUTO: 0.47 THOUSAND/ÂΜL (ref 0.17–1.22)
MONOCYTES NFR BLD AUTO: 9 % (ref 4–12)
NEUTROPHILS # BLD AUTO: 3.33 THOUSANDS/ÂΜL (ref 1.85–7.62)
NEUTS SEG NFR BLD AUTO: 63 % (ref 43–75)
NRBC BLD AUTO-RTO: 0 /100 WBCS
PLATELET # BLD AUTO: 145 THOUSANDS/UL (ref 149–390)
PMV BLD AUTO: 10.2 FL (ref 8.9–12.7)
POTASSIUM SERPL-SCNC: 4 MMOL/L (ref 3.5–5.3)
PROT SERPL-MCNC: 6.7 G/DL (ref 6.4–8.4)
RBC # BLD AUTO: 2.24 MILLION/UL (ref 3.81–5.12)
RSV RNA RESP QL NAA+PROBE: NEGATIVE
SARS-COV-2 RNA RESP QL NAA+PROBE: POSITIVE
SODIUM SERPL-SCNC: 140 MMOL/L (ref 135–147)
WBC # BLD AUTO: 5.29 THOUSAND/UL (ref 4.31–10.16)

## 2022-10-24 PROCEDURE — 0241U HB NFCT DS VIR RESP RNA 4 TRGT: CPT | Performed by: EMERGENCY MEDICINE

## 2022-10-24 PROCEDURE — 71046 X-RAY EXAM CHEST 2 VIEWS: CPT

## 2022-10-24 PROCEDURE — 85025 COMPLETE CBC W/AUTO DIFF WBC: CPT | Performed by: EMERGENCY MEDICINE

## 2022-10-24 PROCEDURE — 80053 COMPREHEN METABOLIC PANEL: CPT | Performed by: EMERGENCY MEDICINE

## 2022-10-24 PROCEDURE — 74176 CT ABD & PELVIS W/O CONTRAST: CPT

## 2022-10-24 PROCEDURE — 83690 ASSAY OF LIPASE: CPT | Performed by: EMERGENCY MEDICINE

## 2022-10-24 PROCEDURE — 36415 COLL VENOUS BLD VENIPUNCTURE: CPT | Performed by: EMERGENCY MEDICINE

## 2022-10-24 PROCEDURE — G1004 CDSM NDSC: HCPCS

## 2022-10-24 PROCEDURE — 99285 EMERGENCY DEPT VISIT HI MDM: CPT | Performed by: EMERGENCY MEDICINE

## 2022-10-24 RX ORDER — KETOROLAC TROMETHAMINE 30 MG/ML
15 INJECTION, SOLUTION INTRAMUSCULAR; INTRAVENOUS ONCE
Status: COMPLETED | OUTPATIENT
Start: 2022-10-24 | End: 2022-10-24

## 2022-10-24 RX ORDER — DOXYCYCLINE HYCLATE 100 MG/1
100 CAPSULE ORAL 2 TIMES DAILY
Qty: 14 CAPSULE | Refills: 0 | Status: SHIPPED | OUTPATIENT
Start: 2022-10-24 | End: 2022-11-01

## 2022-10-24 RX ORDER — METOCLOPRAMIDE HYDROCHLORIDE 5 MG/ML
10 INJECTION INTRAMUSCULAR; INTRAVENOUS ONCE
Status: COMPLETED | OUTPATIENT
Start: 2022-10-24 | End: 2022-10-24

## 2022-10-24 RX ORDER — ACETAMINOPHEN 325 MG/1
975 TABLET ORAL ONCE
Status: COMPLETED | OUTPATIENT
Start: 2022-10-24 | End: 2022-10-24

## 2022-10-24 RX ADMIN — CEFTRIAXONE SODIUM 1000 MG: 10 INJECTION, POWDER, FOR SOLUTION INTRAVENOUS at 09:39

## 2022-10-24 RX ADMIN — ACETAMINOPHEN 975 MG: 325 TABLET, FILM COATED ORAL at 08:35

## 2022-10-24 RX ADMIN — KETOROLAC TROMETHAMINE 15 MG: 30 INJECTION, SOLUTION INTRAMUSCULAR at 08:36

## 2022-10-24 RX ADMIN — METOCLOPRAMIDE 10 MG: 5 INJECTION, SOLUTION INTRAMUSCULAR; INTRAVENOUS at 08:36

## 2022-10-24 NOTE — DISCHARGE INSTRUCTIONS
Please follow up PCP  Please take antibiotics as prescribed  Please do not take calcium containing substance within 2 hours of antibiotic administration and please avoid direct sunlight and alcohol use  Recommend tylenol 650 mg and ibuprofen 600 mg every 6 hours as needed for pain  Please return for severe chest pain, significant shortness of breath, severely worsening symptoms, or any other concerning signs or symptoms  Please refer to the following documents for additional instructions and return precautions

## 2022-10-24 NOTE — ED PROVIDER NOTES
History  Chief Complaint   Patient presents with   • Cough     Pt reports new onset of cough and headache since last night, denies fever  71-year-old female history of breast cancer, hypertension, provoked DVT presenting with multiple complaints  Patient primarily presenting with migraine  Reports right-sided headache feels like her usual migraine  Took Imitrex without improvement  Denies any associated vision changes or neurological deficits such as motor or sensory dysfunction  Also reports a couple days of cough productive of some clear sputum  Denies any systemic symptoms such as fevers or chills  Denies any associated chest pain or shortness of breath or dyspnea  Patient reports right lower quadrant abdominal pain  Describes as sharp  Insidious onset for the past couple of days  Denies any associated nausea vomiting or diarrhea  Reports history of monthly paracenteses with last about 1 month ago  Denies any other complaints  Past Medical History:  No date: Acute DVT (deep venous thrombosis) (HCC)      Comment:  of LLE>   No date: Bladder infection  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 Taking?    QUEtiapine (SEROquel) 200 mg tablet   Yes No   Sig: Take 200 mg by mouth as needed   SUMAtriptan (IMITREX) 100 mg tablet  Self Yes No   Sig: Take 100 mg by mouth once as needed for migraine   diazepam (VALIUM) 2 mg tablet  Self No No   Sig: Take 1 tablet (2 mg total) by mouth every 8 (eight) hours as needed (shoulder pain) for up to 7 days   gabapentin (NEURONTIN) 300 mg capsule  Self No No   Sig: Take 1 capsule (300 mg total) by mouth 3 (three) times a day   Patient taking differently: Take 300 mg by mouth as needed   letrozole (FEMARA) 2 5 mg tablet  Self No No   Sig: TAKE 1 TABLET BY MOUTH EVERY DAY   lidocaine (LIDODERM) 5 %  Self Yes No   methocarbamol (ROBAXIN) 750 mg tablet  Self Yes No   naproxen (Naprosyn) 500 mg tablet  Self No No   Sig: Take 1 tablet (500 mg total) by mouth 2 (two) times a day with meals As needed for mild-moderate pain control  Facility-Administered Medications: None       Past Medical History:   Diagnosis Date   • Acute DVT (deep venous thrombosis) (HCC)     of LLE>    • Bladder infection    • Congenital prolapsed rectum    • History of biliary stent insertion    • History of chemotherapy    • History of radiation therapy 05/2018    left breast ca   • History of transfusion     x2 s/p chemo treatments, no reaction   • Hydronephrosis     right kidney   • Hypertension    • Malignant neoplasm of overlapping sites of left female breast ([facility] [address] ) 05/01/2018   • Migraine        Past Surgical History:   Procedure Laterality Date   • ABDOMINAL ADHESION SURGERY N/A 4/23/2019    Procedure: LYSIS ADHESIONS;  Surgeon: Wiley Davidson MD;  [facility];  Service: Colorectal   • BREAST BIOPSY     • COLON SURGERY      colon resection   • CYSTOSCOPY N/A 3/4/2019    Procedure: CYSTOSCOPY WITH BIOPSIES AND FULGERATION AND [address] OF RECTUM PROLAPSE;  Surgeon: Roscoe Johnson MD;  [facility];  Service: Urology   • ERCP N/A 1/4/2019    Procedure: ENDOSCOPIC RETROGRADE CHOLANGIOPANCREATOGRAPHY (ERCP);   Surgeon: Yifan Barreto MD;  [facility];  Service: Gastroenterology   • INSTILLATION MYTOMYCIN N/A 3/4/2019    Procedure: Hector Madrigal;  Surgeon: Roscoe Johnson MD;  [facility];  Service: Urology • IR PARACENTESIS  3/21/2022   • IR PARACENTESIS  6/1/2022   • IR PARACENTESIS  7/15/2022   • IR PARACENTESIS  8/30/2022   • IR PARACENTESIS  10/4/2022   • MASTECTOMY Left     2018   • AL COLONOSCOPY FLX DX W/COLLJ SPEC WHEN PFRMD N/A 4/22/2019    Procedure: COLONOSCOPY;  Surgeon: Tanya Wright MD;  Location: [facility]; Service: Colorectal   • AL EDG US EXAM SURGICAL ALTER STOM DUODENUM/JEJUNUM N/A 3/7/2019    Procedure: LINEAR ENDOSCOPIC U/S;  Surgeon: Katie Meng MD;  [facility]: [facility]; Service: Gastroenterology   • AL ESOPHAGOGASTRODUODENOSCOPY TRANSORAL DIAGNOSTIC N/A 3/7/2019    Procedure: ESOPHAGOGASTRODUODENOSCOPY (EGD); Surgeon: Katie Meng MD;  [facility]: [facility];   Service: Gastroenterology   • AL INSJ TUNNELED CTR VAD W/SUBQ PORT AGE 5 YR/> N/A 6/26/2018    Procedure: INSERTION VENOUS PORT ( PORT-A-CATH) IR;  Surgeon: Maria D Roman DO;  [facility];  Service: Interventional Radiology   • AL LAP, SURG PROCTOPEXY N/A 4/23/2019    Procedure: LAPAROSCOPIC HAND-ASSIST PELVIC DISSECTION; proctopexy;  Surgeon: Tanya Wright MD;  [facility];  Service: Colorectal   • AL LAP, SURG PROCTOPEXY N/A 11/18/2020    Procedure: LAPAROSCOPIC LYSIS OF ADHESIONS, MOBILIZATION OF RECTUM AND SUTURE RECTOPEXY;  Surgeon: Jericho Sanchez MD;  [facility];  Service: Colorectal   • AL MASTECTOMY, MODIFIED RADICAL Left 5/15/2018    Procedure: BREAST MODIFIED RADICAL MASTECTOMY;  Surgeon: Regi Pretty MD;  [facility];  Service: Surgical Oncology   • AL RMVL [place] CTR VAD W/SUBQ PORT/ CTR/PRPH INSJ N/A 6/4/2019    Procedure: REMOVAL VENOUS PORT (PORT-A-CATH)IR;  Surgeon: Maria D Roman DO;  [facility];  Service: Interventional Radiology   • TUBAL LIGATION     • US GUIDANCE BREAST BIOPSY LEFT EACH ADDITIONAL Left 4/3/2018   • US GUIDED BREAST BIOPSY LEFT COMPLETE Left 4/3/2018   • US GUIDED BREAST LYMPH NODE BIOPSY LEFT Left 4/3/2018       Family History Problem Relation Age of Onset   • No Known Problems Mother    • No Known Problems Father    • Breast cancer Maternal Aunt 48   • Colon cancer Maternal Aunt    • No Known Problems Sister    • No Known Problems Daughter    • No Known Problems Maternal Grandmother    • No Known Problems Maternal Grandfather    • No Known Problems Paternal Grandmother    • No Known Problems Paternal Grandfather      I have reviewed and agree with the history as documented  E-Cigarette/Vaping   • E-Cigarette Use Never User      E-Cigarette/Vaping Substances     Social History     Tobacco Use   • Smoking status: Never Smoker   • Smokeless tobacco: Never Used   Vaping Use   • Vaping Use: Never used   Substance Use Topics   • Alcohol use: Not Currently     Comment: Drinks daily until three weeks ago   • Drug use: Not Currently       Review of Systems   Constitutional: Negative for appetite change, chills, diaphoresis, fever and unexpected weight change  HENT: Negative for congestion and rhinorrhea  Eyes: Negative for photophobia and visual disturbance  Respiratory: Positive for cough  Negative for chest tightness and shortness of breath  Cardiovascular: Negative for chest pain, palpitations and leg swelling  Gastrointestinal: Positive for abdominal pain  Negative for abdominal distention, blood in stool, constipation, diarrhea, nausea and vomiting  Genitourinary: Negative for dysuria and hematuria  Musculoskeletal: Negative for back pain, joint swelling, neck pain and neck stiffness  Skin: Negative for color change, pallor, rash and wound  Neurological: Positive for headaches  Negative for dizziness, syncope, weakness and light-headedness  Psychiatric/Behavioral: Negative for agitation  All other systems reviewed and are negative  Physical Exam  Physical Exam  Vitals and nursing note reviewed  Constitutional:       General: She is not in acute distress  Appearance: Normal appearance   She is well-developed  She is not ill-appearing, toxic-appearing or diaphoretic  HENT:      Head: Normocephalic and atraumatic  Nose: Nose normal  No congestion or rhinorrhea  Mouth/Throat:      Mouth: Mucous membranes are moist       Pharynx: Oropharynx is clear  No oropharyngeal exudate or posterior oropharyngeal erythema  Eyes:      General: No scleral icterus  Right eye: No discharge  Left eye: No discharge  Extraocular Movements: Extraocular movements intact  Conjunctiva/sclera: Conjunctivae normal       Pupils: Pupils are equal, round, and reactive to light  Neck:      Vascular: No JVD  Trachea: No tracheal deviation  Comments: Supple  Normal range of motion  Cardiovascular:      Rate and Rhythm: Normal rate and regular rhythm  Heart sounds: Normal heart sounds  No murmur heard  No friction rub  No gallop  Comments: Normal rate and regular rhythm  Pulmonary:      Effort: Pulmonary effort is normal  No respiratory distress  Breath sounds: Normal breath sounds  No stridor  No wheezing or rales  Comments: Clear to auscultation bilaterally  Chest:      Chest wall: No tenderness  Abdominal:      General: Bowel sounds are normal  There is no distension  Palpations: Abdomen is soft  Tenderness: There is abdominal tenderness  There is no right CVA tenderness, left CVA tenderness, guarding or rebound  Comments: RLQ TTP without guarding  Otherwise soft and nondistended  Normal bowel sounds throughout   Musculoskeletal:         General: No swelling, tenderness, deformity or signs of injury  Normal range of motion  Cervical back: Normal range of motion and neck supple  No rigidity  No muscular tenderness  Right lower leg: No edema  Left lower leg: No edema  Lymphadenopathy:      Cervical: No cervical adenopathy  Skin:     General: Skin is warm and dry  Coloration: Skin is not pale        Findings: No erythema or rash    Neurological:      General: No focal deficit present  Mental Status: She is alert  Mental status is at baseline  Sensory: No sensory deficit  Motor: No weakness or abnormal muscle tone  Coordination: Coordination normal       Gait: Gait normal       Comments: Alert  Strength and sensation grossly intact  Ambulatory without difficulty at baseline  Psychiatric:         Behavior: Behavior normal          Thought Content:  Thought content normal          Vital Signs  ED Triage Vitals   Temperature Pulse Respirations Blood Pressure SpO2   10/24/22 0748 10/24/22 0748 10/24/22 0748 10/24/22 0748 10/24/22 0748   98 5 °F (36 9 °C) 102 20 126/74 100 %      Temp Source Heart Rate Source Patient Position - Orthostatic VS BP Location FiO2 (%)   10/24/22 0748 10/24/22 0748 10/24/22 0748 10/24/22 0748 --   Oral Monitor Sitting Left arm       Pain Score       10/24/22 0804       No Pain           Vitals:    10/24/22 0900 10/24/22 0915 10/24/22 0930 10/24/22 0945   BP: 115/61  100/60    Pulse: 95 95 95 91   Patient Position - Orthostatic VS:             Visual Acuity      ED Medications  Medications   acetaminophen (TYLENOL) tablet 975 mg (975 mg Oral Given 10/24/22 0835)   ketorolac (TORADOL) injection 15 mg (15 mg Intravenous Given 10/24/22 0836)   metoclopramide (REGLAN) injection 10 mg (10 mg Intravenous Given 10/24/22 0836)   ceftriaxone (ROCEPHIN) 1 g/50 mL in dextrose IVPB (0 mg Intravenous Stopped 10/24/22 1018)       Diagnostic Studies  Results Reviewed     Procedure Component Value Units Date/Time    FLU/RSV/COVID - if FLU/RSV clinically relevant [[id number]]  (Abnormal) Collected: 10/24/22 0834    Lab Status: Final result Specimen: Nares from Nose Updated: 10/24/22 0927     SARS-CoV-2 Positive     INFLUENZA A PCR Negative     INFLUENZA B PCR Negative     RSV PCR Negative    Narrative:      FOR PEDIATRIC PATIENTS - copy/paste COVID Guidelines URL to browser: [email or web address]    SARS-CoV-2 assay is a Nucleic Acid Amplification assay intended for the  qualitative detection of nucleic acid from SARS-CoV-2 in nasopharyngeal  swabs  Results are for the presumptive identification of SARS-CoV-2 RNA  Positive results are indicative of infection with SARS-CoV-2, the virus  causing COVID-19, but do not rule out bacterial infection or co-infection  with other viruses  Laboratories within the [place] and its  territories are required to report all positive results to the appropriate  public health authorities  Negative results do not preclude SARS-CoV-2  infection and should not be used as the sole basis for treatment or other  patient management decisions  Negative results must be combined with  clinical observations, patient history, and epidemiological information  This test has not been FDA cleared or approved  This test has been authorized by FDA under an Emergency Use Authorization  (EUA)  This test is only authorized for the duration of time the  declaration that circumstances exist justifying the authorization of the  emergency use of an in vitro diagnostic tests for detection of SARS-CoV-2  virus and/or diagnosis of COVID-19 infection under section 564(b)(1) of  the Act, 21 U  S C  471EPX-1(Q)(8), unless the authorization is terminated  or revoked sooner  The test has been validated but independent review by FDA  and CLIA is pending  Test performed using [employer] Gene[employer]pert: This RT-PCR assay targets N2,  a region unique to SARS-CoV-2  A conserved region in the E-gene was chosen  for pan-Sarbecovirus detection which includes SARS-CoV-2  According to CMS-2020-01-R, this platform meets the definition of high-throughput technology      Comprehensive metabolic panel [[id number]]  (Abnormal) Collected: 10/24/22 8099    Lab Status: Final result Specimen: Blood from Arm, Right Updated: 10/24/22 0907 Sodium 140 mmol/L      Potassium 4 0 mmol/L      Chloride 110 mmol/L      CO2 19 mmol/L      ANION GAP 11 mmol/L      BUN 18 mg/dL      Creatinine 0 96 mg/dL      Glucose 100 mg/dL      Calcium 9 5 mg/dL      Corrected Calcium 10 8 mg/dL      AST 28 U/L      ALT 11 U/L      Alkaline Phosphatase 68 U/L      Total Protein 6 7 g/dL      Albumin 2 4 g/dL      Total Bilirubin 0 87 mg/dL      eGFR 64 ml/min/1 73sq m     Narrative:      [place] guidelines for Chronic Kidney Disease (CKD):   •  Stage 1 with normal or high GFR (GFR > 90 mL/min/1 73 square meters)  •  Stage 2 Mild CKD (GFR = 60-89 mL/min/1 73 square meters)  •  Stage 3A Moderate CKD (GFR = 45-59 mL/min/1 73 square meters)  •  Stage 3B Moderate CKD (GFR = 30-44 mL/min/1 73 square meters)  •  Stage 4 Severe CKD (GFR = 15-29 mL/min/1 73 square meters)  •  Stage 5 End Stage CKD (GFR <15 mL/min/1 73 square meters)  Note: GFR calculation is accurate only with a steady state creatinine    Lipase [[id number]]  (Normal) Collected: 10/24/22 0834    Lab Status: Final result Specimen: Blood from Arm, Right Updated: 10/24/22 0914     Lipase 212 u/L     CBC and differential [[id number]]  (Abnormal) Collected: 10/24/22 0834    Lab Status: Final result Specimen: Blood from Arm, Right Updated: 10/24/22 0852     WBC 5 29 Thousand/uL      RBC 2 24 Million/uL      Hemoglobin 7 3 g/dL      Hematocrit 23 1 %       fL      MCH 32 6 pg      MCHC 31 6 g/dL      RDW 16 9 %      MPV 10 2 fL      Platelets 632 Thousands/uL      nRBC 0 /100 WBCs      Neutrophils Relative 63 %      Immat GRANS % 0 %      Lymphocytes Relative 20 %      Monocytes Relative 9 %      Eosinophils Relative 7 %      Basophils Relative 1 %      Neutrophils Absolute 3 33 Thousands/µL      Immature Grans Absolute 0 02 Thousand/uL      Lymphocytes Absolute 1 06 Thousands/µL      Monocytes Absolute 0 47 Thousand/µL      Eosinophils Absolute 0 38 Thousand/µL      Basophils Absolute 0 03 Thousands/µL                  XR chest 2 views   ED Interpretation by Ruthie Fonseca MD (33/91 [id number])   Concern for left-sided pneumonia  COVID19      Final Result by Natasha Gee MD (10/24 1225)      Small left-sided pleural effusion  Workstation performed: [id number]         CT abdomen pelvis wo contrast   Final Result by Kevin Moore MD (10/24 0901)      1  No acute abnormality in the abdomen or pelvis  2   Cirrhosis with evidence of portal hypertension and large volume abdominopelvic ascites  3   Minimal pneumobilia  Correlate with prior sphincterotomy  Workstation performed: [id number]                    Procedures  Procedures         ED Course                            Initial Sepsis Screening     Row Name 10/24/22 0311                Is the patient's history suggestive of a new or worsening infection? Yes (Proceed)  COVID  -CE        Suspected source of infection --        Are two or more of the following signs & symptoms of infection both present and new to the patient? No  -CE        Indicate SIRS criteria --        If the answer is yes to both questions, suspicion of sepsis is present --        If severe sepsis is present AND tissue hypoperfusion perists in the hour after fluid resuscitation or lactate > 4, the patient meets criteria for SEPTIC SHOCK --        Are any of the following organ dysfunction criteria present within 6 hours of suspected infection and SIRS criteria that are NOT considered to be chronic conditions?  --        Organ dysfunction --        Date of presentation of severe sepsis --        Time of presentation of severe sepsis --        Tissue hypoperfusion persists in the hour after crystalloid fluid administration, evidenced, by either: --        Was hypotension present within one hour of the conclusion of crystalloid fluid administration? --        Date of presentation of septic shock --        Time of presentation of septic shock --              User Key  (r) = Recorded By, (t) = Taken By, (c) = Cosigned By    [address] Name Provider Jerald Almanza MD Physician                SBIRT 22yo+    Flowsheet Row Most Recent Value   SBIRT (23 yo +)    In order to provide better care to our patients, we are screening all of our patients for alcohol and drug use  Would it be okay to ask you these screening questions? Yes Filed at: 10/24/2022 7745   Initial Alcohol Screen: US AUDIT-C     1  How often do you have a drink containing alcohol? 0 Filed at: 10/24/2022 0808   2  How many drinks containing alcohol do you have on a typical day you are drinking? 0 Filed at: 10/24/2022 0808   3a  Male UNDER 65: How often do you have five or more drinks on one occasion? 0 Filed at: 10/24/2022 0808   3b  FEMALE Any Age, or MALE 65+: How often do you have 4 or more drinks on one occassion? 0 Filed at: 10/24/2022 6824   Audit-C Score 0 Filed at: 10/24/2022 2441   IRIS: How many times in the past year have you    Used an illegal drug or used a prescription medication for non-medical reasons? Never Filed at: 10/24/2022 0808                    MDM  Number of Diagnoses or Management Options  Acute cough  COVID  Pneumonia  Right lower quadrant abdominal pain  Diagnosis management comments: 80-year-old female history of breast cancer, hypertension, provoked DVT presenting with multiple complaints  Plan for basic labs including abdominal labs  Chest x-ray  COVID19 fluid testing  CT abdomen and pelvis  Plan for migraine cocktail with oral Tylenol, IV Toradol, and IV Reglan  Will defer fluids for now given 3rd spacing of fluid  Reassess  Symptoms improved after medications  Labs notable for Hgb 7 3 from 8's per chart review  CXR concerning for possible left sided pneumonia  CT no acute process  COVID positive  IV abx  Patient remains stable without SOB or chest pain  Prescriptions sent to pharmacy  Discussed results and recommendations   Advised follow up PCP  Medication recommendations  Given instructions and return precautions  Patient/family at bedside acknowledged understanding of all written and verbal instructions and return precautions  Discharged  Amount and/or Complexity of Data Reviewed  Clinical lab tests: reviewed and ordered  Tests in the radiology section of CPT®: ordered and reviewed  Tests in the medicine section of CPT®: ordered and reviewed  Decide to obtain previous medical records or to obtain history from someone other than the patient: yes  Obtain history from someone other than the patient: yes  Review and summarize past medical records: yes  Independent visualization of images, tracings, or specimens: yes    Risk of Complications, Morbidity, and/or Mortality  Presenting problems: high  Diagnostic procedures: high  Management options: high    Patient Progress  Patient progress: improved      Disposition  Final diagnoses:   COVID   Pneumonia   Acute cough   Right lower quadrant abdominal pain     Time reflects when diagnosis was documented in both MDM as applicable and the Disposition within this note     Time User Action Codes Description Comment    10/24/2022  9:38 AM Lonzell Rein Add [U07 1] COVID     10/24/2022  9:38 AM Lonzell Rein Add [J18 9] Pneumonia     10/24/2022  9:38 AM Lonzell Rein Add [R05 1] Acute cough     10/24/2022  9:38 AM Lonzell Rein Add [R10 31] Right lower quadrant abdominal pain       ED Disposition     ED Disposition   Discharge    Condition   Stable    Date/Time   Mon Oct 24, 2022  9:38 AM    Comment   Juan Carlos Pride discharge to home/self care                 Follow-up Information     Follow up With Specialties Details Why Contact Info    [address] [place], [place] [facility]ract[facility] Medicine Schedule an appointment as soon as possible for a visit in 1 week  [address] [place] [address]  [phone]            Discharge Medication List as of 10/24/2022  9:41 AM      START taking these medications    Details   doxycycline hyclate (VIBRAMYCIN) 100 mg capsule Take 1 capsule (100 mg total) by mouth 2 (two) times a day for 7 days, Starting Mon 10/24/2022, Until Mon 10/31/2022, Normal         CONTINUE these medications which have NOT CHANGED    Details   diazepam (VALIUM) 2 mg tablet Take 1 tablet (2 mg total) by mouth every 8 (eight) hours as needed (shoulder pain) for up to 7 days, Starting Sat 9/24/2022, Until Sat 10/1/2022 at 2359, Normal      gabapentin (NEURONTIN) 300 mg capsule Take 1 capsule (300 mg total) by mouth 3 (three) times a day, Starting Tue 5/28/2019, Normal      letrozole (FEMARA) 2 5 mg tablet TAKE 1 TABLET BY MOUTH EVERY DAY, Normal      lidocaine (LIDODERM) 5 % Starting Sun 9/11/2022, Historical Med      methocarbamol (ROBAXIN) 750 mg tablet Starting Sun 9/11/2022, Historical Med      naproxen (Naprosyn) 500 mg tablet Take 1 tablet (500 mg total) by mouth 2 (two) times a day with meals As needed for mild-moderate pain control , Starting Sat 9/24/2022, Normal      QUEtiapine (SEROquel) 200 mg tablet Take 200 mg by mouth as needed, Historical Med      SUMAtriptan (IMITREX) 100 mg tablet Take 100 mg by mouth once as needed for migraine, Historical Med             No discharge procedures on file      PDMP Review     None          ED Provider  Electronically Signed by           Leila Richardson MD  10/24/22 6823

## 2022-10-25 ENCOUNTER — TELEPHONE (OUTPATIENT)
Dept: FAMILY MEDICINE CLINIC | Facility: CLINIC | Age: 62
End: 2022-10-25

## 2022-10-25 NOTE — TELEPHONE ENCOUNTER
Contacted patient to follow up with her since yesterday's ED visit  Unable to leave a message due to mailbox not setup

## 2022-10-27 ENCOUNTER — HOSPITAL ENCOUNTER (INPATIENT)
Facility: HOSPITAL | Age: 62
LOS: 4 days | Discharge: HOME | End: 2022-11-01
Attending: EMERGENCY MEDICINE | Admitting: INTERNAL MEDICINE

## 2022-10-27 ENCOUNTER — APPOINTMENT (EMERGENCY)
Dept: RADIOLOGY | Facility: HOSPITAL | Age: 62
End: 2022-10-27

## 2022-10-27 DIAGNOSIS — U07.1 PNEUMONIA DUE TO COVID-19 VIRUS: Primary | ICD-10-CM

## 2022-10-27 DIAGNOSIS — J12.82 PNEUMONIA DUE TO COVID-19 VIRUS: Primary | ICD-10-CM

## 2022-10-27 DIAGNOSIS — D62 ACUTE BLOOD LOSS ANEMIA: ICD-10-CM

## 2022-10-27 DIAGNOSIS — D64.9 ANEMIA, UNSPECIFIED TYPE: ICD-10-CM

## 2022-10-27 LAB
ALBUMIN SERPL BCP-MCNC: 2.6 G/DL (ref 3.5–5)
ALP SERPL-CCNC: 76 U/L (ref 46–116)
ALT SERPL W P-5'-P-CCNC: 11 U/L (ref 12–78)
ANION GAP SERPL CALCULATED.3IONS-SCNC: 12 MMOL/L (ref 4–13)
ANISOCYTOSIS BLD QL SMEAR: PRESENT
AST SERPL W P-5'-P-CCNC: 33 U/L (ref 5–45)
BASOPHILS # BLD MANUAL: 0.04 THOUSAND/UL (ref 0–0.1)
BASOPHILS NFR MAR MANUAL: 1 % (ref 0–1)
BILIRUB SERPL-MCNC: 0.98 MG/DL (ref 0.2–1)
BUN SERPL-MCNC: 31 MG/DL (ref 5–25)
CALCIUM ALBUM COR SERPL-MCNC: 10 MG/DL (ref 8.3–10.1)
CALCIUM SERPL-MCNC: 8.9 MG/DL (ref 8.3–10.1)
CARDIAC TROPONIN I PNL SERPL HS: 7 NG/L
CHLORIDE SERPL-SCNC: 109 MMOL/L (ref 96–108)
CO2 SERPL-SCNC: 19 MMOL/L (ref 21–32)
CREAT SERPL-MCNC: 1.04 MG/DL (ref 0.6–1.3)
EOSINOPHIL # BLD MANUAL: 0.19 THOUSAND/UL (ref 0–0.4)
EOSINOPHIL NFR BLD MANUAL: 5 % (ref 0–6)
ERYTHROCYTE [DISTWIDTH] IN BLOOD BY AUTOMATED COUNT: 16.5 % (ref 11.6–15.1)
GFR SERPL CREATININE-BSD FRML MDRD: 58 ML/MIN/1.73SQ M
GLUCOSE SERPL-MCNC: 94 MG/DL (ref 65–140)
HCT VFR BLD AUTO: 20.3 % (ref 34.8–46.1)
HGB BLD-MCNC: 6.5 G/DL (ref 11.5–15.4)
LYMPHOCYTES # BLD AUTO: 1 THOUSAND/UL (ref 0.6–4.47)
LYMPHOCYTES # BLD AUTO: 27 % (ref 14–44)
MACROCYTES BLD QL AUTO: PRESENT
MCH RBC QN AUTO: 32.5 PG (ref 26.8–34.3)
MCHC RBC AUTO-ENTMCNC: 32 G/DL (ref 31.4–37.4)
MCV RBC AUTO: 102 FL (ref 82–98)
MONOCYTES # BLD AUTO: 0.15 THOUSAND/UL (ref 0–1.22)
MONOCYTES NFR BLD: 4 % (ref 4–12)
NEUTROPHILS # BLD MANUAL: 2.3 THOUSAND/UL (ref 1.85–7.62)
NEUTS SEG NFR BLD AUTO: 62 % (ref 43–75)
PLATELET # BLD AUTO: 144 THOUSANDS/UL (ref 149–390)
PLATELET BLD QL SMEAR: ABNORMAL
PMV BLD AUTO: 10.5 FL (ref 8.9–12.7)
POTASSIUM SERPL-SCNC: 4 MMOL/L (ref 3.5–5.3)
PROT SERPL-MCNC: 7 G/DL (ref 6.4–8.4)
RBC # BLD AUTO: 2 MILLION/UL (ref 3.81–5.12)
SCHISTOCYTES BLD QL SMEAR: PRESENT
SODIUM SERPL-SCNC: 140 MMOL/L (ref 135–147)
VARIANT LYMPHS # BLD AUTO: 1 %
WBC # BLD AUTO: 3.71 THOUSAND/UL (ref 4.31–10.16)

## 2022-10-27 PROCEDURE — 30233N1 TRANSFUSION OF NONAUTOLOGOUS RED BLOOD CELLS INTO PERIPHERAL VEIN, PERCUTANEOUS APPROACH: ICD-10-PCS | Performed by: PHYSICIAN ASSISTANT

## 2022-10-27 RX ADMIN — SODIUM CHLORIDE 1000 ML: 0.9 INJECTION, SOLUTION INTRAVENOUS at 22:08

## 2022-10-28 ENCOUNTER — APPOINTMENT (OUTPATIENT)
Dept: NON INVASIVE DIAGNOSTICS | Facility: HOSPITAL | Age: 62
End: 2022-10-28

## 2022-10-28 PROBLEM — U07.1 COVID-19: Status: ACTIVE | Noted: 2022-10-28

## 2022-10-28 LAB
2HR DELTA HS TROPONIN: -1 NG/L
4HR DELTA HS TROPONIN: 0 NG/L
ABO GROUP BLD: NORMAL
ALBUMIN FLD-MCNC: 0.7 G/DL
APPEARANCE FLD: CLEAR
ATRIAL RATE: 88 BPM
ATRIAL RATE: 90 BPM
BLD GP AB SCN SERPL QL: NEGATIVE
CARDIAC TROPONIN I PNL SERPL HS: 6 NG/L
CARDIAC TROPONIN I PNL SERPL HS: 7 NG/L
COLOR FLD: NORMAL
FERRITIN SERPL-MCNC: 130 NG/ML (ref 8–388)
FOLATE SERPL-MCNC: 2.3 NG/ML (ref 3.1–17.5)
HCT VFR BLD AUTO: 21.8 % (ref 34.8–46.1)
HCT VFR BLD AUTO: 22 % (ref 34.8–46.1)
HGB BLD-MCNC: 7 G/DL (ref 11.5–15.4)
HGB BLD-MCNC: 7.3 G/DL (ref 11.5–15.4)
INR PPP: 1.5 (ref 0.84–1.19)
IRON SATN MFR SERPL: 20 % (ref 15–50)
IRON SERPL-MCNC: 45 UG/DL (ref 50–170)
LACTATE SERPL-SCNC: 0.9 MMOL/L (ref 0.5–2)
LYMPHOCYTES NFR BLD AUTO: 55 %
MONO+MESO NFR FLD MANUAL: 8 %
MONOCYTES NFR BLD AUTO: 35 %
NEUTS SEG NFR BLD AUTO: 2 %
P AXIS: 24 DEGREES
P AXIS: 52 DEGREES
PR INTERVAL: 160 MS
PR INTERVAL: 172 MS
PROCALCITONIN SERPL-MCNC: 0.24 NG/ML
PROT FLD-MCNC: <2 G/DL
PROTHROMBIN TIME: 17.8 SECONDS (ref 11.6–14.5)
QRS AXIS: 69 DEGREES
QRS AXIS: 75 DEGREES
QRSD INTERVAL: 78 MS
QRSD INTERVAL: 80 MS
QT INTERVAL: 384 MS
QT INTERVAL: 612 MS
QTC INTERVAL: 469 MS
QTC INTERVAL: 740 MS
RH BLD: NEGATIVE
SITE: NORMAL
SPECIMEN EXPIRATION DATE: NORMAL
T WAVE AXIS: 158 DEGREES
T WAVE AXIS: 22 DEGREES
TIBC SERPL-MCNC: 226 UG/DL (ref 250–450)
TOTAL CELLS COUNTED SPEC: 100
VENTRICULAR RATE: 88 BPM
VENTRICULAR RATE: 90 BPM
VIT B12 SERPL-MCNC: 775 PG/ML (ref 100–900)
WBC # FLD MANUAL: 34 /UL

## 2022-10-28 PROCEDURE — 0W9G3ZZ DRAINAGE OF PERITONEAL CAVITY, PERCUTANEOUS APPROACH: ICD-10-PCS | Performed by: RADIOLOGY

## 2022-10-28 RX ORDER — PANTOPRAZOLE SODIUM 40 MG/10ML
40 INJECTION, POWDER, LYOPHILIZED, FOR SOLUTION INTRAVENOUS EVERY 12 HOURS SCHEDULED
Status: DISCONTINUED | OUTPATIENT
Start: 2022-10-28 | End: 2022-11-01 | Stop reason: HOSPADM

## 2022-10-28 RX ORDER — DIAZEPAM 2 MG/1
2 TABLET ORAL EVERY 8 HOURS PRN
Status: DISCONTINUED | OUTPATIENT
Start: 2022-10-28 | End: 2022-11-01 | Stop reason: HOSPADM

## 2022-10-28 RX ORDER — ACETAMINOPHEN 325 MG/1
650 TABLET ORAL EVERY 6 HOURS PRN
Status: DISCONTINUED | OUTPATIENT
Start: 2022-10-28 | End: 2022-11-01 | Stop reason: HOSPADM

## 2022-10-28 RX ORDER — GABAPENTIN 300 MG/1
300 CAPSULE ORAL DAILY PRN
Status: DISCONTINUED | OUTPATIENT
Start: 2022-10-28 | End: 2022-11-01 | Stop reason: HOSPADM

## 2022-10-28 RX ORDER — SODIUM CHLORIDE 9 MG/ML
75 INJECTION, SOLUTION INTRAVENOUS CONTINUOUS
Status: DISCONTINUED | OUTPATIENT
Start: 2022-10-28 | End: 2022-10-29

## 2022-10-28 RX ORDER — QUETIAPINE FUMARATE 100 MG/1
200 TABLET, FILM COATED ORAL
Status: DISCONTINUED | OUTPATIENT
Start: 2022-10-28 | End: 2022-11-01 | Stop reason: HOSPADM

## 2022-10-28 RX ORDER — LIDOCAINE HYDROCHLORIDE 10 MG/ML
INJECTION, SOLUTION EPIDURAL; INFILTRATION; INTRACAUDAL; PERINEURAL CODE/TRAUMA/SEDATION MEDICATION
Status: COMPLETED | OUTPATIENT
Start: 2022-10-28 | End: 2022-10-28

## 2022-10-28 RX ORDER — ALBUMIN (HUMAN) 12.5 G/50ML
12.5 SOLUTION INTRAVENOUS ONCE
Status: CANCELLED | OUTPATIENT
Start: 2022-10-28 | End: 2022-10-28

## 2022-10-28 RX ORDER — METHOCARBAMOL 750 MG/1
750 TABLET, FILM COATED ORAL EVERY 6 HOURS PRN
Status: DISCONTINUED | OUTPATIENT
Start: 2022-10-28 | End: 2022-11-01 | Stop reason: HOSPADM

## 2022-10-28 RX ADMIN — PANTOPRAZOLE SODIUM 40 MG: 40 INJECTION, POWDER, FOR SOLUTION INTRAVENOUS at 09:09

## 2022-10-28 RX ADMIN — SODIUM CHLORIDE 75 ML/HR: 0.9 INJECTION, SOLUTION INTRAVENOUS at 20:26

## 2022-10-28 RX ADMIN — LIDOCAINE HYDROCHLORIDE 10 ML: 10 INJECTION, SOLUTION EPIDURAL; INFILTRATION; INTRACAUDAL; PERINEURAL at 14:10

## 2022-10-28 RX ADMIN — QUETIAPINE FUMARATE 200 MG: 100 TABLET ORAL at 22:38

## 2022-10-28 RX ADMIN — PANTOPRAZOLE SODIUM 40 MG: 40 INJECTION, POWDER, FOR SOLUTION INTRAVENOUS at 22:35

## 2022-10-28 RX ADMIN — Medication 1000 MG: at 08:35

## 2022-10-28 RX ADMIN — SODIUM CHLORIDE 75 ML/HR: 0.9 INJECTION, SOLUTION INTRAVENOUS at 08:37

## 2022-10-28 NOTE — CONSULTS
Consultation -  [facility]  Marielsochester Mahoney 65 y o  female MRN: [id number]  Unit/Bed#: ED 28 Encounter: [id number]         Reason for Consult / Principal Problem:  Anemia, history of cirrhosis    HPI: Keya Espino is a 65year old female with history of alcoholic cirrhosis complicated by ascites requiring paracentesis monthly, chronic anemia, history of folate deficiency, DVT on Eliquis and CKD stage 3  Patient also has history of breast cancer status post mastectomy and chemo/radiation  Patient reports that her last drink was several months ago  Patient presented to the emergency room for shortness of breath, cough and decreased appetite  GI was consulted for drop in hemoglobin  Folate 2 3  Iron 45, TIBC 226, iron saturation 20%  Hemoglobin 6 5, , platelet count a [id number]  WBC and RBC are also decreased  Patient patient's  denies signs of overt GI bleeding  She denies abdominal pain, fevers or chills at home  Patient reports that she is still complaining of persistent cough  Patient setting at over 98% on room air  She is tachypneic, but other vitals are within stable  Review of Systems:    CONSTITUTIONAL: Denies any fever, chills, or rigors  Good appetite, and no recent weight loss  HEENT: No earache or tinnitus  Denies hearing loss or visual disturbances  CARDIOVASCULAR: No chest pain or palpitations  RESPIRATORY:  Positive for dry cough, positive for shortness of breath on exertion  GASTROINTESTINAL: As noted in the History of Present Illness  GENITOURINARY: No problems with urination  Denies any hematuria or dysuria  NEUROLOGIC: No dizziness or vertigo, denies headaches  MUSCULOSKELETAL: Denies any muscle or joint pain  SKIN: Denies skin rashes or itching  ENDOCRINE: Denies excessive thirst  Denies intolerance to heat or cold  PSYCHOSOCIAL: Denies depression or anxiety  Denies any recent memory loss         Historical Information   Past Medical History: Diagnosis Date   • Acute DVT (deep venous thrombosis) (HCC)     of LLE>    • Bladder infection    • Congenital prolapsed rectum    • History of biliary stent insertion    • History of chemotherapy    • History of radiation therapy 05/2018    left breast ca   • History of transfusion     x2 s/p chemo treatments, no reaction   • Hydronephrosis     right kidney   • Hypertension    • Malignant neoplasm of overlapping sites of left female breast ([address] ) 05/01/2018   • Migraine      Past Surgical History:   Procedure Laterality Date   • ABDOMINAL ADHESION SURGERY N/A 4/23/2019    Procedure: LYSIS ADHESIONS;  Surgeon: Hitesh Diaz MD;  [facility];  Service: Colorectal   • BREAST BIOPSY     • COLON SURGERY      colon resection   • CYSTOSCOPY N/A 3/4/2019    Procedure: CYSTOSCOPY WITH BIOPSIES AND [place] OF RECTUM PROLAPSE;  Surgeon: Dalia Bates MD;  [facility];  Service: Urology   • ERCP N/A 1/4/2019    Procedure: ENDOSCOPIC RETROGRADE CHOLANGIOPANCREATOGRAPHY (ERCP); Surgeon: Stephanie Stone MD;  [facility];  Service: Gastroenterology   • INSTILLATION MYTOMYCIN N/A 3/4/2019    Procedure: Breonna Beard;  Surgeon: Dalia Bates MD;  [facility];  Service: Urology   • IR PARACENTESIS  3/21/2022   • IR PARACENTESIS  6/1/2022   • IR PARACENTESIS  7/15/2022   • IR PARACENTESIS  8/30/2022   • IR PARACENTESIS  10/4/2022   • MASTECTOMY Left     2018   • VA COLONOSCOPY FLX DX W/COLLJ SPEC WHEN PFRMD N/A 4/22/2019    Procedure: COLONOSCOPY;  Surgeon: Hitesh Diaz MD;  [facility]; Service: Colorectal   • VA EDG US EXAM SURGICAL ALTER STOM DUODENUM/JEJUNUM N/A 3/7/2019    Procedure: LINEAR ENDOSCOPIC U/S;  Surgeon: Shebea Ahuja MD;  [facility]; Service: Gastroenterology   • VA ESOPHAGOGASTRODUODENOSCOPY TRANSORAL DIAGNOSTIC N/A 3/7/2019    Procedure: ESOPHAGOGASTRODUODENOSCOPY (EGD);   Surgeon: Sheeba Ahuja MD;  [facility];   Service: Gastroenterology   • CT INSJ TUNNELED CTR VAD W/SUBQ PORT AGE 5 YR/> N/A 6/26/2018    Procedure: INSERTION VENOUS PORT ( PORT-A-CATH) IR;  Surgeon: Dario Libman, DO;  [facility];  Service: Interventional Radiology   • CT LAP, SURG PROCTOPEXY N/A 4/23/2019    Procedure: LAPAROSCOPIC HAND-ASSIST PELVIC DISSECTION; proctopexy;  Surgeon: Nomi Causey MD;  [facility];  Service: Colorectal   • CT LAP, SURG PROCTOPEXY N/A 11/18/2020    Procedure: LAPAROSCOPIC LYSIS OF ADHESIONS, MOBILIZATION OF RECTUM AND SUTURE RECTOPEXY;  Surgeon: Ender Ayoub MD;  [facility];  Service: Colorectal   • CT MASTECTOMY, MODIFIED RADICAL Left 5/15/2018    Procedure: BREAST MODIFIED RADICAL MASTECTOMY;  Surgeon: Paola Mishra MD;  [facility];  Service: Surgical Oncology   • CT RMVL [place] CTR VAD W/SUBQ PORT/ CTR/PRPH INSJ N/A 6/4/2019    Procedure: REMOVAL VENOUS PORT (PORT-A-CATH)IR;  Surgeon: Dario Libman, DO;  [facility];  Service: Interventional Radiology   • TUBAL LIGATION     • US GUIDANCE BREAST BIOPSY LEFT EACH ADDITIONAL Left 4/3/2018   • US GUIDED BREAST BIOPSY LEFT COMPLETE Left 4/3/2018   • US GUIDED BREAST LYMPH NODE BIOPSY LEFT Left 4/3/2018     Social History   Social History     Substance and Sexual Activity   Alcohol Use Not Currently    Comment: Drinks daily until three weeks ago     Social History     Substance and Sexual Activity   Drug Use Not Currently     Social History     Tobacco Use   Smoking Status Never Smoker   Smokeless Tobacco Never Used     Family History   Problem Relation Age of Onset   • No Known Problems Mother    • No Known Problems Father    • Breast cancer Maternal Aunt 48   • Colon cancer Maternal Aunt    • No Known Problems Sister    • No Known Problems Daughter    • No Known Problems Maternal Grandmother    • No Known Problems Maternal Grandfather    • No Known Problems Paternal Grandmother    • No Known Problems Paternal Grandfather         Meds/Allergies     Current Facility-Administered Medications   Medication Dose Route Frequency   • acetaminophen (TYLENOL) tablet 650 mg  650 mg Oral Q6H PRN   • ceftriaxone (ROCEPHIN) 1 g/50 mL in dextrose IVPB  1,000 mg Intravenous Q24H   • diazepam (VALIUM) tablet 2 mg  2 mg Oral Q8H PRN   • gabapentin (NEURONTIN) capsule 300 mg  300 mg Oral Daily PRN   • lidocaine (PF) (XYLOCAINE-MPF) 1 % injection    Code/Trauma/Sedation Med   • methocarbamol (ROBAXIN) tablet 750 mg  750 mg Oral Q6H PRN   • pantoprazole (PROTONIX) injection 40 mg  40 mg Intravenous Q12H [facility]   • QUEtiapine (SEROquel) tablet 200 mg  200 mg Oral HS   • sodium chloride 0 9 % infusion  75 mL/hr Intravenous Continuous       Allergies   Allergen Reactions   • Gluten Meal - Food Allergy GI Intolerance   • Lactose - Food Allergy GI Intolerance         Objective     Blood pressure 141/83, pulse 86, temperature 98 5 °F (36 9 °C), temperature source Oral, resp  rate (!) 29, height 5' 5" (1 651 m), weight 54 4 kg (120 lb), SpO2 98 %, not currently breastfeeding  Intake/Output Summary (Last 24 hours) at 10/28/2022 1435  Last data filed at 10/28/2022 0745  Gross per 24 hour   Intake 500 ml   Output --   Net 500 ml         PHYSICAL EXAM:      General Appearance:   Alert and oriented x 3  Cooperative, and in no respiratory distress   HEENT:   Normocephalic, atraumatic, anicteric      Neck:  Supple, symmetrical, trachea midline   Lungs:   Clear to auscultation bilaterally; no rales, rhonchi or wheezing; respirations unlabored    Heart[de-identified]   S1 and S2 normal; regular rate and rhythm; no murmur, rub, or gallop     Abdomen:   Soft, non-tender, non-distended; normal bowel sounds; no masses, no organomegaly    Genitalia:   Deferred    Rectal:   Deferred    Extremities:  No cyanosis, clubbing or edema    Pulses:  2+ and symmetric all extremities    Skin:  Skin color, texture, turgor normal, no rashes or lesions    Lymph nodes:  No palpable cervical or supraclavicular lymphadenopathy        Lab Results:   Results from last 7 days   Lab Units 10/28/22  1047 10/27/22  2207 10/24/22  0834   WBC Thousand/uL  --  3 71* 5 29   HEMOGLOBIN g/dL 7 0* 6 5* 7 3*   HEMATOCRIT % 21 8* 20 3* 23 1*   PLATELETS Thousands/uL  --  144* 145*   NEUTROS PCT %  --   --  63   LYMPHS PCT %  --   --  20   LYMPHO PCT %  --  27  --    MONOS PCT %  --   --  9   MONO PCT %  --  4  --    EOS PCT %  --  5 7*     Results from last 7 days   Lab Units 10/27/22  2207   POTASSIUM mmol/L 4 0   CHLORIDE mmol/L 109*   CO2 mmol/L 19*   BUN mg/dL 31*   CREATININE mg/dL 1 04   CALCIUM mg/dL 8 9   ALK PHOS U/L 76   ALT U/L 11*   AST U/L 33     Results from last 7 days   Lab Units 10/28/22  1047   INR  1 50*     Results from last 7 days   Lab Units 10/24/22  0834   LIPASE u/L 212       Imaging Studies: I have personally reviewed pertinent imaging studies  XR chest 1 view portable    Result Date: 10/28/2022  Impression: Suspected persistent trace left pleural effusion Workstation performed: [id number]       ASSESSMENT and PLAN:      1) Macrocytic anemia, folate deficiency - Patient patient's  both denies signs overt GI bleeding  Her folic acid is still low  Her other cell lines are also low  - Because patient is COVID positive with active symptoms, we will hold off on EGD at this time since she is not having signs of overt GI bleeding or hemodynamic instability  Per Anesthesia, preferably would avoid anesthesia to help prevent poor outcomes for 1 month  - Low-sodium diet  - Please notify GI if patient were to begin having any signs of overt GI bleeding as we would plan for more emergent endoscopy  - Otherwise, patient is scheduled to have an EGD on 11/22   - As an outpatient, I think she would benefit from seeing her hematologist/oncologist  - Folate supplementation    2) Alcoholic cirrhosis complicated by ascites - Unable to calculate MELD as INR is not resulted  Patient has been abstinent from alcohol since around February 2022 when she was hospitalized for alcoholic hepatitis requiring prednisolone course  Patient has history of breast cancer  Patient receives paracentesis on a monthly basis  Patient's  at the bedside also contributing to history  - CMP and INR daily  - Continued abstinence from alcohol  - Will order therapeutic paracentesis  - If fluid analysis is negative for SBP, consider starting patient on Lasix 20 mg and Aldactone 50 mg  - Close outpatient follow-up, she follows with Dr Juventino Larios and Homa Fletcher PA-C      The patient was seen and examined by Dr Sachin Rowe, all garduno medical decisions were made with Dr Sachin Rowe  Thank you for allowing us to participate in the care of this pleasant patient  Gi will sign off, please call with questions  Thanks!

## 2022-10-28 NOTE — ASSESSMENT & PLAN NOTE
· Patient noted to be COVID positive on 10/24  · Currently on room air saturating greater than 98%  · Will not start any COVID directed medications at this time  · Continue to monitor respiratory status

## 2022-10-28 NOTE — CASE MANAGEMENT
Case Management Discharge Planning Note    Patient name Dionte Gonzalez  Location ED 28/ED 29 MRN [id number]  : 1960 Date 10/28/2022       Current Admission Date: 10/27/2022  Current Admission Diagnosis:Acute blood loss anemia   Patient Active Problem List    Diagnosis Date Noted   • COVID-19 10/28/2022   • Decompensated hepatic cirrhosis ([address] ) 2022   • Ascites 2022   • Hypertension 2022   • Proteinuria 2022   • Abnormal urinalysis 2022   • History of alcohol use disorder    • Alcoholic hepatitis    • Elevated liver enzymes 2022   • SIRS (systemic inflammatory response syndrome) ([facility] ) 2022   • Benign hypertension with CKD (chronic kidney disease) stage III ([facility] ) 2021   • Stage 3a chronic kidney disease ([facility] ) 2021   • Anemia 2021   • Hypokalemia 2021   • Localized swelling of lower extremity 2021   • Alcohol abuse 2021   • Suspected acute pulmonary embolism ([facility] ) 12/15/2020   • Recurrent syncope 12/15/2020   • OPAL (acute kidney injury) ([facility] ) on CKD 12/15/2020   • Closed left fibular fracture 12/15/2020   • History of chemotherapy    • Alcoholic ketosis ([facility] )    • SOB (shortness of breath) 2020   • Hypoglycemia 2020   • Axillary lump, left 2019   • Use of letrozole ([place]) 2019   • Rectal prolapse 2019   • Dilated cbd, acquired 2019   • Lesion of bladder 2019   • Blood loss anemia 2019   • Hemorrhagic cystitis 2018   • Heme positive stool 2018   • Acute deep vein thrombosis (DVT) of left lower extremity ([address] ) 2018   • Hyperbilirubinemia 2018   • Acute blood loss anemia 2018   • Moderate protein-calorie malnutrition ([address] ) 2018   • Immunocompromised ([facility] ) 2018   • Hematuria 2018   • Anemia following use of chemotherapeutic drug 2018   • History of DVT (deep vein thrombosis) 10/19/2018   • Cellulitis 06/11/2018   • History of radiation therapy 05/2018   • Hydronephrosis 04/29/2018   • Malignant neoplasm of overlapping sites of left breast in female, estrogen receptor positive ([facility] [address] ) 04/10/2018      LOS (days): 0  Geometric Mean LOS (GMLOS) (days): 3 60  Days to GMLOS:3     OBJECTIVE:  Risk of Unplanned Readmission Score: 19 98         Current admission status: Inpatient   Preferred Pharmacy:   [id number] [place] - [address]  [address] [facility] [address]  Phone: [phone] Fax: [phone]    Primary Care Provider: Tad Barker DC    Primary Insurance: BLUE CROSS  Secondary Insurance:     DISCHARGE DETAILS:  Pt tested Covid+  TC to 's phone  No answer and unable to leave message   Review of prior chart: [address] [phone] is POA  Pt has LW  Home  Has 2STE   Pt had been independent prior admission  No DME  +h/o ETOH  Pt sober  H/o [facility] PTA  Pt has monthly paracentesis   Received CM consult for outpt resources    CM will follow

## 2022-10-28 NOTE — ASSESSMENT & PLAN NOTE
· Patient came in with hemoglobin of 6 5  · Patient's baseline fluctuates but appears to be closer to 10  · Patient denies any bright red blood per rectum, melena or hematemesis; patient was noted to have blood in her diaper when changed by nursing  · CT abdomen pelvis-No acute abnormality in abdomen or pelvis; cirrhosis with evidence of portal hypertension and large volume abdominal pelvic ascites; minimal pneumobilia    Plan  · Q 8 hours hemoglobin  · Patient received 1 unit PRBC  · Follow-up blood culture x2  · IV Protonix 40 mg b i d    · Ceftriaxone given history of cirrhosis and possible for upper GI bleed  · IV fluids  · Gastroenterology consulted

## 2022-10-28 NOTE — ED PROVIDER NOTES
History  Chief Complaint   Patient presents with   • Shortness of Breath     Hx breast CA, Seen here recently for same and diagnosed with COVID and pneumonia  Patient reports continued shortness of breath that has not improved  Denies any pain  Patient reports lack of appetite, denies any nausea or vomiting  Patient is a 20-year-old female with a past medical history significant for chronic kidney disease, hypertension, decompensated cirrhosis recently diagnosed with COVID presenting to the emergency department for evaluation of persistent cough, shortness of breath since her initial diagnosis  Symptoms started approximately 7 days ago  She also reports decreased appetite  She has generalized abdominal pain, however this is chronic due to her ascites  She had a CT of her abdomen and pelvis at her last visit, no acute pathology was visualized  She does go for paracentesis every month  She is denying any fevers, chills, congestion, chest pain, nausea, vomiting, diarrhea  No other complaints at this time      History provided by:  Patient   used: No    Shortness of Breath  Severity:  Mild  Onset quality:  Gradual  Duration:  7 days  Timing:  Constant  Progression:  Unchanged  Chronicity:  New  Relieved by:  Nothing  Worsened by: Activity  Ineffective treatments:  None tried  Associated symptoms: abdominal pain and cough    Associated symptoms: no chest pain, no claudication, no diaphoresis, no fever, no headaches, no hemoptysis, no neck pain, no PND, no rash, no sore throat, no sputum production, no syncope, no vomiting and no wheezing    Risk factors: alcohol use and hx of PE/DVT        Prior to Admission Medications   Prescriptions Last Dose Informant Patient Reported? Taking?    QUEtiapine (SEROquel) 200 mg tablet   Yes No   Sig: Take 200 mg by mouth as needed   SUMAtriptan (IMITREX) 100 mg tablet  Self Yes No   Sig: Take 100 mg by mouth once as needed for migraine   diazepam (VALIUM) 2 mg tablet  Self No No   Sig: Take 1 tablet (2 mg total) by mouth every 8 (eight) hours as needed (shoulder pain) for up to 7 days   doxycycline hyclate (VIBRAMYCIN) 100 mg capsule   No No   Sig: Take 1 capsule (100 mg total) by mouth 2 (two) times a day for 7 days   gabapentin (NEURONTIN) 300 mg capsule  Self No No   Sig: Take 1 capsule (300 mg total) by mouth 3 (three) times a day   Patient taking differently: Take 300 mg by mouth as needed   letrozole (FEMARA) 2 5 mg tablet  Self No No   Sig: TAKE 1 TABLET BY MOUTH EVERY DAY   lidocaine (LIDODERM) 5 %  Self Yes No   methocarbamol (ROBAXIN) 750 mg tablet  Self Yes No   naproxen (Naprosyn) 500 mg tablet  Self No No   Sig: Take 1 tablet (500 mg total) by mouth 2 (two) times a day with meals As needed for mild-moderate pain control  Facility-Administered Medications: None       Past Medical History:   Diagnosis Date   • Acute DVT (deep venous thrombosis) (HCC)     of LLE>    • Bladder infection    • Congenital prolapsed rectum    • History of biliary stent insertion    • History of chemotherapy    • History of radiation therapy 05/2018    left breast ca   • History of transfusion     x2 s/p chemo treatments, no reaction   • Hydronephrosis     right kidney   • Hypertension    • Malignant neoplasm of overlapping sites of left female breast ([facility] [address] ) 05/01/2018   • Migraine        Past Surgical History:   Procedure Laterality Date   • ABDOMINAL ADHESION SURGERY N/A 4/23/2019    Procedure: LYSIS ADHESIONS;  Surgeon: Stevie Nagel MD;  [facility];  Service: Colorectal   • BREAST BIOPSY     • COLON SURGERY      colon resection   • CYSTOSCOPY N/A 3/4/2019    Procedure: CYSTOSCOPY WITH BIOPSIES AND FULGERATION AND [address] OF RECTUM PROLAPSE;  Surgeon: Tye Mcneil MD;  [facility];  Service: Urology   • ERCP N/A 1/4/2019    Procedure: ENDOSCOPIC RETROGRADE CHOLANGIOPANCREATOGRAPHY (ERCP);   Surgeon: Daisy Mendoza MD;  [facility]; Service: Gastroenterology   • INSTILLATION MYTOMYCIN N/A 3/4/2019    Procedure: Justo Aviles;  Surgeon: Vernon Goltz, MD;  [facility];  Service: Urology   • IR PARACENTESIS  3/21/2022   • IR PARACENTESIS  6/1/2022   • IR PARACENTESIS  7/15/2022   • IR PARACENTESIS  8/30/2022   • IR PARACENTESIS  10/4/2022   • MASTECTOMY Left     2018   • OH COLONOSCOPY FLX DX W/COLLJ SPEC WHEN PFRMD N/A 4/22/2019    Procedure: COLONOSCOPY;  Surgeon: Matteo Wan MD;  Location: [facility]; Service: Colorectal   • OH EDG US EXAM SURGICAL ALTER STOM DUODENUM/JEJUNUM N/A 3/7/2019    Procedure: LINEAR ENDOSCOPIC U/S;  Surgeon: Marina Apple MD;  [facility]: [facility]; Service: Gastroenterology   • OH ESOPHAGOGASTRODUODENOSCOPY TRANSORAL DIAGNOSTIC N/A 3/7/2019    Procedure: ESOPHAGOGASTRODUODENOSCOPY (EGD); Surgeon: Marina Apple MD;  [facility]: [facility];   Service: Gastroenterology   • OH INSJ TUNNELED CTR VAD W/SUBQ PORT AGE 5 YR/> N/A 6/26/2018    Procedure: INSERTION VENOUS PORT ( PORT-A-CATH) IR;  Surgeon: Ba Almonte DO;  [facility];  Service: Interventional Radiology   • OH LAP, SURG PROCTOPEXY N/A 4/23/2019    Procedure: LAPAROSCOPIC HAND-ASSIST PELVIC DISSECTION; proctopexy;  Surgeon: Matteo Wan MD;  [facility];  Service: Colorectal   • OH LAP, SURG PROCTOPEXY N/A 11/18/2020    Procedure: LAPAROSCOPIC LYSIS OF ADHESIONS, MOBILIZATION OF RECTUM AND SUTURE RECTOPEXY;  Surgeon: Shreya Castro MD;  [facility];  Service: Colorectal   • OH MASTECTOMY, MODIFIED RADICAL Left 5/15/2018    Procedure: BREAST MODIFIED RADICAL MASTECTOMY;  Surgeon: Hugo Tellez MD;  [facility];  Service: Surgical Oncology   • OH RMVL [place] CTR VAD W/SUBQ PORT/ CTR/PRPH INSJ N/A 6/4/2019    Procedure: REMOVAL VENOUS PORT (PORT-A-CATH)IR;  Surgeon: Ba Almonte DO;  [facility];  Service: Interventional Radiology   • TUBAL LIGATION     • US GUIDANCE BREAST BIOPSY LEFT EACH ADDITIONAL Left 4/3/2018   • US GUIDED BREAST BIOPSY LEFT COMPLETE Left 4/3/2018   • US GUIDED BREAST LYMPH NODE BIOPSY LEFT Left 4/3/2018       Family History   Problem Relation Age of Onset   • No Known Problems Mother    • No Known Problems Father    • Breast cancer Maternal Aunt 48   • Colon cancer Maternal Aunt    • No Known Problems Sister    • No Known Problems Daughter    • No Known Problems Maternal Grandmother    • No Known Problems Maternal Grandfather    • No Known Problems Paternal Grandmother    • No Known Problems Paternal Grandfather      I have reviewed and agree with the history as documented  E-Cigarette/Vaping   • E-Cigarette Use Never User      E-Cigarette/Vaping Substances     Social History     Tobacco Use   • Smoking status: Never Smoker   • Smokeless tobacco: Never Used   Vaping Use   • Vaping Use: Never used   Substance Use Topics   • Alcohol use: Not Currently     Comment: Drinks daily until three weeks ago   • Drug use: Not Currently       Review of Systems   Constitutional: Negative for diaphoresis and fever  HENT: Negative for congestion and sore throat  Respiratory: Positive for cough and shortness of breath  Negative for hemoptysis, sputum production, chest tightness and wheezing  Cardiovascular: Negative for chest pain, palpitations, claudication, leg swelling, syncope and PND  Gastrointestinal: Positive for abdominal pain  Negative for diarrhea, nausea and vomiting  Musculoskeletal: Negative for arthralgias and neck pain  Skin: Negative for rash  Neurological: Negative for headaches  All other systems reviewed and are negative  Physical Exam  Physical Exam  Vitals reviewed  Constitutional:       General: She is not in acute distress  Appearance: Normal appearance  She is ill-appearing (Chronic)  She is not toxic-appearing or diaphoretic  HENT:      Head: Normocephalic and atraumatic        Right Ear: External ear normal       Left Ear: External ear normal       Nose: Nose normal  No congestion or rhinorrhea  Mouth/Throat:      Mouth: Mucous membranes are moist       Pharynx: Oropharynx is clear  No oropharyngeal exudate or posterior oropharyngeal erythema  Eyes:      General: Scleral icterus present  Right eye: No discharge  Left eye: No discharge  Extraocular Movements: Extraocular movements intact  Conjunctiva/sclera: Conjunctivae normal    Cardiovascular:      Rate and Rhythm: Normal rate and regular rhythm  Pulses: Normal pulses  Heart sounds: Normal heart sounds  No murmur heard  No friction rub  No gallop  Pulmonary:      Effort: Pulmonary effort is normal  No respiratory distress  Breath sounds: No stridor  Rhonchi ( left-sided) present  No wheezing or rales  Abdominal:      General: Abdomen is flat  There is distension  Palpations: Abdomen is soft  Tenderness: There is no abdominal tenderness  There is no guarding or rebound  Musculoskeletal:      Cervical back: Normal range of motion and neck supple  Right lower leg: No edema  Left lower leg: No edema  Skin:     General: Skin is warm and dry  Capillary Refill: Capillary refill takes less than 2 seconds  Coloration: Skin is jaundiced  Neurological:      General: No focal deficit present  Mental Status: She is alert and oriented to person, place, and time     Psychiatric:         Mood and Affect: Mood normal          Behavior: Behavior normal          Vital Signs  ED Triage Vitals [10/27/22 2030]   Temperature Pulse Respirations Blood Pressure SpO2   97 9 °F (36 6 °C) 95 18 109/69 100 %      Temp Source Heart Rate Source Patient Position - Orthostatic VS BP Location FiO2 (%)   Tympanic Monitor Sitting Left arm --      Pain Score       --           Vitals:    10/27/22 2030 10/27/22 2122 10/27/22 2345 10/28/22 0245   BP: 109/69 118/61 126/72 126/73   Pulse: 95 92 86 89   Patient Position - Orthostatic VS: Sitting Sitting  Sitting         Visual Acuity      ED Medications  Medications   sodium chloride 0 9 % bolus 1,000 mL (1,000 mL Intravenous New Bag 10/27/22 2208)       Diagnostic Studies  Results Reviewed     Procedure Component Value Units Date/Time    HS Troponin I 4hr [[id number]] Collected: 10/28/22 0246    Lab Status: In process Specimen: Blood from Arm, Right Updated: 10/28/22 0250    Blood culture #2 [[id number]] Collected: 10/28/22 0243    Lab Status: In process Specimen: Blood from Arm, Right Updated: 10/28/22 0248    Blood culture #1 [[id number]] Collected: 10/28/22 0243    Lab Status: In process Specimen: Blood from Hand, Right Updated: 10/28/22 0248    Procalcitonin [[id number]] Collected: 10/28/22 0243    Lab Status: In process Specimen: Blood from Arm, Right Updated: 10/28/22 0248    Lactic acid [[id number]] Collected: 10/28/22 0243    Lab Status: In process Specimen: Blood from Arm, Right Updated: 10/28/22 0248    Path Slide Review [[id number]] Collected: 10/28/22 0019    Lab Status: In process Specimen: Blood from Arm, Right Updated: 10/28/22 0120    HS Troponin I 2hr [[id number]]  (Normal) Collected: 10/28/22 0019    Lab Status: Final result Specimen: Blood from Arm, Right Updated: 10/28/22 0052     hs TnI 2hr 6 ng/L      Delta 2hr hsTnI -1 ng/L     Peripheral Smear [[id number]] Collected: 10/28/22 0019    Lab Status:  In process Specimen: Blood from Arm, Right Updated: 10/28/22 0024    CBC and differential [[id number]]  (Abnormal) Collected: 10/27/22 2207    Lab Status: Final result Specimen: Blood from Arm, Right Updated: 10/27/22 2310     WBC 3 71 Thousand/uL      RBC 2 00 Million/uL      Hemoglobin 6 5 g/dL      Hematocrit 20 3 %       fL      MCH 32 5 pg      MCHC 32 0 g/dL      RDW 16 5 %      MPV 10 5 fL      Platelets 981 Thousands/uL     Manual Differential(PHLEBS Do Not Order) [[id number]]  (Abnormal) Collected: 10/27/22 8553    Lab Status: Final result Specimen: Blood from Arm, Right Updated: 10/27/22 2309     Segmented % 62 %      Lymphocytes % 27 %      Monocytes % 4 %      Eosinophils, % 5 %      Basophils % 1 %      Atypical Lymphocytes % 1 %      Absolute Neutrophils 2 30 Thousand/uL      Lymphocytes Absolute 1 00 Thousand/uL      Monocytes Absolute 0 15 Thousand/uL      Eosinophils Absolute 0 19 Thousand/uL      Basophils Absolute 0 04 Thousand/uL      Total Counted --     Anisocytosis Present     Macrocytes Present     Schistocytes Present     Platelet Estimate Borderline    HS Troponin 0hr (reflex protocol) [[id number]]  (Normal) Collected: 10/27/22 2207    Lab Status: Final result Specimen: Blood from Arm, Right Updated: 10/27/22 2237     hs TnI 0hr 7 ng/L     Comprehensive metabolic panel [[id number]]  (Abnormal) Collected: 10/27/22 2207    Lab Status: Final result Specimen: Blood from Arm, Right Updated: 10/27/22 2229     Sodium 140 mmol/L      Potassium 4 0 mmol/L      Chloride 109 mmol/L      CO2 19 mmol/L      ANION GAP 12 mmol/L      BUN 31 mg/dL      Creatinine 1 04 mg/dL      Glucose 94 mg/dL      Calcium 8 9 mg/dL      Corrected Calcium 10 0 mg/dL      AST 33 U/L      ALT 11 U/L      Alkaline Phosphatase 76 U/L      Total Protein 7 0 g/dL      Albumin 2 6 g/dL      Total Bilirubin 0 98 mg/dL      eGFR 58 ml/min/1 73sq m     Narrative:      [place] guidelines for Chronic Kidney Disease (CKD):   •  Stage 1 with normal or high GFR (GFR > 90 mL/min/1 73 square meters)  •  Stage 2 Mild CKD (GFR = 60-89 mL/min/1 73 square meters)  •  Stage 3A Moderate CKD (GFR = 45-59 mL/min/1 73 square meters)  •  Stage 3B Moderate CKD (GFR = 30-44 mL/min/1 73 square meters)  •  Stage 4 Severe CKD (GFR = 15-29 mL/min/1 73 square meters)  •  Stage 5 End Stage CKD (GFR <15 mL/min/1 73 square meters)  Note: GFR calculation is accurate only with a steady state creatinine                 XR chest 1 view portable    (Results Pending) Procedures  Procedures         ED Course                               SBIRT 20yo+    Flowsheet Row Most Recent Value   SBIRT (23 yo +)    In order to provide better care to our patients, we are screening all of our patients for alcohol and drug use  Would it be okay to ask you these screening questions? Yes Filed at: 10/27/2022 2130   Initial Alcohol Screen: US AUDIT-C     1  How often do you have a drink containing alcohol? 0 Filed at: 10/27/2022 2130   2  How many drinks containing alcohol do you have on a typical day you are drinking? 0 Filed at: 10/27/2022 2130   3a  Male UNDER 65: How often do you have five or more drinks on one occasion? 0 Filed at: 10/27/2022 2130   3b  FEMALE Any Age, or MALE 65+: How often do you have 4 or more drinks on one occassion? 0 Filed at: 10/27/2022 2130   Audit-C Score 0 Filed at: 10/27/2022 2130   IRIS: How many times in the past year have you    Used an illegal drug or used a prescription medication for non-medical reasons? Never Filed at: 10/27/2022 2130                    MDM  Number of Diagnoses or Management Options  Anemia, unspecified type  Pneumonia due to COVID-19 virus  Diagnosis management comments: Patient presenting for evaluation of worsening cough, shortness of breath in the setting of recently diagnosed COVID-19 pneumonia  She is currently taking doxycycline  Upon arrival, she is chronically ill-appearing  She is jaundiced with scleral icterus despite normal bilirubin on laboratory evaluation  She does have cirrhosis  She has left-sided rhonchi on exam   Chest x-ray with left-sided pneumonia  Lab work remarkable for anemia, hemoglobin 6 5  There are cysts sites present, peripheral smear added  Symptoms possibly secondary to hemolytic anemia  2 units of blood were ordered  Patient consented to blood transfusion  Symptoms likely multifactorial, partly due to COVID pneumonia as well as acute anemia    Patient admitted to Medicine for further evaluation and management       Amount and/or Complexity of Data Reviewed  Clinical lab tests: ordered and reviewed  Tests in the radiology section of CPT®: ordered and reviewed    Risk of Complications, Morbidity, and/or Mortality  Presenting problems: moderate  Diagnostic procedures: low  Management options: low    Patient Progress  Patient progress: stable      Disposition  Final diagnoses:   Pneumonia due to COVID-19 virus   Anemia, unspecified type     Time reflects when diagnosis was documented in both MDM as applicable and the Disposition within this note     Time User Action Codes Description Comment    10/28/2022  1:55 AM Sudhir Avila Add [U07 1,  J12 82] Pneumonia due to COVID-19 virus     10/28/2022  1:55 AM Sudhir Zarco [D64 9] Anemia, unspecified type       ED Disposition     ED Disposition   Admit    Condition   Stable    Date/Time   Fri Oct 28, 2022  1:53 AM    Comment   Case was discussed with MESFIN and the patient's admission status was agreed to be Admission Status: inpatient status to the service of Dr Oneyda Baum   Follow-up Information    None         Patient's Medications   Discharge Prescriptions    No medications on file       No discharge procedures on file      PDMP Review     None          ED Provider  Electronically Signed by           Elsa Rodriguez PA-C  10/28/22 6674

## 2022-10-28 NOTE — ED NOTES
Pt had a small bowel movement and saturated adult brief  Pt cleaned and changed  Purewick placed for pt comfort  Tacho Proctor, [facility]  10/28/22 4291

## 2022-10-28 NOTE — BRIEF OP NOTE (RAD/CATH)
IR PARACENTESIS Procedure Note    PATIENT NAME: Carley Naylor  : 1960  MRN: [id number]    Pre-op Diagnosis:   1  Pneumonia due to COVID-19 virus    2  Anemia, unspecified type    3  Acute blood loss anemia      Post-op Diagnosis:   1  Pneumonia due to COVID-19 virus    2  Anemia, unspecified type    3   Acute blood loss anemia        Surgeon:   Dell Bundy  Assistants:     No qualified resident was available, Resident is only observing    Estimated Blood Loss: none  Findings: 4000 ml clear yellow ascites fluid, LLQ    Specimens: sent as requested    Complications:  None immediate    Anesthesia: local    Libby Wolf     Date: 10/28/2022  Time: 2:44 PM

## 2022-10-28 NOTE — ASSESSMENT & PLAN NOTE
· Patient not on any medications as an outpatient for her cirrhosis  · Abdomen minimally distended  · States she does get paracentesis monthly  · Gastroenterology consulted  · Continue to monitor

## 2022-10-28 NOTE — UTILIZATION REVIEW
Initial Clinical Review    Admission: Date/Time/Statement:   Admission Orders (From admission, onward)     Ordered        10/28/22 0156  INPATIENT ADMISSION  Once                      Orders Placed This Encounter   Procedures   • INPATIENT ADMISSION     Standing Status:   Standing     Number of Occurrences:   1     Order Specific Question:   Level of Care     Answer:   Med Surg [16]     Order Specific Question:   Estimated length of stay     Answer:   More than 2 Midnights     Order Specific Question:   Certification     Answer:   I certify that inpatient services are medically necessary for this patient for a duration of greater than two midnights  See H&P and MD Progress Notes for additional information about the patient's course of treatment  ED Arrival Information     Expected   -    Arrival   10/27/2022 20:19    Acuity   Emergent            Means of arrival   Wheelchair    Escorted by   Family Member    Service   Hospitalist    Admission type   Emergency            Arrival complaint   SOB           Chief Complaint   Patient presents with   • Shortness of Breath     Hx breast CA, Seen here recently for same and diagnosed with COVID and pneumonia  Patient reports continued shortness of breath that has not improved  Denies any pain  Patient reports lack of appetite, denies any nausea or vomiting  Initial Presentation: 65 y o  female  To ER from home,   due to persistent cough, Increasing SOB - worsening since her initial diagnosis  Of COVID  approx 7 days ago  PMH  CKD, HTN, decompensated alcoholic cirrhosis,  Chronic anemia, folate deficiency,  DVT on Eliquis  Pt reports chronic, generalized abd pain  Due to ascites  Recent CTAP showed  no acute pathology  paracentesis every month  EXAM:   Skin jaundiced w/scleral icterus,  Breath sounds -  Rhonchi left sides  amb pulse ox 96% on rm air  Abd distended/ soft     Pt/spouse deny any bright red blood per rectum, melena    IN ER noted to have blood in her diaper when changed by nursing  Hgb  6 5  (baseline fluctuates but normally around 10)   CTAP  Shows large volume abd ascites,  Minimal pneumobilia  Will   Admit IP Status,  MS Level of care  For management of acute  (bright red blood per rectum)   on chronic/ macrocytic anemia,  Ascites  Will Transfuse 1 U PRBC's,  trend Hgb q8H,  Initiate IV protonix,  IV Ceftriaxone ,  Consult GI      Date:    10/28      Day 2: setting at over 98% on room air  She is tachypneic, but other vitals are within stable     Taken to IR For  Paracentesis completed  = 4000 ml clear yellow ascites fluid, LLQ     10/28   GI CONSULT:    patient's  denies signs of overt GI bleeding  folic acid and other cell lines still low  Due to COVID,   will hold off on EGD at this time since she is not having signs of overt GI bleeding or hemodynamic instability  Otherwise, patient is scheduled to have an EGD on 11/22   REC fup w/pt's   Hematologist/oncologist   Cont Folate supplement  CMP and INR daily,   order therapeutic paracentesis, If fluid analysis is negative for SBP, consider starting patient on Lasix and Aldactone     10/29   Patient's HGB 6 8 on AM labs  1U PRBC and post infusion HGB recheck ordered        ED Triage Vitals   Temperature Pulse Respirations Blood Pressure SpO2   10/27/22 2030 10/27/22 2030 10/27/22 2030 10/27/22 2030 10/27/22 2030   97 9 °F (36 6 °C) 95 18 109/69 100 %      Temp Source Heart Rate Source Patient Position - Orthostatic VS BP Location FiO2 (%)   10/27/22 2030 10/27/22 2030 10/27/22 2030 10/27/22 2030 --   Tympanic Monitor Sitting Left arm       Pain Score       10/28/22 0425       No Pain          Wt Readings from Last 1 Encounters:   10/27/22 54 4 kg (120 lb)     Additional Vital Signs:  10/29/22 0650 97 4 °F (36 3 °C) Abnormal  107 Abnormal  18 103/64   10/29/22 06:49:59 97 4 °F (36 3 °C) Abnormal  -- -- 103/64       10/28/22 0845 98 5 °F (36 9 °C) 86 29 Abnormal  141/83 10/28/22 0830 98 5 °F (36 9 °C) 87 37 Abnormal  136/72   10/28/22 0815 98 2 °F (36 8 °C) 83 31 Abnormal  128/69   10/28/22 0800 98 2 °F (36 8 °C) 83 25 Abnormal  128/67   10/28/22 0745 98 2 °F (36 8 °C) 83 24 Abnormal  147/75   10/28/22 0640 98 2 °F (36 8 °C) 81 22 126/85   10/28/22 0545 -- 84 22 --   10/28/22 0540 98 2 °F (36 8 °C) 84 24 Abnormal  136/77   10/28/22 0510 98 °F (36 7 °C) 84 22 132/75   10/28/22 0450 98 2 °F (36 8 °C) 86 19 126/73   10/28/22 0440 98 1 °F (36 7 °C) 86 18 125/71   10/28/22 0425 98 1 °F (36 7 °C) 87 17 115/74   10/28/22 0245 -- 89 21 126/73   10/27/22 2345 -- 86 18 126/72   10/27/22 2126 -- -- -- --   10/27/22 2122 98 2 °F (36 8 °C) 92 18 118/61       Pertinent Labs/Diagnostic Test Results:   10/27  EKG -  Normal sinus rhythm  Low voltage QRS    XR chest 1 view portable   Final Result by Elizabeth Yo MD (10/28 2922)      Suspected persistent trace left pleural effusion        Results from last 7 days   Lab Units 10/24/22  0834   SARS-COV-2  Positive*     Results from last 7 days   Lab Units 10/29/22  0432 10/29/22  0011 10/28/22  1542 10/28/22  1047 10/27/22  2207 10/24/22  0834   WBC Thousand/uL 2 15*  --   --   --  3 71* 5 29   HEMOGLOBIN g/dL 6 8* 7 0* 7 3* 7 0* 6 5* 7 3*   HEMATOCRIT % 21 0* 22 0* 22 0* 21 8* 20 3* 23 1*   PLATELETS Thousands/uL 78*  --   --   --  144* 145*   NEUTROS ABS Thousands/µL 0 79*  --   --   --   --  3 33         Results from last 7 days   Lab Units 10/27/22  2207 10/24/22  0834   SODIUM mmol/L 140 140   POTASSIUM mmol/L 4 0 4 0   CHLORIDE mmol/L 109* 110*   CO2 mmol/L 19* 19*   ANION GAP mmol/L 12 11   BUN mg/dL 31* 18   CREATININE mg/dL 1 04 0 96   EGFR ml/min/1 73sq m 58 64   CALCIUM mg/dL 8 9 9 5     Results from last 7 days   Lab Units 10/27/22  2207 10/24/22  0834   AST U/L 33 28   ALT U/L 11* 11*   ALK PHOS U/L 76 68   TOTAL PROTEIN g/dL 7 0 6 7   ALBUMIN g/dL 2 6* 2 4*   TOTAL BILIRUBIN mg/dL 0 98 0 87         Results from last 7 days Lab Units 10/27/22  2207 10/24/22  0834   GLUCOSE RANDOM mg/dL 94 100     BETA-HYDROXYBUTYRATE   Date Value Ref Range Status   09/08/2020 7 1 (H) <0 6 mmol/L Final      Results from last 7 days   Lab Units 10/28/22  0246 10/28/22  0019 10/27/22  2207   HS TNI 0HR ng/L  --   --  7   HS TNI 2HR ng/L  --  6  --    HSTNI D2 ng/L  --  -1  --    HS TNI 4HR ng/L 7  --   --    HSTNI D4 ng/L 0  --   --          Results from last 7 days   Lab Units 10/28/22  1047   PROTIME seconds 17 8*   INR  1 50*         Results from last 7 days   Lab Units 10/28/22  0243   PROCALCITONIN ng/ml 0 24     Results from last 7 days   Lab Units 10/28/22  0243   LACTIC ACID mmol/L 0 9     Results from last 7 days   Lab Units 10/24/22  0834   LIPASE u/L 212     Results from last 7 days   Lab Units 10/24/22  0834   INFLUENZA A PCR  Negative   INFLUENZA B PCR  Negative   RSV PCR  Negative     Results from last 7 days   Lab Units 10/28/22  1432 10/28/22  0243   BLOOD CULTURE   --  Received in Microbiology Lab  Culture in Progress  Received in Microbiology Lab  Culture in Progress     GRAM STAIN RESULT  No Polys or Bacteria seen  --      Results from last 7 days   Lab Units 10/28/22  1432   TOTAL COUNTED  100   WBC FLUID /ul 34           ED Treatment:   Medication Administration from 10/27/2022 2019 to 10/28/2022 1014       Date/Time Order Dose Route Action     10/27/2022 2208 sodium chloride 0 9 % bolus 1,000 mL 1,000 mL Intravenous New Bag     10/28/2022 0837 sodium chloride 0 9 % infusion 75 mL/hr Intravenous New Bag     10/28/2022 0909 pantoprazole (PROTONIX) injection 40 mg 40 mg Intravenous Given     10/28/2022 0835 ceftriaxone (ROCEPHIN) 1 g/50 mL in dextrose IVPB 1,000 mg Intravenous New Bag        Past Medical History:   Diagnosis Date   • Acute DVT (deep venous thrombosis) (HCC)     of LLE>    • Bladder infection    • Congenital prolapsed rectum    • History of biliary stent insertion    • History of chemotherapy    • History of radiation therapy 05/2018    left breast ca   • History of transfusion     x2 s/p chemo treatments, no reaction   • Hydronephrosis     right kidney   • Hypertension    • Malignant neoplasm of overlapping sites of left female breast ([facility] [address] ) 05/01/2018   • Migraine      Present on Admission:  • Acute blood loss anemia  • Stage 3a chronic kidney disease (HCC)  • Decompensated hepatic cirrhosis (HCC)      Admitting Diagnosis: Acute blood loss anemia [D62]  SOB (shortness of breath) [R06 02]  Anemia, unspecified type [D64 9]  Pneumonia due to COVID-19 virus [U07 1, J12 82]  Age/Sex: 65 y o  female  Admission Orders:       See above  Note  Low Na diet  VS q3H  Up w/assist       Scheduled Medications:  cefTRIAXone, 1,000 mg, Intravenous, Q24H  pantoprazole, 40 mg, Intravenous, Q12H DAVON  QUEtiapine, 200 mg, Oral, HS      Continuous IV Infusions:  sodium chloride, 75 mL/hr, Intravenous, Continuous      PRN Meds:  acetaminophen, 650 mg, Oral, Q6H PRN  diazepam, 2 mg, Oral, Q8H PRN  gabapentin, 300 mg, Oral, Daily PRN  methocarbamol, 750 mg, Oral, Q6H PRN        IP CONSULT TO GASTROENTEROLOGY  IP CONSULT TO CASE MANAGEMENT    Network Utilization Review Department  ATTENTION: Please call with any questions or concerns to [phone] and carefully listen to the prompts so that you are directed to the right person  All voicemails are confidential   Shannan Sanderson all requests for admission clinical reviews, approved or denied determinations and any other requests to dedicated fax number below belonging to the campus where the patient is receiving treatment   List of dedicated fax numbers for the Facilities:  [address] [place] [address] DENIALS (Administrative/Medical Necessity) [phone]   [address] [place] [address] (Maternity/NICU/Pediatrics) [phone]   [address] [facility] [address] [place] [address] Santy Reese [address] [facility] [place] [address] [phone]

## 2022-10-28 NOTE — ASSESSMENT & PLAN NOTE
Lab Results   Component Value Date    EGFR 58 10/27/2022    EGFR 64 10/24/2022    EGFR 75 03/23/2022    CREATININE 1 04 10/27/2022    CREATININE 0 96 10/24/2022    CREATININE 0 84 03/23/2022   ·   · Kidney function at baseline currently  · Avoid hypotension and nephrotoxic agents  · Continue to monitor

## 2022-10-28 NOTE — H&P
[facility]  H&P- Asa [place] 1960, 65 y o  female MRN: [id number]  Unit/Bed#: ED 28 Encounter: [id number]  Primary Care Provider: Jane Herndon DC   Date and time admitted to hospital: 10/27/2022  9:14 PM    * Acute blood loss anemia  Assessment & Plan  · Patient came in with hemoglobin of 6 5  · Patient's baseline fluctuates but appears to be closer to 10  · Patient denies any bright red blood per rectum, melena or hematemesis; patient was noted to have blood in her diaper when changed by nursing  · CT abdomen pelvis-No acute abnormality in abdomen or pelvis; cirrhosis with evidence of portal hypertension and large volume abdominal pelvic ascites; minimal pneumobilia    Plan  · Q 8 hours hemoglobin  · Patient received 1 unit PRBC  · Follow-up blood culture x2  · IV Protonix 40 mg b i d    · Ceftriaxone given history of cirrhosis and possible for upper GI bleed  · IV fluids  · Gastroenterology consulted    COVID-19  Assessment & Plan  · Patient noted to be COVID positive on 10/24  · Currently on room air saturating greater than 98%  · Will not start any COVID directed medications at this time  · Continue to monitor respiratory status    Decompensated hepatic cirrhosis ([address] )  Assessment & Plan  · Patient not on any medications as an outpatient for her cirrhosis  · Abdomen minimally distended  · States she does get paracentesis monthly  · Gastroenterology consulted  · Continue to monitor    Stage 3a chronic kidney disease [facility])  Assessment & Plan  Lab Results   Component Value Date    EGFR 58 10/27/2022    EGFR 64 10/24/2022    EGFR 75 03/23/2022    CREATININE 1 04 10/27/2022    CREATININE 0 96 10/24/2022    CREATININE 0 84 03/23/2022   ·   · Kidney function at baseline currently  · Avoid hypotension and nephrotoxic agents  · Continue to monitor    History of DVT (deep vein thrombosis)  Assessment & Plan  · Not on any anticoagulation    VTE Pharmacologic Prophylaxis: VTE Score: 3 Moderate Risk (Score 3-4) - Pharmacological DVT Prophylaxis Contraindicated  Sequential Compression Devices Ordered  Code Status: Level 3 - DNAR and DNI   Discussion with family: Patient declined call to   Anticipated Length of Stay: Patient will be admitted on an inpatient basis with an anticipated length of stay of greater than 2 midnights secondary to Acute blood loss anemia  Total Time for Visit, including Counseling / Coordination of Care: 60 minutes Greater than 50% of this total time spent on direct patient counseling and coordination of care  Chief Complaint:  Shortness of breath    History of Present Illness:  Zuleyka Mccurdy is a 65 y o  female with a PMH of CKD, hypertension, decompensated cirrhosis who presents with shortness of breath  Patient coming in secondary to shortness of breath, and decreased appetite  Patient was noted to be COVID positive on 10/24 and has been feeling worse since then  Patient denies any recent melena, bright red blood per rectum, or hematemesis  Patient also denies any other complaints on exam     Review of Systems:  Review of Systems   Constitutional: Negative for chills, fatigue, fever and unexpected weight change  HENT: Negative for congestion, ear pain, sore throat and tinnitus  Eyes: Negative for visual disturbance  Respiratory: Negative for cough, chest tightness, shortness of breath and wheezing  Cardiovascular: Negative for chest pain, palpitations and leg swelling  Gastrointestinal: Negative for abdominal pain, anal bleeding, blood in stool, constipation, diarrhea, nausea and vomiting  Genitourinary: Negative for dysuria and frequency  Skin: Negative for rash  Neurological: Negative for dizziness, weakness, light-headedness, numbness and headaches  All other systems reviewed and are negative        Past Medical and Surgical History:   Past Medical History:   Diagnosis Date   • Acute DVT (deep venous thrombosis) ([facility] [address] ) of LLE>    • Bladder infection    • Congenital prolapsed rectum    • History of biliary stent insertion    • History of chemotherapy    • History of radiation therapy 05/2018    left breast ca   • History of transfusion     x2 s/p chemo treatments, no reaction   • Hydronephrosis     right kidney   • Hypertension    • Malignant neoplasm of overlapping sites of left female breast ([facility] [address] ) 05/01/2018   • Migraine        Past Surgical History:   Procedure Laterality Date   • ABDOMINAL ADHESION SURGERY N/A 4/23/2019    Procedure: LYSIS ADHESIONS;  Surgeon: Stevie Nagel MD;  [facility];  Service: Colorectal   • BREAST BIOPSY     • COLON SURGERY      colon resection   • CYSTOSCOPY N/A 3/4/2019    Procedure: CYSTOSCOPY WITH BIOPSIES AND [place] OF RECTUM PROLAPSE;  Surgeon: Tye Mcneil MD;  [facility];  Service: Urology   • ERCP N/A 1/4/2019    Procedure: ENDOSCOPIC RETROGRADE CHOLANGIOPANCREATOGRAPHY (ERCP); Surgeon: Daisy Mendoza MD;  Location[facility];  Service: Gastroenterology   • INSTILLATION MYTOMYCIN N/A 3/4/2019    Procedure: Carry [place];  Surgeon: Tye Mcneil MD;  [facility];  Service: Urology   • IR PARACENTESIS  3/21/2022   • IR PARACENTESIS  6/1/2022   • IR PARACENTESIS  7/15/2022   • IR PARACENTESIS  8/30/2022   • IR PARACENTESIS  10/4/2022   • MASTECTOMY Left     2018   • VA COLONOSCOPY FLX DX W/COLLJ SPEC WHEN PFRMD N/A 4/22/2019    Procedure: COLONOSCOPY;  Surgeon: Stevie Nagel MD;  Location: [facility]; Service: Colorectal   • VA EDG US EXAM SURGICAL ALTER STOM DUODENUM/JEJUNUM N/A 3/7/2019    Procedure: LINEAR ENDOSCOPIC U/S;  Surgeon: Diaz Jacobo MD;  Location: [facility]; Service: Gastroenterology   • VA ESOPHAGOGASTRODUODENOSCOPY TRANSORAL DIAGNOSTIC N/A 3/7/2019    Procedure: ESOPHAGOGASTRODUODENOSCOPY (EGD); Surgeon: Diaz Jacobo MD;  Location: [facility];   Service: Gastroenterology   • VA INSJ TUNNELED CTR VAD W/SUBQ PORT AGE 5 YR/> N/A 6/26/2018    Procedure: INSERTION VENOUS PORT ( PORT-A-CATH) IR;  Surgeon: Dario Libman, DO;  [facility];  Service: Interventional Radiology   • NM LAP, SURG PROCTOPEXY N/A 4/23/2019    Procedure: LAPAROSCOPIC HAND-ASSIST PELVIC DISSECTION; proctopexy;  Surgeon: Nomi Causey MD;  Location: [facility];  Service: Colorectal   • NM LAP, SURG PROCTOPEXY N/A 11/18/2020    Procedure: LAPAROSCOPIC LYSIS OF ADHESIONS, MOBILIZATION OF RECTUM AND SUTURE RECTOPEXY;  Surgeon: Ender Ayoub MD;  Location: [facility];  Service: Colorectal   • NM MASTECTOMY, MODIFIED RADICAL Left 5/15/2018    Procedure: BREAST MODIFIED RADICAL MASTECTOMY;  Surgeon: Paola Mishra MD;  Location[facility];  Service: Surgical Oncology   • NM RMVL [place] CTR VAD W/SUBQ PORT/ CTR/PRPH INSJ N/A 6/4/2019    Procedure: REMOVAL VENOUS PORT (PORT-A-CATH)IR;  Surgeon: Dario Libman, DO;  [facility];  Service: Interventional Radiology   • TUBAL LIGATION     • US GUIDANCE BREAST BIOPSY LEFT EACH ADDITIONAL Left 4/3/2018   • US GUIDED BREAST BIOPSY LEFT COMPLETE Left 4/3/2018   • US GUIDED BREAST LYMPH NODE BIOPSY LEFT Left 4/3/2018       Meds/Allergies:  Prior to Admission medications    Medication Sig Start Date End Date Taking?  Authorizing Provider   diazepam (VALIUM) 2 mg tablet Take 1 tablet (2 mg total) by mouth every 8 (eight) hours as needed (shoulder pain) for up to 7 days 9/24/22 10/1/22  Megan Gonzales DO   doxycycline hyclate (VIBRAMYCIN) 100 mg capsule Take 1 capsule (100 mg total) by mouth 2 (two) times a day for 7 days 10/24/22 10/31/22  Tyrel Lai MD   gabapentin (NEURONTIN) 300 mg capsule Take 1 capsule (300 mg total) by mouth 3 (three) times a day  Patient taking differently: Take 300 mg by mouth as needed 5/28/19   Jonathan Patel MD   letrozole [facility]) 2 5 mg tablet TAKE 1 TABLET BY MOUTH EVERY DAY 11/26/21   Jonathan Patel MD   lidocaine (LIDODERM) 5 % 9/11/22   Historical Provider, MD   methocarbamol (ROBAXIN) 750 mg tablet  9/11/22   Historical Provider, MD   naproxen (Naprosyn) 500 mg tablet Take 1 tablet (500 mg total) by mouth 2 (two) times a day with meals As needed for mild-moderate pain control  9/24/22   Edith Gonzales DO   QUEtiapine (SEROquel) 200 mg tablet Take 200 mg by mouth as needed    Historical Provider, MD   SUMAtriptan (IMITREX) 100 mg tablet Take 100 mg by mouth once as needed for migraine    Historical Provider, MD     I have reviewed home medications with patient personally  Allergies:    Allergies   Allergen Reactions   • Gluten Meal - Food Allergy GI Intolerance   • Lactose - Food Allergy GI Intolerance       Social History:  Marital Status: /Civil Union     Patient Pre-hospital Living Situation: Home  Patient Pre-hospital Level of Mobility: walks  Substance Use History:   Social History     Substance and Sexual Activity   Alcohol Use Not Currently    Comment: Drinks daily until three weeks ago     Social History     Tobacco Use   Smoking Status Never Smoker   Smokeless Tobacco Never Used     Social History     Substance and Sexual Activity   Drug Use Not Currently       Family History:  Family History   Problem Relation Age of Onset   • No Known Problems Mother    • No Known Problems Father    • Breast cancer Maternal Aunt 48   • Colon cancer Maternal Aunt    • No Known Problems Sister    • No Known Problems Daughter    • No Known Problems Maternal Grandmother    • No Known Problems Maternal Grandfather    • No Known Problems Paternal Grandmother    • No Known Problems Paternal Grandfather        Physical Exam:     Vitals:   Blood Pressure: 141/83 (10/28/22 0845)  Pulse: 86 (10/28/22 0845)  Temperature: 98 5 °F (36 9 °C) (10/28/22 0845)  Temp Source: Oral (10/28/22 0845)  Respirations: (!) 29 (10/28/22 0845)  Height: 5' 5" (165 1 cm) (10/27/22 2030)  Weight - Scale: 54 4 kg (120 lb) (10/27/22 2030)  SpO2: 98 % (10/28/22 Beth Kc    Physical Exam  Vitals and nursing note reviewed  Constitutional:       General: She is not in acute distress  Appearance: She is well-developed  HENT:      Head: Normocephalic and atraumatic  Eyes:      General: No scleral icterus  Conjunctiva/sclera: Conjunctivae normal    Cardiovascular:      Rate and Rhythm: Normal rate and regular rhythm  Heart sounds: Normal heart sounds  No murmur heard  No friction rub  No gallop  Pulmonary:      Effort: Pulmonary effort is normal  No respiratory distress  Breath sounds: Normal breath sounds  No wheezing or rales  Abdominal:      General: Bowel sounds are normal       Palpations: Abdomen is soft  Tenderness: There is no abdominal tenderness  Comments: Minimally distended abdomen   Musculoskeletal:         General: Normal range of motion  Skin:     General: Skin is warm  Findings: No rash  Neurological:      Mental Status: She is alert and oriented to person, place, and time            Additional Data:     Lab Results:  Results from last 7 days   Lab Units 10/28/22  1047 10/27/22  2207 10/24/22  0834   WBC Thousand/uL  --  3 71* 5 29   HEMOGLOBIN g/dL 7 0* 6 5* 7 3*   HEMATOCRIT % 21 8* 20 3* 23 1*   PLATELETS Thousands/uL  --  144* 145*   NEUTROS PCT %  --   --  63   LYMPHS PCT %  --   --  20   LYMPHO PCT %  --  27  --    MONOS PCT %  --   --  9   MONO PCT %  --  4  --    EOS PCT %  --  5 7*     Results from last 7 days   Lab Units 10/27/22  2207   SODIUM mmol/L 140   POTASSIUM mmol/L 4 0   CHLORIDE mmol/L 109*   CO2 mmol/L 19*   BUN mg/dL 31*   CREATININE mg/dL 1 04   ANION GAP mmol/L 12   CALCIUM mg/dL 8 9   ALBUMIN g/dL 2 6*   TOTAL BILIRUBIN mg/dL 0 98   ALK PHOS U/L 76   ALT U/L 11*   AST U/L 33   GLUCOSE RANDOM mg/dL 94     Results from last 7 days   Lab Units 10/28/22  1047   INR  1 50*             Results from last 7 days   Lab Units 10/28/22  0243   LACTIC ACID mmol/L 0 9   PROCALCITONIN ng/ml 0 24 Imaging: Reviewed radiology reports from this admission including: abdominal/pelvic CT  XR chest 1 view portable   Final Result by Marleen Umaña MD (10/28 6764)      Suspected persistent trace left pleural effusion            Workstation performed: [id number]         IR INPATIENT Paracentesis    (Results Pending)       EKG and Other Studies Reviewed on Admission:   · EKG: NSR  HR 90     ** Please Note: This note has been constructed using a voice recognition system   **

## 2022-10-28 NOTE — ED NOTES
jose changed  Patient turned and repositioned, brief changed        Yaneth Tinajero, RN  10/28/22 5333

## 2022-10-29 LAB
ABO GROUP BLD BPU: NORMAL
ABO GROUP BLD BPU: NORMAL
AFP-TM SERPL-MCNC: 3.1 NG/ML (ref 0.5–8)
ALBUMIN SERPL BCP-MCNC: 1.8 G/DL (ref 3.5–5)
ALP SERPL-CCNC: 56 U/L (ref 46–116)
ALT SERPL W P-5'-P-CCNC: 8 U/L (ref 12–78)
ANION GAP SERPL CALCULATED.3IONS-SCNC: 13 MMOL/L (ref 4–13)
AST SERPL W P-5'-P-CCNC: 23 U/L (ref 5–45)
BASOPHILS # BLD AUTO: 0.02 THOUSANDS/ÂΜL (ref 0–0.1)
BASOPHILS NFR BLD AUTO: 1 % (ref 0–1)
BILIRUB SERPL-MCNC: 0.7 MG/DL (ref 0.2–1)
BPU ID: NORMAL
BPU ID: NORMAL
BUN SERPL-MCNC: 24 MG/DL (ref 5–25)
CALCIUM ALBUM COR SERPL-MCNC: 9.1 MG/DL (ref 8.3–10.1)
CALCIUM SERPL-MCNC: 7.3 MG/DL (ref 8.3–10.1)
CHLORIDE SERPL-SCNC: 115 MMOL/L (ref 96–108)
CO2 SERPL-SCNC: 15 MMOL/L (ref 21–32)
CREAT SERPL-MCNC: 1.01 MG/DL (ref 0.6–1.3)
CROSSMATCH: NORMAL
CROSSMATCH: NORMAL
EOSINOPHIL # BLD AUTO: 0.27 THOUSAND/ÂΜL (ref 0–0.61)
EOSINOPHIL NFR BLD AUTO: 13 % (ref 0–6)
ERYTHROCYTE [DISTWIDTH] IN BLOOD BY AUTOMATED COUNT: 17 % (ref 11.6–15.1)
GFR SERPL CREATININE-BSD FRML MDRD: 60 ML/MIN/1.73SQ M
GLUCOSE SERPL-MCNC: 107 MG/DL (ref 65–140)
GLUCOSE SERPL-MCNC: 120 MG/DL (ref 65–140)
GLUCOSE SERPL-MCNC: 126 MG/DL (ref 65–140)
HCT VFR BLD AUTO: 21 % (ref 34.8–46.1)
HCT VFR BLD AUTO: 22 % (ref 34.8–46.1)
HCT VFR BLD AUTO: 25.8 % (ref 34.8–46.1)
HGB BLD-MCNC: 6.8 G/DL (ref 11.5–15.4)
HGB BLD-MCNC: 7 G/DL (ref 11.5–15.4)
HGB BLD-MCNC: 8.6 G/DL (ref 11.5–15.4)
IMM GRANULOCYTES # BLD AUTO: 0.01 THOUSAND/UL (ref 0–0.2)
IMM GRANULOCYTES NFR BLD AUTO: 1 % (ref 0–2)
LYMPHOCYTES # BLD AUTO: 0.79 THOUSANDS/ÂΜL (ref 0.6–4.47)
LYMPHOCYTES NFR BLD AUTO: 37 % (ref 14–44)
MCH RBC QN AUTO: 31.8 PG (ref 26.8–34.3)
MCHC RBC AUTO-ENTMCNC: 32.4 G/DL (ref 31.4–37.4)
MCV RBC AUTO: 98 FL (ref 82–98)
MONOCYTES # BLD AUTO: 0.27 THOUSAND/ÂΜL (ref 0.17–1.22)
MONOCYTES NFR BLD AUTO: 13 % (ref 4–12)
NEUTROPHILS # BLD AUTO: 0.79 THOUSANDS/ÂΜL (ref 1.85–7.62)
NEUTS SEG NFR BLD AUTO: 35 % (ref 43–75)
NRBC BLD AUTO-RTO: 0 /100 WBCS
PLATELET # BLD AUTO: 78 THOUSANDS/UL (ref 149–390)
PMV BLD AUTO: 11 FL (ref 8.9–12.7)
POTASSIUM SERPL-SCNC: 3 MMOL/L (ref 3.5–5.3)
PROT SERPL-MCNC: 5.1 G/DL (ref 6.4–8.4)
RBC # BLD AUTO: 2.14 MILLION/UL (ref 3.81–5.12)
SODIUM SERPL-SCNC: 143 MMOL/L (ref 135–147)
UNIT DISPENSE STATUS: NORMAL
UNIT DISPENSE STATUS: NORMAL
UNIT PRODUCT CODE: NORMAL
UNIT PRODUCT CODE: NORMAL
UNIT PRODUCT VOLUME: 350 ML
UNIT PRODUCT VOLUME: 350 ML
UNIT RH: NORMAL
UNIT RH: NORMAL
WBC # BLD AUTO: 2.15 THOUSAND/UL (ref 4.31–10.16)

## 2022-10-29 PROCEDURE — 30233N1 TRANSFUSION OF NONAUTOLOGOUS RED BLOOD CELLS INTO PERIPHERAL VEIN, PERCUTANEOUS APPROACH: ICD-10-PCS

## 2022-10-29 RX ORDER — DIPHENHYDRAMINE HYDROCHLORIDE 50 MG/ML
25 INJECTION INTRAMUSCULAR; INTRAVENOUS ONCE
Status: COMPLETED | OUTPATIENT
Start: 2022-10-29 | End: 2022-10-29

## 2022-10-29 RX ORDER — POTASSIUM CHLORIDE 20 MEQ/1
20 TABLET, EXTENDED RELEASE ORAL ONCE
Status: COMPLETED | OUTPATIENT
Start: 2022-10-29 | End: 2022-10-29

## 2022-10-29 RX ORDER — DIPHENHYDRAMINE HCL 25 MG
25 TABLET ORAL EVERY 6 HOURS PRN
Status: DISCONTINUED | OUTPATIENT
Start: 2022-10-29 | End: 2022-10-29

## 2022-10-29 RX ORDER — POTASSIUM CHLORIDE 20 MEQ/1
40 TABLET, EXTENDED RELEASE ORAL ONCE
Status: COMPLETED | OUTPATIENT
Start: 2022-10-29 | End: 2022-10-29

## 2022-10-29 RX ORDER — SODIUM CHLORIDE 9 MG/ML
75 INJECTION, SOLUTION INTRAVENOUS CONTINUOUS
Status: DISPENSED | OUTPATIENT
Start: 2022-10-29 | End: 2022-10-30

## 2022-10-29 RX ADMIN — PANTOPRAZOLE SODIUM 40 MG: 40 INJECTION, POWDER, FOR SOLUTION INTRAVENOUS at 09:20

## 2022-10-29 RX ADMIN — Medication 1000 MG: at 11:19

## 2022-10-29 RX ADMIN — DIPHENHYDRAMINE HYDROCHLORIDE 25 MG: 50 INJECTION, SOLUTION INTRAMUSCULAR; INTRAVENOUS at 09:10

## 2022-10-29 RX ADMIN — PANTOPRAZOLE SODIUM 40 MG: 40 INJECTION, POWDER, FOR SOLUTION INTRAVENOUS at 21:58

## 2022-10-29 RX ADMIN — SODIUM CHLORIDE 1000 ML: 0.9 INJECTION, SOLUTION INTRAVENOUS at 11:20

## 2022-10-29 RX ADMIN — SODIUM CHLORIDE 75 ML/HR: 0.9 INJECTION, SOLUTION INTRAVENOUS at 15:56

## 2022-10-29 RX ADMIN — POTASSIUM CHLORIDE 20 MEQ: 1500 TABLET, EXTENDED RELEASE ORAL at 15:56

## 2022-10-29 RX ADMIN — QUETIAPINE FUMARATE 200 MG: 100 TABLET ORAL at 21:58

## 2022-10-29 RX ADMIN — POTASSIUM CHLORIDE 40 MEQ: 1500 TABLET, EXTENDED RELEASE ORAL at 09:15

## 2022-10-29 NOTE — ASSESSMENT & PLAN NOTE
Lab Results   Component Value Date    EGFR 60 10/29/2022    EGFR 58 10/27/2022    EGFR 64 10/24/2022    CREATININE 1 01 10/29/2022    CREATININE 1 04 10/27/2022    CREATININE 0 96 10/24/2022     · Kidney function at baseline currently  · Avoid hypotension and nephrotoxic agents  · Continue to monitor

## 2022-10-29 NOTE — ASSESSMENT & PLAN NOTE
· Patient not on any medications as an outpatient for her cirrhosis  · Abdomen minimally distended  · States she does get paracentesis monthly  · Gastroenterology signed off   · Continue to monitor

## 2022-10-29 NOTE — ASSESSMENT & PLAN NOTE
· Patient's hemoglobin noted to be <7 again this morning and patient will receive 1 unit PRBC  · Patient has received 2 units PRBC during hospitalization  · Patient's baseline fluctuates but appears to be closer to 10  · CT abdomen pelvis-No acute abnormality in abdomen or pelvis; cirrhosis with evidence of portal hypertension and large volume abdominal pelvic ascites; minimal pneumobilia    Plan  · Q 12 hour hemoglobin  · Patient received 1 unit PRBC  · Blood culture x2 negative at 1 day  · Will continue IV Protonix 40 mg b i d   · Continue ceftriaxone   · IV fluids  · Gastroenterology signed off; if hemoglobin downtrend CN will rediscuss with them

## 2022-10-29 NOTE — PROGRESS NOTES
Patient complained of becoming itchy when transfusions started  Contacted physician and Dr PERKINS ordered benadryl  Restarted transfusion with no more complaints of pruritis

## 2022-10-29 NOTE — PROGRESS NOTES
[address] [facility]  Progress Note - Sigifredo Stewart 1960, 65 y o  female MRN: [id number]  Unit/Bed#: -01 Encounter: [id number]  Primary Care Provider: Kathryn Najjar, DC   Date and time admitted to hospital: 10/27/2022  9:14 PM    * Acute blood loss anemia  Assessment & Plan  · Patient's hemoglobin noted to be <7 again this morning and patient will receive 1 unit PRBC  · Patient has received 2 units PRBC during hospitalization  · Patient's baseline fluctuates but appears to be closer to 10  · CT abdomen pelvis-No acute abnormality in abdomen or pelvis; cirrhosis with evidence of portal hypertension and large volume abdominal pelvic ascites; minimal pneumobilia    Plan  · Q 12 hour hemoglobin  · Patient received 1 unit PRBC  · Blood culture x2 negative at 1 day  · Will continue IV Protonix 40 mg b i d   · Continue ceftriaxone   · IV fluids  · Gastroenterology signed off; if hemoglobin downtrend CN will rediscuss with them    COVID-19  Assessment & Plan  · Patient noted to be COVID positive on 10/24  · Currently on room air saturating greater than 98%  · Will not start any COVID directed medications at this time  · Continue to monitor respiratory status    Decompensated hepatic cirrhosis ([facility] [address] )  Assessment & Plan  · Patient not on any medications as an outpatient for her cirrhosis  · Abdomen minimally distended  · States she does get paracentesis monthly  · Gastroenterology signed off   · Continue to monitor    Stage 3a chronic kidney disease [facility])  Assessment & Plan  Lab Results   Component Value Date    EGFR 60 10/29/2022    EGFR 58 10/27/2022    EGFR 64 10/24/2022    CREATININE 1 01 10/29/2022    CREATININE 1 04 10/27/2022    CREATININE 0 96 10/24/2022     · Kidney function at baseline currently  · Avoid hypotension and nephrotoxic agents  · Continue to monitor    History of DVT (deep vein thrombosis)  Assessment & Plan  · Not on any anticoagulation        VTE Pharmacologic Prophylaxis: VTE Score: 3 Moderate Risk (Score 3-4) - Pharmacological DVT Prophylaxis Contraindicated  Sequential Compression Devices Ordered  Patient Centered Rounds: I performed bedside rounds with nursing staff today  Discussions with Specialists or Other Care Team Provider: case management    Education and Discussions with Family / Patient: Updated  () at bedside  Time Spent for Care: 45 minutes  More than 50% of total time spent on counseling and coordination of care as described above  Current Length of Stay: 1 day(s)  Current Patient Status: Inpatient   Certification Statement: The patient will continue to require additional inpatient hospital stay due to acute blood loss anemia   Discharge Plan: Anticipate discharge in 48-72 hrs to home  Code Status: Level 3 - DNAR and DNI    Subjective:   Patient resting comfortably on examination  Patient had no overnight events or complaints on exam     Objective:     Vitals:   Temp (24hrs), Av 6 °F (36 4 °C), Min:97 4 °F (36 3 °C), Max:98 4 °F (36 9 °C)    Temp:  [97 4 °F (36 3 °C)-98 4 °F (36 9 °C)] 97 4 °F (36 3 °C)  HR:  [] 107  Resp:  [18-19] 18  BP: ()/(56-77) 94/59  SpO2:  [97 %-100 %] 98 %  Body mass index is 19 97 kg/m²  Input and Output Summary (last 24 hours): Intake/Output Summary (Last 24 hours) at 10/29/2022 1437  Last data filed at 10/28/2022 2026  Gross per 24 hour   Intake 1000 ml   Output --   Net 1000 ml       Physical Exam:   Physical Exam  Vitals and nursing note reviewed  Constitutional:       General: She is not in acute distress  Appearance: She is well-developed  Comments: Chronically ill-appearing   HENT:      Head: Normocephalic and atraumatic  Eyes:      General: No scleral icterus  Conjunctiva/sclera: Conjunctivae normal    Cardiovascular:      Rate and Rhythm: Normal rate and regular rhythm  Heart sounds: Normal heart sounds  No murmur heard  No friction rub  No gallop  Pulmonary:      Effort: Pulmonary effort is normal  No respiratory distress  Breath sounds: Normal breath sounds  No wheezing or rales  Abdominal:      General: Bowel sounds are normal  There is no distension  Palpations: Abdomen is soft  Tenderness: There is no abdominal tenderness  Musculoskeletal:         General: Normal range of motion  Skin:     General: Skin is warm  Findings: No rash  Neurological:      Mental Status: She is alert and oriented to person, place, and time  Additional Data:     Labs:  Results from last 7 days   Lab Units 10/29/22  0432   WBC Thousand/uL 2 15*   HEMOGLOBIN g/dL 6 8*   HEMATOCRIT % 21 0*   PLATELETS Thousands/uL 78*   NEUTROS PCT % 35*   LYMPHS PCT % 37   MONOS PCT % 13*   EOS PCT % 13*     Results from last 7 days   Lab Units 10/29/22  0432   SODIUM mmol/L 143   POTASSIUM mmol/L 3 0*   CHLORIDE mmol/L 115*   CO2 mmol/L 15*   BUN mg/dL 24   CREATININE mg/dL 1 01   ANION GAP mmol/L 13   CALCIUM mg/dL 7 3*   ALBUMIN g/dL 1 8*   TOTAL BILIRUBIN mg/dL 0 70   ALK PHOS U/L 56   ALT U/L 8*   AST U/L 23   GLUCOSE RANDOM mg/dL 107     Results from last 7 days   Lab Units 10/28/22  1047   INR  1 50*     Results from last 7 days   Lab Units 10/29/22  1132 10/29/22  0739   POC GLUCOSE mg/dl 120 126         Results from last 7 days   Lab Units 10/28/22  0243   LACTIC ACID mmol/L 0 9   PROCALCITONIN ng/ml 0 24       Lines/Drains:  Invasive Devices  Report    Peripheral Intravenous Line  Duration           Peripheral IV 10/27/22 Right Antecubital 1 day    Peripheral IV 10/28/22 Right Forearm 1 day                      Imaging: No pertinent imaging reviewed  Recent Cultures (last 7 days):   Results from last 7 days   Lab Units 10/28/22  1432 10/28/22  0243   BLOOD CULTURE   --  No Growth at 24 hrs  No Growth at 24 hrs     GRAM STAIN RESULT  No Polys or Bacteria seen  --    BODY FLUID CULTURE, STERILE  No growth  --        Last 24 Hours Medication List: Current Facility-Administered Medications   Medication Dose Route Frequency Provider Last Rate   • acetaminophen  650 mg Oral Q6H PRN Jung Sharma DO     • cefTRIAXone  1,000 mg Intravenous Q24H Jung Sharma DO 1,000 mg (10/29/22 1119)   • diazepam  2 mg Oral Q8H PRN Jung Sharma, DO     • gabapentin  300 mg Oral Daily PRN Jung Sharma DO     • methocarbamol  750 mg Oral Q6H PRN Jung Sharma DO     • pantoprazole  40 mg Intravenous Q12H [facility] Jung Sharma DO     • potassium chloride  20 mEq Oral Once Jung Sharma DO     • QUEtiapine  200 mg Oral HS Jung Sharma DO     • sodium chloride  75 mL/hr Intravenous Continuous Jung Sharma DO          Today, Patient Was Seen By: Jung Sharma    **Please Note: This note may have been constructed using a voice recognition system  **

## 2022-10-29 NOTE — PLAN OF CARE
Problem: Potential for Falls  Goal: Patient will remain free of falls  Description: INTERVENTIONS:  - Educate patient/family on patient safety including physical limitations  - Instruct patient to call for assistance with activity   - Consult OT/PT to assist with strengthening/mobility   - Keep Call bell within reach  - Keep bed low and locked with side rails adjusted as appropriate  - Keep care items and personal belongings within reach  - Initiate and maintain comfort rounds  - Make Fall Risk Sign visible to staff  - Offer Toileting crystal Hours, in advance of need  - Initiate/Maintain alarm  - Obtain necessary fall risk management equipment:   - Apply yellow socks and bracelet for high fall risk patients  - Consider moving patient to room near nurses station  Outcome: Progressing     Problem: MOBILITY - ADULT  Goal: Maintain or return to baseline ADL function  Description: INTERVENTIONS:  -  Assess patient's ability to carry out ADLs; assess patient's baseline for ADL function and identify physical deficits which impact ability to perform ADLs (bathing, care of mouth/teeth, toileting, grooming, dressing, etc )  - Assess/evaluate cause of self-care deficits   - Assess range of motion  - Assess patient's mobility; develop plan if impaired  - Assess patient's need for assistive devices and provide as appropriate  - Encourage maximum independence but intervene and supervise when necessary  - Involve family in performance of ADLs  - Assess for home care needs following discharge   - Consider OT consult to assist with ADL evaluation and planning for discharge  - Provide patient education as appropriate  Outcome: Progressing  Goal: Maintains/Returns to pre admission functional level  Description: INTERVENTIONS:  - Perform BMAT or MOVE assessment daily    - Set and communicate daily mobility goal to care team and patient/family/caregiver     - Collaborate with rehabilitation services on mobility goals if consulted  - Perform Range of Motion  times a day  - Reposition patient every  hours    - Dangle patient  times a day  - Stand patient  times a day  - Ambulate patient  times a day  - Out of bed to chair  times a day   - Out of bed for m2als  times a day  - Out of bed for toileting  - Record patient progress and toleration of activity level   Outcome: Progressing     Problem: Prexisting or High Potential for Compromised Skin Integrity  Goal: Skin integrity is maintained or improved  Description: INTERVENTIONS:  - Identify patients at risk for skin breakdown  - Assess and monitor skin integrity  - Assess and monitor nutrition and hydration status  - Monitor labs   - Assess for incontinence   - Turn and reposition patient  - Assist with mobility/ambulation  - Relieve pressure over bony prominences  - Avoid friction and shearing  - Provide appropriate hygiene as needed including keeping skin clean and dry  - Evaluate need for skin moisturizer/barrier cream  - Collaborate with interdisciplinary team   - Patient/family teaching  - Consider wound care consult   Outcome: Progressing

## 2022-10-30 LAB
ABO GROUP BLD BPU: NORMAL
ANION GAP SERPL CALCULATED.3IONS-SCNC: 13 MMOL/L (ref 4–13)
BASOPHILS # BLD MANUAL: 0 THOUSAND/UL (ref 0–0.1)
BASOPHILS NFR MAR MANUAL: 0 % (ref 0–1)
BPU ID: NORMAL
BUN SERPL-MCNC: 19 MG/DL (ref 5–25)
CALCIUM SERPL-MCNC: 8.2 MG/DL (ref 8.3–10.1)
CHLORIDE SERPL-SCNC: 116 MMOL/L (ref 96–108)
CO2 SERPL-SCNC: 16 MMOL/L (ref 21–32)
CREAT SERPL-MCNC: 0.98 MG/DL (ref 0.6–1.3)
CROSSMATCH: NORMAL
EOSINOPHIL # BLD MANUAL: 0.21 THOUSAND/UL (ref 0–0.4)
EOSINOPHIL NFR BLD MANUAL: 5 % (ref 0–6)
ERYTHROCYTE [DISTWIDTH] IN BLOOD BY AUTOMATED COUNT: 16.8 % (ref 11.6–15.1)
GFR SERPL CREATININE-BSD FRML MDRD: 62 ML/MIN/1.73SQ M
GLUCOSE SERPL-MCNC: 92 MG/DL (ref 65–140)
HCT VFR BLD AUTO: 26.9 % (ref 34.8–46.1)
HCT VFR BLD AUTO: 28.3 % (ref 34.8–46.1)
HCT VFR BLD AUTO: 28.5 % (ref 34.8–46.1)
HGB BLD-MCNC: 8.7 G/DL (ref 11.5–15.4)
HGB BLD-MCNC: 9.1 G/DL (ref 11.5–15.4)
HGB BLD-MCNC: 9.2 G/DL (ref 11.5–15.4)
LYMPHOCYTES # BLD AUTO: 1.16 THOUSAND/UL (ref 0.6–4.47)
LYMPHOCYTES # BLD AUTO: 27 % (ref 14–44)
MCH RBC QN AUTO: 30.8 PG (ref 26.8–34.3)
MCHC RBC AUTO-ENTMCNC: 31.9 G/DL (ref 31.4–37.4)
MCV RBC AUTO: 97 FL (ref 82–98)
MONOCYTES # BLD AUTO: 0.21 THOUSAND/UL (ref 0–1.22)
MONOCYTES NFR BLD: 5 % (ref 4–12)
NEUTROPHILS # BLD MANUAL: 2.7 THOUSAND/UL (ref 1.85–7.62)
NEUTS SEG NFR BLD AUTO: 63 % (ref 43–75)
PLATELET # BLD AUTO: 85 THOUSANDS/UL (ref 149–390)
PLATELET BLD QL SMEAR: ABNORMAL
PMV BLD AUTO: 11 FL (ref 8.9–12.7)
POTASSIUM SERPL-SCNC: 3.9 MMOL/L (ref 3.5–5.3)
RBC # BLD AUTO: 2.95 MILLION/UL (ref 3.81–5.12)
SODIUM SERPL-SCNC: 145 MMOL/L (ref 135–147)
UNIT DISPENSE STATUS: NORMAL
UNIT PRODUCT CODE: NORMAL
UNIT PRODUCT VOLUME: 350 ML
UNIT RH: NORMAL
WBC # BLD AUTO: 4.29 THOUSAND/UL (ref 4.31–10.16)

## 2022-10-30 RX ADMIN — PANTOPRAZOLE SODIUM 40 MG: 40 INJECTION, POWDER, FOR SOLUTION INTRAVENOUS at 21:35

## 2022-10-30 RX ADMIN — Medication 1000 MG: at 09:40

## 2022-10-30 RX ADMIN — PANTOPRAZOLE SODIUM 40 MG: 40 INJECTION, POWDER, FOR SOLUTION INTRAVENOUS at 09:41

## 2022-10-30 RX ADMIN — QUETIAPINE FUMARATE 200 MG: 100 TABLET ORAL at 21:35

## 2022-10-30 RX ADMIN — METHOCARBAMOL 750 MG: 750 TABLET ORAL at 06:38

## 2022-10-30 NOTE — UTILIZATION REVIEW
NOTIFICATION OF INPATIENT ADMISSION   AUTHORIZATION REQUEST   SERVICING FACILITY:   [address] [place] [address]  Tax ID: [id number]  NPI: [id number] ATTENDING PROVIDER:  Attending Name and NPI#: Naida Weller [[id number]]  Address: [address] [place] [address]  Phone: [phone]     ADMISSION INFORMATION:  Place of Service: Inpatient [address]  [address]  Place of Service Code: 21  Inpatient Admission Date/Time: 10/28/22  1:56 AM  Discharge Date/Time: No discharge date for patient encounter  Admitting Diagnosis Code/Description:  Acute blood loss anemia [D62]  SOB (shortness of breath) [R06 02]  Anemia, unspecified type [D64 9]  Pneumonia due to COVID-19 virus [U07 1, J12 82]     UTILIZATION REVIEW CONTACT:  Emiliana Gray, Utilization   Network Utilization Review Department  Phone: [phone]  Fax [phone]  Email: [email or web address]  Contact for approvals/pending authorizations, clinical reviews, and discharge  PHYSICIAN ADVISORY SERVICES:  Medical Necessity Denial & [id number] Review  Phone: [phone]  Fax: [phone]  Email: [email or web address]

## 2022-10-30 NOTE — ASSESSMENT & PLAN NOTE
· Patient's hemoglobin noted to be <7 again this morning and patient will receive 1 unit PRBC  · Patient has received 2 units PRBC during hospitalization  · Patient's baseline fluctuates but appears to be closer to 10  · CT abdomen pelvis-No acute abnormality in abdomen or pelvis; cirrhosis with evidence of portal hypertension and large volume abdominal pelvic ascites; minimal pneumobilia    Plan  · Q 12 hour hemoglobin  · Patient received 1 unit PRBC  · Blood culture x2 negative at 2 days  · Will continue IV Protonix 40 mg b i d   · Continue ceftriaxone   · Will have patient re-evaluated by Gastroenterology again tomorrow given melena noted per nursing

## 2022-10-30 NOTE — PLAN OF CARE
Problem: Potential for Falls  Goal: Patient will remain free of falls  Description: INTERVENTIONS:  - Educate patient/family on patient safety including physical limitations  - Instruct patient to call for assistance with activity   - Consult OT/PT to assist with strengthening/mobility   - Keep Call bell within reach  - Keep bed low and locked with side rails adjusted as appropriate  - Keep care items and personal belongings within reach  - Initiate and maintain comfort rounds  - Make Fall Risk Sign visible to staff  - Offer Toileting every  Hours, in advance of need  - Initiate/Maintain alarm  - Obtain necessary fall risk management equipment  - Apply yellow socks and bracelet for high fall risk patients  - Consider moving patient to room near nurses station  Outcome: Progressing     Problem: MOBILITY - ADULT  Goal: Maintain or return to baseline ADL function  Description: INTERVENTIONS:  -  Assess patient's ability to carry out ADLs; assess patient's baseline for ADL function and identify physical deficits which impact ability to perform ADLs (bathing, care of mouth/teeth, toileting, grooming, dressing, etc )  - Assess/evaluate cause of self-care deficits   - Assess range of motion  - Assess patient's mobility; develop plan if impaired  - Assess patient's need for assistive devices and provide as appropriate  - Encourage maximum independence but intervene and supervise when necessary  - Involve family in performance of ADLs  - Assess for home care needs following discharge   - Consider OT consult to assist with ADL evaluation and planning for discharge  - Provide patient education as appropriate  Outcome: Progressing  Goal: Maintains/Returns to pre admission functional level  Description: INTERVENTIONS:  - Perform BMAT or MOVE assessment daily    - Set and communicate daily mobility goal to care team and patient/family/caregiver     - Collaborate with rehabilitation services on mobility goals if consulted  - Perform Range of Motion  times a day  - Reposition patient every hours    - Dangle patient  times a day  - Stand patient  times a day  - Ambulate patient  times a day  - Out of bed to chair  times a day   - Out of bed for meals times a day  - Out of bed for toileting  - Record patient progress and toleration of activity level   Outcome: Progressing     Problem: Prexisting or High Potential for Compromised Skin Integrity  Goal: Skin integrity is maintained or improved  Description: INTERVENTIONS:  - Identify patients at risk for skin breakdown  - Assess and monitor skin integrity  - Assess and monitor nutrition and hydration status  - Monitor labs   - Assess for incontinence   - Turn and reposition patient  - Assist with mobility/ambulation  - Relieve pressure over bony prominences  - Avoid friction and shearing  - Provide appropriate hygiene as needed including keeping skin clean and dry  - Evaluate need for skin moisturizer/barrier cream  - Collaborate with interdisciplinary team   - Patient/family teaching  - Consider wound care consult   Outcome: Progressing

## 2022-10-30 NOTE — ASSESSMENT & PLAN NOTE
Lab Results   Component Value Date    EGFR 62 10/30/2022    EGFR 60 10/29/2022    EGFR 58 10/27/2022    CREATININE 0 98 10/30/2022    CREATININE 1 01 10/29/2022    CREATININE 1 04 10/27/2022     · Kidney function at baseline currently  · Avoid hypotension and nephrotoxic agents  · Continue to monitor

## 2022-10-30 NOTE — PROGRESS NOTES
[address] [facility]  Progress Note - Marcie Naqvi 1960, 65 y o  female MRN: [id number]  Unit/Bed#: -01 Encounter: [id number]  Primary Care Provider: Meri Burkitt, DC   Date and time admitted to hospital: 10/27/2022  9:14 PM    * Acute blood loss anemia  Assessment & Plan  · Patient's hemoglobin noted to be <7 again this morning and patient will receive 1 unit PRBC  · Patient has received 2 units PRBC during hospitalization  · Patient's baseline fluctuates but appears to be closer to 10  · CT abdomen pelvis-No acute abnormality in abdomen or pelvis; cirrhosis with evidence of portal hypertension and large volume abdominal pelvic ascites; minimal pneumobilia    Plan  · Q 12 hour hemoglobin  · Patient received 1 unit PRBC  · Blood culture x2 negative at 2 days  · Will continue IV Protonix 40 mg b i d   · Continue ceftriaxone   · Will have patient re-evaluated by Gastroenterology again tomorrow given melena noted per nursing    COVID-19  Assessment & Plan  · Patient noted to be COVID positive on 10/24  · Currently on room air saturating greater than 98%  · Will not start any COVID directed medications at this time  · Continue to monitor respiratory status    Decompensated hepatic cirrhosis ([address] )  Assessment & Plan  · Patient not on any medications as an outpatient for her cirrhosis  · Abdomen minimally distended  · States she does get paracentesis monthly  · Gastroenterology signed off   · Continue to monitor    Stage 3a chronic kidney disease [facility])  Assessment & Plan  Lab Results   Component Value Date    EGFR 62 10/30/2022    EGFR 60 10/29/2022    EGFR 58 10/27/2022    CREATININE 0 98 10/30/2022    CREATININE 1 01 10/29/2022    CREATININE 1 04 10/27/2022     · Kidney function at baseline currently  · Avoid hypotension and nephrotoxic agents  · Continue to monitor    History of DVT (deep vein thrombosis)  Assessment & Plan  · Not on any anticoagulation        VTE Pharmacologic Prophylaxis: VTE Score: 3 Moderate Risk (Score 3-4) - Pharmacological DVT Prophylaxis Contraindicated  Sequential Compression Devices Ordered  Patient Centered Rounds: I performed bedside rounds with nursing staff today  Discussions with Specialists or Other Care Team Provider: case management    Education and Discussions with Family / Patient: Updated  () via phone  Time Spent for Care: 45 minutes  More than 50% of total time spent on counseling and coordination of care as described above  Current Length of Stay: 2 day(s)  Current Patient Status: Inpatient   Certification Statement: The patient will continue to require additional inpatient hospital stay due to Anemia  Discharge Plan: Anticipate discharge in 24-48 hrs to home  Code Status: Level 3 - DNAR and DNI    Subjective:   Patient resting comfortably on examination  Patient had no overnight events or complaints on exam this morning  Objective:     Vitals:   Temp (24hrs), Av 6 °F (36 4 °C), Min:97 5 °F (36 4 °C), Max:97 7 °F (36 5 °C)    Temp:  [97 5 °F (36 4 °C)-97 7 °F (36 5 °C)] 97 5 °F (36 4 °C)  HR:  [83-99] 99  Resp:  [16] 16  BP: (90-95)/(59-62) 90/59  SpO2:  [100 %] 100 %  Body mass index is 19 97 kg/m²  Input and Output Summary (last 24 hours): Intake/Output Summary (Last 24 hours) at 10/30/2022 1503  Last data filed at 10/29/2022 1828  Gross per 24 hour   Intake 1000 ml   Output --   Net 1000 ml       Physical Exam:   Physical Exam  Vitals and nursing note reviewed  Constitutional:       General: She is not in acute distress  Appearance: She is well-developed  HENT:      Head: Normocephalic and atraumatic  Eyes:      General: No scleral icterus  Conjunctiva/sclera: Conjunctivae normal    Cardiovascular:      Rate and Rhythm: Normal rate and regular rhythm  Heart sounds: Normal heart sounds  No murmur heard  No friction rub  No gallop     Pulmonary:      Effort: Pulmonary effort is normal  No respiratory distress  Breath sounds: Normal breath sounds  No wheezing or rales  Abdominal:      General: Bowel sounds are normal       Palpations: Abdomen is soft  Tenderness: There is no abdominal tenderness  Comments: Minimally distended   Musculoskeletal:         General: Normal range of motion  Skin:     General: Skin is warm  Findings: No rash  Neurological:      Mental Status: She is alert and oriented to person, place, and time  Additional Data:     Labs:  Results from last 7 days   Lab Units 10/30/22  0856 10/30/22  0502 10/29/22  2002 10/29/22  0432   WBC Thousand/uL  --  4 29*  --  2 15*   HEMOGLOBIN g/dL 9 2* 9 1*   < > 6 8*   HEMATOCRIT % 28 3* 28 5*   < > 21 0*   PLATELETS Thousands/uL  --  85*  --  78*   NEUTROS PCT %  --   --   --  35*   LYMPHS PCT %  --   --   --  37   LYMPHO PCT %  --  27  --   --    MONOS PCT %  --   --   --  13*   MONO PCT %  --  5  --   --    EOS PCT %  --  5  --  13*    < > = values in this interval not displayed       Results from last 7 days   Lab Units 10/30/22  0502 10/29/22  0432   SODIUM mmol/L 145 143   POTASSIUM mmol/L 3 9 3 0*   CHLORIDE mmol/L 116* 115*   CO2 mmol/L 16* 15*   BUN mg/dL 19 24   CREATININE mg/dL 0 98 1 01   ANION GAP mmol/L 13 13   CALCIUM mg/dL 8 2* 7 3*   ALBUMIN g/dL  --  1 8*   TOTAL BILIRUBIN mg/dL  --  0 70   ALK PHOS U/L  --  56   ALT U/L  --  8*   AST U/L  --  23   GLUCOSE RANDOM mg/dL 92 107     Results from last 7 days   Lab Units 10/28/22  1047   INR  1 50*     Results from last 7 days   Lab Units 10/29/22  1132 10/29/22  0739   POC GLUCOSE mg/dl 120 126         Results from last 7 days   Lab Units 10/28/22  0243   LACTIC ACID mmol/L 0 9   PROCALCITONIN ng/ml 0 24       Lines/Drains:  Invasive Devices  Report    Peripheral Intravenous Line  Duration           Peripheral IV 10/27/22 Right Antecubital 2 days    Peripheral IV 10/28/22 Right Forearm 2 days                      Imaging: No pertinent imaging reviewed  Recent Cultures (last 7 days):   Results from last 7 days   Lab Units 10/28/22  1432 10/28/22  0243   BLOOD CULTURE   --  No Growth at 48 hrs  No Growth at 48 hrs  GRAM STAIN RESULT  No Polys or Bacteria seen  --    BODY FLUID CULTURE, STERILE  No growth  --        Last 24 Hours Medication List:   Current Facility-Administered Medications   Medication Dose Route Frequency Provider Last Rate   • acetaminophen  650 mg Oral Q6H PRN Carrie Keep, DO     • cefTRIAXone  1,000 mg Intravenous Q24H Carrie Keep, DO 1,000 mg (10/30/22 0940)   • diazepam  2 mg Oral Q8H PRN Carrie Keep, DO     • gabapentin  300 mg Oral Daily PRN Carrie Keep, DO     • methocarbamol  750 mg Oral Q6H PRN Carrie Keep, DO     • pantoprazole  40 mg Intravenous Q12H [facility] Carrie Keep, DO     • QUEtiapine  200 mg Oral HS Carrie Keep, DO          Today, Patient Was Seen By: Carrie Rogers    **Please Note: This note may have been constructed using a voice recognition system  **

## 2022-10-31 ENCOUNTER — APPOINTMENT (OUTPATIENT)
Dept: GASTROENTEROLOGY | Facility: HOSPITAL | Age: 62
End: 2022-10-31

## 2022-10-31 ENCOUNTER — ANESTHESIA (INPATIENT)
Dept: GASTROENTEROLOGY | Facility: HOSPITAL | Age: 62
End: 2022-10-31

## 2022-10-31 ENCOUNTER — ANESTHESIA EVENT (INPATIENT)
Dept: GASTROENTEROLOGY | Facility: HOSPITAL | Age: 62
End: 2022-10-31

## 2022-10-31 LAB
ANION GAP SERPL CALCULATED.3IONS-SCNC: 12 MMOL/L (ref 4–13)
ANISOCYTOSIS BLD QL SMEAR: PRESENT
BACTERIA SPEC BFLD CULT: NO GROWTH
BASOPHILS NFR BLD MANUAL: 1 % (ref 0–1)
BUN SERPL-MCNC: 18 MG/DL (ref 5–25)
CALCIUM SERPL-MCNC: 8.3 MG/DL (ref 8.3–10.1)
CHLORIDE SERPL-SCNC: 114 MMOL/L (ref 96–108)
CO2 SERPL-SCNC: 17 MMOL/L (ref 21–32)
CREAT SERPL-MCNC: 0.89 MG/DL (ref 0.6–1.3)
GFR SERPL CREATININE-BSD FRML MDRD: 70 ML/MIN/1.73SQ M
GLUCOSE SERPL-MCNC: 105 MG/DL (ref 65–140)
GRAM STN SPEC: NORMAL
HCT VFR BLD AUTO: 27.2 % (ref 34.8–46.1)
HGB BLD-MCNC: 8.6 G/DL (ref 11.5–15.4)
IMM EOSINOPHIL NFR BLD MANUAL: 5 % (ref 0–6)
LYMPHOCYTES NFR BLD: 29 % (ref 14–44)
MACROCYTES BLD QL AUTO: PRESENT
MONOCYTES NFR BLD AUTO: 4 % (ref 4–12)
NEUTS SEG NFR BLD AUTO: 60 % (ref 45–77)
PLATELET BLD QL SMEAR: ABNORMAL
POTASSIUM SERPL-SCNC: 3.8 MMOL/L (ref 3.5–5.3)
SCHISTOCYTES BLD QL SMEAR: PRESENT
SODIUM SERPL-SCNC: 143 MMOL/L (ref 135–147)
TOTAL CELLS COUNTED SPEC: 100
VARIANT LYMPHS BLD QL SMEAR: 1 % (ref 0–0)

## 2022-10-31 PROCEDURE — 0DB98ZX EXCISION OF DUODENUM, VIA NATURAL OR ARTIFICIAL OPENING ENDOSCOPIC, DIAGNOSTIC: ICD-10-PCS | Performed by: INTERNAL MEDICINE

## 2022-10-31 PROCEDURE — 0DB68ZX EXCISION OF STOMACH, VIA NATURAL OR ARTIFICIAL OPENING ENDOSCOPIC, DIAGNOSTIC: ICD-10-PCS | Performed by: INTERNAL MEDICINE

## 2022-10-31 RX ORDER — LIDOCAINE HYDROCHLORIDE 20 MG/ML
INJECTION, SOLUTION EPIDURAL; INFILTRATION; INTRACAUDAL; PERINEURAL AS NEEDED
Status: DISCONTINUED | OUTPATIENT
Start: 2022-10-31 | End: 2022-10-31

## 2022-10-31 RX ORDER — PROPOFOL 10 MG/ML
INJECTION, EMULSION INTRAVENOUS AS NEEDED
Status: DISCONTINUED | OUTPATIENT
Start: 2022-10-31 | End: 2022-10-31

## 2022-10-31 RX ORDER — HYDROMORPHONE HCL/PF 1 MG/ML
0.5 SYRINGE (ML) INJECTION ONCE
Status: COMPLETED | OUTPATIENT
Start: 2022-10-31 | End: 2022-10-31

## 2022-10-31 RX ORDER — FOLIC ACID 1 MG/1
1 TABLET ORAL DAILY
Status: DISCONTINUED | OUTPATIENT
Start: 2022-10-31 | End: 2022-11-01 | Stop reason: HOSPADM

## 2022-10-31 RX ORDER — SODIUM CHLORIDE, SODIUM LACTATE, POTASSIUM CHLORIDE, CALCIUM CHLORIDE 600; 310; 30; 20 MG/100ML; MG/100ML; MG/100ML; MG/100ML
INJECTION, SOLUTION INTRAVENOUS CONTINUOUS PRN
Status: DISCONTINUED | OUTPATIENT
Start: 2022-10-31 | End: 2022-10-31

## 2022-10-31 RX ADMIN — SODIUM CHLORIDE, SODIUM LACTATE, POTASSIUM CHLORIDE, AND CALCIUM CHLORIDE: .6; .31; .03; .02 INJECTION, SOLUTION INTRAVENOUS at 16:24

## 2022-10-31 RX ADMIN — LIDOCAINE HYDROCHLORIDE 60 MG: 20 INJECTION, SOLUTION EPIDURAL; INFILTRATION; INTRACAUDAL at 16:39

## 2022-10-31 RX ADMIN — PANTOPRAZOLE SODIUM 40 MG: 40 INJECTION, POWDER, FOR SOLUTION INTRAVENOUS at 21:19

## 2022-10-31 RX ADMIN — PANTOPRAZOLE SODIUM 40 MG: 40 INJECTION, POWDER, FOR SOLUTION INTRAVENOUS at 10:07

## 2022-10-31 RX ADMIN — PROPOFOL 10 MG: 10 INJECTION, EMULSION INTRAVENOUS at 16:51

## 2022-10-31 RX ADMIN — HYDROMORPHONE HYDROCHLORIDE 0.5 MG: 1 INJECTION, SOLUTION INTRAMUSCULAR; INTRAVENOUS; SUBCUTANEOUS at 21:19

## 2022-10-31 RX ADMIN — FOLIC ACID 1 MG: 1 TABLET ORAL at 10:06

## 2022-10-31 RX ADMIN — PROPOFOL 60 MG: 10 INJECTION, EMULSION INTRAVENOUS at 16:39

## 2022-10-31 RX ADMIN — QUETIAPINE FUMARATE 200 MG: 100 TABLET ORAL at 21:19

## 2022-10-31 RX ADMIN — PROPOFOL 20 MG: 10 INJECTION, EMULSION INTRAVENOUS at 16:47

## 2022-10-31 RX ADMIN — PROPOFOL 20 MG: 10 INJECTION, EMULSION INTRAVENOUS at 16:43

## 2022-10-31 RX ADMIN — Medication 1000 MG: at 10:07

## 2022-10-31 NOTE — ANESTHESIA POSTPROCEDURE EVALUATION
Post-Op Assessment Note    CV Status:  Stable  Pain Score: 0    Pain management: adequate     Mental Status:  Alert and awake   Hydration Status:  Stable   PONV Controlled:  None   Airway Patency:  Patent and adequate      Post Op Vitals Reviewed: Yes      Staff: Anesthesiologist, CRNA         No complications documented      BP   87/52   Temp      Pulse  88   Resp   18   SpO2   100%

## 2022-10-31 NOTE — PROGRESS NOTES
[address] [facility]  Progress Note - Isadora Blake 1960, 65 y o  female MRN: [id number]  Unit/Bed#: -01 Encounter: [id number]  Primary Care Provider: Norma Simmons DC   Date and time admitted to hospital: 10/27/2022  9:14 PM    * Acute blood loss anemia  Assessment & Plan  · Hemoglobin stable around 9  · Patient has received 2 units PRBC during hospitalization  · Patient's baseline fluctuates but appears to be closer to 10  · CT abdomen pelvis-No acute abnormality in abdomen or pelvis; cirrhosis with evidence of portal hypertension and large volume abdominal pelvic ascites; minimal pneumobilia    Plan  · Q 12 hour hemoglobin  · Patient received 1 unit PRBC  · Blood culture x2 negative at 3 days  · Will continue IV Protonix 40 mg b i d   · Continue ceftriaxone   · Patient seen by Gastroenterology today and will go for EGD    COVID-19  Assessment & Plan  · Patient noted to be COVID positive on 10/24  · Currently on room air saturating greater than 98%  · Will not start any COVID directed medications at this time  · Continue to monitor respiratory status    Decompensated hepatic cirrhosis ([address] )  Assessment & Plan  · Patient not on any medications as an outpatient for her cirrhosis  · Abdomen minimally distended  · States she does get paracentesis monthly  · Gastroenterology signed off   · Continue to monitor    Stage 3a chronic kidney disease [facility])  Assessment & Plan  Lab Results   Component Value Date    EGFR 70 10/31/2022    EGFR 62 10/30/2022    EGFR 60 10/29/2022    CREATININE 0 89 10/31/2022    CREATININE 0 98 10/30/2022    CREATININE 1 01 10/29/2022     · Kidney function at baseline currently  · Avoid hypotension and nephrotoxic agents  · Continue to monitor    History of DVT (deep vein thrombosis)  Assessment & Plan  · Not on any anticoagulation        VTE Pharmacologic Prophylaxis: VTE Score: 3 Moderate Risk (Score 3-4) - Pharmacological DVT Prophylaxis Contraindicated   Sequential Compression Devices Ordered  Patient Centered Rounds: I performed bedside rounds with nursing staff today  Discussions with Specialists or Other Care Team Provider:  Gastroenterology, case management    Education and Discussions with Family / Patient: Updated  () via phone  Time Spent for Care: 40 minutes  More than 50% of total time spent on counseling and coordination of care as described above  Current Length of Stay: 3 day(s)  Current Patient Status: Inpatient   Certification Statement: The patient will continue to require additional inpatient hospital stay due to Possible GI bleed  Discharge Plan: Anticipate discharge tomorrow to home  Code Status: Level 3 - DNAR and DNI    Subjective:   Patient resting comfortably on examination  Patient had no overnight events or complaints on exam this morning  Objective:     Vitals:   Temp (24hrs), Av 9 °F (36 6 °C), Min:97 5 °F (36 4 °C), Max:98 2 °F (36 8 °C)    Temp:  [97 5 °F (36 4 °C)-98 2 °F (36 8 °C)] 98 °F (36 7 °C)  HR:  [86-92] 89  Resp:  [15-18] 18  BP: ()/(55-74) 113/55  SpO2:  [98 %-100 %] 100 %  Body mass index is 19 97 kg/m²  Input and Output Summary (last 24 hours):   No intake or output data in the 24 hours ending 10/31/22 1331    Physical Exam:   Physical Exam  Vitals and nursing note reviewed  Constitutional:       General: She is not in acute distress  Appearance: She is well-developed  HENT:      Head: Normocephalic and atraumatic  Eyes:      General: No scleral icterus  Conjunctiva/sclera: Conjunctivae normal    Cardiovascular:      Rate and Rhythm: Normal rate and regular rhythm  Heart sounds: Normal heart sounds  No murmur heard  No friction rub  No gallop  Pulmonary:      Effort: Pulmonary effort is normal  No respiratory distress  Breath sounds: Normal breath sounds  No wheezing or rales     Abdominal:      General: Bowel sounds are normal       Palpations: Abdomen is soft       Tenderness: There is no abdominal tenderness  Comments: Minimally distended abdomen   Musculoskeletal:         General: Normal range of motion  Skin:     General: Skin is warm  Findings: No rash  Neurological:      Mental Status: She is alert and oriented to person, place, and time  Additional Data:     Labs:  Results from last 7 days   Lab Units 10/31/22  1000 10/30/22  0856 10/30/22  0502 10/29/22  2002 10/29/22  0432   WBC Thousand/uL  --   --  4 29*  --  2 15*   HEMOGLOBIN g/dL 8 6*   < > 9 1*   < > 6 8*   HEMATOCRIT % 27 2*   < > 28 5*   < > 21 0*   PLATELETS Thousands/uL  --   --  85*  --  78*   NEUTROS PCT %  --   --   --   --  35*   LYMPHS PCT %  --   --   --   --  37   LYMPHO PCT %  --   --  27  --   --    MONOS PCT %  --   --   --   --  13*   MONO PCT %  --   --  5  --   --    EOS PCT %  --   --  5  --  13*    < > = values in this interval not displayed  Results from last 7 days   Lab Units 10/31/22  0502 10/30/22  0502 10/29/22  0432   SODIUM mmol/L 143   < > 143   POTASSIUM mmol/L 3 8   < > 3 0*   CHLORIDE mmol/L 114*   < > 115*   CO2 mmol/L 17*   < > 15*   BUN mg/dL 18   < > 24   CREATININE mg/dL 0 89   < > 1 01   ANION GAP mmol/L 12   < > 13   CALCIUM mg/dL 8 3   < > 7 3*   ALBUMIN g/dL  --   --  1 8*   TOTAL BILIRUBIN mg/dL  --   --  0 70   ALK PHOS U/L  --   --  56   ALT U/L  --   --  8*   AST U/L  --   --  23   GLUCOSE RANDOM mg/dL 105   < > 107    < > = values in this interval not displayed       Results from last 7 days   Lab Units 10/28/22  1047   INR  1 50*     Results from last 7 days   Lab Units 10/29/22  1132 10/29/22  0739   POC GLUCOSE mg/dl 120 126         Results from last 7 days   Lab Units 10/28/22  0243   LACTIC ACID mmol/L 0 9   PROCALCITONIN ng/ml 0 24       Lines/Drains:  Invasive Devices  Report    Peripheral Intravenous Line  Duration           Peripheral IV 10/27/22 Right Antecubital 3 days    Peripheral IV 10/28/22 Right Forearm 3 days Imaging: No pertinent imaging reviewed  Recent Cultures (last 7 days):   Results from last 7 days   Lab Units 10/28/22  1432 10/28/22  0243   BLOOD CULTURE   --  No Growth at 72 hrs  No Growth at 72 hrs  GRAM STAIN RESULT  No Polys or Bacteria seen  --    BODY FLUID CULTURE, STERILE  No growth  --        Last 24 Hours Medication List:   Current Facility-Administered Medications   Medication Dose Route Frequency Provider Last Rate   • acetaminophen  650 mg Oral Q6H PRN Jorje Knutson, DO     • cefTRIAXone  1,000 mg Intravenous Q24H Jorje Knutson, DO 1,000 mg (10/31/22 1007)   • diazepam  2 mg Oral Q8H PRN Jorje Knutson, DO     • folic acid  1 mg Oral Daily Jroje Knutson, DO     • gabapentin  300 mg Oral Daily PRN Jorje Knutson, DO     • methocarbamol  750 mg Oral Q6H PRN Jorje Knutson, DO     • pantoprazole  40 mg Intravenous Q12H [facility] Jorje Knutson, DO     • QUEtiapine  200 mg Oral HS Jorje Knutson, DO          Today, Patient Was Seen By: Jorje Kuntson    **Please Note: This note may have been constructed using a voice recognition system  **

## 2022-10-31 NOTE — ASSESSMENT & PLAN NOTE
Lab Results   Component Value Date    EGFR 70 10/31/2022    EGFR 62 10/30/2022    EGFR 60 10/29/2022    CREATININE 0 89 10/31/2022    CREATININE 0 98 10/30/2022    CREATININE 1 01 10/29/2022     · Kidney function at baseline currently  · Avoid hypotension and nephrotoxic agents  · Continue to monitor

## 2022-10-31 NOTE — PROGRESS NOTES
Progress Note - Najma Yen 65 y o  female MRN: [id number]    Unit/Bed#: -01 Encounter: [id number]        Subjective:     GI was recalled to see patient for reported dark stool per nursing  Patient has received 2 units of pRBCs since admission  In the commode, she has dark stool residue  She reports her coughing has drastically improved  She is not on supplemental oxygen  She has no other complaints at this time  ROS: As noted in the HPI, otherwise all others negative  Objective:     Vitals: Blood pressure 113/55, pulse 89, temperature 98 °F (36 7 °C), temperature source Oral, resp  rate 18, height 5' 5" (1 651 m), weight 54 4 kg (120 lb), SpO2 100 %, not currently breastfeeding  ,Body mass index is 19 97 kg/m²  No intake or output data in the 24 hours ending 10/31/22 0921    Physical Exam:     General Appearance: Alert and oriented x 3  In no respiratory distress  Lungs: Clear to auscultation bilaterally, no rales or rhonchi  Heart: Regular rate and rhythm, S1, S2 normal, no murmur, click, rub or gallop  Abdomen: Soft, non-tender, non-distended; bowel sounds normal; no masses or no organomegaly  Extremities: No cyanosis, edema    Invasive Devices  Report    Peripheral Intravenous Line  Duration           Peripheral IV 10/27/22 Right Antecubital 3 days    Peripheral IV 10/28/22 Right Forearm 3 days                Lab Results:  Results from last 7 days   Lab Units 10/30/22  2014 10/30/22  0856 10/30/22  0502 10/29/22  2002 10/29/22  0432   WBC Thousand/uL  --   --  4 29*  --  2 15*   HEMOGLOBIN g/dL 8 7*   < > 9 1*   < > 6 8*   HEMATOCRIT % 26 9*   < > 28 5*   < > 21 0*   PLATELETS Thousands/uL  --   --  85*  --  78*   NEUTROS PCT %  --   --   --   --  35*   LYMPHS PCT %  --   --   --   --  37   LYMPHO PCT %  --   --  27  --   --    MONOS PCT %  --   --   --   --  13*   MONO PCT %  --   --  5  --   --    EOS PCT %  --   --  5  --  13*    < > = values in this interval not displayed       Results from last 7 days   Lab Units 10/31/22  0502 10/30/22  0502 10/29/22  0432   POTASSIUM mmol/L 3 8   < > 3 0*   CHLORIDE mmol/L 114*   < > 115*   CO2 mmol/L 17*   < > 15*   BUN mg/dL 18   < > 24   CREATININE mg/dL 0 89   < > 1 01   CALCIUM mg/dL 8 3   < > 7 3*   ALK PHOS U/L  --   --  56   ALT U/L  --   --  8*   AST U/L  --   --  23    < > = values in this interval not displayed  Results from last 7 days   Lab Units 10/28/22  1047   INR  1 50*           Imaging Studies: I have personally reviewed pertinent imaging studies  XR chest 1 view portable    Result Date: 10/28/2022  Impression: Suspected persistent trace left pleural effusion Workstation performed: [id number]     IR INPATIENT Paracentesis    Result Date: 10/28/2022  Impression: Impression: Diagnostic Ultrasound-guided paracentesis  Signed, performed, and dictated by Tonya Ruth under the direct supervision of Dr Mattie Cervantes  Workstation performed: [id number]         Assessment and Plan:     1) Macrocytic anemia, folate deficiency and now reported dark stool - Patient patient's  both denies signs overt GI bleeding  Her folic acid is still low  Her other cell lines are also low  She did have evidence of dark appearing stool concerning for melena reported by nursing  Small amount observed in the patient's commode  She is otherwise hemodynamically stable, and was due to have an outpatient EGD for varices screening    - Because of reported melena and need for blood transfusion x 2, we will plan for EGD today to investigate  - Discussed with nursing, thankfully patient did not have breakfast this AM   - Low-sodium diet  - Continue IV ceftriaxone for now as part of SBP prophylaxis in the setting of potential GIB  - Continue Protonix BID   - Folate supplementation     2) Alcoholic cirrhosis complicated by ascites - Unable to calculate MELD as INR and CMP were not drawn today   Patient has been abstinent from alcohol since around February 2022 when she was hospitalized for alcoholic hepatitis requiring prednisolone course  Patient has history of breast cancer  Patient receives paracentesis on a monthly basis   She had a paracentesis on Friday that was negative for SBP   - CMP and INR daily  - Continued abstinence from alcohol  -Consider starting patient on Lasix 20 mg and Aldactone 50 mg for ascites  - Close outpatient follow-up, she follows with Dr Jaymie Tompkins and Jazmine Greer PA-C

## 2022-10-31 NOTE — CASE MANAGEMENT
Case Management Discharge Planning Note    Patient name Milena Re  Location /-20 MRN [id number]  : 1960 Date 10/31/2022       Current Admission Date: 10/27/2022  Current Admission Diagnosis:Acute blood loss anemia   Patient Active Problem List    Diagnosis Date Noted   • COVID-19 10/28/2022   • Decompensated hepatic cirrhosis ([address] ) 2022   • Ascites 2022   • Hypertension 2022   • Proteinuria 2022   • Abnormal urinalysis 2022   • History of alcohol use disorder    • Alcoholic hepatitis    • Elevated liver enzymes 2022   • SIRS (systemic inflammatory response syndrome) ([address] ) 2022   • Benign hypertension with CKD (chronic kidney disease) stage III ([facility] ) 2021   • Stage 3a chronic kidney disease ([facility] ) 2021   • Anemia 2021   • Hypokalemia 2021   • Localized swelling of lower extremity 2021   • Alcohol abuse 2021   • Suspected acute pulmonary embolism ([address] ) 12/15/2020   • Recurrent syncope 12/15/2020   • OPAL (acute kidney injury) ([address] ) on CKD 12/15/2020   • Closed left fibular fracture 12/15/2020   • History of chemotherapy    • Alcoholic ketosis ([facility] )    • SOB (shortness of breath) 2020   • Hypoglycemia 2020   • Axillary lump, left 2019   • Use of letrozole (Femara) 2019   • Rectal prolapse 2019   • Dilated cbd, acquired 2019   • Lesion of bladder 2019   • Blood loss anemia 2019   • Hemorrhagic cystitis 2018   • Heme positive stool 2018   • Acute deep vein thrombosis (DVT) of left lower extremity ([address] ) 2018   • Hyperbilirubinemia 2018   • Acute blood loss anemia 2018   • Moderate protein-calorie malnutrition ([address] ) 2018   • Immunocompromised ([facility] ) 2018   • Hematuria 2018   • Anemia following use of chemotherapeutic drug 2018   • History of DVT (deep vein thrombosis) 10/19/2018   • Cellulitis 06/11/2018   • History of radiation therapy 05/2018   • Hydronephrosis 04/29/2018   • Malignant neoplasm of overlapping sites of left breast in female, estrogen receptor positive ([facility] ) 04/10/2018      LOS (days): 3  Geometric Mean LOS (GMLOS) (days): 3 60  Days to GMLOS:0     OBJECTIVE:  Risk of Unplanned Readmission Score: 16 7      Current admission status: Inpatient   Preferred Pharmacy:   [facility]/pharmacy #[id number]- [place] [id number] [address]  Phone: [phone] Fax: [phone]    Primary Care Provider: Edwin Marino DC    Primary Insurance: BLUE CROSS  Secondary Insurance:     DISCHARGE DETAILS:  Patient reviewed during SLIM rounds today  Anticipated d/c in 24hrs  No rehab recommendations noted at this time  CM will continue to follow for needs

## 2022-10-31 NOTE — ASSESSMENT & PLAN NOTE
· Hemoglobin stable around 9  · Patient has received 2 units PRBC during hospitalization  · Patient's baseline fluctuates but appears to be closer to 10  · CT abdomen pelvis-No acute abnormality in abdomen or pelvis; cirrhosis with evidence of portal hypertension and large volume abdominal pelvic ascites; minimal pneumobilia    Plan  · Q 12 hour hemoglobin  · Patient received 1 unit PRBC  · Blood culture x2 negative at 3 days  · Will continue IV Protonix 40 mg b i d   · Continue ceftriaxone   · Patient seen by Gastroenterology today and will go for EGD

## 2022-10-31 NOTE — ANESTHESIA PREPROCEDURE EVALUATION
Procedure:  EGD  * Acute blood loss anemia  Assessment & Plan  · Hemoglobin stable around 9  · Patient has received 2 units PRBC during hospitalization  · Patient's baseline fluctuates but appears to be closer to 10  · CT abdomen pelvis-No acute abnormality in abdomen or pelvis; cirrhosis with evidence of portal hypertension and large volume abdominal pelvic ascites; minimal pneumobilia     Plan  · Q 12 hour hemoglobin  · Patient received 1 unit PRBC  · Blood culture x2 negative at 3 days  · Will continue IV Protonix 40 mg b i d   · Continue ceftriaxone   · Patient seen by Gastroenterology today and will go for EGD     COVID-19  Assessment & Plan  · Patient noted to be COVID positive on 10/24  · Currently on room air saturating greater than 98%  · Will not start any COVID directed medications at this time  · Continue to monitor respiratory status     Decompensated hepatic cirrhosis ([facility] )  Assessment & Plan  · Patient not on any medications as an outpatient for her cirrhosis  · Abdomen minimally distended  · States she does get paracentesis monthly  · Gastroenterology signed off   · Continue to monitor     Stage 3a chronic kidney disease ([place] [address] )  Assessment & Plan        · Lab Results   · Component · Value · Date   ·   · EGFR · 70 · 10/31/2022   ·   · EGFR · 62 · 10/30/2022   ·   · EGFR · 60 · 10/29/2022   ·   · CREATININE · 0 89 · 10/31/2022   ·   · CREATININE · 0 98 · 10/30/2022   ·   · CREATININE · 1 01 · 10/29/2022   ·    · Kidney function at baseline currently  · Avoid hypotension and nephrotoxic agents  · Continue to monitor         Relevant Problems   CARDIO   (+) Acute deep vein thrombosis (DVT) of left lower extremity (HCC)   (+) Hypertension      GI/HEPATIC   (+) Alcoholic hepatitis   (+) Decompensated hepatic cirrhosis ([facility] [address] )      /RENAL   (+) OPAL (acute kidney injury) ([place] [address] ) on CKD   (+) Benign hypertension with CKD (chronic kidney disease) stage III (HCC)   (+) Hydronephrosis   (+) Stage 3a chronic kidney disease ([place] [address] )      GYN   (+) Malignant neoplasm of overlapping sites of left breast in female, estrogen receptor positive ([place] [address] )      HEMATOLOGY   (+) Acute blood loss anemia   (+) Anemia   (+) Anemia following use of chemotherapeutic drug   (+) Blood loss anemia   (+) Immunocompromised (HCC)      NEURO/PSYCH   (+) History of DVT (deep vein thrombosis)   (+) History of alcohol use disorder      PULMONARY   (+) SOB (shortness of breath)      Malignant neoplasm of overlapping sites of left breast in female, estrogen receptor positive ([place] [address] )   Hydronephrosis   Cellulitis   History of DVT (deep vein thrombosis)   Anemia following use of chemotherapeutic drug   Hematuria   Hemorrhagic cystitis   Heme positive stool   Acute deep vein thrombosis (DVT) of left lower extremity (HCC)   Hyperbilirubinemia   Acute blood loss anemia   Moderate protein-calorie malnutrition (HCC)   Immunocompromised (HCC)   Lesion of bladder   Blood loss anemia   Dilated cbd, acquired   Rectal prolapse   Use of letrozole ([facility])   Axillary lump, left   Alcoholic ketosis (HCC)   SOB (shortness of breath)   Hypoglycemia   History of chemotherapy   History of radiation therapy   Suspected acute pulmonary embolism (HCC)   Recurrent syncope   OPAL (acute kidney injury) ([place] [address] ) on CKD   Closed left fibular fracture   Localized swelling of lower extremity   Alcohol abuse   Hypokalemia   Benign hypertension with CKD (chronic kidney disease) stage III (HCC)   Stage 3a chronic kidney disease (HCC)   Anemia   SIRS (systemic inflammatory response syndrome) (HCC)   Alcoholic hepatitis   Elevated liver enzymes   History of alcohol use disorder   Proteinuria   Abnormal urinalysis   Hypertension   Decompensated hepatic cirrhosis (HCC)   Ascites   COVID-19 on 10/ 24 /22       Physical Exam    Airway    Mallampati score: III  TM Distance: >3 FB  Neck ROM: full     Dental       Cardiovascular  Cardiovascular exam normal    Pulmonary  Pulmonary exam normal     Other Findings        Anesthesia Plan  ASA Score- 4     Anesthesia Type- IV sedation with anesthesia with ASA Monitors  Additional Monitors:   Airway Plan:           Plan Factors-Exercise tolerance (METS): <4 METS  Chart reviewed  EKG reviewed  Imaging results reviewed  Existing labs reviewed  Patient summary reviewed  Patient is not a current smoker  Induction- intravenous  Postoperative Plan-     Informed Consent- Anesthetic plan and risks discussed with patient  I personally reviewed this patient with the CRNA  Discussed and agreed on the Anesthesia Plan with the CRNA  Zander Ceballos

## 2022-10-31 NOTE — PLAN OF CARE
Problem: Potential for Falls  Goal: Patient will remain free of falls  Description: INTERVENTIONS:  - Educate patient/family on patient safety including physical limitations  - Instruct patient to call for assistance with activity   - Consult OT/PT to assist with strengthening/mobility   - Keep Call bell within reach  - Keep bed low and locked with side rails adjusted as appropriate  - Keep care items and personal belongings within reach  - Initiate and maintain comfort rounds  - Make Fall Risk Sign visible to staff  - Offer Toileting every 2 Hours, in advance of need  - Initiate/Maintain alarm  - Obtain necessary fall risk management equipment:   - Apply yellow socks and bracelet for high fall risk patients  - Consider moving patient to room near nurses station  Outcome: Progressing     Problem: MOBILITY - ADULT  Goal: Maintain or return to baseline ADL function  Description: INTERVENTIONS:  -  Assess patient's ability to carry out ADLs; assess patient's baseline for ADL function and identify physical deficits which impact ability to perform ADLs (bathing, care of mouth/teeth, toileting, grooming, dressing, etc )  - Assess/evaluate cause of self-care deficits   - Assess range of motion  - Assess patient's mobility; develop plan if impaired  - Assess patient's need for assistive devices and provide as appropriate  - Encourage maximum independence but intervene and supervise when necessary  - Involve family in performance of ADLs  - Assess for home care needs following discharge   - Consider OT consult to assist with ADL evaluation and planning for discharge  - Provide patient education as appropriate  Outcome: Progressing  Goal: Maintains/Returns to pre admission functional level  Description: INTERVENTIONS:  - Perform BMAT or MOVE assessment daily    - Set and communicate daily mobility goal to care team and patient/family/caregiver     - Collaborate with rehabilitation services on mobility goals if consulted  - Perform Range of Motion 2 times a day  - Reposition patient every 2 hours    - Dangle patient 2 times a day  - Stand patient 2 times a day  - Ambulate patient 2 times a day  - Out of bed to chair 2 times a day   - Out of bed for meals 2 times a day  - Out of bed for toileting  - Record patient progress and toleration of activity level   Outcome: Progressing     Problem: Prexisting or High Potential for Compromised Skin Integrity  Goal: Skin integrity is maintained or improved  Description: INTERVENTIONS:  - Identify patients at risk for skin breakdown  - Assess and monitor skin integrity  - Assess and monitor nutrition and hydration status  - Monitor labs   - Assess for incontinence   - Turn and reposition patient  - Assist with mobility/ambulation  - Relieve pressure over bony prominences  - Avoid friction and shearing  - Provide appropriate hygiene as needed including keeping skin clean and dry  - Evaluate need for skin moisturizer/barrier cream  - Collaborate with interdisciplinary team   - Patient/family teaching  - Consider wound care consult   Outcome: Progressing

## 2022-11-01 VITALS
WEIGHT: 120 LBS | RESPIRATION RATE: 16 BRPM | OXYGEN SATURATION: 97 % | BODY MASS INDEX: 19.99 KG/M2 | SYSTOLIC BLOOD PRESSURE: 93 MMHG | HEIGHT: 65 IN | TEMPERATURE: 96.6 F | DIASTOLIC BLOOD PRESSURE: 67 MMHG | HEART RATE: 89 BPM

## 2022-11-01 LAB
ANION GAP SERPL CALCULATED.3IONS-SCNC: 9 MMOL/L (ref 4–13)
BASOPHILS # BLD AUTO: 0.02 THOUSANDS/ÂΜL (ref 0–0.1)
BASOPHILS NFR BLD AUTO: 1 % (ref 0–1)
BUN SERPL-MCNC: 17 MG/DL (ref 5–25)
CALCIUM SERPL-MCNC: 8.7 MG/DL (ref 8.3–10.1)
CHLORIDE SERPL-SCNC: 113 MMOL/L (ref 96–108)
CO2 SERPL-SCNC: 17 MMOL/L (ref 21–32)
CREAT SERPL-MCNC: 0.78 MG/DL (ref 0.6–1.3)
EOSINOPHIL # BLD AUTO: 0.24 THOUSAND/ÂΜL (ref 0–0.61)
EOSINOPHIL NFR BLD AUTO: 6 % (ref 0–6)
ERYTHROCYTE [DISTWIDTH] IN BLOOD BY AUTOMATED COUNT: 16.5 % (ref 11.6–15.1)
GFR SERPL CREATININE-BSD FRML MDRD: 82 ML/MIN/1.73SQ M
GLUCOSE SERPL-MCNC: 105 MG/DL (ref 65–140)
HCT VFR BLD AUTO: 24.7 % (ref 34.8–46.1)
HCT VFR BLD AUTO: 26.1 % (ref 34.8–46.1)
HGB BLD-MCNC: 8.2 G/DL (ref 11.5–15.4)
HGB BLD-MCNC: 8.5 G/DL (ref 11.5–15.4)
IMM GRANULOCYTES # BLD AUTO: 0.02 THOUSAND/UL (ref 0–0.2)
IMM GRANULOCYTES NFR BLD AUTO: 1 % (ref 0–2)
LYMPHOCYTES # BLD AUTO: 0.55 THOUSANDS/ÂΜL (ref 0.6–4.47)
LYMPHOCYTES NFR BLD AUTO: 13 % (ref 14–44)
MCH RBC QN AUTO: 31.3 PG (ref 26.8–34.3)
MCHC RBC AUTO-ENTMCNC: 32.6 G/DL (ref 31.4–37.4)
MCV RBC AUTO: 96 FL (ref 82–98)
MONOCYTES # BLD AUTO: 0.25 THOUSAND/ÂΜL (ref 0.17–1.22)
MONOCYTES NFR BLD AUTO: 6 % (ref 4–12)
NEUTROPHILS # BLD AUTO: 3.04 THOUSANDS/ÂΜL (ref 1.85–7.62)
NEUTS SEG NFR BLD AUTO: 73 % (ref 43–75)
NRBC BLD AUTO-RTO: 0 /100 WBCS
PLATELET # BLD AUTO: 77 THOUSANDS/UL (ref 149–390)
PMV BLD AUTO: 11.1 FL (ref 8.9–12.7)
POTASSIUM SERPL-SCNC: 4 MMOL/L (ref 3.5–5.3)
RBC # BLD AUTO: 2.72 MILLION/UL (ref 3.81–5.12)
SODIUM SERPL-SCNC: 139 MMOL/L (ref 135–147)
WBC # BLD AUTO: 4.12 THOUSAND/UL (ref 4.31–10.16)

## 2022-11-01 RX ORDER — PANTOPRAZOLE SODIUM 40 MG/1
40 TABLET, DELAYED RELEASE ORAL 2 TIMES DAILY
Qty: 60 TABLET | Refills: 0 | Status: SHIPPED | OUTPATIENT
Start: 2022-11-01

## 2022-11-01 RX ADMIN — FOLIC ACID 1 MG: 1 TABLET ORAL at 09:39

## 2022-11-01 RX ADMIN — PANTOPRAZOLE SODIUM 40 MG: 40 INJECTION, POWDER, FOR SOLUTION INTRAVENOUS at 09:38

## 2022-11-01 RX ADMIN — Medication 1000 MG: at 09:38

## 2022-11-01 NOTE — PLAN OF CARE
Problem: Potential for Falls  Goal: Patient will remain free of falls  Description: INTERVENTIONS:  - Educate patient/family on patient safety including physical limitations  - Instruct patient to call for assistance with activity   - Consult OT/PT to assist with strengthening/mobility   - Keep Call bell within reach  - Keep bed low and locked with side rails adjusted as appropriate  - Keep care items and personal belongings within reach  - Initiate and maintain comfort rounds  - Make Fall Risk Sign visible to staff  - Offer Toileting every 2 Hours, in advance of need  - Initiate/Maintain alarm  - Obtain necessary fall risk management equipment:   - Apply yellow socks and bracelet for high fall risk patients  - Consider moving patient to room near nurses station  Outcome: Progressing     Problem: MOBILITY - ADULT  Goal: Maintain or return to baseline ADL function  Description: INTERVENTIONS:  -  Assess patient's ability to carry out ADLs; assess patient's baseline for ADL function and identify physical deficits which impact ability to perform ADLs (bathing, care of mouth/teeth, toileting, grooming, dressing, etc )  - Assess/evaluate cause of self-care deficits   - Assess range of motion  - Assess patient's mobility; develop plan if impaired  - Assess patient's need for assistive devices and provide as appropriate  - Encourage maximum independence but intervene and supervise when necessary  - Involve family in performance of ADLs  - Assess for home care needs following discharge   - Consider OT consult to assist with ADL evaluation and planning for discharge  - Provide patient education as appropriate  Outcome: Progressing  Goal: Maintains/Returns to pre admission functional level  Description: INTERVENTIONS:  - Perform BMAT or MOVE assessment daily    - Set and communicate daily mobility goal to care team and patient/family/caregiver     - Collaborate with rehabilitation services on mobility goals if consulted  - Perform Range of Motion 3 times a day  - Reposition patient every 2 hours    - Dangle patient 3 times a day  - Stand patient 3 times a day  - Ambulate patient 3 times a day  - Out of bed to chair 3 times a day   - Out of bed for meals 3 times a day  - Out of bed for toileting  - Record patient progress and toleration of activity level   Outcome: Progressing     Problem: Prexisting or High Potential for Compromised Skin Integrity  Goal: Skin integrity is maintained or improved  Description: INTERVENTIONS:  - Identify patients at risk for skin breakdown  - Assess and monitor skin integrity  - Assess and monitor nutrition and hydration status  - Monitor labs   - Assess for incontinence   - Turn and reposition patient  - Assist with mobility/ambulation  - Relieve pressure over bony prominences  - Avoid friction and shearing  - Provide appropriate hygiene as needed including keeping skin clean and dry  - Evaluate need for skin moisturizer/barrier cream  - Collaborate with interdisciplinary team   - Patient/family teaching  - Consider wound care consult   Outcome: Progressing

## 2022-11-01 NOTE — PROGRESS NOTES
[facility]  Progress Note - Lilia Rosas 1960, 65 y o  female MRN: [id number]  Unit/Bed#: -01 Encounter: [id number]  Primary Care Provider: Artie Galindo DC   Date and time admitted to hospital: 10/27/2022  9:14 PM    * Acute blood loss anemia  Assessment & Plan  Hemoglobin stable around 9  Patient has received 2 units PRBC during hospitalization  Patient's baseline fluctuates but appears to be closer to 10  CT abdomen pelvis-No acute abnormality in abdomen or pelvis; cirrhosis with evidence of portal hypertension and large volume abdominal pelvic ascites; minimal pneumobilia  EGD showed duodenal ulcerations  Switch to oral Protonix    COVID-19  Assessment & Plan  Patient noted to be COVID positive on 10/24  Currently on room air saturating greater than 98%  Will not start any COVID directed medications at this time  Continue to monitor respiratory status    Decompensated hepatic cirrhosis ([facility] [address] )  Assessment & Plan  Patient not on any medications as an outpatient for her cirrhosis  Abdomen minimally distended  States she does get paracentesis monthly  Gastroenterology signed off   Continue to monitor    Stage 3a chronic kidney disease [facility])  Assessment & Plan  Lab Results   Component Value Date    EGFR 82 11/01/2022    EGFR 70 10/31/2022    EGFR 62 10/30/2022    CREATININE 0 78 11/01/2022    CREATININE 0 89 10/31/2022    CREATININE 0 98 10/30/2022     Creatinine at baseline    History of DVT (deep vein thrombosis)  Assessment & Plan  Not on any anticoagulation      Duodenal ulcer with hemorrhage  causing acute blood loss anemia, as evidenced by anemia in a patient with duodenal ulcer found on EGD, requiring Protonix and GI consult       Discharging Physician / Practitioner: Korin Costa  PCP: Artie Galindo DC  Admission Date:   Admission Orders (From admission, onward)       Ordered        10/28/22 0156  INPATIENT ADMISSION  Once                          Discharge Date: 11/01/22    Medical Problems                 Resolved Problems  Date Reviewed: 11/1/2022   None                   Consultations During Hospital Stay:  GI     Procedures Performed:   egd    Significant Findings / Test Results:   EGD    Result Date: 10/31/2022  Impression: Duodenal ulcer, small, linear, plus erosions  No active bleeding, biopsies pending to rule out H pylori RECOMMENDATION: Will continue the protonix as prescribed  Resume diet Await biopsy results   Balbir Larose,  [place], [place]     XR chest 1 view portable    Result Date: 10/28/2022  Impression: Suspected persistent trace left pleural effusion Workstation performed: [id number]     IR INPATIENT Paracentesis    Result Date: 10/28/2022  Impression: Impression: Diagnostic Ultrasound-guided paracentesis  Signed, performed, and dictated by Janice Butts under the direct supervision of Dr Hilario Lomeli  Workstation performed: [id number]     Incidental Findings:   none     Test Results Pending at Discharge (will require follow up):   no     Outpatient Tests Requested:  none    Complications:  none    Reason for Admission:  Acute blood loss anemia    Hospital Course:     Eddie Doshi is a 65 y o  female patient who originally presented to the hospital on 10/27/2022 due to acute blood loss anemia likely secondary duodenal ulcerations there was treated PPI transition oral PPI  No further bleeding  Hemoglobin remained stable  Will follow-up outpatient PCP      Please see above list of diagnoses and related plan for additional information       Condition at Discharge: stable     Discharge Day Visit / Exam:     Subjective:  Denies chest pain, shortness breath, cough fevers, chills  Vitals: Blood Pressure: 96/62 (11/01/22 0500)  Pulse: 95 (10/31/22 2104)  Temperature: (!) 96 6 °F (35 9 °C) (10/31/22 2104)  Temp Source: Oral (10/31/22 2104)  Respirations: 16 (10/31/22 1900)  Height: 5' 5" (165 1 cm) (10/27/22 2030)  Weight - Scale: 54 4 kg (120 lb) (10/27/22 2030)  SpO2: 97 % (10/31/22 2104)  Exam:   Physical Exam  Constitutional:       General: She is not in acute distress  Appearance: She is well-developed  She is not diaphoretic  HENT:      Head: Normocephalic and atraumatic  Nose: Nose normal       Mouth/Throat:      Pharynx: No oropharyngeal exudate  Eyes:      General: No scleral icterus  Right eye: No discharge  Left eye: No discharge  Conjunctiva/sclera: Conjunctivae normal    Neck:      Thyroid: No thyromegaly  Vascular: No JVD  Cardiovascular:      Rate and Rhythm: Normal rate and regular rhythm  Heart sounds: Normal heart sounds  No murmur heard  No friction rub  No gallop  Pulmonary:      Effort: Pulmonary effort is normal  No respiratory distress  Breath sounds: Normal breath sounds  No wheezing or rales  Chest:      Chest wall: No tenderness  Abdominal:      General: Bowel sounds are normal  There is no distension  Palpations: Abdomen is soft  Tenderness: There is no abdominal tenderness  There is no guarding or rebound  Musculoskeletal:         General: No tenderness or deformity  Normal range of motion  Cervical back: Normal range of motion and neck supple  Skin:     General: Skin is warm and dry  Findings: No erythema or rash  Neurological:      Mental Status: She is alert  Mental status is at baseline  Cranial Nerves: No cranial nerve deficit  Sensory: No sensory deficit  Motor: No abnormal muscle tone  Coordination: Coordination normal          Discussion with Family: pt    Discharge instructions/Information to patient and family:   See after visit summary for information provided to patient and family  Provisions for Follow-Up Care:  See after visit summary for information related to follow-up care and any pertinent home health orders        Disposition:     Home    For Discharges to [facility] SNF:   Not Applicable to this Patient - Not Applicable to this Patient    Planned Readmission: none     Discharge Statement:  I spent 60 minutes discharging the patient  This time was spent on the day of discharge  I had direct contact with the patient on the day of discharge  Greater than 50% of the total time was spent examining patient, answering all patient questions, arranging and discussing plan of care with patient as well as directly providing post-discharge instructions  Additional time then spent on discharge activities  Discharge Medications:  See after visit summary for reconciled discharge medications provided to patient and family        ** Please Note: This note has been constructed using a voice recognition system **

## 2022-11-01 NOTE — ASSESSMENT & PLAN NOTE
Patient noted to be COVID positive on 10/24  Currently on room air saturating greater than 98%  Will not start any COVID directed medications at this time  Continue to monitor respiratory status

## 2022-11-01 NOTE — ASSESSMENT & PLAN NOTE
Patient not on any medications as an outpatient for her cirrhosis  Abdomen minimally distended  States she does get paracentesis monthly  Gastroenterology signed off   Continue to monitor

## 2022-11-01 NOTE — ASSESSMENT & PLAN NOTE
· Hemoglobin stable around 9  · Patient has received 2 units PRBC during hospitalization  · Patient's baseline fluctuates but appears to be closer to 10  · CT abdomen pelvis-No acute abnormality in abdomen or pelvis; cirrhosis with evidence of portal hypertension and large volume abdominal pelvic ascites; minimal pneumobilia  · EGD showed duodenal ulcerations  · Switch to oral Protonix

## 2022-11-01 NOTE — ASSESSMENT & PLAN NOTE
Lab Results   Component Value Date    EGFR 82 11/01/2022    EGFR 70 10/31/2022    EGFR 62 10/30/2022    CREATININE 0 78 11/01/2022    CREATININE 0 89 10/31/2022    CREATININE 0 98 10/30/2022     · Creatinine at baseline

## 2022-11-02 LAB
BACTERIA BLD CULT: NORMAL
BACTERIA BLD CULT: NORMAL

## 2022-11-03 NOTE — UTILIZATION REVIEW
NOTIFICATION OF ADMISSION DISCHARGE   This is a Notification of Discharge from [facility]  Please be advised that this patient has been discharge from our facility  Below you will find the admission and discharge date and time including the patient’s disposition  UTILIZATION REVIEW CONTACT:  Kaitlynn Jacob  Utilization   Network Utilization Review Department  Phone: [phone] x carefully listen to the prompts  All voicemails are confidential   Email: [email or web address]     ADMISSION INFORMATION  PRESENTATION DATE: 10/27/2022  9:14 PM  OBERVATION ADMISSION DATE:   INPATIENT ADMISSION DATE: 10/28/22  1:56 AM   DISCHARGE DATE: 11/1/2022  5:25 PM  DISPOSITION: Home/Self Care Home/Self Care      IMPORTANT INFORMATION:  Send all requests for admission clinical reviews, approved or denied determinations and any other requests to dedicated fax number below belonging to the campus where the patient is receiving treatment   List of dedicated fax numbers:  [address] DENIALS (Administrative/Medical Necessity) [phone]   [address] (Maternity/NICU/Pediatrics) [phone]   [facility] [phone]   [place] [address] [phone]   [address] [phone]   [address] [facility] [phone]   [address] [facility] [phone]   [address] [place] [address] 119 [phone]   [facility]  [phone]   [address] [facility] [phone]   [address] [facility] [address] [facility] [phone]

## 2022-11-04 ENCOUNTER — HOSPITAL ENCOUNTER (OUTPATIENT)
Dept: NON INVASIVE DIAGNOSTICS | Facility: HOSPITAL | Age: 62
Discharge: HOME | End: 2022-11-04

## 2022-11-04 VITALS
DIASTOLIC BLOOD PRESSURE: 65 MMHG | SYSTOLIC BLOOD PRESSURE: 114 MMHG | OXYGEN SATURATION: 99 % | RESPIRATION RATE: 17 BRPM

## 2022-11-04 DIAGNOSIS — K70.31 ALCOHOLIC CIRRHOSIS OF LIVER WITH ASCITES (HCC): ICD-10-CM

## 2022-11-04 RX ORDER — LIDOCAINE HYDROCHLORIDE 10 MG/ML
INJECTION, SOLUTION EPIDURAL; INFILTRATION; INTRACAUDAL; PERINEURAL CODE/TRAUMA/SEDATION MEDICATION
Status: COMPLETED | OUTPATIENT
Start: 2022-11-04 | End: 2022-11-04

## 2022-11-04 RX ADMIN — LIDOCAINE HYDROCHLORIDE 10 ML: 10 INJECTION, SOLUTION EPIDURAL; INFILTRATION; INTRACAUDAL; PERINEURAL at 09:03

## 2022-11-04 NOTE — SEDATION DOCUMENTATION
3350 ml straw colored fluid drained from paracentesis site  Pt reports feeling "much better " Education complete  No questions or concerns per pt at this time  Access site with dressing in place: clean, dry, intact  VSS

## 2022-11-16 NOTE — DISCHARGE SUMMARY
[facility]  Discharge- Maxine Nichole 1960, 65 y o  female MRN: [id number]  Unit/Bed#: -01 Encounter: [id number]  Primary Care Provider: Abida Presley DC   Date and time admitted to hospital: 10/27/2022  9:14 PM  Discharging Physician / Practitioner: Barry Wray MD  PCP: Abida Presley DC  Admission Date:   Admission Orders (From admission, onward)     Ordered        10/28/22 0156  INPATIENT ADMISSION  Once                      Discharge Date: 11/1/2022    Medical Problems     Resolved Problems  Date Reviewed: 11/1/2022   None         Consultations During Hospital Stay:  · GI    Procedures Performed:   · EGD    Significant Findings / Test Results:   IR paracentesis    Result Date: 11/4/2022  · Impression: Impression: Therapeutic Ultrasound-guided paracentesis  Signed, performed, and dictated by Nayan Love under the direct supervision of Dr Alice Penny  Workstation performed: [id number]     Incidental Findings:   · none    Test Results Pending at Discharge (will require follow up):   · none     Outpatient Tests Requested:  · none    Complications:  none    Reason for Admission:  Acute blood loss anemia    Hospital Course:     Maxine Nichole is a 65 y o  female patient who originally presented to the hospital on 10/27/2022 d past medical history significant of cirrhosis, stage III CKD initially presented acute blood loss anemia  Received 2 units packed red blood cells  Had an EGD performed that showed duodenal ulcerations  Treated with IV PPI and switch to oral PPI with resolution  Hemoglobin stabilized      Please see above list of diagnoses and related plan for additional information       Condition at Discharge: stable     Discharge Day Visit / Exam:     * Please refer to separate progress note for these details *    Discussion with Family: pt    Discharge instructions/Information to patient and family:   See after visit summary for information provided to patient and family  Provisions for Follow-Up Care:  See after visit summary for information related to follow-up care and any pertinent home health orders  Disposition:     Home    For Discharges to [facility] SNF:   · Not Applicable to this Patient - Not Applicable to this Patient    Planned Readmission: none     Discharge Statement:  I spent 60 minutes discharging the patient  This time was spent on the day of discharge  I had direct contact with the patient on the day of discharge  Greater than 50% of the total time was spent examining patient, answering all patient questions, arranging and discussing plan of care with patient as well as directly providing post-discharge instructions  Additional time then spent on discharge activities  Discharge Medications:  See after visit summary for reconciled discharge medications provided to patient and family        ** Please Note: This note has been constructed using a voice recognition system **

## 2022-11-17 ENCOUNTER — TELEPHONE (OUTPATIENT)
Dept: GASTROENTEROLOGY | Facility: CLINIC | Age: 62
End: 2022-11-17

## 2022-11-17 NOTE — TELEPHONE ENCOUNTER
----- Message from Woo Ellsworth DO sent at 11/16/2022  7:08 PM EST -----  Please call the patient with the biopsy results  Biopsies of the ulcer and the small intestine showed no evidence of Helicobacter pylori and no evidence of cancer  The stomach biopsies were also negative for Helicobacter pylori and negative for cancer

## 2022-12-02 RX ORDER — SUCRALFATE ORAL 1 G/10ML
SUSPENSION ORAL
COMMUNITY
Start: 2022-11-03

## 2022-12-06 ENCOUNTER — HOSPITAL ENCOUNTER (OUTPATIENT)
Dept: NON INVASIVE DIAGNOSTICS | Facility: HOSPITAL | Age: 62
Discharge: HOME | End: 2022-12-06

## 2022-12-06 VITALS — OXYGEN SATURATION: 98 % | SYSTOLIC BLOOD PRESSURE: 131 MMHG | DIASTOLIC BLOOD PRESSURE: 72 MMHG

## 2022-12-06 DIAGNOSIS — K70.31 ALCOHOLIC CIRRHOSIS OF LIVER WITH ASCITES (HCC): ICD-10-CM

## 2022-12-06 RX ORDER — ALBUMIN (HUMAN) 12.5 G/50ML
SOLUTION INTRAVENOUS
Status: COMPLETED | OUTPATIENT
Start: 2022-12-06 | End: 2022-12-06

## 2022-12-06 RX ORDER — LIDOCAINE HYDROCHLORIDE 10 MG/ML
INJECTION, SOLUTION EPIDURAL; INFILTRATION; INTRACAUDAL; PERINEURAL AS NEEDED
Status: COMPLETED | OUTPATIENT
Start: 2022-12-06 | End: 2022-12-06

## 2022-12-06 RX ADMIN — LIDOCAINE HYDROCHLORIDE 10 ML: 10 INJECTION, SOLUTION EPIDURAL; INFILTRATION; INTRACAUDAL; PERINEURAL at 08:57

## 2022-12-06 RX ADMIN — ALBUMIN (HUMAN) 25 G: 5 SOLUTION INTRAVENOUS at 09:41

## 2022-12-06 RX ADMIN — ALBUMIN (HUMAN) 50 G: 5 SOLUTION INTRAVENOUS at 09:34

## 2022-12-07 ENCOUNTER — OFFICE VISIT (OUTPATIENT)
Dept: GASTROENTEROLOGY | Facility: CLINIC | Age: 62
End: 2022-12-07

## 2022-12-07 VITALS
HEIGHT: 65 IN | HEART RATE: 84 BPM | BODY MASS INDEX: 19.33 KG/M2 | SYSTOLIC BLOOD PRESSURE: 134 MMHG | WEIGHT: 116 LBS | DIASTOLIC BLOOD PRESSURE: 78 MMHG | OXYGEN SATURATION: 94 %

## 2022-12-07 DIAGNOSIS — D62 ACUTE BLOOD LOSS ANEMIA: ICD-10-CM

## 2022-12-07 DIAGNOSIS — K70.31 ASCITES DUE TO ALCOHOLIC CIRRHOSIS (HCC): Primary | ICD-10-CM

## 2022-12-07 DIAGNOSIS — K70.31 ALCOHOLIC CIRRHOSIS OF LIVER WITH ASCITES (HCC): ICD-10-CM

## 2022-12-07 RX ORDER — FUROSEMIDE 20 MG/1
20 TABLET ORAL 2 TIMES DAILY
Qty: 60 TABLET | Refills: 2 | Status: SHIPPED | OUTPATIENT
Start: 2022-12-07

## 2022-12-07 RX ORDER — SPIRONOLACTONE 50 MG/1
50 TABLET, FILM COATED ORAL DAILY
Qty: 30 TABLET | Refills: 3 | Status: SHIPPED | OUTPATIENT
Start: 2022-12-07

## 2022-12-07 RX ORDER — PANTOPRAZOLE SODIUM 40 MG/1
40 TABLET, DELAYED RELEASE ORAL 2 TIMES DAILY
Qty: 60 TABLET | Refills: 6 | Status: SHIPPED | OUTPATIENT
Start: 2022-12-07

## 2022-12-07 NOTE — PROGRESS NOTES
[facility] - Outpatient Follow-up Note  Carley Dayday Bolivar 89 y o  female MRN: [id number]  Encounter: [id number]          ASSESSMENT AND PLAN:      1  Alcoholic cirrhosis of liver with ascites ([address] )  Update labs in order to update MELD  She is sober since February of 2022  Ascites continues to be problematic - she requires LVP at least once monthly - yesterday with over 8 liters removed  Will start low dose diuresis  Reiterated importance of low Na <2gm salt restricted diet  Continue Paracentesis PRN    EGD 10/31/22 - no varices  CT 10/24/22 - no suspicious liver lesions    2  Acute blood loss anemia  EGD 10/31/22 showed duodenal ulcer  Continue Pantoprazole  Update labs    ______________________________________________________________________    SUBJECTIVE: 76-year-old female with alcohol cirrhosis complicated by ascites who presents for routine follow-up  After her last appointment she developed a COVID-19 infection  She was hospitalized with pneumonia  During that hospitalization she experienced melena and a drop in her hemoglobin  She underwent an upper endoscopy on October 31 which documented a duodenal ulcer and erosion  No esophageal varices were noted  She was started on pantoprazole 40 mg twice daily which she continues to take  She denies any further episodes of black stools  She is not vomiting  She is tolerating a diet  She continues to struggle with ascites  Her last paracentesis was yesterday with over 8 L of fluid removed  She has not been put on any diuretics as she only has 1 kidney  Most recent renal function from October is normal   She tries to watch the salt in her diet but admits that she does not read ingredient labels  She has minimal lower extremity swelling  She continues to abstain from alcohol  She has been sober since at least February 2022  REVIEW OF SYSTEMS IS OTHERWISE NEGATIVE        Historical Information   Past Medical History:   Diagnosis Date   • Acute DVT (deep venous thrombosis) (HCC)     of LLE>    • Bladder infection    • Congenital prolapsed rectum    • History of biliary stent insertion    • History of chemotherapy    • History of radiation therapy 05/2018    left breast ca   • History of transfusion     x2 s/p chemo treatments, no reaction   • Hydronephrosis     right kidney   • Hypertension    • Malignant neoplasm of overlapping sites of left female breast ([address] ) 05/01/2018   • Migraine      Past Surgical History:   Procedure Laterality Date   • ABDOMINAL ADHESION SURGERY N/A 4/23/2019    Procedure: LYSIS ADHESIONS;  Surgeon: Laura Iverson MD;  [facility];  Service: Colorectal   • BREAST BIOPSY     • COLON SURGERY      colon resection   • CYSTOSCOPY N/A 3/4/2019    Procedure: CYSTOSCOPY WITH BIOPSIES AND [place] OF RECTUM PROLAPSE;  Surgeon: Kathya Xiao MD;  [facility];  Service: Urology   • ERCP N/A 1/4/2019    Procedure: ENDOSCOPIC RETROGRADE CHOLANGIOPANCREATOGRAPHY (ERCP); Surgeon: Makenna Morales MD;  [facility];  Service: Gastroenterology   • INSTILLATION MYTOMYCIN N/A 3/4/2019    Procedure: Khanh Hogan;  Surgeon: Kathya Xiao MD;  [facility];  Service: Urology   • IR PARACENTESIS  3/21/2022   • IR PARACENTESIS  6/1/2022   • IR PARACENTESIS  7/15/2022   • IR PARACENTESIS  8/30/2022   • IR PARACENTESIS  10/4/2022   • IR PARACENTESIS  10/28/2022   • IR PARACENTESIS  11/4/2022   • IR PARACENTESIS  12/6/2022   • MASTECTOMY Left     2018   • IL COLONOSCOPY FLX DX W/COLLJ SPEC WHEN PFRMD N/A 4/22/2019    Procedure: COLONOSCOPY;  Surgeon: Laura Iverson MD;  [facility]; Service: Colorectal   • IL EDG US EXAM SURGICAL ALTER STOM DUODENUM/JEJUNUM N/A 3/7/2019    Procedure: LINEAR ENDOSCOPIC U/S;  Surgeon: Xiomara Bae MD;  [facility];   Service: Gastroenterology   • IL ESOPHAGOGASTRODUODENOSCOPY TRANSORAL DIAGNOSTIC N/A 3/7/2019 Procedure: ESOPHAGOGASTRODUODENOSCOPY (EGD); Surgeon: Cherylene Needy, MD;  [facility];   Service: Gastroenterology   • VA INSJ TUNNELED CTR VAD W/SUBQ PORT AGE 5 YR/> N/A 6/26/2018    Procedure: INSERTION VENOUS PORT ( PORT-A-CATH) IR;  Surgeon: Cleve Bates DO;  [facility];  Service: Interventional Radiology   • VA LAP, SURG PROCTOPEXY N/A 4/23/2019    Procedure: LAPAROSCOPIC HAND-ASSIST PELVIC DISSECTION; proctopexy;  Surgeon: Bam Dowell MD;  [facility];  Service: Colorectal   • VA LAP, SURG PROCTOPEXY N/A 11/18/2020    Procedure: LAPAROSCOPIC LYSIS OF ADHESIONS, MOBILIZATION OF RECTUM AND SUTURE RECTOPEXY;  Surgeon: Libertad Solorzano MD;  [facility];  Service: Colorectal   • VA MASTECTOMY, MODIFIED RADICAL Left 5/15/2018    Procedure: BREAST MODIFIED RADICAL MASTECTOMY;  Surgeon: Rebecca Urrutia MD;  [facility];  Service: Surgical Oncology   • VA RMVL [place] CTR VAD W/SUBQ PORT/ CTR/PRPH INSJ N/A 6/4/2019    Procedure: REMOVAL VENOUS PORT (PORT-A-CATH)IR;  Surgeon: Cleve Bates DO;  [facility];  Service: Interventional Radiology   • TUBAL LIGATION     • US GUIDANCE BREAST BIOPSY LEFT EACH ADDITIONAL Left 4/3/2018   • US GUIDED BREAST BIOPSY LEFT COMPLETE Left 4/3/2018   • US GUIDED BREAST LYMPH NODE BIOPSY LEFT Left 4/3/2018     Social History   Social History     Substance and Sexual Activity   Alcohol Use Not Currently    Comment: Drinks daily until three weeks ago     Social History     Substance and Sexual Activity   Drug Use Not Currently     Social History     Tobacco Use   Smoking Status Never   Smokeless Tobacco Never     Family History   Problem Relation Age of Onset   • No Known Problems Mother    • No Known Problems Father    • Breast cancer Maternal Aunt 48   • Colon cancer Maternal Aunt    • No Known Problems Sister    • No Known Problems Daughter    • No Known Problems Maternal Grandmother    • No Known Problems Maternal Grandfather    • No Known Problems Paternal Grandmother    • No Known Problems Paternal Grandfather        Meds/Allergies       Current Outpatient Medications:   •  furosemide (LASIX) 20 mg tablet  •  gabapentin (NEURONTIN) 300 mg capsule  •  letrozole (FEMARA) 2 5 mg tablet  •  lidocaine (LIDODERM) 5 %  •  methocarbamol (ROBAXIN) 750 mg tablet  •  naproxen (Naprosyn) 500 mg tablet  •  pantoprazole (PROTONIX) 40 mg tablet  •  QUEtiapine (SEROquel) 200 mg tablet  •  spironolactone (ALDACTONE) 50 mg tablet  •  sucralfate (CARAFATE) 1 g/10 mL suspension  •  SUMAtriptan (IMITREX) 100 mg tablet  •  diazepam (VALIUM) 2 mg tablet    Allergies   Allergen Reactions   • Gluten Meal - Food Allergy GI Intolerance   • Lactose - Food Allergy GI Intolerance           Objective     Blood pressure 134/78, pulse 84, height 5' 5" (1 651 m), weight 52 6 kg (116 lb), SpO2 94 %, not currently breastfeeding  Body mass index is 19 3 kg/m²  PHYSICAL EXAM:      General Appearance:   Alert, cooperative, no distress   HEENT:   Normocephalic, atraumatic, anicteric      Neck:  Supple, symmetrical, trachea midline   Lungs:   Clear to auscultation bilaterally; no rales, rhonchi or wheezing; respirations unlabored    Heart[de-identified]   Regular rate and rhythm; no murmur, rub, or gallop  Abdomen:   Soft, non-tender, non-distended; normal bowel sounds; no masses, no organomegaly    Genitalia:   Deferred    Rectal:   Deferred    Extremities:  No cyanosis, clubbing or edema    Pulses:  2+ and symmetric    Skin:  No jaundice, rashes, or lesions    Lymph nodes:  No palpable cervical lymphadenopathy        Lab Results:   No visits with results within 1 Day(s) from this visit  Latest known visit with results is:   No results displayed because visit has over 200 results  Radiology Results:   IR paracentesis    Result Date: 12/6/2022  Narrative: PROCEDURE SUMMARY: 1  Limited abdominal ultrasound 2    Ultrasound-guided paracentesis (therapeutic) PROCEDURAL PERSONNEL: Attending physician(s): Noris Brower MD Resident physician(s): None Advanced practice provider(s): None INDICATION: K70 31: Alcoholic cirrhosis of liver with ascites COMPLICATIONS: No immediate complications  PROCEDURE DETAILS: Informed consent for the procedure including risks, benefits and alternatives was obtained and time-out was performed prior to the procedure  The site was prepared and draped using all elements of maximal sterile barrier technique including sterile gloves, sterile gown, cap, mask, large sterile sheet, sterile ultrasound probe cover, hand hygiene and cutaneous antisepsis with 2% chlorhexidine  Ultrasound images were obtained to identify an access window  Permanent images were stored and sent to PACS  An access window in the right lower abdomen  was localized and 1 % local lidocaine was administered  Under real-time ultrasound guidance, access to the ascites was obtained with a 5-Serbian catheter using a trocar technique  The catheter was placed to suction drainage  At the end of the procedure the catheter was removed and sterile bandage was applied to the puncture site  Specimens removed: 8 7 L of clear yellow ascites  Impression: Successful ultrasound-guided paracentesis   Workstation performed: [id number]

## 2022-12-29 ENCOUNTER — HOSPITAL ENCOUNTER (OUTPATIENT)
Dept: NON INVASIVE DIAGNOSTICS | Facility: HOSPITAL | Age: 62
Discharge: HOME | End: 2022-12-29

## 2022-12-29 VITALS
SYSTOLIC BLOOD PRESSURE: 99 MMHG | DIASTOLIC BLOOD PRESSURE: 59 MMHG | RESPIRATION RATE: 14 BRPM | HEART RATE: 68 BPM | OXYGEN SATURATION: 99 %

## 2022-12-29 DIAGNOSIS — K70.31 ASCITES DUE TO ALCOHOLIC CIRRHOSIS (HCC): ICD-10-CM

## 2022-12-29 NOTE — SEDATION DOCUMENTATION
Patient sates she feel good and still her "skinny pants", she does not feel full  Dr Eunice Min confirmed with ultrasound very little fluid  It was explained to the patient if she feels this good she can cancel appointment and also if she starts to feel uncomfortable before the next appointment to be put  on the  schedule

## 2023-02-08 ENCOUNTER — TELEPHONE (OUTPATIENT)
Dept: GASTROENTEROLOGY | Facility: CLINIC | Age: 63
End: 2023-02-08

## 2023-02-08 ENCOUNTER — OFFICE VISIT (OUTPATIENT)
Dept: GASTROENTEROLOGY | Facility: CLINIC | Age: 63
End: 2023-02-08

## 2023-02-08 VITALS
HEART RATE: 79 BPM | BODY MASS INDEX: 19.83 KG/M2 | TEMPERATURE: 80 F | WEIGHT: 119 LBS | OXYGEN SATURATION: 99 % | SYSTOLIC BLOOD PRESSURE: 100 MMHG | DIASTOLIC BLOOD PRESSURE: 60 MMHG | HEIGHT: 65 IN

## 2023-02-08 DIAGNOSIS — K70.31 ALCOHOLIC CIRRHOSIS OF LIVER WITH ASCITES (HCC): Primary | ICD-10-CM

## 2023-02-08 RX ORDER — SUMATRIPTAN 50 MG/1
TABLET, FILM COATED ORAL
COMMUNITY
Start: 2023-01-17

## 2023-02-08 RX ORDER — MULTIVITAMIN WITH IRON
TABLET ORAL
COMMUNITY

## 2023-02-08 RX ORDER — FERROUS SULFATE 325(65) MG
1 TABLET ORAL 2 TIMES DAILY
COMMUNITY
Start: 2023-01-17

## 2023-02-08 NOTE — TELEPHONE ENCOUNTER
Called pt's PCP and requested recent labs to be faxed    Alan Tyler DC      PCP - General, Chiropractic Medicine     Since 3/20/2018     [phone]

## 2023-02-08 NOTE — PROGRESS NOTES
[facility] - Outpatient Follow-up Note  Geremias Lockett 65 y o  female MRN: [id number]  Encounter: [id number]          ASSESSMENT AND PLAN:      1  Alcoholic cirrhosis of liver with ascites ([address] )  MELD 10  Sober since February 2022    Ascites: much improved on diuretics; f/u BMP   HE: grade 0; no asterixis  Varices: none on EGD 10/2022  HCC: negative imaging 10/22; negative AFP 12/22    ______________________________________________________________________    SUBJECTIVE: 71-year-old female with alcohol cirrhosis historically complicated by recurrent ascites who presents for routine follow-up  At her last appointment we agreed to start low-dose diuretics  She is currently taking furosemide 20 mg twice daily and spironolactone 50 mg once daily  She has not required a paracentesis since starting this regimen  She is watching the salt in her diet and ensuring that she eats less than 2000 mg a day  She is struggling with fatigue which after discussing this further with her seems to be related to boredom  She is eating well  She denies abdominal pain, diarrhea, constipation, rectal bleeding, black stools, nausea or vomiting  She is able to be more active  She is no longer requiring the use of the wheelchair to travel short distances  Her last alcohol use was in February 2022  In October of last year she was admitted to [facility] with melena  An upper endoscopy showed duodenal ulcers  She was treated with 3 months of pantoprazole  Her PCP stopped this last month  He has her on an iron supplement twice a day  REVIEW OF SYSTEMS IS OTHERWISE NEGATIVE        Historical Information   Past Medical History:   Diagnosis Date   • Acute DVT (deep venous thrombosis) (HCC)     of LLE>    • Bladder infection    • Congenital prolapsed rectum    • History of biliary stent insertion    • History of chemotherapy    • History of radiation therapy 05/2018    left breast ca   • History of transfusion     x2 s/p chemo treatments, no reaction   • Hydronephrosis     right kidney   • Hypertension    • Malignant neoplasm of overlapping sites of left female breast ([facility] [address] ) 05/01/2018   • Migraine      Past Surgical History:   Procedure Laterality Date   • ABDOMINAL ADHESION SURGERY N/A 4/23/2019    Procedure: LYSIS ADHESIONS;  Surgeon: Meg Mane MD;  [facility];  Service: Colorectal   • BREAST BIOPSY     • COLON SURGERY      colon resection   • CYSTOSCOPY N/A 3/4/2019    Procedure: CYSTOSCOPY WITH BIOPSIES AND [place] OF RECTUM PROLAPSE;  Surgeon: Milo Conroy MD;  [facility];  Service: Urology   • ERCP N/A 1/4/2019    Procedure: ENDOSCOPIC RETROGRADE CHOLANGIOPANCREATOGRAPHY (ERCP); Surgeon: Reba Ulloa MD;  [facility];  Service: Gastroenterology   • INSTILLATION MYTOMYCIN N/A 3/4/2019    Procedure: Elsi Palacios;  Surgeon: Milo Conroy MD;  [facility];  Service: Urology   • IR PARACENTESIS  3/21/2022   • IR PARACENTESIS  6/1/2022   • IR PARACENTESIS  7/15/2022   • IR PARACENTESIS  8/30/2022   • IR PARACENTESIS  10/4/2022   • IR PARACENTESIS  10/28/2022   • IR PARACENTESIS  11/4/2022   • IR PARACENTESIS  12/6/2022   • IR PARACENTESIS  12/29/2022   • MASTECTOMY Left     2018   • KS COLONOSCOPY FLX DX W/COLLJ SPEC WHEN PFRMD N/A 4/22/2019    Procedure: COLONOSCOPY;  Surgeon: Meg Mane MD;  [facility]; Service: Colorectal   • KS EDG US EXAM SURGICAL ALTER STOM DUODENUM/JEJUNUM N/A 3/7/2019    Procedure: LINEAR ENDOSCOPIC U/S;  Surgeon: Jaleesa Callahan MD;  [facility]; Service: Gastroenterology   • KS ESOPHAGOGASTRODUODENOSCOPY TRANSORAL DIAGNOSTIC N/A 3/7/2019    Procedure: ESOPHAGOGASTRODUODENOSCOPY (EGD); Surgeon: Jaleesa Callahan MD;  [facility];   Service: Gastroenterology   • [place] INSJ TUNNELED CTR VAD W/SUBQ PORT AGE 5 YR/> N/A 6/26/2018    Procedure: INSERTION VENOUS PORT ( PORT-A-CATH) IR;  Surgeon: Trino Jj DO;  [facility];  Service: Interventional Radiology   • ME LAPAROSCOPY PROCTOPEXY PROLAPSE N/A 4/23/2019    Procedure: LAPAROSCOPIC HAND-ASSIST PELVIC DISSECTION; proctopexy;  Surgeon: Jack Atkinson MD;  [facility];  Service: Colorectal   • ME LAPAROSCOPY PROCTOPEXY PROLAPSE N/A 11/18/2020    Procedure: LAPAROSCOPIC LYSIS OF ADHESIONS, MOBILIZATION OF RECTUM AND SUTURE RECTOPEXY;  Surgeon: Tigist Flynn MD;  [facility];  Service: Colorectal   • ME MAST MODF RAD W/AX LYMPH NOD W/WO PECT/LEONORA MIN Left 5/15/2018    Procedure: BREAST MODIFIED RADICAL MASTECTOMY;  Surgeon: Krista Rios MD;  [facility];  Service: Surgical Oncology   • ME RMVL [place] CTR VAD W/SUBQ PORT/ CTR/PRPH INSJ N/A 6/4/2019    Procedure: REMOVAL VENOUS PORT (PORT-A-CATH)IR;  Surgeon: Trino Jj DO;  [facility];  Service: Interventional Radiology   • TUBAL LIGATION     • US GUIDANCE BREAST BIOPSY LEFT EACH ADDITIONAL Left 4/3/2018   • US GUIDED BREAST BIOPSY LEFT COMPLETE Left 4/3/2018   • US GUIDED BREAST LYMPH NODE BIOPSY LEFT Left 4/3/2018     Social History   Social History     Substance and Sexual Activity   Alcohol Use Not Currently    Comment: Drinks daily until three weeks ago     Social History     Substance and Sexual Activity   Drug Use Not Currently     Social History     Tobacco Use   Smoking Status Never   Smokeless Tobacco Never     Family History   Problem Relation Age of Onset   • No Known Problems Mother    • No Known Problems Father    • Breast cancer Maternal Aunt 48   • Colon cancer Maternal Aunt    • No Known Problems Sister    • No Known Problems Daughter    • No Known Problems Maternal Grandmother    • No Known Problems Maternal Grandfather    • No Known Problems Paternal Grandmother    • No Known Problems Paternal Grandfather        Meds/Allergies       Current Outpatient Medications:   •  Bioflavonoid Products (REBA VITAMIN C-FLAVONOIDS PO)  •  ferrous sulfate 325 (65 Fe) mg tablet  •  furosemide (LASIX) 20 mg tablet  •  letrozole (FEMARA) 2 5 mg tablet  •  Magnesium 250 MG TABS  •  spironolactone (ALDACTONE) 50 mg tablet  •  SUMAtriptan (IMITREX) 100 mg tablet  •  SUMAtriptan (IMITREX) 50 mg tablet  •  diazepam (VALIUM) 2 mg tablet    Allergies   Allergen Reactions   • Gluten Meal - Food Allergy GI Intolerance   • Lactose - Food Allergy GI Intolerance           Objective     Blood pressure 100/60, pulse 79, temperature (!) 80 °F (26 7 °C), height 5' 5" (1 651 m), weight 54 kg (119 lb), SpO2 99 %, not currently breastfeeding  Body mass index is 19 8 kg/m²  PHYSICAL EXAM:      General Appearance:   Alert, cooperative, no distress   HEENT:   Normocephalic, atraumatic, anicteric      Neck:  Supple, symmetrical, trachea midline   Lungs:   Clear to auscultation bilaterally; no rales, rhonchi or wheezing; respirations unlabored    Heart[de-identified]   Regular rate and rhythm; no murmur, rub, or gallop  Abdomen:   Soft, non-tender, non-distended; normal bowel sounds; no masses, no organomegaly    Genitalia:   Deferred    Rectal:   Deferred    Extremities:  No cyanosis, clubbing or edema    Pulses:  2+ and symmetric    Skin:  No jaundice, rashes, or lesions    Lymph nodes:  No palpable cervical lymphadenopathy        Lab Results:   No visits with results within 1 Day(s) from this visit  Latest known visit with results is:   No results displayed because visit has over 200 results  Radiology Results:   No results found

## 2023-02-09 ENCOUNTER — TELEPHONE (OUTPATIENT)
Dept: GASTROENTEROLOGY | Facility: CLINIC | Age: 63
End: 2023-02-09

## 2023-03-04 DIAGNOSIS — K70.31 ASCITES DUE TO ALCOHOLIC CIRRHOSIS (HCC): ICD-10-CM

## 2023-03-06 RX ORDER — FUROSEMIDE 20 MG/1
TABLET ORAL
Qty: 60 TABLET | Refills: 2 | Status: SHIPPED | OUTPATIENT
Start: 2023-03-06

## 2023-05-05 RX ORDER — LIOTHYRONINE SODIUM 25 UG/1
TABLET ORAL
COMMUNITY
Start: 2023-02-15

## 2023-05-09 ENCOUNTER — OFFICE VISIT (OUTPATIENT)
Dept: GASTROENTEROLOGY | Facility: CLINIC | Age: 63
End: 2023-05-09

## 2023-05-09 VITALS
HEIGHT: 65 IN | WEIGHT: 134 LBS | SYSTOLIC BLOOD PRESSURE: 130 MMHG | BODY MASS INDEX: 22.33 KG/M2 | HEART RATE: 88 BPM | OXYGEN SATURATION: 93 % | DIASTOLIC BLOOD PRESSURE: 92 MMHG

## 2023-05-09 DIAGNOSIS — D50.9 IRON DEFICIENCY ANEMIA, UNSPECIFIED IRON DEFICIENCY ANEMIA TYPE: ICD-10-CM

## 2023-05-09 DIAGNOSIS — K70.31 ALCOHOLIC CIRRHOSIS OF LIVER WITH ASCITES (HCC): Primary | ICD-10-CM

## 2023-05-09 RX ORDER — SPIRONOLACTONE 50 MG/1
50 TABLET, FILM COATED ORAL DAILY
Qty: 30 TABLET | Refills: 11 | Status: SHIPPED | OUTPATIENT
Start: 2023-05-09

## 2023-05-09 RX ORDER — LEVOTHYROXINE SODIUM 0.03 MG/1
TABLET ORAL
COMMUNITY
Start: 2023-03-23 | End: 2023-05-09

## 2023-05-09 RX ORDER — FERROUS SULFATE 325(65) MG
1 TABLET ORAL 2 TIMES DAILY
Qty: 60 TABLET | Refills: 3 | Status: SHIPPED | OUTPATIENT
Start: 2023-05-09

## 2023-05-09 RX ORDER — MULTIVITAMIN WITH IRON
250 TABLET ORAL DAILY
Qty: 30 TABLET | Refills: 3 | Status: SHIPPED | OUTPATIENT
Start: 2023-05-09

## 2023-05-09 NOTE — PROGRESS NOTES
[facility] - Outpatient Follow-up Note  Clayton Paris 65 y o  female MRN: [id number]  Encounter: [id number]          ASSESSMENT AND PLAN:      1  Alcoholic cirrhosis of liver with ascites ([address] )  MELD from December of 2022 is 10  Will update labs to update score    Ran out of Spironalactone 1 month ago and notes increased LE swelling  She is cautious to limit/avoid salt  She has gained 15lbs since February  Will restart with Furosemide 20mg BID and re-evaluate    She is due for imaging - will order an US with dopplers  EGD from October with duodenal ulceration but no varices    She and her  deny confusion  She is sleeping well  No asterixis    She has been sober since February of 2022    2  Iron deficiency anemia, unspecified iron deficiency anemia type  Advised a colonoscopy - she is refusing at present    ______________________________________________________________________    SUBJECTIVE: 29-year-old female with alcohol cirrhosis historically complicated by ascites who presents for routine follow-up  Overall she feels well  She has had some increased lower extremity swelling  She reports that she ran out of her spironolactone about a month ago  She has gained about 15 pounds  She is eating well  Her and her  both deny any confusion  She is sleeping well  She denies any excessive fatigue during the day  She is having regular bowel movements without blood  They are not black in color  She is not vomiting  She has no heartburn  She denies any abdominal pain  She has a chronic anemia  She had an EGD in October with a duodenal ulcer noted  She was treated for several weeks with a PPI  She is not taking NSAIDs at present  She does not remember exactly when her last colonoscopy was but notes that it was many years ago  She has not had alcohol since February of last year  REVIEW OF SYSTEMS IS OTHERWISE NEGATIVE        Historical Information   Past Medical History:   Diagnosis Date   • Acute DVT (deep venous thrombosis) (HCC)     of LLE>    • Bladder infection    • Cancer [facility])     breast   • Congenital prolapsed rectum    • History of biliary stent insertion    • History of chemotherapy    • History of radiation therapy 05/2018    left breast ca   • History of transfusion     x2 s/p chemo treatments, no reaction   • Hydronephrosis     right kidney   • Hypertension    • Malignant neoplasm of overlapping sites of left female breast ([address] ) 05/01/2018   • Migraine      Past Surgical History:   Procedure Laterality Date   • ABDOMINAL ADHESION SURGERY N/A 4/23/2019    Procedure: LYSIS ADHESIONS;  Surgeon: Jaja Anderson MD;  [facility];  Service: Colorectal   • BREAST BIOPSY     • COLON SURGERY      colon resection   • CYSTOSCOPY N/A 3/4/2019    Procedure: CYSTOSCOPY WITH BIOPSIES AND [place] OF RECTUM PROLAPSE;  Surgeon: Gilma Paz MD;  [facility];  Service: Urology   • ERCP N/A 1/4/2019    Procedure: ENDOSCOPIC RETROGRADE CHOLANGIOPANCREATOGRAPHY (ERCP); Surgeon: Rajani Maldonado MD;  Location[facility];  Service: Gastroenterology   • INSTILLATION MYTOMYCIN N/A 3/4/2019    Procedure: Ana Laura Torrezmel;  Surgeon: Gilma Paz MD;  [facility];  Service: Urology   • IR PARACENTESIS  3/21/2022   • IR PARACENTESIS  6/1/2022   • IR PARACENTESIS  7/15/2022   • IR PARACENTESIS  8/30/2022   • IR PARACENTESIS  10/4/2022   • IR PARACENTESIS  10/28/2022   • IR PARACENTESIS  11/4/2022   • IR PARACENTESIS  12/6/2022   • IR PARACENTESIS  12/29/2022   • MASTECTOMY Left     2018   • [place] COLONOSCOPY FLX DX W/COLLJ SPEC WHEN PFRMD N/A 4/22/2019    Procedure: COLONOSCOPY;  Surgeon: Jaja Anderson MD;  Location: [facility] LAB; Service: Colorectal   • IL EDG US EXAM SURGICAL ALTER STOM DUODENUM/JEJUNUM N/A 3/7/2019    Procedure: LINEAR ENDOSCOPIC U/S;  Surgeon: Sergio Smith MD;  Location: [facility];   Service: Gastroenterology   • MN ESOPHAGOGASTRODUODENOSCOPY TRANSORAL DIAGNOSTIC N/A 3/7/2019    Procedure: ESOPHAGOGASTRODUODENOSCOPY (EGD); Surgeon: Sal Heaton MD;  Location: [facility];   Service: Gastroenterology   • MN INSJ TUNNELED CTR VAD W/SUBQ PORT AGE 5 YR/> N/A 6/26/2018    Procedure: INSERTION VENOUS PORT ( PORT-A-CATH) IR;  Surgeon: Fabricio Hay DO;  Location: [facility];  Service: Interventional Radiology   • MN LAPAROSCOPY PROCTOPEXY PROLAPSE N/A 4/23/2019    Procedure: LAPAROSCOPIC HAND-ASSIST PELVIC DISSECTION; proctopexy;  Surgeon: Deion Simms MD;  Location: [facility];  Service: Colorectal   • MN LAPAROSCOPY PROCTOPEXY PROLAPSE N/A 11/18/2020    Procedure: LAPAROSCOPIC LYSIS OF ADHESIONS, MOBILIZATION OF RECTUM AND SUTURE RECTOPEXY;  Surgeon: Robi Bennett MD;  Location: [facility];  Service: Colorectal   • MN MAST MODF RAD W/AX LYMPH NOD W/WO PECT/LEONORA MIN Left 5/15/2018    Procedure: BREAST MODIFIED RADICAL MASTECTOMY;  Surgeon: Tavo Dempsey MD;  Location[facility];  Service: Surgical Oncology   • MN RMVL [place] CTR VAD W/SUBQ PORT/ CTR/PRPH INSJ N/A 6/4/2019    Procedure: REMOVAL VENOUS PORT (PORT-A-CATH)IR;  Surgeon: Fabricio Hay DO;  [facility];  Service: Interventional Radiology   • TUBAL LIGATION     • US GUIDANCE BREAST BIOPSY LEFT EACH ADDITIONAL Left 4/3/2018   • US GUIDED BREAST BIOPSY LEFT COMPLETE Left 4/3/2018   • US GUIDED BREAST LYMPH NODE BIOPSY LEFT Left 4/3/2018     Social History   Social History     Substance and Sexual Activity   Alcohol Use Not Currently    Comment: Drinks daily until three weeks ago     Social History     Substance and Sexual Activity   Drug Use Not Currently     Social History     Tobacco Use   Smoking Status Never   Smokeless Tobacco Never     Family History   Problem Relation Age of Onset   • No Known Problems Mother    • No Known Problems Father    • Breast cancer Maternal Aunt 48   • Colon cancer Maternal Aunt    • No Known "Problems Sister    • No Known Problems Daughter    • No Known Problems Maternal Grandmother    • No Known Problems Maternal Grandfather    • No Known Problems Paternal Grandmother    • No Known Problems Paternal Grandfather        Meds/Allergies       Current Outpatient Medications:   •  Ascorbic Acid (VITAMIN C PO)  •  Bioflavonoid Products (REBA VITAMIN C-FLAVONOIDS PO)  •  ferrous sulfate 325 (65 Fe) mg tablet  •  furosemide (LASIX) 20 mg tablet  •  letrozole (FEMARA) 2 5 mg tablet  •  liothyronine (CYTOMEL) 25 mcg tablet  •  Magnesium 250 MG TABS  •  spironolactone (ALDACTONE) 50 mg tablet    No Known Allergies        Objective     Blood pressure 130/92, pulse 88, height 5' 5\" (1 651 m), weight 60 8 kg (134 lb), SpO2 93 %, not currently breastfeeding  Body mass index is 22 3 kg/m²  PHYSICAL EXAM:      General Appearance:   Alert, cooperative, no distress   HEENT:   Normocephalic, atraumatic, anicteric      Neck:  Supple, symmetrical, trachea midline   Lungs:   Clear to auscultation bilaterally; no rales, rhonchi or wheezing; respirations unlabored    Heart[de-identified]   Regular rate and rhythm; no murmur, rub, or gallop  Abdomen:   Soft, non-tender, non-distended; normal bowel sounds; no masses, no organomegaly    Genitalia:   Deferred    Rectal:   Deferred    Extremities:  No cyanosis, clubbing or edema    Pulses:  2+ and symmetric    Skin:  No jaundice, rashes, or lesions    Lymph nodes:  No palpable cervical lymphadenopathy        Lab Results:   No visits with results within 1 Day(s) from this visit  Latest known visit with results is:   No results displayed because visit has over 200 results  Radiology Results:   No results found    Answers for HPI/ROS submitted by the patient on 5/2/2023  Chronicity: chronic  Onset: more than 1 month ago  Onset quality: sudden  Frequency: rarely  Episode duration: 3 Days  Progression since onset: gradually improving  Pain location: suprapubic region, right " flank  Pain - numeric: 10/10  Pain quality: sharp, tearing  Radiates to: does not radiate  anorexia: No  arthralgias: Yes  belching: No  constipation: No  diarrhea: No  dysuria: No  fever: No  flatus: No  frequency: No  headaches: No  hematochezia: No  hematuria: No  melena: No  myalgias: Yes  nausea:  No  weight loss: No  vomiting: No  Aggravated by: nothing  Relieved by: nothing  Diagnostic workup: lower endoscopy, ultrasound

## 2023-05-22 ENCOUNTER — HOSPITAL ENCOUNTER (OUTPATIENT)
Dept: ULTRASOUND IMAGING | Facility: HOSPITAL | Age: 63
Discharge: HOME | End: 2023-05-22

## 2023-05-22 DIAGNOSIS — K70.31 ALCOHOLIC CIRRHOSIS OF LIVER WITH ASCITES (HCC): ICD-10-CM

## 2023-06-02 DIAGNOSIS — K70.31 ASCITES DUE TO ALCOHOLIC CIRRHOSIS (HCC): ICD-10-CM

## 2023-06-02 RX ORDER — FUROSEMIDE 20 MG/1
TABLET ORAL
Qty: 180 TABLET | Refills: 3 | Status: SHIPPED | OUTPATIENT
Start: 2023-06-02

## 2023-07-11 NOTE — ANESTHESIA PREPROCEDURE EVALUATION
Review of Systems/Medical History  Patient summary reviewed  Chart reviewed  No history of anesthetic complications     Cardiovascular  EKG reviewed, Exercise tolerance: good,  Hypertension controlled,    Pulmonary  Negative pulmonary ROS        GI/Hepatic  Negative GI/hepatic ROS          Negative  ROS        Endo/Other  Negative endo/other ROS      GYN    Breast cancer        Hematology  Negative hematology ROS      Musculoskeletal  Negative musculoskeletal ROS        Neurology    Headaches,    Psychology   Negative psychology ROS              Physical Exam    Airway    Mallampati score: II  TM Distance: >3 FB  Neck ROM: full     Dental   Comment: No loose,     Cardiovascular  Rhythm: regular, Rate: normal,     Pulmonary  Breath sounds clear to auscultation,     Other Findings        Anesthesia Plan  ASA Score- 2     Anesthesia Type- general with ASA Monitors  Additional Monitors:   Airway Plan: LMA  Plan Factors-  Patient did not smoke on day of surgery  Induction- intravenous  Postoperative Plan- Plan for postoperative opioid use  Planned trial extubation    Informed Consent- Anesthetic plan and risks discussed with patient  I personally reviewed this patient with the CRNA  Discussed and agreed on the Anesthesia Plan with the CRNA  Eufemia Zaragoza
04-Jul-2023 15:57

## 2023-07-25 ENCOUNTER — TELEPHONE (OUTPATIENT)
Dept: GASTROENTEROLOGY | Facility: CLINIC | Age: 63
End: 2023-07-25

## 2023-08-15 DIAGNOSIS — K70.31 ALCOHOLIC CIRRHOSIS OF LIVER WITH ASCITES (HCC): ICD-10-CM

## 2023-08-16 RX ORDER — CARBOXYMETHYLCELLULOSE SODIUM 0.5 %
DROPPERETTE, SINGLE-USE DROP DISPENSER OPHTHALMIC (EYE)
Qty: 90 TABLET | Refills: 3 | Status: SHIPPED | OUTPATIENT
Start: 2023-08-16

## 2023-09-25 RX ORDER — LEVOTHYROXINE SODIUM 0.05 MG/1
50 TABLET ORAL DAILY
COMMUNITY
Start: 2023-08-09

## 2023-09-25 RX ORDER — GABAPENTIN 600 MG/1
600 TABLET ORAL
COMMUNITY
Start: 2023-08-09

## 2023-09-28 ENCOUNTER — OFFICE VISIT (OUTPATIENT)
Dept: GASTROENTEROLOGY | Facility: CLINIC | Age: 63
End: 2023-09-28
Payer: COMMERCIAL

## 2023-09-28 ENCOUNTER — TELEPHONE (OUTPATIENT)
Dept: GASTROENTEROLOGY | Facility: CLINIC | Age: 63
End: 2023-09-28

## 2023-09-28 VITALS
HEIGHT: 65 IN | SYSTOLIC BLOOD PRESSURE: 113 MMHG | BODY MASS INDEX: 22.39 KG/M2 | OXYGEN SATURATION: 96 % | WEIGHT: 134.4 LBS | HEART RATE: 67 BPM | DIASTOLIC BLOOD PRESSURE: 76 MMHG

## 2023-09-28 DIAGNOSIS — K70.31 ALCOHOLIC CIRRHOSIS OF LIVER WITH ASCITES (HCC): Primary | ICD-10-CM

## 2023-09-28 PROCEDURE — 99213 OFFICE O/P EST LOW 20 MIN: CPT | Performed by: PHYSICIAN ASSISTANT

## 2023-09-28 RX ORDER — LEVOTHYROXINE SODIUM 0.03 MG/1
TABLET ORAL
COMMUNITY
Start: 2023-08-21

## 2023-09-28 RX ORDER — SERTRALINE HYDROCHLORIDE 100 MG/1
100 TABLET, FILM COATED ORAL DAILY
COMMUNITY
Start: 2023-08-15

## 2023-09-28 NOTE — TELEPHONE ENCOUNTER
Labs received from [facility] and were given to [facility] for review.

## 2023-09-28 NOTE — PROGRESS NOTES
[facility] - Outpatient Follow-up Note  Velvet Mcmillan 59 y.o. female MRN: [id number]  Encounter: [id number]          ASSESSMENT AND PLAN:      1. Alcoholic cirrhosis of liver with ascites ([address])  Sober since February 2022  Need labs to update MELD  Will obtain recent labs from PCP and supplement with anything else required    HE: none  Varices: EGD 10/2022 non  Ascites: On Furosemide 20mg BID and Spironolactone 50mg daily with great improvement; Continue meds and low Na diet  HCC: Update imaging and AFP    She again refuses colonoscopy  ______________________________________________________________________    SUBJECTIVE: 71-year-old female with a history of alcohol cirrhosis who presents for routine follow-up. Overall she is doing very well. She is sober since February 2022. She was struggling with increased swelling of her lower extremities at her last appointment. We put her back on furosemide and increased her spironolactone. She is currently taking furosemide 20 mg twice daily and spironolactone 50 mg a day. Her family doctor also has her on a potassium supplement. She denies any issues with swelling of her lower extremities. She has no abdominal distention or discomfort. She reports that she is eating well. She has gained some weight since her last appointment. She denies any diarrhea or constipation. She has no rectal bleeding or melena. She had an ultrasound in May which showed cirrhosis but nothing more concerning. She had her last upper endoscopy in October of last year which showed a duodenal ulcer but was otherwise unremarkable. She reports that she is sleeping well. There is no issues with confusion per the patient and her . She really offers no complaints today. REVIEW OF SYSTEMS IS OTHERWISE NEGATIVE.       Historical Information   Past Medical History:   Diagnosis Date   • Acute DVT (deep venous thrombosis) (HCC)     of LLE>    • Bladder infection • Cancer [facility])     breast   • Congenital prolapsed rectum    • History of biliary stent insertion    • History of chemotherapy    • History of radiation therapy 05/2018    left breast ca   • History of transfusion     x2 s/p chemo treatments, no reaction   • Hydronephrosis     right kidney   • Hypertension    • Malignant neoplasm of overlapping sites of left female breast ([address]) 05/01/2018   • Migraine      Past Surgical History:   Procedure Laterality Date   • ABDOMINAL ADHESION SURGERY N/A 4/23/2019    Procedure: LYSIS ADHESIONS;  Surgeon: Satnam Rasmussen MD;  [facility];  Service: Colorectal   • BREAST BIOPSY     • COLON SURGERY      colon resection   • CYSTOSCOPY N/A 3/4/2019    Procedure: CYSTOSCOPY WITH BIOPSIES  [address] OF RECTUM PROLAPSE;  Surgeon: Jose Zuñiga MD;  [facility];  Service: Urology   • ERCP N/A 1/4/2019    Procedure: ENDOSCOPIC RETROGRADE CHOLANGIOPANCREATOGRAPHY (ERCP); Surgeon: Paul Bear MD;  [facility];  Service: Gastroenterology   • INSTILLATION MYTOMYCIN N/A 3/4/2019    Procedure: Radha Herrera;  Surgeon: Jose Zuñiga MD;  [facility];  Service: Urology   • IR PARACENTESIS  3/21/2022   • IR PARACENTESIS  6/1/2022   • IR PARACENTESIS  7/15/2022   • IR PARACENTESIS  8/30/2022   • IR PARACENTESIS  10/4/2022   • IR PARACENTESIS  10/28/2022   • IR PARACENTESIS  11/4/2022   • IR PARACENTESIS  12/6/2022   • IR PARACENTESIS  12/29/2022   • MASTECTOMY Left     2018   • [place] COLONOSCOPY FLX DX W/COLLJ SPEC WHEN PFRMD N/A 4/22/2019    Procedure: COLONOSCOPY;  Surgeon: Satnam Rasmussen MD;  Location: [facility]; Service: Colorectal   • [place] EDG US EXAM SURGICAL ALTER STOM DUODENUM/JEJUNUM N/A 3/7/2019    Procedure: LINEAR ENDOSCOPIC U/S;  Surgeon: Nat Hargrove MD;  Location: [facility];   Service: Gastroenterology   • [place] ESOPHAGOGASTRODUODENOSCOPY TRANSORAL DIAGNOSTIC N/A 3/7/2019    Procedure: ESOPHAGOGASTRODUODENOSCOPY (EGD); Surgeon: Liu Kelly MD;  Location: [facility];   Service: Gastroenterology   • [place] INSJ TUNNELED CTR VAD W/SUBQ PORT AGE 5 YR/> N/A 6/26/2018    Procedure: INSERTION VENOUS PORT ( PORT-A-CATH) IR;  Surgeon: Valentina Pepper DO;  [facility];  Service: Interventional Radiology   • DE LAPAROSCOPY PROCTOPEXY PROLAPSE N/A 4/23/2019    Procedure: LAPAROSCOPIC HAND-ASSIST PELVIC DISSECTION; proctopexy;  Surgeon: Michael Jones MD;  Location: [facility];  Service: Colorectal   • DE LAPAROSCOPY PROCTOPEXY PROLAPSE N/A 11/18/2020    Procedure: LAPAROSCOPIC LYSIS OF ADHESIONS, MOBILIZATION OF RECTUM AND SUTURE RECTOPEXY;  Surgeon: Agata Gonsalves MD;  Location: [facility];  Service: Colorectal   • DE MAST MODF RAD W/AX LYMPH NOD W/WO PECT/LEONORA MIN Left 5/15/2018    Procedure: BREAST MODIFIED RADICAL MASTECTOMY;  Surgeon: Keith Lazo MD;  [facility];  Service: Surgical Oncology   • [place] RMVL [place] CTR VAD W/SUBQ PORT/ CTR/PRPH INSJ N/A 6/4/2019    Procedure: REMOVAL VENOUS PORT (PORT-A-CATH)IR;  Surgeon: Valentina Pepper DO;  Location[facility];  Service: Interventional Radiology   • TUBAL LIGATION     • US GUIDANCE BREAST BIOPSY LEFT EACH ADDITIONAL Left 4/3/2018   • US GUIDED BREAST BIOPSY LEFT COMPLETE Left 4/3/2018   • US GUIDED BREAST LYMPH NODE BIOPSY LEFT Left 4/3/2018     Social History   Social History     Substance and Sexual Activity   Alcohol Use Not Currently    Comment: Drinks daily until three weeks ago     Social History     Substance and Sexual Activity   Drug Use Not Currently     Social History     Tobacco Use   Smoking Status Never   Smokeless Tobacco Never     Family History   Problem Relation Age of Onset   • No Known Problems Mother    • No Known Problems Father    • Breast cancer Maternal Aunt 48   • Colon cancer Maternal Aunt    • No Known Problems Sister    • No Known Problems Daughter    • No Known Problems Maternal Grandmother    • No Known Problems Maternal Grandfather    • No Known Problems Paternal Grandmother    • No Known Problems Paternal Grandfather        Meds/Allergies       Current Outpatient Medications:   •  Ascorbic Acid (VITAMIN C PO)  •  Bioflavonoid Products (REBA VITAMIN C-FLAVONOIDS PO)  •  [employer] Magnesium Oxide 250 MG TABS  •  ferrous sulfate 325 (65 Fe) mg tablet  •  furosemide (LASIX) 20 mg tablet  •  gabapentin (NEURONTIN) 600 MG tablet  •  letrozole (FEMARA) 2.5 mg tablet  •  levothyroxine 25 mcg tablet  •  levothyroxine 50 mcg tablet  •  liothyronine (CYTOMEL) 25 mcg tablet  •  sertraline (ZOLOFT) 100 mg tablet  •  spironolactone (ALDACTONE) 50 mg tablet    No Known Allergies        Objective     Blood pressure 113/76, pulse 67, height 5' 5" (1.651 m), weight 61 kg (134 lb 6.4 oz), SpO2 96 %, not currently breastfeeding. Body mass index is 22.37 kg/m². PHYSICAL EXAM:      General Appearance:   Alert, cooperative, no distress   HEENT:   Normocephalic, atraumatic, anicteric.     Neck:  Supple, symmetrical, trachea midline   Lungs:   Clear to auscultation bilaterally; no rales, rhonchi or wheezing; respirations unlabored    Heart[de-identified]   Regular rate and rhythm; no murmur, rub, or gallop. Abdomen:   Soft, non-tender, non-distended; normal bowel sounds; no masses, no organomegaly    Genitalia:   Deferred    Rectal:   Deferred    Extremities:  No cyanosis, clubbing or edema    Pulses:  2+ and symmetric    Skin:  No jaundice, rashes, or lesions    Lymph nodes:  No palpable cervical lymphadenopathy        Lab Results:   No visits with results within 1 Day(s) from this visit. Latest known visit with results is:   No results displayed because visit has over 200 results. Radiology Results:   No results found.   Answers for HPI/ROS submitted by the patient on 9/21/2023  Chronicity: new  Onset quality: undetermined  Progression since onset: resolved  anorexia: No  arthralgias: No  belching: No  constipation: No  diarrhea: No  dysuria: No  fever: No  flatus: No  frequency: No  headaches: No  hematochezia: No  hematuria: No  melena: No  myalgias: Yes  nausea:  No  weight loss: No  vomiting: No

## 2023-11-17 ENCOUNTER — HOSPITAL ENCOUNTER (OUTPATIENT)
Dept: CT IMAGING | Facility: HOSPITAL | Age: 63
End: 2023-11-17
Payer: COMMERCIAL

## 2023-11-17 DIAGNOSIS — K70.31 ALCOHOLIC CIRRHOSIS OF LIVER WITH ASCITES (HCC): ICD-10-CM

## 2023-11-17 PROCEDURE — 74177 CT ABD & PELVIS W/CONTRAST: CPT

## 2023-11-17 PROCEDURE — G1004 CDSM NDSC: HCPCS

## 2023-11-17 RX ADMIN — IOHEXOL 100 ML: 350 INJECTION, SOLUTION INTRAVENOUS at 15:49

## 2023-11-27 ENCOUNTER — TELEPHONE (OUTPATIENT)
Dept: GASTROENTEROLOGY | Facility: CLINIC | Age: 63
End: 2023-11-27

## 2023-11-27 NOTE — TELEPHONE ENCOUNTER
----- Message from [address] [place], [place] [address]SHANELL sent at 11/27/2023 12:59 PM EST -----  Please advise patient her CT shows her known cirrhosis but nothing more concerning.

## 2024-01-03 NOTE — ANESTHESIA PREPROCEDURE EVALUATION
Review of Systems/Medical History  Patient summary reviewed  Chart reviewed  No history of anesthetic complications     Cardiovascular  EKG reviewed, Exercise tolerance (METS): >4,  Hypertension controlled,    Pulmonary  Negative pulmonary ROS        GI/Hepatic    GERD ,          Comment: Bladder lesion     Endo/Other  Negative endo/other ROS      GYN    Breast cancer left mastectomy and axillary node dissection       Hematology  Anemia ,     Musculoskeletal  Negative musculoskeletal ROS        Neurology    Headaches,    Psychology   Negative psychology ROS              Physical Exam    Airway    Mallampati score: II  TM Distance: >3 FB  Neck ROM: full     Dental   No notable dental hx     Cardiovascular  Cardiovascular exam normal    Pulmonary  Pulmonary exam normal     Other Findings      Lab Results   Component Value Date    WBC 4 67 02/06/2019    HGB 9 4 (L) 02/06/2019    HCT 30 7 (L) 02/06/2019     (H) 02/06/2019     02/06/2019     Lab Results   Component Value Date    SODIUM 143 02/06/2019    K 4 5 02/06/2019    CO2 27 02/06/2019     02/06/2019    BUN 9 02/06/2019    CREATININE 0 84 02/06/2019     Lab Results   Component Value Date    INR 1 28 (H) 02/06/2019    INR 1 49 (H) 01/04/2019    INR 2 06 (H) 01/02/2019    PROTIME 15 6 (H) 02/06/2019    PROTIME 17 6 (H) 01/04/2019    PROTIME 22 6 (H) 01/02/2019     Lab Results   Component Value Date    PTT 34 02/06/2019     Anesthesia Plan  ASA Score- 2     Anesthesia Type- general with ASA Monitors  Additional Monitors:   Airway Plan: LMA  Plan Factors-  Patient did not smoke on day of surgery  Induction- intravenous  Postoperative Plan- Plan for postoperative opioid use  Informed Consent- Anesthetic plan and risks discussed with patient and spouse  I personally reviewed this patient with the CRNA  Discussed and agreed on the Anesthesia Plan with the CRNA  Hector Gooden
Yes

## 2024-02-01 NOTE — PROGRESS NOTES
Daily Note     Today's date: 2020  Patient name: Calin Nicolas  : 1960  MRN: [id number]  Referring provider: Milady Trejo MD  Dx:   Encounter Diagnosis     ICD-10-CM    1  Post-mastectomy pain syndrome  G89 28    2  Status post left mastectomy  Z90 12                   Subjective: Patient reports that she continues to feel improvement in her sx  Objective: See treatment diary below      Assessment: Tolerated treatment well  Patient would benefit from continued PT      Plan: Continue per plan of care        Precautions: HTN, BCA      Manuals  8/10 8/13 8/17 8/20 8/24 8/27     MFR - ELBOW    10 10 10       IASTM  8' 8 8 8 8 8 10     (-) pressure IASTM  6' 6 8 8 7 7 10     PROM - flex/abd/horiz abd  10 10 10 10 10 15 10     Neuro Re-Ed                          Table Slides hep 5 x :10 ea 5 x :10 ea          P[place]ley  5' 5 min 5' 5' 5 5' 5'     Ladder   :10 x10 flex 10 x :10 10x:10 10x:10 20 x :10 20 x :10     Doorway pec  5 x :20 5 x:20   5 x :20 5x:20 5x:20 5 x :20                               Ther Ex             Supine wand flexion   :10x10  10 x :10 10 x :10 :10x10  10 x :10 10 x :10     IR cane    NV 10 x :10 10 x :10 10x:10 10 x :10 10 x :10     Cane ER - supine    10 x :10 10 x :10 10x:10 10x :10 10 x :10     TB Row/ Ext/ER        RTB x 10                                                         Ther Activity                                       Gait Training                                       Modalities Pt's spouse would like to know if pt can resume testosterone injections following the us report.    Please advise.

## 2024-03-18 ENCOUNTER — OFFICE VISIT (OUTPATIENT)
Dept: GASTROENTEROLOGY | Facility: CLINIC | Age: 64
End: 2024-03-18
Payer: COMMERCIAL

## 2024-03-18 ENCOUNTER — TELEPHONE (OUTPATIENT)
Dept: GASTROENTEROLOGY | Facility: CLINIC | Age: 64
End: 2024-03-18

## 2024-03-18 VITALS
TEMPERATURE: 98.2 F | OXYGEN SATURATION: 97 % | BODY MASS INDEX: 24.06 KG/M2 | DIASTOLIC BLOOD PRESSURE: 78 MMHG | HEART RATE: 90 BPM | WEIGHT: 144.4 LBS | SYSTOLIC BLOOD PRESSURE: 108 MMHG | HEIGHT: 65 IN

## 2024-03-18 DIAGNOSIS — K70.31 ALCOHOLIC CIRRHOSIS OF LIVER WITH ASCITES (HCC): Primary | ICD-10-CM

## 2024-03-18 DIAGNOSIS — C50.812 MALIGNANT NEOPLASM OF OVERLAPPING SITES OF LEFT BREAST IN FEMALE, ESTROGEN RECEPTOR POSITIVE (HCC): ICD-10-CM

## 2024-03-18 DIAGNOSIS — Z17.0 MALIGNANT NEOPLASM OF OVERLAPPING SITES OF LEFT BREAST IN FEMALE, ESTROGEN RECEPTOR POSITIVE (HCC): ICD-10-CM

## 2024-03-18 PROCEDURE — 99213 OFFICE O/P EST LOW 20 MIN: CPT | Performed by: PHYSICIAN ASSISTANT

## 2024-03-18 RX ORDER — SUMATRIPTAN 50 MG/1
TABLET, FILM COATED ORAL
COMMUNITY
Start: 2024-02-16

## 2024-03-18 NOTE — PROGRESS NOTES
[facility] - Outpatient Follow-up Note  Amelia Hensley 63 y.o. female MRN: [id number]  Encounter: [id number]          ASSESSMENT AND PLAN:      1. Alcoholic cirrhosis of liver with ascites (HCC)  Will obtain recent labs in order to update MELD  She gets her bloodwork at an outside lab in [place] - will call     HE: none  Varices: EGD 10/2022 without   HCC: Negative CT AP in November 2023 - update AFP and imaging in May  Ascites: none; low Na diet.  She does maintain on Lasix and Spironolactone    Will plan EGD routine 10/2024 or 2025  She continues to refuse colonoscopy  ______________________________________________________________________    SUBJECTIVE:  63-year-old female with alcohol cirrhosis who presents for routine follow-up.  She continues to do very well.  She continues to avoid all alcohol.  She has had no recent issues with swelling of her legs or ankles.  She has had no recent significant weight gain.  Her last upper endoscopy was October 2022 without varices.  Her last imaging was a CT in November documenting cirrhosis and retroperitoneal varices as well as a large hiatal hernia.  No ascites was noted.  She reports regular bowel movements.  She denies abdominal pain.  She is tolerating a regular diet.  She denies nausea or vomiting.  She denies excessive bleeding or bruising.  She reports that after her last appointment she has had slow blood work at a lab in [place] however we do not have access to the results right now.      REVIEW OF SYSTEMS IS OTHERWISE NEGATIVE.      Historical Information   Past Medical History:   Diagnosis Date    Acute DVT (deep venous thrombosis) (HCC)     of LLE>     Bladder infection     Cancer (HCC)     breast    Congenital prolapsed rectum     History of biliary stent insertion     History of chemotherapy     History of radiation therapy 05/2018    left breast ca    History of transfusion     x2 s/p chemo treatments, no reaction    Hydronephrosis      right kidney    Hypertension     Malignant neoplasm of overlapping sites of left female breast (HCC) 05/01/2018    Migraine      Past Surgical History:   Procedure Laterality Date    ABDOMINAL ADHESION SURGERY N/A 4/23/2019    Procedure: LYSIS ADHESIONS;  Surgeon: Nancy Live MD;  [facility];  Service: Colorectal    BREAST BIOPSY      COLON SURGERY      colon resection    CYSTOSCOPY N/A 3/4/2019    Procedure: CYSTOSCOPY WITH BIOPSIES AND FULGERATION AND REDCUTION OF RECTUM PROLAPSE;  Surgeon: Richmond Hawley MD;  [facility];  Service: Urology    ERCP N/A 1/4/2019    Procedure: ENDOSCOPIC RETROGRADE CHOLANGIOPANCREATOGRAPHY (ERCP);  Surgeon: Alphonso Rai MD;  [facility];  Service: Gastroenterology    INSTILLATION MYTOMYCIN N/A 3/4/2019    Procedure: INSTILLATION MITOMYCIN;  Surgeon: Richmond Hawley MD;  [facility];  Service: Urology    IR PARACENTESIS  3/21/2022    IR PARACENTESIS  6/1/2022    IR PARACENTESIS  7/15/2022    IR PARACENTESIS  8/30/2022    IR PARACENTESIS  10/4/2022    IR PARACENTESIS  10/28/2022    IR PARACENTESIS  11/4/2022    IR PARACENTESIS  12/6/2022    IR PARACENTESIS  12/29/2022    MASTECTOMY Left     2018    VA COLONOSCOPY FLX DX W/COLLJ SPEC WHEN PFRMD N/A 4/22/2019    Procedure: COLONOSCOPY;  Surgeon: Nancy Live MD;  Location: [facility];  Service: Colorectal    VA EDG US EXAM SURGICAL ALTER STOM DUODENUM/JEJUNUM N/A 3/7/2019    Procedure: LINEAR ENDOSCOPIC U/S;  Surgeon: Gordon Shah MD;  [facility]: [facility];  Service: Gastroenterology    VA ESOPHAGOGASTRODUODENOSCOPY TRANSORAL DIAGNOSTIC N/A 3/7/2019    Procedure: ESOPHAGOGASTRODUODENOSCOPY (EGD);  Surgeon: Gordon Shah MD;  [facility]: [facility];  Service: Gastroenterology    VA INSJ TUNNELED CTR VAD W/SUBQ PORT AGE 5 YR/> N/A 6/26/2018    Procedure: INSERTION VENOUS PORT ( PORT-A-CATH) IR;  Surgeon: Tutu Collier DO;  [facility];  Service: Interventional  Radiology    [place] LAPAROSCOPY PROCTOPEXY PROLAPSE N/A 4/23/2019    Procedure: LAPAROSCOPIC HAND-ASSIST PELVIC DISSECTION; proctopexy;  Surgeon: Nancy Live MD;  [facility];  Service: Colorectal    [place] LAPAROSCOPY PROCTOPEXY PROLAPSE N/A 11/18/2020    Procedure: LAPAROSCOPIC LYSIS OF ADHESIONS, MOBILIZATION OF RECTUM AND SUTURE RECTOPEXY;  Surgeon: Richmond Terrell MD;  [facility]: [facility];  Service: Colorectal    [place] MAST MODF RAD W/AX LYMPH NOD W/WO PECT/LEONORA MIN Left 5/15/2018    Procedure: BREAST MODIFIED RADICAL MASTECTOMY;  Surgeon: Darrick Hilliard MD;  [facility];  Service: Surgical Oncology    [place] RMVL [place] CTR VAD W/SUBQ PORT/ CTR/PRPH INSJ N/A 6/4/2019    Procedure: REMOVAL VENOUS PORT (PORT-A-CATH)IR;  Surgeon: Tutu Collier DO;  [facility];  Service: Interventional Radiology    TUBAL LIGATION      US GUIDANCE BREAST BIOPSY LEFT EACH ADDITIONAL Left 4/3/2018    US GUIDED BREAST BIOPSY LEFT COMPLETE Left 4/3/2018    US GUIDED BREAST LYMPH NODE BIOPSY LEFT Left 4/3/2018     Social History   Social History     Substance and Sexual Activity   Alcohol Use Not Currently    Comment: Drinks daily until three weeks ago     Social History     Substance and Sexual Activity   Drug Use Not Currently     Social History     Tobacco Use   Smoking Status Never   Smokeless Tobacco Never     Family History   Problem Relation Age of Onset    No Known Problems Mother     No Known Problems Father     Breast cancer Maternal Aunt 50    Colon cancer Maternal Aunt     No Known Problems Sister     No Known Problems Daughter     No Known Problems Maternal Grandmother     No Known Problems Maternal Grandfather     No Known Problems Paternal Grandmother     No Known Problems Paternal Grandfather        Meds/Allergies       Current Outpatient Medications:     Ascorbic Acid (VITAMIN C PO)    Bioflavonoid Products (REBA VITAMIN C-FLAVONOIDS PO)    [employer] Magnesium Oxide 250 MG TABS    ferrous sulfate  "325 (65 Fe) mg tablet    furosemide (LASIX) 20 mg tablet    gabapentin (NEURONTIN) 600 MG tablet    letrozole (FEMARA) 2.5 mg tablet    levothyroxine 25 mcg tablet    levothyroxine 50 mcg tablet    liothyronine (CYTOMEL) 25 mcg tablet    sertraline (ZOLOFT) 100 mg tablet    spironolactone (ALDACTONE) 50 mg tablet    SUMAtriptan (IMITREX) 50 mg tablet    No Known Allergies        Objective     Blood pressure 108/78, pulse 90, temperature 98.2 °F (36.8 °C), height 5' 5\" (1.651 m), weight 65.5 kg (144 lb 6.4 oz), SpO2 97%, not currently breastfeeding. Body mass index is 24.03 kg/m².      PHYSICAL EXAM:      General Appearance:   Alert, cooperative, no distress   HEENT:   Normocephalic, atraumatic, anicteric.     Neck:  Supple, symmetrical, trachea midline   Lungs:   Clear to auscultation bilaterally; no rales, rhonchi or wheezing; respirations unlabored    Heart::   Regular rate and rhythm; no murmur, rub, or gallop.   Abdomen:   Soft, non-tender, non-distended; normal bowel sounds; no masses, no organomegaly    Genitalia:   Deferred    Rectal:   Deferred    Extremities:  No cyanosis, clubbing or edema    Pulses:  2+ and symmetric    Skin:  No jaundice, rashes, or lesions    Lymph nodes:  No palpable cervical lymphadenopathy        Lab Results:   No visits with results within 1 Day(s) from this visit.   Latest known visit with results is:   No results displayed because visit has over 200 results.            Radiology Results:   No results found.  Answers submitted by the patient for this visit:  Abdominal Pain Questionnaire (Submitted on 3/11/2024)  Chief Complaint: Abdominal pain  Progression since onset: resolved  anorexia: No  arthralgias: No  belching: No  constipation: No  diarrhea: No  dysuria: No  fever: No  flatus: No  frequency: No  headaches: No  hematochezia: No  hematuria: No  melena: No  myalgias: No  nausea: No  weight loss: No  vomiting: No    "

## 2024-03-18 NOTE — TELEPHONE ENCOUNTER
Requested lab work from [facility] to be faxed to us.    [facility]  Tel# [phone]

## 2024-03-29 ENCOUNTER — HOSPITAL ENCOUNTER (OUTPATIENT)
Dept: ULTRASOUND IMAGING | Facility: HOSPITAL | Age: 64
End: 2024-03-29
Payer: COMMERCIAL

## 2024-03-29 DIAGNOSIS — K70.31 ALCOHOLIC CIRRHOSIS OF LIVER WITH ASCITES (HCC): ICD-10-CM

## 2024-03-29 PROCEDURE — 76705 ECHO EXAM OF ABDOMEN: CPT

## 2024-04-19 NOTE — ASSESSMENT & PLAN NOTE
· Had 2 episodes of nonbloody diarrhea yesterday  · C diff and stool enteric panel negative  · Continue p r n   Imodium Detail Level: Detailed Size Of Lesion: 0.4 x 0.3 cm Size Of Lesion: 0.9 x 0.7 cm

## 2024-05-26 DIAGNOSIS — K70.31 ASCITES DUE TO ALCOHOLIC CIRRHOSIS (HCC): ICD-10-CM

## 2024-05-29 RX ORDER — FUROSEMIDE 20 MG/1
TABLET ORAL
Qty: 180 TABLET | Refills: 1 | Status: SHIPPED | OUTPATIENT
Start: 2024-05-29

## 2024-10-09 ENCOUNTER — OFFICE VISIT (OUTPATIENT)
Dept: GASTROENTEROLOGY | Facility: CLINIC | Age: 64
End: 2024-10-09
Payer: COMMERCIAL

## 2024-10-09 VITALS
HEIGHT: 65 IN | HEART RATE: 83 BPM | TEMPERATURE: 97.6 F | BODY MASS INDEX: 22.99 KG/M2 | OXYGEN SATURATION: 97 % | WEIGHT: 138 LBS | SYSTOLIC BLOOD PRESSURE: 102 MMHG | DIASTOLIC BLOOD PRESSURE: 70 MMHG

## 2024-10-09 DIAGNOSIS — K70.31 ALCOHOLIC CIRRHOSIS OF LIVER WITH ASCITES (HCC): Primary | ICD-10-CM

## 2024-10-09 PROCEDURE — 99213 OFFICE O/P EST LOW 20 MIN: CPT | Performed by: PHYSICIAN ASSISTANT

## 2024-10-09 RX ORDER — PLANT STANOL ESTER 450 MG
TABLET ORAL
COMMUNITY

## 2024-10-09 NOTE — PROGRESS NOTES
Ambulatory Visit  Name: Amelia Hensley      : 1960      MRN: [id number]  Encounter Provider: Zaynab French PA-C  Encounter Date: 10/9/2024   Encounter department: [facility] [place]    Assessment & Plan  Alcoholic cirrhosis of liver with ascites (HCC)  No alcohol since 2022   MELD Na 8 as per labs from  of this year    Ascites: no longer problematic; Continues on Furosemide 20mg BID and Spironolactone 50mg daily - will discuss weaning at next appointment; low Na diet <2g daily  HE: grade 0. No asterixis on exam  Varices: EGD 2022 negative.  We discussed repeat surveillance exam next year but right now she does not want to pursue this. Will discuss further at next appointment  HCC: update AFP and imaging  Transplant: not discussed given very low MELD    Continue present management    Recent labs note possible hyperthyroidism  She will d/w her PCP    History of Present Illness     Amelia Hensley is a 64 y.o. female with alcohol cirrhosis historically complicated by ascites, hypothyroidism, DVT who presents for routine follow-up.  Overall she is doing very well.  She denies any issues with swelling of her legs or stomach.  She is actually lost a few pounds since her last appointment.  She maintains on furosemide 20 mg twice a day and spironolactone 50 mg once daily.  She is trying to be cautious with her salt intake.  She denies any issues with abdominal pain, nausea, vomiting, heartburn, dysphagia, diarrhea, constipation, rectal bleeding or melena.  Labs are mostly stable.  She reports no alcohol since 2022.  Her last upper endoscopy was in 2022 with no varices noted.  She has had no confusion episodes.  She is sleeping well at night.  She denies any excessive daytime fatigue.      Review of Systems   Constitutional:  Negative for fatigue and unexpected weight change.   Respiratory:  Negative for cough, shortness of breath and wheezing.   "  Cardiovascular:  Negative for chest pain and palpitations.   Gastrointestinal:  Negative for abdominal distention, abdominal pain, blood in stool, constipation, diarrhea, nausea and vomiting.   Neurological:  Negative for tremors, weakness and headaches.           Objective     /70 (BP Location: Left arm, Patient Position: Sitting, Cuff Size: Standard)   Pulse 83   Temp 97.6 °F (36.4 °C) (Temporal)   Ht 5' 5\" (1.651 m)   Wt 62.6 kg (138 lb)   LMP  (LMP Unknown)   SpO2 97%   BMI 22.96 kg/m²     Physical Exam  Constitutional:       Appearance: Normal appearance. She is normal weight.   HENT:      Head: Normocephalic and atraumatic.   Eyes:      General: No scleral icterus.  Cardiovascular:      Rate and Rhythm: Normal rate and regular rhythm.   Pulmonary:      Effort: Pulmonary effort is normal.      Breath sounds: Normal breath sounds.   Abdominal:      General: Abdomen is flat. Bowel sounds are normal.      Palpations: Abdomen is soft.      Tenderness: There is no abdominal tenderness.   Neurological:      Mental Status: She is alert and oriented to person, place, and time.   Psychiatric:         Mood and Affect: Mood normal.         Behavior: Behavior normal.         "

## 2024-10-16 ENCOUNTER — TELEPHONE (OUTPATIENT)
Age: 64
End: 2024-10-16

## 2024-10-16 ENCOUNTER — HOSPITAL ENCOUNTER (INPATIENT)
Facility: HOSPITAL | Age: 64
LOS: 2 days | Discharge: HOME | DRG: 684 | End: 2024-10-18
Attending: EMERGENCY MEDICINE | Admitting: INTERNAL MEDICINE
Payer: COMMERCIAL

## 2024-10-16 DIAGNOSIS — N17.9 AKI (ACUTE KIDNEY INJURY) (HCC): Primary | ICD-10-CM

## 2024-10-16 DIAGNOSIS — E87.1 HYPONATREMIA: ICD-10-CM

## 2024-10-16 LAB
ALBUMIN SERPL BCG-MCNC: 4.2 G/DL (ref 3.5–5)
ALP SERPL-CCNC: 74 U/L (ref 34–104)
ALT SERPL W P-5'-P-CCNC: 14 U/L (ref 7–52)
ANION GAP SERPL CALCULATED.3IONS-SCNC: 15 MMOL/L (ref 4–13)
AST SERPL W P-5'-P-CCNC: 27 U/L (ref 13–39)
BASOPHILS # BLD AUTO: 0.05 THOUSANDS/ΜL (ref 0–0.1)
BASOPHILS NFR BLD AUTO: 1 % (ref 0–1)
BILIRUB DIRECT SERPL-MCNC: 0.05 MG/DL (ref 0–0.2)
BILIRUB SERPL-MCNC: 0.57 MG/DL (ref 0.2–1)
BUN SERPL-MCNC: 76 MG/DL (ref 5–25)
CALCIUM SERPL-MCNC: 9 MG/DL (ref 8.4–10.2)
CHLORIDE SERPL-SCNC: 95 MMOL/L (ref 96–108)
CO2 SERPL-SCNC: 25 MMOL/L (ref 21–32)
CREAT SERPL-MCNC: 2.3 MG/DL (ref 0.6–1.3)
EOSINOPHIL # BLD AUTO: 0.38 THOUSAND/ΜL (ref 0–0.61)
EOSINOPHIL NFR BLD AUTO: 4 % (ref 0–6)
ERYTHROCYTE [DISTWIDTH] IN BLOOD BY AUTOMATED COUNT: 13.6 % (ref 11.6–15.1)
GFR SERPL CREATININE-BSD FRML MDRD: 21 ML/MIN/1.73SQ M
GLUCOSE SERPL-MCNC: 79 MG/DL (ref 65–140)
HCT VFR BLD AUTO: 37 % (ref 34.8–46.1)
HGB BLD-MCNC: 12.5 G/DL (ref 11.5–15.4)
IMM GRANULOCYTES # BLD AUTO: 0.03 THOUSAND/UL (ref 0–0.2)
IMM GRANULOCYTES NFR BLD AUTO: 0 % (ref 0–2)
LYMPHOCYTES # BLD AUTO: 1.91 THOUSANDS/ΜL (ref 0.6–4.47)
LYMPHOCYTES NFR BLD AUTO: 20 % (ref 14–44)
MAGNESIUM SERPL-MCNC: 2.7 MG/DL (ref 1.9–2.7)
MCH RBC QN AUTO: 30.6 PG (ref 26.8–34.3)
MCHC RBC AUTO-ENTMCNC: 33.8 G/DL (ref 31.4–37.4)
MCV RBC AUTO: 91 FL (ref 82–98)
MONOCYTES # BLD AUTO: 0.58 THOUSAND/ΜL (ref 0.17–1.22)
MONOCYTES NFR BLD AUTO: 6 % (ref 4–12)
NEUTROPHILS # BLD AUTO: 6.39 THOUSANDS/ΜL (ref 1.85–7.62)
NEUTS SEG NFR BLD AUTO: 69 % (ref 43–75)
NRBC BLD AUTO-RTO: 0 /100 WBCS
PLATELET # BLD AUTO: 186 THOUSANDS/UL (ref 149–390)
PMV BLD AUTO: 9.6 FL (ref 8.9–12.7)
POTASSIUM SERPL-SCNC: 3 MMOL/L (ref 3.5–5.3)
PROT SERPL-MCNC: 7.8 G/DL (ref 6.4–8.4)
RBC # BLD AUTO: 4.09 MILLION/UL (ref 3.81–5.12)
SODIUM SERPL-SCNC: 135 MMOL/L (ref 135–147)
WBC # BLD AUTO: 9.34 THOUSAND/UL (ref 4.31–10.16)

## 2024-10-16 PROCEDURE — 85025 COMPLETE CBC W/AUTO DIFF WBC: CPT | Performed by: EMERGENCY MEDICINE

## 2024-10-16 PROCEDURE — 36415 COLL VENOUS BLD VENIPUNCTURE: CPT | Performed by: EMERGENCY MEDICINE

## 2024-10-16 PROCEDURE — 99223 1ST HOSP IP/OBS HIGH 75: CPT | Performed by: INTERNAL MEDICINE

## 2024-10-16 PROCEDURE — 80076 HEPATIC FUNCTION PANEL: CPT | Performed by: EMERGENCY MEDICINE

## 2024-10-16 PROCEDURE — 99285 EMERGENCY DEPT VISIT HI MDM: CPT | Performed by: EMERGENCY MEDICINE

## 2024-10-16 PROCEDURE — 80048 BASIC METABOLIC PNL TOTAL CA: CPT | Performed by: EMERGENCY MEDICINE

## 2024-10-16 PROCEDURE — 83735 ASSAY OF MAGNESIUM: CPT | Performed by: EMERGENCY MEDICINE

## 2024-10-16 PROCEDURE — 99283 EMERGENCY DEPT VISIT LOW MDM: CPT

## 2024-10-16 RX ORDER — ACETAMINOPHEN 325 MG/1
325 TABLET ORAL EVERY 6 HOURS PRN
Status: DISCONTINUED | OUTPATIENT
Start: 2024-10-16 | End: 2024-10-18 | Stop reason: HOSPADM

## 2024-10-16 RX ORDER — SODIUM CHLORIDE 9 MG/ML
75 INJECTION, SOLUTION INTRAVENOUS CONTINUOUS
Status: DISCONTINUED | OUTPATIENT
Start: 2024-10-16 | End: 2024-10-18 | Stop reason: HOSPADM

## 2024-10-16 RX ORDER — GABAPENTIN 300 MG/1
600 CAPSULE ORAL
Status: DISCONTINUED | OUTPATIENT
Start: 2024-10-16 | End: 2024-10-18 | Stop reason: HOSPADM

## 2024-10-16 RX ORDER — SERTRALINE HYDROCHLORIDE 100 MG/1
100 TABLET, FILM COATED ORAL DAILY
Status: DISCONTINUED | OUTPATIENT
Start: 2024-10-17 | End: 2024-10-18 | Stop reason: HOSPADM

## 2024-10-16 RX ORDER — LEVOTHYROXINE SODIUM 50 UG/1
50 TABLET ORAL DAILY
Status: DISCONTINUED | OUTPATIENT
Start: 2024-10-17 | End: 2024-10-18 | Stop reason: HOSPADM

## 2024-10-16 RX ORDER — LETROZOLE 2.5 MG/1
2.5 TABLET, FILM COATED ORAL DAILY
Status: DISCONTINUED | OUTPATIENT
Start: 2024-10-17 | End: 2024-10-18 | Stop reason: HOSPADM

## 2024-10-16 RX ORDER — HEPARIN SODIUM 5000 [USP'U]/ML
5000 INJECTION, SOLUTION INTRAVENOUS; SUBCUTANEOUS EVERY 8 HOURS SCHEDULED
Status: DISCONTINUED | OUTPATIENT
Start: 2024-10-17 | End: 2024-10-18 | Stop reason: HOSPADM

## 2024-10-16 RX ORDER — POTASSIUM CHLORIDE 1500 MG/1
40 TABLET, EXTENDED RELEASE ORAL 2 TIMES DAILY
Status: DISCONTINUED | OUTPATIENT
Start: 2024-10-16 | End: 2024-10-17

## 2024-10-16 RX ADMIN — POTASSIUM CHLORIDE 40 MEQ: 1500 TABLET, EXTENDED RELEASE ORAL at 20:58

## 2024-10-16 RX ADMIN — GABAPENTIN 600 MG: 300 CAPSULE ORAL at 21:00

## 2024-10-16 RX ADMIN — SODIUM CHLORIDE 1000 ML: 0.9 INJECTION, SOLUTION INTRAVENOUS at 18:40

## 2024-10-16 RX ADMIN — SODIUM CHLORIDE 75 ML/HR: 0.9 INJECTION, SOLUTION INTRAVENOUS at 20:58

## 2024-10-16 NOTE — TELEPHONE ENCOUNTER
I don't see these labs in the system but please advise her to go to the ER for potassium administration as her potassium is very low and she is at risk for abnormal heart rhythms because of this.

## 2024-10-16 NOTE — TELEPHONE ENCOUNTER
Patients GI provider:  TIFFANIE French    Number to return call: [phone]    Reason for call:Jairo calling from [employer] calling to report significant findings for potassium , 2.7.    Scheduled procedure/appointment date if applicable: Apt 4/9/24

## 2024-10-16 NOTE — ASSESSMENT & PLAN NOTE
Baseline creatinine approximately 0.8  Presented a creatinine of 2.3  Suspect likely from volume depletion  Hold Aldactone and Lasix  Will provide IV fluids  Repeat BMP in the morning

## 2024-10-16 NOTE — ED PROVIDER NOTES
Time reflects when diagnosis was documented in both MDM as applicable and the Disposition within this note       Time User Action Codes Description Comment    10/16/2024  6:21 PM Ventura, Scott Add [N17.9] OPAL (acute kidney injury) (HCC)     10/16/2024  6:22 PM Ventura, Scott Add [E87.1] Hyponatremia           ED Disposition       ED Disposition   Admit    Condition   Stable    Date/Time   Wed Oct 16, 2024  6:21 PM    Comment   Case was discussed with Dr Vega and the patient's admission status was agreed to be Admission Status: inpatient status to the service of Dr. Vega .               Assessment & Plan       Medical Decision Making  Problems Addressed:  OPAL (acute kidney injury) (HCC): complicated acute illness or injury that poses a threat to life or bodily functions  Hyponatremia: acute illness or injury    Amount and/or Complexity of Data Reviewed  Labs: ordered.    Risk  Decision regarding hospitalization.             Medications   heparin (porcine) subcutaneous injection 5,000 Units (has no administration in time range)   acetaminophen (TYLENOL) tablet 325 mg (has no administration in time range)   sodium chloride 0.9 % infusion (has no administration in time range)   sodium chloride 0.9 % bolus 1,000 mL (1,000 mL Intravenous New Bag 10/16/24 1840)       ED Risk Strat Scores                           SBIRT 22yo+      Flowsheet Row Most Recent Value   Initial Alcohol Screen: US AUDIT-C     1. How often do you have a drink containing alcohol? 0 Filed at: 10/16/2024 1730   2. How many drinks containing alcohol do you have on a typical day you are drinking?  0 Filed at: 10/16/2024 1730   3b. FEMALE Any Age, or MALE 65+: How often do you have 4 or more drinks on one occassion? 0 Filed at: 10/16/2024 1730   Audit-C Score 0 Filed at: 10/16/2024 1730   IRIS: How many times in the past year have you...    Used an illegal drug or used a prescription medication for non-medical reasons? Never Filed at: 10/16/2024 1730                             History of Present Illness       Chief Complaint   Patient presents with    Abnormal Lab     Pt arrives after being sent in by PCP due to low potassium level. Pt denies chest pain/ SOB       Past Medical History:   Diagnosis Date    Acute DVT (deep venous thrombosis) (HCC)     of LLE>     Bladder infection     Cancer (HCC)     breast    Congenital prolapsed rectum     History of biliary stent insertion     History of chemotherapy     History of radiation therapy 05/2018    left breast ca    History of transfusion     x2 s/p chemo treatments, no reaction    Hydronephrosis     right kidney    Hypertension     Malignant neoplasm of overlapping sites of left female breast (HCC) 05/01/2018    Migraine       Past Surgical History:   Procedure Laterality Date    ABDOMINAL ADHESION SURGERY N/A 4/23/2019    Procedure: LYSIS ADHESIONS;  Surgeon: Nancy Live MD;  [facility];  Service: Colorectal    BREAST BIOPSY      COLON SURGERY      colon resection    CYSTOSCOPY N/A 3/4/2019    Procedure: CYSTOSCOPY WITH BIOPSIES AND FULGERATION AND REDCUTION OF RECTUM PROLAPSE;  Surgeon: Richmond Hawley MD;  [facility];  Service: Urology    ERCP N/A 1/4/2019    Procedure: ENDOSCOPIC RETROGRADE CHOLANGIOPANCREATOGRAPHY (ERCP);  Surgeon: Alphonso Rai MD;  [facility];  Service: Gastroenterology    INSTILLATION MYTOMYCIN N/A 3/4/2019    Procedure: INSTILLATION MITOMYCIN;  Surgeon: Richmond Hawley MD;  [facility];  Service: Urology    IR PARACENTESIS  3/21/2022    IR PARACENTESIS  6/1/2022    IR PARACENTESIS  7/15/2022    IR PARACENTESIS  8/30/2022    IR PARACENTESIS  10/4/2022    IR PARACENTESIS  10/28/2022    IR PARACENTESIS  11/4/2022    IR PARACENTESIS  12/6/2022    IR PARACENTESIS  12/29/2022    MASTECTOMY Left     2018    MA COLONOSCOPY FLX DX W/COLLJ SPEC WHEN PFRMD N/A 4/22/2019    Procedure: COLONOSCOPY;  Surgeon: Nancy Live MD;  [facility];  Service: Colorectal    SC EDG US EXAM SURGICAL ALTER STOM DUODENUM/JEJUNUM N/A 3/7/2019    Procedure: LINEAR ENDOSCOPIC U/S;  Surgeon: Gordon Shah MD;  [facility];  Service: Gastroenterology    SC ESOPHAGOGASTRODUODENOSCOPY TRANSORAL DIAGNOSTIC N/A 3/7/2019    Procedure: ESOPHAGOGASTRODUODENOSCOPY (EGD);  Surgeon: Gordon Shah MD;  [facility];  Service: Gastroenterology    SC INSJ TUNNELED CTR VAD W/SUBQ PORT AGE 5 YR/> N/A 6/26/2018    Procedure: INSERTION VENOUS PORT ( PORT-A-CATH) IR;  Surgeon: Tutu Collier DO;  [facility];  Service: Interventional Radiology    SC LAPAROSCOPY PROCTOPEXY PROLAPSE N/A 4/23/2019    Procedure: LAPAROSCOPIC HAND-ASSIST PELVIC DISSECTION; proctopexy;  Surgeon: Nancy Live MD;  [facility];  Service: Colorectal    SC LAPAROSCOPY PROCTOPEXY PROLAPSE N/A 11/18/2020    Procedure: LAPAROSCOPIC LYSIS OF ADHESIONS, MOBILIZATION OF RECTUM AND SUTURE RECTOPEXY;  Surgeon: Richmond Terrell MD;  [facility];  Service: Colorectal    SC MAST MODF RAD W/AX LYMPH NOD W/WO PECT/LEONORA MIN Left 5/15/2018    Procedure: BREAST MODIFIED RADICAL MASTECTOMY;  Surgeon: Darrick Hilliard MD;  [facility];  Service: Surgical Oncology    SC RMVL [place] CTR VAD W/SUBQ PORT/ CTR/PRPH INSJ N/A 6/4/2019    Procedure: REMOVAL VENOUS PORT (PORT-A-CATH)IR;  Surgeon: Tutu Collier DO;  [facility];  Service: Interventional Radiology    TUBAL LIGATION      US GUIDANCE BREAST BIOPSY LEFT EACH ADDITIONAL Left 4/3/2018    US GUIDED BREAST BIOPSY LEFT COMPLETE Left 4/3/2018    US GUIDED BREAST LYMPH NODE BIOPSY LEFT Left 4/3/2018      Family History   Problem Relation Age of Onset    No Known Problems Mother     No Known Problems Father     Breast cancer Maternal Aunt 50    Colon cancer Maternal Aunt     No Known Problems Sister     No Known Problems Daughter     No Known Problems Maternal Grandmother     No Known Problems Maternal Grandfather      No Known Problems Paternal Grandmother     No Known Problems Paternal Grandfather       Social History     Tobacco Use    Smoking status: Never    Smokeless tobacco: Never   Vaping Use    Vaping status: Never Used   Substance Use Topics    Alcohol use: Not Currently     Comment: Drinks daily until three weeks ago    Drug use: Not Currently      E-Cigarette/Vaping    E-Cigarette Use Never User       E-Cigarette/Vaping Substances      I have reviewed and agree with the history as documented.     63 yo female sent to ED by PCP for possible hypokalemia. She is asymptomatic. Denies weakness, fatigue, syncope, fever, chills, vomiting.         Review of Systems   Constitutional:  Negative for chills and fever.   HENT:  Negative for congestion and rhinorrhea.    Eyes:  Negative for photophobia and visual disturbance.   Respiratory:  Negative for apnea, cough, choking, chest tightness and shortness of breath.    Cardiovascular:  Negative for chest pain, palpitations and leg swelling.   Gastrointestinal:  Negative for abdominal distention, abdominal pain and vomiting.   Genitourinary:  Negative for difficulty urinating and hematuria.   Musculoskeletal:  Negative for back pain, neck pain and neck stiffness.   Skin:  Negative for color change and pallor.   Neurological:  Negative for dizziness, weakness and headaches.   Hematological:  Negative for adenopathy. Does not bruise/bleed easily.           Objective       ED Triage Vitals   Temperature Pulse Blood Pressure Respirations SpO2 Patient Position - Orthostatic VS   10/16/24 1729 10/16/24 1729 10/16/24 1729 10/16/24 1729 10/16/24 1729 10/16/24 1729   98.5 °F (36.9 °C) 81 140/63 17 96 % Lying      Temp Source Heart Rate Source BP Location FiO2 (%) Pain Score    10/16/24 1729 10/16/24 1729 10/16/24 1729 -- 10/16/24 1947    Oral Monitor Right arm  No Pain      Vitals      Date and Time Temp Pulse SpO2 Resp BP Pain Score FACES Pain Rating User   10/17/24 2215 98.1 °F (36.7  °C) 73 97 % 19 119/69 -- -- DII   10/17/24 1526 97.5 °F (36.4 °C) 73 95 % 17 100/64 -- -- DII   10/17/24 0945 -- -- -- -- -- No Pain -- LR   10/17/24 0657 97.8 °F (36.6 °C) 73 96 % 18 103/59 -- -- DII   10/16/24 1947 -- -- -- -- -- No Pain -- VS   10/16/24 1930 -- -- -- 16 -- -- -- SD   10/16/24 1930 97.6 °F (36.4 °C) 70 96 % -- 123/75 -- -- DII   10/16/24 1729 98.5 °F (36.9 °C) 81 96 % 17 140/63 -- -- LISA            Physical Exam  Vitals and nursing note reviewed.   Constitutional:       General: She is not in acute distress.     Appearance: She is well-developed. She is not diaphoretic.   HENT:      Head: Normocephalic and atraumatic.      Mouth/Throat:      Mouth: Mucous membranes are moist.      Pharynx: Oropharynx is clear.   Eyes:      Conjunctiva/sclera: Conjunctivae normal.      Pupils: Pupils are equal, round, and reactive to light.   Neck:      Vascular: No JVD.   Cardiovascular:      Rate and Rhythm: Normal rate and regular rhythm.      Pulses: Normal pulses.      Heart sounds: Normal heart sounds.   Pulmonary:      Effort: Pulmonary effort is normal. No respiratory distress.      Breath sounds: Normal breath sounds. No stridor.   Abdominal:      General: There is no distension.      Palpations: Abdomen is soft.      Tenderness: There is no abdominal tenderness. There is no guarding or rebound.   Musculoskeletal:         General: No tenderness or deformity. Normal range of motion.      Cervical back: Normal range of motion and neck supple. No rigidity.   Skin:     General: Skin is warm and dry.      Capillary Refill: Capillary refill takes less than 2 seconds.      Coloration: Skin is not jaundiced or pale.      Findings: No bruising, erythema or rash.   Neurological:      General: No focal deficit present.      Mental Status: She is alert and oriented to person, place, and time.      Cranial Nerves: No cranial nerve deficit.      Sensory: No sensory deficit.      Motor: No weakness or abnormal muscle  tone.      Coordination: Coordination normal.      Gait: Gait normal.         Results Reviewed       Procedure Component Value Units Date/Time    Basic metabolic panel [[id number]]  (Abnormal) Collected: 10/17/24 0451    Lab Status: Final result Specimen: Blood from Arm, Left Updated: 10/17/24 0544     Sodium 141 mmol/L      Potassium 3.3 mmol/L      Chloride 104 mmol/L      CO2 26 mmol/L      ANION GAP 11 mmol/L      BUN 70 mg/dL      Creatinine 1.94 mg/dL      Glucose 82 mg/dL      Calcium 8.7 mg/dL      eGFR 26 ml/min/1.73sq m     Narrative:      National Kid[employer] Disease Foundation guidelines for Chronic Kidney Disease (CKD):     Stage 1 with normal or high GFR (GFR > 90 mL/min/1.73 square meters)    Stage 2 Mild CKD (GFR = 60-89 mL/min/1.73 square meters)    Stage 3A Moderate CKD (GFR = 45-59 mL/min/1.73 square meters)    Stage 3B Moderate CKD (GFR = 30-44 mL/min/1.73 square meters)    Stage 4 Severe CKD (GFR = 15-29 mL/min/1.73 square meters)    Stage 5 End Stage CKD (GFR <15 mL/min/1.73 square meters)  Note: GFR calculation is accurate only with a steady state creatinine    Hepatic function panel [[id number]]  (Normal) Collected: 10/17/24 0451    Lab Status: Final result Specimen: Blood from Arm, Left Updated: 10/17/24 0544     Total Bilirubin 0.53 mg/dL      Bilirubin, Direct 0.04 mg/dL      Alkaline Phosphatase 66 U/L      AST 23 U/L      ALT 13 U/L      Total Protein 6.9 g/dL      Albumin 3.7 g/dL     CBC and differential [[id number]]  (Abnormal) Collected: 10/17/24 0451    Lab Status: Final result Specimen: Blood from Arm, Left Updated: 10/17/24 0520     WBC 7.51 Thousand/uL      RBC 3.92 Million/uL      Hemoglobin 12.2 g/dL      Hematocrit 36.3 %      MCV 93 fL      MCH 31.1 pg      MCHC 33.6 g/dL      RDW 13.7 %      MPV 9.8 fL      Platelets 170 Thousands/uL      nRBC 0 /100 WBCs      Segmented % 59 %      Immature Grans % 0 %      Lymphocytes % 24 %      Monocytes % 7 %      Eosinophils Relative 9 %       Basophils Relative 1 %      Absolute Neutrophils 4.45 Thousands/µL      Absolute Immature Grans 0.03 Thousand/uL      Absolute Lymphocytes 1.78 Thousands/µL      Absolute Monocytes 0.54 Thousand/µL      Eosinophils Absolute 0.67 Thousand/µL      Basophils Absolute 0.04 Thousands/µL     Basic metabolic panel [[id number]]  (Abnormal) Collected: 10/16/24 1741    Lab Status: Final result Specimen: Blood from Hand, Right Updated: 10/16/24 1759     Sodium 135 mmol/L      Potassium 3.0 mmol/L      Chloride 95 mmol/L      CO2 25 mmol/L      ANION GAP 15 mmol/L      BUN 76 mg/dL      Creatinine 2.30 mg/dL      Glucose 79 mg/dL      Calcium 9.0 mg/dL      eGFR 21 ml/min/1.73sq m     Narrative:      National Kidney Disease Foundation guidelines for Chronic Kidney Disease (CKD):     Stage 1 with normal or high GFR (GFR > 90 mL/min/1.73 square meters)    Stage 2 Mild CKD (GFR = 60-89 mL/min/1.73 square meters)    Stage 3A Moderate CKD (GFR = 45-59 mL/min/1.73 square meters)    Stage 3B Moderate CKD (GFR = 30-44 mL/min/1.73 square meters)    Stage 4 Severe CKD (GFR = 15-29 mL/min/1.73 square meters)    Stage 5 End Stage CKD (GFR <15 mL/min/1.73 square meters)  Note: GFR calculation is accurate only with a steady state creatinine    Hepatic function panel [[id number]]  (Normal) Collected: 10/16/24 1741    Lab Status: Final result Specimen: Blood from Hand, Right Updated: 10/16/24 1759     Total Bilirubin 0.57 mg/dL      Bilirubin, Direct 0.05 mg/dL      Alkaline Phosphatase 74 U/L      AST 27 U/L      ALT 14 U/L      Total Protein 7.8 g/dL      Albumin 4.2 g/dL     Magnesium [[id number]]  (Normal) Collected: 10/16/24 1741    Lab Status: Final result Specimen: Blood from Hand, Right Updated: 10/16/24 1759     Magnesium 2.7 mg/dL     CBC and differential [[id number]] Collected: 10/16/24 1741    Lab Status: Final result Specimen: Blood from Hand, Right Updated: 10/16/24 1745     WBC 9.34 Thousand/uL      RBC 4.09 Million/uL       Hemoglobin 12.5 g/dL      Hematocrit 37.0 %      MCV 91 fL      MCH 30.6 pg      MCHC 33.8 g/dL      RDW 13.6 %      MPV 9.6 fL      Platelets 186 Thousands/uL      nRBC 0 /100 WBCs      Segmented % 69 %      Immature Grans % 0 %      Lymphocytes % 20 %      Monocytes % 6 %      Eosinophils Relative 4 %      Basophils Relative 1 %      Absolute Neutrophils 6.39 Thousands/µL      Absolute Immature Grans 0.03 Thousand/uL      Absolute Lymphocytes 1.91 Thousands/µL      Absolute Monocytes 0.58 Thousand/µL      Eosinophils Absolute 0.38 Thousand/µL      Basophils Absolute 0.05 Thousands/µL             No orders to display       Procedures    ED Medication and Procedure Management   Prior to Admission Medications   Prescriptions Last Dose Informant Patient Reported? Taking?   Ascorbic Acid (VITAMIN C PO) 10/16/2024 Self Yes Yes   Sig: Take by mouth   Bioflavonoid Products (REBA VITAMIN C-FLAVONOIDS PO) 10/16/2024 Self Yes Yes   Sig: Take by mouth   [facility] Magnesium Oxide 250 MG TABS 10/16/2024 Self No Yes   Sig: TAKE 1 TABLET (250 MG TOTAL) BY MOUTH IN THE MORNING   Potassium Gluconate 550 (90 K) MG TABS 10/16/2024 Self Yes Yes   Sig: Take by mouth   SUMAtriptan (IMITREX) 50 mg tablet 10/16/2024 Self Yes Yes   Si TABLET AT ONSET OF MIGRAINE   ferrous sulfate 325 (65 Fe) mg tablet 10/16/2024 Self No Yes   Sig: Take 1 tablet (325 mg total) by mouth 2 (two) times a day   furosemide (LASIX) 20 mg tablet 10/16/2024 Self No Yes   Sig: TAKE 1 TABLET BY MOUTH TWICE A DAY   gabapentin (NEURONTIN) 600 MG tablet 10/15/2024 Self Yes Yes   Sig: Take 600 mg by mouth daily at bedtime   letrozole (FEMARA) 2.5 mg tablet 10/16/2024 Self No Yes   Sig: TAKE 1 TABLET BY MOUTH EVERY DAY   levothyroxine 50 mcg tablet 10/16/2024 Self Yes Yes   Sig: Take 50 mcg by mouth daily   liothyronine (CYTOMEL) 25 mcg tablet 10/16/2024 Self Yes Yes   Sig: TAKE 1 TABLET EVERY MORNING 1 HOUR BEFORE BREAKFAST   sertraline (ZOLOFT) 100 mg tablet 10/16/2024  Self Yes Yes   Sig: Take 100 mg by mouth daily   spironolactone (ALDACTONE) 50 mg tablet 10/16/2024 Self No Yes   Sig: Take 1 tablet (50 mg total) by mouth daily      Facility-Administered Medications: None     Current Discharge Medication List        CONTINUE these medications which have NOT CHANGED    Details   Ascorbic Acid (VITAMIN C PO) Take by mouth      Bioflavonoid Products (REBA VITAMIN C-FLAVONOIDS PO) Take by mouth      [facility] Magnesium Oxide 250 MG TABS TAKE 1 TABLET (250 MG TOTAL) BY MOUTH IN THE MORNING  Qty: 90 tablet, Refills: 3    Associated Diagnoses: Alcoholic cirrhosis of liver with ascites (HCC)      ferrous sulfate 325 (65 Fe) mg tablet Take 1 tablet (325 mg total) by mouth 2 (two) times a day  Qty: 60 tablet, Refills: 3    Associated Diagnoses: Alcoholic cirrhosis of liver with ascites (HCC)      furosemide (LASIX) 20 mg tablet TAKE 1 TABLET BY MOUTH TWICE A DAY  Qty: 180 tablet, Refills: 1    Associated Diagnoses: Ascites due to alcoholic cirrhosis (HCC)      gabapentin (NEURONTIN) 600 MG tablet Take 600 mg by mouth daily at bedtime      letrozole (FEMARA) 2.5 mg tablet TAKE 1 TABLET BY MOUTH EVERY DAY  Qty: 90 tablet, Refills: 3    Associated Diagnoses: Malignant neoplasm of overlapping sites of left breast in female, estrogen receptor positive (HCC)      levothyroxine 50 mcg tablet Take 50 mcg by mouth daily      liothyronine (CYTOMEL) 25 mcg tablet TAKE 1 TABLET EVERY MORNING 1 HOUR BEFORE BREAKFAST      Potassium Gluconate 550 (90 K) MG TABS Take by mouth      sertraline (ZOLOFT) 100 mg tablet Take 100 mg by mouth daily      spironolactone (ALDACTONE) 50 mg tablet Take 1 tablet (50 mg total) by mouth daily  Qty: 30 tablet, Refills: 11    Associated Diagnoses: Iron deficiency anemia, unspecified iron deficiency anemia type      SUMAtriptan (IMITREX) 50 mg tablet 1 TABLET AT ONSET OF MIGRAINE           No discharge procedures on file.  ED SEPSIS DOCUMENTATION   Time reflects when diagnosis  was documented in both MDM as applicable and the Disposition within this note       Time User Action Codes Description Comment    10/16/2024  6:21 PM Scott Ventura [N17.9] OPAL (acute kidney injury) (HCC)     10/16/2024  6:22 PM Scott Ventura [E87.1] Hyponatremia                  Scott Ventura MD  10/17/24 9439

## 2024-10-16 NOTE — H&P
H&P - Hospitalist   Name: Amelia Hensley 64 y.o. female I MRN: [id number]  Unit/Bed#: ED 08 I Date of Admission: 10/16/2024   Date of Service: 10/16/2024 I Hospital Day: 0     Assessment & Plan  DEVIN (acute kidney injury) (HCC) on CKD  Baseline creatinine approximately 0.8  Presented a creatinine of 2.3  Suspect likely from volume depletion  Hold Aldactone and Lasix  Will provide IV fluids  Repeat BMP in the morning  Hypokalemia  Noted to be 3.0  Will replete   Cirrhosis (HCC)  History of alcoholic cirrhosis  LFTs stable  Hold Aldactone and Lasix as outlined above      VTE Pharmacologic Prophylaxis:   Moderate Risk (Score 3-4) - Pharmacological DVT Prophylaxis Ordered: heparin.  Code Status:  full code  Discussion with family: Patient declined call to .     Anticipated Length of Stay: Patient will be admitted on an inpatient basis with an anticipated length of stay of greater than 2 midnights secondary to devin.    History of Present Illness   Chief Complaint: Abnormal lab    Amelia Hensley is a 64 y.o. female with past medical history of alcoholic cirrhosis initially presented due to abnormal lab.  Sent in by primary care doctor due to low potassium.  She otherwise denies any acute complaints.  Denies fevers, chills, shortness of breath, abdominal pain, nausea, vomiting, diarrhea or any other complaints.    Review of Systems   Constitutional:  Negative for activity change, appetite change, chills, diaphoresis, fever and unexpected weight change.   HENT:  Negative for congestion, facial swelling and rhinorrhea.    Eyes:  Negative for photophobia and visual disturbance.   Respiratory:  Negative for cough, shortness of breath and wheezing.    Cardiovascular:  Negative for chest pain and palpitations.   Gastrointestinal:  Negative for abdominal pain, blood in stool, constipation, diarrhea, nausea and vomiting.   Genitourinary:  Negative for decreased urine volume, difficulty urinating, dysuria, flank pain,  frequency, hematuria and urgency.   Musculoskeletal:  Negative for arthralgias, back pain, joint swelling and myalgias.   Neurological:  Negative for dizziness, syncope, facial asymmetry, light-headedness, numbness and headaches.   Psychiatric/Behavioral:  Negative for confusion and decreased concentration. The patient is not nervous/anxious.        Historical Information   Past Medical History:   Diagnosis Date    Acute DVT (deep venous thrombosis) (HCC)     of LLE>     Bladder infection     Cancer (HCC)     breast    Congenital prolapsed rectum     History of biliary stent insertion     History of chemotherapy     History of radiation therapy 05/2018    left breast ca    History of transfusion     x2 s/p chemo treatments, no reaction    Hydronephrosis     right kidney    Hypertension     Malignant neoplasm of overlapping sites of left female breast (HCC) 05/01/2018    Migraine      Past Surgical History:   Procedure Laterality Date    ABDOMINAL ADHESION SURGERY N/A 4/23/2019    Procedure: LYSIS ADHESIONS;  Surgeon: Nancy Live MD;  [facility];  Service: Colorectal    BREAST BIOPSY      COLON SURGERY      colon resection    CYSTOSCOPY N/A 3/4/2019    Procedure: CYSTOSCOPY WITH BIOPSIES AND FULGERATION AND REDCUTION OF RECTUM PROLAPSE;  Surgeon: Richmond Hawley MD;  [facility];  Service: Urology    ERCP N/A 1/4/2019    Procedure: ENDOSCOPIC RETROGRADE CHOLANGIOPANCREATOGRAPHY (ERCP);  Surgeon: Alphonso Rai MD;  [facility];  Service: Gastroenterology    INSTILLATION MYTOMYCIN N/A 3/4/2019    Procedure: INSTILLATION MITOMYCIN;  Surgeon: Richmond Hawley MD;  [facility];  Service: Urology    IR PARACENTESIS  3/21/2022    IR PARACENTESIS  6/1/2022    IR PARACENTESIS  7/15/2022    IR PARACENTESIS  8/30/2022    IR PARACENTESIS  10/4/2022    IR PARACENTESIS  10/28/2022    IR PARACENTESIS  11/4/2022    IR PARACENTESIS  12/6/2022    IR PARACENTESIS  12/29/2022     MASTECTOMY Left     2018    RI COLONOSCOPY FLX DX W/COLLJ SPEC WHEN PFRMD N/A 4/22/2019    Procedure: COLONOSCOPY;  Surgeon: Nancy Live MD;  [facility]: [facility];  Service: Colorectal    RI EDG US EXAM SURGICAL ALTER STOM DUODENUM/JEJUNUM N/A 3/7/2019    Procedure: LINEAR ENDOSCOPIC U/S;  Surgeon: Gordon Shah MD;  [facility];  Service: Gastroenterology    RI ESOPHAGOGASTRODUODENOSCOPY TRANSORAL DIAGNOSTIC N/A 3/7/2019    Procedure: ESOPHAGOGASTRODUODENOSCOPY (EGD);  Surgeon: Gordon Shah MD;  [facility];  Service: Gastroenterology    RI INSJ TUNNELED CTR VAD W/SUBQ PORT AGE 5 YR/> N/A 6/26/2018    Procedure: INSERTION VENOUS PORT ( PORT-A-CATH) IR;  Surgeon: Tutu Collier DO;  [facility];  Service: Interventional Radiology    RI LAPAROSCOPY PROCTOPEXY PROLAPSE N/A 4/23/2019    Procedure: LAPAROSCOPIC HAND-ASSIST PELVIC DISSECTION; proctopexy;  Surgeon: Nancy Live MD;  [facility];  Service: Colorectal    RI LAPAROSCOPY PROCTOPEXY PROLAPSE N/A 11/18/2020    Procedure: LAPAROSCOPIC LYSIS OF ADHESIONS, MOBILIZATION OF RECTUM AND SUTURE RECTOPEXY;  Surgeon: Richmond Terrell MD;  [facility];  Service: Colorectal    RI MAST MODF RAD W/AX LYMPH NOD W/WO PECT/LEONORA MIN Left 5/15/2018    Procedure: BREAST MODIFIED RADICAL MASTECTOMY;  Surgeon: Darrick Hilliard MD;  [facility];  Service: Surgical Oncology    RI RMVL [place] CTR VAD W/SUBQ PORT/ CTR/PRPH INSJ N/A 6/4/2019    Procedure: REMOVAL VENOUS PORT (PORT-A-CATH)IR;  Surgeon: Tutu Collier DO;  [facility];  Service: Interventional Radiology    TUBAL LIGATION      US GUIDANCE BREAST BIOPSY LEFT EACH ADDITIONAL Left 4/3/2018    US GUIDED BREAST BIOPSY LEFT COMPLETE Left 4/3/2018    US GUIDED BREAST LYMPH NODE BIOPSY LEFT Left 4/3/2018     Social History     Tobacco Use    Smoking status: Never    Smokeless tobacco: Never   Vaping Use    Vaping status: Never Used   Substance and Sexual  Activity    Alcohol use: Not Currently     Comment: Drinks daily until three weeks ago    Drug use: Not Currently    Sexual activity: Yes     Birth control/protection: Female Sterilization     E-Cigarette/Vaping    E-Cigarette Use Never User      E-Cigarette/Vaping Substances     Family History   Problem Relation Age of Onset    No Known Problems Mother     No Known Problems Father     Breast cancer Maternal Aunt 50    Colon cancer Maternal Aunt     No Known Problems Sister     No Known Problems Daughter     No Known Problems Maternal Grandmother     No Known Problems Maternal Grandfather     No Known Problems Paternal Grandmother     No Known Problems Paternal Grandfather      Social History:  Marital Status: /Civil Union   O  Patient Pre-hospital Living Situation: Home  Patient Pre-hospital Level of Mobility: walks  Patient Pre-hospital Diet Restrictions: none    Meds/Allergies   I have reviewed home medications with patient personally.  Prior to Admission medications    Medication Sig Start Date End Date Taking? Authorizing Provider   Ascorbic Acid (VITAMIN C PO) Take by mouth    Historical Provider, MD   Bioflavonoid Products (REBA VITAMIN C-FLAVONOIDS PO) Take by mouth    Historical Provider, MD   [facility] Magnesium Oxide 250 MG TABS TAKE 1 TABLET (250 MG TOTAL) BY MOUTH IN THE MORNING 8/16/23   Zaynab French PA-C   ferrous sulfate 325 (65 Fe) mg tablet Take 1 tablet (325 mg total) by mouth 2 (two) times a day 5/9/23   Zaynab French PA-C   furosemide (LASIX) 20 mg tablet TAKE 1 TABLET BY MOUTH TWICE A DAY 5/29/24   Zaynab French PA-C   gabapentin (NEURONTIN) 600 MG tablet Take 600 mg by mouth daily at bedtime 8/9/23   Historical Provider, MD   letrozole (FEMARA) 2.5 mg tablet TAKE 1 TABLET BY MOUTH EVERY DAY 11/26/21   Edu Martinez MD   levothyroxine 50 mcg tablet Take 50 mcg by mouth daily 8/9/23   Historical Provider, MD   liothyronine (CYTOMEL) 25 mcg tablet TAKE 1 TABLET EVERY  MORNING 1 HOUR BEFORE BREAKFAST 2/15/23   Historical Provider, MD   Potassium Gluconate 550 (90 K) MG TABS Take by mouth    Historical Provider, MD   sertraline (ZOLOFT) 100 mg tablet Take 100 mg by mouth daily 8/15/23   Historical Provider, MD   spironolactone (ALDACTONE) 50 mg tablet Take 1 tablet (50 mg total) by mouth daily 5/9/23   Zaynab French PA-C   SUMAtriptan (IMITREX) 50 mg tablet 1 TABLET AT ONSET OF MIGRAINE 2/16/24   Historical Provider, MD     No Known Allergies    Objective :  Temp:  [98.5 °F (36.9 °C)] 98.5 °F (36.9 °C)  HR:  [81] 81  BP: (140)/(63) 140/63  Resp:  [17] 17  SpO2:  [96 %] 96 %  O2 Device: None (Room air)    Physical Exam  Constitutional:       General: She is not in acute distress.     Appearance: She is well-developed. She is not diaphoretic.   HENT:      Head: Normocephalic and atraumatic.      Nose: Nose normal.      Mouth/Throat:      Mouth: Oropharynx is clear and moist.      Pharynx: No oropharyngeal exudate.   Eyes:      General: No scleral icterus.        Right eye: No discharge.         Left eye: No discharge.      Extraocular Movements: EOM normal.      Conjunctiva/sclera: Conjunctivae normal.   Neck:      Thyroid: No thyromegaly.      Vascular: No JVD.   Cardiovascular:      Rate and Rhythm: Normal rate and regular rhythm.      Heart sounds: Normal heart sounds. No murmur heard.     No friction rub. No gallop.   Pulmonary:      Effort: Pulmonary effort is normal. No respiratory distress.      Breath sounds: Normal breath sounds. No wheezing or rales.   Chest:      Chest wall: No tenderness.   Abdominal:      General: Bowel sounds are normal. There is no distension.      Palpations: Abdomen is soft.      Tenderness: There is no abdominal tenderness. There is no guarding or rebound.   Musculoskeletal:         General: No tenderness, deformity or edema. Normal range of motion.      Cervical back: Normal range of motion and neck supple.   Skin:     General: Skin is warm  and dry.      Findings: No erythema or rash.   Neurological:      Mental Status: She is alert. Mental status is at baseline.      Cranial Nerves: No cranial nerve deficit.      Sensory: No sensory deficit.      Motor: No abnormal muscle tone.      Coordination: Coordination normal.   Psychiatric:         Mood and Affect: Mood and affect normal.          Lines/Drains:            Lab Results: I have reviewed the following results:  Results from last 7 days   Lab Units 10/16/24  1741   WBC Thousand/uL 9.34   HEMOGLOBIN g/dL 12.5   HEMATOCRIT % 37.0   PLATELETS Thousands/uL 186   SEGS PCT % 69   LYMPHO PCT % 20   MONO PCT % 6   EOS PCT % 4     Results from last 7 days   Lab Units 10/16/24  1741   SODIUM mmol/L 135   POTASSIUM mmol/L 3.0*   CHLORIDE mmol/L 95*   CO2 mmol/L 25   BUN mg/dL 76*   CREATININE mg/dL 2.30*   ANION GAP mmol/L 15*   CALCIUM mg/dL 9.0   ALBUMIN g/dL 4.2   TOTAL BILIRUBIN mg/dL 0.57   ALK PHOS U/L 74   ALT U/L 14   AST U/L 27   GLUCOSE RANDOM mg/dL 79             Lab Results   Component Value Date    HGBA1C 4.9 11/07/2020    HGBA1C 5.2 09/08/2020    HGBA1C 4.1 (L) 04/03/2019           Imaging Results Review: I personally reviewed the following image studies/reports in PACS and discussed pertinent findings with Radiology: chest xray. My interpretation of the radiology images/reports is:  .  Other Study Results Review: EKG was reviewed.     Administrative Statements   I have spent a total time of 60 minutes in caring for this patient on the day of the visit/encounter including Diagnostic results.    ** Please Note: This note has been constructed using a voice recognition system. **

## 2024-10-17 LAB
ALBUMIN SERPL BCG-MCNC: 3.7 G/DL (ref 3.5–5)
ALP SERPL-CCNC: 66 U/L (ref 34–104)
ALT SERPL W P-5'-P-CCNC: 13 U/L (ref 7–52)
ANION GAP SERPL CALCULATED.3IONS-SCNC: 11 MMOL/L (ref 4–13)
AST SERPL W P-5'-P-CCNC: 23 U/L (ref 13–39)
BASOPHILS # BLD AUTO: 0.04 THOUSANDS/ΜL (ref 0–0.1)
BASOPHILS NFR BLD AUTO: 1 % (ref 0–1)
BILIRUB DIRECT SERPL-MCNC: 0.04 MG/DL (ref 0–0.2)
BILIRUB SERPL-MCNC: 0.53 MG/DL (ref 0.2–1)
BUN SERPL-MCNC: 70 MG/DL (ref 5–25)
CALCIUM SERPL-MCNC: 8.7 MG/DL (ref 8.4–10.2)
CHLORIDE SERPL-SCNC: 104 MMOL/L (ref 96–108)
CO2 SERPL-SCNC: 26 MMOL/L (ref 21–32)
CREAT SERPL-MCNC: 1.94 MG/DL (ref 0.6–1.3)
EOSINOPHIL # BLD AUTO: 0.67 THOUSAND/ΜL (ref 0–0.61)
EOSINOPHIL NFR BLD AUTO: 9 % (ref 0–6)
ERYTHROCYTE [DISTWIDTH] IN BLOOD BY AUTOMATED COUNT: 13.7 % (ref 11.6–15.1)
GFR SERPL CREATININE-BSD FRML MDRD: 26 ML/MIN/1.73SQ M
GLUCOSE SERPL-MCNC: 82 MG/DL (ref 65–140)
HCT VFR BLD AUTO: 36.3 % (ref 34.8–46.1)
HGB BLD-MCNC: 12.2 G/DL (ref 11.5–15.4)
IMM GRANULOCYTES # BLD AUTO: 0.03 THOUSAND/UL (ref 0–0.2)
IMM GRANULOCYTES NFR BLD AUTO: 0 % (ref 0–2)
LYMPHOCYTES # BLD AUTO: 1.78 THOUSANDS/ΜL (ref 0.6–4.47)
LYMPHOCYTES NFR BLD AUTO: 24 % (ref 14–44)
MCH RBC QN AUTO: 31.1 PG (ref 26.8–34.3)
MCHC RBC AUTO-ENTMCNC: 33.6 G/DL (ref 31.4–37.4)
MCV RBC AUTO: 93 FL (ref 82–98)
MONOCYTES # BLD AUTO: 0.54 THOUSAND/ΜL (ref 0.17–1.22)
MONOCYTES NFR BLD AUTO: 7 % (ref 4–12)
NEUTROPHILS # BLD AUTO: 4.45 THOUSANDS/ΜL (ref 1.85–7.62)
NEUTS SEG NFR BLD AUTO: 59 % (ref 43–75)
NRBC BLD AUTO-RTO: 0 /100 WBCS
PLATELET # BLD AUTO: 170 THOUSANDS/UL (ref 149–390)
PMV BLD AUTO: 9.8 FL (ref 8.9–12.7)
POTASSIUM SERPL-SCNC: 3.3 MMOL/L (ref 3.5–5.3)
PROT SERPL-MCNC: 6.9 G/DL (ref 6.4–8.4)
RBC # BLD AUTO: 3.92 MILLION/UL (ref 3.81–5.12)
SODIUM SERPL-SCNC: 141 MMOL/L (ref 135–147)
WBC # BLD AUTO: 7.51 THOUSAND/UL (ref 4.31–10.16)

## 2024-10-17 PROCEDURE — 80076 HEPATIC FUNCTION PANEL: CPT | Performed by: INTERNAL MEDICINE

## 2024-10-17 PROCEDURE — 99233 SBSQ HOSP IP/OBS HIGH 50: CPT | Performed by: STUDENT IN AN ORGANIZED HEALTH CARE EDUCATION/TRAINING PROGRAM

## 2024-10-17 PROCEDURE — 80048 BASIC METABOLIC PNL TOTAL CA: CPT | Performed by: INTERNAL MEDICINE

## 2024-10-17 PROCEDURE — 85025 COMPLETE CBC W/AUTO DIFF WBC: CPT | Performed by: INTERNAL MEDICINE

## 2024-10-17 RX ORDER — POTASSIUM CHLORIDE 1500 MG/1
40 TABLET, EXTENDED RELEASE ORAL ONCE
Status: DISCONTINUED | OUTPATIENT
Start: 2024-10-17 | End: 2024-10-17

## 2024-10-17 RX ORDER — POTASSIUM CHLORIDE 1500 MG/1
40 TABLET, EXTENDED RELEASE ORAL ONCE
Status: COMPLETED | OUTPATIENT
Start: 2024-10-17 | End: 2024-10-17

## 2024-10-17 RX ADMIN — SODIUM CHLORIDE 75 ML/HR: 0.9 INJECTION, SOLUTION INTRAVENOUS at 18:25

## 2024-10-17 RX ADMIN — SERTRALINE HYDROCHLORIDE 100 MG: 100 TABLET ORAL at 09:24

## 2024-10-17 RX ADMIN — SODIUM CHLORIDE 75 ML/HR: 0.9 INJECTION, SOLUTION INTRAVENOUS at 05:54

## 2024-10-17 RX ADMIN — HEPARIN SODIUM 5000 UNITS: 5000 INJECTION, SOLUTION INTRAVENOUS; SUBCUTANEOUS at 22:00

## 2024-10-17 RX ADMIN — HEPARIN SODIUM 5000 UNITS: 5000 INJECTION, SOLUTION INTRAVENOUS; SUBCUTANEOUS at 05:53

## 2024-10-17 RX ADMIN — HEPARIN SODIUM 5000 UNITS: 5000 INJECTION, SOLUTION INTRAVENOUS; SUBCUTANEOUS at 14:34

## 2024-10-17 RX ADMIN — POTASSIUM CHLORIDE 40 MEQ: 1500 TABLET, EXTENDED RELEASE ORAL at 15:31

## 2024-10-17 RX ADMIN — LETROZOLE 2.5 MG: 2.5 TABLET, FILM COATED ORAL at 09:25

## 2024-10-17 RX ADMIN — POTASSIUM CHLORIDE 40 MEQ: 1500 TABLET, EXTENDED RELEASE ORAL at 09:24

## 2024-10-17 RX ADMIN — GABAPENTIN 600 MG: 300 CAPSULE ORAL at 22:00

## 2024-10-17 RX ADMIN — LEVOTHYROXINE SODIUM 50 MCG: 0.05 TABLET ORAL at 09:24

## 2024-10-17 NOTE — PROGRESS NOTES
Progress Note - Hospitalist   Name: Amelia Hensley 64 y.o. female I MRN: [id number]  Unit/Bed#: -01 I Date of Admission: 10/16/2024   Date of Service: 10/17/2024 I Hospital Day: 1    Assessment & Plan  OPAL (acute kidney injury) (HCC) on CKD  Baseline creatinine approximately 0.8  Presented a creatinine of 2.3  Suspect likely from volume depletion  Hold Aldactone and Lasix  Creatinine improving  Continue IV fluids  Hypokalemia  3.3 today  Will give 40 mill equivalent of KCl per oral x 1  Cirrhosis (HCC)  History of alcoholic cirrhosis  LFTs stable  Hold Aldactone and Lasix as outlined above    VTE Pharmacologic Prophylaxis: VTE Score: 5 Moderate Risk (Score 3-4) - Pharmacological DVT Prophylaxis Ordered: heparin.    Mobility:   Basic Mobility Inpatient Raw Score: 23  JH-HLM Goal: 7: Walk 25 feet or more  JH-HLM Achieved: 7: Walk 25 feet or more  JH-HLM Goal achieved. Continue to encourage appropriate mobility.    Patient Centered Rounds: I performed bedside rounds with nursing staff today.   Discussions with Specialists or Other Care Team Provider: None    Education and Discussions with Family / Patient:  Updated patient.     Current Length of Stay: 1 day(s)  Current Patient Status: Inpatient   Certification Statement: The patient will continue to require additional inpatient hospital stay due to OPAL  Discharge Plan: Anticipate discharge in 24-48 hrs to home.    Code Status: Level 1 - Full Code    Subjective   Patient seen and examined at bedside  She reported she has not been drinking enough fluids for 4 to 5 days and has done a lot of exertion.  She denied from taking over-the-counter antacids      Objective :  Temp:  [97.6 °F (36.4 °C)-98.5 °F (36.9 °C)] 97.8 °F (36.6 °C)  HR:  [70-81] 73  BP: (103-140)/(59-75) 103/59  Resp:  [16-18] 18  SpO2:  [96 %] 96 %  O2 Device: None (Room air)    Body mass index is 23.37 kg/m².     Input and Output Summary (last 24 hours):     Intake/Output Summary (Last 24 hours) at  10/17/2024 1445  Last data filed at 10/17/2024 0700  Gross per 24 hour   Intake 858.75 ml   Output --   Net 858.75 ml       Physical Exam  Constitutional: no Acute distress  HEENT: no Pallor or icterus  CVS: S1 plus S2  Respiratory: Normal vascular breathe without crackles and wheeze  Gastroenterology: Soft nontender without any palpable mass  Skin: No bruises or ecchymosis  Neurology: No focal logical deficit     Lines/Drains:              Lab Results: I have reviewed the following results:   Results from last 7 days   Lab Units 10/17/24  0451   WBC Thousand/uL 7.51   HEMOGLOBIN g/dL 12.2   HEMATOCRIT % 36.3   PLATELETS Thousands/uL 170   SEGS PCT % 59   LYMPHO PCT % 24   MONO PCT % 7   EOS PCT % 9*     Results from last 7 days   Lab Units 10/17/24  0451   SODIUM mmol/L 141   POTASSIUM mmol/L 3.3*   CHLORIDE mmol/L 104   CO2 mmol/L 26   BUN mg/dL 70*   CREATININE mg/dL 1.94*   ANION GAP mmol/L 11   CALCIUM mg/dL 8.7   ALBUMIN g/dL 3.7   TOTAL BILIRUBIN mg/dL 0.53   ALK PHOS U/L 66   ALT U/L 13   AST U/L 23   GLUCOSE RANDOM mg/dL 82                       Recent Cultures (last 7 days):         Imaging Results Review: No pertinent imaging studies reviewed.  Other Study Results Review: My interpretation of other studies include: Creatinine from 2.30-1.94 with potassium 3.3.    Last 24 Hours Medication List:     Current Facility-Administered Medications:     acetaminophen (TYLENOL) tablet 325 mg, Q6H PRN    gabapentin (NEURONTIN) capsule 600 mg, HS    heparin (porcine) subcutaneous injection 5,000 Units, Q8H DAVON    letrozole (FEMARA) tablet 2.5 mg, Daily    levothyroxine tablet 50 mcg, Daily    potassium chloride (Klor-Con M20) CR tablet 40 mEq, BID    sertraline (ZOLOFT) tablet 100 mg, Daily    sodium chloride 0.9 % infusion, Continuous, Last Rate: 75 mL/hr (10/17/24 0554)    Administrative Statements   Today, Patient Was Seen By: Kaiden Negron MD      **Please Note: This note may have been constructed using a voice  recognition system.**

## 2024-10-17 NOTE — PLAN OF CARE
Problem: Prexisting or High Potential for Compromised Skin Integrity  Goal: Skin integrity is maintained or improved  Description: INTERVENTIONS:  - Identify patients at risk for skin breakdown  - Assess and monitor skin integrity  - Assess and monitor nutrition and hydration status  - Monitor labs   - Assess for incontinence   - Turn and reposition patient  - Assist with mobility/ambulation  - Relieve pressure over bony prominences  - Avoid friction and shearing  - Provide appropriate hygiene as needed including keeping skin clean and dry  - Evaluate need for skin moisturizer/barrier cream  - Collaborate with interdisciplinary team   - Patient/family teaching  - Consider wound care consult   Outcome: Progressing     Problem: PAIN - ADULT  Goal: Verbalizes/displays adequate comfort level or baseline comfort level  Description: Interventions:  - Encourage patient to monitor pain and request assistance  - Assess pain using appropriate pain scale  - Administer analgesics based on type and severity of pain and evaluate response  - Implement non-pharmacological measures as appropriate and evaluate response  - Consider cultural and social influences on pain and pain management  - Notify physician/advanced practitioner if interventions unsuccessful or patient reports new pain  Outcome: Progressing     Problem: INFECTION - ADULT  Goal: Absence or prevention of progression during hospitalization  Description: INTERVENTIONS:  - Assess and monitor for signs and symptoms of infection  - Monitor lab/diagnostic results  - Monitor all insertion sites, i.e. indwelling lines, tubes, and drains  - Monitor endotracheal if appropriate and nasal secretions for changes in amount and color  - Prosper appropriate cooling/warming therapies per order  - Administer medications as ordered  - Instruct and encourage patient and family to use good hand hygiene technique  - Identify and instruct in appropriate isolation precautions for  identified infection/condition  Outcome: Progressing  Goal: Absence of fever/infection during neutropenic period  Description: INTERVENTIONS:  - Monitor WBC    Outcome: Progressing     Problem: SAFETY ADULT  Goal: Patient will remain free of falls  Description: INTERVENTIONS:  - Educate patient/family on patient safety including physical limitations  - Instruct patient to call for assistance with activity   - Consult OT/PT to assist with strengthening/mobility   - Keep Call bell within reach  - Keep bed low and locked with side rails adjusted as appropriate  - Keep care items and personal belongings within reach  - Initiate and maintain comfort rounds  - Make Fall Risk Sign visible to staff  - Offer Toileting every 2 Hours, in advance of need  - Initiate/Maintain bed alarm  - Obtain necessary fall risk management equipment  - Apply yellow socks and bracelet for high fall risk patients  - Consider moving patient to room near nurses station  Outcome: Progressing  Goal: Maintain or return to baseline ADL function  Description: INTERVENTIONS:  -  Assess patient's ability to carry out ADLs; assess patient's baseline for ADL function and identify physical deficits which impact ability to perform ADLs (bathing, care of mouth/teeth, toileting, grooming, dressing, etc.)  - Assess/evaluate cause of self-care deficits   - Assess range of motion  - Assess patient's mobility; develop plan if impaired  - Assess patient's need for assistive devices and provide as appropriate  - Encourage maximum independence but intervene and supervise when necessary  - Involve family in performance of ADLs  - Assess for home care needs following discharge   - Consider OT consult to assist with ADL evaluation and planning for discharge  - Provide patient education as appropriate  Outcome: Progressing  Goal: Maintains/Returns to pre admission functional level  Description: INTERVENTIONS:  - Perform AM-PAC 6 Click Basic Mobility/ Daily Activity  assessment daily.  - Set and communicate daily mobility goal to care team and patient/family/caregiver.   - Collaborate with rehabilitation services on mobility goals if consulted  - Perform Range of Motion 2 times a day.  - Reposition patient every 2 hours.  - Dangle patient 2 times a day  - Stand patient 2 times a day  - Ambulate patient 2 times a day  - Out of bed to chair 2 times a day   - Out of bed for meals 2 times a day  - Out of bed for toileting  - Record patient progress and toleration of activity level   Outcome: Progressing

## 2024-10-17 NOTE — PLAN OF CARE
Problem: Prexisting or High Potential for Compromised Skin Integrity  Goal: Skin integrity is maintained or improved  Description: INTERVENTIONS:  - Identify patients at risk for skin breakdown  - Assess and monitor skin integrity  - Assess and monitor nutrition and hydration status  - Monitor labs   - Assess for incontinence   - Turn and reposition patient  - Assist with mobility/ambulation  - Relieve pressure over bony prominences  - Avoid friction and shearing  - Provide appropriate hygiene as needed including keeping skin clean and dry  - Evaluate need for skin moisturizer/barrier cream  - Collaborate with interdisciplinary team   - Patient/family teaching  - Consider wound care consult   Outcome: Progressing     Problem: PAIN - ADULT  Goal: Verbalizes/displays adequate comfort level or baseline comfort level  Description: Interventions:  - Encourage patient to monitor pain and request assistance  - Assess pain using appropriate pain scale  - Administer analgesics based on type and severity of pain and evaluate response  - Implement non-pharmacological measures as appropriate and evaluate response  - Consider cultural and social influences on pain and pain management  - Notify physician/advanced practitioner if interventions unsuccessful or patient reports new pain  Outcome: Progressing     Problem: INFECTION - ADULT  Goal: Absence or prevention of progression during hospitalization  Description: INTERVENTIONS:  - Assess and monitor for signs and symptoms of infection  - Monitor lab/diagnostic results  - Monitor all insertion sites, i.e. indwelling lines, tubes, and drains  - Monitor endotracheal if appropriate and nasal secretions for changes in amount and color  - [place] appropriate cooling/warming therapies per order  - Administer medications as ordered  - Instruct and encourage patient and family to use good hand hygiene technique  - Identify and instruct in appropriate isolation precautions for  identified infection/condition  Outcome: Progressing  Goal: Absence of fever/infection during neutropenic period  Description: INTERVENTIONS:  - Monitor WBC    Outcome: Progressing     Problem: SAFETY ADULT  Goal: Patient will remain free of falls  Description: INTERVENTIONS:  - Educate patient/family on patient safety including physical limitations  - Instruct patient to call for assistance with activity   - Consult OT/PT to assist with strengthening/mobility   - Keep Call bell within reach  - Keep bed low and locked with side rails adjusted as appropriate  - Keep care items and personal belongings within reach  - Initiate and maintain comfort rounds  - Make Fall Risk Sign visible to staff  - Initiate/Maintain bed alarm  - Apply yellow socks and bracelet for high fall risk patients  - Consider moving patient to room near nurses station  Outcome: Progressing  Goal: Maintain or return to baseline ADL function  Description: INTERVENTIONS:  -  Assess patient's ability to carry out ADLs; assess patient's baseline for ADL function and identify physical deficits which impact ability to perform ADLs (bathing, care of mouth/teeth, toileting, grooming, dressing, etc.)  - Assess/evaluate cause of self-care deficits   - Assess range of motion  - Assess patient's mobility; develop plan if impaired  - Assess patient's need for assistive devices and provide as appropriate  - Encourage maximum independence but intervene and supervise when necessary  - Involve family in performance of ADLs  - Assess for home care needs following discharge   - Consider OT consult to assist with ADL evaluation and planning for discharge  - Provide patient education as appropriate  Outcome: Progressing  Goal: Maintains/Returns to pre admission functional level  Description: INTERVENTIONS:  - Perform AM-PAC 6 Click Basic Mobility/ Daily Activity assessment daily.  - Set and communicate daily mobility goal to care team and patient/family/caregiver.   -  Collaborate with rehabilitation services on mobility goals if consulted  - Perform Range of Motion 2 times a day.  - Reposition patient every 2 hours.  - Dangle patient 2 times a day  - Stand patient 2 times a day  - Ambulate patient 2 times a day  - Out of bed to chair 2 times a day   - Out of bed for meals 3 times a day  - Out of bed for toileting  - Record patient progress and toleration of activity level   Outcome: Progressing

## 2024-10-17 NOTE — UTILIZATION REVIEW
NOTIFICATION OF INPATIENT ADMISSION   AUTHORIZATION REQUEST   SERVICING FACILITY:   [place] [address]  Tax ID: [id number]  NPI: [id number] ATTENDING PROVIDER:  Attending Name and NPI#: Kaiden Negron Md [[id number]]  Address: [address] [place] [address]  Phone: [phone]     ADMISSION INFORMATION:  Place of Service: Inpatient [facility]  Place of Service Code: 21  Inpatient Admission Date/Time: 10/16/24  6:22 PM  Discharge Date/Time: No discharge date for patient encounter.  Admitting Diagnosis Code/Description:  Hyponatremia [E87.1]  Abnormal laboratory test result [R89.9]  OPAL (acute kidney injury) (HCC) [N17.9]     UTILIZATION REVIEW CONTACT:  Era Jacob Utilization   Network Utilization Review Department  Phone: [phone]  Fax [phone]  Email: [email or web address]  Contact for approvals/pending authorizations, clinical reviews, and discharge.     PHYSICIAN ADVISORY SERVICES:  Medical Necessity Denial & Fpwr-rc-Rqrz Review  Phone: [phone]  Fax: [phone]  Email: [email or web address]     DISCHARGE SUPPORT TEAM:  For Patients Discharge Needs & Updates  Phone: [phone] opt. 2 Fax: [phone]  Email: [email or web address]

## 2024-10-17 NOTE — UTILIZATION REVIEW
"NOTIFICATION OF INPATIENT ADMISSION   AUTHORIZATION REQUEST   SERVICING FACILITY:   [facility]  Address: [address] [place] [address]  Tax ID: [id number]  NPI: [id number] ATTENDING PROVIDER:  Attending Name and NPI#: Kaiden Negron Md [[id number]]  Address: [address] [place] [address]  Phone: [phone]   ADMISSION INFORMATION:  Place of Service: Inpatient [facility]  Place of Service Code: 21  Inpatient Admission Date/Time: 10/16/24  6:22 PM  Discharge Date/Time: No discharge date for patient encounter.  Admitting Diagnosis Code/Description:  Hyponatremia [E87.1]  Abnormal laboratory test result [R89.9]  OPAL (acute kidney injury) (HCC) [N17.9]     UTILIZATION REVIEW CONTACT:  Anna Polo"Velia\"Fam Utilization   Network Utilization Review Department  Phone: [phone]  Fax: [phone]  Email: [email or web address]  Contact for approvals/pending authorizations, clinical reviews, and discharge.     PHYSICIAN ADVISORY SERVICES:  Medical Necessity Denial & Ecke-fl-Gtjg Review  Phone: [phone]  Fax: [phone]  Email: [email or web address]     DISCHARGE SUPPORT TEAM:  For Patients Discharge Needs & Updates  Phone: [phone] opt. 2 Fax: [phone]  Email: [email or web address]     "

## 2024-10-17 NOTE — ASSESSMENT & PLAN NOTE
Baseline creatinine approximately 0.8  Presented a creatinine of 2.3  Suspect likely from volume depletion  Hold Aldactone and Lasix  Creatinine improving  Continue IV fluids

## 2024-10-17 NOTE — UTILIZATION REVIEW
Initial Clinical Review    Admission: Date/Time/Statement:   Admission Orders (From admission, onward)       Ordered        10/16/24 1822  INPATIENT ADMISSION  Once                          Orders Placed This Encounter   Procedures    INPATIENT ADMISSION     Standing Status:   Standing     Number of Occurrences:   1     Order Specific Question:   Level of Care     Answer:   Med Surg [16]     Order Specific Question:   Estimated length of stay     Answer:   More than 2 Midnights     Order Specific Question:   Certification     Answer:   I certify that inpatient services are medically necessary for this patient for a duration of greater than two midnights. See H&P and MD Progress Notes for additional information about the patient's course of treatment.     ED Arrival Information       Expected   -    Arrival   10/16/2024 17:16    Acuity   Urgent              Means of arrival   Walk-In    Escorted by   Family Member    Service   Hospitalist    Admission type   Emergency              Arrival complaint   Abnormal Labs             Chief Complaint   Patient presents with    Abnormal Lab     Pt arrives after being sent in by PCP due to low potassium level. Pt denies chest pain/ SOB       Initial Presentation: 64 y.o. female to ED from home w/ PMHX f alcoholic cirrhosis initially presented due to abnormal lab. Found to have K 3.0 and CR 2.3 , baseline 0.8. admitted IP status w/ OPAL , hypokalemia and cirrhosis . Suspect likely volume depletion . Plan to hold aldactone and lasix , give IVF and repeat BMP in am . Replete K . Cirrhosis LFTs stable .     Anticipated Length of Stay/Certification Statement:  Patient will be admitted on an inpatient basis with an anticipated length of stay of greater than 2 midnights secondary to opal.     Date: 10/17   Day 2: cont stay for OPAL . Pt reports dec po intake last 3-4 days . Cont to hold aldactone and lasix . Cont IVF and monitor Cr . Cr improved from 2.3 to 1.94. K 3.3 give additional  KCL and monitor .     ED Treatment-Medication Administration from 10/16/2024 1716 to 10/16/2024 1915         Date/Time Order Dose Route Action     10/16/2024 1840 sodium chloride 0.9 % bolus 1,000 mL 1,000 mL Intravenous New Bag            Scheduled Medications:  gabapentin, 600 mg, Oral, HS  heparin (porcine), 5,000 Units, Subcutaneous, Q8H DAVON  letrozole, 2.5 mg, Oral, Daily  levothyroxine, 50 mcg, Oral, Daily  potassium chloride, 40 mEq, Oral, BID  sertraline, 100 mg, Oral, Daily      Continuous IV Infusions:  sodium chloride, 75 mL/hr, Intravenous, Continuous      PRN Meds:  acetaminophen, 325 mg, Oral, Q6H PRN      ED Triage Vitals   Temperature Pulse Respirations Blood Pressure SpO2 Pain Score   10/16/24 1729 10/16/24 1729 10/16/24 1729 10/16/24 1729 10/16/24 1729 10/16/24 1947   98.5 °F (36.9 °C) 81 17 140/63 96 % No Pain     Weight (last 2 days)       Date/Time Weight    10/17/24 0600 63.7 (140.43)    10/16/24 19:30:25 63.4 (139.77)    10/16/24 1900 63.4 (139.77)            Vital Signs (last 3 days)       Date/Time Temp Pulse Resp BP MAP (mmHg) SpO2 O2 Device Patient Position - Orthostatic VS Pain    10/17/24 06:57:19 97.8 °F (36.6 °C) 73 18 103/59 74 96 % -- -- --    10/16/24 1947 -- -- -- -- -- -- None (Room air) -- No Pain    10/16/24 19:30:25 97.6 °F (36.4 °C) 70 16 123/75 91 96 % None (Room air) Lying --    10/16/24 1729 98.5 °F (36.9 °C) 81 17 140/63 -- 96 % None (Room air) Lying --              Pertinent Labs/Diagnostic Test Results:   Radiology:  No orders to display     Cardiology:  No orders to display     GI:  No orders to display           Results from last 7 days   Lab Units 10/17/24  0451 10/16/24  1741   WBC Thousand/uL 7.51 9.34   HEMOGLOBIN g/dL 12.2 12.5   HEMATOCRIT % 36.3 37.0   PLATELETS Thousands/uL 170 186   TOTAL NEUT ABS Thousands/µL 4.45 6.39         Results from last 7 days   Lab Units 10/17/24  0451 10/16/24  1741   SODIUM mmol/L 141 135   POTASSIUM mmol/L 3.3* 3.0*   CHLORIDE  mmol/L 104 95*   CO2 mmol/L 26 25   ANION GAP mmol/L 11 15*   BUN mg/dL 70* 76*   CREATININE mg/dL 1.94* 2.30*   EGFR ml/min/1.73sq m 26 21   CALCIUM mg/dL 8.7 9.0   MAGNESIUM mg/dL  --  2.7     Results from last 7 days   Lab Units 10/17/24  0451 10/16/24  1741   AST U/L 23 27   ALT U/L 13 14   ALK PHOS U/L 66 74   TOTAL PROTEIN g/dL 6.9 7.8   ALBUMIN g/dL 3.7 4.2   TOTAL BILIRUBIN mg/dL 0.53 0.57   BILIRUBIN DIRECT mg/dL 0.04 0.05         Results from last 7 days   Lab Units 10/17/24  0451 10/16/24  1741   GLUCOSE RANDOM mg/dL 82 79     BETA-HYDROXYBUTYRATE   Date Value Ref Range Status   09/08/2020 7.1 (H) <0.6 mmol/L Final         Past Medical History:   Diagnosis Date    Acute DVT (deep venous thrombosis) (HCC)     of LLE>     Bladder infection     Cancer (HCC)     breast    Congenital prolapsed rectum     History of biliary stent insertion     History of chemotherapy     History of radiation therapy 05/2018    left breast ca    History of transfusion     x2 s/p chemo treatments, no reaction    Hydronephrosis     right kidney    Hypertension     Malignant neoplasm of overlapping sites of left female breast (HCC) 05/01/2018    Migraine      Present on Admission:   OPAL (acute kidney injury) (HCC) on CKD   Cirrhosis (HCC)   Hypokalemia      Admitting Diagnosis: Hyponatremia [E87.1]  Abnormal laboratory test result [R89.9]  OPAL (acute kidney injury) (HCC) [N17.9]  Age/Sex: 64 y.o. female    Network Utilization Review Department  ATTENTION: Please call with any questions or concerns to [phone] and carefully listen to the prompts so that you are directed to the right person. All voicemails are confidential.   For Discharge needs, contact Care Management DC Support Team at [phone] opt. 2  Send all requests for admission clinical reviews, approved or denied determinations and any other requests to dedicated fax number below belonging to the campus where the patient is receiving treatment. List of dedicated fax  numbers for the Facilities:  FACILITY NAME UR FAX NUMBER   ADMISSION DENIALS (Administrative/Medical Necessity) [phone]   DISCHARGE SUPPORT TEAM (NETWORK) [phone]   PARENT CHILD HEALTH (Maternity/NICU/Pediatrics) [phone]   [facility] [phone]   [facility] [phone]   [facility] [phone]   [facility] [phone]   [facility] [phone]   [facility] [phone]   [facility] [phone]   [facility] [phone]   [facility] [phone]   [facility] [phone]   [facility] [phone]   [facility] [phone]

## 2024-10-18 VITALS
WEIGHT: 141.54 LBS | SYSTOLIC BLOOD PRESSURE: 117 MMHG | HEIGHT: 65 IN | BODY MASS INDEX: 23.58 KG/M2 | DIASTOLIC BLOOD PRESSURE: 65 MMHG | TEMPERATURE: 97.5 F | RESPIRATION RATE: 18 BRPM | HEART RATE: 71 BPM | OXYGEN SATURATION: 96 %

## 2024-10-18 LAB
ANION GAP SERPL CALCULATED.3IONS-SCNC: 6 MMOL/L (ref 4–13)
BUN SERPL-MCNC: 38 MG/DL (ref 5–25)
CALCIUM SERPL-MCNC: 7.2 MG/DL (ref 8.4–10.2)
CHLORIDE SERPL-SCNC: 120 MMOL/L (ref 96–108)
CO2 SERPL-SCNC: 20 MMOL/L (ref 21–32)
CREAT SERPL-MCNC: 1.02 MG/DL (ref 0.6–1.3)
GFR SERPL CREATININE-BSD FRML MDRD: 58 ML/MIN/1.73SQ M
GLUCOSE SERPL-MCNC: 70 MG/DL (ref 65–140)
POTASSIUM SERPL-SCNC: 3.5 MMOL/L (ref 3.5–5.3)
SODIUM SERPL-SCNC: 146 MMOL/L (ref 135–147)

## 2024-10-18 PROCEDURE — 80048 BASIC METABOLIC PNL TOTAL CA: CPT | Performed by: STUDENT IN AN ORGANIZED HEALTH CARE EDUCATION/TRAINING PROGRAM

## 2024-10-18 PROCEDURE — 99239 HOSP IP/OBS DSCHRG MGMT >30: CPT | Performed by: STUDENT IN AN ORGANIZED HEALTH CARE EDUCATION/TRAINING PROGRAM

## 2024-10-18 RX ADMIN — LETROZOLE 2.5 MG: 2.5 TABLET, FILM COATED ORAL at 08:48

## 2024-10-18 RX ADMIN — LEVOTHYROXINE SODIUM 50 MCG: 0.05 TABLET ORAL at 08:48

## 2024-10-18 RX ADMIN — SERTRALINE HYDROCHLORIDE 100 MG: 100 TABLET ORAL at 08:48

## 2024-10-18 RX ADMIN — SODIUM CHLORIDE 75 ML/HR: 0.9 INJECTION, SOLUTION INTRAVENOUS at 07:10

## 2024-10-18 RX ADMIN — HEPARIN SODIUM 5000 UNITS: 5000 INJECTION, SOLUTION INTRAVENOUS; SUBCUTANEOUS at 06:01

## 2024-10-18 NOTE — DISCHARGE SUMMARY
"Medical Problems       Resolved Problems  Date Reviewed: 10/9/2024   None       Discharging Physician / Practitioner: Kaiden Negron MD  PCP: Atif Bess DC  Admission Date:   Admission Orders (From admission, onward)       Ordered        10/16/24 1822  INPATIENT ADMISSION  Once                          Discharge Date: 10/18/24    Consultations During Hospital Stay:  none    Procedures Performed:   none    Significant Findings / Test Results:   No orders to display         Incidental Findings:   none    Test Results Pending at Discharge (will require follow up):   none     Outpatient Tests Requested:  none    Complications:  none    Reason for Admission: Abnormal labs    Hospital Course:   Amelia Hensley is a 64 y.o. female patient who originally presented to the hospital on 10/16/2024 due to OPAL due to dehydration compounded by Lasix and ACE inhibitors.  Patient was started on IV fluids responded well to it making good amount of urine creatinine back to normal      Condition at Discharge: good    Discharge Day Visit / Exam:   Subjective: No overnight event.  No active complaint  Vitals: Blood Pressure: 117/65 (10/18/24 0735)  Pulse: 71 (10/18/24 0735)  Temperature: 97.5 °F (36.4 °C) (10/18/24 0735)  Temp Source: Oral (10/16/24 1930)  Respirations: 18 (10/18/24 0735)  Height: 5' 5\" (165.1 cm) (10/16/24 1930)  Weight - Scale: 64.2 kg (141 lb 8.6 oz) (10/18/24 0600)  SpO2: 96 % (10/18/24 0735)  Exam:   Constitutional: no Acute distress  HEENT: no Pallor or icterus  CVS: S1 plus S2  Respiratory: Normal vascular breathe without crackles and wheeze  Gastroenterology: Soft nontender without any palpable mass  Skin: No bruises or ecchymosis  Neurology: No focal logical deficit     Discussion with Family:  Updated patient.     Discharge instructions/Information to patient and family:   See after visit summary for information provided to patient and family.      Provisions for Follow-Up Care:  See after visit summary for " information related to follow-up care and any pertinent home health orders.      Mobility at time of Discharge:   Basic Mobility Inpatient Raw Score: 23  JH-HLM Goal: 7: Walk 25 feet or more  JH-HLM Achieved: 3: Sit at edge of bed  HLM Goal achieved. Continue to encourage appropriate mobility.     Disposition:   Home    Planned Readmission: none     Discharge Statement:  I spent 37 minutes discharging the patient. This time was spent on the day of discharge. I had direct contact with the patient on the day of discharge. Greater than 50% of the total time was spent examining patient, answering all patient questions, arranging and discussing plan of care with patient as well as directly providing post-discharge instructions.  Additional time then spent on discharge activities.    Discharge Medications:  See after visit summary for reconciled discharge medications provided to patient and/or family.      **Please Note: This note may have been constructed using a voice recognition system**

## 2024-10-18 NOTE — PLAN OF CARE
Problem: PAIN - ADULT  Goal: Verbalizes/displays adequate comfort level or baseline comfort level  Description: Interventions:  - Encourage patient to monitor pain and request assistance  - Assess pain using appropriate pain scale  - Administer analgesics based on type and severity of pain and evaluate response  - Implement non-pharmacological measures as appropriate and evaluate response  - Consider cultural and social influences on pain and pain management  - Notify physician/advanced practitioner if interventions unsuccessful or patient reports new pain  Outcome: Progressing     Problem: INFECTION - ADULT  Goal: Absence or prevention of progression during hospitalization  Description: INTERVENTIONS:  - Assess and monitor for signs and symptoms of infection  - Monitor lab/diagnostic results  - Monitor all insertion sites, i.e. indwelling lines, tubes, and drains  - Monitor endotracheal if appropriate and nasal secretions for changes in amount and color  - [place] appropriate cooling/warming therapies per order  - Administer medications as ordered  - Instruct and encourage patient and family to use good hand hygiene technique  - Identify and instruct in appropriate isolation precautions for identified infection/condition  Outcome: Progressing     Problem: INFECTION - ADULT  Goal: Absence of fever/infection during neutropenic period  Description: INTERVENTIONS:  - Monitor WBC    Outcome: Progressing

## 2024-10-18 NOTE — CASE MANAGEMENT
Case Management Assessment & Discharge Planning Note    Patient name Amelia Hensley  Location /-01 MRN [id number]  : 1960 Date 10/18/2024       Current Admission Date: 10/16/2024  Current Admission Diagnosis:OPAL (acute kidney injury) (HCC) on CKD   Patient Active Problem List    Diagnosis Date Noted Date Diagnosed    COVID-19 10/28/2022     Cirrhosis (HCC) 2022     Ascites 2022     Hypertension 2022     Proteinuria 2022     Abnormal urinalysis 2022     History of alcohol use disorder 2022     Alcoholic hepatitis 2022     Elevated liver enzymes 2022     SIRS (systemic inflammatory response syndrome) (HCC) 2022     Benign hypertension with CKD (chronic kidney disease) stage III (HCC) 2021     Stage 3a chronic kidney disease (HCC) 2021     Anemia 2021     Hypokalemia 2021     Localized swelling of lower extremity 2021     Alcohol abuse 2021     Suspected acute pulmonary embolism (HCC) 12/15/2020     Recurrent syncope 12/15/2020     OPAL (acute kidney injury) (HCC) on CKD 12/15/2020     Closed left fibular fracture 12/15/2020     History of chemotherapy      Alcoholic ketosis (HCC) 2020     SOB (shortness of breath) 2020     Hypoglycemia 2020     Axillary lump, left 2019     Use of letrozole (Femara) 2019     Rectal prolapse 2019     Dilated cbd, acquired 2019     Lesion of bladder 2019     Blood loss anemia 2019     Hemorrhagic cystitis 2018     Heme positive stool 2018     Acute deep vein thrombosis (DVT) of left lower extremity (HCC) 2018     Hyperbilirubinemia 2018     Acute blood loss anemia 2018     Moderate protein-calorie malnutrition (HCC) 2018     Immunocompromised (HCC) 2018     Hematuria 2018     Anemia following use of chemotherapeutic drug 2018     History of DVT (deep vein thrombosis)  10/19/2018     Cellulitis 06/11/2018     History of radiation therapy 05/2018     Hydronephrosis 04/29/2018     Malignant neoplasm of overlapping sites of left breast in female, estrogen receptor positive (HCC) 04/10/2018       LOS (days): 2  Geometric Mean LOS (GMLOS) (days): 2.2  Days to GMLOS:0.5     OBJECTIVE:    Risk of Unplanned Readmission Score: 13.74         Current admission status: Inpatient       Preferred Pharmacy:   [facility]/pharm[facility] #[id number] - [place], [place] - [address]  [address] [address]  Phone: [phone] Fax: [phone]    Primary Care Provider: Atif Bess DC    Primary Insurance: BLUE CROSS  Secondary Insurance:     ASSESSMENT:  Active Health Care Proxies       Rahat Hensley Health Care Agent - Spouse   Primary Phone: [phone] (Mobile)  Home Phone: [phone]                 Advance Directives  Does patient have a Health Care POA?: Yes  Does patient have Advance Directives?: Yes  Advance Directives: Power of  for health care  Primary Contact: Michelle dAams         Readmission Root Cause  30 Day Readmission: No    Patient Information  Admitted from:: Home  Mental Status: Alert  During Assessment patient was accompanied by: Not accompanied during assessment  Assessment information provided by:: Patient  Primary Caregiver: Self  Support Systems: Family members, Spouse/significant other  County of Residence: [place]  What city do you live in?: [place]  Home entry access options. Select all that apply.: Stairs  Number of steps to enter home.: 1  Do the steps have railings?: No  Type of Current Residence: [address] [place] [address] home  Upon entering residence, is there a bedroom on the main floor (no further steps)?: Yes  Upon entering residence, is there a bathroom on the main floor (no further steps)?: Yes  Living Arrangements: Lives w/ Spouse/significant other  Is patient a ?: No    Activities of Daily Living Prior to Admission  Functional Status:  Independent  Completes ADLs independently?: Yes  Ambulates independently?: Yes  Does patient use assisted devices?: No  Does patient currently own DME?: No  Does patient have a history of Outpatient Therapy (PT/OT)?: No  Does the patient have a history of Short-Term Rehab?: No  Does patient have a history of HHC?: No  Does patient currently have HHC?: No         Patient Information Continued  Income Source: SSI/SSD  Does patient have prescription coverage?: Yes  Does patient receive dialysis treatments?: No  Does patient have a history of substance abuse?: No  Does patient have a history of Mental Health Diagnosis?: No         Means of Transportation  Means of Transport to Appts:: Drives Self      Social Determinants of Health (SDOH)      Flowsheet Row Most Recent Value   Housing Stability    In the last 12 months, was there a time when you were not able to pay the mortgage or rent on time? N   In the past 12 months, how many times have you moved where you were living? 1   At any time in the past 12 months, were you homeless or living in a shelter (including now)? N   Transportation Needs    In the past 12 months, has lack of transportation kept you from medical appointments or from getting medications? no   In the past 12 months, has lack of transportation kept you from meetings, work, or from getting things needed for daily living? No   Food Insecurity    Within the past 12 months, you worried that your food would run out before you got the money to buy more. Never true   Within the past 12 months, the food you bought just didn't last and you didn't have money to get more. Never true   Utilities    In the past 12 months has the electric, gas, oil, or water company threatened to shut off services in your home? No            DISCHARGE DETAILS:    Discharge planning discussed with:: Patient at bedside  Freedom of Choice: Yes  Comments - Freedom of Choice: CM discussed discharge planning and freedom of choice if  services are recommended by [facility].  CM contacted family/caregiver?: No- see comments (Patient declined)  Were Treatment Team discharge recommendations reviewed with patient/caregiver?: Yes  Did patient/caregiver verbalize understanding of patient care needs?: Yes  Were patient/caregiver advised of the risks associated with not following Treatment Team discharge recommendations?: Yes         Requested Home Health Care         Is the patient interested in HHC at discharge?: No    DME Referral Provided  Referral made for DME?: No    Other Referral/Resources/Interventions Provided:  Interventions: None Indicated    Would you like to participate in our [facility] service program?  : No - Declined    Treatment Team Recommendation: Home  Discharge Destination Plan:: Home  Transport at Discharge : Family

## 2024-11-04 ENCOUNTER — HOSPITAL ENCOUNTER (OUTPATIENT)
Dept: ULTRASOUND IMAGING | Facility: HOSPITAL | Age: 64
Discharge: HOME | End: 2024-11-04
Payer: COMMERCIAL

## 2024-11-04 DIAGNOSIS — K70.31 ALCOHOLIC CIRRHOSIS OF LIVER WITH ASCITES (HCC): ICD-10-CM

## 2024-11-04 PROCEDURE — 76705 ECHO EXAM OF ABDOMEN: CPT

## 2024-11-08 ENCOUNTER — TELEPHONE (OUTPATIENT)
Dept: GASTROENTEROLOGY | Facility: CLINIC | Age: 64
End: 2024-11-08

## 2024-11-08 NOTE — TELEPHONE ENCOUNTER
----- Message from Zaynab French PA-C sent at 11/8/2024  2:05 PM EST -----  Please advise patient that everything looks good on this US  ----- Message -----  From: Interface, Radiology Results In  Sent: 11/8/2024   1:19 PM EST  To: Zaynab French PA-C

## 2024-11-14 DIAGNOSIS — K70.31 ASCITES DUE TO ALCOHOLIC CIRRHOSIS (HCC): ICD-10-CM

## 2024-11-14 RX ORDER — FUROSEMIDE 20 MG/1
20 TABLET ORAL 2 TIMES DAILY
Qty: 180 TABLET | Refills: 1 | Status: SHIPPED | OUTPATIENT
Start: 2024-11-14

## 2025-04-07 DIAGNOSIS — K70.31 ASCITES DUE TO ALCOHOLIC CIRRHOSIS (HCC): ICD-10-CM

## 2025-04-07 RX ORDER — FUROSEMIDE 20 MG/1
20 TABLET ORAL 2 TIMES DAILY
Qty: 180 TABLET | Refills: 1 | Status: SHIPPED | OUTPATIENT
Start: 2025-04-07

## 2025-04-09 ENCOUNTER — OFFICE VISIT (OUTPATIENT)
Dept: GASTROENTEROLOGY | Facility: CLINIC | Age: 65
End: 2025-04-09
Payer: COMMERCIAL

## 2025-04-09 VITALS
OXYGEN SATURATION: 99 % | HEART RATE: 76 BPM | BODY MASS INDEX: 22.49 KG/M2 | WEIGHT: 135 LBS | TEMPERATURE: 97.6 F | HEIGHT: 65 IN | DIASTOLIC BLOOD PRESSURE: 79 MMHG | SYSTOLIC BLOOD PRESSURE: 128 MMHG

## 2025-04-09 DIAGNOSIS — K70.31 ASCITES DUE TO ALCOHOLIC CIRRHOSIS (HCC): Primary | ICD-10-CM

## 2025-04-09 DIAGNOSIS — K72.10 CHRONIC LIVER FAILURE WITHOUT HEPATIC COMA (HCC): ICD-10-CM

## 2025-04-09 DIAGNOSIS — K70.31 ALCOHOLIC CIRRHOSIS OF LIVER WITH ASCITES (HCC): ICD-10-CM

## 2025-04-09 PROCEDURE — 99213 OFFICE O/P EST LOW 20 MIN: CPT | Performed by: PHYSICIAN ASSISTANT

## 2025-04-09 NOTE — ASSESSMENT & PLAN NOTE
Update labs to calculate MELD however has been very low in the past 1 year  No LE edema or abdominal swelling  She stopped Furosemide in January   She is going to stop Spironolactone    She reports having a recent US with an oncologist in [place] - will obtain  Labs ordered and handed to patient    Advised EGD for varices surveillance - she defers. Explained risks of variceal bleeding  Advised colonoscopy for colon cancer screening - she defers.  Explained risks of colon cancer

## 2025-04-09 NOTE — PROGRESS NOTES
Name: Amelia Hensley      : 1960      MRN: [id number]  Encounter Provider: Zaynab French PA-C  Encounter Date: 2025   Encounter department: [facility] [place]  :  Assessment & Plan  Alcoholic cirrhosis of liver with ascites (HCC)  Update labs to calculate MELD however has been very low in the past 1 year  No LE edema or abdominal swelling  She stopped Furosemide in January   She is going to stop Spironolactone    She reports having a recent US with an oncologist in [place] - will obtain  Labs ordered and handed to patient    Advised EGD for varices surveillance - she defers. Explained risks of variceal bleeding  Advised colonoscopy for colon cancer screening - she defers.  Explained risks of colon cancer             History of Present Illness   HPI  Amelia Hensley is a 64 y.o. female with alcohol cirrhosis who presents for routine follow-up.  She is doing very well.  She continues to not drink alcohol.  She has been sober for 3 years now.  She denies any abdominal pain, nausea or vomiting.  Her bowel movements are regular.  She denies any rectal bleeding or melena.  She reports having had an ultrasound with an oncologist in [place] recently.  She is due for labs and has an order from her family doctor.  She stopped her furosemide in January and has not had any increased swelling.  She remains on spironolactone but is wanting to try to stop this as well.  She tries to watch the sodium in her diet.  She is sleeping well.  There is no confusion issues.  Her last endoscopy was in 2022 with a single ulcer noted in the duodenum otherwise normal exam.  She has never had a colonoscopy.      Review of Systems  Medical History Reviewed by provider this encounter:     .  Past Medical History   Past Medical History:   Diagnosis Date    Acute DVT (deep venous thrombosis) (HCC)     of LLE>     Bladder infection     Cancer (HCC)     breast    Congenital prolapsed rectum      History of biliary stent insertion     History of chemotherapy     History of radiation therapy 05/2018    left breast ca    History of transfusion     x2 s/p chemo treatments, no reaction    Hydronephrosis     right kidney    Hypertension     Malignant neoplasm of overlapping sites of left female breast (HCC) 05/01/2018    Migraine      Past Surgical History:   Procedure Laterality Date    ABDOMINAL ADHESION SURGERY N/A 4/23/2019    Procedure: LYSIS ADHESIONS;  Surgeon: Nancy Live MD;  [facility];  Service: Colorectal    BREAST BIOPSY      COLON SURGERY      colon resection    CYSTOSCOPY N/A 3/4/2019    Procedure: CYSTOSCOPY WITH BIOPSIES AND FULGERATION AND REDCUTION OF RECTUM PROLAPSE;  Surgeon: Richmond Hawley MD;  [facility];  Service: Urology    ERCP N/A 1/4/2019    Procedure: ENDOSCOPIC RETROGRADE CHOLANGIOPANCREATOGRAPHY (ERCP);  Surgeon: Alphonso Rai MD;  [facility];  Service: Gastroenterology    INSTILLATION MYTOMYCIN N/A 3/4/2019    Procedure: INSTILLATION MITOMYCIN;  Surgeon: Richmond Hawley MD;  [facility];  Service: Urology    IR PARACENTESIS  3/21/2022    IR PARACENTESIS  6/1/2022    IR PARACENTESIS  7/15/2022    IR PARACENTESIS  8/30/2022    IR PARACENTESIS  10/4/2022    IR PARACENTESIS  10/28/2022    IR PARACENTESIS  11/4/2022    IR PARACENTESIS  12/6/2022    IR PARACENTESIS  12/29/2022    MASTECTOMY Left     2018    SD COLONOSCOPY FLX DX W/COLLJ SPEC WHEN PFRMD N/A 4/22/2019    Procedure: COLONOSCOPY;  Surgeon: Nancy Live MD;  [facility]: [facility];  Service: Colorectal    SD EDG US EXAM SURGICAL ALTER STOM DUODENUM/JEJUNUM N/A 3/7/2019    Procedure: LINEAR ENDOSCOPIC U/S;  Surgeon: Gordon Shah MD;  [facility];  Service: Gastroenterology    SD ESOPHAGOGASTRODUODENOSCOPY TRANSORAL DIAGNOSTIC N/A 3/7/2019    Procedure: ESOPHAGOGASTRODUODENOSCOPY (EGD);  Surgeon: Gordon Shah MD;  [facility];  Service:  Gastroenterology    NE INSJ TUNNELED CTR VAD W/SUBQ PORT AGE 5 YR/> N/A 6/26/2018    Procedure: INSERTION VENOUS PORT ( PORT-A-CATH) IR;  Surgeon: Tutu Collier DO;  [facility];  Service: Interventional Radiology    NE LAPAROSCOPY PROCTOPEXY PROLAPSE N/A 4/23/2019    Procedure: LAPAROSCOPIC HAND-ASSIST PELVIC DISSECTION; proctopexy;  Surgeon: Nancy Live MD;  [facility];  Service: Colorectal    NE LAPAROSCOPY PROCTOPEXY PROLAPSE N/A 11/18/2020    Procedure: LAPAROSCOPIC LYSIS OF ADHESIONS, MOBILIZATION OF RECTUM AND SUTURE RECTOPEXY;  Surgeon: Richmond Terrell MD;  [facility];  Service: Colorectal    NE MAST MODF RAD W/AX LYMPH NOD W/WO PECT/LEONORA MIN Left 5/15/2018    Procedure: BREAST MODIFIED RADICAL MASTECTOMY;  Surgeon: Darrick Hilliard MD;  [facility];  Service: Surgical Oncology    NE RMVL [place] CTR VAD W/SUBQ PORT/ CTR/PRPH INSJ N/A 6/4/2019    Procedure: REMOVAL VENOUS PORT (PORT-A-CATH)IR;  Surgeon: Tutu Collier DO;  [facility];  Service: Interventional Radiology    TUBAL LIGATION      US GUIDANCE BREAST BIOPSY LEFT EACH ADDITIONAL Left 4/3/2018    US GUIDED BREAST BIOPSY LEFT COMPLETE Left 4/3/2018    US GUIDED BREAST LYMPH NODE BIOPSY LEFT Left 4/3/2018     Family History   Problem Relation Age of Onset    No Known Problems Mother     No Known Problems Father     Breast cancer Maternal Aunt 50    Colon cancer Maternal Aunt     No Known Problems Sister     No Known Problems Daughter     No Known Problems Maternal Grandmother     No Known Problems Maternal Grandfather     No Known Problems Paternal Grandmother     No Known Problems Paternal Grandfather       reports that she has never smoked. She has never used smokeless tobacco. She reports that she does not currently use alcohol. She reports that she does not currently use drugs.  Current Outpatient Medications   Medication Instructions    Ascorbic Acid (VITAMIN C PO) Take by mouth     Bioflavonoid Products (REBA VITAMIN C-FLAVONOIDS PO) Take by mouth    [employer] Magnesium Oxide 250 MG TABS TAKE 1 TABLET (250 MG TOTAL) BY MOUTH IN THE MORNING    ferrous sulfate 325 mg, Oral, 2 times daily    furosemide (LASIX) 20 mg, Oral, 2 times daily    gabapentin (NEURONTIN) 600 mg, Daily at bedtime    letrozole (FEMARA) 2.5 mg tablet TAKE 1 TABLET BY MOUTH EVERY DAY    levothyroxine 50 mcg, Daily    liothyronine (CYTOMEL) 25 mcg tablet TAKE 1 TABLET EVERY MORNING 1 HOUR BEFORE BREAKFAST    Potassium Gluconate 550 (90 K) MG TABS Take by mouth    sertraline (ZOLOFT) 100 mg, Daily    spironolactone (ALDACTONE) 50 mg, Oral, Daily    SUMAtriptan (IMITREX) 50 mg tablet 1 TABLET AT ONSET OF MIGRAINE   No Known Allergies   Current Outpatient Medications on File Prior to Visit   Medication Sig Dispense Refill    Ascorbic Acid (VITAMIN C PO) Take by mouth      Bioflavonoid Products (REBA VITAMIN C-FLAVONOIDS PO) Take by mouth      [employer] Magnesium Oxide 250 MG TABS TAKE 1 TABLET (250 MG TOTAL) BY MOUTH IN THE MORNING 90 tablet 3    ferrous sulfate 325 (65 Fe) mg tablet Take 1 tablet (325 mg total) by mouth 2 (two) times a day 60 tablet 3    gabapentin (NEURONTIN) 600 MG tablet Take 600 mg by mouth daily at bedtime      letrozole (FEMARA) 2.5 mg tablet TAKE 1 TABLET BY MOUTH EVERY DAY 90 tablet 3    levothyroxine 50 mcg tablet Take 50 mcg by mouth daily      liothyronine (CYTOMEL) 25 mcg tablet TAKE 1 TABLET EVERY MORNING 1 HOUR BEFORE BREAKFAST      Potassium Gluconate 550 (90 K) MG TABS Take by mouth      sertraline (ZOLOFT) 100 mg tablet Take 100 mg by mouth daily      spironolactone (ALDACTONE) 50 mg tablet Take 1 tablet (50 mg total) by mouth daily 30 tablet 11    SUMAtriptan (IMITREX) 50 mg tablet 1 TABLET AT ONSET OF MIGRAINE      furosemide (LASIX) 20 mg tablet TAKE 1 TABLET BY MOUTH TWICE A DAY (Patient not taking: Reported on 4/9/2025) 180 tablet 1     No current facility-administered medications on file prior to  "visit.      Social History     Tobacco Use    Smoking status: Never    Smokeless tobacco: Never   Vaping Use    Vaping status: Never Used   Substance and Sexual Activity    Alcohol use: Not Currently     Comment: Drinks daily until three weeks ago    Drug use: Not Currently    Sexual activity: Yes     Birth control/protection: Female Sterilization        Objective   /79 (BP Location: Right arm, Patient Position: Sitting, Cuff Size: Standard)   Pulse 76   Temp 97.6 °F (36.4 °C) (Temporal)   Ht 5' 5\" (1.651 m)   Wt 61.2 kg (135 lb)   LMP  (LMP Unknown)   SpO2 99%   BMI 22.47 kg/m²      Physical Exam  Vitals reviewed.   Constitutional:       Appearance: Normal appearance.   HENT:      Head: Normocephalic and atraumatic.   Eyes:      Extraocular Movements: Extraocular movements intact.      Pupils: Pupils are equal, round, and reactive to light.   Cardiovascular:      Rate and Rhythm: Normal rate and regular rhythm.   Abdominal:      General: Bowel sounds are normal. There is no distension.      Palpations: Abdomen is soft. There is no mass.      Tenderness: There is no abdominal tenderness.   Skin:     General: Skin is warm and dry.      Coloration: Skin is not jaundiced.   Neurological:      General: No focal deficit present.      Mental Status: She is alert and oriented to person, place, and time.           "

## 2025-04-10 ENCOUNTER — LAB REQUISITION (OUTPATIENT)
Dept: LAB | Facility: HOSPITAL | Age: 65
End: 2025-04-10

## 2025-04-10 DIAGNOSIS — C50.912 MALIGNANT NEOPLASM OF UNSPECIFIED SITE OF LEFT FEMALE BREAST (HCC): ICD-10-CM

## 2025-04-11 ENCOUNTER — HOSPITAL ENCOUNTER (OUTPATIENT)
Dept: RADIOLOGY | Facility: MEDICAL CENTER | Age: 65
Discharge: HOME | End: 2025-04-11
Payer: COMMERCIAL

## 2025-04-11 VITALS — BODY MASS INDEX: 22.49 KG/M2 | WEIGHT: 135 LBS | HEIGHT: 65 IN

## 2025-04-11 DIAGNOSIS — Z12.31 ENCOUNTER FOR SCREENING MAMMOGRAM FOR MALIGNANT NEOPLASM OF BREAST: ICD-10-CM

## 2025-04-11 PROCEDURE — 77063 BREAST TOMOSYNTHESIS BI: CPT

## 2025-04-11 PROCEDURE — 77067 SCR MAMMO BI INCL CAD: CPT

## 2025-04-15 ENCOUNTER — RESULTS FOLLOW-UP (OUTPATIENT)
Dept: GASTROENTEROLOGY | Facility: CLINIC | Age: 65
End: 2025-04-15

## 2025-04-15 LAB
AFP-TM SERPL-MCNC: 1.8 NG/ML
ALBUMIN SERPL-MCNC: 4.1 G/DL (ref 3.6–5.1)
ALBUMIN/GLOB SERPL: 1.4 (CALC) (ref 1–2.5)
ALP SERPL-CCNC: 83 U/L (ref 37–153)
ALT SERPL-CCNC: 14 U/L (ref 6–29)
AST SERPL-CCNC: 24 U/L (ref 10–35)
BILIRUB SERPL-MCNC: 0.7 MG/DL (ref 0.2–1.2)
BUN SERPL-MCNC: 17 MG/DL (ref 7–25)
BUN/CREAT SERPL: NORMAL (CALC) (ref 6–22)
CALCIUM SERPL-MCNC: 9.5 MG/DL (ref 8.6–10.4)
CHLORIDE SERPL-SCNC: 105 MMOL/L (ref 98–110)
CO2 SERPL-SCNC: 28 MMOL/L (ref 20–32)
CREAT SERPL-MCNC: 0.98 MG/DL (ref 0.5–1.05)
ERYTHROCYTE [DISTWIDTH] IN BLOOD BY AUTOMATED COUNT: 12.6 % (ref 11–15)
GFR/BSA.PRED SERPLBLD CYS-BASED-ARV: 65 ML/MIN/1.73M2
GLOBULIN SER CALC-MCNC: 3 G/DL (CALC) (ref 1.9–3.7)
GLUCOSE SERPL-MCNC: 79 MG/DL (ref 65–99)
HCT VFR BLD AUTO: 39.3 % (ref 35–45)
HGB BLD-MCNC: 13.2 G/DL (ref 11.7–15.5)
INR PPP: 1.1
MCH RBC QN AUTO: 32.4 PG (ref 27–33)
MCHC RBC AUTO-ENTMCNC: 33.6 G/DL (ref 32–36)
MCV RBC AUTO: 96.3 FL (ref 80–100)
PLATELET # BLD AUTO: 190 THOUSAND/UL (ref 140–400)
PMV BLD REES-ECKER: 11 FL (ref 7.5–12.5)
POTASSIUM SERPL-SCNC: 4.4 MMOL/L (ref 3.5–5.3)
PROT SERPL-MCNC: 7.1 G/DL (ref 6.1–8.1)
PROTHROMBIN TIME: 11.4 SEC (ref 9–11.5)
RBC # BLD AUTO: 4.08 MILLION/UL (ref 3.8–5.1)
SODIUM SERPL-SCNC: 141 MMOL/L (ref 135–146)
WBC # BLD AUTO: 6.2 THOUSAND/UL (ref 3.8–10.8)

## 2025-06-13 LAB — SCAN RESULT: NORMAL

## 2025-07-08 NOTE — PLAN OF CARE
Problem: Knowledge Deficit  Goal: Patient/family/caregiver demonstrates understanding of disease process, treatment plan, medications, and discharge instructions  Complete learning assessment and assess knowledge base    Interventions:  - Provide teaching at level of understanding  - Provide teaching via preferred learning methods  Outcome: Progressing
Overactive bladder

## (undated) DEVICE — STERILE CYSTO PACK: Brand: CARDINAL HEALTH

## (undated) DEVICE — CHLORAPREP HI-LITE 26ML ORANGE

## (undated) DEVICE — 40595 XL TRENDELENBURG POSITIONING KIT: Brand: 40595 XL TRENDELENBURG POSITIONING KIT

## (undated) DEVICE — SUT PROLENE 2-0 KS 30 IN 8623H

## (undated) DEVICE — PENCIL ELECTROSURG E-Z CLEAN -0035H

## (undated) DEVICE — CATH URETHERAL CONNECTOR

## (undated) DEVICE — ADHESIVE SKN CLSR HISTOACRYL FLEX 0.5ML LF

## (undated) DEVICE — STERILE UNIVERSAL BREAST PACK: Brand: CARDINAL HEALTH

## (undated) DEVICE — INTENDED FOR TISSUE SEPARATION, AND OTHER PROCEDURES THAT REQUIRE A SHARP SURGICAL BLADE TO PUNCTURE OR CUT.: Brand: BARD-PARKER SAFETY BLADES SIZE 15, STERILE

## (undated) DEVICE — INTENDED FOR TISSUE SEPARATION, AND OTHER PROCEDURES THAT REQUIRE A SHARP SURGICAL BLADE TO PUNCTURE OR CUT.: Brand: BARD-PARKER SAFETY BLADES SIZE 11, STERILE

## (undated) DEVICE — ACCESS PLATFORM FOR MINIMALLY INVASIVE SURGERY.: Brand: GELPORT® LAPAROSCOPIC  SYSTEM

## (undated) DEVICE — DRAPE TOWEL: Brand: CONVERTORS

## (undated) DEVICE — BAG DECANTER

## (undated) DEVICE — MAYO STAND COVER: Brand: CONVERTORS

## (undated) DEVICE — CHLORAPREP HI-LITE 10.5ML ORANGE

## (undated) DEVICE — VISUALIZATION SYSTEM: Brand: CLEARIFY

## (undated) DEVICE — TROCAR: Brand: KII FIOS FIRST ENTRY

## (undated) DEVICE — MINOR PROCEDURE DRAPE: Brand: CONVERTORS

## (undated) DEVICE — GLOVE INDICATOR PI UNDERGLOVE SZ 8 BLUE

## (undated) DEVICE — GLOVE SRG BIOGEL ECLIPSE 7.5

## (undated) DEVICE — SUT VICRYL 0 REEL 54 IN J287G

## (undated) DEVICE — TROCAR: Brand: KII® SLEEVE

## (undated) DEVICE — SYRINGE 10ML LL

## (undated) DEVICE — GUIDEWIRE ANGLE TIP 0.038 IN SOLO PLUS

## (undated) DEVICE — SUT MONOCRYL 4-0 PS-2 18 IN Y496G

## (undated) DEVICE — SUT PDS II 1 CTX 36 IN Z371T

## (undated) DEVICE — DRAIN SPONGES,6 PLY: Brand: EXCILON

## (undated) DEVICE — ENDOPATH 5MM CURVED SCISSORS WITH MONOPOLAR CAUTERY: Brand: ENDOPATH

## (undated) DEVICE — BETHLEHEM UNIVERSAL OUTPATIENT: Brand: CARDINAL HEALTH

## (undated) DEVICE — SUT PDS II 2-0 CT-1 27 IN Z339H

## (undated) DEVICE — GLOVE INDICATOR PI UNDERGLOVE SZ 7 BLUE

## (undated) DEVICE — TUBING SMOKE EVAC W/FILTRATION DEVICE PLUMEPORT ACTIV

## (undated) DEVICE — PACK TUR

## (undated) DEVICE — ENSEAL LAPAROSCOPIC TISSUE SEALER G2 CURVED JAW FOR USE WITH G2 GENERATOR 5MM DIAMETER 35CM SHAFT LENGTH: Brand: ENSEAL

## (undated) DEVICE — INSULATED BLADE ELECTRODE;CAUTION: FOR MANUFACTURING, PROCESSING, OR REPACKING.: Brand: EDGE

## (undated) DEVICE — STERILE SURGICAL LUBRICANT,  TUBE: Brand: SURGILUBE

## (undated) DEVICE — PLUMEPEN PRO 10FT

## (undated) DEVICE — GOWN ,SIRUS ,NONREINFORCED 4XL: Brand: MEDLINE

## (undated) DEVICE — ADHESIVE SKIN HIGH VISCOSITY EXOFIN 1ML

## (undated) DEVICE — SUT SILK 2-0 SH 30 IN K833H

## (undated) DEVICE — INTENDED FOR TISSUE SEPARATION, AND OTHER PROCEDURES THAT REQUIRE A SHARP SURGICAL BLADE TO PUNCTURE OR CUT.: Brand: BARD-PARKER SAFETY BLADES SIZE 10, STERILE

## (undated) DEVICE — TRAY FOLEY 16FR URIMETER SILICONE SURESTEP

## (undated) DEVICE — SUT VICRYL 0 CT-1 27 IN J260H

## (undated) DEVICE — NEEDLE 25G X 1 1/2

## (undated) DEVICE — UNDER BUTTOCKS DRAPE W/FLUID CONTROL POUCH: Brand: CONVERTORS

## (undated) DEVICE — CATH URETERAL 5FR X 70 CM FLEX TIP POLYUR BARD

## (undated) DEVICE — LIGHT GLOVE GREEN

## (undated) DEVICE — LIGAMAX 5 MM ENDOSCOPIC MULTIPLE CLIP APPLIER: Brand: LIGAMAX

## (undated) DEVICE — VIAL DECANTER

## (undated) DEVICE — REM POLYHESIVE ADULT PATIENT RETURN ELECTRODE: Brand: VALLEYLAB

## (undated) DEVICE — GAUZE SPONGES,16 PLY: Brand: CURITY

## (undated) DEVICE — SYRINGE 5ML LL

## (undated) DEVICE — PAD GROUNDING ADULT

## (undated) DEVICE — GLOVE SRG BIOGEL 7

## (undated) DEVICE — CATH SECURE FOLEY

## (undated) DEVICE — SUT VICRYL 1 CT-1 27 IN J261H

## (undated) DEVICE — INSUFLATION TUBING INSUFLOW (LEXION)

## (undated) DEVICE — BETHLEHEM UNIVERSAL MINOR GEN: Brand: CARDINAL HEALTH

## (undated) DEVICE — SUT VICRYL 0 UR-6 27 IN J603H

## (undated) DEVICE — 3M™ IOBAN™ 2 ANTIMICROBIAL INCISE DRAPE 6650EZ: Brand: IOBAN™ 2

## (undated) DEVICE — ELECTRODE BLADE MOD  E-Z CLEAN 6.5IN -0014M

## (undated) DEVICE — DRAPE C-ARM X-RAY

## (undated) DEVICE — PREMIUM DRY TRAY LF: Brand: MEDLINE INDUSTRIES, INC.

## (undated) DEVICE — 3000CC GUARDIAN II: Brand: GUARDIAN

## (undated) DEVICE — LIGHT HANDLE COVER SLEEVE DISP BLUE STELLAR

## (undated) DEVICE — URIMETER 2500ML

## (undated) DEVICE — CHLORHEXIDINE 4PCT 4 OZ

## (undated) DEVICE — SPECIMEN CONTAINER STERILE PEEL PACK

## (undated) DEVICE — GAUZE SPONGES,USP TYPE VII GAUZE, 12 PLY: Brand: CURITY

## (undated) DEVICE — CATH FOLEY 18FR 5ML 2 WAY SILICONE ELASTIMER

## (undated) DEVICE — 3M™ TEGADERM™ TRANSPARENT FILM DRESSING FRAME STYLE, 1626W, 4 IN X 4-3/4 IN (10 CM X 12 CM), 50/CT 4CT/CASE: Brand: 3M™ TEGADERM™

## (undated) DEVICE — IRRIG ENDO FLO TUBING

## (undated) DEVICE — GLOVE INDICATOR PI UNDERGLOVE SZ 7.5 BLUE

## (undated) DEVICE — GLOVE SRG BIOGEL 6.5

## (undated) DEVICE — SUT VICRYL 3-0 SH 27 IN J416H

## (undated) DEVICE — POOLE SUCTION HANDLE: Brand: CARDINAL HEALTH

## (undated) DEVICE — SAMPLING DEVICE  ERCP INFINITY 9FR

## (undated) DEVICE — INVIEW CLEAR LEGGINGS: Brand: CONVERTORS

## (undated) DEVICE — ENSEAL LAPAROSCOPIC TISSUE SEALER G2 STRAIGHT JAW FOR USE WITH G2 GENERATOR 5MM DIAMETER 35CM SHAFT LENGTH: Brand: ENSEAL

## (undated) DEVICE — SPHINCTEROTOME: Brand: DREAMTOME™ RX 44

## (undated) DEVICE — Device: Brand: DEFENDO AIR/WATER/SUCTION AND BIOPSY VALVE

## (undated) DEVICE — TROCARS: Brand: KII® BALLOON BLUNT TIP SYSTEM

## (undated) DEVICE — CO2 AND WATER TUBING/CAP SET FOR OLYMPUS® SCOPES & UCR: Brand: ERBE

## (undated) DEVICE — SUT VICRYL 2-0 SH 27 IN UNDYED J417H

## (undated) DEVICE — SMOKE EVACUATION TUBING WITH 8 IN INTEGRAL WAND AND SPONGE GUARD: Brand: BUFFALO FILTER

## (undated) DEVICE — GLOVE SRG BIOGEL 8

## (undated) DEVICE — VEST COOLING PLUS SZ SNGL USE

## (undated) DEVICE — LOCKING DEVICE AND BIOPSY CAP FOR USE WITH RX BILIARY SYSTEM: Brand: RX LOCKING DEVICE AND BIOPSY CAP

## (undated) DEVICE — TRAVELKIT CONTAINS FIRST STEP KIT (200ML EP-4 KIT) AND SOILED SCOPE BAG - 1 KIT: Brand: TRAVELKIT CONTAINS FIRST STEP KIT AND SOILED SCOPE BAG

## (undated) DEVICE — SCD SEQUENTIAL COMPRESSION COMFORT SLEEVE MEDIUM KNEE LENGTH: Brand: KENDALL SCD

## (undated) DEVICE — 3M™ STERI-STRIP™ REINFORCED ADHESIVE SKIN CLOSURES, R1547, 1/2 IN X 4 IN (12 MM X 100 MM), 6 STRIPS/ENVELOPE: Brand: 3M™ STERI-STRIP™

## (undated) DEVICE — PACK PBDS STERILE LAP LITHOTOMY RF

## (undated) DEVICE — CHEST/BREAST DRAPE: Brand: CONVERTORS

## (undated) DEVICE — ULTRASOUND GEL STERILE FOIL PK

## (undated) DEVICE — COVER PROBE INTRAOPERATIVE 6 X 96 IN

## (undated) DEVICE — UROCATCH BAG

## (undated) DEVICE — Device: Brand: OLYMPUS

## (undated) DEVICE — GLOVE SRG BIOGEL 7.5

## (undated) DEVICE — GUIDEWIRE STRGHT TIP 0.035 IN  SOLO PLUS

## (undated) DEVICE — BETHLEHEM MAJOR GENERAL PACK: Brand: CARDINAL HEALTH

## (undated) DEVICE — WOUND RETRACTOR AND PROTECTOR: Brand: ALEXIS O WOUND PROTECTOR-RETRACTOR

## (undated) DEVICE — DELIVERY SYSTEM NAVFLX 10FR X 202.5CM